# Patient Record
Sex: FEMALE | Race: WHITE | NOT HISPANIC OR LATINO | Employment: OTHER | URBAN - METROPOLITAN AREA
[De-identification: names, ages, dates, MRNs, and addresses within clinical notes are randomized per-mention and may not be internally consistent; named-entity substitution may affect disease eponyms.]

---

## 2017-01-03 ENCOUNTER — COMMUNICATION - HEALTHEAST (OUTPATIENT)
Dept: FAMILY MEDICINE | Facility: CLINIC | Age: 42
End: 2017-01-03

## 2017-01-03 DIAGNOSIS — J45.909 ASTHMA: ICD-10-CM

## 2017-01-05 ENCOUNTER — COMMUNICATION - HEALTHEAST (OUTPATIENT)
Dept: FAMILY MEDICINE | Facility: CLINIC | Age: 42
End: 2017-01-05

## 2017-01-05 DIAGNOSIS — F16.283 FLASHBACKS (H): ICD-10-CM

## 2017-01-19 ENCOUNTER — COMMUNICATION - HEALTHEAST (OUTPATIENT)
Dept: FAMILY MEDICINE | Facility: CLINIC | Age: 42
End: 2017-01-19

## 2017-02-03 ENCOUNTER — COMMUNICATION - HEALTHEAST (OUTPATIENT)
Dept: FAMILY MEDICINE | Facility: CLINIC | Age: 42
End: 2017-02-03

## 2017-02-03 DIAGNOSIS — J45.909 ASTHMA: ICD-10-CM

## 2017-02-06 ENCOUNTER — COMMUNICATION - HEALTHEAST (OUTPATIENT)
Dept: FAMILY MEDICINE | Facility: CLINIC | Age: 42
End: 2017-02-06

## 2017-03-07 ENCOUNTER — COMMUNICATION - HEALTHEAST (OUTPATIENT)
Dept: FAMILY MEDICINE | Facility: CLINIC | Age: 42
End: 2017-03-07

## 2017-03-07 DIAGNOSIS — K21.9 GERD (GASTROESOPHAGEAL REFLUX DISEASE): ICD-10-CM

## 2017-03-15 ENCOUNTER — COMMUNICATION - HEALTHEAST (OUTPATIENT)
Dept: SCHEDULING | Facility: CLINIC | Age: 42
End: 2017-03-15

## 2017-04-04 ENCOUNTER — COMMUNICATION - HEALTHEAST (OUTPATIENT)
Dept: FAMILY MEDICINE | Facility: CLINIC | Age: 42
End: 2017-04-04

## 2017-04-13 ENCOUNTER — COMMUNICATION - HEALTHEAST (OUTPATIENT)
Dept: FAMILY MEDICINE | Facility: CLINIC | Age: 42
End: 2017-04-13

## 2017-04-13 DIAGNOSIS — K59.00 CONSTIPATION: ICD-10-CM

## 2017-04-17 ENCOUNTER — COMMUNICATION - HEALTHEAST (OUTPATIENT)
Dept: SCHEDULING | Facility: CLINIC | Age: 42
End: 2017-04-17

## 2017-04-17 DIAGNOSIS — T78.2XXA: ICD-10-CM

## 2017-04-24 ENCOUNTER — COMMUNICATION - HEALTHEAST (OUTPATIENT)
Dept: INTERNAL MEDICINE | Facility: CLINIC | Age: 42
End: 2017-04-24

## 2017-04-24 DIAGNOSIS — T78.2XXA: ICD-10-CM

## 2017-04-25 ENCOUNTER — COMMUNICATION - HEALTHEAST (OUTPATIENT)
Dept: FAMILY MEDICINE | Facility: CLINIC | Age: 42
End: 2017-04-25

## 2017-05-04 ENCOUNTER — COMMUNICATION - HEALTHEAST (OUTPATIENT)
Dept: SCHEDULING | Facility: CLINIC | Age: 42
End: 2017-05-04

## 2017-05-04 DIAGNOSIS — T78.2XXA: ICD-10-CM

## 2017-05-25 ENCOUNTER — COMMUNICATION - HEALTHEAST (OUTPATIENT)
Dept: SCHEDULING | Facility: CLINIC | Age: 42
End: 2017-05-25

## 2018-07-27 ENCOUNTER — TRANSFERRED RECORDS (OUTPATIENT)
Dept: HEALTH INFORMATION MANAGEMENT | Facility: CLINIC | Age: 43
End: 2018-07-27

## 2019-01-18 ENCOUNTER — RECORDS - HEALTHEAST (OUTPATIENT)
Dept: ADMINISTRATIVE | Facility: OTHER | Age: 44
End: 2019-01-18

## 2019-01-30 ENCOUNTER — RECORDS - HEALTHEAST (OUTPATIENT)
Dept: ADMINISTRATIVE | Facility: OTHER | Age: 44
End: 2019-01-30

## 2019-01-31 ENCOUNTER — RECORDS - HEALTHEAST (OUTPATIENT)
Dept: ADMINISTRATIVE | Facility: OTHER | Age: 44
End: 2019-01-31

## 2019-02-05 ENCOUNTER — RECORDS - HEALTHEAST (OUTPATIENT)
Dept: ADMINISTRATIVE | Facility: OTHER | Age: 44
End: 2019-02-05

## 2019-02-13 ENCOUNTER — COMMUNICATION - HEALTHEAST (OUTPATIENT)
Dept: SCHEDULING | Facility: CLINIC | Age: 44
End: 2019-02-13

## 2019-03-08 ENCOUNTER — OFFICE VISIT - HEALTHEAST (OUTPATIENT)
Dept: FAMILY MEDICINE | Facility: CLINIC | Age: 44
End: 2019-03-08

## 2019-03-08 DIAGNOSIS — I10 ESSENTIAL HYPERTENSION: ICD-10-CM

## 2019-03-08 DIAGNOSIS — J45.40 MODERATE PERSISTENT ASTHMA WITHOUT COMPLICATION: ICD-10-CM

## 2019-03-08 DIAGNOSIS — Z86.711 PERSONAL HISTORY OF PE (PULMONARY EMBOLISM): ICD-10-CM

## 2019-03-08 DIAGNOSIS — Z01.818 PRE-OPERATIVE EXAMINATION: ICD-10-CM

## 2019-03-08 DIAGNOSIS — E66.01 MORBID OBESITY (H): ICD-10-CM

## 2019-03-08 LAB
ANION GAP SERPL CALCULATED.3IONS-SCNC: 8 MMOL/L (ref 5–18)
BUN SERPL-MCNC: 11 MG/DL (ref 8–22)
CALCIUM SERPL-MCNC: 9 MG/DL (ref 8.5–10.5)
CHLORIDE BLD-SCNC: 108 MMOL/L (ref 98–107)
CO2 SERPL-SCNC: 24 MMOL/L (ref 22–31)
CREAT SERPL-MCNC: 0.79 MG/DL (ref 0.6–1.1)
ERYTHROCYTE [DISTWIDTH] IN BLOOD BY AUTOMATED COUNT: 12.8 % (ref 11–14.5)
GFR SERPL CREATININE-BSD FRML MDRD: >60 ML/MIN/1.73M2
GLUCOSE BLD-MCNC: 92 MG/DL (ref 70–125)
HCT VFR BLD AUTO: 40.2 % (ref 35–47)
HGB BLD-MCNC: 13.6 G/DL (ref 12–16)
MCH RBC QN AUTO: 28.7 PG (ref 27–34)
MCHC RBC AUTO-ENTMCNC: 34 G/DL (ref 32–36)
MCV RBC AUTO: 84 FL (ref 80–100)
PLATELET # BLD AUTO: 279 THOU/UL (ref 140–440)
PMV BLD AUTO: 8.6 FL (ref 7–10)
POTASSIUM BLD-SCNC: 3.7 MMOL/L (ref 3.5–5)
RBC # BLD AUTO: 4.76 MILL/UL (ref 3.8–5.4)
SODIUM SERPL-SCNC: 140 MMOL/L (ref 136–145)
WBC: 7.6 THOU/UL (ref 4–11)

## 2019-03-08 ASSESSMENT — MIFFLIN-ST. JEOR: SCORE: 1699.67

## 2019-03-11 ENCOUNTER — AMBULATORY - HEALTHEAST (OUTPATIENT)
Dept: PHARMACY | Facility: CLINIC | Age: 44
End: 2019-03-11

## 2019-03-11 DIAGNOSIS — J45.40 MODERATE PERSISTENT ASTHMA WITHOUT COMPLICATION: ICD-10-CM

## 2019-03-12 ENCOUNTER — COMMUNICATION - HEALTHEAST (OUTPATIENT)
Dept: FAMILY MEDICINE | Facility: CLINIC | Age: 44
End: 2019-03-12

## 2019-03-12 ENCOUNTER — OFFICE VISIT - HEALTHEAST (OUTPATIENT)
Dept: FAMILY MEDICINE | Facility: CLINIC | Age: 44
End: 2019-03-12

## 2019-03-12 DIAGNOSIS — R10.9 LEFT FLANK PAIN: ICD-10-CM

## 2019-03-12 LAB
ALBUMIN UR-MCNC: NEGATIVE MG/DL
APPEARANCE UR: CLEAR
BILIRUB UR QL STRIP: NEGATIVE
COLOR UR AUTO: YELLOW
GLUCOSE UR STRIP-MCNC: NEGATIVE MG/DL
HGB UR QL STRIP: ABNORMAL
KETONES UR STRIP-MCNC: NEGATIVE MG/DL
LEUKOCYTE ESTERASE UR QL STRIP: NEGATIVE
NITRATE UR QL: NEGATIVE
PH UR STRIP: 6 [PH] (ref 5–8)
SP GR UR STRIP: >=1.03 (ref 1–1.03)
UROBILINOGEN UR STRIP-ACNC: ABNORMAL

## 2019-03-15 ENCOUNTER — RECORDS - HEALTHEAST (OUTPATIENT)
Dept: GENERAL RADIOLOGY | Facility: CLINIC | Age: 44
End: 2019-03-15

## 2019-03-15 ENCOUNTER — OFFICE VISIT - HEALTHEAST (OUTPATIENT)
Dept: FAMILY MEDICINE | Facility: CLINIC | Age: 44
End: 2019-03-15
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
## 2019-03-15 DIAGNOSIS — S63.501A WRIST SPRAIN, RIGHT, INITIAL ENCOUNTER: ICD-10-CM
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
## 2019-03-15 DIAGNOSIS — M25.551 HIP PAIN, RIGHT: ICD-10-CM

## 2019-03-18 ENCOUNTER — ANESTHESIA - HEALTHEAST (OUTPATIENT)
Dept: SURGERY | Facility: CLINIC | Age: 44
End: 2019-03-18

## 2019-03-19 ENCOUNTER — SURGERY - HEALTHEAST (OUTPATIENT)
Dept: SURGERY | Facility: CLINIC | Age: 44
End: 2019-03-19

## 2019-04-15 ENCOUNTER — OFFICE VISIT - HEALTHEAST (OUTPATIENT)
Dept: FAMILY MEDICINE | Facility: CLINIC | Age: 44
End: 2019-04-15

## 2019-04-15 DIAGNOSIS — N83.202 LEFT OVARIAN CYST: ICD-10-CM

## 2019-04-15 DIAGNOSIS — F41.1 GAD (GENERALIZED ANXIETY DISORDER): ICD-10-CM

## 2019-04-15 DIAGNOSIS — G43.009 MIGRAINE WITHOUT AURA AND WITHOUT STATUS MIGRAINOSUS, NOT INTRACTABLE: ICD-10-CM

## 2019-04-15 DIAGNOSIS — D25.9 UTERINE LEIOMYOMA, UNSPECIFIED LOCATION: ICD-10-CM

## 2019-04-15 DIAGNOSIS — J45.40 MODERATE PERSISTENT ASTHMA WITHOUT COMPLICATION: ICD-10-CM

## 2019-04-15 DIAGNOSIS — I10 ESSENTIAL HYPERTENSION: ICD-10-CM

## 2019-04-15 DIAGNOSIS — Q51.28 DIDELPHIC UTERUS: ICD-10-CM

## 2019-05-01 ENCOUNTER — RECORDS - HEALTHEAST (OUTPATIENT)
Dept: ADMINISTRATIVE | Facility: OTHER | Age: 44
End: 2019-05-01

## 2019-05-01 LAB
HPV_EXT - HISTORICAL: NORMAL
PAP SMEAR - HIM PATIENT REPORTED: NORMAL

## 2019-05-08 ENCOUNTER — RECORDS - HEALTHEAST (OUTPATIENT)
Dept: FAMILY MEDICINE | Facility: CLINIC | Age: 44
End: 2019-05-08

## 2019-05-08 ENCOUNTER — COMMUNICATION - HEALTHEAST (OUTPATIENT)
Dept: FAMILY MEDICINE | Facility: CLINIC | Age: 44
End: 2019-05-08

## 2019-05-08 DIAGNOSIS — J45.40 MODERATE PERSISTENT ASTHMA WITHOUT COMPLICATION: ICD-10-CM

## 2019-05-11 ENCOUNTER — COMMUNICATION - HEALTHEAST (OUTPATIENT)
Dept: SCHEDULING | Facility: CLINIC | Age: 44
End: 2019-05-11

## 2019-05-14 ENCOUNTER — OFFICE VISIT - HEALTHEAST (OUTPATIENT)
Dept: FAMILY MEDICINE | Facility: CLINIC | Age: 44
End: 2019-05-14

## 2019-05-14 DIAGNOSIS — Z91.09 ENVIRONMENTAL ALLERGIES: ICD-10-CM

## 2019-05-16 ENCOUNTER — RECORDS - HEALTHEAST (OUTPATIENT)
Dept: HEALTH INFORMATION MANAGEMENT | Facility: CLINIC | Age: 44
End: 2019-05-16

## 2019-05-28 ENCOUNTER — RECORDS - HEALTHEAST (OUTPATIENT)
Dept: ADMINISTRATIVE | Facility: OTHER | Age: 44
End: 2019-05-28

## 2019-06-07 ENCOUNTER — COMMUNICATION - HEALTHEAST (OUTPATIENT)
Dept: FAMILY MEDICINE | Facility: CLINIC | Age: 44
End: 2019-06-07

## 2019-06-07 DIAGNOSIS — Z91.09 ENVIRONMENTAL ALLERGIES: ICD-10-CM

## 2019-06-10 ENCOUNTER — OFFICE VISIT - HEALTHEAST (OUTPATIENT)
Dept: FAMILY MEDICINE | Facility: CLINIC | Age: 44
End: 2019-06-10

## 2019-06-10 DIAGNOSIS — R13.19 OTHER DYSPHAGIA: ICD-10-CM

## 2019-06-10 DIAGNOSIS — J45.40 MODERATE PERSISTENT ASTHMA WITHOUT COMPLICATION: ICD-10-CM

## 2019-06-10 ASSESSMENT — MIFFLIN-ST. JEOR: SCORE: 1706.82

## 2019-07-06 ENCOUNTER — COMMUNICATION - HEALTHEAST (OUTPATIENT)
Dept: SCHEDULING | Facility: CLINIC | Age: 44
End: 2019-07-06

## 2019-07-07 ENCOUNTER — OFFICE VISIT - HEALTHEAST (OUTPATIENT)
Dept: FAMILY MEDICINE | Facility: CLINIC | Age: 44
End: 2019-07-07

## 2019-07-07 DIAGNOSIS — L03.811 ABSCESS OR CELLULITIS OF SCALP: ICD-10-CM

## 2019-07-07 DIAGNOSIS — R21 RASH: ICD-10-CM

## 2019-07-08 LAB
VARICELLA ZOSTER DNA COMMENT: NORMAL
VZV DNA SPEC QL NAA+PROBE: NORMAL
VZV PCR SPECIMEN: NORMAL

## 2019-07-15 ENCOUNTER — RECORDS - HEALTHEAST (OUTPATIENT)
Dept: ADMINISTRATIVE | Facility: OTHER | Age: 44
End: 2019-07-15

## 2019-08-01 ENCOUNTER — OFFICE VISIT - HEALTHEAST (OUTPATIENT)
Dept: FAMILY MEDICINE | Facility: CLINIC | Age: 44
End: 2019-08-01

## 2019-08-01 DIAGNOSIS — J45.40 MODERATE PERSISTENT ASTHMA WITHOUT COMPLICATION: ICD-10-CM

## 2019-08-01 DIAGNOSIS — Z91.09 ENVIRONMENTAL ALLERGIES: ICD-10-CM

## 2019-08-01 DIAGNOSIS — M25.561 CHRONIC PAIN OF RIGHT KNEE: ICD-10-CM

## 2019-08-01 DIAGNOSIS — Z13.228 SCREENING FOR METABOLIC DISORDER: ICD-10-CM

## 2019-08-01 DIAGNOSIS — I10 ESSENTIAL HYPERTENSION: ICD-10-CM

## 2019-08-01 DIAGNOSIS — Z91.030 H/O BEE STING ALLERGY: ICD-10-CM

## 2019-08-01 DIAGNOSIS — H91.93 BILATERAL DEAFNESS: ICD-10-CM

## 2019-08-01 DIAGNOSIS — G89.29 CHRONIC PAIN OF RIGHT KNEE: ICD-10-CM

## 2019-08-01 LAB
CHOLEST SERPL-MCNC: 148 MG/DL
FASTING STATUS PATIENT QL REPORTED: ABNORMAL
HBA1C MFR BLD: 5.7 % (ref 3.5–6)
HDLC SERPL-MCNC: 38 MG/DL
LDLC SERPL CALC-MCNC: 95 MG/DL
TRIGL SERPL-MCNC: 74 MG/DL

## 2019-08-01 ASSESSMENT — MIFFLIN-ST. JEOR: SCORE: 1664.41

## 2019-08-02 ENCOUNTER — COMMUNICATION - HEALTHEAST (OUTPATIENT)
Dept: SCHEDULING | Facility: CLINIC | Age: 44
End: 2019-08-02

## 2019-08-21 ENCOUNTER — COMMUNICATION - HEALTHEAST (OUTPATIENT)
Dept: ADMINISTRATIVE | Facility: CLINIC | Age: 44
End: 2019-08-21

## 2019-08-22 ENCOUNTER — RECORDS - HEALTHEAST (OUTPATIENT)
Dept: ADMINISTRATIVE | Facility: OTHER | Age: 44
End: 2019-08-22

## 2019-08-28 ENCOUNTER — COMMUNICATION - HEALTHEAST (OUTPATIENT)
Dept: FAMILY MEDICINE | Facility: CLINIC | Age: 44
End: 2019-08-28

## 2019-08-28 DIAGNOSIS — J45.40 MODERATE PERSISTENT ASTHMA WITHOUT COMPLICATION: ICD-10-CM

## 2019-08-28 DIAGNOSIS — F41.1 GAD (GENERALIZED ANXIETY DISORDER): ICD-10-CM

## 2019-10-08 ENCOUNTER — COMMUNICATION - HEALTHEAST (OUTPATIENT)
Dept: FAMILY MEDICINE | Facility: CLINIC | Age: 44
End: 2019-10-08

## 2019-10-16 ENCOUNTER — COMMUNICATION - HEALTHEAST (OUTPATIENT)
Dept: FAMILY MEDICINE | Facility: CLINIC | Age: 44
End: 2019-10-16

## 2019-10-16 ENCOUNTER — OFFICE VISIT - HEALTHEAST (OUTPATIENT)
Dept: FAMILY MEDICINE | Facility: CLINIC | Age: 44
End: 2019-10-16

## 2019-10-16 DIAGNOSIS — D25.9 UTERINE LEIOMYOMA, UNSPECIFIED LOCATION: ICD-10-CM

## 2019-10-16 DIAGNOSIS — Q51.28 DIDELPHIC UTERUS: ICD-10-CM

## 2019-10-16 DIAGNOSIS — H91.93 BILATERAL DEAFNESS: ICD-10-CM

## 2019-10-16 DIAGNOSIS — N92.6 IRREGULAR MENSES: ICD-10-CM

## 2019-10-20 ENCOUNTER — COMMUNICATION - HEALTHEAST (OUTPATIENT)
Dept: SCHEDULING | Facility: CLINIC | Age: 44
End: 2019-10-20

## 2019-10-21 ENCOUNTER — OFFICE VISIT - HEALTHEAST (OUTPATIENT)
Dept: FAMILY MEDICINE | Facility: CLINIC | Age: 44
End: 2019-10-21

## 2019-10-21 ENCOUNTER — COMMUNICATION - HEALTHEAST (OUTPATIENT)
Dept: FAMILY MEDICINE | Facility: CLINIC | Age: 44
End: 2019-10-21

## 2019-10-21 ENCOUNTER — HOSPITAL ENCOUNTER (OUTPATIENT)
Dept: MAMMOGRAPHY | Facility: CLINIC | Age: 44
Discharge: HOME | End: 2019-10-21
Attending: FAMILY MEDICINE

## 2019-10-21 DIAGNOSIS — Z12.31 VISIT FOR SCREENING MAMMOGRAM: ICD-10-CM

## 2019-10-21 DIAGNOSIS — T39.1X1S: ICD-10-CM

## 2019-10-21 DIAGNOSIS — R41.0 DELIRIUM: ICD-10-CM

## 2019-10-21 LAB
ERYTHROCYTE [DISTWIDTH] IN BLOOD BY AUTOMATED COUNT: 12.2 % (ref 11–14.5)
HCT VFR BLD AUTO: 37.8 % (ref 35–47)
HGB BLD-MCNC: 12.6 G/DL (ref 12–16)
MCH RBC QN AUTO: 28.4 PG (ref 27–34)
MCHC RBC AUTO-ENTMCNC: 33.5 G/DL (ref 32–36)
MCV RBC AUTO: 85 FL (ref 80–100)
PLATELET # BLD AUTO: 291 THOU/UL (ref 140–440)
PMV BLD AUTO: 8.3 FL (ref 7–10)
RBC # BLD AUTO: 4.45 MILL/UL (ref 3.8–5.4)
WBC: 8.3 THOU/UL (ref 4–11)

## 2019-10-21 ASSESSMENT — ANXIETY QUESTIONNAIRES
2. NOT BEING ABLE TO STOP OR CONTROL WORRYING: NOT AT ALL
7. FEELING AFRAID AS IF SOMETHING AWFUL MIGHT HAPPEN: SEVERAL DAYS
GAD7 TOTAL SCORE: 8
5. BEING SO RESTLESS THAT IT IS HARD TO SIT STILL: SEVERAL DAYS
IF YOU CHECKED OFF ANY PROBLEMS ON THIS QUESTIONNAIRE, HOW DIFFICULT HAVE THESE PROBLEMS MADE IT FOR YOU TO DO YOUR WORK, TAKE CARE OF THINGS AT HOME, OR GET ALONG WITH OTHER PEOPLE: NOT DIFFICULT AT ALL
4. TROUBLE RELAXING: MORE THAN HALF THE DAYS
1. FEELING NERVOUS, ANXIOUS, OR ON EDGE: MORE THAN HALF THE DAYS
6. BECOMING EASILY ANNOYED OR IRRITABLE: NOT AT ALL
3. WORRYING TOO MUCH ABOUT DIFFERENT THINGS: MORE THAN HALF THE DAYS

## 2019-10-21 ASSESSMENT — PATIENT HEALTH QUESTIONNAIRE - PHQ9: SUM OF ALL RESPONSES TO PHQ QUESTIONS 1-9: 6

## 2019-10-22 ENCOUNTER — RECORDS - HEALTHEAST (OUTPATIENT)
Dept: RADIOLOGY | Facility: CLINIC | Age: 44
End: 2019-10-22

## 2019-10-22 ENCOUNTER — COMMUNICATION - HEALTHEAST (OUTPATIENT)
Dept: FAMILY MEDICINE | Facility: CLINIC | Age: 44
End: 2019-10-22

## 2019-10-22 LAB
ALBUMIN SERPL-MCNC: 3.6 G/DL (ref 3.5–5)
ALP SERPL-CCNC: 93 U/L (ref 45–120)
ALT SERPL W P-5'-P-CCNC: 19 U/L (ref 0–45)
ANION GAP SERPL CALCULATED.3IONS-SCNC: 9 MMOL/L (ref 5–18)
APAP SERPL-MCNC: <3 UG/ML (ref 10–20)
AST SERPL W P-5'-P-CCNC: 15 U/L (ref 0–40)
BILIRUB SERPL-MCNC: 0.4 MG/DL (ref 0–1)
BUN SERPL-MCNC: 11 MG/DL (ref 8–22)
CALCIUM SERPL-MCNC: 9.1 MG/DL (ref 8.5–10.5)
CHLORIDE BLD-SCNC: 109 MMOL/L (ref 98–107)
CO2 SERPL-SCNC: 23 MMOL/L (ref 22–31)
CREAT SERPL-MCNC: 0.82 MG/DL (ref 0.6–1.1)
GFR SERPL CREATININE-BSD FRML MDRD: >60 ML/MIN/1.73M2
GLUCOSE BLD-MCNC: 129 MG/DL (ref 70–125)
POTASSIUM BLD-SCNC: 3.2 MMOL/L (ref 3.5–5)
PROT SERPL-MCNC: 6.7 G/DL (ref 6–8)
SODIUM SERPL-SCNC: 141 MMOL/L (ref 136–145)

## 2019-10-24 ENCOUNTER — COMMUNICATION - HEALTHEAST (OUTPATIENT)
Dept: SCHEDULING | Facility: CLINIC | Age: 44
End: 2019-10-24

## 2019-10-25 ENCOUNTER — RECORDS - HEALTHEAST (OUTPATIENT)
Dept: ADMINISTRATIVE | Facility: OTHER | Age: 44
End: 2019-10-25

## 2019-10-25 ENCOUNTER — NURSE TRIAGE (OUTPATIENT)
Dept: NURSING | Facility: CLINIC | Age: 44
End: 2019-10-25

## 2019-10-25 NOTE — TELEPHONE ENCOUNTER
"Caller is speaking thru an interpretor with sign language thru a video/ number [phone].  She noticed a lump in her right breast/ on 10/21/ says she cannot get into her clinic until November/ wants to know if she can go into the er [facility] in [place] and if they can help her there.  She will do that now.  Tod Morejon RN [phone]  Additional Information    Negative: Chest pain    Negative: Breastfeeding questions    Negative: Postpartum breast pain and swelling, is breastfeeding    Negative: Postpartum breast pain and swelling, not breastfeeding    Negative: Small spot, skin growth or mole    Negative: SEVERE breast pain and fever > 103 F (39.4 C)    Negative: Patient sounds very sick or weak to the triager    Negative: Breast looks infected (spreading redness, feels hot or painful to touch) and fever    Negative: Breast looks infected (spreading redness, feels hot or painful to touch) and no fever    Negative: Painful rash and multiple small blisters grouped together (i.e., dermatomal distribution or \"band\" or \"stripe\")    Negative: Cuts, burns, or bruises of breasts and suspicious history for the injury    Breast lump    Protocols used: BREAST SYMPTOMS-A-OH      "

## 2019-11-05 ENCOUNTER — COMMUNICATION - HEALTHEAST (OUTPATIENT)
Dept: FAMILY MEDICINE | Facility: CLINIC | Age: 44
End: 2019-11-05

## 2019-11-05 ENCOUNTER — HOSPITAL ENCOUNTER (OUTPATIENT)
Dept: MAMMOGRAPHY | Facility: CLINIC | Age: 44
Discharge: HOME | End: 2019-11-05
Attending: FAMILY MEDICINE

## 2019-11-05 DIAGNOSIS — N64.89 BREAST ASYMMETRY: ICD-10-CM

## 2019-11-08 ENCOUNTER — OFFICE VISIT - HEALTHEAST (OUTPATIENT)
Dept: INTERNAL MEDICINE | Facility: CLINIC | Age: 44
End: 2019-11-08

## 2019-11-08 DIAGNOSIS — F41.9 ANXIETY: ICD-10-CM

## 2019-11-08 DIAGNOSIS — I10 ESSENTIAL HYPERTENSION: ICD-10-CM

## 2019-11-08 DIAGNOSIS — J20.9 ACUTE BRONCHITIS WITH SYMPTOMS > 10 DAYS: ICD-10-CM

## 2019-11-08 DIAGNOSIS — F32.A DEPRESSION, UNSPECIFIED DEPRESSION TYPE: ICD-10-CM

## 2019-11-08 DIAGNOSIS — G47.00 INSOMNIA, UNSPECIFIED TYPE: ICD-10-CM

## 2019-11-08 DIAGNOSIS — F60.3 BORDERLINE PERSONALITY DISORDER (H): ICD-10-CM

## 2019-11-08 ASSESSMENT — ANXIETY QUESTIONNAIRES
1. FEELING NERVOUS, ANXIOUS, OR ON EDGE: MORE THAN HALF THE DAYS
3. WORRYING TOO MUCH ABOUT DIFFERENT THINGS: MORE THAN HALF THE DAYS
IF YOU CHECKED OFF ANY PROBLEMS ON THIS QUESTIONNAIRE, HOW DIFFICULT HAVE THESE PROBLEMS MADE IT FOR YOU TO DO YOUR WORK, TAKE CARE OF THINGS AT HOME, OR GET ALONG WITH OTHER PEOPLE: SOMEWHAT DIFFICULT
7. FEELING AFRAID AS IF SOMETHING AWFUL MIGHT HAPPEN: NOT AT ALL
6. BECOMING EASILY ANNOYED OR IRRITABLE: SEVERAL DAYS
GAD7 TOTAL SCORE: 10
4. TROUBLE RELAXING: MORE THAN HALF THE DAYS
5. BEING SO RESTLESS THAT IT IS HARD TO SIT STILL: SEVERAL DAYS
2. NOT BEING ABLE TO STOP OR CONTROL WORRYING: MORE THAN HALF THE DAYS

## 2019-11-08 ASSESSMENT — MIFFLIN-ST. JEOR: SCORE: 1679.15

## 2019-11-08 ASSESSMENT — PATIENT HEALTH QUESTIONNAIRE - PHQ9: SUM OF ALL RESPONSES TO PHQ QUESTIONS 1-9: 14

## 2019-11-11 ENCOUNTER — COMMUNICATION - HEALTHEAST (OUTPATIENT)
Dept: FAMILY MEDICINE | Facility: CLINIC | Age: 44
End: 2019-11-11

## 2019-11-15 ENCOUNTER — OFFICE VISIT - HEALTHEAST (OUTPATIENT)
Dept: FAMILY MEDICINE | Facility: CLINIC | Age: 44
End: 2019-11-15

## 2019-11-15 ENCOUNTER — COMMUNICATION - HEALTHEAST (OUTPATIENT)
Dept: FAMILY MEDICINE | Facility: CLINIC | Age: 44
End: 2019-11-15

## 2019-11-15 DIAGNOSIS — E87.6 LOW POTASSIUM SYNDROME: ICD-10-CM

## 2019-11-15 DIAGNOSIS — F60.3 BORDERLINE PERSONALITY DISORDER (H): ICD-10-CM

## 2019-11-15 DIAGNOSIS — R73.09 ELEVATED GLUCOSE: ICD-10-CM

## 2019-11-15 DIAGNOSIS — J45.40 MODERATE PERSISTENT ASTHMA WITHOUT COMPLICATION: ICD-10-CM

## 2019-11-15 DIAGNOSIS — S33.5XXA LUMBAR BACK SPRAIN, INITIAL ENCOUNTER: ICD-10-CM

## 2019-11-15 LAB
ANION GAP SERPL CALCULATED.3IONS-SCNC: 10 MMOL/L (ref 5–18)
BUN SERPL-MCNC: 14 MG/DL (ref 8–22)
CALCIUM SERPL-MCNC: 8.8 MG/DL (ref 8.5–10.5)
CHLORIDE BLD-SCNC: 108 MMOL/L (ref 98–107)
CO2 SERPL-SCNC: 25 MMOL/L (ref 22–31)
CREAT SERPL-MCNC: 0.81 MG/DL (ref 0.6–1.1)
GFR SERPL CREATININE-BSD FRML MDRD: >60 ML/MIN/1.73M2
GLUCOSE BLD-MCNC: 120 MG/DL (ref 70–125)
HBA1C MFR BLD: 6 % (ref 3.5–6)
POTASSIUM BLD-SCNC: 3.4 MMOL/L (ref 3.5–5)
SODIUM SERPL-SCNC: 143 MMOL/L (ref 136–145)

## 2019-11-15 ASSESSMENT — MIFFLIN-ST. JEOR: SCORE: 1679.15

## 2019-11-15 ASSESSMENT — PATIENT HEALTH QUESTIONNAIRE - PHQ9: SUM OF ALL RESPONSES TO PHQ QUESTIONS 1-9: 8

## 2019-11-18 ENCOUNTER — COMMUNICATION - HEALTHEAST (OUTPATIENT)
Dept: FAMILY MEDICINE | Facility: CLINIC | Age: 44
End: 2019-11-18

## 2019-11-21 ENCOUNTER — HOSPITAL ENCOUNTER (OUTPATIENT)
Dept: MAMMOGRAPHY | Facility: CLINIC | Age: 44
Discharge: HOME | End: 2019-11-21

## 2019-11-21 ENCOUNTER — HOSPITAL ENCOUNTER (OUTPATIENT)
Dept: MAMMOGRAPHY | Facility: CLINIC | Age: 44
Discharge: HOME | End: 2019-11-21
Admitting: RADIOLOGY

## 2019-11-21 DIAGNOSIS — N64.89 BREAST ASYMMETRY: ICD-10-CM

## 2019-11-22 ENCOUNTER — COMMUNICATION - HEALTHEAST (OUTPATIENT)
Dept: MAMMOGRAPHY | Facility: CLINIC | Age: 44
End: 2019-11-22

## 2019-11-22 LAB
LAB AP CHARGES (HE HISTORICAL CONVERSION): NORMAL
PATH REPORT.COMMENTS IMP SPEC: NORMAL
PATH REPORT.COMMENTS IMP SPEC: NORMAL
PATH REPORT.FINAL DX SPEC: NORMAL
PATH REPORT.GROSS SPEC: NORMAL
PATH REPORT.MICROSCOPIC SPEC OTHER STN: NORMAL
PATH REPORT.RELEVANT HX SPEC: NORMAL
RESULT FLAG (HE HISTORICAL CONVERSION): NORMAL

## 2019-11-25 ENCOUNTER — COMMUNICATION - HEALTHEAST (OUTPATIENT)
Dept: FAMILY MEDICINE | Facility: CLINIC | Age: 44
End: 2019-11-25

## 2019-11-25 ENCOUNTER — COMMUNICATION - HEALTHEAST (OUTPATIENT)
Dept: SCHEDULING | Facility: CLINIC | Age: 44
End: 2019-11-25

## 2020-01-10 ENCOUNTER — OFFICE VISIT - HEALTHEAST (OUTPATIENT)
Dept: INTERNAL MEDICINE | Facility: CLINIC | Age: 45
End: 2020-01-10

## 2020-01-10 DIAGNOSIS — J30.2 SEASONAL ALLERGIC RHINITIS, UNSPECIFIED TRIGGER: ICD-10-CM

## 2020-01-10 DIAGNOSIS — I10 ESSENTIAL HYPERTENSION: ICD-10-CM

## 2020-01-10 DIAGNOSIS — R73.03 PREDIABETES: ICD-10-CM

## 2020-01-10 ASSESSMENT — MIFFLIN-ST. JEOR: SCORE: 1715.44

## 2020-01-29 ENCOUNTER — OFFICE VISIT - HEALTHEAST (OUTPATIENT)
Dept: FAMILY MEDICINE | Facility: CLINIC | Age: 45
End: 2020-01-29

## 2020-01-29 DIAGNOSIS — L60.2 THICKENED NAILS: ICD-10-CM

## 2020-02-04 ENCOUNTER — COMMUNICATION - HEALTHEAST (OUTPATIENT)
Dept: FAMILY MEDICINE | Facility: CLINIC | Age: 45
End: 2020-02-04

## 2020-02-06 ENCOUNTER — RECORDS - HEALTHEAST (OUTPATIENT)
Dept: ADMINISTRATIVE | Facility: OTHER | Age: 45
End: 2020-02-06

## 2020-02-13 ENCOUNTER — COMMUNICATION - HEALTHEAST (OUTPATIENT)
Dept: FAMILY MEDICINE | Facility: CLINIC | Age: 45
End: 2020-02-13

## 2020-02-13 DIAGNOSIS — I10 ESSENTIAL HYPERTENSION: ICD-10-CM

## 2020-02-23 ENCOUNTER — OFFICE VISIT - HEALTHEAST (OUTPATIENT)
Dept: FAMILY MEDICINE | Facility: CLINIC | Age: 45
End: 2020-02-23

## 2020-02-23 DIAGNOSIS — B35.1 ONYCHOMYCOSIS: ICD-10-CM

## 2020-02-26 ENCOUNTER — RECORDS - HEALTHEAST (OUTPATIENT)
Dept: ADMINISTRATIVE | Facility: OTHER | Age: 45
End: 2020-02-26

## 2020-02-28 ENCOUNTER — OFFICE VISIT - HEALTHEAST (OUTPATIENT)
Dept: FAMILY MEDICINE | Facility: CLINIC | Age: 45
End: 2020-02-28

## 2020-02-28 ENCOUNTER — COMMUNICATION - HEALTHEAST (OUTPATIENT)
Dept: FAMILY MEDICINE | Facility: CLINIC | Age: 45
End: 2020-02-28

## 2020-02-28 DIAGNOSIS — H60.391 INFECTIVE OTITIS EXTERNA, RIGHT: ICD-10-CM

## 2020-02-28 DIAGNOSIS — E87.6 LOW SERUM POTASSIUM: ICD-10-CM

## 2020-02-28 DIAGNOSIS — Z13.220 SCREENING FOR LIPID DISORDERS: ICD-10-CM

## 2020-02-28 LAB
ANION GAP SERPL CALCULATED.3IONS-SCNC: 8 MMOL/L (ref 5–18)
BUN SERPL-MCNC: 10 MG/DL (ref 8–22)
CALCIUM SERPL-MCNC: 8.9 MG/DL (ref 8.5–10.5)
CHLORIDE BLD-SCNC: 107 MMOL/L (ref 98–107)
CHOLEST SERPL-MCNC: 165 MG/DL
CO2 SERPL-SCNC: 28 MMOL/L (ref 22–31)
CREAT SERPL-MCNC: 0.81 MG/DL (ref 0.6–1.1)
FASTING STATUS PATIENT QL REPORTED: ABNORMAL
GFR SERPL CREATININE-BSD FRML MDRD: >60 ML/MIN/1.73M2
GLUCOSE BLD-MCNC: 102 MG/DL (ref 70–125)
HDLC SERPL-MCNC: 42 MG/DL
LDLC SERPL CALC-MCNC: 103 MG/DL
POTASSIUM BLD-SCNC: 3.4 MMOL/L (ref 3.5–5)
SODIUM SERPL-SCNC: 143 MMOL/L (ref 136–145)
TRIGL SERPL-MCNC: 100 MG/DL

## 2020-02-28 ASSESSMENT — MIFFLIN-ST. JEOR: SCORE: 1688.23

## 2020-03-02 ENCOUNTER — COMMUNICATION - HEALTHEAST (OUTPATIENT)
Dept: FAMILY MEDICINE | Facility: CLINIC | Age: 45
End: 2020-03-02

## 2020-03-05 ENCOUNTER — COMMUNICATION - HEALTHEAST (OUTPATIENT)
Dept: SCHEDULING | Facility: CLINIC | Age: 45
End: 2020-03-05

## 2020-03-25 ENCOUNTER — COMMUNICATION - HEALTHEAST (OUTPATIENT)
Dept: FAMILY MEDICINE | Facility: CLINIC | Age: 45
End: 2020-03-25

## 2020-03-25 DIAGNOSIS — J45.40 MODERATE PERSISTENT ASTHMA WITHOUT COMPLICATION: ICD-10-CM

## 2020-03-26 ENCOUNTER — COMMUNICATION - HEALTHEAST (OUTPATIENT)
Dept: FAMILY MEDICINE | Facility: CLINIC | Age: 45
End: 2020-03-26

## 2020-03-27 ENCOUNTER — OFFICE VISIT - HEALTHEAST (OUTPATIENT)
Dept: FAMILY MEDICINE | Facility: CLINIC | Age: 45
End: 2020-03-27

## 2020-03-27 ENCOUNTER — COMMUNICATION - HEALTHEAST (OUTPATIENT)
Dept: FAMILY MEDICINE | Facility: CLINIC | Age: 45
End: 2020-03-27

## 2020-03-27 DIAGNOSIS — J45.40 MODERATE PERSISTENT ASTHMA WITHOUT COMPLICATION: ICD-10-CM

## 2020-03-27 DIAGNOSIS — I10 ESSENTIAL HYPERTENSION: ICD-10-CM

## 2020-03-30 ENCOUNTER — COMMUNICATION - HEALTHEAST (OUTPATIENT)
Dept: FAMILY MEDICINE | Facility: CLINIC | Age: 45
End: 2020-03-30

## 2020-04-01 ENCOUNTER — COMMUNICATION - HEALTHEAST (OUTPATIENT)
Dept: SCHEDULING | Facility: CLINIC | Age: 45
End: 2020-04-01

## 2020-04-01 DIAGNOSIS — J45.40 MODERATE PERSISTENT ASTHMA WITHOUT COMPLICATION: ICD-10-CM

## 2020-04-03 ENCOUNTER — COMMUNICATION - HEALTHEAST (OUTPATIENT)
Dept: SCHEDULING | Facility: CLINIC | Age: 45
End: 2020-04-03

## 2020-04-03 DIAGNOSIS — Z91.030 H/O BEE STING ALLERGY: ICD-10-CM

## 2020-04-22 ENCOUNTER — COMMUNICATION - HEALTHEAST (OUTPATIENT)
Dept: FAMILY MEDICINE | Facility: CLINIC | Age: 45
End: 2020-04-22

## 2020-05-03 ENCOUNTER — COMMUNICATION - HEALTHEAST (OUTPATIENT)
Dept: SCHEDULING | Facility: CLINIC | Age: 45
End: 2020-05-03

## 2020-05-07 ENCOUNTER — COMMUNICATION - HEALTHEAST (OUTPATIENT)
Dept: FAMILY MEDICINE | Facility: CLINIC | Age: 45
End: 2020-05-07

## 2020-05-07 DIAGNOSIS — I10 ESSENTIAL HYPERTENSION: ICD-10-CM

## 2020-05-08 ENCOUNTER — COMMUNICATION - HEALTHEAST (OUTPATIENT)
Dept: ADMINISTRATIVE | Facility: CLINIC | Age: 45
End: 2020-05-08

## 2020-05-08 ENCOUNTER — COMMUNICATION - HEALTHEAST (OUTPATIENT)
Dept: SCHEDULING | Facility: CLINIC | Age: 45
End: 2020-05-08

## 2020-05-09 ENCOUNTER — COMMUNICATION - HEALTHEAST (OUTPATIENT)
Dept: EMERGENCY MEDICINE | Facility: CLINIC | Age: 45
End: 2020-05-09

## 2020-05-09 ENCOUNTER — COMMUNICATION - HEALTHEAST (OUTPATIENT)
Dept: SCHEDULING | Facility: CLINIC | Age: 45
End: 2020-05-09

## 2020-05-11 ENCOUNTER — OFFICE VISIT - HEALTHEAST (OUTPATIENT)
Dept: FAMILY MEDICINE | Facility: CLINIC | Age: 45
End: 2020-05-11

## 2020-05-11 ENCOUNTER — COMMUNICATION - HEALTHEAST (OUTPATIENT)
Dept: FAMILY MEDICINE | Facility: CLINIC | Age: 45
End: 2020-05-11

## 2020-05-11 ENCOUNTER — COMMUNICATION - HEALTHEAST (OUTPATIENT)
Dept: SCHEDULING | Facility: CLINIC | Age: 45
End: 2020-05-11

## 2020-05-11 DIAGNOSIS — J01.90 ACUTE NON-RECURRENT SINUSITIS, UNSPECIFIED LOCATION: ICD-10-CM

## 2020-05-11 DIAGNOSIS — H65.03 NON-RECURRENT ACUTE SEROUS OTITIS MEDIA OF BOTH EARS: ICD-10-CM

## 2020-05-11 DIAGNOSIS — J45.41 MODERATE PERSISTENT ASTHMA WITH ACUTE EXACERBATION: ICD-10-CM

## 2020-05-11 DIAGNOSIS — T39.1X1A TYLENOL INGESTION, ACCIDENTAL OR UNINTENTIONAL, INITIAL ENCOUNTER: ICD-10-CM

## 2020-05-11 DIAGNOSIS — I10 ESSENTIAL HYPERTENSION: ICD-10-CM

## 2020-05-11 ASSESSMENT — PATIENT HEALTH QUESTIONNAIRE - PHQ9: SUM OF ALL RESPONSES TO PHQ QUESTIONS 1-9: 4

## 2020-05-11 ASSESSMENT — MIFFLIN-ST. JEOR: SCORE: 1701.83

## 2020-05-15 ENCOUNTER — COMMUNICATION - HEALTHEAST (OUTPATIENT)
Dept: INTERNAL MEDICINE | Facility: CLINIC | Age: 45
End: 2020-05-15

## 2020-05-15 DIAGNOSIS — J30.2 SEASONAL ALLERGIC RHINITIS, UNSPECIFIED TRIGGER: ICD-10-CM

## 2020-05-18 ENCOUNTER — RECORDS - HEALTHEAST (OUTPATIENT)
Dept: ADMINISTRATIVE | Facility: OTHER | Age: 45
End: 2020-05-18

## 2020-05-19 ENCOUNTER — RECORDS - HEALTHEAST (OUTPATIENT)
Dept: ADMINISTRATIVE | Facility: OTHER | Age: 45
End: 2020-05-19

## 2020-05-21 ENCOUNTER — RECORDS - HEALTHEAST (OUTPATIENT)
Dept: ADMINISTRATIVE | Facility: OTHER | Age: 45
End: 2020-05-21

## 2020-06-22 ENCOUNTER — COMMUNICATION - HEALTHEAST (OUTPATIENT)
Dept: FAMILY MEDICINE | Facility: CLINIC | Age: 45
End: 2020-06-22

## 2020-06-22 DIAGNOSIS — J45.40 MODERATE PERSISTENT ASTHMA WITHOUT COMPLICATION: ICD-10-CM

## 2020-07-07 ENCOUNTER — COMMUNICATION - HEALTHEAST (OUTPATIENT)
Dept: FAMILY MEDICINE | Facility: CLINIC | Age: 45
End: 2020-07-07

## 2020-07-15 ENCOUNTER — NURSE TRIAGE (OUTPATIENT)
Dept: NURSING | Facility: CLINIC | Age: 45
End: 2020-07-15

## 2020-07-15 NOTE — TELEPHONE ENCOUNTER
#[phone] calls and says that Marleny is going to go to the ER and does not know what to do with her . Caller says that Marleny's  has ALS and that Marleny cannot leave him alone. RN then told Marleny that Marleny needs to call the ER and see if they will allow her to bring her  to the ER with her. Caller says that Marleny understands this and will do this now. COVID 19 Nurse Triage Plan/Patient Instructions    Please be aware that novel coronavirus (COVID-19) may be circulating in the community. If you develop symptoms such as fever, cough, or SOB or if you have concerns about the presence of another infection including coronavirus (COVID-19), please contact your health care provider or visit [email or web address].     Disposition/Instructions    Home care recommended. Follow home care protocol based instructions.    Thank you for taking steps to prevent the spread of this virus.  o Limit your contact with others.  o Wear a simple mask to cover your cough.  o Wash your hands well and often.    Resources    [facility]: About COVID-19: [email or web address]    CDC: What to Do If You're Sick: www.cdc.gov/coronavirus/2019-ncov/about/steps-when-sick.html    CDC: Ending Home Isolation: www.cdc.gov/coronavirus/2019-ncov/hcp/disposition-in-home-patients.html     CDC: Caring for Someone: www.cdc.gov/coronavirus/2019-ncov/if-you-are-sick/care-for-someone.html     [facility]: Interim Guidance for Hospital Discharge to Home: www.health.[facility].mn.us/diseases/coronavirus/hcp/hospdischarge.pdf    [facility] clinical trials (COVID-19 research studies): clinicalaffairs.[facility]/umn-clinical-trials     Below are the COVID-19 hotlines at the [place] Department of Health ([facility]). Interpreters are available.   o For health questions: Call 249-117-9891 or 1-754.724.9148 (7 a.m. to 7 p.m.)  o For questions about schools and childcare: Call 845-477-5325 or 1-842.838.7687 (7 a.m. to 7 p.m.)                      Additional Information    Negative: [1] Caller is not with the adult (patient) AND [2] reporting urgent symptoms    Negative: Lab result questions    Negative: Medication questions    Negative: Caller can't be reached by phone    Negative: Caller has already spoken to PCP or another triager    Negative: RN needs further essential information from caller in order to complete triage    Negative: Requesting regular office appointment    Negative: [1] Caller requesting NON-URGENT health information AND [2] PCP's office is the best resource    Negative: Health Information question, no triage required and triager able to answer question    General information question, no triage required and triager able to answer question    Protocols used: INFORMATION ONLY CALL-A-

## 2020-07-28 ENCOUNTER — COMMUNICATION - HEALTHEAST (OUTPATIENT)
Dept: SCHEDULING | Facility: CLINIC | Age: 45
End: 2020-07-28

## 2020-07-28 DIAGNOSIS — Z91.030 H/O BEE STING ALLERGY: ICD-10-CM

## 2020-08-06 ENCOUNTER — COMMUNICATION - HEALTHEAST (OUTPATIENT)
Dept: FAMILY MEDICINE | Facility: CLINIC | Age: 45
End: 2020-08-06

## 2020-08-06 DIAGNOSIS — Z91.030 H/O BEE STING ALLERGY: ICD-10-CM

## 2020-09-11 ENCOUNTER — COMMUNICATION - HEALTHEAST (OUTPATIENT)
Dept: FAMILY MEDICINE | Facility: CLINIC | Age: 45
End: 2020-09-11

## 2020-09-11 DIAGNOSIS — G43.009 MIGRAINE WITHOUT AURA AND WITHOUT STATUS MIGRAINOSUS, NOT INTRACTABLE: ICD-10-CM

## 2020-09-11 DIAGNOSIS — F41.1 GAD (GENERALIZED ANXIETY DISORDER): ICD-10-CM

## 2020-09-30 ENCOUNTER — COMMUNICATION - HEALTHEAST (OUTPATIENT)
Dept: FAMILY MEDICINE | Facility: CLINIC | Age: 45
End: 2020-09-30

## 2020-09-30 DIAGNOSIS — F41.1 GAD (GENERALIZED ANXIETY DISORDER): ICD-10-CM

## 2020-09-30 DIAGNOSIS — G43.009 MIGRAINE WITHOUT AURA AND WITHOUT STATUS MIGRAINOSUS, NOT INTRACTABLE: ICD-10-CM

## 2020-09-30 DIAGNOSIS — I10 ESSENTIAL HYPERTENSION: ICD-10-CM

## 2020-10-02 ENCOUNTER — OFFICE VISIT - HEALTHEAST (OUTPATIENT)
Dept: INTERNAL MEDICINE | Facility: CLINIC | Age: 45
End: 2020-10-02

## 2020-10-02 ENCOUNTER — COMMUNICATION - HEALTHEAST (OUTPATIENT)
Dept: INTERNAL MEDICINE | Facility: CLINIC | Age: 45
End: 2020-10-02

## 2020-10-02 DIAGNOSIS — T63.441A BEE STING REACTION, ACCIDENTAL OR UNINTENTIONAL, INITIAL ENCOUNTER: ICD-10-CM

## 2020-10-02 DIAGNOSIS — Z23 NEED FOR 23-POLYVALENT PNEUMOCOCCAL POLYSACCHARIDE VACCINE: ICD-10-CM

## 2020-10-02 DIAGNOSIS — I10 BENIGN ESSENTIAL HYPERTENSION: ICD-10-CM

## 2020-10-02 DIAGNOSIS — Z00.00 ROUTINE GENERAL MEDICAL EXAMINATION AT A HEALTH CARE FACILITY: ICD-10-CM

## 2020-10-02 DIAGNOSIS — Z23 NEED FOR INFLUENZA VACCINATION: ICD-10-CM

## 2020-10-02 DIAGNOSIS — R73.09 ELEVATED GLUCOSE: ICD-10-CM

## 2020-10-02 DIAGNOSIS — T39.1X1D: ICD-10-CM

## 2020-10-02 DIAGNOSIS — I10 ESSENTIAL HYPERTENSION: ICD-10-CM

## 2020-10-02 DIAGNOSIS — G43.009 MIGRAINE WITHOUT AURA AND WITHOUT STATUS MIGRAINOSUS, NOT INTRACTABLE: ICD-10-CM

## 2020-10-02 DIAGNOSIS — J45.40 MODERATE PERSISTENT ASTHMA WITHOUT COMPLICATION: ICD-10-CM

## 2020-10-02 DIAGNOSIS — G43.709 CHRONIC MIGRAINE WITHOUT AURA WITHOUT STATUS MIGRAINOSUS, NOT INTRACTABLE: ICD-10-CM

## 2020-10-02 DIAGNOSIS — Z13.220 LIPID SCREENING: ICD-10-CM

## 2020-10-02 DIAGNOSIS — F41.1 GAD (GENERALIZED ANXIETY DISORDER): ICD-10-CM

## 2020-10-02 LAB
ALBUMIN SERPL-MCNC: 3.8 G/DL (ref 3.5–5)
ALP SERPL-CCNC: 96 U/L (ref 45–120)
ALT SERPL W P-5'-P-CCNC: 12 U/L (ref 0–45)
ANION GAP SERPL CALCULATED.3IONS-SCNC: 8 MMOL/L (ref 5–18)
AST SERPL W P-5'-P-CCNC: 13 U/L (ref 0–40)
BILIRUB DIRECT SERPL-MCNC: 0.1 MG/DL
BILIRUB SERPL-MCNC: 0.4 MG/DL (ref 0–1)
BUN SERPL-MCNC: 13 MG/DL (ref 8–22)
CALCIUM SERPL-MCNC: 8.8 MG/DL (ref 8.5–10.5)
CHLORIDE BLD-SCNC: 108 MMOL/L (ref 98–107)
CHOLEST SERPL-MCNC: 173 MG/DL
CO2 SERPL-SCNC: 26 MMOL/L (ref 22–31)
CREAT SERPL-MCNC: 0.79 MG/DL (ref 0.6–1.1)
FASTING STATUS PATIENT QL REPORTED: ABNORMAL
GFR SERPL CREATININE-BSD FRML MDRD: >60 ML/MIN/1.73M2
GLUCOSE BLD-MCNC: 106 MG/DL (ref 70–125)
HBA1C MFR BLD: 6.2 %
HDLC SERPL-MCNC: 37 MG/DL
LDLC SERPL CALC-MCNC: 117 MG/DL
POTASSIUM BLD-SCNC: 4 MMOL/L (ref 3.5–5)
PROT SERPL-MCNC: 6.8 G/DL (ref 6–8)
SODIUM SERPL-SCNC: 142 MMOL/L (ref 136–145)
TRIGL SERPL-MCNC: 95 MG/DL

## 2020-10-11 ENCOUNTER — OFFICE VISIT - HEALTHEAST (OUTPATIENT)
Dept: FAMILY MEDICINE | Facility: CLINIC | Age: 45
End: 2020-10-11

## 2020-10-11 DIAGNOSIS — H60.392 INFECTIVE OTITIS EXTERNA, LEFT: ICD-10-CM

## 2020-10-15 ENCOUNTER — OFFICE VISIT - HEALTHEAST (OUTPATIENT)
Dept: FAMILY MEDICINE | Facility: CLINIC | Age: 45
End: 2020-10-15

## 2020-10-15 DIAGNOSIS — H60.393 INFECTIVE OTITIS EXTERNA, BILATERAL: ICD-10-CM

## 2020-10-23 ENCOUNTER — COMMUNICATION - HEALTHEAST (OUTPATIENT)
Dept: INTERNAL MEDICINE | Facility: CLINIC | Age: 45
End: 2020-10-23

## 2020-10-23 DIAGNOSIS — J45.40 MODERATE PERSISTENT ASTHMA WITHOUT COMPLICATION: ICD-10-CM

## 2020-10-29 ENCOUNTER — COMMUNICATION - HEALTHEAST (OUTPATIENT)
Dept: SCHEDULING | Facility: CLINIC | Age: 45
End: 2020-10-29

## 2020-11-17 ENCOUNTER — AMBULATORY - HEALTHEAST (OUTPATIENT)
Dept: OTHER | Facility: CLINIC | Age: 45
End: 2020-11-17

## 2020-11-17 ENCOUNTER — DOCUMENTATION ONLY (OUTPATIENT)
Dept: OTHER | Facility: CLINIC | Age: 45
End: 2020-11-17

## 2020-12-07 ENCOUNTER — COMMUNICATION - HEALTHEAST (OUTPATIENT)
Dept: INTERNAL MEDICINE | Facility: CLINIC | Age: 45
End: 2020-12-07

## 2020-12-07 DIAGNOSIS — J45.40 MODERATE PERSISTENT ASTHMA WITHOUT COMPLICATION: ICD-10-CM

## 2020-12-14 ENCOUNTER — COMMUNICATION - HEALTHEAST (OUTPATIENT)
Dept: INTERNAL MEDICINE | Facility: CLINIC | Age: 45
End: 2020-12-14

## 2020-12-17 ENCOUNTER — AMBULATORY - HEALTHEAST (OUTPATIENT)
Dept: ULTRASOUND IMAGING | Facility: CLINIC | Age: 45
End: 2020-12-17

## 2020-12-17 ENCOUNTER — HOSPITAL ENCOUNTER (OUTPATIENT)
Dept: MAMMOGRAPHY | Facility: CLINIC | Age: 45
Discharge: HOME | End: 2020-12-17
Attending: PLASTIC SURGERY

## 2020-12-17 ENCOUNTER — HOSPITAL ENCOUNTER (OUTPATIENT)
Dept: ULTRASOUND IMAGING | Facility: CLINIC | Age: 45
Discharge: HOME | End: 2020-12-17
Attending: PLASTIC SURGERY

## 2020-12-17 DIAGNOSIS — N63.0 BREAST LUMP: ICD-10-CM

## 2020-12-17 DIAGNOSIS — N60.01 CYST OF RIGHT BREAST: ICD-10-CM

## 2020-12-17 DIAGNOSIS — Z11.59 ENCOUNTER FOR SCREENING FOR OTHER VIRAL DISEASES: ICD-10-CM

## 2020-12-22 ENCOUNTER — RECORDS - HEALTHEAST (OUTPATIENT)
Dept: ADMINISTRATIVE | Facility: OTHER | Age: 45
End: 2020-12-22

## 2020-12-26 ENCOUNTER — COMMUNICATION - HEALTHEAST (OUTPATIENT)
Dept: SCHEDULING | Facility: CLINIC | Age: 45
End: 2020-12-26

## 2020-12-31 ENCOUNTER — COMMUNICATION - HEALTHEAST (OUTPATIENT)
Dept: SCHEDULING | Facility: CLINIC | Age: 45
End: 2020-12-31

## 2021-01-02 ENCOUNTER — COMMUNICATION - HEALTHEAST (OUTPATIENT)
Dept: INTERNAL MEDICINE | Facility: CLINIC | Age: 46
End: 2021-01-02

## 2021-01-02 ENCOUNTER — COMMUNICATION - HEALTHEAST (OUTPATIENT)
Dept: FAMILY MEDICINE | Facility: CLINIC | Age: 46
End: 2021-01-02

## 2021-01-02 ENCOUNTER — AMBULATORY - HEALTHEAST (OUTPATIENT)
Dept: FAMILY MEDICINE | Facility: CLINIC | Age: 46
End: 2021-01-02

## 2021-01-02 DIAGNOSIS — J45.40 MODERATE PERSISTENT ASTHMA WITHOUT COMPLICATION: ICD-10-CM

## 2021-01-02 DIAGNOSIS — I10 ESSENTIAL HYPERTENSION: ICD-10-CM

## 2021-01-02 DIAGNOSIS — Z91.030 H/O BEE STING ALLERGY: ICD-10-CM

## 2021-01-02 DIAGNOSIS — Z11.59 ENCOUNTER FOR SCREENING FOR OTHER VIRAL DISEASES: ICD-10-CM

## 2021-01-03 ENCOUNTER — COMMUNICATION - HEALTHEAST (OUTPATIENT)
Dept: INTERNAL MEDICINE | Facility: CLINIC | Age: 46
End: 2021-01-03

## 2021-01-04 ENCOUNTER — COMMUNICATION - HEALTHEAST (OUTPATIENT)
Dept: SCHEDULING | Facility: CLINIC | Age: 46
End: 2021-01-04

## 2021-01-07 ENCOUNTER — AMBULATORY - HEALTHEAST (OUTPATIENT)
Dept: ULTRASOUND IMAGING | Facility: CLINIC | Age: 46
End: 2021-01-07

## 2021-01-07 DIAGNOSIS — Z11.59 ENCOUNTER FOR SCREENING FOR OTHER VIRAL DISEASES: ICD-10-CM

## 2021-01-16 ENCOUNTER — AMBULATORY - HEALTHEAST (OUTPATIENT)
Dept: FAMILY MEDICINE | Facility: CLINIC | Age: 46
End: 2021-01-16

## 2021-01-16 DIAGNOSIS — Z11.59 ENCOUNTER FOR SCREENING FOR OTHER VIRAL DISEASES: ICD-10-CM

## 2021-01-17 ENCOUNTER — COMMUNICATION - HEALTHEAST (OUTPATIENT)
Dept: INTERNAL MEDICINE | Facility: CLINIC | Age: 46
End: 2021-01-17

## 2021-01-17 LAB
SARS-COV-2 PCR COMMENT: NORMAL
SARS-COV-2 RNA SPEC QL NAA+PROBE: NEGATIVE
SARS-COV-2 VIRUS SPECIMEN SOURCE: NORMAL

## 2021-01-18 ENCOUNTER — COMMUNICATION - HEALTHEAST (OUTPATIENT)
Dept: SCHEDULING | Facility: CLINIC | Age: 46
End: 2021-01-18

## 2021-01-19 ENCOUNTER — RECORDS - HEALTHEAST (OUTPATIENT)
Dept: ADMINISTRATIVE | Facility: OTHER | Age: 46
End: 2021-01-19

## 2021-01-19 ENCOUNTER — HOSPITAL ENCOUNTER (OUTPATIENT)
Dept: ULTRASOUND IMAGING | Facility: CLINIC | Age: 46
Discharge: HOME | End: 2021-01-19
Attending: PLASTIC SURGERY

## 2021-01-19 ENCOUNTER — RECORDS - HEALTHEAST (OUTPATIENT)
Dept: SCHEDULING | Facility: CLINIC | Age: 46
End: 2021-01-19

## 2021-01-19 DIAGNOSIS — N63.12 UNSPECIFIED LUMP IN THE RIGHT BREAST, UPPER INNER QUADRANT: ICD-10-CM

## 2021-01-19 DIAGNOSIS — N63.10 BREAST MASS, RIGHT: ICD-10-CM

## 2021-01-19 DIAGNOSIS — N63.0 BREAST LUMP: ICD-10-CM

## 2021-01-19 DIAGNOSIS — N60.01 CYST OF RIGHT BREAST: ICD-10-CM

## 2021-01-25 ENCOUNTER — OFFICE VISIT - HEALTHEAST (OUTPATIENT)
Dept: INTERNAL MEDICINE | Facility: CLINIC | Age: 46
End: 2021-01-25

## 2021-01-25 ENCOUNTER — COMMUNICATION - HEALTHEAST (OUTPATIENT)
Dept: SCHEDULING | Facility: CLINIC | Age: 46
End: 2021-01-25

## 2021-01-25 DIAGNOSIS — M54.2 NECK PAIN: ICD-10-CM

## 2021-01-25 DIAGNOSIS — G44.89 OTHER HEADACHE SYNDROME: ICD-10-CM

## 2021-01-25 DIAGNOSIS — H92.01 RIGHT EAR PAIN: ICD-10-CM

## 2021-01-26 ENCOUNTER — COMMUNICATION - HEALTHEAST (OUTPATIENT)
Dept: SCHEDULING | Facility: CLINIC | Age: 46
End: 2021-01-26

## 2021-01-28 ENCOUNTER — COMMUNICATION - HEALTHEAST (OUTPATIENT)
Dept: SCHEDULING | Facility: CLINIC | Age: 46
End: 2021-01-28

## 2021-01-28 ENCOUNTER — OFFICE VISIT - HEALTHEAST (OUTPATIENT)
Dept: INTERNAL MEDICINE | Facility: CLINIC | Age: 46
End: 2021-01-28

## 2021-01-28 ENCOUNTER — COMMUNICATION - HEALTHEAST (OUTPATIENT)
Dept: INTERNAL MEDICINE | Facility: CLINIC | Age: 46
End: 2021-01-28

## 2021-01-28 DIAGNOSIS — R73.03 PREDIABETES: ICD-10-CM

## 2021-01-28 DIAGNOSIS — H60.501 ACUTE OTITIS EXTERNA OF RIGHT EAR, UNSPECIFIED TYPE: ICD-10-CM

## 2021-01-28 DIAGNOSIS — I10 ESSENTIAL HYPERTENSION: ICD-10-CM

## 2021-01-28 DIAGNOSIS — G47.01 INSOMNIA DUE TO MEDICAL CONDITION: ICD-10-CM

## 2021-03-03 ENCOUNTER — OFFICE VISIT - HEALTHEAST (OUTPATIENT)
Dept: INTERNAL MEDICINE | Facility: CLINIC | Age: 46
End: 2021-03-03

## 2021-03-03 DIAGNOSIS — H91.93 BILATERAL DEAFNESS: ICD-10-CM

## 2021-03-03 DIAGNOSIS — J45.40 MODERATE PERSISTENT ASTHMA WITHOUT COMPLICATION: ICD-10-CM

## 2021-03-03 DIAGNOSIS — G47.33 OBSTRUCTIVE SLEEP APNEA SYNDROME: ICD-10-CM

## 2021-03-03 DIAGNOSIS — I10 ESSENTIAL HYPERTENSION: ICD-10-CM

## 2021-03-03 DIAGNOSIS — E66.01 MORBID OBESITY (H): ICD-10-CM

## 2021-03-03 DIAGNOSIS — R73.03 PREDIABETES: ICD-10-CM

## 2021-03-03 ASSESSMENT — MIFFLIN-ST. JEOR: SCORE: 1708.64

## 2021-03-03 NOTE — ASSESSMENT & PLAN NOTE
A. HBA1C 6.2% 10/2/2021. No time today to discuss. Patient needed to leave.    P. Will discuss with her at future visits.

## 2021-03-09 ENCOUNTER — COMMUNICATION - HEALTHEAST (OUTPATIENT)
Dept: INTERNAL MEDICINE | Facility: CLINIC | Age: 46
End: 2021-03-09

## 2021-03-11 ENCOUNTER — COMMUNICATION - HEALTHEAST (OUTPATIENT)
Dept: INTERNAL MEDICINE | Facility: CLINIC | Age: 46
End: 2021-03-11

## 2021-03-12 ENCOUNTER — COMMUNICATION - HEALTHEAST (OUTPATIENT)
Dept: NURSING | Facility: CLINIC | Age: 46
End: 2021-03-12

## 2021-03-12 ENCOUNTER — COMMUNICATION - HEALTHEAST (OUTPATIENT)
Dept: INTERNAL MEDICINE | Facility: CLINIC | Age: 46
End: 2021-03-12

## 2021-03-22 ENCOUNTER — COMMUNICATION - HEALTHEAST (OUTPATIENT)
Dept: ADMINISTRATIVE | Facility: CLINIC | Age: 46
End: 2021-03-22

## 2021-04-13 ENCOUNTER — OFFICE VISIT - HEALTHEAST (OUTPATIENT)
Dept: INTERNAL MEDICINE | Facility: CLINIC | Age: 46
End: 2021-04-13

## 2021-04-13 ENCOUNTER — COMMUNICATION - HEALTHEAST (OUTPATIENT)
Dept: ADMINISTRATIVE | Facility: CLINIC | Age: 46
End: 2021-04-13

## 2021-04-13 DIAGNOSIS — J45.40 MODERATE PERSISTENT ASTHMA WITHOUT COMPLICATION: ICD-10-CM

## 2021-04-13 DIAGNOSIS — H91.93 BILATERAL DEAFNESS: ICD-10-CM

## 2021-04-13 ASSESSMENT — MIFFLIN-ST. JEOR: SCORE: 1679.15

## 2021-04-20 ENCOUNTER — COMMUNICATION - HEALTHEAST (OUTPATIENT)
Dept: SCHEDULING | Facility: CLINIC | Age: 46
End: 2021-04-20

## 2021-04-20 ENCOUNTER — AMBULATORY - HEALTHEAST (OUTPATIENT)
Dept: NURSING | Facility: CLINIC | Age: 46
End: 2021-04-20

## 2021-04-20 DIAGNOSIS — T78.40XS ALLERGY, SEQUELA: ICD-10-CM

## 2021-04-20 DIAGNOSIS — J45.40 MODERATE PERSISTENT ASTHMA WITHOUT COMPLICATION: ICD-10-CM

## 2021-04-21 ENCOUNTER — RECORDS - HEALTHEAST (OUTPATIENT)
Dept: SCHEDULING | Facility: CLINIC | Age: 46
End: 2021-04-21

## 2021-04-23 ENCOUNTER — COMMUNICATION - HEALTHEAST (OUTPATIENT)
Dept: ALLERGY | Facility: CLINIC | Age: 46
End: 2021-04-23

## 2021-05-17 ENCOUNTER — COMMUNICATION - HEALTHEAST (OUTPATIENT)
Dept: ADMINISTRATIVE | Facility: CLINIC | Age: 46
End: 2021-05-17

## 2021-05-17 DIAGNOSIS — Z91.030 H/O BEE STING ALLERGY: ICD-10-CM

## 2021-05-24 ENCOUNTER — COMMUNICATION - HEALTHEAST (OUTPATIENT)
Dept: FAMILY MEDICINE | Facility: CLINIC | Age: 46
End: 2021-05-24

## 2021-05-25 ENCOUNTER — RECORDS - HEALTHEAST (OUTPATIENT)
Dept: ADMINISTRATIVE | Facility: CLINIC | Age: 46
End: 2021-05-25

## 2021-05-26 ENCOUNTER — RECORDS - HEALTHEAST (OUTPATIENT)
Dept: ADMINISTRATIVE | Facility: CLINIC | Age: 46
End: 2021-05-26

## 2021-05-26 ASSESSMENT — PATIENT HEALTH QUESTIONNAIRE - PHQ9
SUM OF ALL RESPONSES TO PHQ QUESTIONS 1-9: 6
SUM OF ALL RESPONSES TO PHQ QUESTIONS 1-9: 14
SUM OF ALL RESPONSES TO PHQ QUESTIONS 1-9: 8

## 2021-05-27 VITALS
OXYGEN SATURATION: 98 % | DIASTOLIC BLOOD PRESSURE: 101 MMHG | HEART RATE: 66 BPM | RESPIRATION RATE: 20 BRPM | SYSTOLIC BLOOD PRESSURE: 186 MMHG

## 2021-05-27 ASSESSMENT — PATIENT HEALTH QUESTIONNAIRE - PHQ9: SUM OF ALL RESPONSES TO PHQ QUESTIONS 1-9: 4

## 2021-05-27 NOTE — PROGRESS NOTES
"Assessment/plan   Marleny Viera is a 43 y.o. female who is a patient of Tesha Pelaez MD.    Marleny was seen today for medication refill, ear problem, headache and kidney issues.    Diagnoses and all orders for this visit:    Left ovarian cyst  Didelphic uterus  Uterine leiomyoma, unspecified location  -     Ambulatory referral to Obstetrics / Gynecology  Pt reporting left flank pain and \"urinary issues\" with trouble urinating. UA showed blood on 3/12, but pt reports having been on her period. She has work up to rule out UTI and kidney stone, including a recent CT abd/pelvis and vaginal ultrasound which instead confirm left sided 3cm complex ovarian cyst. Given her symptoms and fibroid uterus, I suspect she may be referring to her \"kidney issues\" to mass effect from ovarian cyst and fibroids. Would benefit from evaluation with Ob/Gyn to further review options for management and follow up of the cyst.     Essential hypertension. BPs elevated x 2 here (2nd check even higher in 160 range). Asymptomatic. Recommended home BP monitoring and bring me data to review in 2-4 weeks. Refill of bblocker sent. She was anxious today.   -     metoprolol tartrate (LOPRESSOR) 50 MG tablet; Take 1 tablet (50 mg total) by mouth 2 (two) times a day.    YESICA (generalized anxiety disorder). Stable anxiety. Denies need for counseling or daily SSRI.   PHQ-9 Total Score: 1 (4/15/2019  3:00 PM)  YESICA-7 Total: 4 (4/15/2019  3:00 PM)  Refills sent of her meds:   -     gabapentin (NEURONTIN) 100 MG capsule; Take 100 mg by mouth 2 (two) times a day. For migraines and anxiety  -     hydrOXYzine pamoate (VISTARIL) 25 MG capsule; Take 2 capsules (50 mg total) by mouth at bedtime. For anxiety/sleep    Asthma, moderate persistent, uncontrolled.  Not currently in exacerbation but certainly poor control with ACT score of 12. She reports feeling \"at baseline\". Last exacerbation Jan 11th- tx with steroids, doxycycline. 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  - She is not currently liking the maintenance inhaler which I believe she is using AirDuo. Given time constraints today, I recommended we f/u together and with MTM to further review options. Should have Breo Ellipta available but pt was a little confused by her asthma meds. Has tolerated and likes Symbicort in the past.   -     Refilled montelukast (SINGULAIR) 10 mg tablet; Take 1 tablet (10 mg total) by mouth daily.  -     Ambulatory referral to Medication Management    Migraine without aura and without status migrainosus, not intractable  -    Refilled  gabapentin (NEURONTIN) 100 MG capsule; Take 100 mg by mouth 2 (two) times a day. For migraines and anxiety      I spent 40 minutes with the patient, >50% of which was in counseling regarding the patient's medical issues as noted above.      Follow up: 1 month, or sooner to f/u other concerns. MTM visit for asthma.     Tesha Pelaez MD    Subjective:      HPI: Marleny Viera is a 43 y.o. female who is here for:    Chief Complaint   Patient presents with     Medication Refill     anxiety and sleep meds needed and singulair - [facility] - gabapentin and nasal spray      Ear Problem     pain since Friday - possible fluid      Headache     maybe sinus      kidney issues     not able to urinate enough      Urinary issues: Has not been able to \"urinate enough \" since Friday. Urinating small volumes. Hard to empty bladder. Feels urge to urinate. No dysuria or hematuria. Drinking \"plenty of water\".   On 3/12 she was actually evaluated for left flank pain and UA showed moderate blood without micro being able to be run.  Patient's last menstrual period was 03/17/2019. or shortly before that.   No evidence for infection.  CT abd pelvis on 3/28 showed no kidney stones, and pelvic ultrasound shows complex left cyst present. No fevers.     H/o depression/anxiety.    She " "uses 50 mg of hydroxyzine at night to help her sleep due to some anxiety.    She also has gabapentin twice a day for her anxiety as well as migraine prevention.    Was on many meds but affected kidney function and no longer on meds. No longer seeing a psychiatrist.  H/o meth use from [id number].  H/o alcohol dependence, not in years.    HTN: on metoprolol 50mg twice daily. Previously on HCTZ which may have caused a rash so was stopped.     Asthma, \"uncontrolled but stable\": ACT of 12--> 12 again today. Triggers: cold air, sx are worse this week due to weather  She picked up AirDuo, but doesn't like the taste so hasn't been using this.   Takes Singulair daily which is helpful.   Using albuterol nebulizer every night. Hasn't needed the albuterol inhaler.   Symbicort was previously helpful- needed a PA or something due to cost. '  ?Reports allergy to Advair. Was at an Asthma Clinic in the past.       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   No chest pain.    Needs refill of Singulair today.    Review of Systems:  No fevers or chills.   + headache (mild) and left ear fullness x 3-4 days, wondering if I can look at that. No change in hearing, no significant pain. This was unremarkable on exam, and I asked her to follow up if sx continue. Counseled on option for flonase for eustachian tube dysfunction and monitor for sx of worsening facial pain or fever.  + recent skin procedure went well, has f/u with plastic surgery   12 point comprehensive review of systems was negative except as noted and HPI     Social History:  Social History     Social History Narrative    . No children.     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    Nonsmoker.    No alcohol use.    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     Didelphic uterus     Status post total right knee replacement     Screening for malignant neoplasm of cervix     Personal history of PE (pulmonary embolism)     Migraine     Fatty liver     CMC DJD(carpometacarpal degenerative joint disease), localized primary     Post op anticoagulation care plan (if needed)     Obesity (BMI 35.0-39.9) with comorbidity (H)     Past Medical History:   Diagnosis Date     ADHD (attention deficit hyperactivity disorder)      Alcohol dependence (H)      Anxiety      Arthritis      Asthma      Bipolar depression (H)      Deafness      Drug abuse, nondependent (H) 10/29/2004    Overview:  polydrug abuse per psych notes ; Other, mixed, or unspecified nondependent drug abuse, unspecified     History of blood clots      History of transfusion      Hypertension      Kidney stones 2015     Major depressive disorder, recurrent episode, severe (H) 5/17/2016    Overview:  s/p suicide attempt Epic      Migraine      Obstructive sleep apnea 6/1/2016     Pulmonary emboli (H)      Past Surgical History:   Procedure Laterality Date     BREAST SURGERY       TOTAL HIP ARTHROPLASTY Bilateral      TOTAL KNEE ARTHROPLASTY Right      TUBAL LIGATION       Amoxicillin; Bee pollen; Hymenoptera allergenic extract; Iodine; Wasp venom protein starter kit; Adhesive tape-silicones; Chocolate; Food allergy formula; Geodon [ziprasidone hcl]; Ibuprofen; Prochlorperazine; Risperdal [risperidone]; Risperidone analogues; Theobroma oil; Zoloft [sertraline]; and Hydrochlorothiazide  Family History   Problem Relation Age of Onset     Cancer Mother      Stroke Father        Medications:  Current Outpatient Medications   Medication Sig Dispense Refill     albuterol (PROAIR HFA;PROVENTIL HFA;VENTOLIN HFA) 90 mcg/actuation inhaler Inhale 2 puffs every 6 (six) hours as needed for  wheezing.       cholecalciferol, vitamin D3, (VITAMIN D3) 1,000 unit capsule Take 1 capsule (1,000 Units total) by mouth daily. 60 capsule 0     DIAPER,BRIEF,INFANT-MARY,DISP (DIAPERS MISC) Adult1 month supply  approx 100       EPINEPHrine (EPIPEN 2-DIONISIO) 0.3 mg/0.3 mL injection Inject 0.3 mg as directed as needed.              fluticasone-salmeterol 113-14 mcg/actuation AePB Inhale 1 puff 2 (two) times a day. 1 each 5     fluticasone-vilanterol (BREO ELLIPTA) 100-25 mcg/dose DsDv inhaler Inhale 1 puff daily. 1 each 5     gabapentin (NEURONTIN) 100 MG capsule Take 100 mg by mouth 2 (two) times a day. For migraines and anxiety 180 capsule 3     HYDROcodone-acetaminophen (NORCO) 7.5-325 mg per tablet Take 1 tablet by mouth every 6 (six) hours as needed for pain. 15 tablet 0     hydrOXYzine pamoate (VISTARIL) 25 MG capsule Take 2 capsules (50 mg total) by mouth at bedtime. For anxiety/sleep 180 capsule 3     metoprolol tartrate (LOPRESSOR) 50 MG tablet Take 1 tablet (50 mg total) by mouth 2 (two) times a day. 180 tablet 3     montelukast (SINGULAIR) 10 mg tablet Take 1 tablet (10 mg total) by mouth daily. 90 tablet 3     polyethylene glycol (GLYCOLAX) 17 gram/dose powder Take 17 g by mouth daily as needed. 255 g 0     rizatriptan (MAXALT) 10 MG tablet Take 10 mg by mouth as needed.              No current facility-administered medications for this visit.        Imaging & Labs reviewed this visit:       US PELVIS WITH TRANSVAGINAL NON OB  3/28/2019 3:16 AM     INDICATION: 3 cm left ovarian cyst on CT, LLQ abdominal pain rule out torsion  TECHNIQUE: Transabdominal scans were performed. Endovaginal ultrasound was performed to better visualize the adnexa.  COMPARISON: 03/28/2019 CT abdomen pelvis.      FINDINGS:  Uterus measures 8.7 x 5.8 x 4.7 cm. Uterine didelphys is not well characterized on this exam. Uterine fibroids. Several pedunculated fibroids on the right. The larger 5.8 x 5 x 4.8 cm.  Where visualized endometrium  10 mm.     Right ovary measures 2.5 x 2.3 x 1.8 cm.     Left ovary is occupied by a 2.8 x 2.6 x 2.3 cm septated cyst is is only seen transabdominally. Flow documented to both ovaries. No significant free fluid in the cul-de-sac.     IMPRESSION:   CONCLUSION:  1.  2.8 cm septated left ovarian cyst.  2.  Fibroid uterus. Known uterine didelphys not well characterized on this exam.     REFERENCE:  Management of Asymptomatic Ovarian and Other Adnexal Cysts Imaged at US: Society of Radiologists in Ultrasound Consensus Conference Statement. Radiology September 2010; [id number].     COMPLEX INDETERMINATE CYSTS:  Premenopausal:  Less than or equal to 3 cm: Consider physiologic.  Greater than 3 cm: 8-12 week follow up.     Postmenopausal:  Any complex cyst: Surgical consultation.  INDICATION: left flank pain, LLQ pain   COMPARISON: 10/31/2018  TECHNIQUE: Helical thin-section CT scan of the abdomen and pelvis was performed without oral or IV contrast. Multiplanar reformats were obtained. Dose reduction techniques were used.  CONTRAST: None.     FINDINGS:   LUNG BASES: Clear.     ABDOMEN: Hepatic steatosis. Spleen, pancreas, adrenals and kidneys grossly within normal limits on noncontrast imaging. Prominent some mucosal fat again noted in the duodenum and distal small bowel which can be seen in the setting of chronic   inflammation. Bowel is normal caliber.     PELVIS: 3 cm complex cyst left ovary. Uterine didelphys again noted. There are multiple exophytic masses of the uterus, presumably fibroids, similar to prior. Largest inferiorly on the right 5.1 cm. No significant free fluid. Streak artifact from hip   prosthesis reduces pelvic detail. Normal appendix.     MUSCULOSKELETAL: Bilateral hip prosthesis. Degenerative change osseous structures.     IMPRESSION:   CONCLUSION:   1.  No renal or ureteral calculi. No hydronephrosis.  2.  No inflammatory change, bowel obstruction or abscess. Normal appendix.  3.  3 cm complex left  ovarian cyst.  4.  Uterine didelphys and fibroids again noted.         Objective:      Vitals:    04/15/19 1504 04/15/19 1538   BP: 142/88 160/72   Pulse: 72    Resp: 16    Weight: (!) 229 lb 1 oz (103.9 kg)        Physical Exam:     General: Alert, no acute distress.   HEENT: normocephalic conjunctivae are clear, Normal pearly TMs bilaterally without erythema, pus or fluid. Position and landmarks are normal.  Nose is clear.  Oropharynx is moist and clear, without tonsillar hypertrophy, asymmetry, exudate or lesions.   Neck: supple without adenopathy or thyromegaly.  Lungs: Good aeration bilaterally. Prolonged expiratory phase, Clear to auscultation without wheezes, rales or rhonci.   Heart: regular rate and rhythm, normal S1 and S2, no murmurs  Abdomen: soft and nontender even with deep palpation at the LLQ/flank  Back: No CVA tenderness.   Skin: clear without rash or lesions  Neuro: alert, interactive moving all extremities equally, normal muscle tone in all 4 extremities, deep tendon reflexes 2+ symmetrically at the patella  Psych: casually dressed, mood good, affect congruent with mood, appropriate eye contact, insight and judgment intact, normal speech pattern. No active or passive SI/HI.     PHQ9 score PHQ-9 Total Score: 1 (4/15/2019  3:00 PM)    Little interest or pleasure in doing things: Not at all  Feeling down, depressed, or hopeless: Not at all  Trouble falling or staying asleep, or sleeping too much: Not at all  Feeling tired or having little energy: Several days  Poor appetite or overeating: Not at all  Feeling bad about yourself - or that you are a failure or have let yourself or your family down: Not at all  Trouble concentrating on things, such as reading the newspaper or watching television: Not at all  Moving or speaking so slowly that other people could have noticed. Or the opposite - being so fidgety or restless that you have been moving around a lot more than usual: Not at all  Thoughts that  you would be better off dead, or of hurting yourself in some way: Not at all  PHQ-9 Total Score: 1  If you checked off any problems, how difficult have these problems made it for you to do your work, take care of things at home, or get along with other people?: Not difficult at all    GAD7 score YESICA-7 Total: 4 (4/15/2019  3:00 PM)    No data recorded      In the past 4 weeks, how much of the time did your asthma keep you from getting as much done at work, school, or at home?: Some of the time  During the past 4 weeks, how often have you had shortness of breath?: 3 to 6 times a week  During the past 4 weeks, how often did your asthma symptoms (wheezing, coughing, shortness of breath, chest tightness or pain) wake you up at night or earlier in the morning?: 2 or 3 nights a week  During the past 4 weeks, how often have you used your rescue inhaler or nebulizer medication (such as albuterol)?: 3 or more times per day  How would you rate your asthma control during the past 4 weeks?: Somewhat controlled  ACT Total Score: (!) 12  In the past 12 months, have you visited the emergency room due to your asthma?: No  In the past 12 months, have you been hospitalized due to your asthma?: No      Tesha Pelaez MD

## 2021-05-28 ASSESSMENT — ASTHMA QUESTIONNAIRES
ACT_TOTALSCORE: 22
ACT_TOTALSCORE: 14
ACT_TOTALSCORE: 25

## 2021-05-28 ASSESSMENT — ANXIETY QUESTIONNAIRES
GAD7 TOTAL SCORE: 10
GAD7 TOTAL SCORE: 8

## 2021-05-28 NOTE — PATIENT INSTRUCTIONS - HE
1.  Take zyrtec (cetirizine) 10mg, one tablet daily    2.  flonase nasal spray:  Instill one spray in each nostril twice daily    3.  Follow up in 2 weeks if you are still not better

## 2021-05-28 NOTE — TELEPHONE ENCOUNTER
Sent Rx for albuterol neb treatments. Recommend office visit with MTM especially, as well as myself, to readdress her asthma as recommended last month. Thanks, Tesha Pelaez MD

## 2021-05-28 NOTE — PROGRESS NOTES
ASSESSMENT/PLAN:  Environmental allergies  This is a 43-year-old female, history of asthma and allergies, who is hearing impaired accompanied by a  presented today for allergies.  I will give her Flonase nasal spray.  I asked her to continue with Zyrtec.  She will continue with Singulair and to keep up with her asthma maintenance medications.  She will follow-up in 1 to 2 weeks if her symptoms do not improve.  -     fluticasone propionate (FLONASE) 50 mcg/actuation nasal spray; One spray in each nostril twice daily      Patient Instructions   1.  Take zyrtec (cetirizine) 10mg, one tablet daily    2.  flonase nasal spray:  Instill one spray in each nostril twice daily    3.  Follow up in 2 weeks if you are still not better      SUBJECTIVE:    Marleny Viera is a 43 y.o. female who came in today because of allergies.  Patient has known persistent asthma and seasonal allergies.  Her allergies are related to the spring and fall seasons.  Her allergy symptoms are that of watery eyes, facial pain pressure, rhinorrhea, and a productive cough.  She has had on and off headaches recently.  Last week she was ill with a fever myalgia chills malaise but those symptoms have gotten better.  She did get her flu shot this year.  However, over the last few days she has had allergy-like symptoms.  She has started taking Zyrtec which is helped somewhat.  She also took an over-the-counter oral antihistamine (she is unable to call the name of the medication) which she thinks also has helped.  She is asking for medications to help her with the spring and fall seasons especially right now at this time.      This patient has asthma.  She is on Advair and as needed albuterol.  She also takes Singulair.  Denies shortness of breath or wheezing.      Review of Systems (except those mentioned above)  Constitutional: Negative.   HENT: Negative.   Eyes: Negative.   Respiratory: Negative.   Cardiovascular: Negative.    Gastrointestinal: Negative.   Endocrine: Negative.   Genitourinary: Negative.   Musculoskeletal: Negative.   Skin: Negative.   Allergic/Immunologic: Negative.   Neurological: Negative.   Hematological: Negative.   Psychiatric/Behavioral: Negative.     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     Asthma, moderate persistent, uncomplicated      Insomnia 02/10/2016     Essential hypertension 02/08/2016     Status post total right knee replacement 01/18/2016     Screening for malignant neoplasm of cervix 12/23/2015     Migraine 04/24/2014     Personal history of PE (pulmonary embolism) 09/19/2012     Post op anticoagulation care plan (if needed) 09/13/2012     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 Please pre-medicate patient in the future.      Wasp Venom Protein Starter Kit Itching, Shortness Of Breath, Swelling and Wheezing     Adhesive Tape-Silicones      Chocolate Cough     Food Allergy Formula      Red meat, chocolate     Geodon [Ziprasidone Hcl]      Ibuprofen Other (See Comments)     Kidney function  Kidney function       Prochlorperazine Other (See Comments)     Risperdal [Risperidone]      Risperidone Analogues Other (See Comments)     Theobroma Oil Unknown     Zoloft [Sertraline]      Hydrochlorothiazide Rash     Current Outpatient Medications   Medication Sig Dispense Refill      albuterol (PROAIR HFA;PROVENTIL HFA;VENTOLIN HFA) 90 mcg/actuation inhaler Inhale 2 puffs every 6 (six) hours as needed for wheezing.       albuterol (PROVENTIL) 2.5 mg /3 mL (0.083 %) nebulizer solution Take 3 mL (2.5 mg total) by nebulization every 4 (four) hours as needed for wheezing. 25 vial 2     cholecalciferol, vitamin D3, (VITAMIN D3) 1,000 unit capsule Take 1 capsule (1,000 Units total) by mouth daily. 60 capsule 0     DIAPER,BRIEF,INFANT-MARY,DISP (DIAPERS MISC) Adult1 month supply  approx 100       EPINEPHrine (EPIPEN 2-DIONISIO) 0.3 mg/0.3 mL injection Inject 0.3 mg as directed as needed.              fluticasone propionate (FLONASE) 50 mcg/actuation nasal spray One spray in each nostril twice daily 16 g 0     fluticasone-salmeterol 113-14 mcg/actuation AePB Inhale 1 puff 2 (two) times a day. 1 each 5     fluticasone-vilanterol (BREO ELLIPTA) 100-25 mcg/dose DsDv inhaler Inhale 1 puff daily. 1 each 5     gabapentin (NEURONTIN) 100 MG capsule Take 100 mg by mouth 2 (two) times a day. For migraines and anxiety 180 capsule 3     HYDROcodone-acetaminophen (NORCO) 7.5-325 mg per tablet Take 1 tablet by mouth every 6 (six) hours as needed for pain. 15 tablet 0     hydrOXYzine pamoate (VISTARIL) 25 MG capsule Take 2 capsules (50 mg total) by mouth at bedtime. For anxiety/sleep 180 capsule 3     metoprolol tartrate (LOPRESSOR) 50 MG tablet Take 1 tablet (50 mg total) by mouth 2 (two) times a day. 180 tablet 3     montelukast (SINGULAIR) 10 mg tablet Take 1 tablet (10 mg total) by mouth daily. 90 tablet 3     polyethylene glycol (GLYCOLAX) 17 gram/dose powder Take 17 g by mouth daily as needed. 255 g 0     rizatriptan (MAXALT) 10 MG tablet Take 10 mg by mouth as needed.              No current facility-administered medications for this visit.      Past Medical History:   Diagnosis Date     ADHD (attention deficit hyperactivity disorder)      Alcohol dependence (H)      Anxiety      Arthritis      Asthma      Bipolar  "depression (H)      Deafness      Drug abuse, nondependent (H) 10/29/2004    Overview:  polydrug abuse per psych notes ; Other, mixed, or unspecified nondependent drug abuse, unspecified     History of blood clots      History of transfusion      Hypertension      Kidney stones 2015     Major depressive disorder, recurrent episode, severe (H) 5/17/2016    Overview:  s/p suicide attempt Epic      Migraine      Obstructive sleep apnea 6/1/2016     Pulmonary emboli (H)      Past Surgical History:   Procedure Laterality Date     BREAST SURGERY       TOTAL HIP ARTHROPLASTY Bilateral      TOTAL KNEE ARTHROPLASTY Right      TUBAL LIGATION       Social History     Socioeconomic History     Marital status:      Spouse name: None     Number of children: None     Years of education: None     Highest education level: None   Occupational History     None   Social Needs     Financial resource strain: None     Food insecurity:     Worry: None     Inability: None     Transportation needs:     Medical: None     Non-medical: None   Tobacco Use     Smoking status: Never Smoker     Smokeless tobacco: Never Used   Substance and Sexual Activity     Alcohol use: No     Drug use: No     Sexual activity: Never   Lifestyle     Physical activity:     Days per week: None     Minutes per session: None     Stress: None   Relationships     Social connections:     Talks on phone: None     Gets together: None     Attends Cheondoism service: None     Active member of club or organization: None     Attends meetings of clubs or organizations: None     Relationship status: None     Intimate partner violence:     Fear of current or ex partner: None     Emotionally abused: None     Physically abused: None     Forced sexual activity: None   Other Topics Concern     None   Social History Narrative    . No children.     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    Nonsmoker.    No alcohol use.    Tesha" MD Latanya     Family History   Problem Relation Age of Onset     Cancer Mother      Stroke Father          OBJECTIVE:    Vitals:    05/14/19 1550   BP: 148/72   Pulse: 70   Temp: 98  F (36.7  C)   TempSrc: Oral   SpO2: 99%   Weight: (!) 228 lb 4 oz (103.5 kg)     Body mass index is 37.98 kg/m .    Physical Exam:  Constitutional: Patient was oriented to person, place, and time. Patient appeared well-developed and well-nourished. No distress.   Head: Normocephalic and atraumatic.   Right Ear: External ear normal. Normal TM  Left Ear: External ear normal. Normal TM  Nose: Nose normal.   Mouth/Throat: Oropharynx was clear and moist. No oropharyngeal exudate.   Eyes: Conjunctivae and EOM were normal. Pupils were equal, round, and reactive to light. Right eye exhibited no discharge. Left eye exhibited no discharge. No scleral icterus.   Neck: Neck supple. No JVD present. No tracheal deviation present. No thyromegaly present.   Lymphadenopathy:  Patient has no cervical adenopathy.   Cardiovascular: Normal rate, regular rhythm, normal heart sounds and intact distal pulses. No murmur heard.   Pulmonary/Chest: Effort normal and breath sounds normal. No stridor. No respiratory distress. Patient had no wheezes, no rales, exhibits no tenderness.   Skin: Skin was warm and dry. No rash noted. Patient was not diaphoretic. No erythema. No pallor.       Results for orders placed or performed during the hospital encounter of 03/27/19   HM2(CBC w/o Differential)   Result Value Ref Range    WBC 8.3 4.0 - 11.0 thou/uL    RBC 4.42 3.80 - 5.40 mill/uL    Hemoglobin 12.6 12.0 - 16.0 g/dL    Hematocrit 38.2 35.0 - 47.0 %    MCV 86 80 - 100 fL    MCH 28.5 27.0 - 34.0 pg    MCHC 33.0 32.0 - 36.0 g/dL    RDW 13.3 11.0 - 14.5 %    Platelets 302 140 - 440 thou/uL    MPV 10.4 8.5 - 12.5 fL   Comprehensive Metabolic Panel   Result Value Ref Range    Sodium 138 136 - 145 mmol/L    Potassium 3.6 3.5 - 5.0 mmol/L    Chloride 107 98 - 107 mmol/L     CO2 24 22 - 31 mmol/L    Anion Gap, Calculation 7 5 - 18 mmol/L    Glucose 145 (H) 70 - 125 mg/dL    BUN 12 8 - 22 mg/dL    Creatinine 0.82 0.60 - 1.10 mg/dL    GFR MDRD Af Amer >60 >60 mL/min/1.73m2    GFR MDRD Non Af Amer >60 >60 mL/min/1.73m2    Bilirubin, Total 0.3 0.0 - 1.0 mg/dL    Calcium 8.7 8.5 - 10.5 mg/dL    Protein, Total 6.3 6.0 - 8.0 g/dL    Albumin 3.2 (L) 3.5 - 5.0 g/dL    Alkaline Phosphatase 84 45 - 120 U/L    AST 13 0 - 40 U/L    ALT 16 0 - 45 U/L   Lipase   Result Value Ref Range    Lipase 23 0 - 52 U/L   Urinalysis-UC if Indicated   Result Value Ref Range    Color, UA Melyssa (!) Colorless, Yellow, Straw, Light Yellow    Clarity, UA Clear Clear    Glucose, UA Negative Negative    Bilirubin, UA Negative Negative    Ketones, UA 25 mg/dL (!) Negative    Specific Gravity, UA 1.020 1.001 - 1.030    Blood, UA Negative Negative    pH, UA 6.0 4.5 - 8.0    Protein, UA 30 mg/dL (!) Negative mg/dL    Urobilinogen, UA 2.0 E.U./dL <2.0 E.U./dL, 2.0 E.U./dL    Nitrite, UA Negative Negative    Leukocytes, UA Negative Negative    Bacteria, UA Few (!) None Seen hpf    RBC, UA 0-2 None Seen, 0-2 hpf    WBC, UA 0-5 None Seen, 0-5 hpf    Squam Epithel, UA  (!) None Seen, 0-5 lpf    Mucus, UA Few (!) None Seen lpf   UPT (lab test)   Result Value Ref Range    Pregnancy Test, Urine Negative Negative

## 2021-05-28 NOTE — TELEPHONE ENCOUNTER
Medication Request  Medication name:  Albuterol for Nebulizer  Pharmacy Name and Location:  [facility] [address]  Reason for request:  Allergies are really bad  When did you use medication last?:   n/a  Patient offered appointment:  n/a  Okay to leave a detailed message: yes

## 2021-05-28 NOTE — TELEPHONE ENCOUNTER
Using , detailed message left for patient.  Shaylee Mcrae CMA [facility] CMT

## 2021-05-28 NOTE — TELEPHONE ENCOUNTER
Left message to call back for: Marleny-call disconnected twice   Information to relay to patient:      Sent Rx for albuterol neb treatments. Recommend office visit with MTM especially, as well as myself, to readdress her asthma as recommended last month. Thanks, Tesha Pelaez MD

## 2021-05-28 NOTE — TELEPHONE ENCOUNTER
"She began to have a temperature of 100.8 on 5/9.  Now it is 101.8.  She also has body aches, wheezing diarrhea, back pain and chills.  She has been using her nebulizer every four hours since 5/9. She will go to the ED when her  gets home from work about 0800.  She knows to call 9-1-1 if she needs to.  Reema Kaminski RN, BAN, Nurse Advisor, [facility] 11p-7a      Reason for Disposition    [1] Constant abdominal pain AND [2] present > 2 hours    [1] MODERATE asthma attack (e.g., SOB at rest, speaks in phrases, audible wheezes) AND [2] not resolved after 2 nebulizer or inhaler treatments given 20 minutes apart    Answer Assessment - Initial Assessment Questions  1. TEMPERATURE: \"What is the most recent temperature?\"  \"How was it measured?\"       101.8 oral  2. ONSET: \"When did the fever start?\"       It started yesterday 5/10/19   3. SYMPTOMS: \"Do you have any other symptoms besides the fever?\"  (e.g., colds, headache, sore throat, earache, cough, rash, diarrhea, vomiting, abdominal pain)      Chills off/on; diarrhea; body aches; wheezing; back pain  4. CAUSE: If there are no symptoms, ask: \"What do you think is causing the fever?\"       Unsure  5. CONTACTS: \"Does anyone else in the family have an infection?\"      No  6. TREATMENT: \"What have you done so far to treat this fever?\" (e.g., medications)      Fluids, acetaminophen  7. IMMUNOCOMPROMISE: \"Do you have of the following: diabetes, HIV positive, splenectomy, cancer chemotherapy, chronic steroid treatment, transplant patient, etc.\"      No  8. PREGNANCY: \"Is there any chance you are pregnant?\" \"When was your last menstrual period?\"      Denies  9. TRAVEL: \"Have you traveled out of the country in the last month?\" (e.g., travel history, exposures)      No    Answer Assessment - Initial Assessment Questions  1. DIARRHEA SEVERITY: \"How bad is the diarrhea?\" \"How many extra stools have you had in the past 24 hours than normal?\"     - MILD: Few loose or mushy BMs; " "increase of 1-3 stools over normal daily number of stools; mild increase in ostomy output.    - MODERATE: Increase of 4-6 stools daily over normal; moderate increase in ostomy output.    - SEVERE (or Worst Possible): Increase of 7 or more stools daily over normal; moderate increase in ostomy output; incontinence.      She had seven diarrhea stools on 5/9/19 and five on 5/10/19 - moderate  2. ONSET: \"When did the diarrhea begin?\"       On 5/9/19  3. BM CONSISTENCY: \"How loose or watery is the diarrhea?\"       Loose  4. VOMITING: \"Are you also vomiting?\" If so, ask: \"How many times in the past 24 hours?\"       NNo  5. ABDOMINAL PAIN: \"Are you having any abdominal pain?\" If yes: \"What does it feel like?\" (e.g., crampy, dull, intermittent, constant)       No but she is having back pain  6. ABDOMINAL PAIN SEVERITY: If present, ask: \"How bad is the pain?\"  (e.g., Scale 1-10; mild, moderate, or severe)     - MILD (1-3): doesn't interfere with normal activities, abdomen soft and not tender to touch      - MODERATE (4-7): interferes with normal activities or awakens from sleep, tender to touch      - SEVERE (8-10): excruciating pain, doubled over, unable to do any normal activities       N/A  7. ORAL INTAKE: If vomiting, \"Have you been able to drink liquids?\" \"How much fluids have you had in the past 24 hours?\"      She feels her fluid intake is adequate  8. HYDRATION: \"Any signs of dehydration?\" (e.g., dry mouth [not just dry lips], too weak to stand, dizziness, new weight loss) \"When did you last urinate?\"      Denies  9. EXPOSURE: \"Have you traveled to a foreign country recently?\" \"Have you been exposed to anyone with diarrhea?\" \"Could you have eaten any food that was spoiled?\"      No  10. ANTIBIOTIC USE: \"Are you taking antibiotics now or have you taken antibiotics in the past 2 months?\"        No  11. OTHER SYMPTOMS: \"Do you have any other symptoms?\" (e.g., fever, blood in stool)        Chills, fever, body aches, " "wheezing, back pain  12. PREGNANCY: \"Is there any chance you are pregnant?\" \"When was your last menstrual period?\"        Denies    Answer Assessment - Initial Assessment Questions  1. RESPIRATORY STATUS: \"Describe your breathing?\" (e.g., wheezing, shortness of breath, unable to speak, severe coughing)       \"Wheezes\" \"Tight\"  2. ONSET: \"When did this asthma attack begin?\"       5/9/2019  3. TRIGGER: \"What do you think triggered this attack?\" (e.g., URI, exposure to pollen or other allergen, tobacco smoke)       Unsure  4. PEAK EXPIRATORY FLOW RATE (PEFR): \"Do you use a peak flow meter?\" If so, ask: \"What's the current peak flow? What's your personal best peak flow?\"       N/A  5. SEVERITY: \"How bad is this attack?\"     - MILD: No SOB at rest, mild SOB with walking, speaks normally in sentences, can lay down, no retractions, pulse < 100. (GREEN Zone: PEFR %)    - MODERATE: SOB at rest, SOB with minimal exertion and prefers to sit, cannot lie down flat, speaks in phrases, mild retractions, audible wheezing, pulse 100-120. (YELLOW Zone: PEFR 50-80%)     - SEVERE: Very SOB at rest, speaks in single words, struggling to breathe, sitting hunched forward, retractions, usually loud wheezing, sometimes minimal wheezing because of decreased air movement, pulse > 120. (RED Zone: PEFR < 50%).       Using her nebulizer every four hours  6. MEDICATIONS (Inhaler or nebs): \"What are your asthma medications?\" and \"What treatments have you given so far?\"     - Quick-relief: albuterol, metaproterenol, salbutamol, or other inhaled or nebulized beta-agonist medicines    - Long-term-control: steroids, cromolyn, or other anti-inflammatory medicines.      Albuterol inhaler and nebulizer; Breo; Singulair; fluticasone/salmutaol   7. OTHER SYMPTOMS: \"Do you have any other symptoms? (e.g., runny nose, chest pain, fever)      Fever - 101.8  8. PREGNANCY: \"Is there any chance you are pregnant?\" \"When was your last menstrual period?\"      " Denies    Protocols used: DIARRHEA-A-AH, ASTHMA ATTACK-A-AH, FEVER-A-AH

## 2021-05-29 NOTE — PROGRESS NOTES
1. Asthma, moderate persistent, uncomplicated  methylPREDNISolone (MEDROL DOSEPACK) 4 mg tablet   2. Other dysphagia  Ambulatory Referral for Upper GI Endoscopy        Plan: I feel a lot of her symptoms that she is exhibiting today coming from her asthma is a bit of exacerbation.  She is wheezing audibly and I think she will benefit from a Medrol Dosepak and to continue the inhalers that she has been using but only on a sporadic basis.    With her known history of borderline personality disorder as well, it is noted that she was pretty histrionic today with her symptoms since the sign language interpreters frustration at the patient changing her story a lot on her symptoms that he was hearing from her.  The certainly made the diagnosis and evaluation of this patient more challenging.    She also seems to be relating some dysphagia, so we will get an upper endoscopy to ensure that there is nothing going on there that is preventing her from being able to swallow normally.  We can follow-up on those results when the become available.    Subjective: 43-year-old female who is hearing impaired and is here with a .  She is concerned because she is having some trouble with swallowing.  She went to [facility] emergency [facility] on June 8.  She had some right-sided bone and muscle pain.  She also describes some pain in the right shoulder and right upper chest.  This seems to be worse when she is swallowing or eating.  She seems to note it better when she is not in the process of eating.  She still has a lot of pain and rates it very high at an 8 out of 10.  She seems to work herself up a lot and get very anxious and then she will get frustrated and try to sign too fast and it becomes very difficult to assess what is going on she does not look toxic or in trouble at all but she is very again histrionic with her concerns and complaints today.  She denies any chest pain on the left side or down the arm or up  into the neck but has this odd right sided anterior chest discomfort with swallowing and eating.  She is concerned that the things are getting stuck in her esophagus and that she is not able to swallow them completely.    Also she is concerned because she is having some wheezing, and she has a known history of asthma and is needed sometimes to be on some prednisone to make her breathe better.  She has not really been using her inhaler for unclear reasons to me.  She has no significant cough, fevers or chills, no production, no pain with taking a deep breath.    Objective very anxious appearing female in no acute distress.  Vital signs as noted.  Ears are clear bilaterally.  Eyes and nose are normal.  Oropharynx is clear.  Chest wheezes are heard throughout with decreased breath sounds noted no rales or rhonchi heard.  Heart regular rate and rhythm.

## 2021-05-29 NOTE — TELEPHONE ENCOUNTER
Refill Approved    Rx renewed per Medication Renewal Policy. Medication was last renewed on 5.14.19.    María Corea, [facility] Connection Triage/Med Refill 6/10/2019     Requested Prescriptions   Pending Prescriptions Disp Refills     fluticasone propionate (FLONASE) 50 mcg/actuation nasal spray [Pharmacy Med Name: FLUTICASONE PROP 50 MCG SPRAY]  0     Sig: INSTILL ONE SPRAY IN EACH NOSTRIL TWICE DAILY       Nasal Steroid Refill Protocol Passed - 6/7/2019  7:36 AM        Passed - Patient has had office visit/physical in last 2 years     Last office visit with prescriber/PCP: 5/14/2019 OR same dept: 5/14/2019 Olu Ruelas MD OR same specialty: 5/14/2019 Olu Ruelas MD Last physical: Visit date not found Last MTM visit: Visit date not found    Next appt within 3 mo: Visit date not found  Next physical within 3 mo: Visit date not found  Prescriber OR PCP: Olu Godoy MD  Last diagnosis associated with med order: 1. Environmental allergies  - fluticasone propionate (FLONASE) 50 mcg/actuation nasal spray [Pharmacy Med Name: FLUTICASONE PROP 50 MCG SPRAY]; INSTILL ONE SPRAY IN EACH NOSTRIL TWICE DAILY; Refill: 0     If protocol passes may refill for 12 months if within 3 months of last provider visit (or a total of 15 months).

## 2021-05-30 ENCOUNTER — RECORDS - HEALTHEAST (OUTPATIENT)
Dept: ADMINISTRATIVE | Facility: CLINIC | Age: 46
End: 2021-05-30

## 2021-05-30 NOTE — PATIENT INSTRUCTIONS - HE
Cephalexin three times a day for 10 days  Valacyclovir three times a day for 7 days or until test result returns  Recheck with primary care as planned in 2 days

## 2021-05-30 NOTE — PROGRESS NOTES
Assessment/Plan:   Abscess or cellulitis of scalp  Rash  Red, tender, boggy area/rash on posterior scalp around the left side. No injury, no bug bites, no ticks. Consider cellulitis vs shingles. Also may be early inflamed sebaceous cyst but the area is large and diffuse. A swab for zoster was attempted but without a vesicular lesion it was not a great sample. We will treat empirically for zoster since were are getting close to 72 hours but also treat for cellulitis. She has appointment with primary care in 2 days and can be reassessed at that time, and the valacyclovir stopped if indicated.   - cephalexin (KEFLEX) 500 MG capsule; Take 1 capsule (500 mg total) by mouth 3 (three) times a day for 10 days.  Dispense: 30 capsule; Refill: 0  - valACYclovir (VALTREX) 1000 MG tablet; Take 1 tablet (1,000 mg total) by mouth 3 (three) times a day for 7 days.  Dispense: 21 tablet; Refill: 0  - Varicella-Zoster Virus DNA by PCR, CSF or Skin Swab(VZDNA)    Cephalexin three times a day for 10 days  Valacyclovir three times a day for 7 days or until test result returns  Yogurt or probiotics  Zoster PCR pending.   Recheck with primary care as planned in 2 days    Addendum: the varicella PCR was negative. Attempted to call patient x2 and the call was dropped twice while connecting to the hearing impaired transfer service. Sent result to patient by [facility]. Has follow up scheduled and she can review with primary care at that time whether to continue the valacyclovir.     Subjective:      Marleny Viera is a 43 y.o. female who presents with a sign language video  ([id number]) for evaluation of pain on her scalp. It started about 2-3 days ago on the back of her head and has spread to the left side of scalp slightly. It is burning and very tender, a bit itchy. Sometimes stronger pains. She especially noted pain when brushing her hair. It has seemed weepy and crusty at times. No lesions around the ear or face. Glands feel tender  and swollen on the left anterior neck. No fever or chills, no URI or cough or ear pain. Eyes feel blurry when the pain is worse - like when brushing hair. No known injury, no apparent bug bites or bee stings. Does not think she had chicken pox as a child, no siblings, did have the varicella vaccine a couple years ago. Has not been outside much the last week due to the heat and humidity not being good for her asthma. Breathing is stable.   She is also concerned about a recent CT scan that mentioned a stable lung nodule at 5mm. She recalls a scan in 2013 or so that first found it and she thought it was 2mm which makes her very concerned about whether it is cancer. I reviewed her many CT scans and they have reported a 5mm nodule stable since 2015. I think the lesion is very likely stable and not necenssarily requiring immediate biopsy or evaluation. She has appointment this week with primary care and can discuss further at that time. Never smoker.    Allergies   Allergen Reactions     Amoxicillin Itching and Shortness Of Breath     Bee Pollen Anaphylaxis     Hymenoptera Allergenic Extract Anaphylaxis     Iodine Hives     Pt stated that she was injected with CT CONTRAST in the past and got hives, SOB, and nausea. Please pre-medicate patient in the future.      Wasp Venom Protein Starter Kit Itching, Shortness Of Breath, Swelling and Wheezing     Adhesive Tape-Silicones      Chocolate Cough     Food Allergy Formula      Red meat, chocolate     Geodon [Ziprasidone Hcl]      Ibuprofen Other (See Comments)     Kidney function  Kidney function       Latex      Added based on information entered during case entry, please review and add reactions, type, and severity as needed     Prochlorperazine Other (See Comments)     Risperdal [Risperidone]      Risperidone Analogues Other (See Comments)     Theobroma Oil Unknown     Zoloft [Sertraline]      Hydrochlorothiazide Rash     Current Outpatient Medications on File Prior to Visit    Medication Sig Dispense Refill     albuterol (PROAIR HFA;PROVENTIL HFA;VENTOLIN HFA) 90 mcg/actuation inhaler Inhale 2 puffs every 6 (six) hours as needed for wheezing.       albuterol (PROVENTIL) 2.5 mg /3 mL (0.083 %) nebulizer solution Take 3 mL (2.5 mg total) by nebulization every 4 (four) hours as needed for wheezing. 25 vial 2     cholecalciferol, vitamin D3, (VITAMIN D3) 1,000 unit capsule Take 1 capsule (1,000 Units total) by mouth daily. 60 capsule 0     DIAPER,BRIEF,INFANT-MARY,DISP (DIAPERS MISC) Adult1 month supply  approx 100       fluticasone propionate (FLONASE) 50 mcg/actuation nasal spray INSTILL ONE SPRAY IN EACH NOSTRIL TWICE DAILY 16 g 5     fluticasone-salmeterol 113-14 mcg/actuation AePB Inhale 1 puff 2 (two) times a day. 1 each 5     fluticasone-vilanterol (BREO ELLIPTA) 100-25 mcg/dose DsDv inhaler Inhale 1 puff daily. 1 each 5     gabapentin (NEURONTIN) 100 MG capsule Take 100 mg by mouth 2 (two) times a day. For migraines and anxiety 180 capsule 3     HYDROcodone-acetaminophen (NORCO) 5-325 mg per tablet Take 1-2 tablets by mouth.       HYDROcodone-acetaminophen (NORCO) 7.5-325 mg per tablet Take 1 tablet by mouth every 6 (six) hours as needed for pain. 15 tablet 0     hydrOXYzine HCl (ATARAX) 25 MG tablet TAKE 2 CAPSULES (50 MG TOTAL) BY MOUTH AT BEDTIME. FOR ANXIETY/SLEEP  3     hydrOXYzine pamoate (VISTARIL) 25 MG capsule Take 2 capsules (50 mg total) by mouth at bedtime. For anxiety/sleep 180 capsule 3     metoprolol tartrate (LOPRESSOR) 50 MG tablet Take 1 tablet (50 mg total) by mouth 2 (two) times a day. 180 tablet 3     montelukast (SINGULAIR) 10 mg tablet Take 1 tablet (10 mg total) by mouth daily. 90 tablet 3     rizatriptan (MAXALT) 10 MG tablet Take 10 mg by mouth as needed.              EPINEPHrine (EPIPEN 2-DIONISIO) 0.3 mg/0.3 mL injection Inject 0.3 mg as directed as needed.              polyethylene glycol (GLYCOLAX) 17 gram/dose powder Take 17 g by mouth daily as needed. 255  g 0     No current facility-administered medications on file prior to visit.      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     Didelphic uterus     Personal history of PE (pulmonary embolism)     Migraine     Fatty liver     CMC DJD(carpometacarpal degenerative joint disease), localized primary     Obesity (BMI 35.0-39.9) with comorbidity (H)     Other dysphagia       Objective:     /89   Pulse 90   Temp 98.9  F (37.2  C) (Oral)   Resp 18   Wt (!) 229 lb 3.2 oz (104 kg)   LMP 06/23/2019 (Exact Date)   SpO2 98%   BMI 38.14 kg/m      Physical  General Appearance: Alert, cooperative, no distress, AVSS  Head: Normocephalic, atraumatic - the back of the head has a tender boggy swelling and redness - no definite cystic structure of abscess, seems more of a cellulitis. There is a slight nodularity to the skin which along with the distribution raised the question of shingles, no significant oozing or crusting or definite vesicles. There is tenderness and what sounds like a dysesthesia/numbness/tingling sensation of the scalp in the left side as well - difficult via translation to know for sure. A papular structure was unroofed and swabbed for zoster. No purulent drainage or 'head' of a cyst or abscess identified.   Eyes: Conjunctivae are normal.   Ears: Normal TMs and external ear canals, both ears  Nose: No significant congestion.  Throat: Throat is normal.  No exudate.  No significant lesions  Neck: tender left side adenopathy, neck is supple  Skin: no other rashes or lesions  Psychiatric: Patient has a normal mood and affect.

## 2021-05-30 NOTE — TELEPHONE ENCOUNTER
Via hearing impaired relay system.   July 1 seen in ER for asthma. Referred to get testing. Today results came back. Under lung bases: stable 5mm right lower lobe nodule. She wants to know if this means she has cancer?. She has pain in this area. She states it has grown, it used to be 2 mm and now it is 5 mm.     She has not been seen in clinic for ER follow up yet. Advised to make appointment to discuss test results with a provider.   Call transferred to .     Debbi Harper RN [facility] Triage Nurse

## 2021-05-31 ENCOUNTER — RECORDS - HEALTHEAST (OUTPATIENT)
Dept: ADMINISTRATIVE | Facility: CLINIC | Age: 46
End: 2021-05-31

## 2021-05-31 NOTE — TELEPHONE ENCOUNTER
Patient came in with  and  requesting refills. Patient is going to schedule an appointment for a med check soon as she starts new job 9/9.  Leigh Doshi LPN

## 2021-05-31 NOTE — PROGRESS NOTES
Assessment/plan   Marleny Viera is a 43 y.o. female who is New  patient to my practice here with   Chief Complaint   Patient presents with     Form     Hearing loss form for work     Labs Only     cholesterol, diabetes     Asthma        Marleny was seen today for form, labs only and asthma.    Diagnoses and all orders for this visit:    Screening for metabolic disorder  -     Lipid Cascade FASTING  -     Glycosylated Hemoglobin A1c    Asthma, moderate persistent, uncomplicated  -     montelukast (SINGULAIR) 10 mg tablet; Take 1 tablet (10 mg total) by mouth daily.    Essential hypertension    Bilateral deafness    Chronic pain of right knee  -     HYDROcodone-acetaminophen (NORCO) 5-325 mg per tablet; Take 1-2 tablets by mouth every 6 (six) hours as needed for pain.  -     Ambulatory referral to Orthopedics    H/O bee sting allergy  -     EPINEPHrine (EPIPEN 2-DIONISIO) 0.3 mg/0.3 mL injection; Inject 0.3 mL (0.3 mg total) as directed as needed.    Environmental allergies  -     fluticasone propionate (FLONASE) 50 mcg/actuation nasal spray; INSTILL ONE SPRAY IN EACH NOSTRIL TWICE DAILY      Genetic letter provided to the patient, documented in the chart.  Referral to orthopedics for follow-up on her chronic knee and hip pain  Refills of all the medication as requested by the patient  Blood pressure is well controlled today  Also doing fasting labs will Follow up   Subjective:      HPI: Marleny Viera is a 43 y.o. female is here with , mainly to get a letter for her diagnosis of deafness bilateral.  Currently she is enrolling into a program and her  requested she get required documentation before she is start looking for a job.  Per patient she never worked in her life.  But as her  recently diagnosed with ALS she need to have a secondary income.    Chronic knee pain: Patient status post knee replacement surgery 2012 on the right side continue to have a lot of pain which  require narcotic pain management.  Patient also has status post both the left and right hip surgery no follow-up with her orthopedic surgeon at [facility] willing to have follow-up with them to see what else can be done to help improve her pain will do a limited refill of her Norco.  Patient understand that she need to sign a narcotic agreement form with her primary for future refills    Hypertension: Pressure is well controlled today.    Asthma: Needs refill on her Singulair   ACT Total Score: (!) 12 (4/15/2019  3:37 PM)    I have personally reviewed the patient's allergies, medications, past medical history, family history, social history, rooming notes and problem list in detail and updated the patient record as necessary.      Past Medical History:   Diagnosis Date     ADHD (attention deficit hyperactivity disorder)      Alcohol dependence (H)      Anxiety      Arthritis      Asthma      Bipolar depression (H)      Deafness      Drug abuse, nondependent (H) 10/29/2004    Overview:  polydrug abuse per psych notes ; Other, mixed, or unspecified nondependent drug abuse, unspecified     History of blood clots      History of transfusion      Hypertension      Kidney stones 2015     Major depressive disorder, recurrent episode, severe (H) 5/17/2016    Overview:  s/p suicide attempt Epic      Migraine      Obstructive sleep apnea 6/1/2016     Pulmonary emboli (H)      Status post total right knee replacement 1/18/2016     Past Surgical History:   Procedure Laterality Date     BREAST SURGERY       TOTAL HIP ARTHROPLASTY Bilateral      TOTAL KNEE ARTHROPLASTY Right      TUBAL LIGATION       Amoxicillin; Bee pollen; Hymenoptera allergenic extract; Iodine; Wasp venom protein starter kit; Adhesive tape-silicones; Chocolate; Food allergy formula; Geodon [ziprasidone hcl]; Ibuprofen; Latex; Prochlorperazine; Risperdal [risperidone]; Risperidone analogues; Theobroma oil; Zoloft [sertraline]; and  Hydrochlorothiazide  Current Outpatient Medications   Medication Sig Dispense Refill     albuterol (PROAIR HFA;PROVENTIL HFA;VENTOLIN HFA) 90 mcg/actuation inhaler Inhale 2 puffs every 6 (six) hours as needed for wheezing.       albuterol (PROVENTIL) 2.5 mg /3 mL (0.083 %) nebulizer solution Take 3 mL (2.5 mg total) by nebulization every 4 (four) hours as needed for wheezing. 25 vial 2     DIAPER,BRIEF,INFANT-MARY,DISP (DIAPERS MISC) Adult1 month supply  approx 100       EPINEPHrine (EPIPEN 2-DIONISIO) 0.3 mg/0.3 mL injection Inject 0.3 mL (0.3 mg total) as directed as needed. 2 Pre-filled Pen Syringe 0     fluticasone-vilanterol (BREO ELLIPTA) 100-25 mcg/dose DsDv inhaler Inhale 1 puff daily. 1 each 5     gabapentin (NEURONTIN) 100 MG capsule Take 100 mg by mouth 2 (two) times a day. For migraines and anxiety 180 capsule 3     HYDROcodone-acetaminophen (NORCO) 5-325 mg per tablet Take 1-2 tablets by mouth every 6 (six) hours as needed for pain. 20 tablet 0     hydrOXYzine HCl (ATARAX) 25 MG tablet TAKE 2 CAPSULES (50 MG TOTAL) BY MOUTH AT BEDTIME. FOR ANXIETY/SLEEP  3     hydrOXYzine pamoate (VISTARIL) 25 MG capsule Take 2 capsules (50 mg total) by mouth at bedtime. For anxiety/sleep 180 capsule 3     metoprolol tartrate (LOPRESSOR) 50 MG tablet Take 1 tablet (50 mg total) by mouth 2 (two) times a day. 180 tablet 3     montelukast (SINGULAIR) 10 mg tablet Take 1 tablet (10 mg total) by mouth daily. 90 tablet 3     cholecalciferol, vitamin D3, (VITAMIN D3) 1,000 unit capsule Take 1 capsule (1,000 Units total) by mouth daily. 60 capsule 0     fluticasone propionate (FLONASE) 50 mcg/actuation nasal spray INSTILL ONE SPRAY IN EACH NOSTRIL TWICE DAILY 16 g 5     fluticasone-salmeterol 113-14 mcg/actuation AePB Inhale 1 puff 2 (two) times a day. 1 each 5     rizatriptan (MAXALT) 10 MG tablet Take 10 mg by mouth as needed.              No current facility-administered medications for this visit.      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 No children.     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    Nonsmoker.    No alcohol use.    Tesha Pelaez MD       Objective:     Vitals:    08/01/19 1430   BP: 134/70   Pulse: 66   Weight: (!) 227 lb 4.8 oz (103.1 kg)   Height: 5' 4.84\" (1.647 m)       Physical Exam:   Physical Exam:  General Appearance:  Appears comfortable, Alert, cooperative, no distress,                  Lungs: Clear to auscultation bilaterally, respirations unlabored  Heart: Regular rate and rhythm, S1 and S2 normal, no murmur, rubs or gallop  Abdomen: Soft, non-tender, bowel sounds active all four quadrants,   no masses, no organomegaly  Extremities: Extremities normal, atraumatic, no cyanosis or edema, surgical scar heeling well   Pulses: DP pulses are 1-2+ bilat.    Skin: no rashes or lesions          This note has been dictated using voice recognition software. Any grammatical or context distortions are unintentional and inherent to the software  Jen Chopra MD      "

## 2021-06-02 ENCOUNTER — RECORDS - HEALTHEAST (OUTPATIENT)
Dept: ADMINISTRATIVE | Facility: CLINIC | Age: 46
End: 2021-06-02

## 2021-06-02 VITALS — HEIGHT: 66 IN | BODY MASS INDEX: 37.27 KG/M2 | WEIGHT: 231.9 LBS

## 2021-06-02 VITALS — WEIGHT: 230 LBS | BODY MASS INDEX: 38.27 KG/M2 | WEIGHT: 230 LBS | BODY MASS INDEX: 38.27 KG/M2

## 2021-06-02 VITALS — WEIGHT: 228.56 LBS | BODY MASS INDEX: 37.17 KG/M2

## 2021-06-02 VITALS — BODY MASS INDEX: 38.17 KG/M2 | WEIGHT: 234.7 LBS

## 2021-06-02 VITALS — HEIGHT: 65 IN | HEIGHT: 65 IN | BODY MASS INDEX: 38.27 KG/M2 | BODY MASS INDEX: 38.27 KG/M2

## 2021-06-02 VITALS — WEIGHT: 232.5 LBS | BODY MASS INDEX: 37.81 KG/M2

## 2021-06-02 VITALS — WEIGHT: 229.06 LBS | BODY MASS INDEX: 38.12 KG/M2

## 2021-06-02 NOTE — TELEPHONE ENCOUNTER
Who is calling:  Patient   Reason for Call:  Needs to be seen by a female Dr today, so scheduled with Dr Marquez in the 10:30 time. However patient does require a . Please call back to patient to let her know if an  is available. If not for th at appointment, patient would be willing to take a later time with Dr Alma olmedo.  Date of last appointment with primary care: 8/01/19  Okay to leave a detailed message: Yes

## 2021-06-02 NOTE — TELEPHONE ENCOUNTER
Pt had also sent a mychart: here was my reply:  Thank you letting me know. I'm sorry you feel that it was not a positive experience. I have notified my manager so she will know to look for this information from your . Please let us know if we can do anything further.  Thanks,  Tesha Pelaez MD

## 2021-06-02 NOTE — TELEPHONE ENCOUNTER
Please see the below message. Patient has an appointment scheduled on 10/21/2019. Thank you, Addie Chen

## 2021-06-02 NOTE — TELEPHONE ENCOUNTER
FYI - Status Update  Who is Calling: Patient - Via  Purple phone - sign language services  Update:  Pt  unhappy with her visit yesterday, Patient wants provider to know she is no longer a [facility] patient , transferring her care to different facility.   Okay to leave a detailed message?:  No return call needed

## 2021-06-02 NOTE — TELEPHONE ENCOUNTER
"She is most concerned about the lump on the right side of her that is tender.  She has no fever.  She has full ROM in the neck with some minor discomfort where the lump is.  For the last four days she hasn't felt well - body aches.  Denies URI symptoms. No changes in vision.  She is going to the ED in case she needs an MRI vs WIC.  I asked her to bring a long her medication on their bottles.  Reema Kaminski RN, BAN, Nurse Advisor, [place] 11p-7a    Reason for Disposition    [1] MODERATE neck pain (e.g., interferes with normal activities AND [2] present > 3 days    Answer Assessment - Initial Assessment Questions  1. ONSET: \"When did the pain begin?\"       About four days ago  2. LOCATION: \"Where does it hurt?\"       From the base of her head and then down the neck.  It is happening jeremiah often  3. PATTERN \"Does the pain come and go, or has it been constant since it started?\"       Come/go  4. SEVERITY: \"How bad is the pain?\"  (Scale 1-10; or mild, moderate, severe)    - MILD (1-3): doesn't interfere with normal activities     - MODERATE (4-7): interferes with normal activities or awakens from sleep     - SEVERE (8-10):  excruciating pain, unable to do any normal activities       Moderate  5. RADIATION: \"Does the pain go anywhere else, shoot into your arms?\"      Not into arms or back  6. CORD SYMPTOMS: \"Any weakness or numbness of the arms or legs?\"      Denies  7. CAUSE: \"What do you think is causing the neck pain?\"      Unsure   She does have migraines but this seems to be more muscle  8. NECK OVERUSE: \"Any recent activities that involved turning or twisting the neck?\"      Denies  9. OTHER SYMPTOMS: \"Do you have any other symptoms?\" (e.g., headache, fever, chest pain, difficulty breathing, neck swelling)      Yes, there is a \"lump\" that she noted yesterday on the right side of her neck.  It is tender to the touch.  She has body aches and generally does not feel well.   No vision changes, Full ROM in neck with some " "discomfort - especially where the lump is.  No nausea  10. PREGNANCY: \"Is there any chance you are pregnant?\" \"When was your last menstrual period?\"        Denies    Protocols used: NECK PAIN OR [id number]      "

## 2021-06-02 NOTE — PROGRESS NOTES
"Assessment/plan   Marleny Viera is a 43 y.o. female with history of bipolar depression, deafness, ADHD, asthma, borderline personality disorder, HTN who is established to my practice on two other occasions, last seen in April 2019.  She has subsequently had numerous ER visits for various symptoms.    Marleny was seen today for follow up.    Diagnoses and all orders for this visit:    Tylenol overdose, accidental or unintentional, sequela  -     Acetaminophen(Tylenol )  -     Comprehensive Metabolic Panel  -     HM2(CBC w/o Differential)    Delirium vs delusional d/o vs substance induced psychosis      Today in clinic the patient denied the entire ER visit yesterday (for Tylenol ingestion- deemed accidental at that time) stating she was in [place] and flying back out of [place] with the [place] layover and her cousin picked her up around 7:40 PM and they went to the [facility] to  her cats.  She keeps repeating the story as a reason for why she was not in the ER yesterday and \"whoever the patient was in the ER it was probably a different patient\".  I tried to inform her about the data as I reviewed it including the fact that she was being evaluated with her significant other and a long conversation was held about her toxic level of Tylenol and the fact that she was being interviewed with a , thus making it unlikely that the note encounter from yesterday's ER visit would be for any other patient.  She denies any suicidal ideations or intents.  Her tiffanie 7 score is 8 and her PHQ 9 score is 6, and has no thoughts about hurting herself or feeling like she is better off dead. She has no intent to harm herself or others.    I told her in good branden I could not recommend starting any medications for mood at this point until she has further evaluation in the ER for her memory lapse.  I am concerned about a delusional disorder which may be secondary to her Tylenol ingestion versus bipolar history.  " "She additionally showed me a pre-recorded video of a man she called her  who also speaks by sign language and the  we had in clinic today interpreted the same story indicating they were in [place] for their 5-year wedding anniversary and flew home around 7:40 PM last night.    I strongly encouraged the ER evaluation but she denied.  We unfortunately do not have a way to contact her  because they do not have a video phone by her report and he is \"sleeping with ALS \".  When I did try to call the home phone number it went straight to voicemail and I left a voice message through the purple video phone as directed.    She left against my advice around 5:52pm. I then called the [place] police to request a welfare check in. Details provided as requested.     Overall I am concerned that she may be having delusions or forgetfulness and agitation as a result of her Tylenol ingestion as documented by the ER notes yesterday.  On arrival to the clinic because we were already starting after 5:00pm for this visit she actually had some labs drawn which included a CBC, CMP and Tylenol level.      Follow up: recommended to be in the ER, declines intervention against medical advice. My RN assistant was present for this entire conversation. Recommended pt to see me this week to re-evaluate sx. I offered her several opportunities to schedule an office visit with me in follow-up however she gave various reasons for being busy including having a  meeting on Wednesday for example or volunteering, or working in a workshop for [employer].    I spent 60 minutes with the patient, >50% of which was in counseling regarding the patient's medical issues as noted above.      Tesha Pelaez MD    Subjective:      HPI: Marleny Viera is a 43 y.o. female who is here for:    Chief Complaint   Patient presents with     Follow Up     depression meds and bloodwork       Depression: " "Patient was originally scheduled to discuss starting medication for depression which she has been on in the past.  She is here with a  today, otherwise no significant other who present.    I started the conversation by reviewing what happened yesterday in the ER which was notable for a Tylenol ingestion with a Tylenol level of 69 and she declined N-acetylcysteine despite its recommendation for life-saving treatment.  She left AMA from the ER at this time.      See above conversation, she completely denied this encounter and describes that she flew in from vacation just yesterday evening at 7:40 PM.  She and her  were having their 5-year anniversary in [place] and her cousin picked them up from the" around 7:40 PM and they went to get their 2 cats from the kennel.  She is able to repeat this story clearly several times throughout the encounter.  She states they had a layover in [place] for about 2 hours.  She states they have a trip planned to 10/26 to 11/1 to [place] for her 44 birthday.  She states she had her mammogram done at 3 PM today which is an accurate statement.  I see this result is in process.    She declines going to the ER, concern for not being able to sleep aid, I stated this is not the case.    Review of Systems:  She is agitated by my questioning today.  She is starting to flush later on.  She denies any symptoms that were reported in the ER note from yesterday, including facial pain, mass, lump, headaches, no fatigue, fever, shortness of breath, nausea, diarrhea.    She denies even taking Tylenol or other medications other than prescribed.    12 point comprehensive review of systems was negative except as noted and HPI     Social History:  Social History     Patient does not qualify to have social determinant information on file (likely too young).   Social History Narrative    . No children.     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    Nonsmoker.    No alcohol use.    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     Didelphic uterus     Personal history of PE (pulmonary embolism)     Migraine     Fatty liver     CMC DJD(carpometacarpal degenerative joint disease), localized primary     Obesity (BMI 35.0-39.9) with comorbidity (H)     Other dysphagia     Deafness     Past Medical History:   Diagnosis Date     ADHD (attention deficit hyperactivity disorder)      Alcohol dependence (H)      Anxiety      Arthritis      Asthma      Bipolar depression (H)      Deafness      Drug abuse, nondependent (H) 10/29/2004    Overview:  polydrug abuse per psych notes ; Other, mixed, or unspecified nondependent drug abuse, unspecified     History of blood clots      History of transfusion      Hypertension      Kidney stones 2015     Major depressive disorder, recurrent episode, severe (H) 5/17/2016    Overview:  s/p suicide attempt Epic      Migraine      Obstructive sleep apnea 6/1/2016     Pulmonary emboli (H)      Status post total right knee replacement 1/18/2016     Past Surgical History:   Procedure Laterality Date     BREAST SURGERY       REDUCTION MAMMAPLASTY Bilateral 2017     TOTAL HIP ARTHROPLASTY Bilateral      TOTAL KNEE ARTHROPLASTY Right      TUBAL LIGATION       Amoxicillin; Bee pollen; Hymenoptera allergenic extract; Iodine; Wasp venom protein starter kit; Adhesive tape-silicones; Chocolate; Food allergy formula; Geodon [ziprasidone hcl]; Ibuprofen; Latex; Prochlorperazine; Risperdal [risperidone]; Risperidone analogues; Theobroma oil; Zoloft [sertraline]; and Hydrochlorothiazide  Family History   Problem Relation Age of Onset     " Cancer Mother      Breast cancer Mother      Stroke Father        Medications:  Current Outpatient Medications   Medication Sig Dispense Refill     albuterol (PROAIR HFA;PROVENTIL HFA;VENTOLIN HFA) 90 mcg/actuation inhaler Inhale 2 puffs every 6 (six) hours as needed for wheezing. 1 each 0     albuterol (PROVENTIL) 2.5 mg /3 mL (0.083 %) nebulizer solution Take 3 mL (2.5 mg total) by nebulization every 4 (four) hours as needed for wheezing. 25 vial 2     DIAPER,BRIEF,INFANT-MARY,DISP (DIAPERS MISC) Adult1 month supply  approx 100       EPINEPHrine (EPIPEN 2-DIONISIO) 0.3 mg/0.3 mL injection Inject 0.3 mL (0.3 mg total) as directed as needed. 2 Pre-filled Pen Syringe 0     fluticasone furoate-vilanterol (BREO ELLIPTA) 100-25 mcg/dose DsDv inhaler Inhale 1 Dose daily. 1 each 0     fluticasone propion-salmeterol 113-14 mcg/actuation AePB Inhale 1 puff 2 (two) times a day. 1 each 5     gabapentin (NEURONTIN) 100 MG capsule Take 100 mg by mouth 2 (two) times a day. For migraines and anxiety 180 capsule 3     HYDROcodone-acetaminophen (NORCO) 5-325 mg per tablet Take 1-2 tablets by mouth every 6 (six) hours as needed for pain. 20 tablet 0     hydrOXYzine HCl (ATARAX) 25 MG tablet TAKE 2 CAPSULES (50 MG TOTAL) BY MOUTH AT BEDTIME. FOR ANXIETY/SLEEP  3     metoprolol tartrate (LOPRESSOR) 50 MG tablet Take 1 tablet (50 mg total) by mouth 2 (two) times a day. 180 tablet 3     montelukast (SINGULAIR) 10 mg tablet Take 1 tablet (10 mg total) by mouth daily. 90 tablet 3     rizatriptan (MAXALT) 10 MG tablet Take 10 mg by mouth as needed.              No current facility-administered medications for this visit.        Imaging & Labs reviewed this visit:   Extensive review from ER visit 10/20/2019 including reassuring CMP with chloride 109 otherwise normal, acetaminophen level 69.4, blood alcohol less than 10, salicylate less than 8, WBC 7.9.      Objective:      Vitals:    10/21/19 1713 10/21/19 1733   BP: 142/82 162/88   Pulse: 64     Weight: (!) 230 lb (104.3 kg)      Of note, blood pressure was 182/91 in the ER yesterday.    Physical Exam:     General: Alert, oriented to the situation today being at [facility], however completely denies the ER visit yesterday for Tylenol ingestion.   HEENT: normocephalic conjunctivae are clear, pupils are equal, round and reactive.  Face is mildly flushed by the end of our visit.  Neck: supple without adenopathy or thyromegaly.  Lungs: Good aeration bilaterally.   Heart: regular rate and rhythm, normal S1 and S2, no murmurs  Abdomen: soft and nontender  Skin: clear without rash or lesions  Neuro: alert, interactive moving all extremities equally, she makes good eye contact. Signing through  in room today.  Psych:  appropriate affect.  Mood: concerned and suspicious that we are not talking about the right patient given the fact that she is denying the ER visit yesterday   affect: increased in intensity  Behavior: normal  Speech: appropriate speed, appropriate organization of sentence structure  Thought content: delusions. The patient expresses no suicidal ideation and no homicidal ideation  Thought process: logical connections, However completely denies yesterday's experience in the ER   Insight & Judgement: limited    PHQ9 score PHQ-9 Total Score: 6 (10/21/2019  6:00 PM)    Little interest or pleasure in doing things: Several days  Feeling down, depressed, or hopeless: Several days  Trouble falling or staying asleep, or sleeping too much: Several days  Feeling tired or having little energy: More than half the days  Poor appetite or overeating: Not at all  Feeling bad about yourself - or that you are a failure or have let yourself or your family down: Several days  Trouble concentrating on things, such as reading the newspaper or watching television: Not at all  Moving or speaking so slowly that other people could have noticed. Or the opposite - being so fidgety or restless that you have  been moving around a lot more than usual: Not at all  Thoughts that you would be better off dead, or of hurting yourself in some way: Not at all  PHQ-9 Total Score: 6  If you checked off any problems, how difficult have these problems made it for you to do your work, take care of things at home, or get along with other people?: Not difficult at all    GAD7 score YESICA-7 Total: 8 (10/21/2019  6:00 PM)    Feeling nervous, anxious or on edge: 2 (10/21/2019  6:00 PM)  Not being able to stop or control worry: 0 (10/21/2019  6:00 PM)  Worrying too much about different things: 2 (10/21/2019  6:00 PM)  Trouble relaxin (10/21/2019  6:00 PM)  Being so restless that is is hard to sit still: 1 (10/21/2019  6:00 PM)  Becoming easily annnoyed or irritable: 0 (10/21/2019  6:00 PM)  Feeling afraid as if something awful might happen: 1 (10/21/2019  6:00 PM)  YESICA-7 Total: 8 (10/21/2019  6:00 PM)  How difficult did these problems make it for you to do your work, take care of things at home or get along with other people? : Not difficult at all (10/21/2019  6:00 PM)  How difficult did these problems make it for you to do your work, take care of things at home or get along with other people? : Not difficult at all (10/21/2019  6:00 PM)              Tesha Pelaez MD

## 2021-06-02 NOTE — TELEPHONE ENCOUNTER
FYI - Status Update  Who is Calling: Patient  Update: Patient is calling, to ask Dr. Pelaez, if she is able to prescribe patient antidepressants. Patient has been feeling more depressed with the change of seasons. Patient has never had depression before. Explained this is best addressed in a consult appointment.  Dr. Pelaez may want to run lab tests to determine if patient's metabolics could be a factor. Patient understood. No further questions at this time. Transferred to scheduling to set-up appointment  Okay to leave a detailed message?:  No patient has purple phone, ASL  [phone]

## 2021-06-02 NOTE — PROGRESS NOTES
Chief complaint: Intermenstrual spotting    HPI: This patient was seen in conjunction with a .  History was a little difficult to obtain.  She tells me she has 2 uteri and so will have 2 separate menstrual periods every month.  She had her normal one in September and another one a couple weeks later in October and then now 2 weeks later is having some bright red spotting.  She is also having some abdominal pain that seems to be more on the left side.  She is not particularly sexually active so she tells me there is absolutely no chance she could be pregnant.  She has had a tubal ligation and she has never been pregnant.    She also reports that she is feeling really drowsy the last couple of days and I do not know how to link that to the abdominal pain and the spotting.    When she called to talk to a nurse because of her right red vaginal spotting, they told her to get in right away she was not able to be seen by her primary provider.  She has an appointment next week for that.    Objective:/85 (Patient Site: Right Arm, Patient Position: Sitting, Cuff Size: Adult Large)   Pulse 66   Wt (!) 228 lb 8 oz (103.6 kg)   SpO2 98%   BMI 38.21 kg/m    I did not do a pelvic exam since we know she has an anatomic difference on exam and the main thing I needed to do was do a pelvic ultrasound to look for the endometrial lining and to see if there is an ovarian cyst on the left side.    Assessment: Abdominal pain more left side than right side with early menstrual bleeding.  Didelphys uterus  Deafness    Plan: I will set up a pelvic ultrasound today around the time that she has a mammogram which had already been scheduled.  We tried to make arrangements to have an  there for her.  I believe there is already going to be a  present for the mammogram so we were asking them to get one for the pelvic ultrasound as well.  I did warn her that there may be a  transvaginal probe for that and she relates that she has had that type of an ultrasound in the past.    Total time spent was over 20 minutes today because I was looking at her records and using the  to try to come up with our most immediate game plan.  There was no formal exam completed today she will be notified of the results of her ultrasound via my chart and whatever follow-up as indicated and has an appointment for a week from today with her primary care provider.

## 2021-06-02 NOTE — TELEPHONE ENCOUNTER
" [id number] purple video relay service.    The patient is calling stating that \"the clinic is picking on me\"  \"I am sick of them harassing me and I am mad\".   \"someone told the police and I am pissed off\"  \"The clinic called the police on me\"  Patient does not want to be contacted by [facility] EVER!  She spoke with Naow this morning 830.  Patient requested her chart be \"shut down\". She was made aware that Triage RN is unsure how to shut the chart down.   \"If the clinic continues to harass me I will call customer advocacy\".   \"There need to be boundaries!\"  \"The doctors need to stay out of my...   DO NOT CALL EMAIL MY CHART do not call the police about what is going on in the future about a complaint\". \"Going home and suspended\" \"Physically pushed by the police.\"  \" was suspended from his job and now no pay.\"  \"Not happy with the doctor or the nurse.\"  Triage nurse suggested she communicate via the  for any future follow up she needs from the clinic or HE. Patient agreed and hung up.   Darlin José RN, BSN [facility] Triage Nurse    "

## 2021-06-03 VITALS
HEART RATE: 64 BPM | DIASTOLIC BLOOD PRESSURE: 88 MMHG | WEIGHT: 230 LBS | SYSTOLIC BLOOD PRESSURE: 162 MMHG | BODY MASS INDEX: 38.27 KG/M2

## 2021-06-03 VITALS — HEIGHT: 65 IN | WEIGHT: 227.3 LBS | BODY MASS INDEX: 37.87 KG/M2

## 2021-06-03 VITALS
OXYGEN SATURATION: 98 % | HEIGHT: 65 IN | SYSTOLIC BLOOD PRESSURE: 130 MMHG | DIASTOLIC BLOOD PRESSURE: 70 MMHG | WEIGHT: 230 LBS | BODY MASS INDEX: 38.32 KG/M2 | HEART RATE: 76 BPM

## 2021-06-03 VITALS
WEIGHT: 230 LBS | HEIGHT: 65 IN | OXYGEN SATURATION: 97 % | TEMPERATURE: 98.5 F | DIASTOLIC BLOOD PRESSURE: 82 MMHG | SYSTOLIC BLOOD PRESSURE: 136 MMHG | BODY MASS INDEX: 38.32 KG/M2 | HEART RATE: 67 BPM

## 2021-06-03 VITALS
SYSTOLIC BLOOD PRESSURE: 148 MMHG | WEIGHT: 228.5 LBS | HEART RATE: 66 BPM | OXYGEN SATURATION: 98 % | DIASTOLIC BLOOD PRESSURE: 85 MMHG | BODY MASS INDEX: 38.21 KG/M2

## 2021-06-03 VITALS — BODY MASS INDEX: 37.98 KG/M2 | WEIGHT: 228.25 LBS

## 2021-06-03 VITALS — HEIGHT: 66 IN | BODY MASS INDEX: 37.38 KG/M2 | WEIGHT: 232.6 LBS

## 2021-06-03 VITALS — WEIGHT: 229.2 LBS | BODY MASS INDEX: 38.14 KG/M2

## 2021-06-03 NOTE — TELEPHONE ENCOUNTER
"Could you please contact breast center upstairs and request they contact patient to schedule breast biopsy.  Patient initially stated she wanted to \"watchfully wait\" and follow up imaging in a couple of months. She has since changed her mind and would like the biopsy.  "

## 2021-06-03 NOTE — TELEPHONE ENCOUNTER
----- Message from Anna Calzada PA-C sent at 11/15/2019  4:58 PM CST -----  Still normal A1C- close to diabetic range.  Diet rich in vegetables, lean meat, low carbohydrate recommended.  Please call results to the patient or send a letter if not reachable by phone.

## 2021-06-03 NOTE — PATIENT INSTRUCTIONS - HE
Start with clear liquids only (7-up, Gatorade, Pedialyte, water, jello, broth) until vomiting and diarrhea stop. Small sips every 15 minutes if needed. Move up to BRAT diet (bananas, rice, applesauce, toast or crackers), when tolerated, next go to soft diet, (white bread, sandwich meat, soups with no tomatoes or spice ie chicken noodle) Advance diet as tolerated to normal diet but avoid acid and fatty foods for the next few days until diarrhea has resolved. May use one or two doses of Imodium or Pepto bismol as needed for diarrhea if no fever. Follow up in 1-2 days if unable to keep liquids down.     Singular once a day, Albuterol more often if     [facility] in home or walk in [facility]: [phone]    [facility]  Psychiatry and counseling services  Various locations  [phone]    [facility]  ADHD testing for adults and children  [place]  [address]  [phone]    [address]   [address]  [email or web address]    Amy Diallo MA  [facility]  [phone]    [facility]  [address]  [place], [place] [address] [phone]  (also in [place]: [phone])    [facility]  [address] [place] [address]   [phone]      Dr. Piedad Zraate  [address]   [place] [address]     Kirsten Schoenleber  [facility]  [address], [place] [address]  [phone]    [facility]  Psychiatry and counseling services  [facility]  [phone]    [facility]  [address], [address]  [place] [address]      [facility]  [address]  [place], [place] [id number] [phone]    Dr. Daniele Soni  Counseling services  [address] [place], [place] [id number] [phone]    ASHLEY Fung, [facility]  Counseling services  [facility] [place] [address]  [address]  [place], [place] [address]  [phone]    David Lentz MA  Counseling services  [facility]  [phone]
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
## 2021-06-03 NOTE — TELEPHONE ENCOUNTER
Who is calling:  Patient And      Reason for Call: Lmtcb, Relayed msg  and patient agrees, States she is at ED because of right Flank / Kidney pain. Advised Pt to follow up w/ PCP .     Date of last appointment with primary care:  11/15/19    Okay to leave a detailed message: No

## 2021-06-03 NOTE — PROGRESS NOTES
HPI:  Marleny Viera is a 44 y.o. female who is seen for   Chief Complaint   Patient presents with     Follow Up     Asthma, Patient get really tired, and has been having diarrhea for 2 days   Marleny Viera is a deaf 44-year-old female who is here with .  She has a history of asthma, was recently seen by Hermelinda Auguste NP in our office.  She was given 5 days of prednisone and advised to use her albuterol for an exacerbation of her asthma.  She is not a smoker, but did grow up in a smoking home.  She also has a steroid inhaler that she uses twice daily.  She has a rescue steroid inhaler as well.  She still feels a little short of breath and is still having a dry cough.  She does not have fever or congestion.  She is concerned that she might be diabetic because she is feeling very fatigued lately.  Last A1c was 5.7 she has had some blood sugars in the 1 30-1 50 range on random checks.  She has a history of acute renal failure which resolved other than low potassium levels, her last potassium was 3.1.  She has oral potassium at home but does not know the milligram dose of this.  She is recently discontinued her relationship with her previous family medicine provider.  She states this is due to insurance.  She also mentions that she discontinued her relationship with her psychiatrist over the last year.  She is currently on Atarax for anxiety.  She mentions a overdose of Tylenol, does not want to give any detail on this, this happened last month.  She has a long history of borderline personality disorder and depression and anxiety.  She is not on any other psychotropic drugs.  She denies smoking, alcohol use, illicit drug use.  She also mentions that she recently slipped and fell on the ice, her  also fell and she had to help him get up because he is disabled.  Later that day she noted that she had lumbar back pain.  She did not fall directly on her tailbone and does not feel that she had any other  injury.  She has been using ice and heat alternating for this pain.  She denies foot drop, loss of bowel or bladder control, numbness, tingling, weakness of lower extremities.  In the past she has had Norco but she was told by her ER doctor that she could not take anything with Tylenol in it so she has not been taking this.    ROS: Patient denies fever, chills, sweats, fainting, fatigue, weight change, dizziness, sleep problems, chest pain, palpitations, shortness of breath, wheezing, cough,  sore throat, changes in hearing, ear pain,tinnitus,  disphagia, sore throat, globus, changes in vision, eye pain eye redness, acid reflux, nausea, vomiting, diarrhea, constipation, black or bloody stools,  Dysuria, frequency, urinary incontinence, nocturia, hematuria, back pain,joint pain, bone pain, muscle cramps,edema, weakness, numbness, tingling of extremities, rash, itching, skin changes, swollen lymph nodes, thirst, increased urination, breast lumps, breast pain, nipple discharge, memory difficulties, anxiety, mood swings, (female)vaginal discharge, dyspareunia, menorrhagia, pelvic pain, sexual dysfunction,   (male) testicular lumps, erectile dysfunction.    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     Didelphic uterus     Personal history of PE (pulmonary embolism)     Migraine     Fatty liver     CMC DJD(carpometacarpal degenerative joint disease), localized primary     Obesity (BMI 35.0-39.9) with comorbidity (H)     Other dysphagia     Deafness     Lumbar back sprain, initial encounter  "    Family History   Problem Relation Age of Onset     Cancer Mother      Breast cancer Mother      Stroke Father      Social History     Socioeconomic History     Marital status:      Spouse name: None     Number of children: None     Years of education: None     Highest education level: None   Occupational History     None   Social Needs     Financial resource strain: None     Food insecurity:     Worry: None     Inability: None     Transportation needs:     Medical: None     Non-medical: None   Tobacco Use     Smoking status: Never Smoker     Smokeless tobacco: Never Used   Substance and Sexual Activity     Alcohol use: No     Drug use: No     Sexual activity: Never   Lifestyle     Physical activity:     Days per week: None     Minutes per session: None     Stress: None   Relationships     Social connections:     Talks on phone: None     Gets together: None     Attends Faith service: None     Active member of club or organization: None     Attends meetings of clubs or organizations: None     Relationship status: None     Intimate partner violence:     Fear of current or ex partner: None     Emotionally abused: None     Physically abused: None     Forced sexual activity: None   Other Topics Concern     None   Social History Narrative    . No children.     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    Nonsmoker.    No alcohol use.    Tesha Pelaez MD     Past Surgical History:   Procedure Laterality Date     BREAST SURGERY       REDUCTION MAMMAPLASTY Bilateral 2017     TOTAL HIP ARTHROPLASTY Bilateral      TOTAL KNEE ARTHROPLASTY Right      TUBAL LIGATION       Current Outpatient Medications on File Prior to Visit   Medication Sig Dispense Refill     albuterol (PROAIR HFA;PROVENTIL HFA;VENTOLIN HFA) 90 mcg/actuation inhaler Inhale 2 puffs every 6 (six) hours as needed for wheezing. 1 each 0     albuterol (PROVENTIL) 2.5 mg /3 mL (0.083 %) nebulizer solution " Take 3 mL (2.5 mg total) by nebulization every 4 (four) hours as needed for wheezing. 25 vial 2     DIAPER,BRIEF,INFANT-MARY,DISP (DIAPERS MISC) Adult1 month supply  approx 100       EPINEPHrine (EPIPEN 2-DIONISIO) 0.3 mg/0.3 mL injection Inject 0.3 mL (0.3 mg total) as directed as needed. 2 Pre-filled Pen Syringe 0     fluticasone furoate-vilanterol (BREO ELLIPTA) 100-25 mcg/dose DsDv inhaler Inhale 1 Dose daily. 1 each 0     fluticasone propion-salmeterol 113-14 mcg/actuation AePB Inhale 1 puff 2 (two) times a day. 1 each 5     fluticasone propionate (FLONASE) 50 mcg/actuation nasal spray        gabapentin (NEURONTIN) 100 MG capsule Take 100 mg by mouth 2 (two) times a day. For migraines and anxiety 180 capsule 3     hydrOXYzine HCl (ATARAX) 25 MG tablet TAKE 2 CAPSULES (50 MG TOTAL) BY MOUTH AT BEDTIME. FOR ANXIETY/SLEEP  3     metoprolol tartrate 75 mg Tab Take 75 mg by mouth 2 (two) times a day. 60 tablet 0     montelukast (SINGULAIR) 10 mg tablet Take 1 tablet (10 mg total) by mouth daily. 90 tablet 3     rizatriptan (MAXALT) 10 MG tablet Take 10 mg by mouth as needed.              [DISCONTINUED] HYDROcodone-acetaminophen (NORCO) 5-325 mg per tablet Take 1-2 tablets by mouth every 6 (six) hours as needed for pain. 20 tablet 0     [DISCONTINUED] predniSONE (DELTASONE) 20 MG tablet Take 20 mg by mouth daily. 5 tablet 0     No current facility-administered medications on file prior to visit.      Allergies   Allergen Reactions     Amoxicillin Itching and Shortness Of Breath     Bee Pollen Anaphylaxis     Hymenoptera Allergenic Extract Anaphylaxis     Iodine Hives     Pt stated that she was injected with CT CONTRAST in the past and got hives, SOB, and nausea. Please pre-medicate patient in the future.      Wasp Venom Protein Starter Kit Itching, Shortness Of Breath, Swelling and Wheezing     Adhesive Tape-Silicones      Chocolate Cough     Food Allergy Formula      Red meat, chocolate     Geodon [Ziprasidone Hcl]       Ibuprofen Other (See Comments)     Kidney function  Kidney function       Latex      Added based on information entered during case entry, please review and add reactions, type, and severity as needed     Prochlorperazine Other (See Comments)     Risperdal [Risperidone]      Risperidone Analogues Other (See Comments)     Theobroma Oil Unknown     Zoloft [Sertraline]      Hydrochlorothiazide Rash     OB History        0    Para        Term   0            AB        Living           SAB        TAB        Ectopic        Multiple        Live Births                   I have reviewed the patient's medical history in detail and updated the computerized patient record.  OBJECTIVE:  Wt Readings from Last 3 Encounters:   11/15/19 (!) 230 lb (104.3 kg)   19 (!) 230 lb (104.3 kg)   10/25/19 (!) 226 lb 5 oz (102.7 kg)     Temp Readings from Last 3 Encounters:   19 98.5  F (36.9  C)   10/25/19 99.6  F (37.6  C) (Oral)   10/20/19 98.1  F (36.7  C) (Oral)     BP Readings from Last 3 Encounters:   11/15/19 130/70   19 136/82   10/25/19 (!) 183/76     Pulse Readings from Last 3 Encounters:   11/15/19 76   19 67   10/25/19 84     Body mass index is 38.27 kg/m .     Alert, cooperative, well-hydrated. Appears well.  Eyes: Pupils equal, round, reactive to light.  HEENT: Sclera white, nares patent, MMM, TM's pearly bilaterally  Neck: supple, without lymphadenopathy, Thyroid freely movable and without hypotrophy or nodularity.   Lungs: Clear to auscultation. No retractions, no increased work of respiration, equal chest rise.   Heart: Regular rate and rhythm, no murmurs, clicks,   Gallops.  Abdomen: Soft, bowel sounds in 4 quadrants with no tenderness to palpation, no organomegaly or masses, no aortic or renal bruits.  Extremities: no tenderness to palpation of gastrocnemius, bilaterally.  Skin: no increased warmth, edema, or erythema of lower legs bilaterally.  Back: No cervical, thoracic or lumbar  tenderness to spinous processes or musculature.  Neuro::pupils equal and reactive to light bilaterally, CN II - XII grossly intact. No focal motor/sensory deficits. DTR 2/4 all 4 extremities. Muscle Strength 5/5 all extremities, Rhomberg negative  Labs:  Admission on 10/25/2019, Discharged on 10/25/2019   Component Date Value     Sodium 10/25/2019 141      Potassium 10/25/2019 3.1*     Chloride 10/25/2019 109*     CO2 10/25/2019 24      Anion Gap, Calculation 10/25/2019 8      Glucose 10/25/2019 103      Calcium 10/25/2019 8.8      BUN 10/25/2019 10      Creatinine 10/25/2019 0.77      GFR MDRD Af Amer 10/25/2019 >60      GFR MDRD Non Af Amer 10/25/2019 >60      Bilirubin, Total 10/25/2019 0.6      Bilirubin, Direct 10/25/2019 0.2      Protein, Total 10/25/2019 6.6      Albumin 10/25/2019 3.5      Alkaline Phosphatase 10/25/2019 89      AST 10/25/2019 15      ALT 10/25/2019 20      WBC 10/25/2019 7.2      RBC 10/25/2019 4.54      Hemoglobin 10/25/2019 12.7      Hematocrit 10/25/2019 37.8      MCV 10/25/2019 83      MCH 10/25/2019 28.0      MCHC 10/25/2019 33.6      RDW 10/25/2019 13.7      Platelets 10/25/2019 277      MPV 10/25/2019 11.0      Rapid Strep A Antigen 10/25/2019 No Group A Strep detected, presumptive negative      Group A Strep by PCR 10/25/2019 No Group A Strep rRNA detected      VENTRICULAR RATE 10/25/2019 59      ATRIAL RATE 10/25/2019 59      P-R INTERVAL 10/25/2019 160      QRS DURATION 10/25/2019 86      Q-T INTERVAL 10/25/2019 452      QTC CALCULATION (BEZET) 10/25/2019 447      P [place] 10/25/2019 -3      R AXIS 10/25/2019 -2      T AXIS 10/25/2019 -14      MUSE DIAGNOSIS 10/25/2019                      Value:Sinus bradycardia  T wave abnormality, consider anterior ischemia  Abnormal ECG  When compared with ECG of 20-OCT-2019 08:22,  No significant change was found  Confirmed by POPPY HAIDER, GABRIELLE LOC: ([id number]) on 10/27/2019 5:53:25 PM     Office Visit on 10/21/2019   Component Date Value      Acetaminophen 10/21/2019 <3.0*     Sodium 10/21/2019 141      Potassium 10/21/2019 3.2*     Chloride 10/21/2019 109*     CO2 10/21/2019 23      Anion Gap, Calculation 10/21/2019 9      Glucose 10/21/2019 129*     BUN 10/21/2019 11      Creatinine 10/21/2019 0.82      GFR MDRD Af Amer 10/21/2019 >60      GFR MDRD Non Af Amer 10/21/2019 >60      Bilirubin, Total 10/21/2019 0.4      Calcium 10/21/2019 9.1      Protein, Total 10/21/2019 6.7      Albumin 10/21/2019 3.6      Alkaline Phosphatase 10/21/2019 93      AST 10/21/2019 15      ALT 10/21/2019 19      WBC 10/21/2019 8.3      RBC 10/21/2019 4.45      Hemoglobin 10/21/2019 12.6      Hematocrit 10/21/2019 37.8      MCV 10/21/2019 85      MCH 10/21/2019 28.4      MCHC 10/21/2019 33.5      RDW 10/21/2019 12.2      Platelets 10/21/2019 291      MPV 10/21/2019 8.3    Admission on 10/20/2019, Discharged on 10/20/2019   Component Date Value     Sodium 10/20/2019 143      Potassium 10/20/2019 3.5      Chloride 10/20/2019 109*     CO2 10/20/2019 23      Anion Gap, Calculation 10/20/2019 11      Glucose 10/20/2019 148*     Calcium 10/20/2019 9.2      BUN 10/20/2019 12      Creatinine 10/20/2019 0.81      GFR MDRD Af Amer 10/20/2019 >60      GFR MDRD Non Af Amer 10/20/2019 >60      Alcohol, Blood 10/20/2019 <10      Bilirubin, Total 10/20/2019 0.4      Bilirubin, Direct 10/20/2019 0.1      Protein, Total 10/20/2019 7.1      Albumin 10/20/2019 3.7      Alkaline Phosphatase 10/20/2019 102      AST 10/20/2019 16      ALT 10/20/2019 17      Lipase 10/20/2019 37      SYSTOLIC BLOOD PRESSURE 10/20/2019 182      DIASTOLIC BLOOD PRESSURE 10/20/2019 91      VENTRICULAR RATE 10/20/2019 76      ATRIAL RATE 10/20/2019 76      P-R INTERVAL 10/20/2019 156      QRS DURATION 10/20/2019 84      Q-T INTERVAL 10/20/2019 402      QTC CALCULATION (BEZET) 10/20/2019 452      P Axis 10/20/2019 21      R AXIS 10/20/2019 1      T AXIS 10/20/2019 -9      MUSE DIAGNOSIS 10/20/2019                       Value:Normal sinus rhythm  Nonspecific T wave abnormality  Abnormal ECG  When compared with ECG of 08-JUN-2019 03:55,  Nonspecific T wave abnormality has replaced inverted T waves in Lateral leads  Confirmed by GAIL IRIZARRY MD LOC: ([id number]) on 10/20/2019 4:31:30 PM       Influenza  A, Rapid Anti* 10/20/2019 No Influenza A antigen detected      Influenza B, Rapid Antig* 10/20/2019 No Influenza B antigen detected      Color, UA 10/20/2019 Melyssa*     Clarity, UA 10/20/2019 Clear      Glucose, UA 10/20/2019 Negative      Bilirubin, UA 10/20/2019 Negative      Ketones, UA 10/20/2019 Negative      Specific Gravity, UA 10/20/2019 1.030      Blood, UA 10/20/2019 Moderate*     pH, UA 10/20/2019 6.0      Protein, UA 10/20/2019 30 mg/dL*     Urobilinogen, UA 10/20/2019 <2.0 E.U./dL      Nitrite, UA 10/20/2019 Negative      Leukocytes, UA 10/20/2019 Negative      Bacteria, UA 10/20/2019 None Seen      RBC, UA 10/20/2019 3-5*     WBC, UA 10/20/2019 0-5      Squam Epithel, UA 10/20/2019 *     Mucus, UA 10/20/2019 Many*     Ca Oxalate Summer, UA 10/20/2019 Present*     Acetaminophen 10/20/2019 69.4*     Salicylate 10/20/2019 <8.0      Beta hCG Qualitative 10/20/2019 Negative      WBC 10/20/2019 7.9      RBC 10/20/2019 4.55      Hemoglobin 10/20/2019 12.9      Hematocrit 10/20/2019 38.4      MCV 10/20/2019 84      MCH 10/20/2019 28.4      MCHC 10/20/2019 33.6      RDW 10/20/2019 13.6      Platelets 10/20/2019 291      MPV 10/20/2019 11.0      Neutrophils % 10/20/2019 60      Lymphocytes % 10/20/2019 34      Monocytes % 10/20/2019 5      Eosinophils % 10/20/2019 1      Basophils % 10/20/2019 0      Neutrophils Absolute 10/20/2019 4.8      Lymphocytes Absolute 10/20/2019 2.7      Monocytes Absolute 10/20/2019 0.4      Eosinophils Absolute 10/20/2019 0.1      Basophils Absolute 10/20/2019 0.0      ASSESMENT/PLAN:  1. Elevated glucose  Glycosylated Hemoglobin A1c    Glycosylated Hemoglobin A1c   2. Low potassium syndrome  Basic  Metabolic Panel    Basic Metabolic Panel   3. Borderline personality disorder (H)     4. Asthma, moderate persistent, uncomplicated     5. Lumbar back sprain, initial encounter     1.  Review of recent labs showed that her most recent blood sugar was in the normal range but she has had some elevated blood sugars and she would like to have this rechecked.  We will get A1c today.  2.  She has had a history of acute renal injury, has several low potassium levels on record, will recheck potassium.  She states she has liquid potassium to take at home she is not sure what the milligram dose is of this medication.  She will send a "[employer] message and give the milligram dose when she gets home.  3.  Patient states in passing on 2 occasions she has filed complaints against providers, health systems.  She has left her psychiatrist in the last year.  She is currently only on her Atarax for anxiety.  She was given a referral to psychiatry at [facility] last week by Hermelinda Auguste, nurse practitioner.  She was unhappy that the waiting time is over 8 months.  She was given a list of local counselors and advised that she should be established with someone for her psychiatric care.  She was also given the emergency clinic phone number for [facility].  4.  Reviewed all her asthma medications, she has a daily steroid inhaler, uses her albuterol once or twice a day.  Advised her that if she has any waking with shortness of breath or cough she should use it up to 4 times a day.  She has completed prednisone, has no wheezing today.  Asthma control appears good.  5.  She mentions that she cannot take Tylenol because she overdosed on Tylenol and October.  Does not want to give details of this event.  She is also had a history of acute renal failure so ibuprofen would not be a safe choice for her.  She has had Norco on her medication list she states that she is not taking this and I explained that it does have Tylenol in it.  She will  continue to use ice and heat for her current injury, no signs of swelling or bruising in the lumbar area, this should be sufficient.  I explained to her that I would not be able to use narcotic pain medication in the future, but would refer her to pain clinic if she gets any chronic pain problems.  She voices understanding.  Follow-up in 6 months for physical.    Anna Calzada, MS, PA-C 11/15/19

## 2021-06-03 NOTE — TELEPHONE ENCOUNTER
Who is calling:  Patient - via    Reason for Call:   Needle biopsy of Right Breast,  waiting for an appointment to be scheduled   Date of last appointment with primary care:  11/8   Has the patient been recently seen:  Yes  Okay to leave a detailed message: Yes      Orders being requested:  Needle biopsy of Right Breast   Reason service is needed/diagnosis:    See Mammo 11/6/19, 11/2/19  When are orders needed by:  TODAY   Where to send Orders:  [facility]  Okay to leave detailed message?  Yes          IM not sure who to send this message to ? Who her primary.  Is she a [facility] patient ?

## 2021-06-03 NOTE — TELEPHONE ENCOUNTER
I talked to the [facility] and they will need an order for the needle biopsy in order to schedule.  Thanks!

## 2021-06-03 NOTE — TELEPHONE ENCOUNTER
LM to patient about labs, asked to call clinic to get a full in detail, and will send a letter through the mail regarding labs and through [employer].

## 2021-06-03 NOTE — TELEPHONE ENCOUNTER
ASSESMENT/PLAN:  1. Elevated glucose  Glycosylated Hemoglobin A1c     Glycosylated Hemoglobin A1c   2. Low potassium syndrome  Basic Metabolic Panel     Basic Metabolic Panel   3. Borderline personality disorder (H)      4. Asthma, moderate persistent, uncomplicated      5. Lumbar back sprain, initial encounter      1.  Review of recent labs showed that her most recent blood sugar was in the normal range but she has had some elevated blood sugars and she would like to have this rechecked.  We will get A1c today.  2.  She has had a history of acute renal injury, has several low potassium levels on record, will recheck potassium.  She states she has liquid potassium to take at home she is not sure what the milligram dose is of this medication.  She will send a [employer] message and give the milligram dose when she gets home.  3.  Patient states in passing on 2 occasions she has filed complaints against providers, health systems.  She has left her psychiatrist in the last year.  She is currently only on her Atarax for anxiety.  She was given a referral to psychiatry at [facility] last week by Hermelinda Auguste, nurse practitioner.  She was unhappy that the waiting time is over 8 months.  She was given a list of local counselors and advised that she should be established with someone for her psychiatric care.  She was also given the emergency clinic phone number for [facility].  4.  Reviewed all her asthma medications, she has a daily steroid inhaler, uses her albuterol once or twice a day.  Advised her that if she has any waking with shortness of breath or cough she should use it up to 4 times a day.  She has completed prednisone, has no wheezing today.  Asthma control appears good.  5.  She mentions that she cannot take Tylenol because she overdosed on Tylenol and October.  Does not want to give details of this event.  She is also had a history of acute renal failure so ibuprofen would not be a safe choice for her.   She has had Norco on her medication list she states that she is not taking this and I explained that it does have Tylenol in it.  She will continue to use ice and heat for her current injury, no signs of swelling or bruising in the lumbar area, this should be sufficient.  I explained to her that I would not be able to use narcotic pain medication in the future, but would refer her to pain clinic if she gets any chronic pain problems.  She voices understanding.  Follow-up in 6 months for physical.  Anna Calzada, MS, PA-C 11/15/19

## 2021-06-03 NOTE — TELEPHONE ENCOUNTER
Anna put in a Biopsy order.  I called and left the patient a msg to call Specialty Scheduling for assistance with scheduling or call the [facility] directly at [phone].

## 2021-06-03 NOTE — PROGRESS NOTES
Still normal A1C- close to diabetic range.  Diet rich in vegetables, lean meat, low carbohydrate recommended.  Please call results to the patient or send a letter if not reachable by phone.

## 2021-06-04 VITALS
TEMPERATURE: 98 F | WEIGHT: 238 LBS | SYSTOLIC BLOOD PRESSURE: 138 MMHG | RESPIRATION RATE: 16 BRPM | HEIGHT: 65 IN | BODY MASS INDEX: 39.65 KG/M2 | OXYGEN SATURATION: 98 % | DIASTOLIC BLOOD PRESSURE: 84 MMHG | HEART RATE: 79 BPM

## 2021-06-04 VITALS — BODY MASS INDEX: 39.15 KG/M2 | HEIGHT: 65 IN | WEIGHT: 235 LBS

## 2021-06-04 VITALS
BODY MASS INDEX: 39.66 KG/M2 | DIASTOLIC BLOOD PRESSURE: 70 MMHG | HEART RATE: 80 BPM | SYSTOLIC BLOOD PRESSURE: 120 MMHG | WEIGHT: 238.31 LBS

## 2021-06-04 VITALS
OXYGEN SATURATION: 99 % | DIASTOLIC BLOOD PRESSURE: 89 MMHG | RESPIRATION RATE: 16 BRPM | HEART RATE: 95 BPM | SYSTOLIC BLOOD PRESSURE: 162 MMHG | BODY MASS INDEX: 38.72 KG/M2 | TEMPERATURE: 98.3 F | WEIGHT: 232.7 LBS

## 2021-06-04 VITALS
BODY MASS INDEX: 38.65 KG/M2 | SYSTOLIC BLOOD PRESSURE: 110 MMHG | WEIGHT: 232 LBS | DIASTOLIC BLOOD PRESSURE: 70 MMHG | HEIGHT: 65 IN

## 2021-06-05 VITALS
WEIGHT: 230 LBS | SYSTOLIC BLOOD PRESSURE: 148 MMHG | TEMPERATURE: 97.4 F | HEIGHT: 65 IN | BODY MASS INDEX: 38.32 KG/M2 | HEART RATE: 65 BPM | DIASTOLIC BLOOD PRESSURE: 90 MMHG | OXYGEN SATURATION: 100 %

## 2021-06-05 VITALS
SYSTOLIC BLOOD PRESSURE: 157 MMHG | WEIGHT: 230 LBS | HEART RATE: 93 BPM | RESPIRATION RATE: 17 BRPM | OXYGEN SATURATION: 100 % | BODY MASS INDEX: 38.27 KG/M2 | DIASTOLIC BLOOD PRESSURE: 87 MMHG | TEMPERATURE: 98.8 F

## 2021-06-05 VITALS
SYSTOLIC BLOOD PRESSURE: 140 MMHG | DIASTOLIC BLOOD PRESSURE: 70 MMHG | WEIGHT: 227.4 LBS | OXYGEN SATURATION: 99 % | BODY MASS INDEX: 37.84 KG/M2 | HEART RATE: 74 BPM

## 2021-06-05 VITALS
SYSTOLIC BLOOD PRESSURE: 150 MMHG | DIASTOLIC BLOOD PRESSURE: 82 MMHG | WEIGHT: 232.1 LBS | HEART RATE: 65 BPM | BODY MASS INDEX: 38.62 KG/M2 | OXYGEN SATURATION: 99 % | TEMPERATURE: 98.5 F

## 2021-06-05 VITALS
DIASTOLIC BLOOD PRESSURE: 80 MMHG | HEART RATE: 83 BPM | SYSTOLIC BLOOD PRESSURE: 138 MMHG | WEIGHT: 236.5 LBS | BODY MASS INDEX: 39.4 KG/M2 | HEIGHT: 65 IN | OXYGEN SATURATION: 99 %

## 2021-06-05 VITALS
SYSTOLIC BLOOD PRESSURE: 159 MMHG | OXYGEN SATURATION: 99 % | WEIGHT: 231 LBS | HEART RATE: 88 BPM | TEMPERATURE: 98.6 F | DIASTOLIC BLOOD PRESSURE: 90 MMHG | BODY MASS INDEX: 38.44 KG/M2 | RESPIRATION RATE: 22 BRPM

## 2021-06-05 NOTE — PROGRESS NOTES
Internal Medicine Office Visit  [facility]  Patient Name: Marleny Viera  Patient Age: 44 y.o.  YOB: 1975  MRN: [id number]    Date of Visit: 1/10/2020  Reason for Office Visit:   Chief Complaint   Patient presents with     Medication Refill     Follow-up     Patient would like to discuss allergies and medicine for that.            Assessment / Plan / Medical Decision Makin. Essential hypertension  Currently no well controlled   - metoprolol tartrate 75 mg Tab; Take 75 mg by mouth 2 (two) times a day.  Dispense: 60 tablet; Refill: 0    2. Seasonal allergic rhinitis, unspecified trigger  Recommend continuing singular   - mometasone (NASONEX) 50 mcg/actuation nasal spray; 2 sprays into each nostril daily.  Dispense: 17 g; Refill: 2  - sodium chloride (OCEAN) 0.65 % nasal spray; 2 sprays into each nostril 2 (two) times a day.  Dispense: 15 mL; Refill: 1  - Ambulatory referral to Allergy at the request of the patient     3. Prediabetes  Did review diabetic recommendations for dietary changes in addition to encouraging 30 minutes of purposeful activity most days of the week.  - Ambulatory referral to Diabetes Education Program (CDE) at patient's request      Orders Placed This Encounter   Procedures     Ambulatory referral to Diabetes Education Program (CDE)     Ambulatory referral to Allergy   Followup: No follow-ups on file. earlier if needed.    Health Maintenance Review  Health Maintenance   Topic Date Due     DEPRESSION ACTION PLAN  1975     MEDICARE ANNUAL WELLNESS VISIT  02/10/2017     ASTHMA CONTROL TEST  10/21/2020     MAMMOGRAM  2020     ASTHMA ACTION PLAN  11/15/2020     ADVANCE CARE PLANNING  2021     PAP SMEAR  2024     HPV TEST  2024     LIPID  2024     TD 18+ HE  02/10/2026     HIV SCREENING  Completed     PNEUMOCOCCAL IMMUNIZATION 19-64 MEDIUM RISK  Completed     INFLUENZA VACCINE RULE BASED  Completed      TDAP ADULT ONE TIME DOSE  Completed         I am having Marleny Viera start on mometasone and sodium chloride. I am also having her maintain her (DIAPER,BRIEF,INFANT-MARY,DISP (DIAPERS MISC)), rizatriptan, gabapentin, hydrOXYzine HCl, EPINEPHrine, albuterol, albuterol, fluticasone furoate-vilanterol, montelukast, fluticasone propion-salmeterol, fluticasone propionate, potassium chloride, and metoprolol tartrate.      HPI:  Marleny Viera is a 44 y.o. year old who presents to the office today accompanied by  patient is noted uncontrolled hypertension at her previous clinic visit her metoprolol was increased to twice daily though patient states that she did not increase medication blood pressure today is 138/84.  Patient reports over the last 10 days some increase in sinus congestion clear nasal drainage sneezing itchy watery eyes she is on Singulair and utilizing Flonase though is not found relief of symptoms.  She is requesting referral to allergy for allergy testing          Review of Systems- pertinent positive in bold:  Constitutional: Fever, chills, night sweats, fainting, weight change, fatigue, dizziness, sleeping difficulties, loud snoring/pauses in breathing  Eyes: change in vision, blurred or double vision, redness/eye pain  Ears, nose, mouth, throat: change in hearing, ear pain, hoarseness, difficulty swallowing, sores in the mouth or throat  Respiratory: shortness of breath, cough, bloody sputum, wheezing  Cardiovascular: chest pain, palpitations   Gastrointestinal: abdominal pain, heartburn/indigestion, nausea/vomiting, change in appetite, change in bowel habits, constipation or diarrhea, rectal bleeding/dark stools, difficulty swallowing  Urinary: painful urination, frequent urination, urinary urgency/incontinence, blood in urine/dark urine, nocturia (new or increasing), muscle cramps, swelling of hands, feet, ankles, leg pain/redness  Skin: change in moles/freckles, rash,  nodules  Hematologic/lymphatic: swollen lymph glands, abnormal bruising/bleeding  Endocrine: excessive thirst/urination, cold or heat intolerance  Neurologic/emotional: worrisome memory change, numbness/tingling, anxiety, mood swings      Current Scheduled Meds:  Outpatient Encounter Medications as of 1/10/2020   Medication Sig Dispense Refill     albuterol (PROAIR HFA;PROVENTIL HFA;VENTOLIN HFA) 90 mcg/actuation inhaler Inhale 2 puffs every 6 (six) hours as needed for wheezing. 1 each 0     albuterol (PROVENTIL) 2.5 mg /3 mL (0.083 %) nebulizer solution Take 3 mL (2.5 mg total) by nebulization every 4 (four) hours as needed for wheezing. 25 vial 2     DIAPER,BRIEF,INFANT-MARY,DISP (DIAPERS MISC) Adult1 month supply  approx 100       EPINEPHrine (EPIPEN 2-DIONISIO) 0.3 mg/0.3 mL injection Inject 0.3 mL (0.3 mg total) as directed as needed. 2 Pre-filled Pen Syringe 0     fluticasone furoate-vilanterol (BREO ELLIPTA) 100-25 mcg/dose DsDv inhaler Inhale 1 Dose daily. 1 each 0     fluticasone propion-salmeterol 113-14 mcg/actuation AePB Inhale 1 puff 2 (two) times a day. 1 each 5     fluticasone propionate (FLONASE) 50 mcg/actuation nasal spray        gabapentin (NEURONTIN) 100 MG capsule Take 100 mg by mouth 2 (two) times a day. For migraines and anxiety 180 capsule 3     hydrOXYzine HCl (ATARAX) 25 MG tablet TAKE 2 CAPSULES (50 MG TOTAL) BY MOUTH AT BEDTIME. FOR ANXIETY/SLEEP  3     metoprolol tartrate 75 mg Tab Take 75 mg by mouth 2 (two) times a day. 60 tablet 0     montelukast (SINGULAIR) 10 mg tablet Take 1 tablet (10 mg total) by mouth daily. 90 tablet 3     potassium chloride (K-DUR,KLOR-CON) 20 MEQ tablet Take 1 tablet (20 mEq total) by mouth daily. 30 tablet 11     rizatriptan (MAXALT) 10 MG tablet Take 10 mg by mouth as needed.              [DISCONTINUED] metoprolol tartrate 75 mg Tab Take 75 mg by mouth 2 (two) times a day. (Patient taking differently: Take 75 mg by mouth daily. ) 60 tablet 0     mometasone  "(NASONEX) 50 mcg/actuation nasal spray 2 sprays into each nostril daily. 17 g 2     sodium chloride (OCEAN) 0.65 % nasal spray 2 sprays into each nostril 2 (two) times a day. 15 mL 1     No facility-administered encounter medications on file as of 1/10/2020.      Past Medical History:   Diagnosis Date     ADHD (attention deficit hyperactivity disorder)      Alcohol dependence (H)      Anxiety      Arthritis      Asthma      Bipolar depression (H)      Deafness      Drug abuse, nondependent (H) 10/29/2004    Overview:  polydrug abuse per psych notes ; Other, mixed, or unspecified nondependent drug abuse, unspecified     History of blood clots      History of transfusion      Hypertension      Kidney stones 2015     Major depressive disorder, recurrent episode, severe (H) 5/17/2016    Overview:  s/p suicide attempt Epic      Migraine      Obstructive sleep apnea 6/1/2016     Pulmonary emboli (H)      Status post total right knee replacement 1/18/2016     Past Surgical History:   Procedure Laterality Date     BREAST SURGERY       REDUCTION MAMMAPLASTY Bilateral 2017     TOTAL HIP ARTHROPLASTY Bilateral      TOTAL KNEE ARTHROPLASTY Right      TUBAL LIGATION       US BREAST CORE BIOPSY RIGHT Right 11/21/2019     Social History     Tobacco Use     Smoking status: Never Smoker     Smokeless tobacco: Never Used   Substance Use Topics     Alcohol use: No     Drug use: No     Family History   Problem Relation Age of Onset     Cancer Mother      Breast cancer Mother      Stroke Father      Social History     Social History Narrative    . No children.     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    Nonsmoker.    No alcohol use.    Tesha Pelaez MD       Objective / Physical Examination:  Vitals:    01/10/20 0944   BP: 138/84   Pulse: 79   Resp: 16   Temp: 98  F (36.7  C)   SpO2: 98%   Weight: (!) 238 lb (108 kg)   Height: 5' 5\" (1.651 m)     Wt Readings from Last 3 Encounters: "   01/10/20 (!) 238 lb (108 kg)   11/15/19 (!) 230 lb (104.3 kg)   11/08/19 (!) 230 lb (104.3 kg)     Body mass index is 39.61 kg/m .     General Appearance: Alert and oriented, cooperative, affect appropriate, speech clear, in no apparent distress  Head: Normocephalic, atraumatic  Ears: Tympanic membrane clear with landmarks well visualized bilaterally  Eyes:  Conjunctivae clear and sclerae non-icteric  Nose: Septum midline, mild congestion with clear nasal drainage noted  Throat: Lips and mucosa moist. Teeth in good repair, pharynx without erythema or exudate  Neck: Supple, trachea midline. No cervical adenopathy  Lungs: Clear to auscultation bilaterally. No adventitious lung sounds noted. Normal inspiratory and expiratory effort  Cardiovascular: Regular rate, normal S1, S2. No murmurs, rubs, or gallops  Integumentary: Warm and dry.   Neuro: Alert and oriented, follows commands appropriately.     No results found.  No results found for this or any previous visit (from the past 240 hour(s)).        Hermelinda Auguste, CNP

## 2021-06-06 NOTE — TELEPHONE ENCOUNTER
Who is calling:  Patient   Reason for Call:  Patient stated that she was in the ER at [facility] and was referred to [facility]. Patient stated that she was able to schedule for an appointment with [facility] for April 20, 2020 but since she is in a lot pain right now she is questioning if there is anyway she can get in sooner. Patient stated that diagnosed with colon secondary virus and now it is traveling throughout her body. Patient stated that she is now experiencing ear pain, throat and back. Patient stated that she feels like it's her kidneys. Patient stated that she did have a CT scan yesterday but no kidney stones were found. Patient declined nurse triage. Patient stated that she wanted to schedule an hospital follow up appointment but was transferred to [facility] (writer). Writer warm transferred call to scheduling.   Date of last appointment with primary care: 2/28/2020  Okay to leave a detailed message: Yes  [phone]    FYI:  used. ID #[id number].

## 2021-06-06 NOTE — TELEPHONE ENCOUNTER
Called for the Pt to return for the Pt call.  [facility]      Jared Cr RN, [facility] Triage/Med Refill 3/5/2020 7:06 PM

## 2021-06-06 NOTE — TELEPHONE ENCOUNTER
"\"pt called in didn't know why she was referred to Internal Podiatry due to no nails. Gave [facility] address and phone number. AS 2/28/20.   refer externally for nail care podiatry no longer see for nail care JKS\"  "

## 2021-06-06 NOTE — PROGRESS NOTES
Labs are normal, with exception of mildly low potassium, unchanged since list test and beger that in October of last year, recommend  Daily potassium rich foods, orange juice, avocado, bananas, tomatoes and good hydration. please call results to the patient or send a letter if not reachable by phone.

## 2021-06-06 NOTE — TELEPHONE ENCOUNTER
----- Message from Anna Calzada PA-C sent at 2/28/2020  3:07 PM CST -----  Labs are normal, with exception of mildly low potassium, unchanged since list test and beger that in October of last year, recommend  Daily potassium rich foods, orange juice, avocado, bananas, tomatoes and good hydration. please call results to the patient or send a letter if not reachable by phone.

## 2021-06-06 NOTE — PROGRESS NOTES
SUBJECTIVE:  Marleny Viera  is a 44 y.o. female  comes in on 03/03/20 , for right ear pain. she notes that pain is behind the ear in the mastoid area, pain was not improved by cleaning wax out with a Q-tip.  She tried ice pack, Tylenol.  This did not help.  She also has a headache in the occipital area, right jaw pain,.  She has a history of otitis media and of allergies to mold.  She currently has no cough or congestion and denies fever, dizziness, nausea, vomiting, change in hearing.  She is profoundly deaf, uses an  for visits today, uses sign language for communication.  She would also like to get her blood level rechecked, at her last visit her potassium was a little low.   She has no history of kidney disease.  She originally made this appointment to discuss fungal nails, does have a podiatry consult and will follow up with podiatry.    PRIMARY PHYSICIAN:  No primary care provider on file.      Allergies  Allergies   Allergen Reactions     Amoxicillin Itching and Shortness Of Breath     Bee Pollen Anaphylaxis     Contrast [Iodixanol] Shortness Of Breath and Rash     Pt stated she reacted to the contrast given for her CT in past. She experienced shortness of breath, throat tightening, and rash on chest, and they gave her an injection to counter the symptoms.      Hymenoptera Allergenic Extract Anaphylaxis     Iodine Hives     Pt stated that she was injected with CT CONTRAST in the past and got hives, SOB, and nausea. Please pre-medicate patient in the future.      Wasp Venom Protein Starter Kit Itching, Shortness Of Breath, Swelling and Wheezing     Adhesive Tape-Silicones      Chocolate Cough     Food Allergy Formula      Red meat, chocolate     Geodon [Ziprasidone Hcl]      Ibuprofen Other (See Comments)     Kidney function  Kidney function       Latex      Added based on information entered during case entry, please review and add reactions, type, and severity as needed     Prochlorperazine  Other (See Comments)     Risperdal [Risperidone]      Risperidone Analogues Other (See Comments)     Theobroma Oil Unknown     Zoloft [Sertraline]      Hydrochlorothiazide Rash           MEDS:  Current Outpatient Medications on File Prior to Visit   Medication Sig Dispense Refill     albuterol (PROAIR HFA;PROVENTIL HFA;VENTOLIN HFA) 90 mcg/actuation inhaler Inhale 2 puffs every 6 (six) hours as needed for wheezing. 1 each 0     albuterol (PROVENTIL) 2.5 mg /3 mL (0.083 %) nebulizer solution Take 3 mL (2.5 mg total) by nebulization every 4 (four) hours as needed for wheezing. 25 vial 2     DIAPER,BRIEF,INFANT-MARY,DISP (DIAPERS MISC) Adult1 month supply  approx 100       EPINEPHrine (EPIPEN 2-DIONISIO) 0.3 mg/0.3 mL injection Inject 0.3 mL (0.3 mg total) as directed as needed. 2 Pre-filled Pen Syringe 0     fluticasone furoate-vilanterol (BREO ELLIPTA) 100-25 mcg/dose DsDv inhaler Inhale 1 Dose daily. 1 each 0     fluticasone propion-salmeterol 113-14 mcg/actuation AePB Inhale 1 puff 2 (two) times a day. 1 each 5     fluticasone propionate (FLONASE) 50 mcg/actuation nasal spray        gabapentin (NEURONTIN) 100 MG capsule Take 100 mg by mouth 2 (two) times a day. For migraines and anxiety 180 capsule 3     hydrOXYzine HCl (ATARAX) 25 MG tablet TAKE 2 CAPSULES (50 MG TOTAL) BY MOUTH AT BEDTIME. FOR ANXIETY/SLEEP  3     metoprolol tartrate 75 mg Tab Take 75 mg by mouth 2 (two) times a day. 180 tablet 2     mometasone (NASONEX) 50 mcg/actuation nasal spray 2 sprays into each nostril daily. 17 g 2     montelukast (SINGULAIR) 10 mg tablet Take 1 tablet (10 mg total) by mouth daily. 90 tablet 3     potassium chloride (K-DUR,KLOR-CON) 20 MEQ tablet Take 1 tablet (20 mEq total) by mouth daily. 30 tablet 11     rizatriptan (MAXALT) 10 MG tablet Take 10 mg by mouth as needed.              sodium chloride (OCEAN) 0.65 % nasal spray 2 sprays into each nostril 2 (two) times a day. 15 mL 1     No current facility-administered  "medications on file prior to visit.              PHYSICAL EXAMINATION:  /70 (Patient Site: Right Arm, Patient Position: Sitting, Cuff Size: Adult Regular)   Ht 5' 5\" (1.651 m)   Wt (!) 232 lb (105.2 kg)   LMP 01/29/2020 (Exact Date)   BMI 38.61 kg/m      General: alert, oriented and no acute distress  HEENT:  OBJECTIVE:  Ears: TM's pearly bilaterally with no retractions, EAC on left is without lesion and nontender, on right is tender, has erythema and edema in one area with no impacted cerumen and tenderness to exam, also tender in mastoid area without swelling or increased warmth.   Neck: No adenopathy or tenderness of anterior or posterio lymph nodes.   Oropharynx was negative. No ulcers, exudates or lesions were seen. Soft and hard palates were negative. Posterior pharynx, negative.  HEART: Heart has a regular rate and rhythm. Respirations are not labored. Lungs are clear to auscultation, without rales, rhonchi or wheezes.   Assessment and Plan:  1. Infective otitis externa, right  neomycin-polymyxin-hydrocortisone (CORTISPORIN) otic solution    traMADoL (ULTRAM) 50 mg tablet   2. Low serum potassium  Basic Metabolic Panel    Basic Metabolic Panel   3. Screening for lipid disorders  Lipid Cascade    Lipid Cascade     1.  Otitis externa, will treat with topical antibiotic, this was explained through , patient originally thought she needed an oral antibiotic, I again explained a second time that she is getting the antibiotic as an eardrop with steroid for the pain, she appears to voiced understanding.  2.  We will recheck potassium level, patient was called with this level which was still 1/10 low from the normal range which may be her normal range, advised diet rich in potassium sources such as bananas, avocado, tomato.  3.  Patient is fasting today we will get her cholesterol levels done.    She should follow-up with a regular provider in our group, has not completely established care yet, " has seen several providers, she will plan for physical at next visit with provider of her choice.  Anna Calzada MS, PA-C

## 2021-06-06 NOTE — TELEPHONE ENCOUNTER
Refill Approved    Rx renewed per Medication Renewal Policy. Medication was last renewed on 1/10/20.    Indiana Feldman, [facility] Triage/Med Refill 2/19/2020     Requested Prescriptions   Pending Prescriptions Disp Refills     metoprolol tartrate 75 mg Tab 60 tablet 0     Sig: Take 75 mg by mouth 2 (two) times a day.       Beta-Blockers Refill Protocol Passed - 2/13/2020  4:09 PM        Passed - PCP or prescribing provider visit in past 12 months or next 3 months     Last office visit with prescriber/PCP: 11/15/2019 Anna Calzada PA-C OR same dept: 11/15/2019 Anna Calzada PA-C OR same specialty: 11/15/2019 Anna Calzada PA-C  Last physical: Visit date not found Last MTM visit: Visit date not found   Next visit within 3 mo: Visit date not found  Next physical within 3 mo: Visit date not found  Prescriber OR PCP: Anna Calzada PA-C  Last diagnosis associated with med order: 1. Essential hypertension  - metoprolol tartrate 75 mg Tab; Take 75 mg by mouth 2 (two) times a day.  Dispense: 60 tablet; Refill: 0    If protocol passes may refill for 12 months if within 3 months of last provider visit (or a total of 15 months).             Passed - Blood pressure filed in past 12 months     BP Readings from Last 1 Encounters:   01/29/20 120/70

## 2021-06-06 NOTE — TELEPHONE ENCOUNTER
Question following Office Visit  When did you see your provider: 2/23/20  What is your question: Patient and a  with [employer], stated she saw the walk in care provider on 2/23/20 and was referred to podiatry but was told they do not do foot care, only surgery. Patient stated she would like to be referred somewhere in [place], that does foot care.  Okay to leave a detailed message: Yes [phone]

## 2021-06-06 NOTE — TELEPHONE ENCOUNTER
Was unable to contact patient with results will send a letter through [employer] and by Mail. Please relay message if patient calls back    Labs are normal, with exception of mildly low potassium, unchanged since list test and beger that in October of last year, recommend  Daily potassium rich foods, orange juice, avocado, bananas, tomatoes and good hydration.

## 2021-06-07 NOTE — TELEPHONE ENCOUNTER
Orders being requested:   B/P cuff/reader  Neb tubing and adult mask  Reason service is needed/diagnosis:   Patient has been told by [facility] that they will accept the orders for the supplies patient is in need of.  Needs the order faxed to their office for them to proceed with the order  When are orders needed by: ASA  Where to send Orders: Fax: [phone]  Okay to leave detailed message?  Yes  Patient requests a call back as to the completion of this request.  Needs sign .

## 2021-06-07 NOTE — TELEPHONE ENCOUNTER
Question following Office Visit  When did you see your provider: today virtual visit  What is your question: Patient stated I was told I needed to monitor my blood pressure but the [facility] pharmacy will not sell me a blood pressure cuff.  Patient was also frustrated to learn Anna was not her PCP.  Patient stressed I have been seeing her for a year and even today why is she not my PCP?   Please consider this patient as your patient or call with a follow up plan.   Okay to leave a detailed message: Yes

## 2021-06-07 NOTE — TELEPHONE ENCOUNTER
Who is calling:  Patient using  #[phone]  Reason for Call:  Pt calling stating her insurance will not cover a blood pressure machine, and she can not afford to pay for it.  Please advise her on the next step.  Date of last appointment with primary care: N/A  Okay to leave a detailed message: No

## 2021-06-07 NOTE — TELEPHONE ENCOUNTER
caller stated that the pharmacy had told her that this was discontinue back in December by the doctor. Caller stated that she was never informed of this. Caller would like to get some clarification on this medication.

## 2021-06-07 NOTE — TELEPHONE ENCOUNTER
"45 y/o female calls with  about right sided chest pain, pain just above breast and into shoulder, pain comes and goes, appears positional.  She states it hurts to take a deep breath.  She tells nurse hx of blood clot in 2012.  Pain is also in location of a previous cyst.  No known injury, no fever, no congestion, just runny nose.  RN reviewed protocols.    Per protocols, RN advised her that she should be seen immediately in the ER for evaluation. Patient not receptive to this plan, advised her MD would likely send her to the ER if she came to the clinic with these symptoms.    Heaven Levi RN Triage Nurse/[facility]  05/02/2020   12:47AM      Reason for Disposition    History of prior \"blood clot\" in leg or lungs (i.e., deep vein thrombosis, pulmonary embolism)    Protocols used: CHEST PAIN-A-AH      "

## 2021-06-07 NOTE — TELEPHONE ENCOUNTER
"Pt calling  Was exposed to multiple employees at [facility] wearing latex gloves. Pt states she has Latex allergy. When pt arrived at home shortly store visit \"a a red rash began around her neck to jaw line\". During call pt stated that her throat was becoming \"scratchy\".  Pt had no benadryl or her Epi-pen available to her.  She did agree to take a dose of Singular & go to ER now  Denied difficulty breathing  Report called to Dr Salomon at [facility]    Indiana Lin RN  [facility] Nurse Advisor    Reason for Disposition    Face becomes swollen     During call pt began to c/o scratch throat & rash area around neck & jaw were becoming swollen. Triaged to UC or ED. Pt agreed to plan. Will have a friend transport to [facility].    Protocols used: RASH OR REDNESS - WIDESPREAD-A-OH    "

## 2021-06-07 NOTE — TELEPHONE ENCOUNTER
Called to pharmacy- pharmacy has the medication listed as inactive- stated that patient stated in December may have mentioned she no longer needed the medication or that she was no longer taking it.    Called to patient and patient stated that she only has 3 pills left and is needing this medication    Patient needs new script sent in- teed up medication     montelukast (SINGULAIR) 10 mg tablet  90 tablet  3  8/28/2019   No    Sig - Route: Take 1 tablet (10 mg total) by mouth daily. - Oral    Sent to pharmacy as: montelukast (SINGULAIR) 10 mg tablet    E-Prescribing Status: Receipt confirmed by pharmacy (8/28/2019  4:22 PM CDT)

## 2021-06-07 NOTE — TELEPHONE ENCOUNTER
2/28/2020 Anna Calzada PA-C  Marleny Viera  is a 44 y.o. female  comes in on 03/03/20 , for right ear pain. she notes that pain is behind the ear in the mastoid area, pain was not improved by cleaning wax out with a Q-tip.  She tried ice pack, Tylenol.  This did not help.  She also has a headache in the occipital area, right jaw pain,.  She has a history of otitis media and of allergies to mold.  She currently has no cough or congestion and denies fever, dizziness, nausea, vomiting, change in hearing.  She is profoundly deaf, uses an  for visits today, uses sign language for communication.  She would also like to get her blood level rechecked, at her last visit her potassium was a little low.   She has no history of kidney disease.    albuterol (PROAIR HFA;PROVENTIL HFA;VENTOLIN HFA) 90 mcg/actuation inhaler  1 each  0  8/28/2019   No    Sig - Route: Inhale 2 puffs every 6 (six) hours as needed for wheezing. - Inhalation    Sent to pharmacy as: albuterol (PROAIR HFA;PROVENTIL HFA;VENTOLIN HFA) 90 mcg/actuation inhaler    Notes to Pharmacy: May substitute the equivalent medication per insurance preference.    E-Prescribing Status: Receipt confirmed by pharmacy (8/28/2019  4:22 PM CDT)      albuterol (PROVENTIL) 2.5 mg /3 mL (0.083 %) nebulizer solution  25 vial  2  8/28/2019   No    Sig - Route: Take 3 mL (2.5 mg total) by nebulization every 4 (four) hours as needed for wheezing. - Nebulization    Sent to pharmacy as: albuterol (PROVENTIL) 2.5 mg /3 mL (0.083 %) nebulizer solution    E-Prescribing Status: Receipt confirmed by pharmacy (8/28/2019  4:22 PM CDT)      Medication teed up for provider signature- please adjust as needed

## 2021-06-07 NOTE — TELEPHONE ENCOUNTER
RN cannot approve Refill Request    RN can NOT refill this medication med is not covered by policy/route to provider.     Indiana Feldman, [facility] Triage/Med Refill 4/6/2020    Requested Prescriptions   Pending Prescriptions Disp Refills     EPINEPHrine (EPIPEN 2-DIONISIO) 0.3 mg/0.3 mL injection 2 Pre-filled Pen Syringe 0     Sig: Inject 0.3 mL (0.3 mg total) as directed as needed.       There is no refill protocol information for this order

## 2021-06-07 NOTE — TELEPHONE ENCOUNTER
Check BP when you are at the grocery store or pharmacy in the next week and give this information by My Chart or phone.

## 2021-06-07 NOTE — TELEPHONE ENCOUNTER
Needs a telephone visit for her nebulizer parts. She is deaf and will need to have a video chat set up with her service. They answered when I called and she could not get her video open for the phone visit. She also needs to be screened for COVID, thanks.

## 2021-06-07 NOTE — TELEPHONE ENCOUNTER
Orders being requested: New mask for nebulizer.  Reason service is needed/diagnosis: asthma  When are orders needed by: asap  Where to send Orders: [facility]   Okay to leave detailed message?    Skin normal color for race, warm, dry and intact

## 2021-06-07 NOTE — TELEPHONE ENCOUNTER
Last Office Visit  2/28/2020 Anna Calzada PA-C  Notes:  Marleny Viera  is a 44 y.o. female  comes in on 03/03/20 , for right ear pain. she notes that pain is behind the ear in the mastoid area, pain was not improved by cleaning wax out with a Q-tip.  She tried ice pack, Tylenol.  This did not help.  She also has a headache in the occipital area, right jaw pain,.  She has a history of otitis media and of allergies to mold.  She currently has no cough or congestion and denies fever, dizziness, nausea, vomiting, change in hearing.  She is profoundly deaf, uses an  for visits today, uses sign language for communication.  She would also like to get her blood level rechecked, at her last visit her potassium was a little low.   She has no history of kidney disease.    Last Filled:  montelukast (SINGULAIR) 10 mg tablet  90 tablet  3  8/28/2019   No    Sig - Route: Take 1 tablet (10 mg total) by mouth daily. - Oral    Sent to pharmacy as: montelukast (SINGULAIR) 10 mg tablet    E-Prescribing Status: Receipt confirmed by pharmacy (8/28/2019  4:22 PM CDT)        Next OV:  Nothing scheduled       Pharmacy told patient this was discontinued- still on active med list     Medication teed up for provider signature

## 2021-06-07 NOTE — TELEPHONE ENCOUNTER
Who is calling:  Vane from [facility] [employer]  Reason for Call:  Please re-fax order to [phone], the location to where the other order was sent is currently closed.  Date of last appointment with primary care: N/A  Okay to leave a detailed message: No

## 2021-06-07 NOTE — TELEPHONE ENCOUNTER
[facility] Specific Disposition - REQUIRED: [facility] Specific Patient Instructions  COVID 19 Nurse Triage Plan/Patient Instructions    Please be aware that novel coronavirus (COVID-19) may be circulating in the community. If you develop symptoms such as fever, cough, or SOB or if you have concerns about the presence of another infection including coronavirus (COVID-19), please contact your health care provider or visit [email or web address].       Additional COVID19 information to add for patients.     Additional General Information About COVID-19    COVID-19 - General Information:  Regardless of if you have been tested or not:  Patient who have symptoms (cough, fever, or shortness of breath), need to isolate for 7 days from when symptoms started AND 72 hours after fever resolves (without fever reducing medications) AND improvement of respiratory symptoms (whichever is longer).    Isolate yourself at home (in own room/own bathroom if possible)  Do Not allow any visitors  Do Not go to work or school  Do Not go to [employer],  centers, shopping, or other public places.  Do Not shake hands.  Avoid close and intimate contact with others (hugging, kissing).  Follow CDC recommendations for household cleaning of frequently touched services.     After the initial 7 days, continue to isolate yourself from household members as much as possible. To continue decrease the risk of community spread and exposure, you and any members of your household should limit activities in public for 14 days after starting home isolation.     You can reference the following CDC link for helpful home isolation/care tips:  https://www.cdc.gov/coronavirus/2019-ncov/downloads/10Things.pdf    COVID-19 - Symptoms:   The COVID-19 can cause a respiratory illness, such as bronchitis or pneumonia.  The most common symptoms are: cough, fever, and shortness of breath.   Other symptoms are: body aches, chills, diarrhea, fatigue, headache,  runny nose, and sore throat     COVID-19 - Exposure Risk Factors:  Exposure to a person who has been diagnosed with COVID-19 .  Travel from an area with recent local transmission of COVID-19 .  The CDC (www.cdc.gov) has the most up-to-date list of where the COVID-19 outbreak is occurring.    COVID-19 - Spreading:   The virus likely spreads through respiratory droplets produced when a person coughs or sneezes. These respiratory droplets can travel approximately 6 feet and can remain on surfaces.  Common disinfectants will kill the virus.  The CDC currently does not recommend healthy people wearing masks.    COVID-19 - Protect Yourself:   Avoid close contact with people known to have this new COVID-19 infection.  Wash hands often with soap and water or alcohol-based hand .  Avoid touching the eyes, nose or mouth.     Thank you for limiting contact with others, wearing a simple mask to cover your cough, practice good hand hygiene habits and accessing our [facility] services where possible to limit the spread of this virus.    For more information about COVID19 and options for caring for yourself at home, please visit the CDC website at https://www.cdc.gov/coronavirus/2019-ncov/about/steps-when-sick.html  For more options for care at [facility], please visit our website at [email or web address]    For more information, please use the [place] Department of Health ([facility]) COVID-19 Hotlines (Interpreters available):   Health questions: Phone Number: [phone] or [phone] and Hours: 7 a.m. to 7 p.m.  Schools and  questions: Phone Number: [phone] or [phone] and Hours 7 a.m. to 7 p.m.    Protocols used: CORONAVIRUS (COVID-19) EXPOSURE-A-AH 3.30.20

## 2021-06-07 NOTE — TELEPHONE ENCOUNTER
Called and let patient know that orders were faxed to [facility] at the fax number provided Fax: [phone]    Patient understood and thanked for the call

## 2021-06-07 NOTE — TELEPHONE ENCOUNTER
: [id number]    Called to patient to relay message. LVM for patient with this information and left number to call back for any questions or concerns.   Anesthesia Post Evaluation    Patient: Aminah Woods    Procedure(s) Performed: Procedure(s) (LRB):  COLONOSCOPY, please schedule with GI provider, needs upper and lower (N/A)  EGD (ESOPHAGOGASTRODUODENOSCOPY), please schedule with GI provider, needs upper and lower (N/A)    Final Anesthesia Type: general    Patient location during evaluation: PACU  Patient participation: Yes- Able to Participate  Level of consciousness: awake and alert  Post-procedure vital signs: reviewed and stable  Pain management: adequate  Airway patency: patent    PONV status at discharge: No PONV  Anesthetic complications: no      Cardiovascular status: blood pressure returned to baseline  Respiratory status: unassisted  Hydration status: euvolemic  Follow-up not needed.          Vitals Value Taken Time   /77 2/27/2020  8:35 AM   Temp 36.6 °C (97.9 °F) 2/27/2020  8:00 AM   Pulse 88 2/27/2020  8:35 AM   Resp 20 2/27/2020  8:35 AM   SpO2 99 % 2/27/2020  8:35 AM         Event Time     Out of Recovery 08:38:20          Pain/Teodora Score: Teodora Score: 10 (2/27/2020  8:30 AM)

## 2021-06-07 NOTE — PROGRESS NOTES
"Marleny Viera is a 44 y.o. female who is being evaluated via a billable telephone visit.    Marleny Viera needs refills of her asthma medication and tubing and mask for her nebulizer.  She uses her nebulizer about 4 times a week when she has exacerbations of her asthma, she uses her handheld inhaler once or twice daily otherwise.  She is currently having worsening allergy symptoms because she is out of her antihistamine.  She still takes hydroxyzine 50 mg at night for sleep.  She is also taking her Singulair once daily and using her Nasonex once daily.  She also needs a refill of her metoprolol 75 mg.  Review of systems: Negative for fever, cough, shortness of breath, chest pain, palpitations, dizziness.    The patient has been notified of following:     \"This telephone visit will be conducted via a call between you and your physician/provider. We have found that certain health care needs can be provided without the need for a physical exam.  This service lets us provide the care you need with a short phone conversation.  If a prescription is necessary we can send it directly to your pharmacy.  If lab work is needed we can place an order for that and you can then stop by our lab to have the test done at a later time.    If during the course of the call the physician/provider feels a telephone visit is not appropriate, you will not be charged for this service.\"         Marleny Viera complains of    Chief Complaint   Patient presents with     Orders Request     CPAP needed/brace, also neds refills and a new inhaler mask        I have reviewed and updated the patient's Past Medical History, Social History, Family History and Medication List.    ALLERGIES  Amoxicillin; Bee pollen; Contrast [iodixanol]; Hymenoptera allergenic extract; Iodine; Wasp venom protein starter kit; Adhesive tape-silicones; Chocolate; Food allergy formula; Geodon [ziprasidone hcl]; Ibuprofen; Latex; Prochlorperazine; Risperdal " [risperidone]; Risperidone analogues; Theobroma oil; Zoloft [sertraline]; and Hydrochlorothiazide      Assessment/Plan:  1. Essential hypertension  metoprolol tartrate 75 mg Tab    blood pressure monitor Kit   2. Asthma, moderate persistent, uncomplicated  albuterol (PROVENTIL) 2.5 mg /3 mL (0.083 %) nebulizer solution    Nebulizer Supplies (cup, tubing, mask, & filters)     1.  Refills given of metoprolol also patient does not have a blood pressure cuff at home, this order was placed.  Check blood pressure once daily in the morning.  Blood pressure should be 130/80 or lower, pulse should not be lower than 50.  If any concerns with blood pressure reschedule for phone visit.  2.  Reviewed patient's allergy and asthma medications, refilled her nebs, tubing and mask.  Advised her that she may take her hydroxyzine once during the day as an antihistamine and continue to take 2 tabs at night for sleep.  Continue to take Singulair once daily which also helps with allergies.  Continue to use Nasonex daily.  Follow-up if allergy symptoms are worsening.  Otherwise, follow-up in July for recheck of high blood pressure.  Patient is profoundly deaf and uses video  for call.  All questions were answered, she voices understanding.  20 minutes of 28-minute appointment used to explain each medication that she uses for asthma, allergies, blood pressure and answer all her questions.    Phone call duration:  28 minutes    Tori Shaikh

## 2021-06-07 NOTE — TELEPHONE ENCOUNTER
Who is calling:  Patient   Reason for Call:  Caller has questions on regards to her nebulizer supplies and a blood pressure reader. Caller is needing to know if orders has been sent over to her medical supply store.   Date of last appointment with primary care:   Okay to leave a detailed message: Yes

## 2021-06-07 NOTE — TELEPHONE ENCOUNTER
Medication Question or Clarification  Who is calling: patient   What medication are you calling about (include dose and sig)?:    Disp  Refills  Start  End     montelukast (SINGULAIR) 10 mg tablet  90 tablet  3  8/28/2019      Sig - Route: Take 1 tablet (10 mg total) by mouth daily. - Oral     Sent to pharmacy as: montelukast (SINGULAIR) 10 mg tablet     E-Prescribing Status: Receipt confirmed by pharmacy (8/28/2019  4:22 PM CDT)         Who prescribed the medication?: Anna Calzada   What is your question/concern?: caller stated that the pharmacy had told her that this was discontinue back in December by the doctor. Caller stated that she was never informed of this. Caller would like to get some clarification on this medication.   Requested Pharmacy: n/a  Okay to leave a detailed message?: Yes

## 2021-06-07 NOTE — TELEPHONE ENCOUNTER
Refill Request  Did you contact pharmacy:no (told her to call them tomorrow also)  Medication name: inhaler, and drops for the nebulizer   Requested Prescriptions      No prescriptions requested or ordered in this encounter     Who prescribed the medication:  Requested Pharmacy:[facility] [place]  Is patient out of medication:yes  Patient notified refills processed in 3 business days: yes  Okay to leave a detailed message:

## 2021-06-08 NOTE — PROGRESS NOTES
"Marleny Viera is a 44 y.o. female who is being evaluated via a billable telephone visit.      The patient has been notified of following:     \"This telephone visit will be conducted via a call between you and your physician/provider. We have found that certain health care needs can be provided without the need for a physical exam.  This service lets us provide the care you need with a short phone conversation.  If a prescription is necessary we can send it directly to your pharmacy.  If lab work is needed we can place an order for that and you can then stop by our lab to have the test done at a later time.    Telephone visits are billed at different rates depending on your insurance coverage. During this emergency period, for some insurers they may be billed the same as an in-person visit.  Please reach out to your insurance provider with any questions.    If during the course of the call the physician/provider feels a telephone visit is not appropriate, you will not be charged for this service.\"    Patient has given verbal consent to a Telephone visit? Yes    What phone number would you like to be contacted at? [phone]     Patient would like to receive their AVS by AVS Preference: Zeinab.    Additional provider notes:       Assessment/ Plan:         1. Moderate persistent asthma with acute exacerbation     2. Non-recurrent acute serous otitis media of both ears     3. Acute non-recurrent sinusitis, unspecified location     4. Essential hypertension     5. Tylenol ingestion, accidental or unintentional, initial encounter       Clinically, this was a very difficult visit completed over the phone.  Due to the nature of however visit was conducted as well as patient's overall frustration and scattered presentation of symptoms.      Overall I encouraged patient to continue her antibiotics and steroid that she was prescribed as well as the inhalers and nebulizer management for her asthma exacerbation as well as " her acute sinus infection and otitis media.  It would be beneficial for patient to have a face-to-face visit this week for further evaluation.  Since patient will have a face-to-face visit later this week when evaluating labs can be completed at that time.  I did not order any labs to be completed today however I had discussed with the patient to recheck her blood sugar as this was quite elevated and to consider doing liver function studies given her questionable Tylenol overdose that was unintentional.  She currently is not experiencing any symptoms of an acute Tylenol overdose.  I discussed with patient to avoid taking more pain relieving medications at this time and to continue with symptomatic management with other means.  In terms of ongoing management of her asthma symptoms I discussed briefly daily steroid inhaled medication such as Advair which she adamantly refused and said that they did not work well for her.     I did not address patient's blood pressure during this visit.  Review of her blood pressures in the emergency department indicate elevation.  It is not clear if this is secondary to her emotional state when in the emergency department for evaluation.      Recommend following up in the next 1 to 3 days for face-to-face visit with Anna Calzada PA-C at [facility].  She has seen this provider numerous times in the past.    Subjective:      Marleny Viera is a 44 y.o. female who is being seen as a telephone visit encounter.   Chief Complaint   Patient presents with     Pain     Lung pain x 4 week. and Right sided (front) kidney pain x 4 weeks     Patient is new to me. She has a medical history of hypertension, asthma, borderline personality disorder, PE, obesity, and fatty liver. She is deaf. An interpretor was utilized during this visit. The patient and interpretor were video conferencing. Appointment and patients complaints/ concerns are very scattered during this phone visit.  In general  "patient has been having symptoms of shortness of breath that has been intermittent for the last 4 weeks.  She also mentions that she has had 4 weeks of general right sided pain.    Patient is feeling very frustrated in general.  She reports that she was in the emergency room every day for the last 3 days.  On 5/8 she presented to the emergency department for concerns of shortness of breath.  She was tested for COVID-19 and came back negative.  She was also diagnosed with an asthma exacerbation as well as an acute sinusitis and acute otitis media.  She was given a 5-day course of prednisone and a 7-day course of doxycycline.  She started the medication but does not feel that it is \"helping\".  She has taken 2 full days of the medication.    On 5/9 she went to the emergency department for ongoing symptoms of concern and was diagnosed with atypical chest pain and pain of the right lower leg.  Labs were completed as well as an ultrasound of the right leg and a chest CT.  The imaging results came back within normal limits.  There was no significant abnormalities found of concern on her labs.  EKG did not show any ischemia or changes from the previous day.  Troponins were negative.  She did have elevation of her blood pressures.    On 5/10 she presented to the emergency department again and was diagnosed with an asthma exacerbation.  They reviewed the work-up that had been done the previous 2 days and did not feel that further work-up of a pulmonary embolism was needed because of her presentation more suggestive of an upper respiratory tract infection.  She also has a allergy to contrast CT dye therefore limiting the ability to appropriately image her lungs for CT scan rule out PE.    She initially started the visit discussing how she called the police on her neighbor and is being panicky about this because her neighbor had mentioned that she might have to go to court because some of the attendance at the party gotten a " "\"fight\" with the police.  Supposedly patient believes that they are still in [facility].  It is unclear why patient felt that this was contributing to some of her symptoms however she does admit that it pops from anxiety but does not feel that her symptoms are related to anxiety.  Of note she was given some Ativan in the emergency department on 5/10 and it did not resolve any of her symptoms.  She mentions increased concern regarding some minor abnormalities in the labs that were completed on 5/9 which was a CO2 level of 20 and her blood sugar being 287.  She does not have a history of diabetes.  Previous labs that have been done were within normal limits and non-concerning.    After approximately 30 minutes of being on the phone together she asked regarding the concerns with Tylenol.  During this visit I was not aware of the Tylenol concern I was able to find the nurse triage note the patient described taking Tylenol numerous times during the day for the pain that she is been experiencing.  She denies any change in her skin color or yellowing of her skin or eyes.  She also denies any specific right upper quadrant abdominal pain.    Today she continues to have ear pain that seems to be worsening.  Right lung pain and wheezing, right leg pain, and front of \"kidney\" pain.  She denies any arm pain.  She did have some fevers on Saturday.    Labs and imaging completed in ER:    Results for orders placed or performed during the hospital encounter of 05/09/20   Troponin I   Result Value Ref Range    Troponin I <0.01 0.00 - 0.29 ng/mL   BNP(B-type Natriuretic Peptide)   Result Value Ref Range    BNP 31 0 - 66 pg/mL   Basic Metabolic Panel   Result Value Ref Range    Sodium 139 136 - 145 mmol/L    Potassium 3.7 3.5 - 5.0 mmol/L    Chloride 109 (H) 98 - 107 mmol/L    CO2 20 (L) 22 - 31 mmol/L    Anion Gap, Calculation 10 5 - 18 mmol/L    Glucose 287 (H) 70 - 125 mg/dL    Calcium 9.0 8.5 - 10.5 mg/dL    BUN 10 8 - 22 mg/dL    " Creatinine 0.88 0.60 - 1.10 mg/dL    GFR MDRD Af Amer >60 >60 mL/min/1.73m2    GFR MDRD Non Af Amer >60 >60 mL/min/1.73m2   Troponin I   Result Value Ref Range    Troponin I 0.01 0.00 - 0.29 ng/mL   ECG 12 lead nursing unit performed   Result Value Ref Range    SYSTOLIC BLOOD PRESSURE      DIASTOLIC BLOOD PRESSURE      VENTRICULAR RATE 83 BPM    ATRIAL RATE 83 BPM    P-R INTERVAL 146 ms    QRS DURATION 82 ms    Q-T INTERVAL 376 ms    QTC CALCULATION (BEZET) 441 ms    P Axis 31 degrees    R AXIS -1 degrees    T AXIS -26 degrees    MUSE DIAGNOSIS       Normal sinus rhythm  Minimal voltage criteria for LVH, may be normal variant  Possible Anterior infarct (cited on or before 08-MAY-2020)  T wave abnormality, consider lateral ischemia  Abnormal ECG  When compared with ECG of 08-MAY-2020 19:13,  No significant change was found  Confirmed by SEE ED PROVIDER NOTE FOR, ECG INTERPRETATION ([id number]),  MuseAdmin, MuseAdmin ([id number]) on 5/11/2020 11:48:13 AM         Xr Chest 1 View Portable    Result Date: 5/8/2020  EXAM: XR CHEST 1 VIEW PORTABLE LOCATION: [facility] DATE/TIME: 5/8/2020 8:20 PM INDICATION: dyspnea COMPARISON: None.     Heart size within normal limits for portable technique. Pulmonary vascularity normal. The lungs are clear. Prominent overlying soft tissue.    Ct Chest Without Contrast    Result Date: 5/9/2020  EXAM: CT CHEST WO CONTRAST LOCATION: [facility] DATE/TIME: 5/9/2020 6:16 PM INDICATION: Right sided chest pain and shortness of breath, CXR yesterday, concern for possible PNA not seen on CXR, COVID test yesterday negative COMPARISON: None. TECHNIQUE: CT chest without IV contrast. Multiplanar reformats were obtained. Dose reduction techniques were used. CONTRAST: None. FINDINGS: 5 mm pulmonary nodule right lung base adjacent to the diaphragm on image 60 of series 4. The lungs otherwise are clear. No infiltrates or pleural effusions. MEDIASTINUM/AXILLAE: No lymphadenopathy. No thoracic  aortic aneurysm. UPPER ABDOMEN: No significant finding. MUSCULOSKELETAL: Unremarkable.     1.  5 mm pulmonary nodule right lung base. Follow-up using Fleischner Society guidelines recommended. 2.  Lungs otherwise clear. REFERENCE: Guidelines for Management of Incidental Pulmonary Nodules Detected on CT Images: From the Fleischner Society 2017. Guidelines apply to incidental nodules in patients who are 35 years or older. Guidelines do not apply to lung cancer screening, patients with immunosuppression, or patients with known primary cancer. SINGLE NODULE Nodule size <6 mm Low-risk patients: No follow-up needed. High-risk patients: Optional follow-up at 12 months. Nodule size 6-8 mm Low-risk patients: Follow-up CT at 6-12 months, then consider CT at 18-24 months. High-risk patients: Follow-up CT at 6-12 months, then at 18-24 months if no change. Nodule size >8 mm Either low or high-risk patients: Consider CT, PET/CT, or tissue sampling at 3 months. Consider referral to lung nodule clinic.     Us Venous Leg Right    Result Date: 5/9/2020  EXAM: US VENOUS LEG RIGHT LOCATION: [facility] DATE/TIME: 5/9/2020 6:11 PM INDICATION: Right calf pain COMPARISON: Ultrasound 06/07/2019 TECHNIQUE: Venous Duplex ultrasound of the right lower extremity with and without compression, augmentation and duplex. Color flow and spectral Doppler with waveform analysis performed. FINDINGS: Exam includes the common femoral, femoral, popliteal, and contralateral common femoral veins as well as segmentally visualized deep calf veins and greater saphenous vein. RIGHT: No deep vein thrombosis. No superficial thrombophlebitis. No popliteal cyst. Limited visualization of the calf veins due to body habitus.     1.  No deep venous thrombosis in the right lower extremity.      The following portions of the patient's history were reviewed and updated as appropriate: allergies, current medications, past family history, past medical history, past  social history, past surgical history and problem list.    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      albuterol (PROVENTIL) 2.5 mg /3 mL (0.083 %) nebulizer solution Take 3 mL (2.5 mg total) by nebulization 4 (four) times a week.      blood pressure monitor Kit Check blood pressure once in am; should be 130/80 or less. Pulse should be greater than 50.      DIAPER,BRIEF,INFANT-MARY,DISP (DIAPERS MISC) Adult1 month supply  approx 100 7/19/2016: Received from: [employer]     doxycycline (VIBRAMYCIN) 100 MG capsule Take 1 capsule (100 mg total) by mouth 2 (two) times a day for 7 days.      EPINEPHrine (EPIPEN 2-DIONISIO) 0.3 mg/0.3 mL injection Inject 0.3 mL (0.3 mg total) as directed as needed.      gabapentin (NEURONTIN) 100 MG capsule Take 100 mg by mouth 2 (two) times a day. For migraines and anxiety      hydrOXYzine HCL (ATARAX) 25 MG tablet TAKE 2 CAPSULES (50 MG TOTAL) BY MOUTH AT BEDTIME. FOR ANXIETY/SLEEP      mometasone (NASONEX) 50 mcg/actuation nasal spray 2 sprays into each nostril daily.      montelukast (SINGULAIR) 10 mg tablet Take 1 tablet (10 mg total) by mouth daily.      potassium chloride (K-DUR,KLOR-CON) 20 MEQ tablet Take 1 tablet (20 mEq total) by mouth daily.   "    predniSONE (DELTASONE) 20 MG tablet Take 60 mg by mouth daily for 5 days.      rizatriptan (MAXALT) 10 MG tablet Take 10 mg by mouth as needed.        7/7/2019: prn     sodium chloride (OCEAN) 0.65 % nasal spray 2 sprays into each nostril 2 (two) times a day.      traMADoL (ULTRAM) 50 mg tablet Take 1 tablet (50 mg total) by mouth once as needed for pain or severe pain (7-10) (one tab at night next 5 night).      metoprolol tartrate 75 mg Tab Take 75 mg by mouth 2 (two) times a day.        Review of Systems   A 12 point comprehensive review of systems was negative except as noted.     See HPI.      Objective:      Ht 5' 5\" (1.651 m)   Wt (!) 235 lb (106.6 kg)   BMI 39.11 kg/m      No physical exam was completed today as this encounter was completed over a telephone visit.     No evaluation was able to be completed on patient at this visit was completed on the phone through an .  Patient is very frustrated sounding on the phone.    I personally spent 37 minutes in direct conversation with the patient    Eve Sharp CNP  12:14 PM       "

## 2021-06-08 NOTE — TELEPHONE ENCOUNTER
ID [id number]    From Virtual Visit with Eve Sharp CNP:    Recommend following up in the next 1 to 3 days for face-to-face visit with Anna Calzada PA-C at [facility].  She has seen this provider numerous times in the past.      Patient states she has been transferred and disconnected multiple times.    Pt requesting just to schedule above appointment as recommended.     Pt warm transferred to scheduling to schedule.    Monika Lawton, RN   [facility] RN Triage    Reason for Disposition    [1] Follow-up call to recent contact AND [2] information only call, no triage required    Protocols used: INFORMATION ONLY CALL-A-

## 2021-06-08 NOTE — TELEPHONE ENCOUNTER
Left message on patients voicemail stating we are not seeing patients with sinus issues due to the restrictions with COVID and she can give me a call back to see what symptoms she is having.    Cherelle Mendez RN  [facility]  [phone]

## 2021-06-08 NOTE — TELEPHONE ENCOUNTER
"Pt is calling   call. Hearing impaired.    For the last 4 months, she is complaining of nasal congestion, runny nose, sinus pressure and a sinus headache.. It is hard to smell and the inside of the nose hurts at times.   Sinuses feel slightly swollen.  Whites of the eyes are slightly pink in color, watery eyes.  Using visine for the eyes.  Three times a day. Also taking Singulair.  She was on the Flonase, but this wasn't working so she stopped it. She is on Nasonex, Hydroxyzine at bedtime, Singulair, and an inhaler twice a day. None of this is working.  No fever  No other symptoms  H/O asthma and allergies related to the weather at times.  She states that she has come wheezing on and off.  Uses her inhaler twice a day, but this is not working as well.  The medication she was put on in January isn't helping at all either.  She had a nebulizer but threw it out. She stated that she doesn't like how the Albuterol makes her feel. She stated that it doesn't really work for her.  She states that she is \"really frustrated since this has been going on for 4 months now with no relief\".  She would like new medication or something else to help with all of these symptoms, and would like to speak to the doctor for help.        Reason for Disposition    [1] Nasal allergies AND [2] only certain times of year AND [3] hay fever diagnosis has never been confirmed by a HCP    Protocols used: NASAL ALLERGIES (HAY FEVER)-A-    Protocol and home care measures reviewed in detail with the patient.  I advised her that I would send a message to her provider for a call back after the weekend.  I advised her to call back with any new or worsening, signs, symptoms, concerns, or questions, should they come up.  She verbalized understanding.    Joyce Ross RN Triage [facility]    "

## 2021-06-08 NOTE — TELEPHONE ENCOUNTER
Pt called and said that she has had nasal blockage with drainage for the past 3-4 years and it seems to be getting worse. She said it's hard to breathe. She is currently using prescription nasal spray but it doesn't seem to help. She is requesting an appointment and said that if she is not able to be seen in clinic she is wondering if a telephone visit can be done. She uses [employer]. Please call pt at [phone] or let schedulers know if clinic or phone visit are possible.

## 2021-06-08 NOTE — TELEPHONE ENCOUNTER
Patient has not been to [facility] for a long time. She has been seeing Hermelinda Auguste.

## 2021-06-08 NOTE — TELEPHONE ENCOUNTER
Right lung area doesn't feel well  Kidney uncomfortable.  She thinks the Tylelnol she is taking is too much.      Marleny has been in an ER everyday of the past three days.  Her problems persist. She doesn't know what to do. She knows she does not want to go to an ER again. She was willing to have a telephone visit with a provider today.  I want this visit as soon as possible.    My biggest concern is a possible Tylenol overdose.  She said she's been taking 1000 mg Tylenol every two hours while awake for the past three days.  Today she has only had 1000 mg once. That was about 5:00 a.m. I instructed her to not take any more at this time. She's taking the Tylenol for chest and kidney pain. She stated because of the  Stress she can't remember how much Tylenol she took yesterday, 5/10 and Saturday 5/9.  She took 3000 mg Tylenol on Friday in the period between 2 pm and 7 pm.    Marleny is very upset, very stressed and said her experience at the ER yesterday, 5/10 was not good.      There was a problem with the interpretor service that Marleny said she told the staff about but it wasn't resolved so this added to her stress. Of course this limited communication between her and the staff.    She kept trying to make the provider yesterday, understand the amount of Tylenol she was taking and thought she should have lab work done.    She said the provider refused further lab work.    Besides her own issues she has a son with ALS that she tries to help which adds to her stress on Saturday.  There was a major stressor yesterday with her neighbors which involved the police.    I wanted Marleny to go to an ER again because of the possibility of Tylenol overdose. She won't go but agreed to a telephone visit today.    A visit is scheduled for 9:45 a.m. today        COVID 19 Nurse Triage Plan/Patient Instructions    Please be aware that novel coronavirus (COVID-19) may be circulating in the community. If you develop symptoms such as fever,  cough, or SOB or if you have concerns about the presence of another infection including coronavirus (COVID-19), please contact your health care provider or visit [email or web address].     Disposition/Instructions    Patient to have scheduled Telephone Visit with a provider. Follow System Ambulatory Workflow for COVID 19.     The clinic staff will assist you to schedule an appointment to complete the Telephone Visit with a provider during normal clinic hours.       Call Back If: Your symptoms worsen before you are able to complete your Telephone Visit with a provider.        Thank you for limiting contact with others, wearing a simple mask to cover your cough, practice good hand hygiene habits and accessing our virtual services where possible to limit the spread of this virus.    For more information about COVID19 and options for caring for yourself at home, please visit the CDC website at https://www.cdc.gov/coronavirus/2019-ncov/about/steps-when-sick.html  For more options for care at [facility], please visit our website at [email or web address]    For more information, please use the [place] Department of Health COVID-19 Website: [email or web address]/coronavirus/index.html  [place] Department of Health ([facility]) COVID-19 Hotlines (Interpreters available):      Health questions: Phone Number: [phone] or [phone] and Hours: 7 a.m. to 7 p.m.    Schools and  questions: Phone Number: [phone] or [phone] and Hours 7 a.m. to 7 p.m.      Reason for Disposition    DOUBLE DOSE (an extra dose or lesser amount) of over-the-counter (OTC) drug and any symptoms (e.g., dizziness, nausea, pain, sleepiness)    Protocols used: POISONING-A-[place]    Nguyen WHEELER RN FNA

## 2021-06-08 NOTE — TELEPHONE ENCOUNTER
# [id number]   with purple video  relay  Service called with deaf  Pt .    Received from [facility] ED 5/8/20 - prednisone and antibiotic suspected sinus and ear infection  And  Shortness of breath and slight cough,  and  intermittent R dull  side chest pain =negative for covid   .  Return to ED if worsening pain or struggling to breath or fever and any other new of concerning symptoms.     Hx of  R sided chest pain for 4 weeks ,  and eval  in ED  and same pain as when seen  as last night  In ED ..    Currently : T 100 TM , having shortness of breath  And intermittent  R chest pain  At dull level  . Concerned about blood clot  In lung and legs , persisting R chest pain and states having shortness of breath and having new symptoms of ;   New symptoms :  neck and back of  Head pain- constant is 8/10 on pain scale   , new  fever  T 100,Tm now  , Constant  R Knee cap pain is 7/10 , &  intermittently R side kidney pain ( hx of kidney stones on left only in past ) is 6/10. Called to [facility] ED to give ED  a heads up  And advised Pt to bring in note pad to write her answers until  can arrive .     Guideline : used discharge instruction - no guideline A .   COVID 19 Nurse Triage Plan/Patient Instructions Pt will return to [facility] ED per discharge instructions.        Please be aware that novel coronavirus (COVID-19) may be circulating in the community. If you develop symptoms such as fever, cough, or SOB or if you have concerns about the presence of another infection including coronavirus (COVID-19), please contact your health care provider or visit [email or web address].     Disposition/Instructions    Patient to go to ED and follow protocol based instructions. Follow System Ambulatory Workflow for COVID 19.     Bring Your Own Device:  Please also bring your smart device(s) (smart phones, tablets, laptops) and their charging cables for your personal use and to communicate with your care team during  your visit.      Thank you for limiting contact with others, wearing a simple mask to cover your cough, practice good hand hygiene habits and accessing our virtual services where possible to limit the spread of this virus.    For more information about COVID19 and options for caring for yourself at home, please visit the CDC website at https://www.cdc.gov/coronavirus/2019-ncov/about/steps-when-sick.html  For more options for care at [facility], please visit our website at [email or web address]    For more information, please use the [place] Department of Health COVID-19 Website: https://www.health.[facility].[place]/diseases/coronavirus/index.html  [place] Department of Health ([facility]) COVID-19 Hotlines (Interpreters available):      Health questions: Phone Number: [phone] or [phone] and Hours: 7 a.m. to 7 p.m.    Schools and  questions: Phone Number: [phone] or [phone] and Hours 7 a.m. to 7 p.m.                       Reason for Disposition    Nursing judgment    Protocols used: NO GUIDELINE OR REFERENCE [id number]

## 2021-06-09 NOTE — TELEPHONE ENCOUNTER
Who is requesting the letter? Patient.  Why is the letter needed? States needs letter that states she is unable to wear a mask due to her asthma and hard to breath in the hot weather.  How would you like this letter returned? Pick it up at the clinic.  Okay to leave a detailed message? Yes

## 2021-06-09 NOTE — TELEPHONE ENCOUNTER
Patient no longer a patient of Dr. Pelaez's. Looks like she has been seeing NAYELI Diaz at [facility].   Leigh Doshi LPN

## 2021-06-10 NOTE — TELEPHONE ENCOUNTER
RN cannot approve Refill Request    RN can NOT refill this medication med is not covered by policy/route to provider. Last office visit: 2/28/2020 Anna Calzada PA-C Last Physical: Visit date not found Last MTM visit: Visit date not found Last visit same specialty: Visit date not found.  Next visit within 3 mo: Visit date not found  Next physical within 3 mo: Visit date not found      Lupe Jonesin, [facility] Triage/Med Refill 7/30/2020    Requested Prescriptions   Pending Prescriptions Disp Refills     EPINEPHrine (EPIPEN/ADRENACLICK/AUVI-Q) 0.3 mg/0.3 mL injection [Pharmacy Med Name: EPINEPHRINE 0.3 MG AUTO-INJECT] 2 Pre-filled Pen Syringe 0     Sig: INJECT 0.3 ML (0.3 MG TOTAL) AS DIRECTED AS NEEDED.       Epinephrine (Bee Sting) Kit Refill Protocol Passed - 7/28/2020  8:30 AM        Passed - Patient to get 0.3mg dose (adult kit)          Passed - Patient has had office visit/physical in last 1 year     Last office visit with prescriber/PCP: 2/28/2020 Anna Calzada PA-C OR same dept: Visit date not found OR same specialty: Visit date not found  Last physical: Visit date not found Last MTM visit: Visit date not found   Next visit within 3 mo: Visit date not found  Next physical within 3 mo: Visit date not found  Prescriber OR PCP: Anna Calzada PA-C  Last diagnosis associated with med order: 1. H/O bee sting allergy  - EPINEPHrine (EPIPEN/ADRENACLICK/AUVI-Q) 0.3 mg/0.3 mL injection [Pharmacy Med Name: EPINEPHRINE 0.3 MG AUTO-INJECT]; Inject 0.3 mL (0.3 mg total) as directed as needed.  Dispense: 2 Pre-filled Pen Syringe; Refill: 0    If protocol passes may refill for 12 months if within 3 months of last provider visit (or a total of 15 months).

## 2021-06-10 NOTE — TELEPHONE ENCOUNTER
Transmission to pharmacy failed during last prescription attempt, resent electronically to pharmacy.    Tesha Lucio RN  [facility] Medication Refill Nurse  8/6/2020  7:03 AM

## 2021-06-11 NOTE — TELEPHONE ENCOUNTER
Medication Question or Clarification  Who is calling: Patient   What medication are you calling about (include dose and sig)?: hydrOXYzine HCL (ATARAX) 25 MG tablet  180 tablet  3  5/7/2020     Sig: TAKE 2 CAPSULES (50 MG TOTAL) BY MOUTH AT BEDTIME. FOR ANXIETY/SLEEP    Sent to pharmacy as: hydrOXYzine HCL 25 mg tablet (ATARAX)    Who prescribed the medication?: Anna Spann   What is your question/concern?: Patient states her prescription hydroxyzine was sent to wrong pharmacy requesting to send to [facility] pharmacy .  Requested Pharmacy: [facility]   Okay to leave a detailed message?: No

## 2021-06-11 NOTE — TELEPHONE ENCOUNTER
Last Office Visit  VV 05/11/2020    Gabapentin is not discussed        Notes:      Last Filled:  gabapentin (NEURONTIN) 100 MG capsule  180 capsule  3  4/15/2019   No    Sig - Route: Take 100 mg by mouth 2 (two) times a day. For migraines and anxiety - Oral        Next OV:  Visit date not found  - it has been requested to [facility] and pt has not done so at this time.  Nothing scheduled             Medication teed up for provider signature      Called and LVM for pt to call back to schedule appt.  Please advise on Refill

## 2021-06-11 NOTE — TELEPHONE ENCOUNTER
Patient states she is using [facility] pharmacy from now [facility] requested to send all her prescription to new pharmacy .  Refill Request  Did you contact pharmacy: No  Medication name:   Requested Prescriptions     Pending Prescriptions Disp Refills     gabapentin (NEURONTIN) 100 MG capsule 180 capsule 3     Sig: Take 100 mg by mouth 2 (two) times a day. For migraines and anxiety     rizatriptan (MAXALT) 10 MG tablet   0     Sig: Take 1 tablet (10 mg total) by mouth as needed.   Who prescribed the medication: FIDELIA BROOKS  Requested Pharmacy: [facility]   Is patient out of medication: Yes  Patient notified refills processed in 3 business days:  yes  Okay to leave a detailed message: no

## 2021-06-11 NOTE — TELEPHONE ENCOUNTER
Refill Approved    Rx renewed per Medication Renewal Policy. Medication was last renewed on 4/15/19.    Indiana Feldman, [facility] Triage/Med Refill 9/12/2020     Requested Prescriptions   Pending Prescriptions Disp Refills     gabapentin (NEURONTIN) 100 MG capsule [Pharmacy Med Name: GABAPENTIN 100 MG CAPSULE] 180 capsule 3     Sig: TAKE ONE CAPSULE BY MOUTH 2 (TWO) TIMES A DAY. FOR MIGRAINES AND ANXIETY       Gabapentin/Levetiracetam/Tiagabine Refill Protocol  Passed - 9/11/2020  2:29 PM        Passed - PCP or prescribing provider visit in past 12 months or next 3 months     Last office visit with prescriber/PCP: 10/21/2019 Tesha Pelaez MD OR same dept: 10/21/2019 Tesha Pelaez MD OR same specialty: 2/28/2020 Anna Calzada PA-C  Last physical: 3/8/2019 Last MTM visit: Visit date not found   Next visit within 3 mo: Visit date not found  Next physical within 3 mo: Visit date not found  Prescriber OR PCP: Tesha Pelaez MD  Last diagnosis associated with med order: 1. Migraine without aura and without status migrainosus, not intractable  - gabapentin (NEURONTIN) 100 MG capsule [Pharmacy Med Name: GABAPENTIN 100 MG CAPSULE]; TAKE ONE CAPSULE BY MOUTH 2 (TWO) TIMES A DAY. FOR MIGRAINES AND ANXIETY  Dispense: 180 capsule; Refill: 3    2. YESICA (generalized anxiety disorder)  - gabapentin (NEURONTIN) 100 MG capsule [Pharmacy Med Name: GABAPENTIN 100 MG CAPSULE]; TAKE ONE CAPSULE BY MOUTH 2 (TWO) TIMES A DAY. FOR MIGRAINES AND ANXIETY  Dispense: 180 capsule; Refill: 3    If protocol passes may refill for 12 months if within 3 months of last provider visit (or a total of 15 months).

## 2021-06-12 NOTE — TELEPHONE ENCOUNTER
Order pended.  Miya Casey [facility] ............... 4:57 PM, 10/02/20

## 2021-06-12 NOTE — TELEPHONE ENCOUNTER
Medication Question or Clarification  Who is calling: [facility]   What medication are you calling about (include dose and sig)?: albuterol (PROVENTIL) 2.5 mg /3 mL (0.083 %) nebulizer solution 24 vial 2 10/3/2020    Sig - Route: Take 3 mL (2.5 mg total) by nebulization 4 (four) times a week. - Nebulization   Sent to pharmacy as: albuterol sulfate 2.5 mg/3 mL (0.083 %) solution for nebulization (PROVENTIL)   E-Prescribing Status: Receipt confirmed by pharmacy (10/2/2020  2:33 PM CDT)  Who prescribed the medication?: BLAKE CORCORAN  What is your question/concern?: [facility] states patient insurance Medicare Part B does not cover this medication at [facility] and also they can not transfer prescription to other pharmacy per medicare guide lines requesting to send albuterol Rx to [facility] pharmacy # [id number]  Requested Pharmacy: [facility] # [id number]  Okay to leave a detailed message?: No

## 2021-06-12 NOTE — PROGRESS NOTES
Chief Complaint   Patient presents with     Ear Pain     left ear painful and draining x5 days       HPI:    Marleny Viera is a 44 y.o. female with history of deafness, hypertension, and asthma who presents today complaining of painful left ear pain with drainage noted over the past 5 days.  She has not had fever, cough, or runny nose.  She denies sore throat and shortness of breath.  She has not had a loss of sense of taste or smell.  The patient recalls having an ear infection a long time ago and does not recall what medication she was treated with for that infection.      History obtained from the patient.  As and  is not available, communicated with the patient in writing.     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  2016-02: Insomnia  2016-02: Essential hypertension  2016-01: Status post total right knee replacement  2015-12: Screening for malignant neoplasm of cervix  2015-06: Headache disorder  2015-05: Dizziness  2014-08: Atypical chest pain  2014-08: Acute bronchitis  2014-04: Migraine  2012-09: Personal history of PE (pulmonary embolism)  2012-09: Post op anticoagulation care plan (if needed)  2012-07: 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  2012-02: Fatty liver  2004-10: Drug abuse, nondependent (H)  2003-02: Alcohol dependence (H)  Asthma, moderate persistent, uncomplicated  Abdominal pain, epigastric  Hypokalemia  Uterine leiomyoma, unspecified location      Past Medical History:   Diagnosis Date     ADHD (attention deficit hyperactivity disorder)       Alcohol dependence (H)      Anxiety      Arthritis      Asthma      Bipolar depression (H)      Deafness      Drug abuse, nondependent (H) 10/29/2004    Overview:  polydrug abuse per psych notes ; Other, mixed, or unspecified nondependent drug abuse, unspecified     History of blood clots      History of transfusion      Hypertension      Kidney stones 2015     Major depressive disorder, recurrent episode, severe (H) 5/17/2016    Overview:  s/p suicide attempt Epic      Migraine      Obstructive sleep apnea 6/1/2016     Pulmonary emboli (H)      Status post total right knee replacement 1/18/2016       Social History     Tobacco Use     Smoking status: Never Smoker     Smokeless tobacco: Never Used   Substance Use Topics     Alcohol use: Yes     Comment: dependence       Review of Systems   Constitutional: Negative for activity change, chills and fever.   HENT: Positive for ear discharge and ear pain. Negative for congestion, rhinorrhea and sore throat.         Left ear pain for five days with drainage noted.    Eyes: Negative for redness.   Respiratory: Negative for cough, chest tightness, shortness of breath and wheezing.    Cardiovascular: Negative for chest pain.       Vitals:    10/11/20 1143 10/11/20 1206   BP: 146/84 159/90   Patient Site: Right Arm    Patient Position: Sitting    Cuff Size: Adult Large    Pulse: 64 88   Resp: 16 22   Temp: 98.6  F (37  C)    TempSrc: Oral    SpO2: 99%    Weight: (!) 231 lb (104.8 kg)        Physical Exam  Constitutional:       General: She is not in acute distress.     Appearance: Normal appearance. She is obese. She is not ill-appearing or toxic-appearing.   HENT:      Head: Normocephalic.      Right Ear: Ear canal normal. No drainage. Tympanic membrane is not erythematous.      Left Ear: Drainage present. Tympanic membrane is injected.      Ears:      Comments: Erythema and moisture noted left ear canal.      Nose: No congestion or rhinorrhea.      Mouth/Throat:      Mouth:  Mucous membranes are moist.   Cardiovascular:      Rate and Rhythm: Normal rate and regular rhythm.      Heart sounds: Normal heart sounds, S1 normal and S2 normal.   Pulmonary:      Effort: Pulmonary effort is normal.      Breath sounds: Normal breath sounds.   Skin:     General: Skin is warm and dry.      Capillary Refill: Capillary refill takes less than 2 seconds.   Neurological:      Mental Status: She is alert.         Clinical Decision Making:    Differential diagnosis for this patient include otitis media and otitis externa.  Slight injection of the left tympanic membrane with moisture in the ear canal of the left ear.  The right ear appears clear of erythema or drainage.  I will treat the patient for otitis externa she will be treated with Ciprodex otic drops.  Patient is to follow-up in 3 days if symptoms are not resolving.  She is also to address her elevated blood pressure with her primary provider.     At the end of the encounter, I discussed results, diagnosis, medications. Discussed red flags for immediate return to clinic/ER, as well as indications for follow up if no improvement. Patient understood and agreed to plan. Patient was stable for discharge.    1. Infective otitis externa, left  ciprofloxacin-dexamethasone (CIPRODEX) otic suspension         Patient Instructions   1.  Keep ears completely dry during the time that you are on treatment. No swimming. Showering is ok, but keep ears away from the water as much as possible.   2.  Follow-up if no improvement in pain over the course the next 3 to 4 days.  Follow-up sooner if worsening symptoms such as fever develop.  3.  May take Tylenol or ibuprofen as needed for pain control.  4.  Avoid putting anything inside the ear canal such as Q-tips.

## 2021-06-12 NOTE — TELEPHONE ENCOUNTER
Orders being requested: Mammogram   Reason service is needed/diagnosis: routine check   When are orders needed by: as soon as possible   Where to send Orders: n/a  Okay to leave detailed message?  Yes

## 2021-06-12 NOTE — PROGRESS NOTES
Chief Complaint   Patient presents with     Ear Drainage / painx 1 week       ASSESSMENT & PLAN:   Diagnoses and all orders for this visit:    Infective otitis externa, bilateral        MDM:  Patient with complaints of bilateral ear pain and drainage, left greater than right.  Has improved somewhat since starting eardrops 4 days ago.    Ear exam is more or less normal with the exception of tenderness when pushing on tragus and movement of ear.  Recommended continuing eardrops.  Emphasized need to instill drops and lie with the ear up for 5 minutes then repeating on the other side.  There is no significant canal swelling that would impede the drops from getting to the back of the ear.    Tylenol 3 times daily for pain discussed.    Recheck if ear pain is still significant after total of 7 days of drops.    Supportive care discussed.  See discharge instructions below for specific recommendations given.    At the end of the encounter, I discussed results, diagnosis, medications. Discussed red flags for immediate return to clinic/ER, as well as indications for follow up if no improvement. Patient and/or caregiver understood and agreed to plan. Patient was stable for discharge.    SUBJECTIVE    HPI:  HPI  Marleny Duartein presents to the walk-in clinic with   Chief Complaint   Patient presents with     Ear Drainage / painx 1 week     Patient continues to have a lot of left ear drainage after starting Ciprodex on October 11 for otitis externa.  Please see note from October 11 for further information.    Patient does not feel like the eardrops are getting all the way to the back of her ear.    She has new right ear pain starting yesterday.  Pain in both ears is sharp.  Hurts to touch the outside of her ears.  Has pain behind both ears in her head as well.  Started using the drops in her right ear as well after it started hurting.    There has been a little drainage from the right ear as well    Patient denies sore throat,  congestion or cough.      Patient is allergic to ibuprofen.    No  is available.  Patient would like to communicate via writing.    History obtained by voice dictating questions in my phone and having patient write out answers.    Denies: Fever, cough, congestion,     See ROS for additional symptoms and/or pertinent negatives.       History obtained from the patient.    Past Medical History:   Diagnosis Date     ADHD (attention deficit hyperactivity disorder)      Alcohol dependence (H)      Anxiety      Arthritis      Asthma      Bipolar depression (H)      Deafness      Drug abuse, nondependent (H) 10/29/2004    Overview:  polydrug abuse per psych notes ; Other, mixed, or unspecified nondependent drug abuse, unspecified     History of blood clots      History of transfusion      Hypertension      Kidney stones 2015     Major depressive disorder, recurrent episode, severe (H) 5/17/2016    Overview:  s/p suicide attempt Epic      Migraine      Obstructive sleep apnea 6/1/2016     Pulmonary emboli (H)      Status post total right knee replacement 1/18/2016       Active Ambulatory (Non-Hospital) Problems    Diagnosis     Lumbar back sprain, initial encounter     Deafness     Other dysphagia     Obesity (BMI 35.0-39.9) with comorbidity (H)     Uterine leiomyoma, unspecified location     CMC DJD(carpometacarpal degenerative joint disease), localized primary     Borderline personality disorder (H)     Didelphic uterus     Asthma, moderate persistent, uncomplicated     Insomnia     Essential hypertension     Migraine     Personal history of PE (pulmonary embolism)     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   HENT: Positive for ear discharge and ear pain. Negative for congestion, postnasal drip, rhinorrhea and sore throat.    Respiratory: Negative for cough.    Allergic/Immunologic: Negative for environmental allergies.   Neurological: Positive for headaches.       OBJECTIVE    Vitals:    10/15/20 1645 10/15/20 1649   BP: 169/83 157/87   Pulse: 93    Resp: 17    Temp: 98.8  F (37.1  C)    SpO2: 100%    Weight: (!) 230 lb (104.3 kg)        Physical Exam  Constitutional:       General: She is not in acute distress.     Appearance: She is well-developed.   HENT:      Head:      Comments: Tender just posterior to her bilateral ears.  No signs of mastoiditis nor swelling.     Right Ear: External ear normal. Tenderness present. No drainage or swelling. No middle ear effusion. Tympanic membrane is not injected, erythematous or bulging.      Left Ear: External ear normal. Tenderness present. No drainage or swelling.  No middle ear effusion. Tympanic membrane is not injected, erythematous or bulging.      Nose: No congestion or rhinorrhea.   Eyes:      General:         Right eye: No discharge.         Left eye: No discharge.      Conjunctiva/sclera: Conjunctivae normal.   Pulmonary:      Effort: Pulmonary effort is normal.   Musculoskeletal: Normal range of motion.   Skin:     General: Skin is warm and dry.      Capillary Refill: Capillary refill takes less than 2 seconds.   Neurological:      Mental Status: She is alert and oriented to person, place, and time.   Psychiatric:         Mood and Affect: Mood normal.         Behavior: Behavior normal.         Thought Content: Thought content normal.         Judgment: Judgment normal.         Labs:  No results found for this or any previous visit (from the past 240 hour(s)).      Radiology:    No results found.    PATIENT INSTRUCTIONS:   There are no Patient Instructions  on file for this visit.

## 2021-06-12 NOTE — PROGRESS NOTES
Assessment/Plan:        Problem List Items Addressed This Visit        ENT/CARD/PULM/ENDO Problems    Essential hypertension, as below.    Relevant Medications    metoprolol tartrate (LOPRESSOR) 25 MG tablet    hydrOXYzine HCL (ATARAX) 25 MG tablet    Other Relevant Orders    Basic Metabolic Panel (Completed)    Asthma, moderate persistent, uncomplicated, says she gets some shortness of breath when going upstairs, states that this is worsened by allergies.  It sounds like she uses albuterol as needed.  She has a nebulizer machine at home.  She is on Zyrtec for allergies.  She uses fluticasone for allergies.  She is on Singulair.  She is not on any other medications for asthma.  Currently controlled.    Relevant Medications    fluticasone (VERAMYST) 27.5 mcg/actuation nasal spray    albuterol (PROAIR HFA;PROVENTIL HFA;VENTOLIN HFA) 90 mcg/actuation inhaler    cetirizine (ZYRTEC) 10 MG tablet    hydrOXYzine HCL (ATARAX) 25 MG tablet    montelukast (SINGULAIR) 10 mg tablet    Migraine, as described below, the patient takes four tablets a week.    Relevant Medications    rizatriptan (MAXALT) 10 MG tablet    metoprolol tartrate (LOPRESSOR) 25 MG tablet    gabapentin (NEURONTIN) 100 MG capsule      Other Visit Diagnoses     Routine general medical examination at a health care facility, not listed as medicare annual wellness, listed as an office visit and patient wanted a physical. Care gaps reviewed with the patient. Influenza and pneumonia 23 given to the patient today. Mammogram ordered 5/19/2020.  -  Primary    Bee sting reaction, accidental or unintentional, initial encounter        Relevant Medications    cetirizine (ZYRTEC) 10 MG tablet    YESICA (generalized anxiety disorder), patient wished all, of her medications refilled.  She says this helps her mood.        Relevant Medications    gabapentin (NEURONTIN) 100 MG capsule    Benign essential hypertension, blood pressure is elevated in clinic today.  Patient is on  metoprolol 75 mg twice a day, heart rate is 93.  I discussed this with the patient, and the patient wishes to continue to follow-up with her primary provider, KENNETH Diaz [facility] in [facility])    Relevant Medications    metoprolol tartrate (LOPRESSOR) 25 MG tablet    Accidental paracetamol poisoning, subsequent encounter,  I tried to discuss this with the patient but was unable to, she was seen August 30, 2020 in the emergency department, [facility], with this diagnosis. I was unable to discuss this hospitalization with the patient, and she left before receive treatment for an elevated acetaminophen level level..  She was seen once again in September 1, 2020 in the emergency department, [facility] [low back pain and black stool for the past 2 days] patient left saying that she was going to go to a different hospital. Today the visit is according to her wishes. She comes by herself to the appointment. I discussed that because of the acetaminophen level I will check her liver function today.        Relevant Orders    Hepatic Profile (Completed)    Elevated glucose, 128 when she was seen by [facility] August 30, 2020.  I discussed this with her, and that I will be checking an HbA1c result.    Relevant Orders    Glycosylated Hemoglobin A1c (Completed)    Need for 23-polyvalent pneumococcal polysaccharide vaccine        Relevant Orders    Pneumococcal polysaccharide vaccine 23-valent 3 yo or older, subq/IM (Completed)    Lipid screening        Relevant Orders    Lipid Palmer FASTING (Completed)    Need for influenza vaccination                  Subjective:    Patient ID: Marleny Viera is a 44 y.o. female.    HPI   Patient is here for an office visit, says that she needs a medication refill.  No primary care provider listed (follows with Dr. Anna Calzada).  She wants the flu shot, says she needs labs 6 monthly. Wants physical as well.  in clinic with her, and  "interpreting for her.    Asthma: May 8 COVID test neg, allergies, affect it as well. Fall and the spring. Runny nose, has been trying to use nasal spray, flonase, (not on her list). Shortness of breath on and off, on walking. Going up stairs. Not persistent cough. ON awakening has cough, then improves. August 30, went to the hospital for her asthma, CXR done, diagnosed in her 20's or childhood. Patient has a neb machine she says. Uses inhalers she says for emergencies.    Migraine: related to stress, vision sensitivity, ice, all over,  has ALS, difficult. All day, try to sleep, better on waking. 4 times a week. Takes the medication 4 times a week. November 11, 2017 ? \"Heart attack\", kidney function abnormal. No stents, no surgery.     Gabapentin is for bipolar disorder, ADHD, spilt personality disorder according to the patient. No psychiatrist currently.  she is working with and they are looking for a new psychiatrist. 100 mg two times a day. Helps with anxiety. Aterax for allergy, 50 mg at bedtime.    Metoprolol 75 mg two times a day.     I discussed with the patient, that her glucose was elevated at 128, and potassium low at 3.4 August 30, 2020.    The following portions of the patient's history were reviewed and updated as appropriate: allergies, current medications, past family history, past medical history, past social history, past surgical history and problem list.    She has a nurse who sets up her medications.  She has a CADI waiver.    Review of Systems  Cough she says in the mornings, shortness of breath with activity, which sounds like this is normal for her, and no acute exacerbation.  No chest pain.  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      /70   Pulse 74   Wt (!) 227 lb 6.4 oz (103.1 kg)   LMP 09/28/2020 (Exact Date)   SpO2 99%   BMI 37.84 kg/m  "     Objective:    Physical Exam   Not distressed.  She talks about the things that she wants from this appointment today.  She does not really wish to deal with anything else.  No thyromegaly, no cervical lymphadenopathy.  Chest is clear, no crackles, no wheezes.  Normal heart sounds, I do not hear any murmurs.  Abdomen is soft and nontender.  No peripheral edema.  Affect is good, not depressed, but also on her own mission.

## 2021-06-12 NOTE — PATIENT INSTRUCTIONS - HE
1.  Keep ears completely dry during the time that you are on treatment. No swimming. Showering is ok, but keep ears away from the water as much as possible.   2.  Follow-up if no improvement in pain over the course the next 3 to 4 days.  Follow-up sooner if worsening symptoms such as fever develop.  3.  May take Tylenol or ibuprofen as needed for pain control.  4.  Avoid putting anything inside the ear canal such as Q-tips.

## 2021-06-12 NOTE — TELEPHONE ENCOUNTER
RN cannot approve Refill Request    RN can NOT refill this medication No established PCP. Last office visit: 10/2/2020 Carolann Julien MD Last Physical: Visit date not found Last MTM visit: Visit date not found Last visit same specialty: 10/2/2020 Carolann Julien MD.  Next visit within 3 mo: Visit date not found  Next physical within 3 mo: Visit date not found      Salud Diego, [facility] Triage/Med Refill 10/25/2020    Requested Prescriptions   Pending Prescriptions Disp Refills     albuterol (PROAIR HFA;PROVENTIL HFA;VENTOLIN HFA) 90 mcg/actuation inhaler [Pharmacy Med Name: ALBUTEROL SULFATE HFA 90MCG] 18 g 0     Sig: INHALE 2 PUFFS BY MOUTH EVERY FOUR HOURS AS NEEDED FOR WHEEZING OR SHORTNESS OF BREATH       Albuterol/Levalbuterol Refill Protocol Passed - 10/23/2020  4:07 PM        Passed - PCP or prescribing provider visit in last year     Last office visit with prescriber/PCP: 10/2/2020 Carolann Julien MD OR same dept: 10/2/2020 Carolann Julien MD OR same specialty: 10/2/2020 Carolann Julien MD Last physical: Visit date not found       Next appt within 3 mo: Visit date not found  Next physical within 3 mo: Visit date not found  Prescriber OR PCP: Carolann Julien MD  Last diagnosis associated with med order: 1. Asthma, moderate persistent, uncomplicated  - albuterol (PROAIR HFA;PROVENTIL HFA;VENTOLIN HFA) 90 mcg/actuation inhaler [Pharmacy Med Name: ALBUTEROL SULFATE HFA 90MCG]; INHALE 2 PUFFS BY MOUTH EVERY FOUR HOURS AS NEEDED FOR WHEEZING OR SHORTNESS OF BREATH  Dispense: 18 g; Refill: 0    If protocol passes may refill for 6 months if within 3 months of last provider visit (or a total of 9 months). If patient requesting >1 inhaler per month refill x 6 months and have patient make appointment with provider.

## 2021-06-13 NOTE — TELEPHONE ENCOUNTER
Who is calling:  patient  Reason for Call:  Has questions regarding covid vaccines. Wondering if clinic is offering it yet she would like to schedule for herself and spouse.  Date of last appointment with primary care: na  Okay to leave a detailed message: Yes

## 2021-06-13 NOTE — TELEPHONE ENCOUNTER
Orders being requested: Scale with LED light  Reason service is needed/diagnosis: weighing  When are orders needed by: soon  Where to send Orders: dme  Okay to leave detailed message?  Yes

## 2021-06-13 NOTE — TELEPHONE ENCOUNTER
I've signed for a different formulation of Albuterol. I hope this is less expensive.    Carolann Julien MD

## 2021-06-13 NOTE — TELEPHONE ENCOUNTER
Medication Question or Clarification  Who is calling: Patient  What medication are you calling about (include dose and sig)?:   albuterol (PROVENTIL) 2.5 mg /3 mL (0.083 %) nebulizer solution 24 vial 2 10/3/2020     Sig - Route: Take 3 mL (2.5 mg total) by nebulization 4 (four) times a week. - Nebulization    Sent to pharmacy as: albuterol sulfate 2.5 mg/3 mL (0.083 %) solution for nebulization (PROVENTIL)        Who prescribed the medication?: Dr Julien  What is your question/concern?: States this medication cost's too much and was wondering if she can have a lower cost alternative sent to [facility].  Requested Pharmacy: [facility]  Okay to leave a detailed message?: Yes

## 2021-06-13 NOTE — TELEPHONE ENCOUNTER
I contacted patient via .  She is stating she wants scale due to new diagnosis of diabetes.   I explained to her that insurance will not cover a scale based on diabetes diagnosis.      Patient upset as she was able to get bp monitor due to her hypertension.    I tried to explain the reasoning, however, patient hung up.  Magali PURCELL CMA

## 2021-06-13 NOTE — TELEPHONE ENCOUNTER
Please send in rx to [facility].  It appears albuterol (proventil)  is the one she called saying was too expensive.  Magali PURCELL, CMA

## 2021-06-14 NOTE — TELEPHONE ENCOUNTER
Reason for call:  Patient reporting a symptom    Symptom or request: Heart papatations and General fatigue    Duration (how long have symptoms been present): n/a    Have you been treated for this before? No    Additional comments: pt scheduled through [facility] with luis on 07/26. Please triage symptoms    Phone Number patient can be reached at:  [phone]    Best Time:  ANY    Can we leave a detailed message on this number:  YES    Call taken on 7/19/2018 at 8:44 AM by Laurence Antonio     Triage Call:  Pt is calling with a . Pt received a letter in the mail from the [facility] department stating to make an appointment for her biopsy, pt already has an appointment made so she was confused as to why she also got a letter. Letter states if she has not already made an appointment to do so. Pt question clarified and pt understands.   Alicia Messer, RN Nurse Triage 12/26/2020 4:49 PM     Additional Information    Health Information question, no triage required and triager able to answer question    Protocols used: INFORMATION ONLY CALL-A-

## 2021-06-14 NOTE — TELEPHONE ENCOUNTER
Made patient a follow up appointment and mailed her an appointment card with her instructions.   Prescription signed for albuterol, with 5 refills.    Carolann Julien MD

## 2021-06-14 NOTE — TELEPHONE ENCOUNTER
RN cannot approve Refill Request    RN can NOT refill this medication Protocol failed and NO refill given. Last office visit: Visit date not found Last Physical: Visit date not found Last MTM visit: Visit date not found Last visit same specialty: 2/28/2020 Anna Calzada PA-C.  Next visit within 3 mo: Visit date not found  Next physical within 3 mo: Visit date not found      Salud Diego, [facility] Triage/Med Refill 1/2/2021    Requested Prescriptions   Pending Prescriptions Disp Refills     blood pressure monitor Kit 1 each 0     Sig: Check blood pressure once in am; should be 130/80 or less. Pulse should be greater than 50.       There is no refill protocol information for this order

## 2021-06-14 NOTE — TELEPHONE ENCOUNTER
"Patient calling - with  #[phone] on the line.  Says she went to clinic yesterday - for severe ear pain and was sent to the ED.  She says she was evaluated in the ED and sent home.    She continues to feel the \"pinching\" pain in her ear and about an hour ago she developed new symptoms.  Now the right side of her face, neck and arm are numb.    Triaged to disposition of Call 911 Now.    Joyce Nava RN  Triage Nurse Advisor    COVID 19 Nurse Triage Plan/Patient Instructions    Please be aware that novel coronavirus (COVID-19) may be circulating in the community. If you develop symptoms such as fever, cough, or SOB or if you have concerns about the presence of another infection including coronavirus (COVID-19), please contact your health care provider or visit [email or web address].     Disposition/Instructions    Call to EMS/911 recommended. Follow protocol based instructions.    Bring Your Own Device:  Please also bring your smart device(s) (smart phones, tablets, laptops) and their charging cables for your personal use and to communicate with your care team during your visit.      Thank you for taking steps to prevent the spread of this virus.  o Limit your contact with others.  o Wear a simple mask to cover your cough.  o Wash your hands well and often.    Resources    [facility]: About COVID-19: [email or web address]    CDC: What to Do If You're Sick: www.cdc.gov/coronavirus/2019-ncov/about/steps-when-sick.html    CDC: Ending Home Isolation: www.cdc.gov/coronavirus/2019-ncov/hcp/disposition-in-home-patients.html     CDC: Caring for Someone: www.cdc.gov/coronavirus/2019-ncov/if-you-are-sick/care-for-someone.html     [facility]: Interim Guidance for Hospital Discharge to Home: www.health.[facility].[place]/diseases/coronavirus/hcp/hospdischarge.pdf    [facility] clinical trials (COVID-19 research studies): clinicalaffairs.[facility]/umn-clinical-trials     Below are the COVID-19 " hotlines at the [place] Department of [facility] ([facility]). Interpreters are available.   o For health questions: Call 644-066-1402 or 1-661.427.8214 (7 a.m. to 7 p.m.)  o For questions about schools and childcare: Call 776-169-1125 or 1-725.991.5721 (7 a.m. to 7 p.m.)       Reason for Disposition    [1] Numbness (i.e., loss of sensation) of the face, arm / hand, or leg / foot on one side of the body AND [2] sudden onset AND [3] present now    Protocols used: NEUROLOGIC DEFICIT-A-AH

## 2021-06-14 NOTE — TELEPHONE ENCOUNTER
From chart review, it appears the patient has tried trazodone in the past.  I can give her a few days of trazodone to help with sleep.  Alternatively, melatonin can be obtained over-the-counter at a very low cost.  Thank you

## 2021-06-14 NOTE — TELEPHONE ENCOUNTER
Patient calling , and she is describing having little 'pins' 3 of them that have crawled out of her ear. She reports being seen in the ER twice for this, and having an MRI.  She reports she will not go back to the ER again.  She wants s nurse or doctor to come to her home and states, 'this is an emergency, this is an emergency , over and over.    Patient was advised to make a telephone appointment with Dr. Julien.   translater states she sees the patient on videao screen , and she is just rocking back and forth in a chair while we are communicating.    Call was sent to PSR to schedule an appointment.by telephone today.    Lupe Llamas RN  [facility] Triage/refill nurse    Reason for Disposition    Caller is unable to remove the foreign body    All other foreign bodies in ear canal    Protocols used: EAR - FOREIGN BODY-A-OH

## 2021-06-14 NOTE — PROGRESS NOTES
Marleny Viera is a 45 y.o. female who is being evaluated via a billable telephone visit.    EDF [facility] 1/25, [facility] 1/27 pain in right ear, right side of face - ear drops for right ear, thought she saw small pinworms on cotton ball when she removed it.. [video relay    [phone]]  What phone number would you like to be contacted at? [phone]  How would you like to obtain your AVS? AVS Preference: MyChart.  Assessment & Plan     Acute otitis externa of right ear, unspecified type  Insomnia due to medical condition  Reviewed hospital/ED visits with the patient and counseled her on the treatment plan.  Recommended to abstain from using Q-tips as that may defeat the purpose of the topical eardrops.  She will complete the course of cephalexin and Cortisporin.  Recommended probiotics and/or yogurt in the setting of taking the cephalexin for her loose stools.  If symptoms do not improve, including possible cellulitis around the periauricular area, she will touch base with her PCP.  I will forward note to her PCP.  Recommended to take 5-10 mg of melatonin to help with sleep.  She can use acetaminophen for pain and will keep current gabapentin dose.  She believes that she has kidney dysfunction, hence reason for the low gabapentin dose.  I recommended reassured her that she does have known CKD.  Also recommended she upload a picture of the ?Pinworm on mychart If she notices another one on the cottonball  - melatonin 5 mg Tab tablet  Dispense: 30 tablet; Refill: 0    Essential hypertension  Prediabetes  6.2% in October 2020, and is slowly rising.  Blood pressure in ambulatory setting also above goal.  Reassured her that elevated blood pressures during the ED visits are likely secondary to pain and anxiety/stress regarding the issue with her ear.  Counseled patient regarding increased risk of developing diabetes and to speak with PCP regarding when to next check A1c and how to address her elevated blood  "pressures.    Review of external notes as documented above      27 minutes spent on the date of the encounter doing chart review, history and exam, documentation and further activities as noted above     BMI:   Estimated body mass index is 38.44 kg/m  as calculated from the following:    Height as of 1/27/21: 5' 5\" (1.651 m).    Weight as of 1/27/21: 231 lb (104.8 kg).     Return if symptoms worsen or fail to improve.    Joseph Singh MD  [facility]    Subjective   Marleny Viera is 45 y.o. and presents to clinic today for the following health issues   HPI   was used for this telephone visit. Seen at [facility] yesterday for right facial numbness and ear pain. Affected area actually noted to be along the right occipital region and the right cheek/zygomatic arch area/mid face.  Had been seen at [facility] the day before where she underwent a CT scan of the neck that was negative for any abnormalities.  Over the 1/25/2021 01/27/2021 work-up, MRI and MRA head, CT soft tissue neck, CT head, BMP, troponin, CBC, PTT/INR, was unremarkable.  Discharged on cephalexin 500 mg 4 times daily x7 days and Cortisporin drops 3 times daily x7 days (for otitis externa).  Also administered dose of Decadron.  Notes that she went to sleep after after using the eardrops and then putting a cotton ball in her ear She slept okay however when she took the cottonball out this morning, she noted there was a pinworm in the air. Then put a Q-tip in the ear, and that she did not see anything. She endorses still taking the cephalexin.  Her main issue is that she cannot sleep due to the discomfort.      Review of Systems  ROS A focused review of systems was performed and was otherwise negative      Objective     Vitals:  No vitals were obtained today due to virtual visit.    Physical Exam  N/A    I spent a total of 27 minutes face-to-face with Marlenytonie Duartein during today's office " visit.     Phone call duration: 27 minutes

## 2021-06-14 NOTE — TELEPHONE ENCOUNTER
Refill Approved    Rx renewed per Medication Renewal Policy. Medication was last renewed on 8/6/20.    Indiana Feldman, [facility] Triage/Med Refill 1/4/2021     Requested Prescriptions   Pending Prescriptions Disp Refills     EPINEPHrine (EPIPEN/ADRENACLICK/AUVI-Q) 0.3 mg/0.3 mL injection 2 Pre-filled Pen Syringe 0     Sig: Inject 0.3 mL (0.3 mg total) as directed as needed.       Epinephrine (Bee Sting) Kit Refill Protocol Passed - 1/2/2021 10:31 AM        Passed - Patient to get 0.3mg dose (adult kit)          Passed - Patient has had office visit/physical in last 1 year     Last office visit with prescriber/PCP: Visit date not found OR same dept: 2/28/2020 Anna Calzada PA-C OR same specialty: 2/28/2020 Anna Calzada PA-C  Last physical: Visit date not found Last MTM visit: Visit date not found   Next visit within 3 mo: Visit date not found  Next physical within 3 mo: Visit date not found  Prescriber OR PCP: Eve Sharp, PEBBLSE  Last diagnosis associated with med order: 1. H/O bee sting allergy  - EPINEPHrine (EPIPEN/ADRENACLICK/AUVI-Q) 0.3 mg/0.3 mL injection; Inject 0.3 mL (0.3 mg total) as directed as needed.  Dispense: 2 Pre-filled Pen Syringe; Refill: 0    If protocol passes may refill for 12 months if within 3 months of last provider visit (or a total of 15 months).

## 2021-06-14 NOTE — TELEPHONE ENCOUNTER
Reason for Call:  Other      Detailed comments: Patient seen this morning and prescribed melatonin 5 mg, the pharmacy called patient and stated that the insurance does not cover this prescription.    Phone Number Patient can be reached at: Home number on file [phone] (home)  This will go directly thru a relay service    Best Time: any    Can we leave a detailed message on this number?: Yes    Call taken on 1/28/2021 at 9:57 AM by Laura L Goldberg

## 2021-06-14 NOTE — TELEPHONE ENCOUNTER
and patient calling.  Complains of diarrhea and back pain for past 2 weeks.  Also having headaches and feels worn down.  Is having episodes of diarrhea every 15-30 minutes and diarrhea is green, orange.  Back pain is on spine area and is constant.  Rates pain 9 out of 10 and OTC medication provides no relief.  Offered UC, ED, office visit.  Patient prefers same day office visit.    Piedad Franco RN  [facility] Triage Nurse Advisor    Reason for Disposition    [1] SEVERE diarrhea (e.g., 7 or more times / day more than normal) AND [2] present > 24 hours (1 day)    SEVERE back pain (e.g., excruciating, unable to do any normal activities) and not improved after pain medicine and CARE ADVICE    Additional Information    Negative: Shock suspected (e.g., cold/pale/clammy skin, too weak to stand, low BP, rapid pulse)    Negative: Difficult to awaken or acting confused (e.g., disoriented, slurred speech)    Negative: Sounds like a life-threatening emergency to the triager    Negative: Vomiting also present and worse than the diarrhea    Negative: [1] Blood in stool AND [2] without diarrhea    Negative: Diarrhea in a cancer patient who is currently (or recently) receiving chemotherapy or radiation therapy, or cancer patient who has metastatic or end-stage cancer and is receiving palliative care    Negative: [1] SEVERE abdominal pain (e.g., excruciating) AND [2] present > 1 hour    Negative: [1] SEVERE abdominal pain AND [2] age > 60    Negative: [1] Blood in the stool AND [2] moderate or large amount of blood    Negative: Black or tarry bowel movements  (Exception: chronic-unchanged  black-grey bowel movements AND is taking iron pills or Pepto-bismol)    Negative: [1] Drinking very little AND [2] dehydration suspected (e.g., no urine > 12 hours, very dry mouth, very lightheaded)    Negative: Patient sounds very sick or weak to the triager    Negative: [1] SEVERE diarrhea (e.g., 7 or more times / day  more than normal) AND [2] age > 60 years    Negative: [1] Constant abdominal pain AND [2] present > 2 hours    Negative: [1] Fever > 103 F (39.4 C) AND [2] not able to get the fever down using Fever Care Advice    Negative: Fever > 100.5 F (38.1 C) and flank pain    Negative: Pain or burning with passing urine (urination)    Negative: Pain radiates into groin, scrotum    Negative: Blood in urine (red, pink, or tea-colored)    Negative: Vomiting and pain over lower ribs of back (i.e., flank - kidney area)    Negative: Weakness of a leg or foot (e.g., unable to bear weight, dragging foot)    Negative: Patient sounds very sick or weak to the triager    Negative: SEVERE back pain of sudden onset and age > 60    Negative: SEVERE abdominal pain (e.g., excruciating)    Negative: Abdominal pain and age > 60    Negative: Unable to urinate (or only a few drops) and bladder feels very full    Negative: Loss of bladder or bowel control (urine or bowel incontinence; wetting self, leaking stool) of new onset    Negative: Numbness (loss of sensation) in groin or rectal area    Negative: Major injury to the back (e.g., MVA, fall > 10 feet or 3 meters, penetrating injury, etc.)    Negative: Pain in the upper back over the ribs (rib cage) that radiates (travels) into the chest    Negative: Pain in the upper back over the ribs (rib cage) and worsened by coughing (or clearly increases with breathing)    Negative: Passed out (i.e., fainted, collapsed and was not responding)    Negative: Shock suspected (e.g., cold/pale/clammy skin, too weak to stand, low BP, rapid pulse)    Negative: Sounds like a life-threatening emergency to the triager    Protocols used: DIARRHEA-A-AH, BACK PAIN-A-OH

## 2021-06-14 NOTE — TELEPHONE ENCOUNTER
/2021      Marleny KENNETH Maximiliano  [address]  [place] [address]          2019-nCOV (no units)   Date Value   01/16/2021     Test received-See reflex to IDDL test SARS CoV2 (COVID-19) Virus RT-PCR        SARS-CoV-2 PCR Result (no units)   Date Value   01/16/2021 NEGATIVE     SARS-COV-2 PCR COMMENT (no units)   Date Value   01/16/2021     Testing was performed using the [employer]pt[employer] SARS-CoV-2 Assay on the [place]       This letter provides a written record that you were tested for COVID-19.    Your result was negative. This means that we didn t find the virus that causes COVID-19 in your sample. A test may show negative when you do actually have the virus. This can happen when the virus is in the early stages of infection, before you feel illness symptoms.    If you have symptoms   Stay home and away from others (self-isolate) until you meet ALL of the guidelines below:    You ve had no fever--and no medicine that reduces fever--for 1 full day (24 hours). And      Your other symptoms have gotten better. For example, your cough or breathing has improved. And     At least 10 days have passed since your symptoms started. (If you've been told by a doctor that you have a weak immune system, wait 20 days.)    During this time:    Stay home. Don t go to work, school or anywhere else.     Stay in your own room, including for meals. Use your own bathroom if you can.    Stay away from others in your home. No hugging, kissing or shaking hands. No visitors.    Clean  high touch  surfaces often (doorknobs, counters, handles, etc.). Use a household cleaning spray or wipes. You can find a full list on the EPA website at www.epa.gov/pesticide-registration/list-n-disinfectants-use-against-sars-cov-2.    Cover your mouth and nose with a mask or other face covering to avoid spreading germs.    Wash your hands and face often with soap and water.    Going back to work  Check with your employer for any guidelines to follow for going back to  work.    Employers: This document serves as formal notice that your employee tested negative for COVID-19, as of the testing date shown above.

## 2021-06-14 NOTE — PROGRESS NOTES
COVID-19 PCR test completed. Patient handout For Patients Who Have Been Tested for Covid-19 (Coronavirus) was given to the patient, which includes test result notification process.  
16

## 2021-06-14 NOTE — TELEPHONE ENCOUNTER
She had a sign language interpretor on the call. She has pain in the back of her head and ear pinching discomfort. She took a picture of the back of her head and didn't find any redness. She said she did have an ear infection about one month ago. I connected them both to scheduling for an appointment and told them if they can't get an appointment urgent care or the ER is appropriate.  Dasia Doyle RN  [facility]      Reason for Disposition    Severe earache pain     Pinching or pain at 8    Additional Information    Ear pain is the main symptom    Negative: Sounds like a life-threatening emergency to the triager    Negative: Moving the earlobe or touching the ear clearly increases the pain    Negative: Pink or red swelling behind the ear    Negative: Stiff neck (can't touch chin to chest)    Negative: Patient sounds very sick or weak to the triager    Protocols used: EARACHE-A-OH, EAR - CONGESTION-A-OH

## 2021-06-14 NOTE — PROGRESS NOTES
Assessment & Plan     Marleny was seen today for pain.    Diagnoses and all orders for this visit:    Other headache syndrome      Neck pain      Right ear pain        I originally planned on sending the patient for a stat CT scan of her head neck, but it is late, and I decided that it would be better for her to go to the emergency department and be seen.  She needs a hearing .  She has had severe right-sided occipital head pain, and right angle of the jaw pain with radiation to her chin on the right-hand side.  Movement makes this pain worse.  No fever.  She also has pain in her right ear.  She was treated for acute otitis media in October 2020.  I am concerned that she has something bad, possibly ear infection, mastoiditis, with local effects in her brain.    I discussed with ED physician and patient will go to the ED at [facility] now.       Return in about 1 week (around 2/1/2021). This appointment wasn't scheduled.     Carolann Julien MD  [facility]     Marleny Viera is 45 y.o. and presents to clinic today for the following health issues     HPI   Patient is here for an office visit. The hearing  had to leave.  She has had 3 days of very severe pain involving her head, the right-hand side, over the occipital area.  She also has pain involving the angle of her right shoulder, with pain radiating to her chin.  Pain in her right ear.  She was treated with eardrops and antibiotics for otitis media, October 2020.  These treatments helped the pain, but this is returned.  She does not have a fever.  No sore throat, no cough.    I discussed with the patient, that I would send her for an urgent CT scan of her head and neck.  However it is 4 PM, and she is unlikely to get this done urgently.  I spoke with [facility] emergency department, and the patient will go across to them today.    Review of Systems  No cough, no fever, no sore  throat.      Objective    /82 (Patient Site: Right Arm, Patient Position: Sitting, Cuff Size: Adult Large)   Pulse 65   Temp 98.5  F (36.9  C) (Oral)   Wt (!) 232 lb 1.6 oz (105.3 kg)   SpO2 99%   BMI 38.62 kg/m    Body mass index is 38.62 kg/m .     Physical Exam  In pain, pale. Swelling and tenderness at the angle of her jaw on the right, may have lymphadenopathy in this area. Right ear looks okay (eardrum), except for the canal which appears narrowed. Left eardrum and ear canal normal. Oropharynx appears normal. RUBY. Patient has a normal gait. Patient is alert and able to answer questions. Hearing impaired  interpreting for her.  No respiratory distress.

## 2021-06-15 NOTE — PROGRESS NOTES
"    Assessment & Plan     Marleny was seen today for medication refill and asthma follow up.    Diagnoses and all orders for this visit:    Essential hypertension, blood pressure is controlled.  Heart rate is 83.  Continue with metoprolol 75 mg twice a day.    Asthma, moderate persistent, uncomplicated (no acute exacerbation today) patient says she is on an inhaler that she takes regularly twice a day, but there is none on her list. Just albuterol which is prn. Albuterol neb which is also prn. She says she has not had to use the albuterol nebulizer or albuterol inhaler for the whole month of February.  Review past medications, budesonide-formoterol [160-4.5] 2 puffs twice a day is on her list, however this was discontinued in March 2019, and the reason listed is expense.  Also fluticasone Solu-Medrol 113-14, 1 puff twice a day, discontinued August 2019.  Breo Ellipta 100-25 1 daily, discontinued 5/2020.  I'm going to start this again. I may ask for a pharm D visit.    Obesity (BMI 35.0-39.9) with comorbidity (H)    Bilateral deafness, needs hearing .    Prediabetes, discussed with patient at future visit.  HbA1c 6.2% October 2020.    Sleep apnea, fatigue, drowsiness during the day.  Patient does not wish to be referred to the sleep clinic, for other care for her sleep apnea.      Prediabetes  A. HBA1C 6.2% 10/2/2021. No time today to discuss. Patient needed to leave.    P. Will discuss with her at future visits.        30 minutes spent on the date of the encounter doing chart review, review of test results, patient visit and documentation        BMI:   Estimated body mass index is 39.36 kg/m  as calculated from the following:    Height as of this encounter: 5' 5\" (1.651 m).    Weight as of this encounter: 236 lb 8 oz (107.3 kg).     {Provider Documentation   No follow-ups on file.    Carolann Julien MD  [facility]    Subjective   Marleny Viera is 45 y.o. and " presents today for the following health issues     HPI   Patient needs a hearing interpretor.     Patient needed to rush off to relieve the PCA (her  has ALS).     She says she has drowsiness.  When I discussed this with her further.  She says that she is not getting enough sleep at night.  She is not using CPAP as this is not comfortable.  Mask is an issue.  I discussed with her that there are other ways of doing the CPAP, such as with a nose connection.  I offered to refer the patient back to the sleep clinic, however patient is not interested in this.  She saw something on Facebook, where she could just touch her button and this would help her sleep apnea.  I am not sure of what she is referring to.    Patient says that she had a was having a biopsy, sounds like this was a breast biopsy, and she had to stop this because she could not breathe.  She is concerned however that they could be a problem that has not been dealt with.    Patient says that she is anxious all day.  She describes PTSD.  She asks about something for sleep, even though I discussed with her that if she is not treating sleep apnea, medications not really going to help that.  She has been taking hydroxyzine 50 mg at bedtime.  She says that she is not on any melatonin.  Discussed that she can use melatonin, and see what happens with her sleep.    For her asthma.  She says that she is on a steroid inhaler [not on her list], which she takes 1 puff twice a day.  She has a nebulizer, but has not had to use this, during February.  She also has an inhaler, however she has not had to use this during February.  Today when I ask about the fluticasone, which is a nasal spray, she says she is not using anything like this.  She is on Zyrtec 10 mg daily.  Singular 10 mg daily.    She came requesting medicine refills, however I am not sure which medications she needs refilling    Blood pressure is controlled, patient is on metoprolol 75 mg twice a day.   "Heart rate is 83.    On her list is generalized anxiety disorder.  Patient says she has PTSD.  Previously has said that the gabapentin helps her mood.  She is on gabapentin 100 mg twice a day.    History of elevated glucose.  Recently glucose was elevated at 121, January 27, 2021.  HbA1c 6.2% October 2, 2020.  This is not discussed with her today, diagnosis of prediabetes.      Review of Systems  Rest of her review of systems is negative.      Objective    /80   Pulse 83   Ht 5' 5\" (1.651 m)   Wt (!) 236 lb 8 oz (107.3 kg)   SpO2 99%   BMI 39.36 kg/m    Body mass index is 39.36 kg/m .  Physical Exam   Patient is completely deaf.  Hearing  is for her.  Patient is not in respiratory distress.  She is walking without an assistive device.  Her chest is completely clear, no crackles, no wheezes.  Air entry is good bilaterally.  No peripheral edema.          "

## 2021-06-15 NOTE — TELEPHONE ENCOUNTER
Called pt to help schedule - phone rang without the option to leave VM - please try again     ALTGARACIA King  8:34 AM ........ 3/11/2021

## 2021-06-15 NOTE — TELEPHONE ENCOUNTER
Pt has an appointment on 3/17 for a AWV. Thanks.    ----- Message from Carolann Julien MD sent at 3/3/2021 10:30 PM CST -----  Please schedule an appointment with me, and ask the patient to bring in her inhalers she is using. Thank you, Carolann Julien MD

## 2021-06-15 NOTE — TELEPHONE ENCOUNTER
New Appointment Needed  What is the reason for the visit:    Pre-Op Appt Request  When is the surgery? :  04.15.21  Where is the surgery?:   [facility]   Who is the surgeon? :  Dr Simons   What type of surgery is being done?: carpal tunnel and another procedure  Provider Preference: PCP only  How soon do you need to be seen?: last week of March   Waitlist offered?: No  Okay to leave a detailed message:  Yes

## 2021-06-15 NOTE — TELEPHONE ENCOUNTER
Received MTM referral from clinic     Patient was not reachable after several attempts, will route to MTM Pharmacist/Provider as an FYI. Left MTM scheduling information on patients voicemail.    Thank you for the referral,    Christophe Whitaker, MTM coordinator

## 2021-06-16 PROBLEM — H91.93 BILATERAL DEAFNESS: Status: ACTIVE | Noted: 2019-08-01

## 2021-06-16 PROBLEM — G47.33 OBSTRUCTIVE SLEEP APNEA SYNDROME: Status: ACTIVE | Noted: 2021-03-03

## 2021-06-16 PROBLEM — E66.01 MORBID OBESITY (H): Status: ACTIVE | Noted: 2019-03-08

## 2021-06-16 PROBLEM — R73.03 PREDIABETES: Status: ACTIVE | Noted: 2021-03-03

## 2021-06-16 PROBLEM — R13.19 OTHER DYSPHAGIA: Status: ACTIVE | Noted: 2019-06-10

## 2021-06-16 PROBLEM — S33.5XXA LUMBAR BACK SPRAIN, INITIAL ENCOUNTER: Status: ACTIVE | Noted: 2019-11-15

## 2021-06-16 NOTE — TELEPHONE ENCOUNTER
The patient was given the below clinician message and stated that she would like to be referred to the allergy clinic. Referral pended.

## 2021-06-16 NOTE — TELEPHONE ENCOUNTER
Patient called back stating that the reaction she had after the intial covid vaccine  was that she slept for 16 hours and could not get out of bed.  Concerns regarding this because her  has ALS and was unable to assist her in any way.

## 2021-06-16 NOTE — TELEPHONE ENCOUNTER
I don't know if her second vaccine should be given or not.     Does she want me to send her to Allergy clinic?    Carolann Julien MD

## 2021-06-16 NOTE — TELEPHONE ENCOUNTER
"Thank you for this information. The patient was not able to tell me yesterday what was specifically needed. Do I also place a referral to speech pathology or just need to write that she needs a speech pathology evaluation in the letter.    \"UbiDuo 3 SGD communication device letter  Needs to be re- written & include:  to see a speech language pathologist for a SGD evaluation & include the H dx code (typically its H91.93)  This is specialized evaluation and if the letter doesn't specifically say this information patient wont get evaluated   FAX: [phone]  ATTN: ANTOINE/ Brianna\"    I have rewritten the letter.     Carolann Julien MD      "

## 2021-06-16 NOTE — TELEPHONE ENCOUNTER
Called pt. See note from COVID vaccine today. Pt still has redness and warmth in neck and jaw, arm soreness, temp of 99.7 and has done 2 neb treatments. Still feels a little short of breath. 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 Pt would like to know what she can do at home for treatment.     Also she is wondering if she should even get her second covid vaccine. Thanks.

## 2021-06-16 NOTE — TELEPHONE ENCOUNTER
Opened in error. Request from staff to reach out to patient.See charting in Nurse Triage note from yesterday 4/20/21. Patient returned call to clinic from staff. Will hold off on contacting at this time.    Karina Wheatley RN, BSN Nurse Triage Advisor 10:01 AM 4/21/2021

## 2021-06-16 NOTE — TELEPHONE ENCOUNTER
Marleny called back, relayed provider message, she acknowledged understanding.  She is wondering if she needs to have more Allergy testing done?  She is scheduled for her 2nd COVID vaccine on 5/11

## 2021-06-16 NOTE — TELEPHONE ENCOUNTER
Pt called back with  ID sign language [id number],    Pt wants to know why the Nurse hung up on her. Pt said that the nurse was really rude and hung up on her.     Pt would like a follow up call.

## 2021-06-16 NOTE — TELEPHONE ENCOUNTER
I have signed the allergy clinic referral.    I think we should avoid giving her the second COVID shot, as it is not clear what happened and I am concerned about giving her another one.    Thank you.    Carolann Julien MD

## 2021-06-16 NOTE — TELEPHONE ENCOUNTER
4-14-21  Reason for Call:  Letter is being requested     Detailed comments: Brianna called (pt ) stated the letter that was written 4-13-21 for  UbiDuo 3 SGD communication device letter  Needs to be re- written & include:  to see a speech language pathologist for a SGD evaluation & include the H dx code (typically its H91.93)  This is specialized evaluation and if the letter doesn't specifically say this information patient wont get evaluated   FAX: [phone]  ATTN: ANTOINE/ Brianna    Phone Number Patient can be reached at: Other phone number:  [phone]    Best Time: anytime    Can we leave a detailed message on this number?: Yes    Call taken on 4/14/2021 at 12:04 PM by Екатерина Jackson

## 2021-06-16 NOTE — TELEPHONE ENCOUNTER
"RN Triage:    ID sign language [id number] with [employer].     Patient is calling in \"stated that I already explained everything to the care team\". Writer asked if she was calling the clinic back. Patient replied \"oh dear god \" and the call was ended.    Darlin José RN, BSN Care Connection Triage Nurse      Additional Information    Caller hangs up    Protocols used: DIFFICULT CALLER-A-AH      "

## 2021-06-16 NOTE — TELEPHONE ENCOUNTER
Reason for Call:  Other      Detailed comments: [employer] received a message from patient asking them to follow up with provider if any additional information regarding the process or getting the device is needed.  The order for the SGD evaluation.  A fax was sent to you for getting the referral.  Joyce Tran [employer] [phone]        Call taken on 4/13/2021 at 4:38 PM by Laura L Goldberg

## 2021-06-16 NOTE — TELEPHONE ENCOUNTER
We will just have to wait and see what happens, over the next couple of days to few days.  In terms of her shortness of breath, she can use nebulizer treatments, every 4 hours as needed.  However if she has worsening problems with breathing, I would suggest that she go to the emergency room.    I am not sure at this time about the second Covid vaccine.  We have some time to figure that out.    Carolann Julien MD

## 2021-06-16 NOTE — TELEPHONE ENCOUNTER
The patient was informed of the clinician message and verbalized understanding. COVID 2nd shot appointment cancelled.     The patient inquired about never receiving her Symbicort prescribed at her appointment on 3/3/21. The pharmacy was closed and will be open again tomorrow from 8 AM to 4:30 PM. The patient was informed that we will work on this and let her know the outcome in the morning.     Update: The pharmacy stated that 2 medications including the budesonide (Symbicort) inhaler were delivered to the patient on 3/4/21. The patient was informed of this and she stated that she did not receive this delivery. She stated that she will follow up with the pharmacy.

## 2021-06-16 NOTE — TELEPHONE ENCOUNTER
Reason for Call:  Other appointment     Detailed comments: Pt is scheduled for preop with Bethany at 1pm today. Her  went to the hospital this morning, hospital is calling her to go there. Pt doesn't want to cancel her appt. Wants to speak with someone about  appt.     Phone Number Patient can be reached at: Home number on file [phone] (home)    Best Time: anytime    Can we leave a detailed message on this number?: No    Call taken on 3/22/2021 at 9:45 AM by Barbara Reynoso

## 2021-06-16 NOTE — TELEPHONE ENCOUNTER
4-21-21   Marleny called back with Sign language interpretor, pt stated she has 2 added questions for the Nurse she just spoke to, I asked if it was regarding Allergy or Covid patient & interpretor were getting miss communication pt stated she is having a reaction and I tried to understand and get clarification if the question was related to Allergy or Covid Vaccine, pt ended up disconnectingthe call, sign language did call the pt back and went to v-mail I LM to call [facility] @ [phone].  I called RN triage line to call pt back to assist  elizabeth

## 2021-06-16 NOTE — TELEPHONE ENCOUNTER
Pt said she spoke with Naima about the covid vaccine and was transferred to the nurse line who was rude and got hung up on.

## 2021-06-16 NOTE — PROGRESS NOTES
"  Assessment & Plan     Marleny was seen today for letter for school/work.    Diagnoses and all orders for this visit:    Bilateral deafness, patient has come today wanting a letter to say that she needs a communication device listed below..  Letter is written and given to the patient.    Asthma, moderate persistent, uncomplicated, I would like to review this at future visits.  Patient is using a nebulizer machine, 3-4 times a day, several times a week.  Like to investigate this further, optimize her treatment.    20 minutes spent on the date of the encounter doing chart review, patient visit and documentation     BMI:   Estimated body mass index is 38.27 kg/m  as calculated from the following:    Height as of this encounter: 5' 5\" (1.651 m).    Weight as of this encounter: 230 lb (104.3 kg).       Return in about 3 months (around 7/13/2021), or Follow with Dr. Julien.    Carolann Julien MD  [facility]   Marleny Viera is 45 y.o. and presents today for the following health issues   HPI   Patient has come today, because she wants a communication device, called an Chrysallis 3 SGD.  She wants me to write a letter, saying that she needs it.  She would like like to use this, when no  is present, and this will enable her to communicate.  Patient did not feel that she needed speech pathology referral.  She says that this has already been approved through Medicare.  She says everything has been arranged.  Patient has a .  From what I read, there has to be speech pathology assessment.  We will see what happens.    Patient is using her nebulizer machine several times a day [3-4 times a day, several times a week].  I have previously discussed with her, that she can use the albuterol inhaler as needed.  She does not need to use the nebulizer machine every day.  She is on montelukast 10 mg daily.  She is on Symbicort 80-4.52 puffs twice a day.      Review of " "Systems   Rest of the review systems is negative.  No cough.        Objective    /90 (Patient Site: Right Arm, Patient Position: Sitting)   Pulse 65   Temp 97.4  F (36.3  C)   Ht 5' 5\" (1.651 m)   Wt (!) 230 lb (104.3 kg)   SpO2 100%   BMI 38.27 kg/m    Body mass index is 38.27 kg/m .  Physical Exam   Chest is clear, no crackles, no wheezes.  Patient complains of pain in her nose, she has redness.  She had the Covid vaccine.            "

## 2021-06-17 NOTE — TELEPHONE ENCOUNTER
Pt called with inter services and was requesting the Moderna vaccination she had a reaction to the Pzifer. A referral was placed to see  and she had to cancel. She does not know what else to do

## 2021-06-17 NOTE — TELEPHONE ENCOUNTER
Telephone Encounter by Danielle Zacarias LPN at 5/11/2020  8:32 AM     Author: Danielle Zacarias LPN Service: -- Author Type: Certified Medical Assistant    Filed: 5/11/2020  8:34 AM Encounter Date: 5/8/2020 Status: Signed    : Danielle Zacarias LPN (Licensed Nurse)       Anna Calzada PA-C [place], [facility] [place] 9 minutes ago (8:22 AM)      Video visit or in person with a .        Attempted tp contact patient- patient stated that she was on the other line and hung up.    If patient calls back, please assist in scheduling appointment

## 2021-06-17 NOTE — TELEPHONE ENCOUNTER
Reason for Call:  Medication or medication refill:    Do you use a [facility]?  Name of the pharmacy and phone number for the current request: [facility] [phone]    Name of the medication requested: EPINEPHrine (EPIPEN/ADRENACLICK/AUVI-Q) 0.3 mg/0.3 mL injection    Pharmacy calling for refill      Call taken on 5/17/2021 at 4:05 PM by Laura L Goldberg

## 2021-06-17 NOTE — TELEPHONE ENCOUNTER
Refill Request  Medication name:   Requested Prescriptions     Pending Prescriptions Disp Refills     EPINEPHrine (EPIPEN/ADRENACLICK/AUVI-Q) 0.3 mg/0.3 mL injection 2 Pre-filled Pen Syringe 2     Sig: Inject 0.3 mL (0.3 mg total) as directed as needed.     Who prescribed the medication: Eve Sharp CNP                      Last refill on medication: 01/04/2021  Requested Pharmacy: [facility]  Last appointment with PCP: 04/13/2021  Next appointment: Not due    Vishal Reynoso MA

## 2021-06-18 NOTE — PATIENT INSTRUCTIONS - HE
Patient Instructions by Tommie Brown PA-C at 2/23/2020 11:40 AM     Author: Tommie Brown PA-C Service: -- Author Type: Physician Assistant    Filed: 2/23/2020 12:55 PM Encounter Date: 2/23/2020 Status: Addendum    : Tommie Brown PA-C (Physician Assistant)    Related Notes: Original Note by Tommie Brown PA-C (Physician Assistant) filed at 2/23/2020 12:55 PM       Referral to podiatry for evaluation of your nail status and infection with fungus.  At that time determination can be made for appropriate labs and treatment if nail fungus is diagnosed.    At that appointment the nail care can be discussed with the specialist.      Patient Education     Nail Fungal Infection  A nail fungal infection changes the way fingernails and toenails look. They may thicken, discolor, change shape, or split. This condition is hard to treat because nails grow slowly and have limited blood supply. The infection often comes back after treatment.  There are 2 types of medicines used to treat this condition:    Topical anti-fungal medicines. These are applied to the surface of the skin and nail area. These medicines are not very effective because they cant get deep into the nail.    Oral antifungal medicines. These medicines work better because they go into the nail from the inside out. But the infection may still come back. It may take 9 to 12 months for your nail to look normal again. This means you are cured. You can repeat treatment if needed. Most people take these medicines without any problems. It is rare to stop therapy because of side effects. But your healthcare provider may give you some monitoring tests. Talk about possible side effects with your provider before starting treatment.  If medicines fail, the nail can be removed surgically or chemically. These methods physically remove the fungus from the body. This helps medical treatment be more effective.  Home care    Use medicines exactly as directed for as long as  directed. Treating a fungal infection can take longer than other kinds of infections.    Smoking is a risk factor for fungal infection. This is one more reason to quit.    Wear absorbent socks, and shoes that let your feet breathe. Sweaty feet increase your risk of fungal infection. They also make an existing infection harder to treat.    Use footwear when in damp public places like swimming pools, gyms, and shower rooms. This will help you avoid the fungus that grows there.    Don't share nail clippers or scissors with others.  Follow-up care  Follow up with your healthcare provider, or as advised.  When to seek medical advice  Call your healthcare provider right away if any of these occur:    Skin by the nail becomes red, swollen, painful, or drains pus (a creamy yellow or white liquid)    Side effects from oral anti-fungal medicines  Date Last Reviewed: 8/1/2016 2000-2017 [employer]. [address] [place] [address], [place] [address]. All rights reserved. This information is not intended as a substitute for professional medical care. Always follow your healthcare professional's instructions.

## 2021-06-19 NOTE — LETTER
Letter by Jen Chopra MD at      Author: Jen Chopra MD Service: -- Author Type: --    Filed:  Encounter Date: 8/1/2019 Status: (Other)         August 1, 2019     Patient: Marleny Viera   YOB: 1975   Date of Visit: 8/1/2019       To Whom it May Concern:    Marleny Viera was seen in my clinic on 8/1/2019. I certify that patient is deaf since birth , we used  to conduct this interview.     If you have any questions or concerns, please don't hesitate to call.    Sincerely,         Electronically signed by Jen Chopra MD

## 2021-06-19 NOTE — LETTER
Letter by Marleni Weathers at      Author: Marleni Weathers Service: -- Author Type: --    Filed:  Encounter Date: 8/21/2019 Status: (Other)         Marleny Viera  [address] [facility] [address]           08/21/19       Dear Marleny:      At [facility], we care about your health and well-being.  Your primary care provider is committed to ensuring you receive high quality care and has chosen a network of specialists to assist in providing that care.  Recently Dr. Mistry referred you to [facility] for an upper endoscopy.     It is important to your overall health to follow through with the referral from your care provider.  Please call [facility] [phone] at your earliest convenience for assistance in scheduling an appointment with the recommended specialist.  If you have already scheduled an appointment, please disregard this notice.  Thank you for choosing the [facility] for your healthcare needs.        Sincerely,        Electronically signed by Marleni Weathers      Referral Coordinator   [facility]

## 2021-06-20 ENCOUNTER — HEALTH MAINTENANCE LETTER (OUTPATIENT)
Age: 46
End: 2021-06-20

## 2021-06-20 NOTE — LETTER
Letter by Serenity Vizcaino RN at      Author: Serenity Vizcaino RN Service: -- Author Type: --    Filed:  Encounter Date: 5/9/2020 Status: (Other)       5/9/2020        Marleny Viera  [address] [place] [address]    This letter provides a written record that you were tested for COVID-19 on 5/8/20.     Your result was negative.    This means that we didnt find the virus that causes COVID-19 in your sample. A test may show negative when you do actually have the virus. This can happen when the virus is in the early stages of infection, before you feel illness symptoms.    Even if you dont have symptoms, they may still appear. For safety, its very important to follow these rules.    Keep yourself away from others (self-isolation):      Stay home. Dont go to work, school or anywhere else.     Stay away from others in your home. No hugging, kissing or shaking hands.    Dont let anyone visit.    Cover your mouth and nose with a mask, tissue or wash cloth to avoid spreading germs.    Stay in self-isolation until you meet ALL of the guidelines below:    1. You have had no fever for at least 72 hours (that is 3 full days of no fever without the use of medicine that reduces fevers), AND  2. other symptoms (such as cough, shortness of breath) have gotten better, AND  3. at least 7 days have passed since your symptoms first appeared.    Going back to work  Check with your employer for any guidelines to follow for going back to work.    Employers: This document serves as formal notice that your employee tested negative for COVID-19, as of the testing date shown above.    For questions regarding this letter or your Negative COVID-19 result, call [phone] between 8A to 6:30P (M-F) and 10A to 6:30P (weekends).

## 2021-06-20 NOTE — LETTER
Letter by Anna Calzada PA-C at      Author: Anna Calzada PA-C Service: -- Author Type: --    Filed:  Encounter Date: 2/28/2020 Status: (Other)         Marleny Viera  [address]  [place] [address]             February 28, 2020         Dear Ms. Viera,    Below are the results from your recent visit:    Resulted Orders   Basic Metabolic Panel   Result Value Ref Range    Sodium 143 136 - 145 mmol/L    Potassium 3.4 (L) 3.5 - 5.0 mmol/L    Chloride 107 98 - 107 mmol/L    CO2 28 22 - 31 mmol/L    Anion Gap, Calculation 8 5 - 18 mmol/L    Glucose 102 70 - 125 mg/dL    Calcium 8.9 8.5 - 10.5 mg/dL    BUN 10 8 - 22 mg/dL    Creatinine 0.81 0.60 - 1.10 mg/dL    GFR MDRD Af Amer >60 >60 mL/min/1.73m2    GFR MDRD Non Af Amer >60 >60 mL/min/1.73m2    Narrative    Fasting Glucose reference range is 70-99 mg/dL per  American Diabetes Association (ADA) guidelines.   Lipid Cascade   Result Value Ref Range    Cholesterol 165 <=199 mg/dL    Triglycerides 100 <=149 mg/dL    HDL Cholesterol 42 (L) >=50 mg/dL    LDL Calculated 103 <=129 mg/dL    Patient Fasting > 8hrs? Unknown         Labs are normal, with exception of mildly low potassium, unchanged since list test and beger that in October of last year, recommend  Daily potassium rich foods, orange juice, avocado, bananas, tomatoes and good hydration.  Please call with questions or contact us using [employer].    Sincerely,        Electronically signed by Anna Calzada PA-C

## 2021-06-20 NOTE — LETTER
Letter by Anna Calzada PA-C at      Author: Anna Calzada PA-C Service: -- Author Type: --    Filed:  Encounter Date: 2020 Status: (Other)         Marleny Viera  [address]  [facility] [address]      2020      Dear Marleny Viera,   : 1975      This letter is in regards to the appointment that you had scheduled on 2020 at the [facility] with Anna Calzada.     The [facility] strives to see all patients in a timely manner and we need your help to achieve this.  The above-mentioned appointment was missed and we do not have record of a cancellation by you.  Whenever possible, we request appointment cancellations at least 72 hours in advance.  This time allows us to offer the appointment to another patient in need.      If you feel you have received this letter in error, or if you need to reschedule this appointment, please call our office so that we may update our records.      Sincerely,    [facility]

## 2021-06-20 NOTE — LETTER
Letter by Monisha Brar RN at      Author: Monisha Brar RN Service: -- Author Type: --    Filed:  Encounter Date: 5/9/2020 Status: (Other)       5/9/2020        Marleny Viera  [address] [place] [address]    This letter provides a written record that you were tested for COVID-19 on 5/8/20.     Your result was negative.    This means that we didnt find the virus that causes COVID-19 in your sample. A test may show negative when you do actually have the virus. This can happen when the virus is in the early stages of infection, before you feel illness symptoms.    Even if you dont have symptoms, they may still appear. For safety, its very important to follow these rules.    Keep yourself away from others (self-isolation):      Stay home. Dont go to work, school or anywhere else.     Stay away from others in your home. No hugging, kissing or shaking hands.    Dont let anyone visit.    Cover your mouth and nose with a mask, tissue or wash cloth to avoid spreading germs.    Stay in self-isolation until you meet ALL of the guidelines below:    1. You have had no fever for at least 72 hours (that is 3 full days of no fever without the use of medicine that reduces fevers), AND  2. other symptoms (such as cough, shortness of breath) have gotten better, AND  3. at least 10 days have passed since your symptoms first appeared.    Going back to work  Check with your employer for any guidelines to follow for going back to work.    Employers: This document serves as formal notice that your employee tested negative for COVID-19, as of the testing date shown above.    For questions regarding this letter or your Negative COVID-19 result, call [phone] between 8A to 6:30P (M-F) and 10A to 6:30P (weekends).

## 2021-06-20 NOTE — LETTER
Letter by Anna Calzada PA-C at      Author: Anna Calzada PA-C Service: -- Author Type: --    Filed:  Encounter Date: 2020 Status: (Other)         Marleny Viera  [address]  [facility] [address]      2020      Dear Marleny Viera,   : 1975      This letter is in regards to the appointment that you had scheduled on 2020 at the [facility] with Anna Calzada PA-C.     The [facility] strives to see all patients in a timely manner and we need your help to achieve this.  The above-mentioned appointment was missed and we do not have record of a cancellation by you.  Whenever possible, we request appointment cancellations at least 72 hours in advance.  This time allows us to offer the appointment to another patient in need.      If you feel you have received this letter in error, or if you need to reschedule this appointment, please call our office so that we may update our records.      Sincerely,    [facility]

## 2021-06-21 NOTE — LETTER
Letter by Carolann Julien MD at      Author: Carolann Julien MD Service: -- Author Type: --    Filed:  Encounter Date: 2021 Status: (Other)         Marleny Viera  [address]  [place] [address]             2021    Attention: ANTOINE/ Brianna Fax: [phone]    Re: Marleny Viera, : 1975    Marleny Viera is deaf, and relies on a hearing . An [employer] 3 SGD communication device would be very helpful for her, allowing her to communicate when there is no  present. Please have a speech language pathologist do a SGD evaluation. Her diagnosis is bilateral deafness, she was born deaf (Dx: H91.93).    Please call with questions or contact us using [employer].    Sincerely,        Electronically signed by Carolann Julien MD

## 2021-06-21 NOTE — LETTER
Letter by Anny Watts at      Author: Anny Watts Service: -- Author Type: --    Filed:  Date of Service:  Status: (Other)         Della Mendez  [address] [place] [address]            March 2, 2021      Exam Date: 1/19/21    Dear Della Mendez:  In accordance with Mammography Quality Standards Act of 1992 (MQSA), we are informing you that the below named patient has been mailed two separate notices for a Breast Biopsy.    We feel we have given Marleny Viera 1975 an adequate amount of time to respond.  As part of the MQSA, this will be our final attempt to contact the patient.    Please notify us, either by phone [phone], if the patient has gone to another facility for her follow-up study.    Thank you for any assistance you can provide.        Sincerely,    Your Care Team

## 2021-06-21 NOTE — LETTER
Letter by Carolann Julien MD at      Author: Carolann Julien MD Service: -- Author Type: --    Filed:  Encounter Date: 3/3/2021 Status: (Other)       My Asthma Action Plan     Name: Marleny Viera   YOB: 1975  Date: 3/3/2021   My doctor: Carolann Julien MD   My clinic: [facility]        My Rescue Medicine:   Albuterol (Proair/Ventolin/Proventil HFA) 2-4 puffs EVERY 4 HOURS as needed. Use a spacer if recommended by your provider.   My Asthma Severity:   Moderate Persistent  Know your asthma triggers: upper respiratory infections             GREEN ZONE   Good Control    I feel good    No cough or wheeze    Can work, sleep and play without asthma symptoms     Take your asthma control medicine every day.     1. If exercise triggers your asthma, take your rescue medication    15 minutes before exercise or sports, and    During exercise if you have asthma symptoms  2. Spacer to use with inhaler: If you have a spacer, make sure to use it with your inhaler             YELLOW ZONE Getting Worse  I have ANY of these:    I do not feel good    Cough or wheeze    Chest feels tight    Wake up at night 1. Keep taking your Green Zone medications  2. Start taking your rescue medicine:    every 20 minutes for up to 1 hour. Then every 4 hours for 24-48 hours.  3. If you stay in the Yellow Zone for more than 12-24 hours, contact your doctor.  4. If you do not return to the Green Zone in 12-24 hours or you get worse, start taking your oral steroid medicine if prescribed by your provider.           RED ZONE Medical Alert - Get Help  I have ANY of these:    I feel awful    Medicine is not helping    Breathing getting harder    Trouble walking or talking    Nose opens wide to breathe     1. Take your rescue medicine NOW  2. If your provider has prescribed an oral steroid medicine, start taking it NOW  3. Call your doctor NOW  4. If you are still in the Red Zone  after 20 minutes and you have not reached your doctor:    Take your rescue medicine again and    Call 911 or go to the emergency room right away    See your regular doctor within 2 weeks of an Emergency Room or Urgent Care visit for follow-up treatment.          Annual Reminders:  Meet with Asthma Educator,  Flu Shot in the Fall, consider Pneumonia Vaccination for patients with asthma (aged 19 and older).    Pharmacy:   [id number] - [place], [place] - [address] [place] [address]  Phone: [phone] Fax: [phone]      Electronically signed by Carolann Julien MD   Date: 03/03/21                      Asthma Triggers  How To Control Things That Make Your Asthma Worse    Triggers are things that make your asthma worse.  Look at the list below to help you find your triggers and what you can do about them.  You can help prevent asthma flare-ups by staying away from your triggers.      Trigger                                                          What you can do   Cigarette Smoke  Tobacco smoke can make asthma worse. Do not allow smoking in your home, car or around you.  Be sure no one smokes at a junaid day care or school.  If you smoke, ask your health care provider for ways to help you quit.  Ask family members to quit too.  Ask your health care provider for a referral to Quit Plan to help you quit smoking, or call 1-806-506-PLAN.     Colds, Flu, Bronchitis  These are common triggers of asthma. Wash your hands often.  Dont touch your eyes, nose or mouth.  Get a flu shot every year.     Dust Mites  These are tiny bugs that live in cloth or carpet. They are too small to see. Wash sheets and blankets in hot water every week.   Encase pillows and mattress in dust mite proof covers.  Avoid having carpet if you can. If you have carpet, vacuum weekly.   Use a dust mask and HEPA vacuum.   Pollen and Outdoor Mold  Some people are allergic to trees, grass, or weed pollen,  or molds. Try to keep your windows closed.  Limit time out doors when pollen count is high.   Ask you health care provider about taking medicine during allergy season.     Animal Dander  Some people are allergic to skin flakes, urine or saliva from pets with fur or feathers. Keep pets with fur or feathers out of your home.    If you cant keep the pet outdoors, then keep the pet out of your bedroom.  Keep the bedroom door closed.  Keep pets off cloth furniture and away from stuffed toys.     Mice, Rats, and Cockroaches  Some people are allergic to the waste from these pests.   Cover food and garbage.  Clean up spills and food crumbs.  Store grease in the refrigerator.   Keep food out of the bedroom.   Indoor Mold  This can be a trigger if your home has high moisture. Fix leaking faucets, pipes, or other sources of water.   Clean moldy surfaces.  Dehumidify basement if it is damp and smelly.   Smoke, Strong Odors, and Sprays  These can reduce air quality. Stay away from strong odors and sprays, such as perfume, powder, hair spray, paints, smoke incense, paint, cleaning products, candles and new carpet.   Exercise or Sports  Some people with asthma have this trigger. Be active!  Ask your doctor about taking medicine before sports or exercise to prevent symptoms.    Warm up for 5-10 minutes before and after sports or exercise.     Other Triggers of Asthma  Cold air:  Cover your nose and mouth with a scarf.  Sometimes laughing or crying can be a trigger.  Some medicines and food can trigger asthma.

## 2021-06-21 NOTE — LETTER
Letter by Carolann Julien MD at      Author: Carolann Julien MD Service: -- Author Type: --    Filed:  Encounter Date: 2021 Status: (Other)         To Whom it May Concern,    Re: Marleny Viera : 1975    Marleny Viera is deaf, and relies on a hearing . An [employer] 3 SGD communication device would be very helpful for her, allowing her to communicate when there is no  present.     Sincerely,        Carolann Julien [facility] (Dr. Julien)

## 2021-06-22 ENCOUNTER — RECORDS - HEALTHEAST (OUTPATIENT)
Dept: INTERNAL MEDICINE | Facility: CLINIC | Age: 46
End: 2021-06-22

## 2021-06-23 ENCOUNTER — TRANSFERRED RECORDS (OUTPATIENT)
Dept: HEALTH INFORMATION MANAGEMENT | Facility: CLINIC | Age: 46
End: 2021-06-23

## 2021-06-23 ENCOUNTER — RECORDS - HEALTHEAST (OUTPATIENT)
Dept: ADMINISTRATIVE | Facility: OTHER | Age: 46
End: 2021-06-23

## 2021-06-24 ENCOUNTER — COMMUNICATION - HEALTHEAST (OUTPATIENT)
Dept: ADMINISTRATIVE | Facility: CLINIC | Age: 46
End: 2021-06-24

## 2021-06-24 NOTE — PATIENT INSTRUCTIONS - HE
Instructions for Surgery  No aspirin or ibuprofen products 1 week before surgery.    The morning of surgery take the following medications with small sips of water: Metoprolol and Singulair. Hold all other oral medications.  The morning of surgery, use the Albuterol and Symbicort inhaler.    You should be ambulatory immediately, this is not major surgery.  You not at increased risk for blood clots.    NO Anticoagulation is needed.    If you feel you are immobilized after surgery by pain or any other reason, call the clinic to let us know.       Continue your asthma albuterol inhaler- ideally restarting your Symbicort would be great help. We will need to see you back post-op to better optimize your long term asthma control, but because symptoms are at baseline, it is ok to proceed with this type of procedure.

## 2021-06-24 NOTE — PROGRESS NOTES
Per PCP, patient unable to afford Symbicort as not covered by insurance. Cheapest LAMA/ICS alternatives are Breo Ellipta or Dulera at $35 per inhaler or generic AirDuo at $5. Per past PCP note, patient reports allergy to Advair, which contains same ingredients as AirDuo (fluticasone/salmeterol). Will discuss with PCP.

## 2021-06-24 NOTE — TELEPHONE ENCOUNTER
Question following Office Visit  When did you see your provider: 3-12-19  What is your question: pharmacy confirming only the two inhalers were sent to the pharmacy. Writer verified yes yesterday two were sent.    Okay to leave a detailed message: Yes  fluticasone-vilanterol (BREO ELLIPTA) 100-25 mcg/dose DsDv inhaler  fluticasone-salmeterol 113-14 mcg/actuation AePB

## 2021-06-24 NOTE — TELEPHONE ENCOUNTER
Left message to call back for: Marleny  Information to relay to patient:  Patient is a new patient, please schedule with any available provider accepting new patient's.

## 2021-06-24 NOTE — PROGRESS NOTES
"Preoperative Exam    Scheduled Procedure: EXCISION DOG EAR LEFT CHEST  Surgery Date:  3/19/19  Surgery Location: [facility], fax [phone]    Surgeon:  Dr. Della Mendez    Assessment/Plan:     1. Pre-operative examination  - Basic Metabolic Panel- normal  - HM2(CBC w/o Differential)- normal   - She had an EKG 1/18/19- NSR, VR 60, no acute ST or T changes  - She had a CXR on 1/11/19- normal     2. Morbid obesity (H)    3. Asthma, moderate persistent, uncontrolled. Not currently in exacerbation but certainly poor control with ACT score of 12. She reports feeling \"at baseline\". Last exacerbation Jan 11th- tx with steroids, doxycycline. 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  - Reviewed inhaler technique today which was good.  - Continue singulair morning of surgery  - Continue albuterol 2-3 times per day at this time. Has been unable to afford the Symbicort but will look to find this to see if she has an old one available.   - Will follow up with us after procedure asap to review asthma and options for alternative maintenance inhaler pending coverage with her insurance plan    4. Personal history of PE (pulmonary embolism)  Pt will be ambulatory immediately, this is not major surgery.   She is not at risk for blood clots.  NO Anticoagulation is needed.  If she feels that she is immobilized after surgery by pain or any other reason, call the clinic and we will start the recommendations with rivaroxaban (her problem list has a plan in place for post op anticoagulation with this agent as she has not tolerated coumadin previously).      5. Essential hypertension  BP mildly elevated here.   Continue metoprolol even on day of surgery.        Surgical Procedure Risk: Low (reported cardiac risk generally < 1%)  Have you had prior anesthesia?: Yes  Have you or any family members had a previous anesthesia reaction:  No  Do you " "or any family members have a history of a clotting or bleeding disorder?: Yes: Hx of a blood clot  Cardiac Risk Assessment: no increased risk for major cardiac complications    Patient approved for surgery with general or local anesthesia.    Functional Status: Independent  Patient plans to recover at home with family.     Subjective:      Marleny Viera is a 43 y.o. female who is establishing care here due to insurance change and presents for a preoperative consultation for revision dog ear procedure.     She has a cyst draining under her arm that had been removed. Continues to drain. She is having a procedure to fix this.     New to me, so we additionally reviewed her pertinent medical hx:     H/o pulmonary embolism: in 2012 which was provoked in the setting of a postop knee replacement. Sounds like she had an unidentified DVT in the leg that moved to her lung, and during this knee procedure she actually required \"12 minutes\" of cardiac resuscitation.  Completed 6 months of a blood thinner, not anymore.   Her problem list has a plan for post-op anticoagulation should she be sedentary or undergoing any high risk procedures (which the current pre-op is low risk, not requiring post op anticoagulation).    HTN: on metoprolol 50mg twice daily. Previously on HCTZ which may have caused a rash so was stopped.     Asthma, \"uncontrolled but stable\": on Singulair 10mg daily.  ACT of 12. Using albuterol nebulizer or inhaler with spacer. Used neb this AM. Used albuterol inhaler or neb 3x/day a few days after shoveling snow. Symbicort on her med list but states not using as not effective or too costly. Reports allergy to Advair. Was at an Asthma Clinic in the past.     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   No chest pain.    H/o depression. Was on many meds but affected kidney function and no longer on meds. No longer seeing a " psychiatrist.  H/o meth use from [id number].  H/o alcohol dependence, not in years.    She has a h/o BL hip replacement and right knee replacement.    All other systems reviewed and are negative, other than those listed in the HPI.    Pertinent History  Do you have difficulty breathing or chest pain after walking up a flight of stairs: Yes: Patient reports sometimes has pain with stairs  History of obstructive sleep apnea: No  Steroid use in the last 6 months: No  Frequent Aspirin/NSAID use: No  Prior Blood Transfusion: No  Prior Blood Transfusion Reaction: No  If for some reason prior to, during or after the procedure, if it is medically indicated, would you be willing to have a blood transfusion?:  There is no transfusion refusal.    Current Outpatient Medications   Medication Sig Dispense Refill     cholecalciferol, vitamin D3, (VITAMIN D3) 1,000 unit capsule Take 1 capsule (1,000 Units total) by mouth daily. 60 capsule 0     DIAPER,BRIEF,INFANT-MARY,DISP (DIAPERS MISC) Adult1 month supply  approx 100       EPINEPHrine (EPIPEN 2-DIONISIO) 0.3 mg/0.3 mL injection        gabapentin (NEURONTIN) 100 MG capsule Take 100 mg by mouth 3 (three) times a day.       metoprolol tartrate (LOPRESSOR) 25 MG tablet Take 50 mg by mouth 2 (two) times a day.              polyethylene glycol (GLYCOLAX) 17 gram/dose powder Take 17 g by mouth daily as needed. 255 g 0     rizatriptan (MAXALT) 10 MG tablet        budesonide-formoterol (SYMBICORT) 160-4.5 mcg/actuation inhaler Inhale 2 puffs 2 (two) times a day.       hydrOXYzine pamoate (VISTARIL) 25 MG capsule Take 25 mg by mouth 3 (three) times a day as needed for itching.       No current facility-administered medications for this visit.         Allergies   Allergen Reactions     Amoxicillin Itching and Shortness Of Breath     Bee Pollen Anaphylaxis     Hymenoptera Allergenic Extract Anaphylaxis     Iodine Hives     Pt stated that she was injected with CT CONTRAST in the past and got  hives, SOB, and nausea. Please pre-medicate patient in the future.      Wasp Venom Protein Starter Kit Itching, Shortness Of Breath, Swelling and Wheezing     Adhesive Tape-Silicones      Chocolate Cough     Food Allergy Formula      Red meat, chocolate     Geodon [Ziprasidone Hcl]      Ibuprofen Other (See Comments)     Kidney function  Kidney function       Prochlorperazine Other (See Comments)     Risperdal [Risperidone]      Risperidone Analogues Other (See Comments)     Theobroma Oil Unknown     Zoloft [Sertraline]      Hydrochlorothiazide Rash       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     Didelphic uterus     Status post total right knee replacement     Screening for malignant neoplasm of cervix     Personal history of PE (pulmonary embolism)     Migraine     Fatty liver     CMC DJD(carpometacarpal degenerative joint disease), localized primary     Post op anticoagulation care plan (if needed)     Obesity (BMI 35.0-39.9) with comorbidity (H)       Past Medical History:   Diagnosis Date     ADHD (attention deficit hyperactivity disorder)      Alcohol dependence (H)      Anxiety      Arthritis      Asthma      Bipolar depression (H)      Deafness      Diabetes mellitus (H)      Drug abuse, nondependent (H) 10/29/2004    Overview:  polydrug abuse per psych notes ; Other, mixed, or unspecified nondependent drug abuse, unspecified     History of blood clots      Hypertension      Kidney stones 2015     Major depressive disorder, recurrent episode, severe (H) 5/17/2016    Overview:  s/p suicide attempt Epic      Migraine      Obstructive sleep apnea 6/1/2016     Pulmonary emboli (H)        Past Surgical History:   Procedure Laterality Date     BREAST SURGERY        "TOTAL HIP ARTHROPLASTY Bilateral      TOTAL KNEE ARTHROPLASTY Right      TUBAL LIGATION         Social History     Socioeconomic History     Marital status:      Spouse name: Not on file     Number of children: Not on file     Years of education: Not on file     Highest education level: Not on file   Occupational History     Not on file   Social Needs     Financial resource strain: Not on file     Food insecurity:     Worry: Not on file     Inability: Not on file     Transportation needs:     Medical: Not on file     Non-medical: Not on file   Tobacco Use     Smoking status: Never Smoker     Smokeless tobacco: Never Used   Substance and Sexual Activity     Alcohol use: No     Drug use: No     Sexual activity: No   Lifestyle     Physical activity:     Days per week: Not on file     Minutes per session: Not on file     Stress: Not on file   Relationships     Social connections:     Talks on phone: Not on file     Gets together: Not on file     Attends Alevism service: Not on file     Active member of club or organization: Not on file     Attends meetings of clubs or organizations: Not on file     Relationship status: Not on file     Intimate partner violence:     Fear of current or ex partner: Not on file     Emotionally abused: Not on file     Physically abused: Not on file     Forced sexual activity: Not on file   Other Topics Concern     Not on file   Social History Narrative    . No children.     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    Nonsmoker.    No alcohol use.    Tesha Pelaez MD       Patient Care Team:  Tesha Pelaez MD as PCP - General (Family Medicine)  Joyce Martinez CNP as Nurse Practitioner (Family Medicine)          Objective:     Vitals:    03/08/19 1201 03/08/19 1236   BP: 150/72 144/78   Pulse: (!) 56    Resp: 18    Temp: 97.8  F (36.6  C)    TempSrc: Oral    SpO2: 99%    Weight: (!) 231 lb 14.4 oz (105.2 kg)    Height: 5' 5.75\" " (1.67 m)    LMP: 03/01/2019         Physical Exam:  Constitutional: Patient is oriented to person, place, and time. Patient appears well-developed and well-nourished. No distress.   Head: Normocephalic and atraumatic.   Ears: External ear and TMs normal bilaterally.  Nose: Nose normal.   Mouth/Throat: Oropharynx is clear and moist. No oropharyngeal exudate.   Eyes: Conjunctivae and EOM are normal. Pupils are equal, round, and reactive to light. No discharge. No scleral icterus.   Neck: Neck supple. No JVD present. No tracheal deviation present. No thyromegaly present.  Left axilla: 3-4 cm area of fullness that is nontender to palpation   Cardiovascular: Normal rate, regular rhythm, normal heart sounds and intact distal pulses. No murmur heard.   Pulmonary/Chest: Effort normal. Good airflow but prominent wheezing noted fairly diffusely, with prolonged exhalation phase.   Abdominal: Soft. Bowel sounds are normal. No masses. There is no tenderness.   Lymphadenopathy:  Patient has no cervical adenopathy.   Neurological: Patient is alert and oriented to person, place, and time. Patient has normal reflexes. No cranial nerve deficit. Coordination normal.   Skin: Skin is warm and dry. No rash noted. No pallor.  Psychiatric: Patient has good eye contact without any psychomotor retardation or stereotypic behaviors.  normal mood and affect. Judgment and thought content normal.   Speech is regular rate and rhythm.       Patient Instructions   Instructions for Surgery    No aspirin or ibuprofen products 1 week before surgery.    The morning of surgery take the following medications with small sips of water: Metoprolol and Singulair. Hold all other oral medications.  The morning of surgery, use the Albuterol and Symbicort inhaler.    You should be ambulatory immediately, this is not major surgery.  You not at increased risk for blood clots.    NO Anticoagulation is needed.    If you feel you are immobilized after surgery by pain  or any other reason, call the clinic and we will start the recommendations with rivaroxaban.              Continue your asthma albuterol inhaler. We will need to see you back post-op to better optimize your long term asthma control, but because symptoms are at baseline, it is ok to proceed with this type of procedure.       Labs:  Recent Results (from the past 48 hour(s))   Basic Metabolic Panel    Collection Time: 03/08/19 12:54 PM   Result Value Ref Range    Sodium 140 136 - 145 mmol/L    Potassium 3.7 3.5 - 5.0 mmol/L    Chloride 108 (H) 98 - 107 mmol/L    CO2 24 22 - 31 mmol/L    Anion Gap, Calculation 8 5 - 18 mmol/L    Glucose 92 70 - 125 mg/dL    Calcium 9.0 8.5 - 10.5 mg/dL    BUN 11 8 - 22 mg/dL    Creatinine 0.79 0.60 - 1.10 mg/dL    GFR MDRD Af Amer >60 >60 mL/min/1.73m2    GFR MDRD Non Af Amer >60 >60 mL/min/1.73m2   HM2(CBC w/o Differential)    Collection Time: 03/08/19 12:54 PM   Result Value Ref Range    WBC 7.6 4.0 - 11.0 thou/uL    RBC 4.76 3.80 - 5.40 mill/uL    Hemoglobin 13.6 12.0 - 16.0 g/dL    Hematocrit 40.2 35.0 - 47.0 %    MCV 84 80 - 100 fL    MCH 28.7 27.0 - 34.0 pg    MCHC 34.0 32.0 - 36.0 g/dL    RDW 12.8 11.0 - 14.5 %    Platelets 279 140 - 440 thou/uL    MPV 8.6 7.0 - 10.0 fL        Immunization History   Administered Date(s) Administered     Hep B, Adult 02/22/2007, 10/16/2007, 05/27/2008, 03/14/2013     Influenza, inj, historic,unspecified 11/03/2014, 09/30/2015     MMR 05/09/2016     Pneumo Polysac 23-V 10/03/2006     Td, Adult, Absorbed 1973, 11/10/2003, 03/14/2013     Td,adult,historic,unspecified 1973     Tdap 01/24/2011, 02/10/2016     Varicella 06/12/2017, 07/10/2017       In the past 4 weeks, how much of the time did your asthma keep you from getting as much done at work, school, or at home?: Some of the time  During the past 4 weeks, how often have you had shortness of breath?: 3 to 6 times a week  During the past 4 weeks, how often did your asthma symptoms  (wheezing, coughing, shortness of breath, chest tightness or pain) wake you up at night or earlier in the morning?: 2 or 3 nights a week  During the past 4 weeks, how often have you used your rescue inhaler or nebulizer medication (such as albuterol)?: 3 or more times per day  How would you rate your asthma control during the past 4 weeks?: Somewhat controlled  ACT Total Score: (!) 12  In the past 12 months, have you visited the emergency room due to your asthma?: Yes  Number of Emergency Room visits in the past 12 months due to asthma: 5  In the past 12 months, have you been hospitalized due to your asthma?: Yes  Number of Hospitalizations due to asthma in the last 12 months: 2        Electronically signed by Tesha Pelaez MD 03/09/19 12:03 PM

## 2021-06-24 NOTE — TELEPHONE ENCOUNTER
New Appointment Needed  What is the reason for the visit:    Pre-Op Appt Request  When is the surgery? :  03.19.19  Where is the surgery?:   Unknown   Who is the surgeon? :  Dr. Pavan Mendez  What type of surgery is being done?: Surgery on left breast   Provider Preference: Any available  How soon do you need to be seen?: Any day and prefer afternoons.  Waitlist offered?: No  Okay to leave a detailed message:  Yes

## 2021-06-24 NOTE — TELEPHONE ENCOUNTER
Patient Returning Call  Reason for call:  Patient returning clinic call    Information relayed to patient:  Patient received a message from clinic to call back and schedule appointment.  Patient scheduled for 03/01 at 4:00pm with Dr. Tesha Pelaez.    Patient has additional questions:  No     If YES, what are your questions/concerns:  N/A      Okay to leave a detailed message?: No call back needed

## 2021-06-24 NOTE — TELEPHONE ENCOUNTER
Called pt to review options per pharmD note.   She reviewed there was no specific allergy to Allegra, she just wasn't sure it was all that helpful.  She was agreeable to start AirDuo because she won't have more money until she gets paid next week. We discussed her breathing symptoms should improve starting the AirDuo, and if not, then we can switch to the breo Ellipta later.     She will go to pharmacy today to  AirDuo; advised her to ask them to show her how to use it as we are not in clinic today to review this with her.  Tesha Pelaez MD

## 2021-06-24 NOTE — PROGRESS NOTES
Assessment/Plan:     1. Left flank pain  UA shows moderate blood.  Unable to run micro.  No evidence of infection.  Discussed CT to confirm a stone or watching/waiting with pain management.  Patient would like to go proceed with scan.  Orders placed for CT scan, and patient meeting with referral coordinator to get scheduled.  I will be in touch with her regarding results.  Push fluids.  May continue Tylenol as needed for pain.  To ER with any severe/worsening pain.   - Urinalysis-UC if Indicated  - CT Abdomen Pelvis Without Oral Without IV Contrast; Future        Subjective:     Marleny Viera is a 43 y.o. female accompanied by an  who presents with complaints of left back pain.  She woke up suddenly last night from her sleep with a sharp pain to her left flank area.  The pain has persisted since onset.  It comes and goes.  Patient reports a crampy, sharp type pain.  Rates the pain a 7/10.  Pain is worse with walking or laying down.  No chest pain.  She does have some shortness of breath, but this is baseline for her.  She is recently had a change to her asthma medications.  Patient denies any fevers.  She is having some mild nausea and diarrhea.  No vomiting, hematuria, dysuria, or urinary frequency/urgency.  Patient denies any abnormal vaginal discharge or bleeding.  She finished her menstrual period about 4 days ago.  Patient denies any chronic back issues.  No recent fall or trauma.  Patient does have a history of a kidney stone.  She does not recall when this was.  Patient treated pain at home with Tylenol, which was not terribly helpful.  No other treatments tried.      The following portions of the patient's history were reviewed and updated as appropriate: allergies, current medications.    Review of Systems  A comprehensive review of systems was performed and was otherwise negative    Objective:     /70   Pulse 76   Temp 99  F (37.2  C)   Resp 20   Wt (!) 234 lb 11.2 oz (106.5 kg)    LMP 03/01/2019   BMI 38.17 kg/m      General Appearance: Alert, cooperative, no distress, appears stated age  Back: left CVA tenderness  Lungs: Lung sounds diminished with diffuse expiratory wheezing.   Heart: Regular rate and rhythm, S1 and S2 normal, no murmur, rub, or gallop   Abdomen: Soft, diffuse tenderness, bowel sounds active all four quadrants,  no masses, no organomegaly      Yamel Beckman, SHAMIR

## 2021-06-24 NOTE — TELEPHONE ENCOUNTER
"----- Message from Heaven Lundberg, PharmD sent at 3/11/2019 12:59 PM CDT -----  Regarding: RE: Inhaler coverage....  Ok, here's what I figured out. The cheapest inhaler is AirDuo at $5 (yay!), but it contains the exact same ingredients (fluticasone/salmeterol) as Advair and I saw in your last note, patient reported an \"allergy\" to Advair.     Other options are ...    Breo Ellipta (fluticasone/vilanterol) at $35 - also contains fluticasone, but nice because it's just one puff a day so can improve compliance    Dulera (mometasone/formoterol) at $35    Rx for Airduo and Breo Ellipta are both on hold at her pharmacy so depending on what you and patient decide, she could  either.     Hope this helps!  Heaven     ----- Message -----  From: Tesha Pelaez MD  Sent: 3/9/2019   5:03 PM  To: Heaven Lundberg, PharmD  Subject: Inhaler coverage....                             Hi Heaven, this is the pt I was mentioning on Friday- would love your assistance looking into options for maintenance inhaler coverage for her. If you think this could be specifically an MTM visit/referral, I can place this (not sure if you are able to self-refer or not)! Thanks. JS    "

## 2021-06-25 NOTE — ANESTHESIA PREPROCEDURE EVALUATION
Anesthesia Evaluation      Patient summary reviewed   No history of anesthetic complications     Airway   Mallampati: I  Neck ROM: full   Pulmonary - normal exam   (+) asthma  sleep apnea on other, ,                          Cardiovascular - normal exam  (+) hypertension, ,      Neuro/Psych - negative ROS     Endo/Other    (+) obesity,      GI/Hepatic/Renal - negative ROS           Dental - normal exam                        Anesthesia Plan  Planned anesthetic: general LMA    ASA 3   Induction: intravenous   Anesthetic plan and risks discussed with: patient

## 2021-06-25 NOTE — PROGRESS NOTES
Assessment/Plan:     1. Fall, initial encounter  - XR Pelvis W 2 Vw Hip Right; Future  - XR Wrist Right 3 or More VWS; Future    2. Wrist sprain, right, initial encounter    3. Hip pain, right    Xrays completed today and reviewed by myself as negative for any acute fractures.  Radiology report is negative for any fracture.  Reassured patient.  Recommend rest, ice application, elevation, and Tylenol for pain.  I did place patient's wrist in a splint for support.  She may discontinue this in the next 2-3 weeks as pain improves.  She will follow up with her PCP with any new/worsening symptoms.     Subjective:     Marleny Viera is a 43 y.o. female who presents after sustaining a fall earlier today.  Patient is deaf.  An  is not available, therefore, communication was done through writing.  Patient delivers food.  She slipped on some ice and fell on her right hip and on an outstretched right hand.  Reports pain to her right hip - worse with ambulation.  History of bilateral hip replacement.  Swelling and pain to her right wrist.  She does have some tingling in her fingers.  No treatments tried at home for symptoms.     Of note, patient was in earlier this week with flank pain concerning for a possible kidney stone.  She was referred for a CT, but apparently cancelled it.  Patient denies any pain or hematuria today.     The following portions of the patient's history were reviewed and updated as appropriate: allergies, current medications.    Review of Systems  A comprehensive review of systems was performed and was otherwise negative    Objective:     /88   Pulse 80   Wt (!) 232 lb 8 oz (105.5 kg)   LMP 03/01/2019   BMI 37.81 kg/m      General Appearance: Alert, cooperative, no distress, appears stated age  Musculoskeletal: Gait is ataxic secondary to right hip pain. No swelling, bruising, or acute deformity. Mild pain to palpation to the lateral right hip.  Right wrist appears normal without  acute deformity, swelling, redness, or bruising.  No swelling or pain to the hand or digits.  CMS intact.  Wrist flexion and extension very painful.       Yamel Beckman, NP     Discharged

## 2021-06-25 NOTE — ANESTHESIA CARE TRANSFER NOTE
Last vitals:   Vitals:    03/19/19 0759   BP: (!) 159/96   Pulse: 81   Resp: 22   Temp: 37.1  C (98.7  F)   SpO2: 100%     Patient's level of consciousness is drowsy  Spontaneous respirations: yes  Maintains airway independently: yes  Dentition unchanged: yes  Oropharynx: oropharynx clear of all foreign objects    QCDR Measures:  ASA# 20 - Surgical Safety Checklist: WHO surgical safety checklist completed prior to induction    PQRS# 430 - Adult PONV Prevention: 4558F - Pt received => 2 anti-emetic agents (different classes) preop & intraop  ASA# 8 - Peds PONV Prevention: NA - Not pediatric patient, not GA or 2 or more risk factors NOT present  PQRS# 424 - Neema-op Temp Management: 4559F - At least one body temp DOCUMENTED => 35.5C or 95.9F within required timeframe  PQRS# 426 - PACU Transfer Protocol: - Transfer of care checklist used  ASA# 14 - Acute Post-op Pain: ASA14B - Patient did NOT experience pain >= 7 out of 10

## 2021-06-25 NOTE — ANESTHESIA POSTPROCEDURE EVALUATION
Patient: Marleny Viera  EXCISION DOG EAR LEFT CHEST  Anesthesia type: general    Patient location: Phase II Recovery  Last vitals:   Vitals:    03/19/19 0900   BP: 167/78   Pulse: 69   Resp: 18   Temp:    SpO2: 98%     Post vital signs: stable  Level of consciousness: awake and responds to simple questions  Post-anesthesia pain: pain controlled  Post-anesthesia nausea and vomiting: no  Pulmonary: unassisted, return to baseline  Cardiovascular: stable and blood pressure at baseline  Hydration: adequate  Anesthetic events: no    QCDR Measures:  ASA# 11 - Neema-op Cardiac Arrest: ASA11B - Patient did NOT experience unanticipated cardiac arrest  ASA# 12 - Neema-op Mortality Rate: ASA12B - Patient did NOT die  ASA# 13 - PACU Re-Intubation Rate: ASA13B - Patient did NOT require a new airway mgmt  ASA# 10 - Composite Anes Safety: ASA10A - No serious adverse event    Additional Notes:

## 2021-06-25 NOTE — PATIENT INSTRUCTIONS - HE
Recommend rest, ice, and elevation of the wrist.      Tylenol for pain.    You can wear the splint for support - you can take it off to shower or if your wrist if feeling better.    I will call you if the radiologist says anything about your xrays - otherwise they look normal.

## 2021-06-26 NOTE — TELEPHONE ENCOUNTER
RN cannot approve Refill Request    RN can NOT refill this medication The original prescription was discontinued on 4/13/2021 by Carolann Julien MD. Renewing this prescription may not be appropriate.. Last office visit: 4/13/2021 Carolann Julien MD Last Physical: Visit date not found Last MTM visit: Visit date not found Last visit same specialty: 4/13/2021 Carolann Julien MD.  Next visit within 3 mo: Visit date not found  Next physical within 3 mo: Visit date not found      Isaura Hinds, [facility] Triage/Med Refill 6/22/2021    Requested Prescriptions   Pending Prescriptions Disp Refills     hydrOXYzine HCL (ATARAX) 25 MG tablet [Pharmacy Med Name: hydrOXYzine HCl 25MG TABS] 180 tablet 1     Sig: TAKE 2 TABLETS BY MOUTH AT BEDTIME       Antihistamine Refill Protocol Passed - 6/21/2021 10:36 AM        Passed - Patient has had office visit/physical in last year     Last office visit with prescriber/PCP: 4/13/2021 Carolann Julien MD OR same dept: 4/13/2021 Carolann Julien MD OR same specialty: 4/13/2021 Carolann Julien MD  Last physical: Visit date not found Last MTM visit: Visit date not found   Next visit within 3 mo: Visit date not found  Next physical within 3 mo: Visit date not found  Prescriber OR PCP: Carolann Julien MD  Last diagnosis associated with med order: 1. Essential hypertension  - hydrOXYzine HCL (ATARAX) 25 MG tablet [Pharmacy Med Name: hydrOXYzine HCl 25MG TABS]; TAKE 2 TABLETS BY MOUTH AT BEDTIME  Dispense: 180 tablet; Refill: 1    If protocol passes may refill for 12 months if within 3 months of last provider visit (or a total of 15 months).

## 2021-06-26 NOTE — TELEPHONE ENCOUNTER
Brianna from [facility] has sent over evaluation report with the MD rx form for the provider to sign. She has sent over the Medicare approved RX form. Please send back by Friday, June 25th or Monday, June 28th at the latest. Please call Brianna if directly with any questions.

## 2021-06-28 ENCOUNTER — COMMUNICATION - HEALTHEAST (OUTPATIENT)
Dept: INTERNAL MEDICINE | Facility: CLINIC | Age: 46
End: 2021-06-28

## 2021-06-28 NOTE — PROGRESS NOTES
Progress Notes by Tommie Brown PA-C at 2/23/2020 11:40 AM     Author: Tommie Brown PA-C Service: -- Author Type: Physician Assistant    Filed: 3/3/2020  4:30 PM Encounter Date: 2/23/2020 Status: Signed    : Tommie Brown PA-C (Physician Assistant)       Subjective:      Patient ID: Marleny Viera is a 44 y.o. female.    Chief Complaint:    HPI     Marleny Viera is a 44 y.o. female who presents today complaining of inability to care for her feet since she has had hip surgeries and onto able to bend over and cut the nails.  She had formerly been having someone help her cut the nails.  She states over the last 2 weeks the toenails on the toes have become painful.      Past Medical History:   Diagnosis Date   ? ADHD (attention deficit hyperactivity disorder)    ? Alcohol dependence (H)    ? Anxiety    ? Arthritis    ? Asthma    ? Bipolar depression (H)    ? Deafness    ? Drug abuse, nondependent (H) 10/29/2004    Overview:  polydrug abuse per psych notes ; Other, mixed, or unspecified nondependent drug abuse, unspecified   ? History of blood clots    ? History of transfusion    ? Hypertension    ? Kidney stones 2015   ? Major depressive disorder, recurrent episode, severe (H) 5/17/2016    Overview:  s/p suicide attempt Epic    ? Migraine    ? Obstructive sleep apnea 6/1/2016   ? Pulmonary emboli (H)    ? Status post total right knee replacement 1/18/2016       Past Surgical History:   Procedure Laterality Date   ? BREAST SURGERY     ? REDUCTION MAMMAPLASTY Bilateral 2017   ? TOTAL HIP ARTHROPLASTY Bilateral    ? TOTAL KNEE ARTHROPLASTY Right    ? TUBAL LIGATION     ? US BREAST CORE BIOPSY RIGHT Right 11/21/2019       Family History   Problem Relation Age of Onset   ? Cancer Mother    ? Breast cancer Mother    ? Stroke Father        Social History     Tobacco Use   ? Smoking status: Never Smoker   ? Smokeless tobacco: Never Used   Substance Use Topics   ? Alcohol use: Yes     Comment: dependence   ? Drug use:  Not Currently       Review of Systems  As above in HPI, otherwise balance of Review of Systems are negative.    Objective:     /89   Pulse 95   Temp 98.3  F (36.8  C) (Oral)   Resp 16   Wt (!) 232 lb 11.2 oz (105.6 kg)   LMP 01/29/2020 (Exact Date)   SpO2 99%   BMI 38.72 kg/m      Physical Exam  General: Patient is resting comfortably no acute distress is afebrile  HEENT: Head is normocephalic atraumatic   Skin: Without rash non-diaphoretic  Musculoskeletal: feet are inspected.  There is onychomycosis on the thickened toenails and dystrophic changes that appear to be consistent with onychomycosis.      Assessment:     Procedures    The encounter diagnosis was Onychomycosis.    Plan:     1. Onychomycosis  Ambulatory referral to Podiatry         Patient Instructions   Referral to podiatry for evaluation of your nail status and infection with fungus.  At that time determination can be made for appropriate labs and treatment if nail fungus is diagnosed.    At that appointment the nail care can be discussed with the specialist.      Patient Education     Nail Fungal Infection  A nail fungal infection changes the way fingernails and toenails look. They may thicken, discolor, change shape, or split. This condition is hard to treat because nails grow slowly and have limited blood supply. The infection often comes back after treatment.  There are 2 types of medicines used to treat this condition:    Topical anti-fungal medicines. These are applied to the surface of the skin and nail area. These medicines are not very effective because they cant get deep into the nail.    Oral antifungal medicines. These medicines work better because they go into the nail from the inside out. But the infection may still come back. It may take 9 to 12 months for your nail to look normal again. This means you are cured. You can repeat treatment if needed. Most people take these medicines without any problems. It is rare to stop  therapy because of side effects. But your healthcare provider may give you some monitoring tests. Talk about possible side effects with your provider before starting treatment.  If medicines fail, the nail can be removed surgically or chemically. These methods physically remove the fungus from the body. This helps medical treatment be more effective.  Home care    Use medicines exactly as directed for as long as directed. Treating a fungal infection can take longer than other kinds of infections.    Smoking is a risk factor for fungal infection. This is one more reason to quit.    Wear absorbent socks, and shoes that let your feet breathe. Sweaty feet increase your risk of fungal infection. They also make an existing infection harder to treat.    Use footwear when in damp public places like swimming pools, gyms, and shower rooms. This will help you avoid the fungus that grows there.    Don't share nail clippers or scissors with others.  Follow-up care  Follow up with your healthcare provider, or as advised.  When to seek medical advice  Call your healthcare provider right away if any of these occur:    Skin by the nail becomes red, swollen, painful, or drains pus (a creamy yellow or white liquid)    Side effects from oral anti-fungal medicines  Date Last Reviewed: 8/1/2016 2000-2017 [employer]. [address] [place] [address], [place] [address]. All rights reserved. This information is not intended as a substitute for professional medical care. Always follow your healthcare professional's instructions.

## 2021-07-03 NOTE — ADDENDUM NOTE
Addendum Note by Tesha Pelaez MD at 4/15/2019  3:00 PM     Author: Tesha Pelaez MD Service: -- Author Type: Physician    Filed: 4/17/2019  8:12 PM Encounter Date: 4/15/2019 Status: Signed    : Tesha Pelaez MD (Physician)    Addended by: TESHA PELAEZ on: 4/17/2019 08:12 PM        Modules accepted: Level of Service

## 2021-07-04 NOTE — ADDENDUM NOTE
Addendum Note by Carolann Corcoran MD at 4/21/2021  4:21 PM     Author: Carolann Corcoran MD Service: -- Author Type: Physician    Filed: 4/21/2021  4:21 PM Encounter Date: 4/20/2021 Status: Signed    : Carolann Corcoran MD (Physician)    Addended by: CAROLANN CORCORAN on: 4/21/2021 04:21 PM        Modules accepted: Orders

## 2021-07-04 NOTE — ADDENDUM NOTE
Addendum Note by Alicia Castillo CMA at 4/21/2021  4:17 PM     Author: Alicia Castillo CMA Service: -- Author Type: Certified Medical Assistant    Filed: 4/21/2021  4:17 PM Encounter Date: 4/20/2021 Status: Signed    : Alicia Castillo CMA (Certified Medical Assistant)    Addended by: ALICIA CASTILLO on: 4/21/2021 04:17 PM        Modules accepted: Orders

## 2021-07-06 ENCOUNTER — COMMUNICATION - HEALTHEAST (OUTPATIENT)
Dept: SCHEDULING | Facility: CLINIC | Age: 46
End: 2021-07-06

## 2021-07-07 ENCOUNTER — COMMUNICATION - HEALTHEAST (OUTPATIENT)
Dept: LAB | Facility: CLINIC | Age: 46
End: 2021-07-07

## 2021-07-07 ENCOUNTER — TRANSCRIBE ORDERS (OUTPATIENT)
Dept: INTERNAL MEDICINE | Facility: CLINIC | Age: 46
End: 2021-07-07

## 2021-07-07 ENCOUNTER — HOSPITAL ENCOUNTER (EMERGENCY)
Dept: EMERGENCY MEDICINE | Facility: CLINIC | Age: 46
Discharge: HOME | End: 2021-07-07
Attending: EMERGENCY MEDICINE
Payer: COMMERCIAL

## 2021-07-07 ENCOUNTER — COMMUNICATION - HEALTHEAST (OUTPATIENT)
Dept: ADMINISTRATIVE | Facility: CLINIC | Age: 46
End: 2021-07-07

## 2021-07-07 DIAGNOSIS — K92.1 MELENA: Primary | ICD-10-CM

## 2021-07-07 DIAGNOSIS — K92.1 MELENA: ICD-10-CM

## 2021-07-07 DIAGNOSIS — R63.4 LOSS OF WEIGHT: ICD-10-CM

## 2021-07-07 LAB
ABO/RH(D): NORMAL
ALBUMIN SERPL-MCNC: 3.4 G/DL (ref 3.5–5)
ALP SERPL-CCNC: 84 U/L (ref 45–120)
ALT SERPL W P-5'-P-CCNC: 12 U/L (ref 0–45)
ANION GAP SERPL CALCULATED.3IONS-SCNC: 12 MMOL/L (ref 5–18)
ANTIBODY SCREEN: NEGATIVE
AST SERPL W P-5'-P-CCNC: 15 U/L (ref 0–40)
BILIRUB DIRECT SERPL-MCNC: 0.2 MG/DL
BILIRUB SERPL-MCNC: 0.8 MG/DL (ref 0–1)
BUN SERPL-MCNC: 8 MG/DL (ref 8–22)
CALCIUM SERPL-MCNC: 8.8 MG/DL (ref 8.5–10.5)
CHLORIDE BLD-SCNC: 105 MMOL/L (ref 98–107)
CO2 SERPL-SCNC: 22 MMOL/L (ref 22–31)
CREAT SERPL-MCNC: 0.72 MG/DL (ref 0.6–1.1)
DILUENT/DEVELOPER EXIPRATION DATE HE - HISTORICAL: ABNORMAL
DILUENT/DEVELOPER LOT NUMBER HE - HISTORICAL: ABNORMAL
ERYTHROCYTE [DISTWIDTH] IN BLOOD BY AUTOMATED COUNT: 14.5 % (ref 11–14.5)
EXPIRATION DATE HE - HISTORICAL: ABNORMAL
GFR SERPL CREATININE-BSD FRML MDRD: >60 ML/MIN/1.73M2
GLUCOSE BLD-MCNC: 96 MG/DL (ref 70–125)
HCT VFR BLD AUTO: 37.1 % (ref 35–47)
HGB BLD-MCNC: 12.2 G/DL (ref 12–16)
INR PPP: 1.02 (ref 0.9–1.1)
LOT NUMBER (HISTORICAL CONVERSION): ABNORMAL
MCH RBC QN AUTO: 26.5 PG (ref 27–34)
MCHC RBC AUTO-ENTMCNC: 32.9 G/DL (ref 32–36)
MCV RBC AUTO: 81 FL (ref 80–100)
PLATELET # BLD AUTO: 293 THOU/UL (ref 140–440)
PMV BLD AUTO: 11.1 FL (ref 8.5–12.5)
POC FECAL OCCULT BLD HE - HISTORICAL: POSITIVE
POCT NEGATIVE CONTROL HE - HISTORICAL: ABNORMAL
POCT POSITIVE CONTROL HE - HISTORICAL: ABNORMAL
POTASSIUM BLD-SCNC: 3.2 MMOL/L (ref 3.5–5)
PROT SERPL-MCNC: 6.8 G/DL (ref 6–8)
RBC # BLD AUTO: 4.6 MILL/UL (ref 3.8–5.4)
SODIUM SERPL-SCNC: 139 MMOL/L (ref 136–145)
WBC: 7.2 THOU/UL (ref 4–11)

## 2021-07-07 ASSESSMENT — MIFFLIN-ST. JEOR: SCORE: 1615.65

## 2021-07-07 NOTE — TELEPHONE ENCOUNTER
Telephone Encounter by Nathalie Hernández RT (R) at 7/7/2021  2:00 PM     Author: Nathalie Hernández RT (R) Service: -- Author Type: Radiologic Technologist    Filed: 7/7/2021  2:03 PM Encounter Date: 7/7/2021 Status: Signed    : Nathalie Hernández RT (R) (Radiologic Technologist)       Reason for Call:  BUN result  question/Additional comments     Name of test or procedure: Basic Metabolic Panel     Date of test of procedure: 7/6/21    Location of the test or procedure: [facility]    OK to leave the result message on voice mail or with a family member? Yes    Phone number Patient can be reached at: Home number on file [phone] (home)    Additional comments:     1.patient viewed her results via [employer] and would like to know what the BUN level means. I also relayed the message from Dr. Julien as she asked if other labs have been placed.     2. Patient hasn't been contacted by [facility] and order is only in as routine, if we're needing a more urgent appointment I'd resubmit order and deb emergent if needing to be seen stat.     Call taken on 7/7/2021 at 2:00 PM by Nathalie Hernández

## 2021-07-07 NOTE — ED TRIAGE NOTES
ED Triage Notes by Lilibeth Hawkins RN at 7/7/2021  5:33 PM     Author: Lilibeth Hawkins RN Service: -- Author Type: Registered Nurse    Filed: 7/7/2021  5:33 PM Date of Service: 7/7/2021  5:33 PM Status: Signed    : Lilibeth Hawkins RN (Registered Nurse)       Arrives to ED from [facility] with c/o x1 week black stools and x3 days of L sided abd pain. Denies N/V.

## 2021-07-07 NOTE — TELEPHONE ENCOUNTER
Telephone Encounter by Isabella Meier RN at 7/6/2021 10:25 PM     Author: Isabella Meier RN Service: -- Author Type: Registered Nurse    Filed: 7/6/2021 10:32 PM Encounter Date: 7/6/2021 Status: Signed    : Isabella Meier RN (Registered Nurse)       Pt had labs today. Got results on MyChart. Calling w/ questions about results. Questions answered. Advised contact clinic tomorrow if further questions.     Reason for Disposition  ? Caller requesting lab results    Additional Information  ? Negative: Lab calling with strep throat test results and triager can call in prescription  ? Negative: Lab calling with urinalysis test results and triager can call in prescription  ? Negative: Medication questions  ? Negative: ED call to PCP  ? Negative: Physician call to PCP  ? Negative: Call about patient who is currently hospitalized  ? Negative: Lab or radiology calling with CRITICAL test results  ? Negative: [1] Prescription not at pharmacy AND [2] was prescribed today by PCP  ? Negative: [1] Follow-up call from patient regarding patient's clinical status AND [2] information urgent  ? Negative: [1] Caller requests to speak ONLY to PCP AND [2] URGENT question  ? Negative: [1] Caller requests to speak to PCP now AND [2] won't tell us reason for call  (Exception: if 10 pm to 6 am, caller must first discuss reason for the call)  ? Negative: Notification of hospital admission  ? Negative: Notification of death    Protocols used[facility]

## 2021-07-07 NOTE — ED NOTES
ED Notes by Suri Wilhelm RN at 7/7/2021  6:46 PM     Author: Suri Wilhelm RN Service: Emergency Medicine Author Type: Registered Nurse    Filed: 7/7/2021  6:47 PM Date of Service: 7/7/2021  6:46 PM Status: Signed    : Suri Wilhelm RN (Registered Nurse)        at bedside with MD. Pt reports black stools for one week.

## 2021-07-07 NOTE — TELEPHONE ENCOUNTER
Telephone Encounter by García Willams RT (R) at 7/7/2021  2:54 PM     Author: García Willams RT (R) Service: -- Author Type: Radiologic Technologist    Filed: 7/7/2021  2:56 PM Encounter Date: 7/7/2021 Status: Signed    : García Willams RT (R) (Radiologic Technologist)       Called pt to come back for the HM1 draw. For whatever reason, the HM1 was not drawn.

## 2021-07-07 NOTE — TELEPHONE ENCOUNTER
Telephone Encounter by Aleena Sellers CMA at 7/7/2021  4:21 PM     Author: Aleena Sellers CMA Service: -- Author Type: Certified Medical Assistant    Filed: 7/7/2021  4:23 PM Encounter Date: 7/7/2021 Status: Signed    : Aleena Sellers CMA (Certified Medical Assistant)       Called pt on lab results and she states that she has had left ovary pain since this morning along with another black stool. States pain this am was a 8 out of 10. She has been taking Extra Strength Tylenol every 6 hours. Please advise.

## 2021-07-07 NOTE — TELEPHONE ENCOUNTER
Reason for Call:  Other      Detailed comments: Person from [facility] calling wanting update on paperwork sent over last week (see encounter from 6/24). I advised her to fax the paperwork again, so please be on the look out as this is time sensitive paperwork.     Phone Number Patient can be reached at: Other phone number:  *[phone]    Best Time: Any    Can we leave a detailed message on this number?: No    Call taken on 6/28/2021 at 12:14 PM by Jennifer Perez

## 2021-07-07 NOTE — TELEPHONE ENCOUNTER
[employer] calling to check status once again on form.  Form was faxed to [phone].  Please call to update status  [phone]

## 2021-07-08 ENCOUNTER — COMMUNICATION - HEALTHEAST (OUTPATIENT)
Dept: ADMINISTRATIVE | Facility: CLINIC | Age: 46
End: 2021-07-08

## 2021-07-08 NOTE — ED NOTES
ED Notes by Eve Madden RN at 7/7/2021  7:36 PM     Author: Yanko, Eve, RN Service: -- Author Type: Registered Nurse    Filed: 7/7/2021  7:36 PM Date of Service: 7/7/2021  7:36 PM Status: Signed    : Eve Madden RN (Registered Nurse)       Pt wants to leave as she needs to care for her  who has ALS. Dr Zabala notified.

## 2021-07-08 NOTE — ED PROVIDER NOTES
ED Provider Notes by Dillon Zabala MD at 7/7/2021  6:14 PM     Author: Dillon Zabala MD Service: -- Author Type: Physician    Filed: 7/7/2021  7:53 PM Date of Service: 7/7/2021  6:14 PM Status: Signed    : Dillon Zabala MD (Physician)       eMERGENCY dEPARTMENT eNCOUnter      CHIEF COMPLAINT    Chief Complaint   Patient presents with   ? Melena       HPI      The patient required an .     Marleny Viera is a 45 y.o. female with a pertinent history of hypertension, asthma, PE, fatty liver, prediabetic who presents to this ED by personal vehicle for evaluation of melena.    Per chart review, the patient presented to the  [facility] yesterday for weight loss.  The patient also reported black stools. It was found the patient had low potassium and low BUN.  An urgent gastroenterology referral was requested.  As of today at~1400, the patient had not been contacted by gastroenterology.    Per triage note, the patient reports ~1 week (~6/30/21) of black stools accompanied by ~3 days (~7/4/21) of left sided abdominal pain.    The patient presents to the ED reporting left pelvic pain and black stool lasting ~ 1 week (~6/30/21).  She took extra strength tylenol before presenting to the ED.  The patient denies the use of blood thinners and notes she is allergic to ibuprofen.  She explained last time she used ibuprofen it caused her to have kidney problems.  The patient denies a history of GI bleeds or internal bleeding.  The patient has never had a colonoscopy or endoscopy.    Of note, the patient went to the doctor today and had blood drawn.  She noted her BUN was low.    The patient denies chest pain, shortness of breath, abdominal pain, myalgias, and any other symptoms or complaints at this time.        PAST MEDICAL HISTORY    Past Medical History:   Diagnosis Date   ? ADHD (attention deficit hyperactivity disorder)    ? Alcohol dependence (H)    ? Anxiety    ?  Arthritis    ? Asthma    ? Bipolar depression (H)    ? Deafness    ? Drug abuse, nondependent (H) 10/29/2004    Overview:  polydrug abuse per psych notes ; Other, mixed, or unspecified nondependent drug abuse, unspecified   ? History of blood clots    ? History of transfusion    ? Hypertension    ? Kidney stones 2015   ? Major depressive disorder, recurrent episode, severe (H) 5/17/2016    Overview:  s/p suicide attempt Epic    ? Migraine    ? Obstructive sleep apnea 6/1/2016   ? Pulmonary emboli (H)    ? Status post total right knee replacement 1/18/2016     Past Surgical History:   Procedure Laterality Date   ? BREAST BIOPSY Right     2019... Also had fluid drained from left breast under surgery also in 2019   ? BREAST SURGERY     ? REDUCTION MAMMAPLASTY Bilateral 2017   ? TOTAL HIP ARTHROPLASTY Bilateral    ? TOTAL KNEE ARTHROPLASTY Right    ? TUBAL LIGATION     ? US BREAST CORE BIOPSY RIGHT Right 11/21/2019       CURRENT MEDICATIONS    No current facility-administered medications for this encounter.     Current Outpatient Medications:   ?  albuterol (ACCUNEB) 0.63 mg/3 mL nebulizer solution, Take 3 mL (0.63 mg total) by nebulization every 6 (six) hours as needed for wheezing., Disp: 90 vial, Rfl: 3  ?  albuterol (PROAIR HFA;PROVENTIL HFA;VENTOLIN HFA) 90 mcg/actuation inhaler, Inhale 2 puffs every 4 (four) hours as needed for wheezing., Disp: 18 g, Rfl: 5  ?  blood pressure monitor Kit, Check blood pressure once in am; should be 130/80 or less. Pulse should be greater than 50., Disp: 1 each, Rfl: 0  ?  budesonide-formoteroL (SYMBICORT) 80-4.5 mcg/actuation inhaler, Inhale 2 puffs 2 (two) times a day., Disp: 1 Inhaler, Rfl: 12  ?  EPINEPHrine (EPIPEN/ADRENACLICK/AUVI-Q) 0.3 mg/0.3 mL injection, Inject 0.3 mL (0.3 mg total) as directed as needed., Disp: 2 Pre-filled Pen Syringe, Rfl: 2  ?  fluticasone (VERAMYST) 27.5 mcg/actuation nasal spray, Use 1 spray each nose daily, Disp: 10 g, Rfl: 12  ?  gabapentin  (NEURONTIN) 100 MG capsule, Take 100 mg by mouth 2 (two) times a day. For migraines and anxiety, Disp: 180 capsule, Rfl: 3  ?  melatonin 5 mg Tab tablet, Take 1-2 tablets (5-10 mg total) by mouth at bedtime as needed (for difficulty sleeping)., Disp: 30 tablet, Rfl: 0  ?  metoprolol tartrate (LOPRESSOR) 25 MG tablet, Take 3 tablets (75 mg total) by mouth 2 (two) times a day., Disp: 180 tablet, Rfl: 3  ?  montelukast (SINGULAIR) 10 mg tablet, Take 1 tablet (10 mg total) by mouth daily., Disp: 90 tablet, Rfl: 3  ?  potassium chloride (KLOR-CON) 10 MEQ CR tablet, Take 2 tablets (20 mEq total) by mouth daily for 7 days., Disp: 14 tablet, Rfl: 0  ?  rizatriptan (MAXALT) 10 MG tablet, Take 1 tablet (10 mg total) by mouth as needed., Disp: 90 tablet, Rfl: 3    ALLERGIES    Allergies   Allergen Reactions   ? Amoxicillin Itching and Shortness Of Breath   ? Bee Pollen Anaphylaxis   ? Contrast [Iodixanol] Shortness Of Breath and Rash     Pt stated she reacted to the contrast given for her CT in past. She experienced shortness of breath, throat tightening, and rash on chest, and they gave her an injection to counter the symptoms.    ? Hymenoptera Allergenic Extract Anaphylaxis   ? Iodine Hives     Pt stated that she was injected with CT CONTRAST in the past and got hives, SOB, and nausea. Please pre-medicate patient in the future.    ? Wasp Venom Protein Starter Kit Itching, Shortness Of Breath, Swelling and Wheezing   ? Adhesive Tape-Silicones    ? Food Allergy Formula      Red meat, chocolate   ? Geodon [Ziprasidone Hcl]    ? Ibuprofen Other (See Comments)     Kidney function  Kidney function     ? Latex      Added based on information entered during case entry, please review and add reactions, type, and severity as needed   ? Prochlorperazine Other (See Comments)   ? Risperdal [Risperidone]    ? Risperidone Analogues Other (See Comments)   ? Theobroma Oil Unknown   ? Zoloft [Sertraline]    ? Hydrochlorothiazide Rash  "      FAMILY HISTORY    Family History   Problem Relation Age of Onset   ? Cancer Mother    ? Breast cancer Mother    ? Stroke Father        SOCIAL HISTORY    Social History     Socioeconomic History   ? Marital status:      Spouse name: Not on file   ? Number of children: Not on file   ? Years of education: Not on file   ? Highest education level: Not on file   Occupational History   ? Not on file   Social Needs   ? Financial resource strain: Not on file   ? Food insecurity     Worry: Not on file     Inability: Not on file   ? Transportation needs     Medical: Not on file     Non-medical: Not on file   Tobacco Use   ? Smoking status: Never Smoker   ? Smokeless tobacco: Never Used   Substance and Sexual Activity   ? Alcohol use: Yes     Comment: dependence   ? Drug use: Not Currently   ? Sexual activity: Never   Lifestyle   ? Physical activity     Days per week: Not on file     Minutes per session: Not on file   ? Stress: Not on file   Relationships   ? Social connections     Talks on phone: Not on file     Gets together: Not on file     Attends [employer] service: Not on file     Active member of club or organization: Not on file     Attends meetings of clubs or organizations: Not on file     Relationship status: Not on file   ? Intimate partner violence     Fear of current or ex partner: Not on file     Emotionally abused: Not on file     Physically abused: Not on file     Forced sexual activity: Not on file   Other Topics Concern   ? Not on file   Social History Narrative    . No children.     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    Nonsmoker.    No alcohol use.    Tesha Pelaez MD       REVIEW OF SYSTEMS      Review of Systems   Respiratory: Negative for shortness of breath.    Cardiovascular: Negative for chest pain.   Gastrointestinal: Positive for blood in stool (black stool). Negative for abdominal pain.   Genitourinary: Positive for pelvic pain " "(left).   Musculoskeletal: Negative for myalgias.   All other systems reviewed and are negative.      PHYSICAL EXAM    VITAL SIGNS: BP (!) 185/79 (Patient Position: Sitting)   Pulse 69   Temp 98.2  F (36.8  C) (Oral)   Resp 16   Ht 5' 5\" (1.651 m)   Wt 216 lb (98 kg)   LMP 06/28/2021   SpO2 99%   BMI 35.94 kg/m      Physical Exam   Constitutional: She appears well-developed and well-nourished. No distress.   HENT:   Head: Normocephalic and atraumatic.   Eyes: Conjunctivae are normal.   Neck: Neck supple.   Cardiovascular: Normal rate and regular rhythm.   Pulmonary/Chest: Effort normal and breath sounds normal.   Abdominal: Soft. There is no abdominal tenderness.   Genitourinary:    Genitourinary Comments: No fissures or hemorrhoids noted.  There was not any obvious evident stool on rectal exam, however the Hemoccult did test positive     Musculoskeletal:         General: No edema.   Neurological: She is alert.   Skin: Skin is warm and dry.   Psychiatric: She has a normal mood and affect.   Nursing note and vitals reviewed.      LABS  Pertinent lab results reviewed in chart.  No results found for this visit on 07/07/21.      RADIOLOGY  No results found.        ED COURSE & MEDICAL DECISION MAKING    6:45 PM I met with the patient for the initial interview and physical examination. Discussed plan for treatment and workup in the ED. PPE: Provider wore gloves and paper mask.         I did see and examined the patient.  History and physical examination is consistent with an upper GI bleed given the black stools and Hemoccult positive test.  Diagnostics were ordered.    Before her diagnostics could return she did tell staff that she had to leave and unfortunately she left before is able to get back into talk to her as far as her reason why.  Attempt to contact her.  She did tell staff that she was going to try and come back later.  If she does all  were left off and plan would be for admission with Protonix " and GI consultation    FINAL IMPRESSION        Upper GI bleed        Dillon Zabala MD  07/07/21 1953

## 2021-07-08 NOTE — TELEPHONE ENCOUNTER
Telephone Encounter by Goldberg, Laura L, RT (R) at 7/8/2021  2:27 PM     Author: Goldberg, Laura L, RT (NICOL) Service: -- Author Type: Radiologic Technologist    Filed: 7/8/2021  2:32 PM Encounter Date: 7/8/2021 Status: Signed    : Goldberg, Laura L, RT (R) (Radiologic Technologist)       Reason for Call: Request for an order or referral:    Order or referral being requested: order    Date needed: asap    Has the patient been seen by the PCP for this problem? YES    Additional comments:   Order was sent to [facility] they are unable to see this patient in their office.  Order was redirected to [facility], patient indicates she does not want to go to [facility] for services.   Wondering if an order for endoscopy or Colonoscopy can be ordered so she can get in sooner with [facility] or another facility.   Patient concerned because she has blood in her stool and orders are placed as URGENT and she is unable to get in soon.    Phone number Patient can be reached at:  Home number on file [phone] (home)    Best Time:  any    Can we leave a detailed message on this number?  Yes    Call taken on 7/8/2021 at 2:28 PM by Laura L Goldberg

## 2021-07-08 NOTE — ED NOTES
ED Notes by Eve Madden RN at 7/7/2021  8:23 PM     Author: Yanko, Eve, RN Service: -- Author Type: Registered Nurse    Filed: 7/7/2021  8:23 PM Date of Service: 7/7/2021  8:23 PM Status: Signed    : Eve Madden RN (Registered Nurse)       Pt left on own power.

## 2021-07-08 NOTE — PROGRESS NOTES
Progress Notes by Fercho Rose RPh at 7/7/2021  7:22 PM     Author: Fercho Rose RPh Service: Pharmacy Author Type: Pharmacist    Filed: 7/7/2021  7:23 PM Date of Service: 7/7/2021  7:22 PM Status: Signed    : Fercho Rose RPh (Pharmacist)       Pharmacy Note - Admission Medication History    Pertinent Provider Information: INformation obtained from patient thru assistance of sign language interpretor.     ______________________________________________________________________    Prior To Admission (PTA) med list completed and updated in EMR.       PTA Med List   Medication Sig Last Dose   ? albuterol (ACCUNEB) 0.63 mg/3 mL nebulizer solution Take 3 mL (0.63 mg total) by nebulization every 6 (six) hours as needed for wheezing. Past Week at Unknown time   ? albuterol (PROAIR HFA;PROVENTIL HFA;VENTOLIN HFA) 90 mcg/actuation inhaler Inhale 2 puffs every 4 (four) hours as needed for wheezing. Past Week at Unknown time   ? budesonide-formoteroL (SYMBICORT) 80-4.5 mcg/actuation inhaler Inhale 2 puffs 2 (two) times a day. 7/7/2021 at am NOT with   ? fluticasone (VERAMYST) 27.5 mcg/actuation nasal spray Use 1 spray each nose daily 7/7/2021 at Unknown time   ? gabapentin (NEURONTIN) 100 MG capsule Take 100 mg by mouth 2 (two) times a day. For migraines and anxiety 7/7/2021 at am   ? metoprolol tartrate (LOPRESSOR) 25 MG tablet Take 3 tablets (75 mg total) by mouth 2 (two) times a day. 7/7/2021 at had both doses today   ? montelukast (SINGULAIR) 10 mg tablet Take 10 mg by mouth at bedtime. 7/6/2021 at Unknown time   ? rizatriptan (MAXALT) 10 MG tablet Take 1 tablet (10 mg total) by mouth as needed. Past Week at Unknown time       Information source(s): Patient and CareEverywhere/SureScripts  Method of interview communication: in-person    Summary of Changes to PTA Med List    Changed: removed kcl, patient did not start    Patient was asked about OTC/herbal products specifically.  PTA med  list reflects this.    In the past week, patient estimated taking medication this percent of the time:  greater than 90%.    Allergies were reviewed, assessed, and updated with the patient.      Patient did not bring any medications to the hospital and can't retrieve from home. No multi-dose medications are available for use during hospital stay.     The information provided in this note is only as accurate as the sources available at the time of the update(s).    Thank you for the opportunity to participate in the care of this patient.    Fercho Rose [facility]  7/7/2021 7:22 PM

## 2021-07-08 NOTE — ED NOTES
ED Notes by Suri Wilhelm RN at 7/7/2021  7:11 PM     Author: Suri Wilhelm RN Service: Emergency Medicine Author Type: Registered Nurse    Filed: 7/7/2021  7:11 PM Date of Service: 7/7/2021  7:11 PM Status: Signed    : Suri Wilhelm RN (Registered Nurse)       Report given to RENATA Prado.

## 2021-07-09 ENCOUNTER — COMMUNICATION - HEALTHEAST (OUTPATIENT)
Dept: ADMINISTRATIVE | Facility: CLINIC | Age: 46
End: 2021-07-09

## 2021-07-09 ENCOUNTER — RECORDS - HEALTHEAST (OUTPATIENT)
Dept: ADMINISTRATIVE | Facility: OTHER | Age: 46
End: 2021-07-09

## 2021-07-09 NOTE — TELEPHONE ENCOUNTER
Telephone Encounter by Nathalie Hernández RT (R) at 7/9/2021  3:35 PM     Author: Nathalie Hernández RT (R) Service: -- Author Type: Radiologic Technologist    Filed: 7/9/2021  3:39 PM Encounter Date: 7/9/2021 Status: Signed    : Nathalie Hernández RT (R) (Radiologic Technologist)       ..Reason for Call:  Other appointment     Detailed comments: Patient is scheduled on Tuesday with [facility].    Phone Number Patient can be reached at: Home number on file [phone] (home)    Best Time: ANY    Can we leave a detailed message on this number?: Yes    Call taken on 7/9/2021 at 3:36 PM by Nathalie Hernández

## 2021-07-09 NOTE — TELEPHONE ENCOUNTER
Telephone Encounter by Aleena Sellers CMA at 7/9/2021  1:55 PM     Author: Aleean Sellers CMA Service: -- Author Type: Certified Medical Assistant    Filed: 7/9/2021  1:57 PM Encounter Date: 7/8/2021 Status: Signed    : Aleena Sellers CMA (Certified Medical Assistant)       Spoke to pt. She is wondering if she can be referred to [facility] and Dr Julien can't refer outside the system. 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 Offered pt to refer pt to health care home for assistance and she declined.    I called [facility] and there are no appointments until September. They are checking to see if pt can be seen sooner. Waiting for a call back.

## 2021-07-09 NOTE — TELEPHONE ENCOUNTER
Telephone Encounter by Aleena Sellers CMA at 7/9/2021 11:20 AM     Author: Aleena Sellers CMA Service: -- Author Type: Certified Medical Assistant    Filed: 7/9/2021 11:20 AM Encounter Date: 7/8/2021 Status: Signed    : Aleena Sellers CMA (Certified Medical Assistant)       LMTCB. Pt needs to report back to the ER for treatment. See note from ER the other day.    Before her diagnostics could return she did tell staff that she had to leave and unfortunately she left before is able to get back into talk to her as far as her reason why.  Attempt to contact her.  She did tell staff that she was going to try and come back later.  If she does all  were left off and plan would be for admission with Protonix and GI consultation

## 2021-07-09 NOTE — TELEPHONE ENCOUNTER
Telephone Encounter by Aleena Sellers CMA at 7/9/2021  3:53 PM     Author: Aleena Sellers CMA Service: -- Author Type: Certified Medical Assistant    Filed: 7/9/2021  3:53 PM Encounter Date: 7/9/2021 Status: Signed    : Aleena Sellers CMA (Certified Medical Assistant)       Noted. Thanks.

## 2021-07-09 NOTE — TELEPHONE ENCOUNTER
Telephone Encounter by Carolann Julien MD at 7/9/2021 10:04 AM     Author: Carolann Julien MD Service: -- Author Type: Physician    Filed: 7/9/2021 10:07 AM Encounter Date: 7/8/2021 Status: Signed    : Carolann Julien MD (Physician)       I'm wondering if she should go into the ED as I don't think that [facility] will be able to see her urgently for a colonoscopy and this may be the only way to get this done quickly.     She also has a hearing disability, and requires a hearing , which makes it more difficult as well.    Hemoglobin was noted to be 12.2 in the emergency department, when she was seen for a different problem, she was concerned about adnexal pain, July 7, 2021.  This hemoglobin was done, 5 days after she had noted that she had melena.So I am less worried.    Carolann Julien MD

## 2021-07-09 NOTE — TELEPHONE ENCOUNTER
Telephone Encounter by Nathalie Hernández RT (R) at 7/9/2021  1:50 PM     Author: Nathalie Hernández RT (R) Service: -- Author Type: Radiologic Technologist    Filed: 7/9/2021  1:56 PM Encounter Date: 7/9/2021 Status: Signed    : Nathalie Hernández RT (R) (Radiologic Technologist)       ERROR

## 2021-07-09 NOTE — TELEPHONE ENCOUNTER
Telephone Encounter by Nathalie Hernández RT (R) at 7/9/2021  2:05 PM     Author: Nathalie Hernández RT (R) Service: -- Author Type: Radiologic Technologist    Filed: 7/9/2021  2:09 PM Encounter Date: 7/8/2021 Status: Signed    : Nathalie Hernández RT (R) (Radiologic Technologist)       Ramiro from [facility] called to let us kn ow that patient is scheduled with [facility] Friday, July 16th at 10:40am with Dr. Nava in [place].

## 2021-07-10 VITALS — WEIGHT: 216 LBS | HEIGHT: 65 IN | BODY MASS INDEX: 35.99 KG/M2

## 2021-07-11 PROBLEM — M18.11 ARTHRITIS OF CARPOMETACARPAL (CMC) JOINT OF RIGHT THUMB: Status: ACTIVE | Noted: 2021-03-10

## 2021-07-21 NOTE — ED NOTES
Went in to change the expiration date on the occult blood stool but it would not allow me to change it.Correct expiration date should be 11/23/21.

## 2021-08-02 DIAGNOSIS — F60.3 BORDERLINE PERSONALITY DISORDER (H): Primary | ICD-10-CM

## 2021-08-05 RX ORDER — HYDROXYZINE HYDROCHLORIDE 25 MG/1
TABLET, FILM COATED ORAL
Qty: 180 TABLET | Refills: 1 | Status: SHIPPED | OUTPATIENT
Start: 2021-08-05 | End: 2021-11-17

## 2021-08-05 NOTE — TELEPHONE ENCOUNTER
Patient calling to check status of refill request, she is out of her medication and needing asap as she is unable to sleep.

## 2021-09-08 ENCOUNTER — HOSPITAL ENCOUNTER (EMERGENCY)
Facility: HOSPITAL | Age: 46
Discharge: HOME | End: 2021-09-08
Payer: MEDICARE

## 2021-09-08 ENCOUNTER — NURSE TRIAGE (OUTPATIENT)
Dept: NURSING | Facility: CLINIC | Age: 46
End: 2021-09-08

## 2021-09-08 VITALS
RESPIRATION RATE: 16 BRPM | WEIGHT: 218 LBS | SYSTOLIC BLOOD PRESSURE: 184 MMHG | TEMPERATURE: 98.4 F | BODY MASS INDEX: 36.32 KG/M2 | HEIGHT: 65 IN | OXYGEN SATURATION: 96 % | HEART RATE: 73 BPM | DIASTOLIC BLOOD PRESSURE: 84 MMHG

## 2021-09-08 ASSESSMENT — MIFFLIN-ST. JEOR: SCORE: 1624.72

## 2021-09-08 NOTE — ED TRIAGE NOTES
Pt reports right-sided rib/below the breast pain that doesn't worsen with inhalation or exhalation. Pt reports feeling a lump and has been painful for 3 days. Pt took extra strength tylenol 1 hr PTA and has not helped. Pt also endorses diarrhea. Denies new SOB.    Medical hx of asthma and allergies.  Hx of blood clots in 2012/2013 and anticoagulant was discontinued after that time.

## 2021-09-09 ENCOUNTER — HOSPITAL ENCOUNTER (EMERGENCY)
Facility: CLINIC | Age: 46
Discharge: HOME | End: 2021-09-10
Admitting: EMERGENCY MEDICINE
Payer: MEDICARE

## 2021-09-09 ENCOUNTER — APPOINTMENT (OUTPATIENT)
Dept: ULTRASOUND IMAGING | Facility: CLINIC | Age: 46
End: 2021-09-09
Payer: MEDICARE

## 2021-09-09 ENCOUNTER — APPOINTMENT (OUTPATIENT)
Dept: RADIOLOGY | Facility: CLINIC | Age: 46
End: 2021-09-09
Payer: MEDICARE

## 2021-09-09 VITALS
OXYGEN SATURATION: 100 % | RESPIRATION RATE: 16 BRPM | HEART RATE: 75 BPM | BODY MASS INDEX: 36.32 KG/M2 | WEIGHT: 218 LBS | SYSTOLIC BLOOD PRESSURE: 187 MMHG | DIASTOLIC BLOOD PRESSURE: 95 MMHG | HEIGHT: 65 IN | TEMPERATURE: 97.3 F

## 2021-09-09 DIAGNOSIS — R07.89 CHEST WALL PAIN: ICD-10-CM

## 2021-09-09 PROCEDURE — 76705 ECHO EXAM OF ABDOMEN: CPT

## 2021-09-09 PROCEDURE — 99285 EMERGENCY DEPT VISIT HI MDM: CPT | Mod: 25

## 2021-09-09 PROCEDURE — 71101 X-RAY EXAM UNILAT RIBS/CHEST: CPT | Mod: RT

## 2021-09-09 ASSESSMENT — MIFFLIN-ST. JEOR: SCORE: 1624.72

## 2021-09-09 NOTE — TELEPHONE ENCOUNTER
Patient is calling with  #[id number].      For the last 3 days she has been noticing that on the right side of her chest, closer to rib cage in center there is a lump.  It is painful to touch, rates pain 7/10.   Lump is size of a quarter and normal skin color.  It doesn't show above the skin, but is there when she presses down.        Denies fever, warmth.     Patient was frustrated with the wait in the ED overnight and today at the clinic it was reported to be a 3 hour wait for an , so she left.  She isn't sure she would be able to be see in the clinic or  tomorrow without advanced warning to get an .  She had left the ED in order to get to a 0700 AA meeting she couldn't miss.      Plan: Route note high priority to PCP team to evaluate need for appointment or other plan.  Patient was agreeable to this, also verbalized understanding that she may still need to come in and they can get  arranged if needed.    COVID 19 Nurse Triage Plan/Patient Instructions    Please be aware that novel coronavirus (COVID-19) may be circulating in the community. If you develop symptoms such as fever, cough, or SOB or if you have concerns about the presence of another infection including coronavirus (COVID-19), please contact your health care provider or visit [email or web address].     Disposition/Instructions    In-Person Visit with provider recommended. Reference Visit Selection Guide.    Thank you for taking steps to prevent the spread of this virus.  o Limit your contact with others.  o Wear a simple mask to cover your cough.  o Wash your hands well and often.    Resources    [facility]: About COVID-19: [email or web address]    CDC: What to Do If You're Sick: www.cdc.gov/coronavirus/2019-ncov/about/steps-when-sick.html    CDC: Ending Home Isolation: www.cdc.gov/coronavirus/2019-ncov/hcp/disposition-in-home-patients.html     CDC: Caring for Someone:  www.cdc.gov/coronavirus/2019-ncov/if-you-are-sick/care-for-someone.html     [facility]: Interim Guidance for Hospital Discharge to Home: www.health.[facility].[place]/diseases/coronavirus/hcp/hospdischarge.pdf    [facility] clinical trials (COVID-19 research studies): [email or web address].[email or web address]/umn-clinical-trials     Below are the COVID-19 hotlines at the [place] Department of Health ([facility]). Interpreters are available.   o For health questions: Call 584-485-1177 or 1-575.729.4442 (7 a.m. to 7 p.m.)  o For questions about schools and childcare: Call 994-685-2305 or 1-925.955.1236 (7 a.m. to 7 p.m.)                     Tesha Sierra RN on 9/8/2021 at 9:58 PM         Reason for Disposition    [1] Swelling is painful to touch AND [2] no fever    Additional Information    Negative: Small growth, spot, bump, or pigmented area of skin (e.g., moles, skin tags, wart, melanoma, skin cancer)    Negative: Inguinal hernia previously diagnosed by a physician    Negative: Followed a skin injury    Negative: Follows an insect bite    Negative: Swelling of lymph node suspected    Negative: Swelling of vaccination site    Negative: Swelling of tongue    Negative: Swelling of lip    Negative: Swelling of eye    Negative: Swelling of entire face    Negative: Swelling of scrotum    Negative: Swelling of labia    Negative: Swelling of surgical incision    Negative: Swelling of ankle joint    Negative: Swelling of elbow joint    Negative: Swelling of knee joint    Negative: Swelling with a skin rash    Negative: Patient sounds very sick or weak to the triager    Negative: SEVERE pain (e.g., excruciating)    Negative: [1] Swelling is painful to touch AND [2] fever    Negative: [1] Swelling is red AND [2] fever    Negative: [1] Swelling is red AND [2] size > 2 inches (5.0 cm) (Exception: itchy area of skin)    Negative: [1] Swelling of groin (inguinal area) AND [2] painful    Protocols used: SKIN LUMP OR LOCALIZED SWELLING-A-AH

## 2021-09-09 NOTE — TELEPHONE ENCOUNTER
Patient was left a message asking to call back to make an appointment with Dr. Julien. When the patient returns this call, please inform her that there are virtual visits that are currently available this afternoon with Dr. Julien which can be facilitated using this ASL phone service, otherwise patient may make an appointment for an office visit next week. Please clarify with the patient that Dr. Julien's note has a typo: The office visit being offered to the patient is on Friday, September 17 at 2:00--not September 16.      #[phone] helped with this call.

## 2021-09-09 NOTE — TELEPHONE ENCOUNTER
Patient has some sort of device that allows her to communicate, which be able to do a virtual visit with us?    Otherwise, I can see her when I am in the clinic, there is a same-day opening, on my schedule, September 16, at  2 p.m.It is a 20 minute slot. Let us know if there is any change in the lump, or if she develops redness of her skin or a fever.     Carolann Julien MD

## 2021-09-10 LAB
ALBUMIN SERPL-MCNC: 3.5 G/DL (ref 3.5–5)
ALP SERPL-CCNC: 84 U/L (ref 45–120)
ALT SERPL W P-5'-P-CCNC: 16 U/L (ref 0–45)
ANION GAP SERPL CALCULATED.3IONS-SCNC: 9 MMOL/L (ref 5–18)
AST SERPL W P-5'-P-CCNC: 16 U/L (ref 0–40)
BILIRUB SERPL-MCNC: 0.8 MG/DL (ref 0–1)
BUN SERPL-MCNC: 10 MG/DL (ref 8–22)
CALCIUM SERPL-MCNC: 8.8 MG/DL (ref 8.5–10.5)
CHLORIDE BLD-SCNC: 107 MMOL/L (ref 98–107)
CO2 SERPL-SCNC: 26 MMOL/L (ref 22–31)
CREAT SERPL-MCNC: 0.81 MG/DL (ref 0.6–1.1)
ERYTHROCYTE [DISTWIDTH] IN BLOOD BY AUTOMATED COUNT: 14.5 % (ref 10–15)
GFR SERPL CREATININE-BSD FRML MDRD: 87 ML/MIN/1.73M2
GLUCOSE BLD-MCNC: 101 MG/DL (ref 70–125)
HCT VFR BLD AUTO: 36.3 % (ref 35–47)
HGB BLD-MCNC: 11.8 G/DL (ref 11.7–15.7)
LIPASE SERPL-CCNC: 17 U/L (ref 0–52)
MCH RBC QN AUTO: 26.7 PG (ref 26.5–33)
MCHC RBC AUTO-ENTMCNC: 32.5 G/DL (ref 31.5–36.5)
MCV RBC AUTO: 82 FL (ref 78–100)
PLATELET # BLD AUTO: 286 10E3/UL (ref 150–450)
POTASSIUM BLD-SCNC: 3.4 MMOL/L (ref 3.5–5)
PROT SERPL-MCNC: 6.8 G/DL (ref 6–8)
RBC # BLD AUTO: 4.42 10E6/UL (ref 3.8–5.2)
SODIUM SERPL-SCNC: 142 MMOL/L (ref 136–145)
WBC # BLD AUTO: 7.4 10E3/UL (ref 4–11)

## 2021-09-10 PROCEDURE — 83690 ASSAY OF LIPASE: CPT | Performed by: EMERGENCY MEDICINE

## 2021-09-10 PROCEDURE — 85027 COMPLETE CBC AUTOMATED: CPT | Performed by: EMERGENCY MEDICINE

## 2021-09-10 PROCEDURE — 36415 COLL VENOUS BLD VENIPUNCTURE: CPT | Performed by: EMERGENCY MEDICINE

## 2021-09-10 PROCEDURE — 80053 COMPREHEN METABOLIC PANEL: CPT | Performed by: EMERGENCY MEDICINE

## 2021-09-10 NOTE — ED PROVIDER NOTES
EMERGENCY DEPARTMENT ENCOUNTER      NAME: Marleny Viera  AGE: 45 year old female  YOB: 1975  MRN: [id number]  EVALUATION DATE & TIME: 9/9/2021  9:01 PM    PCP: Carolann Julien    ED PROVIDER: Gladys Goldman PA-C      Chief Complaint   Patient presents with     Abdominal Pain         FINAL IMPRESSION:  1. Chest wall pain          ED COURSE & MEDICAL DECISION MAKING:    Pertinent Labs & Imaging studies reviewed. (See chart for details)    45 year old female presents to the Emergency Department for evaluation of chest wall pain/lump.    Physical exam is remarkable for a well-appearing female who is in no acute distress.  She has tenderness to palpation on the right distal anterior chest wall and the uppermost portion of the right upper quadrant of the abdomen.  I do not appreciate any lump or hernias on my exam.  No overlying skin changes.  Heart and lung sounds clear diffusely throughout.  Remainder of abdomen is soft and nontender.  Vital signs are stable and she is afebrile.    CBC is unremarkable with no leukocytosis or anemia.  CMP is unremarkable with no significant electrolyte derangements, normal liver and kidney function. Lipase within normal limits.  Right upper quadrant ultrasound shows fatty liver but otherwise clear.  Chest x-ray is negative.    The patient was given Tylenol for pain.  I do not think any further emergent labs or imaging are indicated at this time.  She has very reassuring lab work-up.  Chest pain is highly reproducible so low concern for ACS, she denies any discrete chest pain or shortness of breath. She is not tachycardic or hypoxic, low concern for PE.  I suspect her symptoms are secondary to muscle strain, the patient notes that she does move her  frequently as he has ALS so certainly possible mechanism there.  Advised ongoing treatment at home with Tylenol, ice or heat, and lidocaine patches.  Recommend follow-up with her primary care provider for  recheck.  Advised return to the ER if she develops any new or worsening symptoms like severe pain, fever, persistent vomiting, chest pain or shortness of breath, or any other concerning symptoms.  Patient is amenable with this treatment plan and verbalized her understanding.    ED Course   9:12 PM Performed my initial history and physical exam. Discussed workup in the emergency department, management of symptoms, and likely disposition. PPE: Provider wore surgical mask and gloves.   12:43 AM Updated with test results. I discussed the plan for discharge with the patient, and patient is agreeable. We discussed supportive cares at home and reasons for return to the ER including new or worsening symptoms - all questions and concerns addressed. Patient to be discharged by RN.    At the conclusion of the encounter I discussed the results of all of the tests and the disposition. The questions were answered. The patient or family acknowledged understanding and was agreeable with the care plan.     Voice recognition software was used in the creation of this note. Any grammatical or nonsensical errors are due to inherent errors with the software and are not the intention of the writer.     MEDICATIONS GIVEN IN THE EMERGENCY:  Medications - No data to display    NEW PRESCRIPTIONS STARTED AT TODAY'S ER VISIT  New Prescriptions    No medications on file            =================================================================    HPI    Patient information was obtained from: Patient     Use of Intrepreter: Yes (In Person) - Language American Sign Language        Marleny Viera is a 45 year old female with a pertinent medical history of hypertension, pulmonary emboli, asthma, kidney stones, anxiety, and alcohol dependence who presents to this ED by walk in for evaluation of painful lump over right ribcage.    Patient has had a painful lump over her right lower ribs/liver area for 4 days. She describes the pain as feeling  "\"warm\" and notes that she has been having a hard time sleeping. The pain is worsened when she lays down and when she is moving around. Her pain is not worsened with taking deep breaths. She took Tylenol this morning, afternoon, and evening (last dose around 6:30 PM) with no relief. She also reports that she had 1 episode of diarrhea recently. No history of similar symptoms. No reported recent falls or injury, but patient notes that she has to help her , who has ALS, get around. She notes a history of asthma and reports no change in her breathing from baseline. She denies fever, chills, vomiting, black/bloody stools, headache, dizziness, or additional symptoms at this time.         REVIEW OF SYSTEMS   Constitutional:  No reported fever, chills  Respiratory: No reported SOB  GI:  Reports diarrhea. No reported nausea, vomiting, hematemesis, dark or bloody stools, hematochezia  Musculoskeletal:  Reports painful lump over right lower rib region. No reported new muscle/joint swelling or loss of function.   Neurologic:  No reported headache, vertigo    All other systems reviewed and are negative unless noted in HPI.      PAST MEDICAL HISTORY:  Past Medical History:   Diagnosis Date     ADHD (attention deficit hyperactivity disorder)      Alcohol dependence (H)      Anxiety      Arthritis      Asthma      Bipolar depression (H)      Deafness      Drug abuse, nondependent (H) 10/29/2004    Overview:  polydrug abuse per psych notes ; Other, mixed, or unspecified nondependent drug abuse, unspecified     History of blood clots      History of transfusion      Hypertension      Kidney stones 2015     Major depressive disorder, recurrent episode, severe (H) 5/17/2016    Overview:  s/p suicide attempt Epic      Migraine      Obstructive sleep apnea 6/1/2016     Pulmonary emboli (H)      Status post total right knee replacement 1/18/2016       PAST SURGICAL HISTORY:  Past Surgical History:   Procedure Laterality Date     " BIOPSY BREAST Right     2019... Also had fluid drained from left breast under surgery also in 2019     BREAST SURGERY       MAMMOPLASTY REDUCTION Bilateral 2017     TOTAL HIP ARTHROPLASTY Bilateral      TOTAL KNEE ARTHROPLASTY Right      TUBAL LIGATION       US BREAST CORE BIOPSY RIGHT Right 11/21/2019       CURRENT MEDICATIONS:    albuterol (ACCUNEB) 0.63 MG/3ML neb solution  albuterol (PROAIR HFA/PROVENTIL HFA/VENTOLIN HFA) 108 (90 Base) MCG/ACT inhaler  albuterol (PROAIR HFA/PROVENTIL HFA/VENTOLIN HFA) 108 (90 Base) MCG/ACT inhaler  albuterol (PROVENTIL) (2.5 MG/3ML) 0.083% neb solution  Blood Pressure Monitoring (BLOOD PRESSURE KIT) KIT  budesonide-formoterol (SYMBICORT) 80-4.5 MCG/ACT Inhaler  EPINEPHrine (ANY BX GENERIC EQUIV) 0.3 MG/0.3ML injection 2-pack  EPINEPHrine (ANY BX GENERIC EQUIV) 0.3 MG/0.3ML injection 2-pack  fluticasone (VERAMYST) 27.5 MCG/SPRAY nasal spray  gabapentin (NEURONTIN) 100 MG capsule  hydrOXYzine (ATARAX) 25 MG tablet  hydrOXYzine (ATARAX) 50 MG tablet  melatonin 5 MG tablet  metoprolol tartrate (LOPRESSOR) 25 MG tablet  mometasone (NASONEX) 50 MCG/ACT nasal spray  montelukast (SINGULAIR) 10 MG tablet  omeprazole (PRILOSEC) 20 MG DR capsule  oxybutynin ER (DITROPAN-XL) 10 MG 24 hr tablet  potassium chloride ER (K-TAB/KLOR-CON) 10 MEQ CR tablet  rizatriptan (MAXALT) 10 MG tablet  rizatriptan (MAXALT) 10 MG tablet  SYMBICORT 80-4.5 MCG/ACT Inhaler        ALLERGIES:  Allergies   Allergen Reactions     Amoxicillin Itching and Shortness Of Breath     Bee Pollen Anaphylaxis     Contrast [Iodixanol] Shortness Of Breath and Rash     Pt stated she reacted to the contrast given for her CT in past. She experienced shortness of breath, throat tightening, and rash on chest, and they gave her an injection to counter the symptoms.      Hymenoptera Allergenic Extract [Wasp Venom Protein] Anaphylaxis     Iodine Hives     Pt stated that she was injected with CT CONTRAST in the past and got hives, SOB,  "and nausea. Please pre-medicate patient in the future.      Wasp Venom Protein Starter Kit [Wasp Venom Protein] Itching, Shortness Of Breath, Swelling and Difficulty breathing     Adhesive Tape-Silicones [Adhesive Tape] Unknown     Food Allergy Formula Unknown     Red meat, chocolate     Geodon [Ziprasidone] Unknown     Ibuprofen Other (See Comments)     Kidney function, Kidney function     Latex Unknown     Added based on information entered during case entry, please review and add reactions, type, and severity as needed     Prochlorperazine Other (See Comments)     Risperdal [Risperidone] Unknown     Risperidone Analogues [Risperidone] Other (See Comments)     Theobroma Oil [Theobroma Grandiflorum Seed Butter] Unknown     Zoloft [Sertraline] Unknown     Hydrochlorothiazide Rash       FAMILY HISTORY:  Family History   Problem Relation Age of Onset     Cancer Mother      Breast Cancer Mother      Cerebrovascular Disease Father        SOCIAL HISTORY:   Social History     Socioeconomic History     Marital status:      Spouse name: Not on file     Number of children: Not on file     Years of education: Not on file     Highest education level: Not on file   Occupational History     Not on file   Tobacco Use     Smoking status: Never Smoker     Smokeless tobacco: Never Used   Substance and Sexual Activity     Alcohol use: Yes     Comment: Alcoholic Drinks/day: dependence     Drug use: Not Currently     Sexual activity: Never   Other Topics Concern     Not on file   Social History Narrative    . No children.   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  Nonsmoker.  No alcohol use.  Tesha Pelaez MD       Social Determinants of Health     Financial Resource Strain:      Difficulty of Paying Living Expenses:    Food Insecurity:      Worried About Running Out of Food in the Last Year:      Ran Out of Food in the Last Year:    Transportation Needs:      Lack of " "Transportation (Medical):      Lack of Transportation (Non-Medical):    Physical Activity:      Days of Exercise per Week:      Minutes of Exercise per Session:    Stress:      Feeling of Stress :    Social Connections:      Frequency of Communication with Friends and Family:      Frequency of Social Gatherings with Friends and Family:      Attends Yazidi Services:      Active Member of Clubs or Organizations:      Attends Club or Organization Meetings:      Marital Status:    Intimate Partner Violence:      Fear of Current or Ex-Partner:      Emotionally Abused:      Physically Abused:      Sexually Abused:        VITALS:  Patient Vitals for the past 24 hrs:   BP Temp Temp src Pulse Resp SpO2 Height Weight   09/09/21 2008 (!) 187/95 97.3  F (36.3  C) Oral 75 16 100 % 1.651 m (5' 5\") 98.9 kg (218 lb)       PHYSICAL EXAM    VITAL SIGNS: BP (!) 187/95   Pulse 75   Temp 97.3  F (36.3  C) (Oral)   Resp 16   Ht 1.651 m (5' 5\")   Wt 98.9 kg (218 lb)   LMP 08/25/2021   SpO2 100%   BMI 36.28 kg/m    General Appearance: Alert, cooperative, normal speech and facial symmetry, appears stated age, the patient does not appear in distress  Head:  Normocephalic, without obvious abnormality, atraumatic  Eyes: Conjunctiva/corneas clear, EOM's intact, no nystagmus, PERRL  ENT:  Lips, mucosa, and tongue normal; teeth and gums normal, no pharyngeal inflammation, no dysphonia or difficulty swallowing, membranes are moist without pallor  Chest:  Tenderness to palpation on the right distal anterior chest wall and the uppermost portion of the right upper quadrant of the abdomen.  I do not appreciate any lump or hernias on my exam.  No overlying skin changes.  Cardio:  Regular rate and rhythm, S1 and S2 normal, no murmur, rub    or gallop, 2+ pulses symmetric in all extremities  Pulm:  Clear to auscultation bilaterally, respirations unlabored with no accessory muscle use  Abdomen:  Abdomen is soft, non-distended with no tenderness " to palpation, rebound tenderness, or guarding.   Extremities:  Extremities normal, there is no tenderness to palpation , atraumatic, no cyanosis or edema, full function and range of motion, pulses equal in all extremities, normal cap refill, no joint swelling; no calf tenderness  Neuro: Patient is awake, alert, and responsive to voice. No gross motor weaknesses or sensory loss; moves all extremities.     LAB:  All pertinent labs reviewed and interpreted.  Labs Ordered and Resulted from Time of ED Arrival Up to the Time of Departure from the ED   COMPREHENSIVE METABOLIC PANEL - Abnormal; Notable for the following components:       Result Value    Potassium 3.4 (*)     All other components within normal limits   CBC WITH PLATELETS - Normal   LIPASE - Normal       RADIOLOGY:  Reviewed all pertinent imaging. Please see official radiology report.  Abdomen US, limited (RUQ only)   Final Result   IMPRESSION:   1.  Fatty liver.   2.  Exam otherwise negative.            Ribs XR, unilat 3 views + PA chest, right   Final Result   IMPRESSION: No acute abnormality. No rib fractures.              IRaiza, am serving as a scribe to document services personally performed by Gladys Goldman PA-C based on my observation and the provider's statements to me. IGladys PA-C attest that Raiza Pires is acting in a scribe capacity, has observed my performance of the services and has documented them in accordance with my direction.     Gladys Goldman PA-C  Emergency Medicine  [facility] EMERGENCY ROOM  [address] [facility] [id number] [phone]  Dept: [phone]     Gladys Goldman PA-C  09/10/21 0101

## 2021-09-10 NOTE — DISCHARGE INSTRUCTIONS
You were seen in the emergency department today for evaluation of chest wall/upper abdominal pain.  Your lab work today is reassuring with no evidence of infection in your blood, liver or kidney dysfunction, or electrolyte problems.  Your right upper quadrant ultrasound showed fatty liver but I do not believe this is the cause of your symptoms, this is typically benign and can be monitored by your primary doctor.  Your chest x-ray is negative for fractures or dislocations.  You may have a chest wall strain which should improve over the next few days.    You may take Tylenol for pain, do not exceed 4000 mg/day.  Apply ice or heat, whatever feels better.  You may also use over-the-counter lidocaine patches.    Follow-up with your primary care provider for recheck if your symptoms have not improved.  Return to the ER if you develop any new or worsening symptoms like severe pain, fever, vomiting, chest pain or difficulty breathing, or any other symptoms that concern you.

## 2021-09-11 ENCOUNTER — NURSE TRIAGE (OUTPATIENT)
Dept: NURSING | Facility: CLINIC | Age: 46
End: 2021-09-11

## 2021-09-11 NOTE — TELEPHONE ENCOUNTER
Patient is complaining of spreading purple color on the right side of breast area. Patient was in in the ED one day ago. Patient is complaining of moderate pain to area and tylenol taken with no relief.  Triage guidelines recommend to see pcp within 2 weeks.  Caller verbalized and understands directives.  COVID 19 Nurse Triage Plan/Patient Instructions    Please be aware that novel coronavirus (COVID-19) may be circulating in the community. If you develop symptoms such as fever, cough, or SOB or if you have concerns about the presence of another infection including coronavirus (COVID-19), please contact your health care provider or visit [email or web address].     Disposition/Instructions    In-Person Visit with provider recommended. Reference Visit Selection Guide.    Thank you for taking steps to prevent the spread of this virus.  o Limit your contact with others.  o Wear a simple mask to cover your cough.  o Wash your hands well and often.    Resources    [facility]: About COVID-19: [email or web address]    CDC: What to Do If You're Sick: www.cdc.gov/coronavirus/2019-ncov/about/steps-when-sick.html    CDC: Ending Home Isolation: www.cdc.gov/coronavirus/2019-ncov/hcp/disposition-in-home-patients.html     CDC: Caring for Someone: www.cdc.gov/coronavirus/2019-ncov/if-you-are-sick/care-for-someone.html     [facility]: Interim Guidance for Hospital Discharge to Home: www.health.[facility].mn.us/diseases/coronavirus/hcp/hospdischarge.pdf    [facility] clinical trials (COVID-19 research studies): [email or web address]     Below are the COVID-19 hotlines at the [facility] of Health ([facility]). Interpreters are available.   o For health questions: Call 238-382-9793 or 1-478.163.9553 (7 a.m. to 7 p.m.)  o For questions about schools and childcare: Call 148-890-5160 or 1-179.458.6789 (7 a.m. to 7 p.m.)                     Reason for Disposition    [1] Breast pain AND [2]  "cause is not known    Additional Information    Negative: Chest pain    Negative: Breastfeeding questions about baby    Negative: Breastfeeding questions about mother (breast symptoms or feeling sick)    Negative: Breastfeeding questions about mother's medicines and diet    Negative: Postpartum breast pain and swelling, not breastfeeding    Negative: Small spot, skin growth or mole    Negative: [1] SEVERE breast pain AND [2] fever > 103 F (39.4 C)    Negative: Patient sounds very sick or weak to the triager    Negative: [1Breast looks infected (spreading redness, feels hot or painful to touch) AND [2] fever    Negative: [1Breast looks infected (spreading redness, feels hot or painful to touch) AND [2] no fever    Negative: [1] Painful rash AND [2] multiple small blisters grouped together (i.e., dermatomal distribution or \"band\" or \"stripe\")    Negative: [1] Cuts, burns, or bruises of breasts AND [2] suspicious history for the injury    Negative: Breast lump    Negative: [1] Nipple discharge AND [2] bloody    Negative: Nipple is inverted (i.e., points inward)  (Exception: long-term physical characteristic, present for many years)    Negative: Dry flaking-peeling skin of nipple    Negative: Change in shape or appearance of breast    Negative: Dimpling of skin of breast  (i.e., skin looks like the outside of an orange peel)    Negative: [1] Breast tenderness and fullness AND [2] pregnant    Negative: [1] Nipple discharge AND [2] not bloody (e.g., clear, white, yellow, brown, green)    Protocols used: BREAST SYMPTOMS-A-AH      "

## 2021-09-26 ENCOUNTER — HOSPITAL ENCOUNTER (EMERGENCY)
Facility: CLINIC | Age: 46
Discharge: LEFT BEFORE DISCHARGE | End: 2021-09-26
Payer: COMMERCIAL

## 2021-09-26 VITALS
OXYGEN SATURATION: 100 % | TEMPERATURE: 98.6 F | HEART RATE: 92 BPM | WEIGHT: 218 LBS | DIASTOLIC BLOOD PRESSURE: 92 MMHG | SYSTOLIC BLOOD PRESSURE: 165 MMHG | BODY MASS INDEX: 36.28 KG/M2 | RESPIRATION RATE: 18 BRPM

## 2021-09-27 ENCOUNTER — OFFICE VISIT (OUTPATIENT)
Dept: INTERNAL MEDICINE | Facility: CLINIC | Age: 46
End: 2021-09-27
Payer: MEDICARE

## 2021-09-27 VITALS
DIASTOLIC BLOOD PRESSURE: 68 MMHG | HEART RATE: 76 BPM | SYSTOLIC BLOOD PRESSURE: 136 MMHG | WEIGHT: 215 LBS | BODY MASS INDEX: 35.78 KG/M2

## 2021-09-27 DIAGNOSIS — Z23 NEED FOR IMMUNIZATION AGAINST INFLUENZA: ICD-10-CM

## 2021-09-27 DIAGNOSIS — M54.2 NECK PAIN ON RIGHT SIDE: Primary | ICD-10-CM

## 2021-09-27 PROCEDURE — 90686 IIV4 VACC NO PRSV 0.5 ML IM: CPT | Performed by: INTERNAL MEDICINE

## 2021-09-27 PROCEDURE — G0008 ADMIN INFLUENZA VIRUS VAC: HCPCS | Performed by: INTERNAL MEDICINE

## 2021-09-27 PROCEDURE — 99214 OFFICE O/P EST MOD 30 MIN: CPT | Mod: 25 | Performed by: INTERNAL MEDICINE

## 2021-09-27 NOTE — PATIENT INSTRUCTIONS
Schedule a head CT scan.  Look on your [facility] computer portal for results of that test.    Follow-up in clinic in 1 to 2 weeks with available provider to follow-up on the head/neck pain.    You can request a referral to physical therapy anytime to help with neck stretching exercises to help reduce pain.    You can use Tylenol up to 3 times a day.  Okay to use a heating pad and gentle stretching and massage of your back.    If you do develop a fever or increasing ear pain, get evaluated at that time but you did not have evidence of an ear infection at this time.

## 2021-09-27 NOTE — PROGRESS NOTES
[facility] clinic Follow Up Note    Marleny Viera   45 year old female    Date of Visit: 9/27/2021    Chief Complaint   Patient presents with     Follow Up     Emergency department follow-up and discuss several health concerns     Subjective  Marleny is a 45-year-old who had established care with Dr. Julien, who comes in today for follow-up on ER and complaint of right occipital pain for 5 days.    Patient has a history of recurrent headaches.  I did review the MRI MRA of her brain January 2021 that did not show any arterial stenosis or aneurysm or mass or abnormality.    On review of the chart I do note diagnoses of fatty liver, prediabetes, history of alcohol and polysubstance abuse and questionable sleep apnea.    Patient was in the ER on September 9, she had had a chest wall strain.  Chest x-ray was negative and no rib fracture.  Patient states that the rib pain and chest wall pain has now resolved.    She did have an ultrasound that time which showed a fatty liver but otherwise negative.  I did review labs from September 9 including a normal lipase, normal liver test a creatinine of 0.81 and normal hemogram.    Patient is on metoprolol for hypertension.    History of asthma but never smoked, denies flare or increasing shortness of breath.  On inhalers.    She denies fever or sinusitis symptoms or nasal discharge or cough.  No pain on the left side of her head or neck.  No chest pain or abdominal pain.    She did have some mild nausea over the past couple of days, she had some mild dizziness and unsteadiness.  She states that happened in the past stated it felt like a migraine headaches.    PMHx:    Past Medical History:   Diagnosis Date     ADHD (attention deficit hyperactivity disorder)      Alcohol dependence (H)      Anxiety      Arthritis      Asthma      Bipolar depression (H)      Deafness      Drug abuse, nondependent (H) 10/29/2004    Overview:  polydrug abuse per psych notes ; Other, mixed,  or unspecified nondependent drug abuse, unspecified     History of blood clots      History of transfusion      Hypertension      Kidney stones 2015     Major depressive disorder, recurrent episode, severe (H) 5/17/2016    Overview:  s/p suicide attempt Epic      Migraine      Obstructive sleep apnea 6/1/2016     Pulmonary emboli (H)      Status post total right knee replacement 1/18/2016     PSHx:    Past Surgical History:   Procedure Laterality Date     BIOPSY BREAST Right     2019... Also had fluid drained from left breast under surgery also in 2019     BREAST SURGERY       MAMMOPLASTY REDUCTION Bilateral 2017     TOTAL HIP ARTHROPLASTY Bilateral      TOTAL KNEE ARTHROPLASTY Right      TUBAL LIGATION       US BREAST CORE BIOPSY RIGHT Right 11/21/2019     Immunizations:   Immunization History   Administered Date(s) Administered     COVID-19,PF,Pfizer 04/20/2021     FLU 6-35 months 09/18/2016     Flu, Unspecified 11/03/2014, 09/30/2015, 09/15/2016, 10/22/2018, 09/05/2019     HepB-Adult 02/22/2007, 10/16/2007, 05/27/2008, 03/14/2013     Influenza (IIV3) PF 09/30/2015     Influenza Vaccine IM > 6 months Valent IIV4 (Alfuria,Fluzone) 09/21/2017, 10/03/2018, 09/05/2019, 10/02/2020, 09/27/2021     Influenza,INJ,MDCK,PF, IIV3 18+yrs 11/03/2014     MMR 05/09/2016     Pneumococcal 23 valent 10/03/2006, 10/02/2020     Td (Adult), Adsorbed 1973, 11/10/2003, 03/14/2013     Td,adult,historic,unspecified 1973     Tdap (Adacel,Boostrix) 01/24/2011, 02/10/2016     Tdap (Adult) Unspecified Formulation 1973     Varicella 06/12/2017, 07/10/2017       ROS A comprehensive review of systems was performed and was otherwise negative    Medications, allergies, and problem list were reviewed and updated    Exam  /68   Pulse 76   Wt 97.5 kg (215 lb)   BMI 35.78 kg/m    Patient is bright and alert.  Patient is deaf,  used.    Pupils and irises equal and reactive.  Extraocular muscles  intact.  No conjunctivitis or scleral icterus.  External ear was normal to inspection palpation nontender to palpation.  External ear canals normal and tympanic membrane is normal without evidence of inflammation or effusion.  No significant cerumen.  No vesicular eruption around the ear.  Skin is normal behind the ear and of the scalp.    She does have recreation of her pain with palpation of the occipital prominence and mastoid area on the right.  There is no midline cervical spine tenderness to palpation.  There is minimal tenderness along the trapezius muscle.  There are some mild tenderness along the paracervical muscle as it attaches to the occipital area.  There is no fluctuance or lymphadenopathy.  No neck mass or lymphadenopathy.  There is no carotid bruit.  No JVD.  Her lungs are clear to auscultation with no wheezing or crackles.  No supraclavicular density or adenopathy.  Heart is regular without murmur.  Abdomen is moderately overweight, nontender.  Able to climb up on exam table and otherwise nonfocal neurologic exam.    Assessment/Plan  1. Neck pain on right side  I suspect this is full skeletal pain of her neck.  Consistent with the occipital insertion site of her paracervical muscles, possible sternocleidomastoid muscle strain.  Unclear precipitating etiology.  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    She denies prolonged time on the computer or falling asleep in a chair.    There has been some history of alcohol use and possible sleep apnea in the chart, may have been neck positioning at night causing strain.    Patient is requesting a head CT scan, I will have her do that to rule out gross abnormality.    She did not want to do another brain MRI that was done in January of this year and otherwise negative.  - CT Head w/o Contrast; Future    Could consider physical therapy but she declines at this time.    Tylenol as needed, heat and massage and  stretching.    As her primary care physician is out on medical leave, I will have patient follow-up in clinic in 1 to 2 weeks with nurse practitioner or available provider to follow-up on this issue.    2. Need for immunization against influenza  Given today  - INFLUENZA VACCINE IM >6 MO VALENT IIV4 (ALFURIA/FLUZONE)    History of asthma but lungs are clear, no apparent flare at this time.  Continue current inhalers and Singulair.      Return in about 10 days (around 10/7/2021) for Follow up.   Patient Instructions   Schedule a head CT scan.  Look on your [employer] computer portal for results of that test.    Follow-up in clinic in 1 to 2 weeks with available provider to follow-up on the head/neck pain.    You can request a referral to physical therapy anytime to help with neck stretching exercises to help reduce pain.    You can use Tylenol up to 3 times a day.  Okay to use a heating pad and gentle stretching and massage of your back.    If you do develop a fever or increasing ear pain, get evaluated at that time but you did not have evidence of an ear infection at this time.    Edward Benson MD, MD        Current Outpatient Medications   Medication Sig Dispense Refill     albuterol (ACCUNEB) 0.63 MG/3ML neb solution Inhale 0.63 mg into the lungs       albuterol (PROAIR HFA/PROVENTIL HFA/VENTOLIN HFA) 108 (90 Base) MCG/ACT inhaler Inhale 2 puffs into the lungs       albuterol (PROAIR HFA/PROVENTIL HFA/VENTOLIN HFA) 108 (90 Base) MCG/ACT inhaler        albuterol (PROVENTIL) (2.5 MG/3ML) 0.083% neb solution TAKE 3 ML (2.5 MG TOTAL) BY NEBULIZATION 4 (FOUR) TIMES A WEEK.       Blood Pressure Monitoring (BLOOD PRESSURE KIT) KIT Check blood pressure once in am; should be 130/80 or less. Pulse should be greater than 50.       EPINEPHrine (ANY BX GENERIC EQUIV) 0.3 MG/0.3ML injection 2-pack 0.3 mg       EPINEPHrine (ANY BX GENERIC EQUIV) 0.3 MG/0.3ML injection 2-pack        fluticasone (VERAMYST) 27.5 MCG/SPRAY nasal spray  Use 1 spray each nose daily       gabapentin (NEURONTIN) 100 MG capsule Take 100 mg by mouth       hydrOXYzine (ATARAX) 25 MG tablet TAKE 2 TABLETS BY MOUTH AT BEDTIME 180 tablet 1     melatonin 5 MG tablet Take 5-10 mg by mouth nightly as needed for sleep        metoprolol tartrate (LOPRESSOR) 25 MG tablet Take 25 mg by mouth daily        mometasone (NASONEX) 50 MCG/ACT nasal spray Spray 2 sprays into both nostrils daily        montelukast (SINGULAIR) 10 MG tablet Take 10 mg by mouth At Bedtime        omeprazole (PRILOSEC) 20 MG DR capsule Take 20 mg by mouth every morning        potassium chloride ER (K-TAB/KLOR-CON) 10 MEQ CR tablet Take 10 mEq by mouth daily        rizatriptan (MAXALT) 10 MG tablet Take 10 mg by mouth at onset of headache for migraine        SYMBICORT 80-4.5 MCG/ACT Inhaler        Allergies   Allergen Reactions     Amoxicillin Itching and Shortness Of Breath     Bee Pollen Anaphylaxis     Contrast [Iodixanol] Shortness Of Breath and Rash     Pt stated she reacted to the contrast given for her CT in past. She experienced shortness of breath, throat tightening, and rash on chest, and they gave her an injection to counter the symptoms.      Covid-19 (Mrna) Vaccine Shortness Of Breath     [facility] COVID-19 Vaccine     Hymenoptera Allergenic Extract [Wasp Venom Protein] Anaphylaxis     Iodine Hives     Pt stated that she was injected with CT CONTRAST in the past and got hives, SOB, and nausea. Please pre-medicate patient in the future.      Wasp Venom Protein Starter Kit [Wasp Venom Protein] Itching, Shortness Of Breath, Swelling and Difficulty breathing     Adhesive Tape-Silicones [Adhesive Tape] Unknown     Food Allergy Formula Unknown     Red meat, chocolate     Geodon [Ziprasidone] Unknown     Ibuprofen Other (See Comments)     Kidney function, Kidney function     Latex Unknown     Added based on information entered during case entry, please review and add reactions, type, and severity as needed      Prochlorperazine Other (See Comments)     Risperdal [Risperidone] Unknown     Risperidone Analogues [Risperidone] Other (See Comments)     Theobroma Oil [Theobroma Grandiflorum Seed Butter] Unknown     Zoloft [Sertraline] Unknown     Hydrochlorothiazide Rash     Social History     Tobacco Use     Smoking status: Never Smoker     Smokeless tobacco: Never Used   Substance Use Topics     Alcohol use: Yes     Comment: Alcoholic Drinks/day: dependence     Drug use: Not Currently

## 2021-09-27 NOTE — ED TRIAGE NOTES
Pt presents to the ED with c/o of right side neck and head pain that started after a fall on Thursday.  Pt reports falling and hitting her head backwards, no loc. Pt has dizziness without nausea. This information is collected via [employer].    53.25

## 2021-10-04 DIAGNOSIS — R09.82 POSTNASAL DRIP: ICD-10-CM

## 2021-10-04 DIAGNOSIS — G43.909 MIGRAINE WITHOUT STATUS MIGRAINOSUS, NOT INTRACTABLE, UNSPECIFIED MIGRAINE TYPE: Primary | ICD-10-CM

## 2021-10-05 RX ORDER — RIZATRIPTAN BENZOATE 10 MG/1
TABLET ORAL
Qty: 18 TABLET | Refills: 3 | Status: SHIPPED | OUTPATIENT
Start: 2021-10-05 | End: 2022-06-17

## 2021-10-05 RX ORDER — FLUTICASONE FUROATE 27.5 UG/1
SPRAY, METERED NASAL
Qty: 5.9 ML | Refills: 3 | Status: SHIPPED | OUTPATIENT
Start: 2021-10-05 | End: 2021-11-17

## 2021-10-27 RX ORDER — MONTELUKAST SODIUM 10 MG/1
TABLET ORAL
Qty: 90 TABLET | OUTPATIENT
Start: 2021-10-27

## 2021-10-27 RX ORDER — GABAPENTIN 100 MG/1
CAPSULE ORAL
Qty: 180 CAPSULE | OUTPATIENT
Start: 2021-10-27

## 2021-11-01 DIAGNOSIS — Z87.898 HISTORY OF HEADACHE: Primary | ICD-10-CM

## 2021-11-01 DIAGNOSIS — J32.9 CHRONIC SINUSITIS, UNSPECIFIED LOCATION: ICD-10-CM

## 2021-11-01 RX ORDER — GABAPENTIN 100 MG/1
CAPSULE ORAL
Qty: 180 CAPSULE | Refills: 0 | Status: SHIPPED | OUTPATIENT
Start: 2021-11-01 | End: 2022-03-04

## 2021-11-01 RX ORDER — MONTELUKAST SODIUM 10 MG/1
TABLET ORAL
Qty: 90 TABLET | Refills: 0 | Status: SHIPPED | OUTPATIENT
Start: 2021-11-01 | End: 2022-03-04

## 2021-11-16 ASSESSMENT — ACTIVITIES OF DAILY LIVING (ADL)
CURRENT_FUNCTION: LAUNDRY REQUIRES ASSISTANCE
CURRENT_FUNCTION: TELEPHONE REQUIRES ASSISTANCE
CURRENT_FUNCTION: MONEY MANAGEMENT REQUIRES ASSISTANCE
CURRENT_FUNCTION: MEDICATION ADMINISTRATION REQUIRES ASSISTANCE
CURRENT_FUNCTION: PREPARING MEALS REQUIRES ASSISTANCE
CURRENT_FUNCTION: BATHING REQUIRES ASSISTANCE
CURRENT_FUNCTION: SHOPPING REQUIRES ASSISTANCE

## 2021-11-16 ASSESSMENT — PATIENT HEALTH QUESTIONNAIRE - PHQ9
10. IF YOU CHECKED OFF ANY PROBLEMS, HOW DIFFICULT HAVE THESE PROBLEMS MADE IT FOR YOU TO DO YOUR WORK, TAKE CARE OF THINGS AT HOME, OR GET ALONG WITH OTHER PEOPLE: SOMEWHAT DIFFICULT
SUM OF ALL RESPONSES TO PHQ QUESTIONS 1-9: 12
SUM OF ALL RESPONSES TO PHQ QUESTIONS 1-9: 12

## 2021-11-17 ENCOUNTER — OFFICE VISIT (OUTPATIENT)
Dept: INTERNAL MEDICINE | Facility: CLINIC | Age: 46
End: 2021-11-17
Payer: MEDICARE

## 2021-11-17 VITALS
HEART RATE: 65 BPM | BODY MASS INDEX: 35.99 KG/M2 | DIASTOLIC BLOOD PRESSURE: 84 MMHG | HEIGHT: 65 IN | WEIGHT: 216 LBS | SYSTOLIC BLOOD PRESSURE: 134 MMHG | OXYGEN SATURATION: 99 %

## 2021-11-17 DIAGNOSIS — F41.1 GAD (GENERALIZED ANXIETY DISORDER): ICD-10-CM

## 2021-11-17 DIAGNOSIS — R73.03 PREDIABETES: ICD-10-CM

## 2021-11-17 DIAGNOSIS — Z13.21 SCREENING FOR ENDOCRINE, NUTRITIONAL, METABOLIC AND IMMUNITY DISORDER: ICD-10-CM

## 2021-11-17 DIAGNOSIS — Z12.11 ENCOUNTER FOR SCREENING COLONOSCOPY: ICD-10-CM

## 2021-11-17 DIAGNOSIS — R63.4 WEIGHT LOSS: ICD-10-CM

## 2021-11-17 DIAGNOSIS — Z13.0 SCREENING FOR ENDOCRINE, NUTRITIONAL, METABOLIC AND IMMUNITY DISORDER: ICD-10-CM

## 2021-11-17 DIAGNOSIS — Z11.59 ENCOUNTER FOR HCV SCREENING TEST FOR LOW RISK PATIENT: ICD-10-CM

## 2021-11-17 DIAGNOSIS — H91.93 BILATERAL DEAFNESS: ICD-10-CM

## 2021-11-17 DIAGNOSIS — K76.0 FATTY LIVER: ICD-10-CM

## 2021-11-17 DIAGNOSIS — E66.01 MORBID OBESITY (H): ICD-10-CM

## 2021-11-17 DIAGNOSIS — G47.00 PERSISTENT INSOMNIA: ICD-10-CM

## 2021-11-17 DIAGNOSIS — K92.1 MELENA: ICD-10-CM

## 2021-11-17 DIAGNOSIS — Z00.00 ENCOUNTER FOR ANNUAL PHYSICAL EXAM: ICD-10-CM

## 2021-11-17 DIAGNOSIS — Z13.29 SCREENING FOR ENDOCRINE, NUTRITIONAL, METABOLIC AND IMMUNITY DISORDER: ICD-10-CM

## 2021-11-17 DIAGNOSIS — G47.33 OBSTRUCTIVE SLEEP APNEA SYNDROME: ICD-10-CM

## 2021-11-17 DIAGNOSIS — I10 ESSENTIAL HYPERTENSION: ICD-10-CM

## 2021-11-17 DIAGNOSIS — Z13.228 SCREENING FOR ENDOCRINE, NUTRITIONAL, METABOLIC AND IMMUNITY DISORDER: ICD-10-CM

## 2021-11-17 LAB
ALBUMIN SERPL-MCNC: 3.1 G/DL (ref 3.5–5)
ALP SERPL-CCNC: 84 U/L (ref 45–120)
ALT SERPL W P-5'-P-CCNC: 11 U/L (ref 0–45)
ANION GAP SERPL CALCULATED.3IONS-SCNC: 9 MMOL/L (ref 5–18)
AST SERPL W P-5'-P-CCNC: 11 U/L (ref 0–40)
BASOPHILS # BLD AUTO: 0 10E3/UL (ref 0–0.2)
BASOPHILS NFR BLD AUTO: 0 %
BILIRUB SERPL-MCNC: 0.5 MG/DL (ref 0–1)
BUN SERPL-MCNC: 10 MG/DL (ref 8–22)
CALCIUM SERPL-MCNC: 8.8 MG/DL (ref 8.5–10.5)
CHLORIDE BLD-SCNC: 107 MMOL/L (ref 98–107)
CHOLEST SERPL-MCNC: 154 MG/DL
CO2 SERPL-SCNC: 25 MMOL/L (ref 22–31)
CREAT SERPL-MCNC: 0.79 MG/DL (ref 0.6–1.1)
EOSINOPHIL # BLD AUTO: 0.1 10E3/UL (ref 0–0.7)
EOSINOPHIL NFR BLD AUTO: 2 %
ERYTHROCYTE [DISTWIDTH] IN BLOOD BY AUTOMATED COUNT: 14.3 % (ref 10–15)
FASTING STATUS PATIENT QL REPORTED: NO
GFR SERPL CREATININE-BSD FRML MDRD: 89 ML/MIN/1.73M2
GLUCOSE BLD-MCNC: 124 MG/DL (ref 70–125)
HBA1C MFR BLD: 5.9 % (ref 0–5.6)
HCT VFR BLD AUTO: 35.3 % (ref 35–47)
HDLC SERPL-MCNC: 37 MG/DL
HGB BLD-MCNC: 11.6 G/DL (ref 11.7–15.7)
IMM GRANULOCYTES # BLD: 0 10E3/UL
IMM GRANULOCYTES NFR BLD: 0 %
LDLC SERPL CALC-MCNC: 97 MG/DL
LYMPHOCYTES # BLD AUTO: 2.3 10E3/UL (ref 0.8–5.3)
LYMPHOCYTES NFR BLD AUTO: 31 %
MCH RBC QN AUTO: 27.2 PG (ref 26.5–33)
MCHC RBC AUTO-ENTMCNC: 32.9 G/DL (ref 31.5–36.5)
MCV RBC AUTO: 83 FL (ref 78–100)
MONOCYTES # BLD AUTO: 0.3 10E3/UL (ref 0–1.3)
MONOCYTES NFR BLD AUTO: 4 %
NEUTROPHILS # BLD AUTO: 4.7 10E3/UL (ref 1.6–8.3)
NEUTROPHILS NFR BLD AUTO: 63 %
PLATELET # BLD AUTO: 268 10E3/UL (ref 150–450)
POTASSIUM BLD-SCNC: 3.5 MMOL/L (ref 3.5–5)
PROT SERPL-MCNC: 5.9 G/DL (ref 6–8)
RBC # BLD AUTO: 4.27 10E6/UL (ref 3.8–5.2)
SODIUM SERPL-SCNC: 141 MMOL/L (ref 136–145)
TRIGL SERPL-MCNC: 100 MG/DL
TSH SERPL DL<=0.005 MIU/L-ACNC: 3.21 UIU/ML (ref 0.3–5)
WBC # BLD AUTO: 7.4 10E3/UL (ref 4–11)

## 2021-11-17 PROCEDURE — 85025 COMPLETE CBC W/AUTO DIFF WBC: CPT | Performed by: NURSE PRACTITIONER

## 2021-11-17 PROCEDURE — 36415 COLL VENOUS BLD VENIPUNCTURE: CPT | Performed by: NURSE PRACTITIONER

## 2021-11-17 PROCEDURE — 80053 COMPREHEN METABOLIC PANEL: CPT | Performed by: NURSE PRACTITIONER

## 2021-11-17 PROCEDURE — 84443 ASSAY THYROID STIM HORMONE: CPT | Performed by: NURSE PRACTITIONER

## 2021-11-17 PROCEDURE — 80061 LIPID PANEL: CPT | Performed by: NURSE PRACTITIONER

## 2021-11-17 PROCEDURE — 99214 OFFICE O/P EST MOD 30 MIN: CPT | Mod: 25 | Performed by: NURSE PRACTITIONER

## 2021-11-17 PROCEDURE — 83036 HEMOGLOBIN GLYCOSYLATED A1C: CPT | Performed by: NURSE PRACTITIONER

## 2021-11-17 PROCEDURE — 99207 PR ANNUAL WELLNESS VISIT, PPS, INITIAL STAT: CPT | Performed by: NURSE PRACTITIONER

## 2021-11-17 PROCEDURE — 86803 HEPATITIS C AB TEST: CPT | Performed by: NURSE PRACTITIONER

## 2021-11-17 RX ORDER — HYDROXYZINE HYDROCHLORIDE 25 MG/1
75 TABLET, FILM COATED ORAL 3 TIMES DAILY PRN
Qty: 270 TABLET | Refills: 0 | Status: SHIPPED | OUTPATIENT
Start: 2021-11-17 | End: 2022-03-04

## 2021-11-17 ASSESSMENT — ACTIVITIES OF DAILY LIVING (ADL)
CURRENT_FUNCTION: SHOPPING REQUIRES ASSISTANCE
CURRENT_FUNCTION: TELEPHONE REQUIRES ASSISTANCE
CURRENT_FUNCTION: BATHING REQUIRES ASSISTANCE
CURRENT_FUNCTION: MONEY MANAGEMENT REQUIRES ASSISTANCE
CURRENT_FUNCTION: LAUNDRY REQUIRES ASSISTANCE
CURRENT_FUNCTION: MEDICATION ADMINISTRATION REQUIRES ASSISTANCE
CURRENT_FUNCTION: PREPARING MEALS REQUIRES ASSISTANCE

## 2021-11-17 ASSESSMENT — ASTHMA QUESTIONNAIRES
QUESTION_2 LAST FOUR WEEKS HOW OFTEN HAVE YOU HAD SHORTNESS OF BREATH: NOT AT ALL
QUESTION_1 LAST FOUR WEEKS HOW MUCH OF THE TIME DID YOUR ASTHMA KEEP YOU FROM GETTING AS MUCH DONE AT WORK, SCHOOL OR AT HOME: NONE OF THE TIME
QUESTION_4 LAST FOUR WEEKS HOW OFTEN HAVE YOU USED YOUR RESCUE INHALER OR NEBULIZER MEDICATION (SUCH AS ALBUTEROL): NOT AT ALL
QUESTION_5 LAST FOUR WEEKS HOW WOULD YOU RATE YOUR ASTHMA CONTROL: WELL CONTROLLED
ACT_TOTALSCORE: 24
QUESTION_3 LAST FOUR WEEKS HOW OFTEN DID YOUR ASTHMA SYMPTOMS (WHEEZING, COUGHING, SHORTNESS OF BREATH, CHEST TIGHTNESS OR PAIN) WAKE YOU UP AT NIGHT OR EARLIER THAN USUAL IN THE MORNING: NOT AT ALL

## 2021-11-17 ASSESSMENT — PATIENT HEALTH QUESTIONNAIRE - PHQ9: SUM OF ALL RESPONSES TO PHQ QUESTIONS 1-9: 12

## 2021-11-17 ASSESSMENT — MIFFLIN-ST. JEOR: SCORE: 1610.65

## 2021-11-17 NOTE — PATIENT INSTRUCTIONS
Your labs are processing this time, all results and measurements are back.    They will call you to get your colonoscopy scheduled.    Increase your hydroxyzine at bedtime to 3 tablets, total of 75 mg for sleep.  Try this for the next couple weeks and see if this is helpful.    Follow-up with Dr. Chaudhry in 3 months.    COVID testing at [facility] or [facility].
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
## 2021-11-18 ENCOUNTER — TELEPHONE (OUTPATIENT)
Dept: NURSING | Facility: CLINIC | Age: 46
End: 2021-11-18
Payer: COMMERCIAL

## 2021-11-18 LAB — HCV AB SERPL QL IA: NEGATIVE

## 2021-11-18 ASSESSMENT — ASTHMA QUESTIONNAIRES: ACT_TOTALSCORE: 24

## 2021-11-18 NOTE — TELEPHONE ENCOUNTER
Telephone Call:     Pt went into clinic yesterday for her annual physical and a lab draw was completed.     Pt checked her mychart and saw that her labs are in and she would like her PCP to call her to discuss the lab results with her.     Pt prefers a telephone call.  needed as well for the call.     Routing a message to her provider to address the labs with patient.     Flavia Mckeon RN  [facility] Nurse Advisor 9:22 AM 11/18/2021

## 2021-11-19 ENCOUNTER — MYC MEDICAL ADVICE (OUTPATIENT)
Dept: INTERNAL MEDICINE | Facility: CLINIC | Age: 46
End: 2021-11-19
Payer: COMMERCIAL

## 2021-11-19 NOTE — TELEPHONE ENCOUNTER
She will need a follow up visit for this, not sure how else to explain her labs over the phone to her if not in person with an .

## 2021-11-19 NOTE — TELEPHONE ENCOUNTER
Sent patient a UEIS message to either call us back or to schedule a follow up appt with Maria Fernanda through her UEIS for a lab follow up appointment.

## 2021-12-15 DIAGNOSIS — I10 ESSENTIAL HYPERTENSION: Primary | ICD-10-CM

## 2021-12-16 RX ORDER — METOPROLOL TARTRATE 25 MG/1
TABLET, FILM COATED ORAL
Qty: 540 TABLET | Refills: 3 | Status: SHIPPED | OUTPATIENT
Start: 2021-12-16 | End: 2022-12-21

## 2021-12-26 ENCOUNTER — HOSPITAL ENCOUNTER (EMERGENCY)
Facility: CLINIC | Age: 46
Discharge: HOME | End: 2021-12-26
Attending: EMERGENCY MEDICINE | Admitting: EMERGENCY MEDICINE
Payer: MEDICARE

## 2021-12-26 VITALS
RESPIRATION RATE: 16 BRPM | SYSTOLIC BLOOD PRESSURE: 179 MMHG | WEIGHT: 215 LBS | OXYGEN SATURATION: 97 % | DIASTOLIC BLOOD PRESSURE: 81 MMHG | HEART RATE: 63 BPM | BODY MASS INDEX: 35.78 KG/M2 | TEMPERATURE: 98.4 F

## 2021-12-26 DIAGNOSIS — H60.502 ACUTE OTITIS EXTERNA OF LEFT EAR, UNSPECIFIED TYPE: ICD-10-CM

## 2021-12-26 PROCEDURE — 99283 EMERGENCY DEPT VISIT LOW MDM: CPT

## 2021-12-26 RX ORDER — NEOMYCIN SULFATE, POLYMYXIN B SULFATE, HYDROCORTISONE 3.5; 10000; 1 MG/ML; [USP'U]/ML; MG/ML
4 SOLUTION/ DROPS AURICULAR (OTIC) 3 TIMES DAILY
Qty: 5 ML | Refills: 0 | Status: SHIPPED | OUTPATIENT
Start: 2021-12-26 | End: 2022-01-02

## 2021-12-26 RX ORDER — CEPHALEXIN 500 MG/1
500 CAPSULE ORAL 4 TIMES DAILY
Qty: 28 CAPSULE | Refills: 0 | Status: SHIPPED | OUTPATIENT
Start: 2021-12-26 | End: 2022-01-02

## 2021-12-26 ASSESSMENT — ENCOUNTER SYMPTOMS
CHILLS: 0
SORE THROAT: 0
COUGH: 0
FEVER: 0
DIZZINESS: 1
APPETITE CHANGE: 0
HEADACHES: 1
DIAPHORESIS: 0
NECK PAIN: 1

## 2021-12-26 NOTE — ED NOTES
Patient triaged using written material while waiting for the sign language interpretor to arrive   Given the call light

## 2021-12-26 NOTE — ED PROVIDER NOTES
EMERGENCY DEPARTMENT ENCOUNTER      NAME: Marleny Viera  AGE: 46 year old female  YOB: 1975  MRN: [id number]  EVALUATION DATE & TIME: 12/26/2021  5:40 AM    PCP: Carolann Julien    ED PROVIDER: Latha Liu M.D.      Chief Complaint   Patient presents with     Otalgia     left side      Dizziness         FINAL IMPRESSION:  1. Acute otitis externa of left ear, unspecified type          ED COURSE & MEDICAL DECISION MAKING:    Pertinent Labs & Imaging studies reviewed. (See chart for details)  46 year old female presents to the Emergency Department for evaluation of left ear pain.  The patient states that her pain started 2 days ago.  She states that she gets ear infections about once a year, and this pain is similar to her previous ear infections.  She states when she has ear infections, she has associated dizziness and drainage from the ear.  She denies any fever, chills, sweats, no URI symptoms.  On evaluation, the patient has otitis externa.  Patient has previously been treated with oral antibiotics antibiotic drops.  This was prescribed to her and I discussed with her return if symptoms acutely worsen or progress.    At the conclusion of the encounter I discussed the results of all of the tests and the disposition. The questions were answered. The patient or family acknowledged understanding and was agreeable with the care plan.     6:22 AM I met with patient for initial interview and exam. I discussed plan for discharge with oral antibiotics. Patient was agreeable with the plan. PPE includes N95, protective glasses, gloves.      MEDICATIONS GIVEN IN THE EMERGENCY:  Medications - No data to display    NEW PRESCRIPTIONS STARTED AT TODAY'S ER VISIT  New Prescriptions    CEPHALEXIN (KEFLEX) 500 MG CAPSULE    Take 1 capsule (500 mg) by mouth 4 times daily for 7 days    NEOMYCIN-POLYMYXIN-HYDROCORTISONE (CORTISPORIN) 3.5-43768-5 OTIC SOLUTION    Place 4 drops Into the left ear 3 times  "daily for 7 days            =================================================================    HPI    Patient information was obtained from: patient     Use of :  Yes- ASL        Marleny Viera is a 46 year old female with a pertinent history of hypertension, asthma, PE, history of transfusion, ROBERT, who presents to this ED via car for evaluation of otalgia and dizziness.    Patient reports since Spring Chhaya (3 days ago), she has developed sudden onset of left ear pain that feels like a \"fullness sharp intermittent\" ache that radiates to the left side of her neck as well. At present, the severity of the pain is 6/10. She is also noticing drainage from the left ear. Her right ear seems to be asymptomatic. She says this feel similar to her prior ear infections that usually occurs every December. They are usually resolved with oral antibiotics and ear drops. Along with her ear infections, she also develops associated dizziness and headaches.     She is not a smoker. The patient currently denies fever, chills, sweats, cough, congestion, appetite change, sore throat, or any other associated symptoms. No other medical complaints at this time.        REVIEW OF SYSTEMS   Review of Systems   Constitutional: Negative for appetite change, chills, diaphoresis and fever.   HENT: Positive for ear discharge and ear pain (left sided). Negative for congestion and sore throat.    Respiratory: Negative for cough.    Musculoskeletal: Positive for neck pain (left sided).   Neurological: Positive for dizziness and headaches.   All other systems reviewed and are negative.       PAST MEDICAL HISTORY:  Past Medical History:   Diagnosis Date     ADHD (attention deficit hyperactivity disorder)      Alcohol dependence (H)      Anxiety      Arthritis      Asthma      Bipolar depression (H)      Deafness      Drug abuse, nondependent (H) 10/29/2004    Overview:  polydrug abuse per psych notes ; Other, mixed, or unspecified " nondependent drug abuse, unspecified     History of blood clots      History of transfusion      Hypertension      Kidney stones 2015     Major depressive disorder, recurrent episode, severe (H) 5/17/2016    Overview:  s/p suicide attempt Epic      Migraine      Obstructive sleep apnea 6/1/2016     Pulmonary emboli (H)      Status post total right knee replacement 1/18/2016       PAST SURGICAL HISTORY:  Past Surgical History:   Procedure Laterality Date     BIOPSY BREAST Right     2019... Also had fluid drained from left breast under surgery also in 2019     BREAST SURGERY       MAMMOPLASTY REDUCTION Bilateral 2017     TOTAL HIP ARTHROPLASTY Bilateral      TOTAL KNEE ARTHROPLASTY Right      TUBAL LIGATION       US BREAST CORE BIOPSY RIGHT Right 11/21/2019           CURRENT MEDICATIONS:    No current facility-administered medications for this encounter.     Current Outpatient Medications   Medication     cephALEXin (KEFLEX) 500 MG capsule     neomycin-polymyxin-hydrocortisone (CORTISPORIN) 3.5-38062-8 otic solution     albuterol (ACCUNEB) 0.63 MG/3ML neb solution     albuterol (PROAIR HFA/PROVENTIL HFA/VENTOLIN HFA) 108 (90 Base) MCG/ACT inhaler     albuterol (PROVENTIL) (2.5 MG/3ML) 0.083% neb solution     Blood Pressure Monitoring (BLOOD PRESSURE KIT) KIT     EPINEPHrine (ANY BX GENERIC EQUIV) 0.3 MG/0.3ML injection 2-pack     gabapentin (NEURONTIN) 100 MG capsule     hydrOXYzine (ATARAX) 25 MG tablet     melatonin 5 MG tablet     metoprolol tartrate (LOPRESSOR) 25 MG tablet     mometasone (NASONEX) 50 MCG/ACT nasal spray     montelukast (SINGULAIR) 10 MG tablet     omeprazole (PRILOSEC) 20 MG DR capsule     rizatriptan (MAXALT) 10 MG tablet     SYMBICORT 80-4.5 MCG/ACT Inhaler         ALLERGIES:  Allergies   Allergen Reactions     Amoxicillin Itching and Shortness Of Breath     Bee Pollen Anaphylaxis     Contrast [Iodixanol] Shortness Of Breath and Rash     Pt stated she reacted to the contrast given for her CT in  past. She experienced shortness of breath, throat tightening, and rash on chest, and they gave her an injection to counter the symptoms.      Covid-19 (Mrna) Vaccine Shortness Of Breath     Pfizer COVID-19 Vaccine     Hymenoptera Allergenic Extract [Wasp Venom Protein] Anaphylaxis     Iodine Hives     Pt stated that she was injected with CT CONTRAST in the past and got hives, SOB, and nausea. Please pre-medicate patient in the future.      Wasp Venom Protein Starter Kit [Wasp Venom Protein] Itching, Shortness Of Breath, Swelling and Difficulty breathing     Adhesive Tape-Silicones [Adhesive Tape] Unknown     Food Allergy Formula Unknown     Red meat, chocolate     Geodon [Ziprasidone] Unknown     Ibuprofen Other (See Comments)     Kidney function, Kidney function     Latex Unknown     Added based on information entered during case entry, please review and add reactions, type, and severity as needed     Prochlorperazine Other (See Comments)     Risperdal [Risperidone] Unknown     Risperidone Analogues [Risperidone] Other (See Comments)     Theobroma Oil [Theobroma Grandiflorum Seed Butter] Unknown     Trazodone Other (See Comments)     Elevated creatinine     Zoloft [Sertraline] Unknown     Hydrochlorothiazide Rash       FAMILY HISTORY:  Family History   Problem Relation Age of Onset     Cancer Mother      Breast Cancer Mother      Cerebrovascular Disease Father        SOCIAL HISTORY:   Social History     Socioeconomic History     Marital status:      Spouse name: Not on file     Number of children: Not on file     Years of education: Not on file     Highest education level: Not on file   Occupational History     Not on file   Tobacco Use     Smoking status: Never Smoker     Smokeless tobacco: Never Used   Substance and Sexual Activity     Alcohol use: Yes     Comment: Alcoholic Drinks/day: dependence     Drug use: Not Currently     Sexual activity: Never   Other Topics Concern     Not on file   Social History  "Narrative    . No children.   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  Nonsmoker.  No alcohol use.  Tseha Pelaez MD       Social Determinants of Health     Financial Resource Strain: Not on file   Food Insecurity: Not on file   Transportation Needs: Not on file   Physical Activity: Not on file   Stress: Not on file   Social Connections: Not on file   Intimate Partner Violence: Not on file   Housing Stability: Not on file       VITALS:  BP (!) 179/81   Pulse 63   Temp 98.4  F (36.9  C) (Oral)   Resp 16   Wt 97.5 kg (215 lb)   SpO2 97%   BMI 35.78 kg/m      PHYSICAL EXAM    Constitutional: Well developed, Well nourished, NAD  HENT: Right TM is clear, left TM edemadous left ear canal with exudate, erythema and serous drainage Normocephalic, Atraumatic, Oropharynx normal, mucous membranes moist, Nose normal. Neck- Normal range of motion, No tenderness, Supple, No stridor.   Eyes: PERRL, EOMI, Conjunctiva normal, No discharge.   Respiratory: Normal breath sounds, No respiratory distress, No wheezing, Speaks full sentences easily. No cough.    Cardiovascular: Normal heart rate, Regular rhythm, Chest wall nontender.    GI: Bowel sounds normal, Soft, No tenderness  Musculoskeletal: 2+ DP pulses. No edema.  No cyanosis, No clubbing. Good range of motion in all major joints. No tenderness to palpation or major deformities noted.   Integument: Warm, Dry, No erythema, No rash. No petechiae.   Neurologic: Alert & oriented x 3, Normal motor function, Normal sensory function, No focal deficits noted.    Psychiatric: Affect normal, Judgment normal, Mood normal. Cooperative.   LAB/RADIOLOGY:  All pertinent labs and imaging reviewed and interpreted. Please see official radiology report.         PROCEDURES:   None      I, Marianne Maldonado, am serving as a scribe to document services personally performed by Dr. Liu based on my observation and the provider's statements to me. I, " Latha Liu MD attest that Marianne Joel, is acting in a scribe capacity, has observed my performance of the services and has documented them in accordance with my direction.    Latha Liu M.D.  Emergency Medicine  [facility] EMERGENCY ROOM  [address] [address]  [phone]  Dept: [phone]     Latha Liu MD  12/26/21 1019

## 2021-12-26 NOTE — ED NOTES
Discharge education performed using the sign language interpretor   Patient denies any questions/concerns

## 2021-12-26 NOTE — ED NOTES
Patient roomed, waiting on the sign language interpretor to arrive in order to triage and assess patient properly   ETA 30 minutes  Patient aware

## 2021-12-30 ENCOUNTER — TELEPHONE (OUTPATIENT)
Dept: INTERNAL MEDICINE | Facility: CLINIC | Age: 46
End: 2021-12-30
Payer: COMMERCIAL

## 2021-12-30 DIAGNOSIS — Z12.31 VISIT FOR SCREENING MAMMOGRAM: Primary | ICD-10-CM

## 2021-12-30 NOTE — TELEPHONE ENCOUNTER
Reason for Call:  Patient calling via .  Patient states that she was seen in the ER for an ear infection on 12/26/2021.  Patient states that she is still experiencing ear drainage and the antibiotic is causing her to have diarrhea. Patient states that she is also using ear drops.  Patient is wondering if there is a different antibiotic she should be taking.     Patient also has questions regarding a mammogram order.  Patient states she needs to have one ordered as it has been over a year since she had her last one.      Patient also wondering if she is due for any vaccines.      Phone Number Patient can be reached at: Cell number on file:    Telephone Information:   Mobile [phone]       Best Time: anytime    Can we leave a detailed message on this number? YES    Call taken on 12/30/2021 at 10:12 AM by Vidya Rodriguez

## 2021-12-30 NOTE — TELEPHONE ENCOUNTER
Please ask the patient to schedule an appointment with me, to discuss.  I do have some openings next week.    She has a number of concerns, and I wish to do proper job with her concerns.    Carolann Julien MD

## 2021-12-30 NOTE — TELEPHONE ENCOUNTER
Attempted to call but was not able to leave voicemail. Will try back at a later time. Prelert message also sent.  Miya Casey CMA ............... 5:18 PM, 12/30/21

## 2022-01-12 NOTE — TELEPHONE ENCOUNTER
Called patient and no answer and no voicemail available.  Sent patient another [employer] message to call us back and schedule appt.

## 2022-02-14 ENCOUNTER — NURSE TRIAGE (OUTPATIENT)
Dept: NURSING | Facility: CLINIC | Age: 47
End: 2022-02-14
Payer: COMMERCIAL

## 2022-02-15 NOTE — TELEPHONE ENCOUNTER
#[phone] calls and says that Marleny is calling and says that she needs an appointment for her blood work.  says that pt. Says that she comes in every 3 months for her blood work.  says that pt. Gets blood work done for her blood glucose. RN then conferenced  and pt., with [facility] , for assistance in scheduling an appointmnet. COVID 19 Nurse Triage Plan/Patient Instructions    Please be aware that novel coronavirus (COVID-19) may be circulating in the community. If you develop symptoms such as fever, cough, or SOB or if you have concerns about the presence of another infection including coronavirus (COVID-19), please contact your health care provider or visit [email or web address].     Disposition/Instructions    Home care recommended. Follow home care protocol based instructions.    Thank you for taking steps to prevent the spread of this virus.  o Limit your contact with others.  o Wear a simple mask to cover your cough.  o Wash your hands well and often.    Resources    [facility]: About COVID-19: [email or web address]    CDC: What to Do If You're Sick: www.cdc.gov/coronavirus/2019-ncov/about/steps-when-sick.html    CDC: Ending Home Isolation: www.cdc.gov/coronavirus/2019-ncov/hcp/disposition-in-home-patients.html     CDC: Caring for Someone: www.cdc.gov/coronavirus/2019-ncov/if-you-are-sick/care-for-someone.html     [facility]: Interim Guidance for Hospital Discharge to Home: www.health.[facility].[place]/diseases/coronavirus/hcp/hospdischarge.pdf    [facility] clinical trials (COVID-19 research studies): [email or web address]/[email or web address]-trials     Below are the COVID-19 hotlines at the [place] Department of Health ([facility]). Interpreters are available.   o For health questions: Call 189-256-2319 or 1-688.187.9145 (7 a.m. to 7 p.m.)  o For questions about schools and childcare: Call 348-543-0203 or 1-866.869.3240 (7 a.m. to 7  p.m.)                   Reason for Disposition    Health Information question, no triage required and triager able to answer question    Additional Information    Negative: [1] Caller is not with the adult (patient) AND [2] reporting urgent symptoms    Negative: Lab result questions    Negative: Medication questions    Negative: Caller can't be reached by phone    Negative: Caller has already spoken to PCP or another triager    Negative: RN needs further essential information from caller in order to complete triage    Negative: Requesting regular office appointment    Negative: [1] Caller requesting NON-URGENT health information AND [2] PCP's office is the best resource    Protocols used: INFORMATION ONLY CALL - NO TRIAGE-A-

## 2022-03-02 DIAGNOSIS — J45.40 MODERATE PERSISTENT ASTHMA WITHOUT COMPLICATION: Primary | ICD-10-CM

## 2022-03-04 DIAGNOSIS — Z87.898 HISTORY OF HEADACHE: ICD-10-CM

## 2022-03-04 DIAGNOSIS — J32.9 CHRONIC SINUSITIS, UNSPECIFIED LOCATION: ICD-10-CM

## 2022-03-04 DIAGNOSIS — G47.00 PERSISTENT INSOMNIA: ICD-10-CM

## 2022-03-04 RX ORDER — MONTELUKAST SODIUM 10 MG/1
1 TABLET ORAL DAILY
Qty: 90 TABLET | Refills: 3 | Status: SHIPPED | OUTPATIENT
Start: 2022-03-04 | End: 2022-03-08

## 2022-03-04 RX ORDER — GABAPENTIN 100 MG/1
CAPSULE ORAL
Qty: 180 CAPSULE | Refills: 3 | Status: SHIPPED | OUTPATIENT
Start: 2022-03-04 | End: 2022-03-08

## 2022-03-04 RX ORDER — HYDROXYZINE HYDROCHLORIDE 25 MG/1
75 TABLET, FILM COATED ORAL 3 TIMES DAILY PRN
Qty: 270 TABLET | Refills: 0 | Status: SHIPPED | OUTPATIENT
Start: 2022-03-04 | End: 2022-06-11

## 2022-03-04 RX ORDER — ALBUTEROL SULFATE 90 UG/1
AEROSOL, METERED RESPIRATORY (INHALATION)
Qty: 8.5 G | Refills: 1 | Status: SHIPPED | OUTPATIENT
Start: 2022-03-04 | End: 2022-03-08

## 2022-03-04 NOTE — TELEPHONE ENCOUNTER
Reason for Call:  Medication or medication refill: 2 refill requests    Do you use a [facility]? NO  Name of the pharmacy and phone number for the current request:  [address] in [place], [phone]    Name of the medication requested:     gabapentin (NEURONTIN) 100 MG capsule 180 capsule 0 11/1/2021  No   Sig: TAKE 1 CAPSULE BY MOUTH TWICE A DAY FOR MIGRAINES AND ANXIETY     montelukast (SINGULAIR) 10 MG tablet 90 tablet 0 11/1/2021  No   Sig: TAKE 1 TABLET BY MOUTH DAILY     Can we leave a detailed message on this number? YES    Best Time: ANY    Call taken on 3/4/2022 at 2:07 PM by Cami Millan

## 2022-03-04 NOTE — TELEPHONE ENCOUNTER
Refill Request  Medication name: Pending Prescriptions:                       Disp   Refills    hydrOXYzine (ATARAX) 25 MG tablet         270 ta*0            Sig: Take 3 tablets (75 mg) by mouth 3 times daily as           needed for anxiety (Insomnia)    Who prescribed the medication: Milady  Last refill on medication: 10/26/21  Requested Pharmacy: [facility]  Last appointment with PCP: 11/17/21  Next appointment: Not due

## 2022-03-08 DIAGNOSIS — J45.40 MODERATE PERSISTENT ASTHMA WITHOUT COMPLICATION: ICD-10-CM

## 2022-03-08 DIAGNOSIS — Z87.898 HISTORY OF HEADACHE: ICD-10-CM

## 2022-03-08 DIAGNOSIS — J32.9 CHRONIC SINUSITIS, UNSPECIFIED LOCATION: ICD-10-CM

## 2022-03-08 RX ORDER — MONTELUKAST SODIUM 10 MG/1
1 TABLET ORAL DAILY
Qty: 90 TABLET | Refills: 3 | Status: SHIPPED | OUTPATIENT
Start: 2022-03-08 | End: 2023-03-13

## 2022-03-08 RX ORDER — ALBUTEROL SULFATE 90 UG/1
AEROSOL, METERED RESPIRATORY (INHALATION)
Qty: 8.5 G | Refills: 1 | Status: SHIPPED | OUTPATIENT
Start: 2022-03-08 | End: 2023-03-13

## 2022-03-08 RX ORDER — GABAPENTIN 100 MG/1
CAPSULE ORAL
Qty: 180 CAPSULE | Refills: 3 | Status: SHIPPED | OUTPATIENT
Start: 2022-03-08 | End: 2023-03-13

## 2022-03-26 ENCOUNTER — HEALTH MAINTENANCE LETTER (OUTPATIENT)
Age: 47
End: 2022-03-26

## 2022-05-14 ENCOUNTER — APPOINTMENT (OUTPATIENT)
Dept: CT IMAGING | Facility: CLINIC | Age: 47
End: 2022-05-14
Attending: EMERGENCY MEDICINE
Payer: MEDICARE

## 2022-05-14 ENCOUNTER — HOSPITAL ENCOUNTER (EMERGENCY)
Facility: CLINIC | Age: 47
Discharge: HOME | End: 2022-05-14
Attending: EMERGENCY MEDICINE | Admitting: EMERGENCY MEDICINE
Payer: MEDICARE

## 2022-05-14 VITALS
RESPIRATION RATE: 18 BRPM | TEMPERATURE: 96.9 F | WEIGHT: 225 LBS | BODY MASS INDEX: 37.44 KG/M2 | SYSTOLIC BLOOD PRESSURE: 175 MMHG | DIASTOLIC BLOOD PRESSURE: 79 MMHG | OXYGEN SATURATION: 99 % | HEART RATE: 58 BPM

## 2022-05-14 DIAGNOSIS — S00.03XA CONTUSION OF SCALP, INITIAL ENCOUNTER: ICD-10-CM

## 2022-05-14 DIAGNOSIS — S09.90XA CLOSED HEAD INJURY, INITIAL ENCOUNTER: ICD-10-CM

## 2022-05-14 PROCEDURE — 72125 CT NECK SPINE W/O DYE: CPT

## 2022-05-14 PROCEDURE — 70450 CT HEAD/BRAIN W/O DYE: CPT

## 2022-05-14 PROCEDURE — 250N000013 HC RX MED GY IP 250 OP 250 PS 637: Performed by: EMERGENCY MEDICINE

## 2022-05-14 PROCEDURE — 99284 EMERGENCY DEPT VISIT MOD MDM: CPT | Mod: 25

## 2022-05-14 RX ORDER — ACETAMINOPHEN 325 MG/1
650 TABLET ORAL ONCE
Status: COMPLETED | OUTPATIENT
Start: 2022-05-14 | End: 2022-05-14

## 2022-05-14 RX ORDER — CYCLOBENZAPRINE HCL 10 MG
10 TABLET ORAL 2 TIMES DAILY PRN
Qty: 10 TABLET | Refills: 0 | Status: SHIPPED | OUTPATIENT
Start: 2022-05-14 | End: 2022-06-17

## 2022-05-14 RX ORDER — CYCLOBENZAPRINE HCL 10 MG
10 TABLET ORAL 2 TIMES DAILY PRN
Qty: 10 TABLET | Refills: 0 | Status: SHIPPED | OUTPATIENT
Start: 2022-05-14 | End: 2022-05-14

## 2022-05-14 RX ADMIN — ACETAMINOPHEN 650 MG: 325 TABLET ORAL at 12:28

## 2022-05-14 ASSESSMENT — ENCOUNTER SYMPTOMS
NUMBNESS: 0
CONFUSION: 0
TROUBLE SWALLOWING: 0
FEVER: 0
BRUISES/BLEEDS EASILY: 0
NECK PAIN: 1
WEAKNESS: 0
WOUND: 1
VOMITING: 0
NECK STIFFNESS: 1

## 2022-05-14 NOTE — ED TRIAGE NOTES
Patient reports that she was mowing the lawn @1015 this morning and tripped backwards over a ramp and hit the back of her head, she denies LOC, denies taking thinners, reports that she does have C spine tenderness, was wearing a C collar that the writer placed, patient reported that she could not breathe while wearing the collar, the possibility of paralysis was emphasized to the patient and they still insisted on removing the collar, this encounter utilized a  over the martii.  Romi Haider RN.......5/14/2022 11:46 AM     Triage Assessment     Row Name 05/14/22 1141       Triage Assessment (Adult)    Airway WDL WDL       Respiratory WDL    Respiratory WDL WDL       Skin Circulation/Temperature WDL    Skin Circulation/Temperature WDL WDL       Cardiac WDL    Cardiac WDL WDL       Peripheral/Neurovascular WDL    Peripheral Neurovascular WDL WDL       Cognitive/Neuro/Behavioral WDL    Cognitive/Neuro/Behavioral WDL WDL

## 2022-05-14 NOTE — ED PROVIDER NOTES
EMERGENCY DEPARTMENT ENCOUNTER      NAME: Marleny Viera  AGE: 46 year old female  YOB: 1975  MRN: [id number]  EVALUATION DATE & TIME: 5/14/2022 12:40 PM    PCP: Maria Fernanda Schumacher    ED PROVIDER: Phil Gallegos MD        Chief Complaint   Patient presents with     Headache     Head Injury         FINAL IMPRESSION:  1. Closed head injury, initial encounter    2. Contusion of scalp, initial encounter          ED COURSE & MEDICAL DECISION MAKING:    Pertinent Labs & Imaging studies reviewed. (See chart for details)  46 year old female presents to the Emergency Department for evaluation of head injury after tripping over a ramp    No loss of consciousness.  Not on blood thinners.  Denies any confusion or wooziness.    No numbness or weakness in arms or legs.     Reported midline C-spine tenderness in triage therefore CT head and neck was ordered.  Abrasion right parietal scalp.  CT imaging shows contusion right parietal scalp otherwise no fractures or intracranial hemorrhage.    Patient has no chest pain or shortness of breath and is adamant this was a mechanical fall.     Plan for discharge home with Flexeril for neck stiffness, use Tylenol for pain, icing, given information what to watch out for regarding head injuries.    12:53 PM I met with the patient, obtained history, performed an initial exam, and discussed options and plan for diagnostics and treatment here in the ED. PPE worn: N95 mask, gloves, face shield   1:14 PM We discussed the plan for discharge and the patient is agreeable. Reviewed supportive cares, symptomatic treatment, outpatient follow up, and reasons to return to the Emergency Department. Patient to be discharged by ED RN.     At the conclusion of the encounter I discussed the results of all of the tests and the disposition. The questions were answered. The patient or family acknowledged understanding and was agreeable with the care plan.            MEDICATIONS GIVEN IN THE  "EMERGENCY:  Medications   acetaminophen (TYLENOL) tablet 650 mg (650 mg Oral Given 5/14/22 1228)       NEW PRESCRIPTIONS STARTED AT TODAY'S ER VISIT  Current Discharge Medication List      START taking these medications    Details   cyclobenzaprine (FLEXERIL) 10 MG tablet Take 1 tablet (10 mg) by mouth 2 times daily as needed for muscle spasms  Qty: 10 tablet, Refills: 0                =================================================================    HPI    Triage note  \"  Patient reports that she was mowing the lawn @1015 this morning and tripped backwards over a ramp and hit the back of her head, she denies LOC, denies taking thinners, reports that she does have C spine tenderness, was wearing a C collar that the writer placed, patient reported that she could not breathe while wearing the collar, the possibility of paralysis was emphasized to the patient and they still insisted on removing the collar, this encounter utilized a  over the martii.  Romi Haider RN.......5/14/2022 11:46 AM     Triage Assessment     Row Name 05/14/22 1141       Triage Assessment (Adult)    Airway WDL WDL       Respiratory WDL    Respiratory WDL WDL       Skin Circulation/Temperature WDL    Skin Circulation/Temperature WDL WDL       Cardiac WDL    Cardiac WDL WDL       Peripheral/Neurovascular WDL    Peripheral Neurovascular WDL WDL       Cognitive/Neuro/Behavioral WDL    Cognitive/Neuro/Behavioral WDL WDL              \"      Patient information was obtained from: Patient     Use of : Yes (In Person) Language - American Sign Language         Marleny Viera is a 46 year old female with a pertinent history of deafness, PE, hypertension, asthma, drug abuse, alcohol dependence, ADHD, bipolar depression, anxiety, s/p knee arthroplasty, and s/p tubal ligation who presents to this ED by ambulance for evaluation of headache and head injury.     At approximately 10:15 AM, the patient reports mowing the lawn when " "she tripped backwards over a ramp, hitting the back of her head. No loss of consciousness. No active bleeding. She adds that she \"always has problems with balance.\" The patient also endorses neck stiffness and soreness. The patient states that she used to be on blood thinners for her s/p knee arthroplasty, but is no longer taking any blood thinners. She is allergic to Ibuprofen. No other injuries or complaints at this time.       REVIEW OF SYSTEMS   Review of Systems   Constitutional: Negative for fever.   HENT: Negative for trouble swallowing.         Positive for head injury.   Eyes: Negative for visual disturbance.   Cardiovascular: Negative for chest pain.   Gastrointestinal: Negative for vomiting.   Musculoskeletal: Positive for neck pain (sore) and neck stiffness.   Skin: Positive for wound.        Negative for active bleeding.   Allergic/Immunologic: Negative for immunocompromised state.   Neurological: Negative for weakness and numbness.        Negative for loss of consciousness.   Hematological: Does not bruise/bleed easily.   Psychiatric/Behavioral: Negative for confusion.       PAST MEDICAL HISTORY:  Past Medical History:   Diagnosis Date     ADHD (attention deficit hyperactivity disorder)      Alcohol dependence (H)      Anxiety      Arthritis      Asthma      Bipolar depression (H)      Deafness      Drug abuse, nondependent (H) 10/29/2004    Overview:  polydrug abuse per psych notes ; Other, mixed, or unspecified nondependent drug abuse, unspecified     History of blood clots      History of transfusion      Hypertension      Kidney stones 2015     Major depressive disorder, recurrent episode, severe (H) 5/17/2016    Overview:  s/p suicide attempt Epic      Migraine      Obstructive sleep apnea 6/1/2016     Pulmonary emboli (H)      Status post total right knee replacement 1/18/2016       PAST SURGICAL HISTORY:  Past Surgical History:   Procedure Laterality Date     BIOPSY BREAST Right     2019... Also " had fluid drained from left breast under surgery also in 2019     BREAST SURGERY       MAMMOPLASTY REDUCTION Bilateral 2017     TOTAL HIP ARTHROPLASTY Bilateral      TOTAL KNEE ARTHROPLASTY Right      TUBAL LIGATION       US BREAST CORE BIOPSY RIGHT Right 11/21/2019           CURRENT MEDICATIONS:    cyclobenzaprine (FLEXERIL) 10 MG tablet  albuterol (ACCUNEB) 0.63 MG/3ML neb solution  albuterol (PROAIR HFA/PROVENTIL HFA/VENTOLIN HFA) 108 (90 Base) MCG/ACT inhaler  albuterol (PROVENTIL) (2.5 MG/3ML) 0.083% neb solution  Blood Pressure Monitoring (BLOOD PRESSURE KIT) KIT  EPINEPHrine (ANY BX GENERIC EQUIV) 0.3 MG/0.3ML injection 2-pack  gabapentin (NEURONTIN) 100 MG capsule  hydrOXYzine (ATARAX) 25 MG tablet  melatonin 5 MG tablet  metoprolol tartrate (LOPRESSOR) 25 MG tablet  mometasone (NASONEX) 50 MCG/ACT nasal spray  montelukast (SINGULAIR) 10 MG tablet  omeprazole (PRILOSEC) 20 MG DR capsule  rizatriptan (MAXALT) 10 MG tablet  SYMBICORT 80-4.5 MCG/ACT Inhaler        ALLERGIES:  Allergies   Allergen Reactions     Amoxicillin Itching and Shortness Of Breath     Bee Pollen Anaphylaxis     Contrast [Iodixanol] Shortness Of Breath and Rash     Pt stated she reacted to the contrast given for her CT in past. She experienced shortness of breath, throat tightening, and rash on chest, and they gave her an injection to counter the symptoms.      Covid-19 (Mrna) Vaccine Shortness Of Breath     Pfizer COVID-19 Vaccine     Hymenoptera Allergenic Extract [Wasp Venom Protein] Anaphylaxis     Iodine Hives     Pt stated that she was injected with CT CONTRAST in the past and got hives, SOB, and nausea. Please pre-medicate patient in the future.      Wasp Venom Protein Starter Kit [Wasp Venom Protein] Itching, Shortness Of Breath, Swelling and Difficulty breathing     Adhesive Tape-Silicones [Adhesive Tape] Unknown     Food Allergy Formula Unknown     Red meat, chocolate     Geodon [Ziprasidone] Unknown     Ibuprofen Other (See  "Comments)     Kidney function, Kidney function     Latex Unknown     Added based on information entered during case entry, please review and add reactions, type, and severity as needed     Prochlorperazine Other (See Comments)     Risperdal [Risperidone] Unknown     Risperidone Analogues [Risperidone] Other (See Comments)     Theobroma Oil [Theobroma Grandiflorum Seed Butter] Unknown     Trazodone Other (See Comments)     Elevated creatinine     Zoloft [Sertraline] Unknown     Hydrochlorothiazide Rash       FAMILY HISTORY:  Family History   Problem Relation Age of Onset     Cancer Mother      Breast Cancer Mother      Cerebrovascular Disease Father        SOCIAL HISTORY:   Social History     Socioeconomic History     Marital status:    Tobacco Use     Smoking status: Never Smoker     Smokeless tobacco: Never Used   Substance and Sexual Activity     Alcohol use: Yes     Comment: Alcoholic Drinks/day: dependence     Drug use: Not Currently     Sexual activity: Never   Social History Narrative    . No children.   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  Nonsmoker.  No alcohol use.  Tesha Pelaez MD         VITALS:  BP (!) 175/79   Pulse 58   Temp 96.9  F (36.1  C) (Temporal)   Resp 18   Wt 102.1 kg (225 lb)   LMP 05/03/2022   SpO2 99%   BMI 37.44 kg/m      PHYSICAL EXAM      Vitals: BP (!) 175/79   Pulse 58   Temp 96.9  F (36.1  C) (Temporal)   Resp 18   Wt 102.1 kg (225 lb)   LMP 05/03/2022   SpO2 99%   BMI 37.44 kg/m    General: Appears in no acute distress, awake, alert, interactive.  Eyes: Conjunctivae non-injected. Sclera anicteric. Pupils round and reactive.   HENT: Radiation and tenderness to right parietal scalp  Neck: Supple.  No midline tenderness.  Full range of motion  Respiratory/Chest: Respiration unlabored.  Abdomen: non distended  Musculoskeletal: Normal extremities. No edema or erythema. Moves arms with no difficulty. No weakness in " the arms.   Skin: No diaphoresis. Abrasion to right parietal scalp.   Neurologic: Face symmetric, moves all extremities spontaneously. Speech clear.  Psychiatric: Oriented to person, place, and time. Affect appropriate.       LAB:  All pertinent labs reviewed and interpreted.  Results for orders placed or performed during the hospital encounter of 05/14/22   CT Head w/o Contrast    Impression    IMPRESSION:  1.  No acute intracranial hemorrhage.  2.  Right parietal scalp hematoma without underlying calvarial fracture.   CT Cervical Spine w/o Contrast    Impression    IMPRESSION:  1.  No acute cervical spine fracture.       RADIOLOGY:  Reviewed all pertinent imaging. Please see official radiology report.  CT Cervical Spine w/o Contrast   Final Result   IMPRESSION:   1.  No acute cervical spine fracture.      CT Head w/o Contrast   Final Result   IMPRESSION:   1.  No acute intracranial hemorrhage.   2.  Right parietal scalp hematoma without underlying calvarial fracture.              I, Tesfaye Maldonado, am serving as a scribe to document services personally performed by Eddy Gallegos MD based on my observation and the provider's statements to me. I, Dr. Eddy Gallegos, attest that Tesfaye Maldonado is acting in a scribe capacity, has observed my performance of the services and has documented them in accordance with my direction.    Eddy Gallegos MD  Emergency Medicine  [facility] EMERGENCY ROOM  [address] [facility] [id number] [phone]     Eddy Gallegos MD  05/14/22 2312

## 2022-05-14 NOTE — DISCHARGE INSTRUCTIONS
Recommend icing your contusion, use Tylenol as needed for pain.  If he develop neck stiffness she can use Flexeril twice a day.  Return to the ER for worsening symptoms

## 2022-06-06 ENCOUNTER — NURSE TRIAGE (OUTPATIENT)
Dept: NURSING | Facility: CLINIC | Age: 47
End: 2022-06-06
Payer: MEDICARE

## 2022-06-06 NOTE — TELEPHONE ENCOUNTER
Caller  is reporting she has had daily headaches , watery eye with floaters and nausea and some  Concentration problems since  having a head injury that she was seen in ED for on 05/14; caller has not had a followup appointmnt in advised time and  now has appointment scheduled for   10 days   Caller also report others have noticed she is tilting her head back but she is unsure why.   Caller reprots headaches can be severe and often lasts   several hours. ;     Triage protocol revieiwed   Patient is advised to be seen withiin 3 days   Able to schdule next day  follow up ED appointment at her clinic   Caller further advised to call back for any new or worsening symptoms  Maria C Shelley RN  FNA            Reason for Disposition    [1] Traumatic brain injury (mTBI; concussion) AND [2] more than 14 days since head injury    Traumatic Brain Injury (mTBI, concussion) symptoms are worsening    Additional Information    Negative: [1] ACUTE NEURO SYMPTOM AND [2] present now  (DEFINITION: difficult to awaken OR confused thinking and talking OR slurred speech OR weakness of arms OR unsteady walking)    Negative: Knocked out (unconscious) > 1 minute    Negative: Seizure (convulsion) occurred  (Exception: prior history of seizures and now alert and without Acute Neuro Symptoms)    Negative: Penetrating head injury (e.g., knife, gun shot wound, metal object)    Negative: [1] Major bleeding (e.g., actively dripping or spurting) AND [2] can't be stopped    Negative: [1] Dangerous mechanism of injury (e.g., MVA, diving, trampoline, contact sports, fall > 10 feet or 3 meters) AND [2] NECK pain AND [3] began < 1 hour after injury    Negative: Sounds like a life-threatening emergency to the triager    Negative: Weakness (i.e., paralysis, loss of muscle strength) of the face, arm or leg on one side of the body    Negative: Loss of speech or garbled speech    Negative: Difficult to awaken or acting confused (e.g., disoriented, slurred  "speech)    Negative: Sounds like a life-threatening emergency to the triager    Negative: [1] Traumatic brain injury (concussion) AND [2] less than 14 days since head injury    Negative: [1] SEVERE headache (e.g., excruciating) AND [2] \"worst headache\" of life    Negative: [1] SEVERE headache AND [2] sudden-onset (i.e., reaching maximum intensity within seconds)    Negative: [1] TBI  symptoms are worsening AND [2]  taking Coumadin (warfarin) or other strong blood thinner, or known bleeding disorder (e.g., thrombocytopenia)    Negative: [1] TBI symptoms are worsening AND [2] age > 60 years    Negative: Patient sounds very sick or weak to the triager    Negative: [1] SEVERE headache (e.g., excruciating, pain scale 8-10) AND [2] not improved after pain medications    Negative: [1] Caller has URGENT question AND [2] triager unable to answer question    Negative: [1] Caller has NON-URGENT question AND [2] triager unable to answer question    Negative: Known moderate or severe traumatic brain injury    Protocols used: HEAD INJURY-A-AH, TRAUMATIC BRAIN INJURY MORE THAN 14 DAYS AGO FOLLOW-UP CALL-A-      "

## 2022-06-10 DIAGNOSIS — G47.00 PERSISTENT INSOMNIA: ICD-10-CM

## 2022-06-11 RX ORDER — HYDROXYZINE HYDROCHLORIDE 25 MG/1
TABLET, FILM COATED ORAL
Qty: 270 TABLET | Refills: 1 | Status: SHIPPED | OUTPATIENT
Start: 2022-06-11 | End: 2022-12-21

## 2022-06-11 NOTE — TELEPHONE ENCOUNTER
"Last Written Prescription Date:  3/4/22  Last Fill Quantity: 270,  # refills: 0   Last office visit provider:  11/17/21     Requested Prescriptions   Pending Prescriptions Disp Refills     hydrOXYzine (ATARAX) 25 MG tablet [Pharmacy Med Name: hydrOXYzine HCl 25MG TABS*] 270 tablet 0     Sig: TAKE 3 TABLETS (75MG) BY MOUTH THREE TIMES A DAY AS NEEDED FOR ANXIETY OR INSOMNIA       Antihistamines Protocol Passed - 6/10/2022 12:37 PM        Passed - Recent (12 mo) or future (30 days) visit within the authorizing provider's specialty     Patient has had an office visit with the authorizing provider or a provider within the authorizing providers department within the previous 12 mos or has a future within next 30 days. See \"Patient Info\" tab in inbasket, or \"Choose Columns\" in Meds & Orders section of the refill encounter.              Passed - Patient is age 3 or older     Apply age and/or weight-based dosing for peds patients age 3 and older.    Forward request to provider for patients under the age of 3.          Passed - Medication is active on med list             Salud Diego 06/11/22 12:05 PM  "

## 2022-06-16 ENCOUNTER — HOSPITAL ENCOUNTER (OUTPATIENT)
Dept: MAMMOGRAPHY | Facility: CLINIC | Age: 47
Discharge: HOME | End: 2022-06-16
Attending: NURSE PRACTITIONER
Payer: MEDICARE

## 2022-06-16 DIAGNOSIS — N64.89 BREAST ASYMMETRY: ICD-10-CM

## 2022-06-16 PROCEDURE — 77066 DX MAMMO INCL CAD BI: CPT

## 2022-06-16 PROCEDURE — 76642 ULTRASOUND BREAST LIMITED: CPT | Mod: 50

## 2022-06-17 ENCOUNTER — OFFICE VISIT (OUTPATIENT)
Dept: FAMILY MEDICINE | Facility: CLINIC | Age: 47
End: 2022-06-17
Payer: MEDICARE

## 2022-06-17 VITALS
DIASTOLIC BLOOD PRESSURE: 99 MMHG | TEMPERATURE: 98.6 F | RESPIRATION RATE: 20 BRPM | OXYGEN SATURATION: 97 % | BODY MASS INDEX: 35.94 KG/M2 | HEART RATE: 67 BPM | SYSTOLIC BLOOD PRESSURE: 173 MMHG | WEIGHT: 216 LBS

## 2022-06-17 DIAGNOSIS — H10.33 ACUTE CONJUNCTIVITIS OF BOTH EYES, UNSPECIFIED ACUTE CONJUNCTIVITIS TYPE: ICD-10-CM

## 2022-06-17 DIAGNOSIS — H05.20 PROPTOSIS: Primary | ICD-10-CM

## 2022-06-17 LAB — TSH SERPL DL<=0.005 MIU/L-ACNC: 3.74 UIU/ML (ref 0.3–5)

## 2022-06-17 PROCEDURE — 99213 OFFICE O/P EST LOW 20 MIN: CPT | Performed by: EMERGENCY MEDICINE

## 2022-06-17 PROCEDURE — 84443 ASSAY THYROID STIM HORMONE: CPT | Performed by: EMERGENCY MEDICINE

## 2022-06-17 PROCEDURE — 36415 COLL VENOUS BLD VENIPUNCTURE: CPT | Performed by: EMERGENCY MEDICINE

## 2022-06-17 RX ORDER — SULFACETAMIDE SODIUM 100 MG/ML
1-2 SOLUTION/ DROPS OPHTHALMIC
Qty: 5 ML | Refills: 0 | Status: SHIPPED | OUTPATIENT
Start: 2022-06-17 | End: 2022-11-09

## 2022-06-18 ENCOUNTER — NURSE TRIAGE (OUTPATIENT)
Dept: NURSING | Facility: CLINIC | Age: 47
End: 2022-06-18
Payer: MEDICARE

## 2022-06-18 NOTE — TELEPHONE ENCOUNTER
Triage Call:    Patient calling through sign language interpretor service to inquire on TSH lab results from 6/17/22.  Results have not been reviewed or released.      Advised patient to check back tomorrow.    María Rebollar RN  06/18/22 12:18 AM  [facility] Nurse Advisor    Reason for Disposition    Caller requesting lab results    Additional Information    Negative: Lab calling with strep throat test results and triager can call in prescription    Negative: Lab calling with urinalysis test results and triager can call in prescription    Negative: Medication questions    Negative: ED call to PCP    Negative: Physician call to PCP    Negative: Call about patient who is currently hospitalized    Negative: Lab or radiology calling with CRITICAL test results    Negative: [1] Prescription not at pharmacy AND [2] was prescribed today by PCP    Negative: [1] Follow-up call from patient regarding patient's clinical status AND [2] information urgent    Negative: [1] Caller requests to speak ONLY to PCP AND [2] urgent question    Negative: [1] Caller requests to speak to PCP now AND [2] won't tell us reason for call  (Exception: if 10 pm to 6 am, caller must first discuss reason for the call)    Negative: Notification of hospital admission    Negative: Notification of death    Protocols used: [facility]

## 2022-06-18 NOTE — PROGRESS NOTES
Impression:  Eye symptoms.  Differential diagnosis includes conjunctivitis, although the patient may have some proptosis and need to rule out thyroid eye disease or other conditions    Plan:  Check a TSH, sulfacetamide eyedrops, refer to [place] [facility] for further evaluation.  She should follow-up with her primary care physician to follow-up the results of the thyroid tests as well.      Chief Complaint:  Patient presents with:  Eye Problem: Itchiness and pain was seen for it headache since 15 th of May. Patient states it is both eyes. Patient states it is getting worse. Patient states she has been seen for this. Patient has been sleeping more than usual.         HPI:   Marleny Viera is a 46 year old female who presents to this clinic for the evaluation of eye symptoms.  Patient woke up this morning with feeling of dry eyes and grittiness in her eyes and redness in her eyes.  Both eyes equally affected.  No decrease in vision.  She had a head injury where she fell while mowing the lawn and hit her head on a wood ramp back on May 15.  She had CT of the head and the neck at that time which were negative.  Since that time she has had occasional headaches and has been sleeping more than usual.  No drainage from the nose or the ears.  No nausea or vomiting.  No other complaints      PMH:   Past Medical History:   Diagnosis Date     ADHD (attention deficit hyperactivity disorder)      Alcohol dependence (H)      Anxiety      Arthritis      Asthma      Bipolar depression (H)      Deafness      Drug abuse, nondependent (H) 10/29/2004    Overview:  polydrug abuse per psych notes ; Other, mixed, or unspecified nondependent drug abuse, unspecified     History of blood clots      History of transfusion      Hypertension      Kidney stones 2015     Major depressive disorder, recurrent episode, severe (H) 5/17/2016    Overview:  s/p suicide attempt Epic      Migraine      Obstructive sleep apnea 6/1/2016     Pulmonary  emboli (H)      Status post total right knee replacement 1/18/2016     Past Surgical History:   Procedure Laterality Date     BIOPSY BREAST Right     2019... Also had fluid drained from left breast under surgery also in 2019     BREAST SURGERY       MAMMOPLASTY REDUCTION Bilateral 2017     TOTAL HIP ARTHROPLASTY Bilateral      TOTAL KNEE ARTHROPLASTY Right      TUBAL LIGATION       US BREAST CORE BIOPSY RIGHT Right 11/21/2019         ROS:  All other systems negative    Meds:    Current Outpatient Medications:      albuterol (ACCUNEB) 0.63 MG/3ML neb solution, Inhale 0.63 mg into the lungs, Disp: , Rfl:      albuterol (PROAIR HFA/PROVENTIL HFA/VENTOLIN HFA) 108 (90 Base) MCG/ACT inhaler, INHALE 2 PUFFS BY MOUTH EVERY FOUR HOURS AS NEEDED FOR WHEEZING OR SHORTNESS OF BREATH, Disp: 8.5 g, Rfl: 1     gabapentin (NEURONTIN) 100 MG capsule, TAKE 1 CAPSULE BY MOUTH TWICE A DAY FOR MIGRAINES AND ANXIETY, Disp: 180 capsule, Rfl: 3     hydrOXYzine (ATARAX) 25 MG tablet, TAKE 3 TABLETS (75MG) BY MOUTH THREE TIMES A DAY AS NEEDED FOR ANXIETY OR INSOMNIA, Disp: 270 tablet, Rfl: 1     melatonin 5 MG tablet, Take 5-10 mg by mouth nightly as needed for sleep , Disp: , Rfl:      metoprolol tartrate (LOPRESSOR) 25 MG tablet, TAKE 3 TABLETS BY MOUTH TWICE A DAY, Disp: 540 tablet, Rfl: 3     montelukast (SINGULAIR) 10 MG tablet, Take 1 tablet (10 mg) by mouth daily, Disp: 90 tablet, Rfl: 3     omeprazole (PRILOSEC) 20 MG DR capsule, Take 20 mg by mouth every morning , Disp: , Rfl:      sulfacetamide (BLEPH-10) 10 % ophthalmic solution, Apply 1-2 drops to eye every 2 hours (while awake), Disp: 5 mL, Rfl: 0     albuterol (PROVENTIL) (2.5 MG/3ML) 0.083% neb solution, TAKE 3 ML (2.5 MG TOTAL) BY NEBULIZATION 4 (FOUR) TIMES A WEEK., Disp: , Rfl:      Blood Pressure Monitoring (BLOOD PRESSURE KIT) KIT, Check blood pressure once in am; should be 130/80 or less. Pulse should be greater than 50., Disp: , Rfl:      cyclobenzaprine (FLEXERIL) 10  "MG tablet, Take 1 tablet (10 mg) by mouth 2 times daily as needed for muscle spasms, Disp: 10 tablet, Rfl: 0     EPINEPHrine (ANY BX GENERIC EQUIV) 0.3 MG/0.3ML injection 2-pack, , Disp: , Rfl:      mometasone (NASONEX) 50 MCG/ACT nasal spray, Spray 2 sprays into both nostrils daily  (Patient not taking: Reported on 6/17/2022), Disp: , Rfl:      rizatriptan (MAXALT) 10 MG tablet, TABLET BY MOUTH DAILY AS NEEDED (Patient not taking: Reported on 6/17/2022), Disp: 18 tablet, Rfl: 3     SYMBICORT 80-4.5 MCG/ACT Inhaler, , Disp: , Rfl:         Social:  Social History     Socioeconomic History     Marital status:      Spouse name: Not on file     Number of children: Not on file     Years of education: Not on file     Highest education level: Not on file   Occupational History     Not on file   Tobacco Use     Smoking status: Never Smoker     Smokeless tobacco: Never Used   Substance and Sexual Activity     Alcohol use: Yes     Comment: Alcoholic Drinks/day: dependence     Drug use: Not Currently     Sexual activity: Never   Other Topics Concern     Not on file   Social History Narrative    . No children.   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  Nonsmoker.  No alcohol use.  Tesha Pelaez MD       Social Determinants of Health     Financial Resource Strain: Not on file   Food Insecurity: Not on file   Transportation Needs: Not on file   Physical Activity: Not on file   Stress: Not on file   Social Connections: Not on file   Intimate Partner Violence: Not on file   Housing Stability: Not on file         Physical Exam:  Vitals:    06/17/22 1911   BP: (!) 173/99   Pulse: 67   Resp: 20   Temp: 98.6  F (37  C)   TempSrc: Oral   SpO2: 97%   Weight: 98 kg (216 lb)      Patient is awake, alert, no distress  Eyes: PERRL, EOMI, slight conjunctival injection bilaterally, the patient may have some proptosis bilaterally but no lid lag, cornea and anterior chamber appear " clear  Head: No finding any scalp hematoma or skull depression or deformity  Skin: No lesions or rash  Neuro: Normal motor and sensory function in all extremities  Psych: Awake, alert, normally responsive      Results:    No results found for this or any previous visit (from the past 24 hour(s)).           Tommie Barillas MD

## 2022-07-04 ENCOUNTER — HOSPITAL ENCOUNTER (EMERGENCY)
Facility: CLINIC | Age: 47
Discharge: HOME | End: 2022-07-04
Attending: EMERGENCY MEDICINE | Admitting: EMERGENCY MEDICINE
Payer: MEDICARE

## 2022-07-04 ENCOUNTER — APPOINTMENT (OUTPATIENT)
Dept: ULTRASOUND IMAGING | Facility: CLINIC | Age: 47
End: 2022-07-04
Attending: EMERGENCY MEDICINE
Payer: MEDICARE

## 2022-07-04 VITALS
RESPIRATION RATE: 18 BRPM | BODY MASS INDEX: 35.82 KG/M2 | SYSTOLIC BLOOD PRESSURE: 194 MMHG | HEART RATE: 78 BPM | WEIGHT: 215 LBS | OXYGEN SATURATION: 98 % | HEIGHT: 65 IN | DIASTOLIC BLOOD PRESSURE: 90 MMHG | TEMPERATURE: 98.5 F

## 2022-07-04 DIAGNOSIS — T74.21XA SEXUAL ASSAULT OF ADULT, INITIAL ENCOUNTER: ICD-10-CM

## 2022-07-04 PROCEDURE — 250N000009 HC RX 250: Performed by: EMERGENCY MEDICINE

## 2022-07-04 PROCEDURE — 99285 EMERGENCY DEPT VISIT HI MDM: CPT | Mod: 25

## 2022-07-04 PROCEDURE — 250N000011 HC RX IP 250 OP 636: Performed by: EMERGENCY MEDICINE

## 2022-07-04 PROCEDURE — 76830 TRANSVAGINAL US NON-OB: CPT

## 2022-07-04 PROCEDURE — 250N000013 HC RX MED GY IP 250 OP 250 PS 637: Performed by: EMERGENCY MEDICINE

## 2022-07-04 PROCEDURE — 96372 THER/PROPH/DIAG INJ SC/IM: CPT | Performed by: EMERGENCY MEDICINE

## 2022-07-04 RX ORDER — DIPHENHYDRAMINE HCL 25 MG
25 CAPSULE ORAL ONCE
Status: COMPLETED | OUTPATIENT
Start: 2022-07-04 | End: 2022-07-04

## 2022-07-04 RX ORDER — METRONIDAZOLE 500 MG/1
500 TABLET ORAL 2 TIMES DAILY
Qty: 14 TABLET | Refills: 1 | Status: SHIPPED | OUTPATIENT
Start: 2022-07-04 | End: 2022-07-04

## 2022-07-04 RX ORDER — METRONIDAZOLE 500 MG/1
500 TABLET ORAL 2 TIMES DAILY
Qty: 14 TABLET | Refills: 1 | Status: SHIPPED | OUTPATIENT
Start: 2022-07-04 | End: 2022-11-09

## 2022-07-04 RX ORDER — DOXYCYCLINE 100 MG/1
100 CAPSULE ORAL 2 TIMES DAILY
Qty: 14 CAPSULE | Refills: 0 | Status: SHIPPED | OUTPATIENT
Start: 2022-07-04 | End: 2022-07-04

## 2022-07-04 RX ORDER — DOXYCYCLINE 100 MG/1
100 CAPSULE ORAL 2 TIMES DAILY
Qty: 14 CAPSULE | Refills: 0 | Status: SHIPPED | OUTPATIENT
Start: 2022-07-04 | End: 2022-11-09

## 2022-07-04 RX ADMIN — DIPHENHYDRAMINE HYDROCHLORIDE 25 MG: 25 CAPSULE ORAL at 16:33

## 2022-07-04 RX ADMIN — LIDOCAINE HYDROCHLORIDE 500 MG: 10 INJECTION, SOLUTION EPIDURAL; INFILTRATION; INTRACAUDAL; PERINEURAL at 19:28
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
## 2022-07-05 ENCOUNTER — NURSE TRIAGE (OUTPATIENT)
Dept: NURSING | Facility: CLINIC | Age: 47
End: 2022-07-05

## 2022-07-05 ENCOUNTER — HOSPITAL ENCOUNTER (EMERGENCY)
Facility: CLINIC | Age: 47
Discharge: LEFT BEFORE DISCHARGE | End: 2022-07-05
Payer: MEDICARE

## 2022-07-05 ENCOUNTER — ANCILLARY PROCEDURE (OUTPATIENT)
Dept: GENERAL RADIOLOGY | Facility: CLINIC | Age: 47
End: 2022-07-05
Attending: PHYSICIAN ASSISTANT
Payer: MEDICARE

## 2022-07-05 ENCOUNTER — OFFICE VISIT (OUTPATIENT)
Dept: FAMILY MEDICINE | Facility: CLINIC | Age: 47
End: 2022-07-05
Payer: MEDICARE

## 2022-07-05 VITALS
SYSTOLIC BLOOD PRESSURE: 191 MMHG | TEMPERATURE: 98.9 F | OXYGEN SATURATION: 98 % | DIASTOLIC BLOOD PRESSURE: 105 MMHG | RESPIRATION RATE: 16 BRPM | HEART RATE: 92 BPM

## 2022-07-05 VITALS
WEIGHT: 215 LBS | TEMPERATURE: 98.7 F | HEART RATE: 65 BPM | DIASTOLIC BLOOD PRESSURE: 86 MMHG | OXYGEN SATURATION: 99 % | RESPIRATION RATE: 18 BRPM | SYSTOLIC BLOOD PRESSURE: 180 MMHG | BODY MASS INDEX: 35.78 KG/M2

## 2022-07-05 DIAGNOSIS — S46.011A ROTATOR CUFF STRAIN, RIGHT, INITIAL ENCOUNTER: Primary | ICD-10-CM

## 2022-07-05 PROCEDURE — 73030 X-RAY EXAM OF SHOULDER: CPT | Mod: TC | Performed by: RADIOLOGY

## 2022-07-05 PROCEDURE — 99214 OFFICE O/P EST MOD 30 MIN: CPT | Performed by: PHYSICIAN ASSISTANT

## 2022-07-05 NOTE — PATIENT INSTRUCTIONS
Ice topically to the area 20 minutes on each hour while awake.  Take precautions to avoid damage to the skin.  After 2 days, transition to ice topically 20 minutes 3 times per day.  After 2 days may add heat and alternate ice and heat.    Return to see primary care provider or return to clinic for reexamination and heidy-ray in 2 weeks if anything less than 100% resolution or return to pain-free weightbearing and full activities.
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
## 2022-07-06 ASSESSMENT — PATIENT HEALTH QUESTIONNAIRE - PHQ9
10. IF YOU CHECKED OFF ANY PROBLEMS, HOW DIFFICULT HAVE THESE PROBLEMS MADE IT FOR YOU TO DO YOUR WORK, TAKE CARE OF THINGS AT HOME, OR GET ALONG WITH OTHER PEOPLE: SOMEWHAT DIFFICULT
SUM OF ALL RESPONSES TO PHQ QUESTIONS 1-9: 19
SUM OF ALL RESPONSES TO PHQ QUESTIONS 1-9: 19
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
## 2022-07-07 ENCOUNTER — NURSE TRIAGE (OUTPATIENT)
Dept: NURSING | Facility: CLINIC | Age: 47
End: 2022-07-07

## 2022-07-07 ENCOUNTER — OFFICE VISIT (OUTPATIENT)
Dept: INTERNAL MEDICINE | Facility: CLINIC | Age: 47
End: 2022-07-07
Payer: MEDICARE

## 2022-07-07 VITALS
SYSTOLIC BLOOD PRESSURE: 160 MMHG | OXYGEN SATURATION: 99 % | BODY MASS INDEX: 36.15 KG/M2 | DIASTOLIC BLOOD PRESSURE: 88 MMHG | WEIGHT: 217 LBS | HEIGHT: 65 IN | HEART RATE: 75 BPM

## 2022-07-07 DIAGNOSIS — I10 ESSENTIAL HYPERTENSION: ICD-10-CM

## 2022-07-07 DIAGNOSIS — F32.2 SEVERE DEPRESSION (H): ICD-10-CM

## 2022-07-07 DIAGNOSIS — T74.21XA SEXUAL ASSAULT OF ADULT, INITIAL ENCOUNTER: ICD-10-CM

## 2022-07-07 DIAGNOSIS — T50.901A ACCIDENTAL OVERDOSE, INITIAL ENCOUNTER: Primary | ICD-10-CM

## 2022-07-07 LAB
ALBUMIN SERPL BCG-MCNC: 4.4 G/DL (ref 3.5–5.2)
ALP SERPL-CCNC: 100 U/L (ref 35–104)
ALT SERPL W P-5'-P-CCNC: 13 U/L (ref 10–35)
ANION GAP SERPL CALCULATED.3IONS-SCNC: 6 MMOL/L (ref 7–15)
AST SERPL W P-5'-P-CCNC: 25 U/L (ref 10–35)
BILIRUB SERPL-MCNC: 0.7 MG/DL
BUN SERPL-MCNC: 13.5 MG/DL (ref 6–20)
CALCIUM SERPL-MCNC: 9.3 MG/DL (ref 8.6–10)
CHLORIDE SERPL-SCNC: 103 MMOL/L (ref 98–107)
CREAT SERPL-MCNC: 0.94 MG/DL (ref 0.51–0.95)
DEPRECATED HCO3 PLAS-SCNC: 28 MMOL/L (ref 22–29)
ERYTHROCYTE [DISTWIDTH] IN BLOOD BY AUTOMATED COUNT: 13.2 % (ref 10–15)
GFR SERPL CREATININE-BSD FRML MDRD: 74 ML/MIN/1.73M2
GLUCOSE SERPL-MCNC: 121 MG/DL (ref 70–99)
HCT VFR BLD AUTO: 42.8 % (ref 35–47)
HGB BLD-MCNC: 14.3 G/DL (ref 11.7–15.7)
HIV 1+2 AB+HIV1 P24 AG SERPL QL IA: NONREACTIVE
MCH RBC QN AUTO: 27.8 PG (ref 26.5–33)
MCHC RBC AUTO-ENTMCNC: 33.4 G/DL (ref 31.5–36.5)
MCV RBC AUTO: 83 FL (ref 78–100)
PLATELET # BLD AUTO: 297 10E3/UL (ref 150–450)
POTASSIUM SERPL-SCNC: 3.4 MMOL/L (ref 3.4–5.3)
PROT SERPL-MCNC: 7.8 G/DL (ref 6.4–8.3)
RBC # BLD AUTO: 5.15 10E6/UL (ref 3.8–5.2)
SODIUM SERPL-SCNC: 137 MMOL/L (ref 136–145)
WBC # BLD AUTO: 6.7 10E3/UL (ref 4–11)

## 2022-07-07 PROCEDURE — 80053 COMPREHEN METABOLIC PANEL: CPT | Performed by: INTERNAL MEDICINE

## 2022-07-07 PROCEDURE — 87591 N.GONORRHOEAE DNA AMP PROB: CPT | Performed by: INTERNAL MEDICINE

## 2022-07-07 PROCEDURE — 83036 HEMOGLOBIN GLYCOSYLATED A1C: CPT | Performed by: INTERNAL MEDICINE

## 2022-07-07 PROCEDURE — 85027 COMPLETE CBC AUTOMATED: CPT | Performed by: INTERNAL MEDICINE

## 2022-07-07 PROCEDURE — 36415 COLL VENOUS BLD VENIPUNCTURE: CPT | Performed by: INTERNAL MEDICINE

## 2022-07-07 PROCEDURE — 87491 CHLMYD TRACH DNA AMP PROBE: CPT | Performed by: INTERNAL MEDICINE

## 2022-07-07 PROCEDURE — 87389 HIV-1 AG W/HIV-1&-2 AB AG IA: CPT | Performed by: INTERNAL MEDICINE

## 2022-07-07 PROCEDURE — 99214 OFFICE O/P EST MOD 30 MIN: CPT | Performed by: INTERNAL MEDICINE

## 2022-07-07 PROCEDURE — 96127 BRIEF EMOTIONAL/BEHAV ASSMT: CPT | Performed by: INTERNAL MEDICINE

## 2022-07-07 ASSESSMENT — ASTHMA QUESTIONNAIRES
ACT_TOTALSCORE: 25
ACT_TOTALSCORE: 25
QUESTION_1 LAST FOUR WEEKS HOW MUCH OF THE TIME DID YOUR ASTHMA KEEP YOU FROM GETTING AS MUCH DONE AT WORK, SCHOOL OR AT HOME: NONE OF THE TIME
QUESTION_4 LAST FOUR WEEKS HOW OFTEN HAVE YOU USED YOUR RESCUE INHALER OR NEBULIZER MEDICATION (SUCH AS ALBUTEROL): NOT AT ALL
QUESTION_5 LAST FOUR WEEKS HOW WOULD YOU RATE YOUR ASTHMA CONTROL: COMPLETELY CONTROLLED
QUESTION_3 LAST FOUR WEEKS HOW OFTEN DID YOUR ASTHMA SYMPTOMS (WHEEZING, COUGHING, SHORTNESS OF BREATH, CHEST TIGHTNESS OR PAIN) WAKE YOU UP AT NIGHT OR EARLIER THAN USUAL IN THE MORNING: NOT AT ALL
QUESTION_2 LAST FOUR WEEKS HOW OFTEN HAVE YOU HAD SHORTNESS OF BREATH: NOT AT ALL

## 2022-07-07 ASSESSMENT — PATIENT HEALTH QUESTIONNAIRE - PHQ9
SUM OF ALL RESPONSES TO PHQ QUESTIONS 1-9: 19
10. IF YOU CHECKED OFF ANY PROBLEMS, HOW DIFFICULT HAVE THESE PROBLEMS MADE IT FOR YOU TO DO YOUR WORK, TAKE CARE OF THINGS AT HOME, OR GET ALONG WITH OTHER PEOPLE: SOMEWHAT DIFFICULT

## 2022-07-07 NOTE — TELEPHONE ENCOUNTER
"  TELEPHONE CALL -    Reason for call-  TEST results - with .     [facility] some flags on the results   and RN explained that [facility] Glucose is accepted for prediabetic pt.  Pt needs to wait for Dr to reviewed the results to call her back  She asked for the other test - explained that they are \"still in process\" we will all back if they are positive  Patient verbalized understanding and agrees with plan    Yennifer Contreras RN Nurse Triage Advisor 6:53 PM 7/7/2022  "
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
## 2022-07-08 ENCOUNTER — TELEPHONE (OUTPATIENT)
Dept: INTERNAL MEDICINE | Facility: CLINIC | Age: 47
End: 2022-07-08

## 2022-07-08 ENCOUNTER — MYC MEDICAL ADVICE (OUTPATIENT)
Dept: INTERNAL MEDICINE | Facility: CLINIC | Age: 47
End: 2022-07-08

## 2022-07-08 LAB
C TRACH DNA SPEC QL NAA+PROBE: NEGATIVE
N GONORRHOEA DNA SPEC QL NAA+PROBE: NEGATIVE

## 2022-07-08 NOTE — TELEPHONE ENCOUNTER
Patient informed via [employer] about auto-release results and that provider will get back to her or with result notes once he has a chance to review results.    Arthur Watts, ANA MARIAN, RN  [facility]

## 2022-07-08 NOTE — TELEPHONE ENCOUNTER
Pt called and stated she met with Luis A Rodriguez on 07/07/2022 and she has some questions/concerns about her lab work that was done yesterday. Please call Pt to discuss her questions/concerns.    Cami Millan

## 2022-07-11 DIAGNOSIS — R73.01 IMPAIRED FASTING GLUCOSE: Primary | ICD-10-CM

## 2022-07-11 LAB — HBA1C MFR BLD: 5.9 % (ref 0–5.6)

## 2022-07-11 NOTE — TELEPHONE ENCOUNTER
Duplicate: please see Telephone encounter from 7/8/22.    ANA MARIA HarerllN, RN  [facility]
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
## 2022-07-11 NOTE — TELEPHONE ENCOUNTER
Left message to return call-please relay Elma Rodriguez's message to the patient when she returns call and assist patient to set up appt with PCP.    Arthur Watts, ANA MARIAN, RN  [facility]

## 2022-07-11 NOTE — TELEPHONE ENCOUNTER
Patient called back regarding message that was left. PCP does not have in person appointment that works for patient for several weeks. Time frame was not in message and if it needed to be virtual or in person. Please call patient back and help with scheduling.
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
## 2022-07-11 NOTE — TELEPHONE ENCOUNTER
I will try adding on a hemoglobin A1c to the labs drawn last week to further evaluate for possible diabetes.  She should be scheduling a follow-up appointment with her primary care provider, Maria Fernanda Schumacher to follow-up all of her complicated issues

## 2022-07-13 NOTE — TELEPHONE ENCOUNTER
Pt returned call and TC advised PCP would like her to schedule a follow up office visit. Pt states she is feeling fine and doesn't need to come in for a follow up appointment.     Nothing else needed at this time.      Cami Millan

## 2022-07-13 NOTE — TELEPHONE ENCOUNTER
Called pt L/M. Calling to set up an appt with [facility]. Can use a hold spot if available per Maria Fernanda.

## 2022-07-13 NOTE — TELEPHONE ENCOUNTER
I see that her A1C was 5.9%; she should still make a follow up appointment with me. If we can use a hold spot that would be okay.

## 2022-07-20 ENCOUNTER — NURSE TRIAGE (OUTPATIENT)
Dept: NURSING | Facility: CLINIC | Age: 47
End: 2022-07-20
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
## 2022-08-08 ENCOUNTER — VIRTUAL VISIT (OUTPATIENT)
Dept: INTERNAL MEDICINE | Facility: CLINIC | Age: 47
End: 2022-08-08
Payer: MEDICARE

## 2022-08-08 DIAGNOSIS — E87.6 HYPOKALEMIA: Primary | ICD-10-CM

## 2022-08-08 PROCEDURE — 99207 PR NON-BILLABLE SERV PER CHARTING: CPT | Performed by: NURSE PRACTITIONER

## 2022-08-08 NOTE — PROGRESS NOTES
Marleny is a 46 year old who is being evaluated via a billable telephone visit.    Patient declines doing video call today. The patient also declined doing the phone visit today, her  was currently at her house. Rescheduled for Wednesday 08/10/22. No charge visit today. Will close encounter.     What phone number would you like to be contacted at? [phone]  How would you like to obtain your AVS? Zeinab  Phone call duration: 5 minutes  
[phone]

## 2022-08-08 NOTE — NURSING NOTE
Unable to update visit type due to template scheduling error. Supervisor made aware and is looking into this. Patient reports no changes to allergies/medications since reviewed during e-check in.     Gladys WHEELER Virtual Visit Facilitator 12:27 PM August 8, 2022

## 2022-08-10 ENCOUNTER — OFFICE VISIT (OUTPATIENT)
Dept: INTERNAL MEDICINE | Facility: CLINIC | Age: 47
End: 2022-08-10
Payer: MEDICARE

## 2022-08-10 VITALS
OXYGEN SATURATION: 98 % | DIASTOLIC BLOOD PRESSURE: 83 MMHG | BODY MASS INDEX: 36.78 KG/M2 | HEART RATE: 73 BPM | WEIGHT: 221 LBS | SYSTOLIC BLOOD PRESSURE: 150 MMHG

## 2022-08-10 DIAGNOSIS — R59.9 ENLARGED LYMPH NODES: Primary | ICD-10-CM

## 2022-08-10 DIAGNOSIS — R21 RASH AND NONSPECIFIC SKIN ERUPTION: ICD-10-CM

## 2022-08-10 LAB
BASOPHILS # BLD AUTO: 0 10E3/UL (ref 0–0.2)
BASOPHILS NFR BLD AUTO: 0 %
EOSINOPHIL # BLD AUTO: 0.1 10E3/UL (ref 0–0.7)
EOSINOPHIL NFR BLD AUTO: 1 %
ERYTHROCYTE [DISTWIDTH] IN BLOOD BY AUTOMATED COUNT: 13.1 % (ref 10–15)
HCT VFR BLD AUTO: 39.6 % (ref 35–47)
HGB BLD-MCNC: 13.3 G/DL (ref 11.7–15.7)
IMM GRANULOCYTES # BLD: 0 10E3/UL
IMM GRANULOCYTES NFR BLD: 0 %
LYMPHOCYTES # BLD AUTO: 2.8 10E3/UL (ref 0.8–5.3)
LYMPHOCYTES NFR BLD AUTO: 37 %
MCH RBC QN AUTO: 28.1 PG (ref 26.5–33)
MCHC RBC AUTO-ENTMCNC: 33.6 G/DL (ref 31.5–36.5)
MCV RBC AUTO: 84 FL (ref 78–100)
MONOCYTES # BLD AUTO: 0.3 10E3/UL (ref 0–1.3)
MONOCYTES NFR BLD AUTO: 5 %
NEUTROPHILS # BLD AUTO: 4.2 10E3/UL (ref 1.6–8.3)
NEUTROPHILS NFR BLD AUTO: 57 %
PLATELET # BLD AUTO: 296 10E3/UL (ref 150–450)
RBC # BLD AUTO: 4.73 10E6/UL (ref 3.8–5.2)
WBC # BLD AUTO: 7.4 10E3/UL (ref 4–11)

## 2022-08-10 PROCEDURE — 85025 COMPLETE CBC W/AUTO DIFF WBC: CPT | Performed by: NURSE PRACTITIONER

## 2022-08-10 PROCEDURE — 99000 SPECIMEN HANDLING OFFICE-LAB: CPT | Performed by: NURSE PRACTITIONER

## 2022-08-10 PROCEDURE — 36415 COLL VENOUS BLD VENIPUNCTURE: CPT | Performed by: NURSE PRACTITIONER

## 2022-08-10 PROCEDURE — 99213 OFFICE O/P EST LOW 20 MIN: CPT | Performed by: NURSE PRACTITIONER

## 2022-08-10 RX ORDER — TRIAMCINOLONE ACETONIDE 1 MG/G
CREAM TOPICAL 2 TIMES DAILY
Qty: 45 G | Refills: 0 | Status: SHIPPED | OUTPATIENT
Start: 2022-08-10 | End: 2022-08-24

## 2022-08-10 ASSESSMENT — PATIENT HEALTH QUESTIONNAIRE - PHQ9
SUM OF ALL RESPONSES TO PHQ QUESTIONS 1-9: 7
SUM OF ALL RESPONSES TO PHQ QUESTIONS 1-9: 7
10. IF YOU CHECKED OFF ANY PROBLEMS, HOW DIFFICULT HAVE THESE PROBLEMS MADE IT FOR YOU TO DO YOUR WORK, TAKE CARE OF THINGS AT HOME, OR GET ALONG WITH OTHER PEOPLE: SOMEWHAT DIFFICULT

## 2022-08-10 NOTE — PATIENT INSTRUCTIONS
Your labs are processing at this time, I will release results on my chart when they are back.    Topical TMC cream twice daily to the rashes, good handwashing after use.    Try a warm moist wash cloth to the right side of your neck area/swollen lymph node 15 minutes four times per day.    Tylenol as needed for pain control.    Follow up if symptoms persist or worsen.

## 2022-08-10 NOTE — PROGRESS NOTES
"  Assessment & Plan     Enlarged lymph nodes: Right side of neck, one solitary node, movable, squishy, felt on palpation. CBC was normal. No other symptoms.   - CBC with platelets and differential  - Other Laboratory; [place] [facility]; Monkeypox (Laboratory Miscellaneous Order)  - Rest, push fluids.  - Warm, moist wash cloth to swollen node 15 minutes four times per day.  - Tylenol as needed for pain control.     Rash and nonspecific skin eruption: Rash appears to be contact dermatitis. Will treat with topical TMC cream. She did want Monkey Pox testing, this was ordered today. Follow up if symptoms persist.   - triamcinolone (KENALOG) 0.1 % external cream  Dispense: 45 g; Refill: 0  - Other Laboratory; [place] [facility]; Monkeypox (Laboratory Miscellaneous Order)       BMI:   Estimated body mass index is 36.78 kg/m  as calculated from the following:    Height as of 7/7/22: 1.651 m (5' 5\").    Weight as of this encounter: 100.2 kg (221 lb).     Return in about 3 months (around 11/10/2022) for Follow up.    Maria Fernanda Schumacher CNP  [facility]    Rosemarie Farmer is a 46 year old, presenting for the following health issues:  Derm Problem (Lump on right side of neck - started yesterday & today - wants more information about monkey pox vaccine)    An  was used for the duration of her visit today via I PAD.    HPI     The patient presents today with concerns of an itchy rash that she is concerned might be monkey pox. 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 Upon return she started to have a red and itchy main on her neck area, that has spread to her arms and other areas. She has not tried anything topical to help with the rash at home.     She reports that there was 5 people that came home from their conference that tested positive for monkey pox. Discussed that there is not a lot of information about the " vaccine yet, Infectious Disease is the only department that currently has it.     She also reports noticing a lump on the right side of her neck that developed over the past couple of days. It hurts to touch. She denies a fever, chills, sore throat, body aches, or other symptoms. Her right ear hurts at times, but is tolerable.    Review of Systems   Constitutional, HEENT, cardiovascular, pulmonary, GI, , musculoskeletal, neuro, skin, endocrine and psych systems are negative, except as otherwise noted.      Objective    BP (!) 150/83 (BP Location: Right arm, Patient Position: Sitting, Cuff Size: Adult Large)   Pulse 73   Wt 100.2 kg (221 lb)   SpO2 98%   BMI 36.78 kg/m    Body mass index is 36.78 kg/m .  Physical Exam   GENERAL: healthy, alert and no distress  EYES: Eyes grossly normal to inspection, PERRLA and conjunctivae and sclerae normal  HENT: ear canals and TM's normal, nose and mouth without ulcers or lesions  NECK: Inflamed lymph node, right side of neck no masses, or scars and thyroid normal to palpation  RESP: lungs clear to auscultation - no rales, rhonchi or wheezes  CV: regular rate and rhythm, normal S1 S2, no S3 or S4, no murmur, click or rub, no peripheral edema and peripheral pulses strong  MS: no gross musculoskeletal defects noted, no edema  SKIN: Red raised pinpoint rash on neck, bilateral upper arms.  NEURO: Normal strength and tone, mentation intact and speech normal  PSYCH: mentation appears normal, affect normal/bright              .  ..

## 2022-08-16 DIAGNOSIS — J45.40 MODERATE PERSISTENT ASTHMA WITHOUT COMPLICATION: Primary | ICD-10-CM

## 2022-08-16 DIAGNOSIS — T78.40XD ALLERGIC REACTION, SUBSEQUENT ENCOUNTER: Primary | ICD-10-CM

## 2022-08-16 NOTE — TELEPHONE ENCOUNTER
"Routing refill request to provider for review/approval because:  Medication is reported/historical   Disp Refills Start End SERENITY   EPINEPHrine (EPIPEN/ADRENACLICK/AUVI-Q) 0.3 mg/0.3 mL injection 2 Pre-filled Pen Syringe 2 2021  No   Sig - Route: Inject 0.3 mL (0.3 mg total) as directed as needed. - Injection   Sent to pharmacy as: EPINEPHrine 0.3 mg/0.3 mL injection, auto-injector (EPIPEN/ADRENACLICK/AUVI-Q)   Notes to Pharmacy: DX Code Needed  REQUESTING REFILLS FOR EPINEPHRINE 0.3 MG AUTO-INJECT PEN, FOR MEHRAN ABDI, : 1975. PT USED THEIR LAST ONE.   E-Prescribing Status: Receipt confirmed by pharmacy (2021  6:59 PM CDT)       EPINEPHrine (EPIPEN/ADRENACLICK/AUVI-Q) 0.3 mg/0.3 mL injection [[id number]]    Electronically signed by: Carolann Julien MD on 21 Status: Active   Ordering user: Carolann Julien MD 21 Authorized by: Carolann Julien MD   Frequency: PRN 21 - Until Discontinued Released by: Carolann Julien MD 21   Diagnoses  H/O bee sting allergy [Z91.030]   Medication comments: DX Code Needed  REQUESTING REFILLS FOR EPINEPHRINE 0.3 MG AUTO-INJECT PEN, FOR MEHRAN ABDI, : 1975. PT USED THEIR LAST ONE.       Last Written Prescription Date:    Last Fill Quantity: ,  # refills:    Last office visit provider:  8/10/22     Requested Prescriptions   Pending Prescriptions Disp Refills     EPINEPHrine (ANY BX GENERIC EQUIV) 0.3 MG/0.3ML injection 2-pack 2 each        Anaphylaxis Kits Protocol Passed - 2022  8:11 AM        Passed - Recent (12 mo) or future (30 days) visit witin the authorizing provider's specialty     Patient has had an office visit with the authorizing provider or a provider within the authorizing providers department within the previous 12 mos or has a future within next 30 days. See \"Patient Info\" tab in inbasket, or \"Choose Columns\" in Meds & Orders section of the refill encounter.       "        Passed - Patient is age 5 or older        Passed - Medication is active on med list             Indiana Feldman, RN 08/16/22 6:07 PM

## 2022-08-16 NOTE — TELEPHONE ENCOUNTER
"Routing refill request to provider for review/approval because:  Medication is reported/historical  budesonide-formoteroL (SYMBICORT) 80-4.5 mcg/actuation inhaler [[id number]]    Electronically signed by: Carolann Julien MD on 03/03/21 2232 Status: Active   Ordering user: Carolann Julien MD 03/03/21 2232 Authorized by: Carolann Julien MD   Frequency: BID 03/03/21 - Until Discontinued   Diagnoses  Moderate persistent asthma without complication [J45.40]       Last Written Prescription Date:    Last Fill Quantity: ,  # refills:    Last office visit provider:  8/10/22     Requested Prescriptions   Pending Prescriptions Disp Refills     SYMBICORT 80-4.5 MCG/ACT Inhaler         Inhaled Steroids Protocol Passed - 8/16/2022  8:09 AM        Passed - Patient is age 12 or older        Passed - Asthma control assessment score within normal limits in last 6 months     Please review ACT score.           Passed - Medication is active on med list        Passed - Recent (6 mo) or future (30 days) visit within the authorizing provider's specialty     Patient had office visit in the last 6 months or has a visit in the next 30 days with authorizing provider or within the authorizing provider's specialty.  See \"Patient Info\" tab in inbasket, or \"Choose Columns\" in Meds & Orders section of the refill encounter.           Long-Acting Beta Agonist Inhalers Protocol  Passed - 8/16/2022  8:09 AM        Passed - Patient is age 12 or older        Passed - Asthma control assessment score within normal limits in last 6 months     Please review ACT score.           Passed - Medication is active on med list        Passed - Recent (6 mo) or future (30 days) visit within the authorizing provider's specialty     Patient had office visit in the last 6 months or has a visit in the next 30 days with authorizing provider or within the authorizing provider's specialty.  See \"Patient Info\" tab in inbasket, or \"Choose Columns\" " in Meds & Orders section of the refill encounter.                 Indiana Feldman RN 08/16/22 6:03 PM

## 2022-08-17 RX ORDER — EPINEPHRINE 0.3 MG/.3ML
0.3 INJECTION SUBCUTANEOUS PRN
Qty: 2 EACH | Refills: 1 | Status: SHIPPED | OUTPATIENT
Start: 2022-08-17 | End: 2023-04-19

## 2022-08-17 RX ORDER — DILTIAZEM HYDROCHLORIDE 60 MG/1
2 TABLET, FILM COATED ORAL 2 TIMES DAILY
Qty: 10.2 G | Refills: 11 | Status: SHIPPED | OUTPATIENT
Start: 2022-08-17 | End: 2023-04-19

## 2022-08-19 ENCOUNTER — TELEPHONE (OUTPATIENT)
Dept: INTERNAL MEDICINE | Facility: CLINIC | Age: 47
End: 2022-08-19

## 2022-08-19 NOTE — TELEPHONE ENCOUNTER
"Prior Authorization Request   Who s requesting:  Pharmacy  Pharmacy Name and Location:  [facility]  Medication Name: Symbicort  Insurance Plan: Medicare  Key: None,\" [id number]  " Telephone Encounter by Peabody, Diane, RN at 04/17/18 04:10 PM     Author:  Peabody, Diane, RN Service:  (none) Author Type:  Registered Nurse     Filed:  04/17/18 04:10 PM Encounter Date:  4/17/2018 Status:  Signed     :  Peabody, Diane, RN (Registered Nurse)       From: Ines Keyes  To: Claude Sabillon PA-C  Sent: 4/17/2018  3:14 PM CDT  Subject: Other    The patch is working fine; wore it for 2 weeks, no hot flashes.       Revision History        Date/Time User Provider Type Action    > 04/17/18 04:10 PM Peabody, Diane, RN Registered Nurse Sign    Attribution information within the note text is not available.

## 2022-08-19 NOTE — TELEPHONE ENCOUNTER
Central Prior Authorization Team   Phone: [phone]      Prior Authorization Approval    Authorization Effective Date: 8/19/2022  Authorization Expiration Date: 12/31/2022  Medication: SYMBICORT 80-4.5 MCG/ACT Inhaler--APPROVED  Approved Dose/Quantity:    Reference #:     Insurance Company: [employer] Part D - Phone [phone] Fax [phone]  Expected CoPay:       CoPay Card Available:      Foundation Assistance Needed:    Which Pharmacy is filling the prescription (Not needed for infusion/clinic administered): [facility] [address] - [place], [place] - [address] [place] [address]  Pharmacy Notified: Yes  Patient Notified: Yes PHARMACY WILL CONTACT WHEN FILLED

## 2022-08-19 NOTE — TELEPHONE ENCOUNTER
Central Prior Authorization Team   Phone: [phone]      PA Initiation    Medication: SYMBICORT 80-4.5 MCG/ACT Inhaler--INITIATED  Insurance Company: [employer] Part D - Phone [phone] Fax [phone]  Pharmacy Filling the Rx: [facility] [address] - [place], [place] - [facility] [address]  Filling Pharmacy Phone: [phone]  Filling Pharmacy Fax:    Start Date: 8/19/2022

## 2022-08-20 ENCOUNTER — ANCILLARY PROCEDURE (OUTPATIENT)
Dept: GENERAL RADIOLOGY | Facility: CLINIC | Age: 47
End: 2022-08-20
Attending: PHYSICIAN ASSISTANT
Payer: MEDICARE

## 2022-08-20 ENCOUNTER — OFFICE VISIT (OUTPATIENT)
Dept: FAMILY MEDICINE | Facility: CLINIC | Age: 47
End: 2022-08-20
Payer: MEDICARE

## 2022-08-20 VITALS
HEART RATE: 77 BPM | SYSTOLIC BLOOD PRESSURE: 146 MMHG | OXYGEN SATURATION: 99 % | DIASTOLIC BLOOD PRESSURE: 86 MMHG | TEMPERATURE: 98.6 F | RESPIRATION RATE: 20 BRPM

## 2022-08-20 DIAGNOSIS — S60.222A CONTUSION OF LEFT HAND, INITIAL ENCOUNTER: Primary | ICD-10-CM

## 2022-08-20 DIAGNOSIS — S05.12XA CONTUSION OF LEFT ORBITAL TISSUES, INITIAL ENCOUNTER: ICD-10-CM

## 2022-08-20 DIAGNOSIS — S80.212A ABRASION, LEFT KNEE, INITIAL ENCOUNTER: ICD-10-CM

## 2022-08-20 DIAGNOSIS — S40.812A ABRASION OF LEFT UPPER EXTREMITY, INITIAL ENCOUNTER: ICD-10-CM

## 2022-08-20 DIAGNOSIS — M79.642 PAIN OF LEFT HAND: ICD-10-CM

## 2022-08-20 PROCEDURE — 99214 OFFICE O/P EST MOD 30 MIN: CPT | Performed by: PHYSICIAN ASSISTANT

## 2022-08-20 PROCEDURE — 73130 X-RAY EXAM OF HAND: CPT | Mod: TC | Performed by: RADIOLOGY

## 2022-08-20 NOTE — PATIENT INSTRUCTIONS
Ice topically to the area 20 minutes on each hour while awake.  Take precautions to avoid damage to the skin.  After 2 days, transition to ice topically 20 minutes 3 times per day.  After 2 days may add heat and alternate ice and heat.  Ibuprofen 600 mg (3 tablets) 3 times a day with food for analgesia and anti-inflammatory effect.  Do not use the ibuprofen if you have contraindications to using NSAIDs.  Injuries to the extremities may be elevated above level of the heart continuously for the first 2 days as much as possible.  Use of brace for comfort for three days.  You may transition out of the brace after 3 days for range of motion and return to full activities as tolerated.    Return to see primary care provider or return to clinic for reexamination and heidy-ray in 2 weeks if anything less than 100% resolution or return to pain-free weightbearing and full activities.
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
## 2022-09-18 ENCOUNTER — HEALTH MAINTENANCE LETTER (OUTPATIENT)
Age: 47
End: 2022-09-18

## 2022-09-22 LAB — SCANNED LAB RESULT: NORMAL

## 2022-09-30 ENCOUNTER — TELEPHONE (OUTPATIENT)
Dept: INTERNAL MEDICINE | Facility: CLINIC | Age: 47
End: 2022-09-30

## 2022-09-30 DIAGNOSIS — J45.40 MODERATE PERSISTENT ASTHMA WITHOUT COMPLICATION: Primary | ICD-10-CM

## 2022-09-30 RX ORDER — ALBUTEROL SULFATE 0.83 MG/ML
SOLUTION RESPIRATORY (INHALATION)
Qty: 90 ML | Refills: 1 | Status: SHIPPED | OUTPATIENT
Start: 2022-09-30 | End: 2022-12-01

## 2022-10-03 NOTE — TELEPHONE ENCOUNTER
General Call      Reason for Call:  Insurance issue    What are your questions or concerns:  Pt was informed that the albuterol (Proventil) neb solution is not covered by insurance; is there a generic that will be covered by Medicare?  Please address.    Date of last appointment with provider: 8/10/2022    Could we send this information to you in GRID or would you prefer to receive a phone call?:   Patient would prefer a phone call   Okay to leave a detailed message?: Yes at Cell number on file:    Telephone Information:   Mobile [phone]

## 2022-10-06 ENCOUNTER — NURSE TRIAGE (OUTPATIENT)
Dept: NURSING | Facility: CLINIC | Age: 47
End: 2022-10-06

## 2022-10-06 ENCOUNTER — DOCUMENTATION ONLY (OUTPATIENT)
Dept: LAB | Facility: CLINIC | Age: 47
End: 2022-10-06

## 2022-10-06 ENCOUNTER — HOSPITAL ENCOUNTER (EMERGENCY)
Facility: CLINIC | Age: 47
Discharge: HOME | End: 2022-10-06
Attending: STUDENT IN AN ORGANIZED HEALTH CARE EDUCATION/TRAINING PROGRAM | Admitting: STUDENT IN AN ORGANIZED HEALTH CARE EDUCATION/TRAINING PROGRAM
Payer: MEDICARE

## 2022-10-06 VITALS
TEMPERATURE: 99 F | SYSTOLIC BLOOD PRESSURE: 159 MMHG | RESPIRATION RATE: 16 BRPM | DIASTOLIC BLOOD PRESSURE: 77 MMHG | HEART RATE: 66 BPM | OXYGEN SATURATION: 96 %

## 2022-10-06 DIAGNOSIS — L03.011 PARONYCHIA OF FINGER OF RIGHT HAND: ICD-10-CM

## 2022-10-06 LAB
ALBUMIN SERPL-MCNC: 3.8 G/DL (ref 3.5–5)
ALP SERPL-CCNC: 82 U/L (ref 45–120)
ALT SERPL W P-5'-P-CCNC: 19 U/L (ref 0–45)
ANION GAP SERPL CALCULATED.3IONS-SCNC: 6 MMOL/L (ref 5–18)
APTT PPP: 29 SECONDS (ref 22–38)
AST SERPL W P-5'-P-CCNC: 21 U/L (ref 0–40)
BASOPHILS # BLD AUTO: 0 10E3/UL (ref 0–0.2)
BASOPHILS NFR BLD AUTO: 0 %
BILIRUB SERPL-MCNC: 0.8 MG/DL (ref 0–1)
BUN SERPL-MCNC: 13 MG/DL (ref 8–22)
CALCIUM SERPL-MCNC: 9.1 MG/DL (ref 8.5–10.5)
CHLORIDE BLD-SCNC: 106 MMOL/L (ref 98–107)
CO2 SERPL-SCNC: 28 MMOL/L (ref 22–31)
CREAT SERPL-MCNC: 0.83 MG/DL (ref 0.6–1.1)
EOSINOPHIL # BLD AUTO: 0.1 10E3/UL (ref 0–0.7)
EOSINOPHIL NFR BLD AUTO: 1 %
ERYTHROCYTE [DISTWIDTH] IN BLOOD BY AUTOMATED COUNT: 13.7 % (ref 10–15)
GFR SERPL CREATININE-BSD FRML MDRD: 86 ML/MIN/1.73M2
GLUCOSE BLD-MCNC: 122 MG/DL (ref 70–125)
HCT VFR BLD AUTO: 41.2 % (ref 35–47)
HGB BLD-MCNC: 13.9 G/DL (ref 11.7–15.7)
IMM GRANULOCYTES # BLD: 0 10E3/UL
IMM GRANULOCYTES NFR BLD: 0 %
INR PPP: 1.07 (ref 0.85–1.15)
LYMPHOCYTES # BLD AUTO: 2.4 10E3/UL (ref 0.8–5.3)
LYMPHOCYTES NFR BLD AUTO: 36 %
MCH RBC QN AUTO: 27.9 PG (ref 26.5–33)
MCHC RBC AUTO-ENTMCNC: 33.7 G/DL (ref 31.5–36.5)
MCV RBC AUTO: 83 FL (ref 78–100)
MONOCYTES # BLD AUTO: 0.2 10E3/UL (ref 0–1.3)
MONOCYTES NFR BLD AUTO: 3 %
NEUTROPHILS # BLD AUTO: 4.1 10E3/UL (ref 1.6–8.3)
NEUTROPHILS NFR BLD AUTO: 60 %
NRBC # BLD AUTO: 0 10E3/UL
NRBC BLD AUTO-RTO: 0 /100
PLATELET # BLD AUTO: 281 10E3/UL (ref 150–450)
POTASSIUM BLD-SCNC: 3.4 MMOL/L (ref 3.5–5)
PROT SERPL-MCNC: 7.5 G/DL (ref 6–8)
RBC # BLD AUTO: 4.99 10E6/UL (ref 3.8–5.2)
SODIUM SERPL-SCNC: 140 MMOL/L (ref 136–145)
WBC # BLD AUTO: 6.9 10E3/UL (ref 4–11)

## 2022-10-06 PROCEDURE — 85610 PROTHROMBIN TIME: CPT | Performed by: STUDENT IN AN ORGANIZED HEALTH CARE EDUCATION/TRAINING PROGRAM

## 2022-10-06 PROCEDURE — C9113 INJ PANTOPRAZOLE SODIUM, VIA: HCPCS | Performed by: STUDENT IN AN ORGANIZED HEALTH CARE EDUCATION/TRAINING PROGRAM

## 2022-10-06 PROCEDURE — 250N000013 HC RX MED GY IP 250 OP 250 PS 637: Performed by: STUDENT IN AN ORGANIZED HEALTH CARE EDUCATION/TRAINING PROGRAM

## 2022-10-06 PROCEDURE — 80053 COMPREHEN METABOLIC PANEL: CPT | Performed by: STUDENT IN AN ORGANIZED HEALTH CARE EDUCATION/TRAINING PROGRAM

## 2022-10-06 PROCEDURE — 96365 THER/PROPH/DIAG IV INF INIT: CPT

## 2022-10-06 PROCEDURE — 82040 ASSAY OF SERUM ALBUMIN: CPT | Performed by: STUDENT IN AN ORGANIZED HEALTH CARE EDUCATION/TRAINING PROGRAM

## 2022-10-06 PROCEDURE — 96375 TX/PRO/DX INJ NEW DRUG ADDON: CPT

## 2022-10-06 PROCEDURE — 36415 COLL VENOUS BLD VENIPUNCTURE: CPT | Performed by: STUDENT IN AN ORGANIZED HEALTH CARE EDUCATION/TRAINING PROGRAM

## 2022-10-06 PROCEDURE — 258N000003 HC RX IP 258 OP 636: Performed by: STUDENT IN AN ORGANIZED HEALTH CARE EDUCATION/TRAINING PROGRAM

## 2022-10-06 PROCEDURE — 85730 THROMBOPLASTIN TIME PARTIAL: CPT | Performed by: STUDENT IN AN ORGANIZED HEALTH CARE EDUCATION/TRAINING PROGRAM

## 2022-10-06 PROCEDURE — 99284 EMERGENCY DEPT VISIT MOD MDM: CPT | Mod: 25

## 2022-10-06 PROCEDURE — 250N000011 HC RX IP 250 OP 636: Performed by: STUDENT IN AN ORGANIZED HEALTH CARE EDUCATION/TRAINING PROGRAM

## 2022-10-06 PROCEDURE — 85025 COMPLETE CBC W/AUTO DIFF WBC: CPT | Performed by: STUDENT IN AN ORGANIZED HEALTH CARE EDUCATION/TRAINING PROGRAM

## 2022-10-06 RX ORDER — CLINDAMYCIN HCL 150 MG
450 CAPSULE ORAL 3 TIMES DAILY
Qty: 63 CAPSULE | Refills: 0 | Status: SHIPPED | OUTPATIENT
Start: 2022-10-06 | End: 2022-10-13

## 2022-10-06 RX ORDER — CLINDAMYCIN HCL 150 MG
450 CAPSULE ORAL ONCE
Status: COMPLETED | OUTPATIENT
Start: 2022-10-06 | End: 2022-10-06

## 2022-10-06 RX ORDER — SUCRALFATE 1 G/1
1 TABLET ORAL 4 TIMES DAILY
Qty: 20 TABLET | Refills: 0 | Status: SHIPPED | OUTPATIENT
Start: 2022-10-06 | End: 2022-10-11

## 2022-10-06 RX ORDER — ACETAMINOPHEN 325 MG/1
975 TABLET ORAL ONCE
Status: DISCONTINUED | OUTPATIENT
Start: 2022-10-06 | End: 2022-10-07 | Stop reason: HOSPADM

## 2022-10-06 RX ORDER — PANTOPRAZOLE SODIUM 40 MG/1
40 TABLET, DELAYED RELEASE ORAL DAILY
Qty: 30 TABLET | Refills: 0 | Status: SHIPPED | OUTPATIENT
Start: 2022-10-06 | End: 2022-11-05

## 2022-10-06 RX ADMIN — VANCOMYCIN HYDROCHLORIDE 2000 MG: 5 INJECTION, POWDER, LYOPHILIZED, FOR SOLUTION INTRAVENOUS at 22:16

## 2022-10-06 RX ADMIN — PANTOPRAZOLE SODIUM 40 MG: 40 INJECTION, POWDER, FOR SOLUTION INTRAVENOUS at 23:02

## 2022-10-06 RX ADMIN — CLINDAMYCIN HYDROCHLORIDE 450 MG: 150 CAPSULE ORAL at 21:08

## 2022-10-06 ASSESSMENT — ACTIVITIES OF DAILY LIVING (ADL)
ADLS_ACUITY_SCORE: 35
ADLS_ACUITY_SCORE: 35

## 2022-10-06 NOTE — TELEPHONE ENCOUNTER
Nurse Triage SBAR    Situation:   -Skin redness    Background:   -Katn calling, with , It is okay to leave a detailed message at this number    Assessment: .   -For the past 3 days her right pinky finger is red  -Swollen  -Sensitive tot the toug=cht  -No trauma  -It is red up to the second joint   -Pain with movement - throbing pain (8/10 at rest)  -No Ooozing or discharge   -No fever  -Hot to the touch    Protocol Recommended Disposition:   See HCP Within 4 Hours (Or PCP Triage), See in Office Today    Recommendation:   ISHA Cr MD @1911  She called back at 1912 and gave the following recommendations:  -She needs to be seen tonight if she at any point develops systemic symptoms (ex. Fever, Chills) or if it rapidly gets worse  -Otherwise she can go to  early tomorrow AM - but she needs to seen in the next 18 hours      Provider Recommendation Follow Up:   Reached patient/caregiver. Informed of provider's recommendations. Patient verbalized understanding and agrees with the plan.         -Patient is already at the ED - she has not other questions at this time    Reason for Disposition    Localized rash is very painful (no fever)    [1] Red area or streak [2] large (> 2 in. or 5 cm)    Additional Information    Negative: Localized lump (or swelling) without redness or rash    Negative: Jock Itch suspected (i.e., itchy rash on inner thighs near genital area)    Negative: Insect bite(s) suspected    Negative: Athlete's Foot suspected (i.e., itchy rash between the toes)    Negative: Sounds like a life-threatening emergency to the triager    Negative: Redness of immunization site    Negative: Shingles suspected (i.e., painful rash, multiple small blisters grouped together in one area of body; dermatomal distribution)    Negative: Small spot, skin growth, or mole    Negative: Wound infection suspected (i.e., pain, spreading redness, or pus; in a cut, puncture, scrape or sutured wound)     Negative: Poison ivy, oak, or sumac contact suspected    Negative: Rash of female genital area (vulva)    Negative: Rash of male genital area (penis or scrotum)    Negative: Fever and localized purple or blood-colored spots or dots that are not from injury or friction    Negative: Fever and localized rash is very painful    Negative: Patient sounds very sick or weak to the triager    Negative: Looks like a boil, infected sore, deep ulcer, or other infected rash (spreading redness, pus)    Negative: Painful rash with multiple small blisters grouped together (i.e., dermatomal distribution or 'band' or 'stripe')    Negative: Followed a hand injury    Negative: Swelling followed an insect bite to the area    Negative: Swelling followed a bee, wasp, or other sting to the area    Negative: Swelling of arm is main symptom    Negative: Swelling of wrist is main symptom    Negative: Cast problems or questions    Negative: Pain, redness, swelling, or pus at IV Site    Negative: SEVERE difficulty breathing (e.g., struggling for each breath, speaks in single words)    Negative: Sounds like a life-threatening emergency to the triager    Negative: Difficulty breathing at rest    Negative: Looks like a broken bone or dislocated joint (e.g., crooked or deformed)    Negative: Entire hand is cool or blue in comparison to other side    Negative: [1] Can't use hand or can barely use hand AND [2] new-onset    Negative: [1] Difficulty breathing with exertion (e.g., walking) AND [2] new-onset or worsening    Negative: [1] Red area or streak AND [2] fever    Negative: [1] Cast arm AND [2] now increased pain    Negative: [1] Swelling is painful to touch AND [2] fever    Negative: [1] Postpartum (from 0 to 6 weeks after delivery) AND [2] new blurred vision or vision changes    Negative: [1] Postpartum (from 0 to 6 weeks after delivery) AND [2] face swelling    Negative: Patient sounds very sick or weak to the triager    Negative: [1]  Pregnant 20 or more weeks AND [2] new face swelling    Negative: [1] Pregnant 20 or more weeks AND [2] new blurred vision or vision changes    Negative: SEVERE hand swelling (e.g., swelling of entire hand and up into forearm)    Negative: MODERATE hand swelling (e.g., visible swelling of hand and fingers; pitting edema)    Protocols used: RASH OR REDNESS - [id number]-DH, HAND SWELLING-A-AH

## 2022-10-07 NOTE — PROGRESS NOTES
There is not anything that I would check, her last A1C was 5.9% and would not warrant another check until the 6 month deb.

## 2022-10-07 NOTE — PHARMACY-VANCOMYCIN DOSING SERVICE
Pharmacy Vancomycin Initial Note  Date of Service 2022  Patient's  1975  46 year old, female    Indication: Skin and Soft Tissue Infection    Current estimated CrCl = CrCl cannot be calculated (Patient's most recent lab result is older than the maximum 30 days allowed.).    Creatinine for last 3 days  No results found for requested labs within last 72 hours.    Recent Vancomycin Level(s) for last 3 days  No results found for requested labs within last 72 hours.      Vancomycin IV Administrations (past 72 hours)      No vancomycin orders with administrations in past 72 hours.                Nephrotoxins and other renal medications (From now, onward)    Start     Dose/Rate Route Frequency Ordered Stop    10/06/22 2200  vancomycin (VANCOCIN) 2,000 mg in 0.9% NaCl 500 mL intermittent infusion         2,000 mg  over 2 Hours Intravenous ONCE 10/06/22 2154            Contrast Orders - past 72 hours (72h ago, onward)    None          Plan:  1. Start vancomycin  2000 mg IV x 1 dose in ER        [facility]

## 2022-10-07 NOTE — ED TRIAGE NOTES
Pt presents to the ED with c/o right 5th finger pain and swelling that has been going on for 3 days. No known trauma or cut. It is hot to the touch, red. Took tylenol earlier without relief.      Triage Assessment     Row Name 10/06/22 2005       Triage Assessment (Adult)    Airway WDL WDL       Respiratory WDL    Respiratory WDL WDL       Skin Circulation/Temperature WDL    Skin Circulation/Temperature WDL WDL       Cardiac WDL    Cardiac WDL WDL       Peripheral/Neurovascular WDL    Peripheral Neurovascular WDL WDL       Cognitive/Neuro/Behavioral WDL    Cognitive/Neuro/Behavioral WDL WDL

## 2022-10-07 NOTE — ED PROVIDER NOTES
Emergency Department Encounter         FINAL IMPRESSION:  Paronychia, upper GI bleed        ED COURSE AND MEDICAL DECISION MAKING     8:52 PM I met with the patient and performed my initial interview and exam   9:23 PM I performed a digital block procedure on the patient's right little finger   10:47 PM Spoke with Dr. Ramirez, [facility], regarding the patient reporting episodes of black stool  10:52 PM nurse reported the patient is itching and now has redness on their neck while receiving a vancomycin transfusion   10:55 PM I rechecked the patient to follow up about the possible vancomycin reaction     ED Course as of 10/06/22 2133   Thu Oct 06, 2022   2102 Patient is a 38-year-old female here with Right finger pain for the past 3 days.  No fevers, chills, nausea or vomiting.  No trauma.  No wrist pain.  Patient states the pain is streaking up her medial aspect of the right pinky finger.  Arrival she looks well.  Has a swelling and concern for paronychia of the right pinky finger however no significant fluctuance or signs of purulence.  There is some swelling and redness noted to the pad of the finger concerning for felon.  There is some streaking up the medial half of the finger.  Plan to have patient be seen tomorrow by hand surgery for drainage.  We will start clindamycin on her.   2128 Digital block successful.  Patient now reports that she was having  black stools for the past couple days.  No blood.  No abdominal pain.  Plan for labs IV reevaluate     - Labs reassuring.  Patient is not on blood thinners.  Hemoglobin stable.  She seems reliable.  Discussed case with Dr. Ramirez who agrees that if patient is reliable, and has no other significant risk factors for severe decompensating upper GI bleed, reasonable for her to follow-up emergently as an outpatient.  Symptom with Protonix and Carafate.  - As far as her paronychia/possible developing felon, she will be given hand surgery follow-up to call tomorrow morning.   "Sent home with clindamycin.  Digital block performed for pain control           At the conclusion of the encounter I discussed the results of all the tests and the disposition. The questions were answered. The patient or family acknowledged understanding and was agreeable with the care plan.                  MEDICATIONS GIVEN IN THE EMERGENCY DEPARTMENT:  Medications - No data to display    NEW PRESCRIPTIONS STARTED AT TODAY'S ED VISIT:  New Prescriptions    No medications on file       HPI     Patient information obtained from: Patient     Use of Interpretor: Yes (In Person) - Language: ASL    Marleny Viera is a 46 year old female with a pertinent history of HTN and prediabetes who presents to this ED via walk-in for evaluation of finger pain and swelling    The patient reports that for 3 days straight their right little finger has been red, swollen, and painful to the touch. The patient describes the pain as \"throbbing\". Redness has been spreading down the finger and pain radiates into the hand and wrist. Most of the pain is located on the inside of the tip of the middle finger. The skin is also warm to the touch and it hurts to bend the finger. The patient also notes a new white dot that formed on the pad of the finger. The patient notes a mild fever and that they have had black stool for a couple of days. Patient denies vomiting.     REVIEW OF SYSTEMS:  Review of Systems   Constitutional: Negative for malaise. Positive for fever (mild)  HEENT: Negative runny nose, sore throat, ear pain, neck pain  Respiratory: Negative for shortness of breath, cough, congestion  Cardiovascular: Negative for chest pain, leg edema  Gastrointestinal: Negative for abdominal distention, abdominal pain, constipation, vomiting, nausea, diarrhea. Positive for black stool  Genitourinary: Negative for dysuria and hematuria.   Integument: Negative for rash, skin breakdown. Positive for finger swelling and redness (right little " finger)  Neurological: Negative for paresthesias, weakness, headache.  Musculoskeletal: Negative for joint pain, joint swelling      All other systems reviewed and are negative.      MEDICAL HISTORY     Past Medical History:   Diagnosis Date     Accidental overdose, initial encounter 7/7/2022     ADHD (attention deficit hyperactivity disorder)      Alcohol dependence (H)      Anxiety      Arthritis      Asthma      Bipolar depression (H)      Deafness      Drug abuse, nondependent (H) 10/29/2004     History of blood clots      History of transfusion      Hypertension      Kidney stones 2015     Major depressive disorder, recurrent episode, severe (H) 5/17/2016     Migraine      Obstructive sleep apnea 6/1/2016     Pulmonary emboli (H)      Status post total right knee replacement 1/18/2016       Past Surgical History:   Procedure Laterality Date     BIOPSY BREAST Right     2019... Also had fluid drained from left breast under surgery also in 2019     BREAST SURGERY       MAMMOPLASTY REDUCTION Bilateral 2017     TOTAL HIP ARTHROPLASTY Bilateral      TOTAL KNEE ARTHROPLASTY Right      TUBAL LIGATION       US BREAST CORE BIOPSY RIGHT Right 11/21/2019       Social History     Tobacco Use     Smoking status: Never Smoker     Smokeless tobacco: Never Used   Substance Use Topics     Alcohol use: Yes     Comment: Alcoholic Drinks/day: dependence     Drug use: Not Currently       albuterol (ACCUNEB) 0.63 MG/3ML neb solution  albuterol (PROAIR HFA/PROVENTIL HFA/VENTOLIN HFA) 108 (90 Base) MCG/ACT inhaler  albuterol (PROVENTIL) (2.5 MG/3ML) 0.083% neb solution  Blood Pressure Monitoring (BLOOD PRESSURE KIT) KIT  doxycycline hyclate (VIBRAMYCIN) 100 MG capsule  EPINEPHrine (ANY BX GENERIC EQUIV) 0.3 MG/0.3ML injection 2-pack  gabapentin (NEURONTIN) 100 MG capsule  hydrOXYzine (ATARAX) 25 MG tablet  melatonin 5 MG tablet  metoprolol tartrate (LOPRESSOR) 25 MG tablet  metroNIDAZOLE (FLAGYL) 500 MG tablet  mometasone (NASONEX) 50  MCG/ACT nasal spray  montelukast (SINGULAIR) 10 MG tablet  omeprazole (PRILOSEC) 20 MG DR capsule  sulfacetamide (BLEPH-10) 10 % ophthalmic solution  SYMBICORT 80-4.5 MCG/ACT Inhaler          PHYSICAL EXAM     BP (!) 177/90   Pulse 83   Temp 99  F (37.2  C) (Temporal)   Resp 16   SpO2 95%       PHYSICAL EXAM:     General: Patient appears well, nontoxic, comfortable  HEENT: Moist mucous membranes, no tongue swelling.  No head trauma.  No midline neck pain.  Cardiovascular: Normal rate, normal rhythm, no extremity edema.  No appreciable murmur.  Respiratory: No signs of respiratory distress, lungs are clear to auscultation bilaterally with no wheezes rhonchi or rales.  Abdominal: Soft, nontender, nondistended, no palpable masses, no guarding, no rebound  Musculoskeletal: Full range of motion of joints, no deformities appreciated.  Neurological: Alert and oriented, grossly neurologically intact.  Psychological: Normal affect and mood.  Integument: No rashes appreciated          RESULTS       Labs Ordered and Resulted from Time of ED Arrival to Time of ED Departure - No data to display    No orders to display         PROCEDURES:  Procedures:  [facility]    Digital Block    Date/Time: 10/6/2022 9:24 PM  Performed by: Shane Lance DO  Authorized by: Shane Lance DO     Risks, benefits and alternatives discussed.      INDICATION     Indications:  Pain relief    LOCATION     Block location:  Finger    Finger blocked:  R little finger    PRE-PROCEDURE DETAILS     Skin preparation:  Alcohol    ANESTHESIA (see MAR for exact dosages)     Syringe type:  Controlled syringe    Needle gauge:  30 G    Anesthetic injected:  Bupivacaine 0.25% w/o epi    Technique:  Metacarpal block    Injection procedure:  Anatomic landmarks identified, incremental injection, negative aspiration for blood, anatomic landmarks palpated and introduced needle    POST-PROCEDURE DETAILS     Outcome:  Anesthesia  achieved      PROCEDURE    Patient Tolerance:  Patient tolerated the procedure well with no immediate complications         I, Edie Long am serving as a scribe to document services personally performed by Shane Lance DO, based on my observations and the provider's statements to me.  I, Shane Lance DO, attest that Edie Long is acting in a scribe capacity, has observed my performance of the services and has documented them in accordance with my direction.    Shane Lance DO  Emergency Medicine  [facility] EMERGENCY ROOM     Shane Lance DO  10/07/22 0210

## 2022-10-11 ENCOUNTER — HOSPITAL ENCOUNTER (OUTPATIENT)
Dept: MAMMOGRAPHY | Facility: CLINIC | Age: 47
Discharge: HOME | End: 2022-10-11
Attending: NURSE PRACTITIONER | Admitting: NURSE PRACTITIONER
Payer: MEDICARE

## 2022-10-11 DIAGNOSIS — N64.89 BREAST ASYMMETRY: ICD-10-CM

## 2022-10-11 DIAGNOSIS — R92.8 OTHER ABNORMAL AND INCONCLUSIVE FINDINGS ON DIAGNOSTIC IMAGING OF BREAST: ICD-10-CM

## 2022-10-11 PROCEDURE — 76642 ULTRASOUND BREAST LIMITED: CPT | Mod: RT

## 2022-10-11 NOTE — TELEPHONE ENCOUNTER
Central Prior Authorization Team   Phone: [phone]    PA Initiation    Medication:   Albuterol Sulfate (2.5 MG/3ML)0.083% nebulizer solution  Insurance Company: [employer] Part D - Phone [phone] Fax [phone]  Pharmacy Filling the Rx: [id number] [place] [address] AT [place] [address]  Filling Pharmacy Phone: [phone]  Filling Pharmacy Fax:    Start Date: 10/11/2022

## 2022-10-11 NOTE — TELEPHONE ENCOUNTER
PRIOR AUTHORIZATION DENIED    Medication:   Albuterol Sulfate (2.5 MG/3ML)0.083% nebulizer solution    Denial Date: 10/11/2022    Denial Rational: Pharmacy instructed to bill Medicare Part B and contact clinic if CMN form is needed

## 2022-10-11 NOTE — PROGRESS NOTES
I met with Marleny to discuss her procedure today. She was able to indentify herself using (the computer as her SL interpretor had not arrived yet for me) using full name and .I used a grease board to write down my questions for her as she is not a lip reader. She indicated the procedure would be on her right breast. I handed her the consent to read. Consent was obtained.The interpretor, Ale arrived and we reviewed the procedure again. Ale explained the process of changing then gowning up then on to the bx. I gave Marleny the take home information for post biopsy. She agreed and accepted the information. I escorted her back and asked if she needed to use the restroom and she declined. I informed Marleny and Ale that the room was being cleaned and prepped. I then escorted her to the U/S room, laid her on the gurney. I informed them that the U/S tech is Ralph and the radiologist is Dr. Oliveros. I advised that results should be back in 1-3 business days and if not, someone will call and explain the reason for the delay. Marleny responded that she has a video phone and to leave a message as she will not be home on Friday. RENATA Rosen, OCN, CBCN    Ultimately, Marleny reported she was having some loose stools/diarrhea and would like to reschedule. I responded that the decision is hers so if that is what she wishes, we will accommodate her. I escorted her back to the changing area and left as the interpretor will escort her out. RENATA Rosen, OCN, CBCN

## 2022-10-26 ENCOUNTER — MYC MEDICAL ADVICE (OUTPATIENT)
Dept: INTERNAL MEDICINE | Facility: CLINIC | Age: 47
End: 2022-10-26

## 2022-10-26 NOTE — TELEPHONE ENCOUNTER
[facility] called the [facility] and talked with Sofiya YANEZ. She said patient has been has left the procedure once and cancelled another at the last minute. Patient is able to be elevated with pillow that have if she needs to be positioned to support breath. Patient could bring her own if needed. RN recommended she reschedule and they will assist her at the appointment.     I responded to patient's MyChart and relayed [facility] RN recommendations.

## 2022-11-02 ENCOUNTER — NURSE TRIAGE (OUTPATIENT)
Dept: NURSING | Facility: CLINIC | Age: 47
End: 2022-11-02

## 2022-11-02 PROCEDURE — 93005 ELECTROCARDIOGRAM TRACING: CPT | Performed by: EMERGENCY MEDICINE

## 2022-11-02 PROCEDURE — C9803 HOPD COVID-19 SPEC COLLECT: HCPCS

## 2022-11-02 PROCEDURE — 90791 PSYCH DIAGNOSTIC EVALUATION: CPT

## 2022-11-02 PROCEDURE — 93005 ELECTROCARDIOGRAM TRACING: CPT | Performed by: STUDENT IN AN ORGANIZED HEALTH CARE EDUCATION/TRAINING PROGRAM

## 2022-11-02 PROCEDURE — 96374 THER/PROPH/DIAG INJ IV PUSH: CPT

## 2022-11-02 PROCEDURE — 99285 EMERGENCY DEPT VISIT HI MDM: CPT | Mod: 25

## 2022-11-03 ENCOUNTER — APPOINTMENT (OUTPATIENT)
Dept: CT IMAGING | Facility: HOSPITAL | Age: 47
End: 2022-11-03
Attending: EMERGENCY MEDICINE
Payer: MEDICARE

## 2022-11-03 ENCOUNTER — HOSPITAL ENCOUNTER (EMERGENCY)
Facility: HOSPITAL | Age: 47
Discharge: HOME | End: 2022-11-03
Attending: EMERGENCY MEDICINE | Admitting: EMERGENCY MEDICINE
Payer: MEDICARE

## 2022-11-03 ENCOUNTER — APPOINTMENT (OUTPATIENT)
Dept: RADIOLOGY | Facility: HOSPITAL | Age: 47
End: 2022-11-03
Attending: EMERGENCY MEDICINE
Payer: MEDICARE

## 2022-11-03 ENCOUNTER — APPOINTMENT (OUTPATIENT)
Dept: NUCLEAR MEDICINE | Facility: HOSPITAL | Age: 47
End: 2022-11-03
Attending: EMERGENCY MEDICINE
Payer: MEDICARE

## 2022-11-03 VITALS
RESPIRATION RATE: 18 BRPM | HEART RATE: 59 BPM | DIASTOLIC BLOOD PRESSURE: 73 MMHG | SYSTOLIC BLOOD PRESSURE: 170 MMHG | TEMPERATURE: 98.4 F | OXYGEN SATURATION: 98 %

## 2022-11-03 DIAGNOSIS — R10.9 FLANK PAIN: ICD-10-CM

## 2022-11-03 DIAGNOSIS — R07.9 CHEST PAIN, UNSPECIFIED TYPE: ICD-10-CM

## 2022-11-03 DIAGNOSIS — F32.A DEPRESSION, UNSPECIFIED DEPRESSION TYPE: ICD-10-CM

## 2022-11-03 LAB
ALBUMIN UR-MCNC: 10 MG/DL
ANION GAP SERPL CALCULATED.3IONS-SCNC: 9 MMOL/L (ref 7–15)
APPEARANCE UR: CLEAR
BACTERIA #/AREA URNS HPF: ABNORMAL /HPF
BASOPHILS # BLD AUTO: 0 10E3/UL (ref 0–0.2)
BASOPHILS NFR BLD AUTO: 0 %
BILIRUB UR QL STRIP: NEGATIVE
BUN SERPL-MCNC: 11.7 MG/DL (ref 6–20)
CALCIUM SERPL-MCNC: 8.7 MG/DL (ref 8.6–10)
CHLORIDE SERPL-SCNC: 103 MMOL/L (ref 98–107)
COLOR UR AUTO: ABNORMAL
CREAT SERPL-MCNC: 0.81 MG/DL (ref 0.51–0.95)
D DIMER PPP FEU-MCNC: 1.21 UG/ML FEU (ref 0–0.5)
DEPRECATED HCO3 PLAS-SCNC: 26 MMOL/L (ref 22–29)
EOSINOPHIL # BLD AUTO: 0.1 10E3/UL (ref 0–0.7)
EOSINOPHIL NFR BLD AUTO: 1 %
ERYTHROCYTE [DISTWIDTH] IN BLOOD BY AUTOMATED COUNT: 13.7 % (ref 10–15)
GFR SERPL CREATININE-BSD FRML MDRD: 88 ML/MIN/1.73M2
GLUCOSE SERPL-MCNC: 120 MG/DL (ref 70–99)
GLUCOSE UR STRIP-MCNC: 50 MG/DL
HCT VFR BLD AUTO: 41.5 % (ref 35–47)
HGB BLD-MCNC: 13.9 G/DL (ref 11.7–15.7)
HGB UR QL STRIP: ABNORMAL
IMM GRANULOCYTES # BLD: 0 10E3/UL
IMM GRANULOCYTES NFR BLD: 0 %
KETONES UR STRIP-MCNC: ABNORMAL MG/DL
LEUKOCYTE ESTERASE UR QL STRIP: NEGATIVE
LYMPHOCYTES # BLD AUTO: 2.5 10E3/UL (ref 0.8–5.3)
LYMPHOCYTES NFR BLD AUTO: 32 %
MCH RBC QN AUTO: 28.4 PG (ref 26.5–33)
MCHC RBC AUTO-ENTMCNC: 33.5 G/DL (ref 31.5–36.5)
MCV RBC AUTO: 85 FL (ref 78–100)
MONOCYTES # BLD AUTO: 0.3 10E3/UL (ref 0–1.3)
MONOCYTES NFR BLD AUTO: 4 %
MUCOUS THREADS #/AREA URNS LPF: PRESENT /LPF
NEUTROPHILS # BLD AUTO: 4.8 10E3/UL (ref 1.6–8.3)
NEUTROPHILS NFR BLD AUTO: 63 %
NITRATE UR QL: NEGATIVE
NRBC # BLD AUTO: 0 10E3/UL
NRBC BLD AUTO-RTO: 0 /100
NT-PROBNP SERPL-MCNC: 321 PG/ML (ref 0–450)
PH UR STRIP: 6 [PH] (ref 5–7)
PLATELET # BLD AUTO: 270 10E3/UL (ref 150–450)
POTASSIUM SERPL-SCNC: 3.7 MMOL/L (ref 3.4–5.3)
RBC # BLD AUTO: 4.9 10E6/UL (ref 3.8–5.2)
RBC URINE: 118 /HPF
SARS-COV-2 RNA RESP QL NAA+PROBE: NEGATIVE
SODIUM SERPL-SCNC: 138 MMOL/L (ref 136–145)
SP GR UR STRIP: 1.02 (ref 1–1.03)
SQUAMOUS EPITHELIAL: <1 /HPF
TROPONIN T SERPL HS-MCNC: <6 NG/L
UROBILINOGEN UR STRIP-MCNC: <2 MG/DL
WBC # BLD AUTO: 7.6 10E3/UL (ref 4–11)
WBC URINE: 1 /HPF

## 2022-11-03 PROCEDURE — 36415 COLL VENOUS BLD VENIPUNCTURE: CPT | Performed by: EMERGENCY MEDICINE

## 2022-11-03 PROCEDURE — 250N000013 HC RX MED GY IP 250 OP 250 PS 637: Performed by: EMERGENCY MEDICINE

## 2022-11-03 PROCEDURE — 71046 X-RAY EXAM CHEST 2 VIEWS: CPT

## 2022-11-03 PROCEDURE — 85379 FIBRIN DEGRADATION QUANT: CPT | Performed by: EMERGENCY MEDICINE

## 2022-11-03 PROCEDURE — U0003 INFECTIOUS AGENT DETECTION BY NUCLEIC ACID (DNA OR RNA); SEVERE ACUTE RESPIRATORY SYNDROME CORONAVIRUS 2 (SARS-COV-2) (CORONAVIRUS DISEASE [COVID-19]), AMPLIFIED PROBE TECHNIQUE, MAKING USE OF HIGH THROUGHPUT TECHNOLOGIES AS DESCRIBED BY CMS-2020-01-R: HCPCS | Performed by: EMERGENCY MEDICINE

## 2022-11-03 PROCEDURE — 81001 URINALYSIS AUTO W/SCOPE: CPT | Performed by: EMERGENCY MEDICINE

## 2022-11-03 PROCEDURE — 343N000001 HC RX 343: Performed by: EMERGENCY MEDICINE

## 2022-11-03 PROCEDURE — 80048 BASIC METABOLIC PNL TOTAL CA: CPT | Performed by: EMERGENCY MEDICINE

## 2022-11-03 PROCEDURE — A9567 TECHNETIUM TC-99M AEROSOL: HCPCS | Performed by: EMERGENCY MEDICINE

## 2022-11-03 PROCEDURE — 250N000011 HC RX IP 250 OP 636: Performed by: EMERGENCY MEDICINE

## 2022-11-03 PROCEDURE — 272N000035 NM LUNG SCAN VENTILATION

## 2022-11-03 PROCEDURE — 85025 COMPLETE CBC W/AUTO DIFF WBC: CPT | Performed by: EMERGENCY MEDICINE

## 2022-11-03 PROCEDURE — 84484 ASSAY OF TROPONIN QUANT: CPT | Performed by: EMERGENCY MEDICINE

## 2022-11-03 PROCEDURE — G1010 CDSM STANSON: HCPCS

## 2022-11-03 PROCEDURE — 83880 ASSAY OF NATRIURETIC PEPTIDE: CPT | Performed by: EMERGENCY MEDICINE

## 2022-11-03 RX ORDER — ONDANSETRON 2 MG/ML
4 INJECTION INTRAMUSCULAR; INTRAVENOUS ONCE
Status: COMPLETED | OUTPATIENT
Start: 2022-11-03 | End: 2022-11-03

## 2022-11-03 RX ORDER — DIPHENHYDRAMINE HYDROCHLORIDE 50 MG/ML
25 INJECTION INTRAMUSCULAR; INTRAVENOUS ONCE
Status: DISCONTINUED | OUTPATIENT
Start: 2022-11-03 | End: 2022-11-03 | Stop reason: HOSPADM

## 2022-11-03 RX ORDER — HYDROXYZINE HYDROCHLORIDE 25 MG/1
50 TABLET, FILM COATED ORAL ONCE
Status: COMPLETED | OUTPATIENT
Start: 2022-11-03 | End: 2022-11-03

## 2022-11-03 RX ORDER — KETOROLAC TROMETHAMINE 15 MG/ML
15 INJECTION, SOLUTION INTRAMUSCULAR; INTRAVENOUS ONCE
Status: DISCONTINUED | OUTPATIENT
Start: 2022-11-03 | End: 2022-11-03

## 2022-11-03 RX ORDER — ACETAMINOPHEN 325 MG/1
650 TABLET ORAL ONCE
Status: COMPLETED | OUTPATIENT
Start: 2022-11-03 | End: 2022-11-03

## 2022-11-03 RX ORDER — DIAZEPAM 5 MG
5 TABLET ORAL ONCE
Status: DISCONTINUED | OUTPATIENT
Start: 2022-11-03 | End: 2022-11-03 | Stop reason: HOSPADM

## 2022-11-03 RX ADMIN — ACETAMINOPHEN 650 MG: 325 TABLET, FILM COATED ORAL at 04:34

## 2022-11-03 RX ADMIN — HYDROXYZINE HYDROCHLORIDE 50 MG: 25 TABLET, FILM COATED ORAL at 02:03

## 2022-11-03 RX ADMIN — ONDANSETRON 4 MG: 2 INJECTION INTRAMUSCULAR; INTRAVENOUS at 02:03

## 2022-11-03 RX ADMIN — KIT FOR THE PREPARATION OF TECHNETIUM TC 99M PENTETATE 55 MILLICURIE: 20 INJECTION, POWDER, LYOPHILIZED, FOR SOLUTION INTRAVENOUS; RESPIRATORY (INHALATION) at 07:46

## 2022-11-03 ASSESSMENT — ACTIVITIES OF DAILY LIVING (ADL)
ADLS_ACUITY_SCORE: 35

## 2022-11-03 NOTE — ED TRIAGE NOTES
Pt arrives to triage ambulatory and is deaf, endorsing that she is having left sided chest pain x 2 days and numbness in upper left arm. Left flank pain x 1 week. Started period Monday. Has some depression and pain in chest worse with stress or sadness.pt breathing a bit labored in triage and yes to anxiety and a little of hard time breathing

## 2022-11-03 NOTE — ED NOTES
Pt wants to go home.  She is refusing CT. Knows that IV benadryl will make her sleepy.  She has no way of coming back to get her car, as her family is in [place].

## 2022-11-03 NOTE — DISCHARGE INSTRUCTIONS
Aftercare Plan  If I am feeling unsafe or I am in a crisis, I will:   Contact my established care providers   Call the National Suicide Prevention Lifeline: 988  Go to the nearest emergency room   Call 911     Warning signs that I or other people might notice when a crisis is developing for me:   Grief   Feeling anxious  Medical concerns    Things I am able to do on my own to cope or help me feel better:   Riding horses  Pet cats  Watch movies  Read a book  Go to library and read, check out books to read  Walk around the mall to decrease isolation     Things that I am able to do with others to cope or help me better:   Clean at the farm, 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    Por flex, texto  GENNA a 728535 o texto a 442-AYUDAME en WhatsApp    The Lexx Project (LGBTQ Youth Crisis Line)  5.034.659.4938  text START to 254-544    Community Resources  Fast Tracker  Linking people to mental health and substance use disorder resources  [employer] Fuelsckermn.org     [place] Mental Health Warm Line  Peer to peer support  Monday thru Saturday, 12 pm to 10 pm  300.579.6626 or 1.169.926.2018  Text  "\"Support\" to 15635    National [place] on Mental Illness (ULISES)  391.509.8012 or 1.888.ULISES.HELPS    Mental Health Apps  My3  https://Lukkin.org/    VirtualHopeBox  https://"[employer]"/apps/virtual-hope-box/    Additional Information  Today you were seen by a licensed mental health professional through Triage and Transition services, [facility] ([facility])  for a crisis assessment in the Emergency Department at [facility].  It is recommended that you follow up with your established providers (psychiatrist, mental health therapist, and/or primary care doctor - as relevant) as soon as possible. Coordinators from [facility] will be calling you in the next 24-48 hours to ensure that you have the resources you need.  You can also contact [facility] coordinators directly at [phone]. You may have been scheduled for or offered an appointment with a mental health provider. [facility] maintains an extensive network of licensed behavioral health providers to connect patients with the services they need.  We do not charge providers a fee to participate in our referral network.  We match patients with providers based on a patient's specific needs, insurance coverage, and location.  Our first effort will be to refer you to a provider within your care system, and will utilize providers outside your care system as needed.    "

## 2022-11-03 NOTE — CONSULTS
"Diagnostic Evaluation Consultation  Crisis Assessment    Patient was assessed: Ignacia  Patient location: [facility] ED  Was a release of information signed: No. Reason: Pt needing to sign      Referral Data and Chief Complaint  Pt is a 47 year old, who uses she/her pronouns, and presents to the ED alone. Patient is referred to the ED by self.     Per Triage Note: \"Pt arrives to triage ambulatory and is deaf, endorsing that she is having left sided chest pain x 2 days and numbness in upper left arm. Left flank pain x 1 week. Started period Monday. Has some depression and pain in chest worse with stress or sadness.pt breathing a bit labored in triage and yes to anxiety and a little of hard time breathing. Arelis Julio RN 11/2/2022 11:28 PM\"    Per Provider Note: \"47 year old female presents to the Emergency Department for evaluation of concerns including increasing depression in the setting of the anniversary of the loss of her partner.  She reports that for the last couple of days she has had increased left-sided chest pain and some pain and numbness extending to her jaw and her arm on the left side.  Her cardiopulmonary and neurovascular exams are unremarkable.  She also reports some left flank pain which seems below the rib margin.  Her urine analysis does reveal some hematuria however patient thinks that this is probably contaminated from her menses.  CT stone protocol negative for kidney stone.  Chest x-ray clear.  EKG and troponin are both stable and reassuring.  I think she is ruled out for ACS given 2 days of continuous symptoms.  She does have an elevated D-dimer.  She is pended for a VQ scan because she has a contrast allergy.  This will not be available until early morning but is ordered.  Expect that if this is negative she will be able to be cleared from a chest pain standpoint.  She does report that this is quite temporally related to the anniversary of her partner's death and she has been more " depressed.  She adamantly denies any suicidal ideation.  She is interested in getting some resources from crisis counselors here and a DEC evaluation was ordered, also pending at shift change.  I do not think she is holdable based on my conversations with her if she decides to leave prior to completing this evaluation.  She was signed out to Dr. Andrade at shift change pending follow-up on VQ scan and DEC evaluation    Informed Consent and Assessment Methods     Patient is her own guardian. Writer met with patient and explained the crisis assessment process, including applicable information disclosures and limits to confidentiality, assessed understanding of the process, and obtained consent to proceed with the assessment. Patient was observed to be able to participate in the assessment as evidenced by appearing alert and oriented. Assessment methods included conducting a formal interview with patient, review of medical records, collaboration with medical staff, and obtaining relevant collateral information from family and community providers when available..     Over the course of this crisis assessment provided reassurance, offered validation, engaged patient in problem solving and disposition planning, worked with patient on safety and aftercare planning and provided psychoeducation. Patient's response to interventions was accepting.     Summary of Patient Situation  Pt presents to ED as mentioned above, for medical complaints and depression that stems around the year anniversary of her husbands death. Pt lives alone and tends to isolate. 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 Pt denies any SI, HI or SIB. Pt has a Confucianism branden base and attends a local deaf Y[employer]. Pt was offered help in scheduling individual therapy for which pt respectfully declined. Pt would like to work on securing services through her  Alexus who works for [employer]. Pt  "reports feeling safe where she lives. Pt is concerned about her recnt medical diagnosis and that has caused her depression and anxiety to be triggered. Pt developed a safety / aftercare plan during the assessment and is in agreement to using this in the community.    Brief Psychosocial History  Pt reports she lives alone in a home in [place]. Pt states she is struggling with the one year anniversary of the passing of her  who had ALS. Pt states she is disabled, not working. Pt has reduced social security money coming in that cause her to stress over her finances. Pt has to wait until she is 48 to get \"widows program funding\".  Pt states she is on the list for a CADI Waiver. Pt is also on the list for S assistance. Pt is not currently connected to additional services at this time.    Hobbies: Pt rides her horse and hearing friends. Likes horse therapy, is on a break from it during the winter season.    Supports: Most are friends who can hear and can sign, related tot he horse farm.    Significant Clinical History  Pt reports a historical dx of Anxiety, ADHD, Bipolar, PTSD and Depression. Flashbacks for whole life, since childhood. Previous IP MH placements, most recently in 2013 at [facility]. Pt could not remember how long she attended programming there. Pt states she doesn't take medications for mental health because of the impact it has on her kiddney's. Pt's physical health prevent her from being able to take certain medications for mental health. Pt states there are 2 medications she can take for anxiety which are gabapentin and hydroxyzine. No recent medication changes noted.    [employer]  Alexus KUMARI, is helping pt find a therapist who takes her insurance. They meet 2 times per month. Pt declined therapy services during assessment. Requests \"deaf therapist\".    Sober 1 1/2 years from alcohol. Pt states she entered into treatment for this at that time. Pt attended programming " and completed successfully.     Collateral Information  Pt did not have someone for [facility] to contact for collateral.     Risk Assessment  ESS-6  1.a. Over the past 2 weeks, have you had thoughts of killing yourself? No  1.b. Have you ever attempted to kill yourself and, if yes, when did this last happen? No   2. Recent or current suicide plan? No   3. Recent or current intent to act on ideation? No  4. Lifetime psychiatric hospitalization? Yes  5. Pattern of excessive substance use? Yes  6. Current irritability, agitation, or aggression? No  Scoring note: BOTH 1a and 1b must be yes for it to score 1 point, if both are not yes it is zero. All others are 1 point per number. If all questions 1a/1b - 6 are no, risk is negligible. If one of 1a/1b is yes, then risk is mild. If either question 2 or 3, but not both, is yes, then risk is automatically moderate regardless of total score. If both 2 and 3 are yes, risk is automatically high regardless of total score.      Score: 2, mild risk      Does the patient have access to lethal means? No  Does the patient engage in non-suicidal self-injurious behavior (NSSI/SIB)? no  Does the patient have thoughts of harming others? No  Is the patient engaging in sexually inappropriate behavior?  no     Current Substance Abuse  Is there recent substance abuse? no  Was a urine drug screen or blood alcohol level obtained: No    Mental Status Exam   Affect: Flat   Appearance: Appropriate    Attention Span/Concentration: Attentive  Eye Contact: Engaged   Fund of Knowledge: Appropriate    Language /Speech Content: Other: Pt is deaf, used    Language /Speech Volume: Other: Pt is deaf, used    Language /Speech Rate/Productions:Other: Pt is deaf, used    Recent Memory: Intact   Remote Memory: Variable   Mood: Sad    Orientation to Person: Yes    Orientation to Place: Yes   Orientation to Time of Day: Yes    Orientation to Date: Yes    Situation (Do they understand  why they are here?): Yes    Psychomotor Behavior: Normal    Thought Content: Clear   Thought Form: Intact      History of commitment: No    Medication  Psychotropic medications:   No current facility-administered medications for this encounter.     Current Outpatient Medications   Medication     albuterol (ACCUNEB) 0.63 MG/3ML neb solution     albuterol (PROAIR HFA/PROVENTIL HFA/VENTOLIN HFA) 108 (90 Base) MCG/ACT inhaler     albuterol (PROVENTIL) (2.5 MG/3ML) 0.083% neb solution     Blood Pressure Monitoring (BLOOD PRESSURE KIT) KIT     doxycycline hyclate (VIBRAMYCIN) 100 MG capsule     EPINEPHrine (ANY BX GENERIC EQUIV) 0.3 MG/0.3ML injection 2-pack     gabapentin (NEURONTIN) 100 MG capsule     hydrOXYzine (ATARAX) 25 MG tablet     melatonin 5 MG tablet     metoprolol tartrate (LOPRESSOR) 25 MG tablet     metroNIDAZOLE (FLAGYL) 500 MG tablet     mometasone (NASONEX) 50 MCG/ACT nasal spray     montelukast (SINGULAIR) 10 MG tablet     omeprazole (PRILOSEC) 20 MG DR capsule     pantoprazole (PROTONIX) 40 MG EC tablet     sulfacetamide (BLEPH-10) 10 % ophthalmic solution     SYMBICORT 80-4.5 MCG/ACT Inhaler     Medication changes made in the last two weeks: No    Current Care Team  Primary Care Provider: Unknown  Psychiatrist: No  Therapist: No  : Yes. Name: Alexus. Location: [facility]. Date of last visit: Unknown. Frequency: See above. Perceived helpfulness: yes.     CTSS or ARMHS:No  ACT Team: No  Other: Is working to get a CADI worker and IHS worker through  Alexus.    Diagnosis  311 (F32.9) Unspecified Depressive Disorder   300.00 (F41.9) Unspecified Anxiety Disorder   Other Unspecified and Specified Bipolar and Related Disorder 296.80 (F31.9) Unspecified Bipolar and Related Disorder - by history   309.81 (F43.10) Posttraumatic Stress Disorder (includes Posttraumatic Stress Disorder for Children 6 Years and Younger)  Without dissociative symptoms - by history     Clinical Summary and  Substantiation of Recommendations    After therapeutic assessment, intervention and aftercare planning by ED care team and [facility] and in consultation with attending provider, the patient's circumstances and mental state were appropriate for outpatient management. It is the recommendation of this clinician that pt discharge with OP MH support. A this time the pt is not presenting as an acute risk to self or others due to the following factors: denying any SI, HI or SIB. Pt willing to utilize aftercare / safety plan. Pt is connected with services through [employer] and would like to continue working with them specifically, declining any individual therapy offered during this assessment. Pt states she feels she is safe at home.   Disposition  Recommended disposition: Individual Therapy and Medication Management      Reviewed case and recommendations with attending provider. Attending Name: Dr Birmingham  Attending concurs with disposition: Yes    Patient concurs with disposition: Yes       Final disposition: Individual therapy  and Medication management.   Outpatient Details (if applicable):   Aftercare plan and appointments placed in the AVS and provided to patient: Yes. Given to patient by ED Staff    Was lethal means counseling provided as a part of aftercare planning? No;     Assessment Details  Patient interview started at: 6:12AM and completed at: 7:06AM.  Total duration spent on the patient case in minutes: 1.75 hrs   CPT code(s) utilized: 53653 - Psychotherapy for Crisis - 60 (30-74*) min     Piedad Trevizo, [facility], [facility], [facility]  DEC - Triage & Transition Services  Callback: [phone]    Aftercare Plan  If I am feeling unsafe or I am in a crisis, I will:   Contact my established care providers   Call the National Suicide Prevention Lifeline: 988  Go to the nearest emergency room   Call 590     Warning signs that I or other people might notice when a crisis is developing for me:   Grief   Feeling anxious  Medical  "concerns    Things I am able to do on my own to cope or help me feel better:   Riding horses  Pet cats  Watch movies  Read a book  Go to library and read, check out books to read  Walk around the mall to decrease isolation     Things that I am able to do with others to cope or help me better:   Clean at the farm, 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  Look into making or using fidgets for a distraction    Changes I can make to support my mental health and wellness:   Explore ways to build relationships with friends who clean the farm   Begin individual therapy  Seek additional support through [employer]    People in my life that I can ask for help:   Alexus  Continue to call family    Your [facility] has a mental health crisis team you can call 24/7: [facility] Mobile Crisis  [phone] (adults)  [phone] (children)    Crisis Lines  Crisis Text Line  Text 339657  You will be connected with a trained live crisis counselor to provide support.    Por espanol, texto  GENNA a 114766 o texto a 442-AYUDAME en WhatsApp    The Lexx Project (LGBTQ Youth Crisis Line)  3.597.813.1381  text START to 021-312    Community Resources  Fast Tracker  Linking people to mental health and substance use disorder resources  fasttrackermn.org     [place] Mental Health Warm Line  Peer to peer support  Monday thru Saturday, 12 pm to 10 pm  355.294.9730 or 8.578.446.1630  Text \"Support\" to 86593    National [place] on Mental Illness (ULIESS)  831.110.5435 or 1.888.ULISES.HELPS    Mental Health Apps  My3  https://my3app.org/    VirtualHopeBox  https://LogRhythm.org/apps/virtual-hope-box/    Additional Information  Today you were seen by a licensed mental health professional through Triage and " Transition services, [facility] ([facility])  for a crisis assessment in the Emergency Department at [facility].  It is recommended that you follow up with your established providers (psychiatrist, mental health therapist, and/or primary care doctor - as relevant) as soon as possible. Coordinators from [facility] will be calling you in the next 24-48 hours to ensure that you have the resources you need.  You can also contact [facility] coordinators directly at [phone]. You may have been scheduled for or offered an appointment with a mental health provider. [facility] maintains an extensive network of licensed behavioral health providers to connect patients with the services they need.  We do not charge providers a fee to participate in our referral network.  We match patients with providers based on a patient's specific needs, insurance coverage, and location.  Our first effort will be to refer you to a provider within your care system, and will utilize providers outside your care system as needed.

## 2022-11-03 NOTE — ED PROVIDER NOTES
EMERGENCY DEPARTMENT ENCOUNTER      NAME: Marleny Viera  AGE: 47 year old female  YOB: 1975  MRN: [id number]  EVALUATION DATE & TIME: 11/3/2022 12:53 AM    PCP: Maria Fernanda Schumacher    ED PROVIDER: Syed Thompson M.D.      Chief Complaint   Patient presents with     Chest Pain     Flank Pain       FINAL IMPRESSION:  1. Chest pain, unspecified type    2. Flank pain    3. Depression, unspecified depression type          ED COURSE & MEDICAL DECISION MAKIN year old female presents to the Emergency Department for evaluation of concerns including increasing depression in the setting of the anniversary of the loss of her partner.  She reports that for the last couple of days she has had increased left-sided chest pain and some pain and numbness extending to her jaw and her arm on the left side.  Her cardiopulmonary and neurovascular exams are unremarkable.  She also reports some left flank pain which seems below the rib margin.  Her urine analysis does reveal some hematuria however patient thinks that this is probably contaminated from her menses.  CT stone protocol negative for kidney stone.  Chest x-ray clear.  EKG and troponin are both stable and reassuring.  I think she is ruled out for ACS given 2 days of continuous symptoms.  She does have an elevated D-dimer.  She is pended for a VQ scan because she has a contrast allergy.  This will not be available until early morning but is ordered.  Expect that if this is negative she will be able to be cleared from a chest pain standpoint.  She does report that this is quite temporally related to the anniversary of her partner's death and she has been more depressed.  She adamantly denies any suicidal ideation.  She is interested in getting some resources from crisis counselors here and a DEC evaluation was ordered, also pending at shift change.  I do not think she is holdable based on my conversations with her if she decides to leave prior to  "completing this evaluation.  She was signed out to Dr. Andrade at shift change pending follow-up on VQ scan and DEC evaluation        MEDICATIONS GIVEN IN THE EMERGENCY:  Medications   hydrOXYzine (ATARAX) tablet 50 mg (50 mg Oral Given 11/3/22 0203)   ondansetron (ZOFRAN) injection 4 mg (4 mg Intravenous Given 11/3/22 0203)   acetaminophen (TYLENOL) tablet 650 mg (650 mg Oral Given 11/3/22 0434)       NEW PRESCRIPTIONS STARTED AT TODAY'S ER VISIT  New Prescriptions    No medications on file          =================================================================    HPI    Patient information was obtained from: patient    Use of : Yes (In Person) - Language - American Sign Language        Marleny Viera is a 47 year old female with a pertinent history of drug abuse, bipolar depression, borderline personality disorder, alcohol dependence, pulmonary embolism, hypertension, obesity, breast surgery, and fatty liver who presents to this ED via walk in by self for evaluation of chest pain and depression.    Per chart review, patient spoke with [facility] on 11/02/2022 for evaluation of chest pain. Patient reported the following: \"breathing just fine but left chest starting to hurt.\" Chest pain has been constant for 2 days. The pain goes to the \"upper part\" of left chest and \"goes to shoulder.\" She was advised to call 911, but patient declined and said her roommate would give her a ride to the ER.    Patient presents with depression that has been present since 11/01/2022 (2 days) as well as constant chest pain that has been present the past couple of days. Per depression, patient reports it is the anniversary of her  passing. She denies suicidal ideation. She also reports of decreased sleeping because her mind has been racing. She reports not feeling safe at home. She lives alone. Per chest pain, patient reports the pain radiates into her neck and ear as well as her left arm and side of her " face. She reports her left arm feels numb. She reports her ear feels like an ear infection. Stirring in the kitchen makes her left arm feel numb. Exertion provokes her chest pain. She took Tylenol before departing to the ED with no significant chest pain relief. She has never experienced this pain before. She also reports of a cough. Her pain and numbness is currently present. Patient is allergic to ibuprofen. She denies leg swelling, leg pain, and fever.    Patient reports she was feeling left kidney pain, but this has resolved. She reports her pain felt like her prior kidney stone. Patient has had her period recently. Patient does not report of any other medical concerns or complaints at this time.      REVIEW OF SYSTEMS   All systems reviewed and negative except as noted in HPI.    PAST MEDICAL HISTORY:  Past Medical History:   Diagnosis Date     Accidental overdose, initial encounter 7/7/2022     ADHD (attention deficit hyperactivity disorder)      Alcohol dependence (H)      Anxiety      Arthritis      Asthma      Bipolar depression (H)      Deafness      Drug abuse, nondependent (H) 10/29/2004    Overview:  polydrug abuse per psych notes ; Other, mixed, or unspecified nondependent drug abuse, unspecified     History of blood clots      History of transfusion      Hypertension      Kidney stones 2015     Major depressive disorder, recurrent episode, severe (H) 5/17/2016    Overview:  s/p suicide attempt Epic      Migraine      Obstructive sleep apnea 6/1/2016     Pulmonary emboli (H)      Status post total right knee replacement 1/18/2016       PAST SURGICAL HISTORY:  Past Surgical History:   Procedure Laterality Date     BIOPSY BREAST Right     2019... Also had fluid drained from left breast under surgery also in 2019     BREAST SURGERY       MAMMOPLASTY REDUCTION Bilateral 2017     TOTAL HIP ARTHROPLASTY Bilateral      TOTAL KNEE ARTHROPLASTY Right      TUBAL LIGATION       US BREAST CORE BIOPSY RIGHT Right  11/21/2019           CURRENT MEDICATIONS:    No current facility-administered medications for this encounter.     Current Outpatient Medications   Medication     albuterol (ACCUNEB) 0.63 MG/3ML neb solution     albuterol (PROAIR HFA/PROVENTIL HFA/VENTOLIN HFA) 108 (90 Base) MCG/ACT inhaler     albuterol (PROVENTIL) (2.5 MG/3ML) 0.083% neb solution     Blood Pressure Monitoring (BLOOD PRESSURE KIT) KIT     doxycycline hyclate (VIBRAMYCIN) 100 MG capsule     EPINEPHrine (ANY BX GENERIC EQUIV) 0.3 MG/0.3ML injection 2-pack     gabapentin (NEURONTIN) 100 MG capsule     hydrOXYzine (ATARAX) 25 MG tablet     melatonin 5 MG tablet     metoprolol tartrate (LOPRESSOR) 25 MG tablet     metroNIDAZOLE (FLAGYL) 500 MG tablet     mometasone (NASONEX) 50 MCG/ACT nasal spray     montelukast (SINGULAIR) 10 MG tablet     omeprazole (PRILOSEC) 20 MG DR capsule     pantoprazole (PROTONIX) 40 MG EC tablet     sulfacetamide (BLEPH-10) 10 % ophthalmic solution     SYMBICORT 80-4.5 MCG/ACT Inhaler         ALLERGIES:  Allergies   Allergen Reactions     Amoxicillin Itching and Shortness Of Breath     Bee Pollen Anaphylaxis     Contrast [Iodixanol] Shortness Of Breath and Rash     Pt stated she reacted to the contrast given for her CT in past. She experienced shortness of breath, throat tightening, and rash on chest, and they gave her an injection to counter the symptoms.      Covid-19 (Mrna) Vaccine Shortness Of Breath     Pfizer COVID-19 Vaccine     Hymenoptera Allergenic Extract [Wasp Venom Protein] Anaphylaxis     Iodine Hives     Pt stated that she was injected with CT CONTRAST in the past and got hives, SOB, and nausea. Please pre-medicate patient in the future.      Wasp Venom Protein Starter Kit [Wasp Venom Protein] Itching, Shortness Of Breath, Swelling and Difficulty breathing     Adhesive Tape-Silicones [Adhesive Tape] Unknown     Food Allergy Formula Unknown     Red meat, chocolate     Geodon [Ziprasidone] Unknown     Ibuprofen  "Other (See Comments)     Kidney function, Kidney function     Latex Unknown     Added based on information entered during case entry, please review and add reactions, type, and severity as needed     Prochlorperazine Other (See Comments)     Risperdal [Risperidone] Unknown     Risperidone Analogues [Risperidone] Other (See Comments)     Theobroma Oil [Theobroma Grandiflorum Seed Butter] Unknown     Trazodone Other (See Comments)     Elevated creatinine     Zoloft [Sertraline] Unknown     Hydrochlorothiazide Rash       FAMILY HISTORY:  Family History   Problem Relation Age of Onset     Cancer Mother      Breast Cancer Mother      Cerebrovascular Disease Father        SOCIAL HISTORY:   Social History     Socioeconomic History     Marital status:    Tobacco Use     Smoking status: Never     Smokeless tobacco: Never   Substance and Sexual Activity     Alcohol use: Yes     Comment: Alcoholic Drinks/day: dependence     Drug use: Not Currently     Sexual activity: Never   Social History Narrative    . No children.   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  Nonsmoker.  No alcohol use.  Tesha Pelaez MD         VITALS:  BP (!) 168/79   Pulse 69   Temp 98.4  F (36.9  C) (Oral)   Resp 26   SpO2 99%     PHYSICAL EXAM    Constitutional: Well developed, Well nourished, NAD.  HENT: Normocephalic, Atraumatic. Neck Supple.  Eyes: EOMI, Conjunctiva normal.  Respiratory: Breathing comfortably on room air. Speaks full sentences easily. Lungs clear to ascultation.  Cardiovascular: Normal heart rate, Regular rhythm. No peripheral edema.  Abdomen: Soft, nontender. No CVA tenderness  Musculoskeletal: Good range of motion in all major joints. No major deformities noted.  Integument: Warm, Dry.  Neurologic: Fully awake, alert, oriented.  Face is symmetric.  Speech is normal.  Cranial nerves II through XII intact.  Strength is 5 out of 5 throughout bilateral upper and lower " extremities.  Sensation to light touch intact x4.  Patient is ambulatory.  Psychiatric: Cooperative.     LAB:  All pertinent labs reviewed and interpreted.  Labs Ordered and Resulted from Time of ED Arrival to Time of ED Departure   BASIC METABOLIC PANEL - Abnormal       Result Value    Sodium 138      Potassium 3.7      Chloride 103      Carbon Dioxide (CO2) 26      Anion Gap 9      Urea Nitrogen 11.7      Creatinine 0.81      Calcium 8.7      Glucose 120 (*)     GFR Estimate 88     D DIMER QUANTITATIVE - Abnormal    D-Dimer Quantitative 1.21 (*)    ROUTINE UA WITH MICROSCOPIC REFLEX TO CULTURE - Abnormal    Color Urine Light Yellow      Appearance Urine Clear      Glucose Urine 50 (*)     Bilirubin Urine Negative      Ketones Urine Trace (*)     Specific Gravity Urine 1.025      Blood Urine >1.0 mg/dL (*)     pH Urine 6.0      Protein Albumin Urine 10 (*)     Urobilinogen Urine <2.0      Nitrite Urine Negative      Leukocyte Esterase Urine Negative      Bacteria Urine Few (*)     Mucus Urine Present (*)     RBC Urine 118 (*)     WBC Urine 1      Squamous Epithelials Urine <1     TROPONIN T, HIGH SENSITIVITY - Normal    Troponin T, High Sensitivity <6     NT PROBNP INPATIENT - Normal    N terminal Pro BNP Inpatient 321     CBC WITH PLATELETS AND DIFFERENTIAL    WBC Count 7.6      RBC Count 4.90      Hemoglobin 13.9      Hematocrit 41.5      MCV 85      MCH 28.4      MCHC 33.5      RDW 13.7      Platelet Count 270      % Neutrophils 63      % Lymphocytes 32      % Monocytes 4      % Eosinophils 1      % Basophils 0      % Immature Granulocytes 0      NRBCs per 100 WBC 0      Absolute Neutrophils 4.8      Absolute Lymphocytes 2.5      Absolute Monocytes 0.3      Absolute Eosinophils 0.1      Absolute Basophils 0.0      Absolute Immature Granulocytes 0.0      Absolute NRBCs 0.0         RADIOLOGY:  Reviewed all pertinent imaging. Please see official radiology report.  Abd/pelvis CT no contrast - Stone Protocol   Final  Result   IMPRESSION:    1.  No acute abnormality. No urinary stone or hydronephrosis.   2.  Uterine fibroids.         Chest XR,  PA & LAT   Final Result   IMPRESSION: Negative chest.      NM Lung Scan Ventilation and Perfusion    (Results Pending)       EKG:    Performed at: Nov 2, 2022 23:45    Impression: Sinus rhythm. Minimal voltage criteria for LVH, may be normal variant. Nonspecific ST and T wave abnormality. No change from previous    Rate: 77 BPM  Rhythm: Sinus rhythm.  Axis: 0  ID Interval: 160 ms  QRS Interval: 84 ms  QTc Interval: 452 ms  ST Changes: Nonspecific ST abnormality  Comparison: When compared with ECG of Jan 27, 2021 00:43, no significant change was found.     I have independently reviewed and interpreted the EKG(s) documented above.        I, Dillan Horn, am serving as a scribe to document services personally performed by Dr. Syed Thompson, based on my observation and the provider's statements to me. I, Syed Thompson MD attest that Dillan Horn is acting in a scribe capacity, has observed my performance of the services and has documented them in accordance with my direction.    Syed Thompson M.D.  Emergency Medicine  [facility] EMERGENCY DEPARTMENT  [facility] [facility] [id number] [phone]  Dept: [phone]     Syed Thompson MD  11/03/22 8177

## 2022-11-03 NOTE — ED NOTES
EMERGENCY DEPARTMENT SIGN OUT NOTE        ED COURSE AND MEDICAL DECISION MAKING  Patient was signed out to me by Dr Yaneli Andrade at end of shift    Patient evaluated for mental health.  She was assessed by the on call crisis team and felt to be safe for discharge.  She was also evaluated for shortness of breath.  The D-dimer was positive.  She has a contrast allergy and therefore VQ scan was ordered.  Patient was too anxious to complete the study.  I offered Valium but she initially would rather have the CTA of the chest with some pretest Benadryl.  I ordered the Benadryl.  She declined the further imaging.  She would rather follow-up as an outpatient.  I explained the CT of the chest was necessary to rule out a pulmonary embolism which could be life-threatening.  Patient acknowledges understanding.  She is having no symptoms at this time does not think this is necessary.  I attempted to convince her to have the study performed however she declined.  She acknowledges understanding has full capacity to make this decision      FINAL IMPRESSION    1. Chest pain, unspecified type    2. Flank pain    3. Depression, unspecified depression type        ED MEDS  Medications   technetium pertechnetate with albumin (Tc99m MAA) radioisotope injection 3-10 millicurie (has no administration in time range)   diazepam (VALIUM) tablet 5 mg (has no administration in time range)   diphenhydrAMINE (BENADRYL) injection 25 mg (has no administration in time range)   hydrOXYzine (ATARAX) tablet 50 mg (50 mg Oral Given 11/3/22 0203)   ondansetron (ZOFRAN) injection 4 mg (4 mg Intravenous Given 11/3/22 0203)   acetaminophen (TYLENOL) tablet 650 mg (650 mg Oral Given 11/3/22 0434)   technetium pentetate Tc99m (DTPA) inhaled radioisotope 30-65 millicurie (55 millicuries Inhalation Given 11/3/22 7977)       LAB  Labs Ordered and Resulted from Time of ED Arrival to Time of ED Departure   BASIC METABOLIC PANEL - Abnormal       Result Value     Sodium 138      Potassium 3.7      Chloride 103      Carbon Dioxide (CO2) 26      Anion Gap 9      Urea Nitrogen 11.7      Creatinine 0.81      Calcium 8.7      Glucose 120 (*)     GFR Estimate 88     D DIMER QUANTITATIVE - Abnormal    D-Dimer Quantitative 1.21 (*)    ROUTINE UA WITH MICROSCOPIC REFLEX TO CULTURE - Abnormal    Color Urine Light Yellow      Appearance Urine Clear      Glucose Urine 50 (*)     Bilirubin Urine Negative      Ketones Urine Trace (*)     Specific Gravity Urine 1.025      Blood Urine >1.0 mg/dL (*)     pH Urine 6.0      Protein Albumin Urine 10 (*)     Urobilinogen Urine <2.0      Nitrite Urine Negative      Leukocyte Esterase Urine Negative      Bacteria Urine Few (*)     Mucus Urine Present (*)     RBC Urine 118 (*)     WBC Urine 1      Squamous Epithelials Urine <1     TROPONIN T, HIGH SENSITIVITY - Normal    Troponin T, High Sensitivity <6     NT PROBNP INPATIENT - Normal    N terminal Pro BNP Inpatient 321     COVID-19 VIRUS (CORONAVIRUS) BY PCR - Normal    SARS CoV2 PCR Negative     CBC WITH PLATELETS AND DIFFERENTIAL    WBC Count 7.6      RBC Count 4.90      Hemoglobin 13.9      Hematocrit 41.5      MCV 85      MCH 28.4      MCHC 33.5      RDW 13.7      Platelet Count 270      % Neutrophils 63      % Lymphocytes 32      % Monocytes 4      % Eosinophils 1      % Basophils 0      % Immature Granulocytes 0      NRBCs per 100 WBC 0      Absolute Neutrophils 4.8      Absolute Lymphocytes 2.5      Absolute Monocytes 0.3      Absolute Eosinophils 0.1      Absolute Basophils 0.0      Absolute Immature Granulocytes 0.0      Absolute NRBCs 0.0         RADIOLOGY    Abd/pelvis CT no contrast - Stone Protocol   Final Result   IMPRESSION:    1.  No acute abnormality. No urinary stone or hydronephrosis.   2.  Uterine fibroids.         Chest XR,  PA & LAT   Final Result   IMPRESSION: Negative chest.      NM Lung Scan Ventilation and Perfusion    (Results Pending)       DISCHARGE MEDS  New  Prescriptions    No medications on file       Isiah Birmingham MD  [facility] EMERGENCY DEPARTMENT  [facility] [facility] [id number] [phone]     Isiah Birmingham MD  11/03/22 1016

## 2022-11-03 NOTE — TELEPHONE ENCOUNTER
"Pt reports \"breathing just fine but L chest starting to hurt\". Pt reports chest pain for two days, constant. Pt reports pain is \"upper part\" L chest and \"goes to shoulder\".     Advised pt to call 911 now.     Pt refuses to call 911, states she will have her roommate give her a ride to the ER and ends call.     Reason for Disposition    [1] Chest pain lasts > 5 minutes AND [2] age > 44    Additional Information    Negative: Passed out (i.e., lost consciousness, collapsed and was not responding)    Negative: Shock suspected (e.g., cold/pale/clammy skin, too weak to stand, low BP, rapid pulse)    Negative: Difficult to awaken or acting confused (e.g., disoriented, slurred speech)    Negative: SEVERE difficulty breathing (e.g., struggling for each breath, speaks in single words)    Protocols used: CHEST PAIN-A-AH      "

## 2022-11-03 NOTE — ED NOTES
EMERGENCY DEPARTMENT SIGN OUT NOTE        ED COURSE AND MEDICAL DECISION MAKING  Patient was signed out to me by Dr Yaneli Andrade at 7:05 AM     In brief, Marleny Viera is a 47 year old female who initially presented for evaluation of chest pain and depression. Patient present since 11/01/2022 (2 days) with depression and constant chest pain for the past couple of days. Pain radiates into her neck and ear as well as her left arm and side of her face. She has left arm feels numb. Patient took Tylenol before ED arrival. At present, she still reports persisting pain and numbness. She also reports of decreased sleeping because her mind has been racing. She reports not feeling safe at home. Patient reports it is the anniversary of her  passing. She denies suicidal ideation.        FINAL IMPRESSION    1. Chest pain, unspecified type    2. Flank pain    3. Depression, unspecified depression type        ED MEDS  Medications   hydrOXYzine (ATARAX) tablet 50 mg (50 mg Oral Given 11/3/22 0203)   ondansetron (ZOFRAN) injection 4 mg (4 mg Intravenous Given 11/3/22 0203)   acetaminophen (TYLENOL) tablet 650 mg (650 mg Oral Given 11/3/22 0434)       LAB  Labs Ordered and Resulted from Time of ED Arrival to Time of ED Departure   BASIC METABOLIC PANEL - Abnormal       Result Value    Sodium 138      Potassium 3.7      Chloride 103      Carbon Dioxide (CO2) 26      Anion Gap 9      Urea Nitrogen 11.7      Creatinine 0.81      Calcium 8.7      Glucose 120 (*)     GFR Estimate 88     D DIMER QUANTITATIVE - Abnormal    D-Dimer Quantitative 1.21 (*)    ROUTINE UA WITH MICROSCOPIC REFLEX TO CULTURE - Abnormal    Color Urine Light Yellow      Appearance Urine Clear      Glucose Urine 50 (*)     Bilirubin Urine Negative      Ketones Urine Trace (*)     Specific Gravity Urine 1.025      Blood Urine >1.0 mg/dL (*)     pH Urine 6.0      Protein Albumin Urine 10 (*)     Urobilinogen Urine <2.0      Nitrite Urine Negative      Leukocyte  Esterase Urine Negative      Bacteria Urine Few (*)     Mucus Urine Present (*)     RBC Urine 118 (*)     WBC Urine 1      Squamous Epithelials Urine <1     TROPONIN T, HIGH SENSITIVITY - Normal    Troponin T, High Sensitivity <6     NT PROBNP INPATIENT - Normal    N terminal Pro BNP Inpatient 321     CBC WITH PLATELETS AND DIFFERENTIAL    WBC Count 7.6      RBC Count 4.90      Hemoglobin 13.9      Hematocrit 41.5      MCV 85      MCH 28.4      MCHC 33.5      RDW 13.7      Platelet Count 270      % Neutrophils 63      % Lymphocytes 32      % Monocytes 4      % Eosinophils 1      % Basophils 0      % Immature Granulocytes 0      NRBCs per 100 WBC 0      Absolute Neutrophils 4.8      Absolute Lymphocytes 2.5      Absolute Monocytes 0.3      Absolute Eosinophils 0.1      Absolute Basophils 0.0      Absolute Immature Granulocytes 0.0      Absolute NRBCs 0.0     COVID-19 VIRUS (CORONAVIRUS) BY PCR         RADIOLOGY    Abd/pelvis CT no contrast - Stone Protocol   Final Result   IMPRESSION:    1.  No acute abnormality. No urinary stone or hydronephrosis.   2.  Uterine fibroids.         Chest XR,  PA & LAT   Final Result   IMPRESSION: Negative chest.      NM Lung Scan Ventilation and Perfusion    (Results Pending)       DISCHARGE MEDS  New Prescriptions    No medications on file       Isiah Birmingham MD  [facility] EMERGENCY DEPARTMENT  [address] [facility] [id number] [phone]     Isiah Birmingham MD  08/20/23 1233

## 2022-11-03 NOTE — ED NOTES
ER DISCHARGE NOTE:   Marleny Viera  MRN: [id number]    Marleny Viera is ok to discharge from the emergency room.    (R07.9) Chest pain, unspecified type    (R10.9) Flank pain    (F32.A) Depression, unspecified depression type      Marleny Viera discharged via on foot, alone.    Verbalized understanding of discharge instructions.   Prescriptions: none prescribed.    AVS in hand.

## 2022-11-03 NOTE — ED NOTES
EMERGENCY DEPARTMENT SIGN OUT NOTE        ED COURSE AND MEDICAL DECISION MAKING  Patient was signed out to me by Dr Jerome Thompson at 4:56 AM.    In brief, Marleny Viera is a 47 year old female who initially presented for evaluation of chest pain and depression. Patient presents with depression that has been present since 11/01/2022 (2 days) as well as constant chest pain that has been present the past couple of days. Pain radiates into her neck and ear as well as her left arm and side of her face. She reports her left arm feels numb. Patient took Tylenol before ED arrival. At present, she still reports persisting pain and numbness. She also reports of decreased sleeping because her mind has been racing. She reports not feeling safe at home. Patient reports it is the anniversary of her  passing. She denies suicidal ideation.     At time of sign out, disposition was pending VQ scan and DEC evaluation.    FINAL IMPRESSION    1. Chest pain, unspecified type    2. Flank pain    3. Depression, unspecified depression type        ED MEDS  Medications   hydrOXYzine (ATARAX) tablet 50 mg (50 mg Oral Given 11/3/22 0203)   ondansetron (ZOFRAN) injection 4 mg (4 mg Intravenous Given 11/3/22 0203)   acetaminophen (TYLENOL) tablet 650 mg (650 mg Oral Given 11/3/22 0434)       LAB  Labs Ordered and Resulted from Time of ED Arrival to Time of ED Departure   BASIC METABOLIC PANEL - Abnormal       Result Value    Sodium 138      Potassium 3.7      Chloride 103      Carbon Dioxide (CO2) 26      Anion Gap 9      Urea Nitrogen 11.7      Creatinine 0.81      Calcium 8.7      Glucose 120 (*)     GFR Estimate 88     D DIMER QUANTITATIVE - Abnormal    D-Dimer Quantitative 1.21 (*)    ROUTINE UA WITH MICROSCOPIC REFLEX TO CULTURE - Abnormal    Color Urine Light Yellow      Appearance Urine Clear      Glucose Urine 50 (*)     Bilirubin Urine Negative      Ketones Urine Trace (*)     Specific Gravity Urine 1.025      Blood Urine >1.0  mg/dL (*)     pH Urine 6.0      Protein Albumin Urine 10 (*)     Urobilinogen Urine <2.0      Nitrite Urine Negative      Leukocyte Esterase Urine Negative      Bacteria Urine Few (*)     Mucus Urine Present (*)     RBC Urine 118 (*)     WBC Urine 1      Squamous Epithelials Urine <1     TROPONIN T, HIGH SENSITIVITY - Normal    Troponin T, High Sensitivity <6     NT PROBNP INPATIENT - Normal    N terminal Pro BNP Inpatient 321     CBC WITH PLATELETS AND DIFFERENTIAL    WBC Count 7.6      RBC Count 4.90      Hemoglobin 13.9      Hematocrit 41.5      MCV 85      MCH 28.4      MCHC 33.5      RDW 13.7      Platelet Count 270      % Neutrophils 63      % Lymphocytes 32      % Monocytes 4      % Eosinophils 1      % Basophils 0      % Immature Granulocytes 0      NRBCs per 100 WBC 0      Absolute Neutrophils 4.8      Absolute Lymphocytes 2.5      Absolute Monocytes 0.3      Absolute Eosinophils 0.1      Absolute Basophils 0.0      Absolute Immature Granulocytes 0.0      Absolute NRBCs 0.0           RADIOLOGY    Abd/pelvis CT no contrast - Stone Protocol   Final Result   IMPRESSION:    1.  No acute abnormality. No urinary stone or hydronephrosis.   2.  Uterine fibroids.         Chest XR,  PA & LAT   Final Result   IMPRESSION: Negative chest.      NM Lung Scan Ventilation and Perfusion    (Results Pending)       DISCHARGE MEDS  New Prescriptions    No medications on file       I, Alicia Boston, am serving as a scribe to document services personally performed by Dr. Andrade, based on my observations and the provider's statements to me. I, Dr. Andrade, attest that Alicia Boston is acting in a scribe capacity, has observed my performance of the services and has documented them in accordance with my direction.      Haley Andrade MD  [facility] EMERGENCY DEPARTMENT  [facility] [facility] [address] [phone]

## 2022-11-03 NOTE — ED NOTES
ER NURSING STATUS NOTE  Marleny Viera  MRN: [id number]       Initial reason for ER visit: 2 day old chest pain    Current length of ER stay: ~9 hours    Back story: ER visit on 11/2/22 at 2330 for c/o 2 day old chest pain that radiates into left arm.  Sxs has pt stressed and sad.      Medical Hx:   Past Medical History:   Diagnosis Date     Accidental overdose, initial encounter 7/7/2022     ADHD (attention deficit hyperactivity disorder)      Alcohol dependence (H)      Anxiety      Arthritis      Asthma      Bipolar depression (H)      Deafness      Drug abuse, nondependent (H) 10/29/2004    Overview:  polydrug abuse per psych notes ; Other, mixed, or unspecified nondependent drug abuse, unspecified     History of blood clots      History of transfusion      Hypertension      Kidney stones 2015     Major depressive disorder, recurrent episode, severe (H) 5/17/2016    Overview:  s/p suicide attempt Epic      Migraine      Obstructive sleep apnea 6/1/2016     Pulmonary emboli (H)      Status post total right knee replacement 1/18/2016        Current status:    Family at bedside: none at bedside  Neuro: alert and oriented.  Deaf.  Cardiac: on cardiac monitor.  Denies CP.  Respiratory: on RA, no distress.  Digestive: Mild nausea, stated it's reflux.  Urinary: up to bathroom  Mobility: steady on feet  Skin: no obvious skin impairments  Abnormal labs/scans: had partial VQ scan.  Pt became anxious, techs could not finish scan.  MD updated.  IV Access site: 18g LAC  Psych: DEC assessed.  OK to discharge with MH support as OP.  Current living status: lives alone  VS obtained: BP (!) 172/79   Pulse 63   Temp 98.4  F (36.9  C) (Oral)   Resp 18   SpO2 97% .  Pain status: denies pain  Preferred Language: ASL  Plan: awaiting full eval

## 2022-11-04 ENCOUNTER — MYC MEDICAL ADVICE (OUTPATIENT)
Dept: INTERNAL MEDICINE | Facility: CLINIC | Age: 47
End: 2022-11-04

## 2022-11-09 ENCOUNTER — OFFICE VISIT (OUTPATIENT)
Dept: INTERNAL MEDICINE | Facility: CLINIC | Age: 47
End: 2022-11-09
Payer: MEDICARE

## 2022-11-09 VITALS
OXYGEN SATURATION: 100 % | BODY MASS INDEX: 37.94 KG/M2 | SYSTOLIC BLOOD PRESSURE: 138 MMHG | DIASTOLIC BLOOD PRESSURE: 78 MMHG | WEIGHT: 227.7 LBS | HEART RATE: 59 BPM | HEIGHT: 65 IN

## 2022-11-09 DIAGNOSIS — K21.00 GASTROESOPHAGEAL REFLUX DISEASE WITH ESOPHAGITIS, UNSPECIFIED WHETHER HEMORRHAGE: Primary | ICD-10-CM

## 2022-11-09 DIAGNOSIS — I10 ESSENTIAL HYPERTENSION: ICD-10-CM

## 2022-11-09 DIAGNOSIS — F32.2 SEVERE DEPRESSION (H): ICD-10-CM

## 2022-11-09 DIAGNOSIS — R07.89 CHEST DISCOMFORT: ICD-10-CM

## 2022-11-09 PROCEDURE — 99214 OFFICE O/P EST MOD 30 MIN: CPT | Mod: 25 | Performed by: NURSE PRACTITIONER

## 2022-11-09 PROCEDURE — G0008 ADMIN INFLUENZA VIRUS VAC: HCPCS | Performed by: NURSE PRACTITIONER

## 2022-11-09 PROCEDURE — 90686 IIV4 VACC NO PRSV 0.5 ML IM: CPT | Performed by: NURSE PRACTITIONER

## 2022-11-09 ASSESSMENT — PATIENT HEALTH QUESTIONNAIRE - PHQ9
SUM OF ALL RESPONSES TO PHQ QUESTIONS 1-9: 2
SUM OF ALL RESPONSES TO PHQ QUESTIONS 1-9: 2
10. IF YOU CHECKED OFF ANY PROBLEMS, HOW DIFFICULT HAVE THESE PROBLEMS MADE IT FOR YOU TO DO YOUR WORK, TAKE CARE OF THINGS AT HOME, OR GET ALONG WITH OTHER PEOPLE: NOT DIFFICULT AT ALL

## 2022-11-09 NOTE — PROGRESS NOTES
Assessment & Plan     Gastroesophageal reflux disease with esophagitis, unspecified whether hemorrhage  Intermittent heartburn with subjective feelings of reflux.  She had been on omeprazole in the past but this was discontinued for some reason.  Restart the medication and follow-up if symptoms are not improving  - omeprazole (PRILOSEC) 20 MG DR capsule; Take 1 capsule (20 mg) by mouth every morning    Chest discomfort  She was seen in the ED for chest discomfort.  D-dimer mildly elevated but VQ scan indeterminate.  Her chest discomfort has completely resolved and so I told her that we do not need to investigate this further as long as she is feeling much better    Essential hypertension  Controlled on metoprolol    Severe depression (H)  She has a history of depression and reported worsening symptoms during her ED visit.  It sounds like it was the anniversary of the loss of a partner.  However, she says her mood is a bit better today and that she is feeling stable    Patient Instructions   EKG started Optisol 30 minutes before breakfast every day.    No need to recheck D-dimer.  Flu shot today.    If your gastrointestinal symptoms do not improve over the next couple of weeks, come back for reevaluation        Review of external notes as documented elsewhere in note  Prescription drug management  20 minutes spent on the date of the encounter doing chart review, history and exam, documentation and further activities per the note    Return in about 2 weeks (around 11/23/2022), or if symptoms worsen or fail to improve.    Harpreet Jacobs, [facility]    Rosemarie Farmer is a 47 year old, presenting for the following health issues:  ED FOLLOW UP ([place] 11/3 - needs pulmonologist referral- flu shot recheck labs/)      HPI     Here for ED follow-up.  She had been having some chest discomfort and was feeling a bit depressed.  Her work-up revealed mildly elevated D-dimer she  "could not have CTA of the chest due to contrast allergy and so a VQ scan was ordered but indeterminate.    Today she is feeling much better although she wants to talk about some gastrointestinal symptoms she has been experiencing.  Mild heartburn symptoms along with subjective feelings of gastric contents in her esophagus at times.    Review of her chart shows that she had been prescribed omeprazole in the past but this was discontinued.  She is willing to restart the medication      Review of Systems   Constitutional, HEENT, cardiovascular, pulmonary, gi and gu systems are negative, except as otherwise noted.      Objective    /78 (BP Location: Right arm, Patient Position: Sitting, Cuff Size: Adult Large)   Pulse 59   Ht 1.651 m (5' 5\")   Wt 103.3 kg (227 lb 11.2 oz)   SpO2 100%   BMI 37.89 kg/m    Body mass index is 37.89 kg/m .  Physical Exam      used for this encounter.  The patient is alert oriented and in no acute distress      Answers for HPI/ROS submitted by the patient on 11/9/2022  If you checked off any problems, how difficult have these problems made it for you to do your work, take care of things at home, or get along with other people?: Not difficult at all  PHQ9 TOTAL SCORE: 2      "

## 2022-11-09 NOTE — PATIENT INSTRUCTIONS
EKG started Optisol 30 minutes before breakfast every day.    No need to recheck D-dimer.  Flu shot today.    If your gastrointestinal symptoms do not improve over the next couple of weeks, come back for reevaluation

## 2022-11-18 NOTE — TELEPHONE ENCOUNTER
..Reason for Call:  Medication or medication refill:    Do you use a [facility]?  Name of the pharmacy and phone number for the current request: [facility] [place] [address] - [place], [place] - [address]  [phone]    Name of the medication requested:   1. gabapentin (NEURONTIN) 100 MG capsule  2. montelukast (SINGULAIR) 10 MG tablet    Other request: NONE    Call taken on 10/28/2021 at 9:37 AM by SHAYNA Brenner       80

## 2022-11-30 DIAGNOSIS — J45.40 MODERATE PERSISTENT ASTHMA WITHOUT COMPLICATION: ICD-10-CM

## 2022-12-01 RX ORDER — ALBUTEROL SULFATE 0.83 MG/ML
SOLUTION RESPIRATORY (INHALATION)
Qty: 90 ML | Refills: 1 | Status: SHIPPED | OUTPATIENT
Start: 2022-12-01 | End: 2022-12-19

## 2022-12-01 NOTE — TELEPHONE ENCOUNTER
"Routing refill request to provider for review/approval because:  Early refill requested.    Last Written Prescription Date:  9/30/22  Last Fill Quantity: 90 ml,  # refills: 1   Last office visit provider:  11/9/22     Requested Prescriptions   Pending Prescriptions Disp Refills     albuterol (PROVENTIL) (2.5 MG/3ML) 0.083% neb solution 90 mL 1     Sig: TAKE 3 ML (2.5 MG TOTAL) BY NEBULIZATION 4 (FOUR) TIMES A WEEK.       Asthma Maintenance Inhalers - Anticholinergics Passed - 12/1/2022 11:08 AM        Passed - Patient is age 12 years or older        Passed - Asthma control assessment score within normal limits in last 6 months     Please review ACT score.           Passed - Medication is active on med list        Passed - Recent (6 mo) or future (30 days) visit within the authorizing provider's specialty     Patient had office visit in the last 6 months or has a visit in the next 30 days with authorizing provider or within the authorizing provider's specialty.  See \"Patient Info\" tab in inbasket, or \"Choose Columns\" in Meds & Orders section of the refill encounter.           Short-Acting Beta Agonist Inhalers Protocol  Passed - 12/1/2022 11:08 AM        Passed - Patient is age 12 or older        Passed - Asthma control assessment score within normal limits in last 6 months     Please review ACT score.           Passed - Medication is active on med list        Passed - Recent (6 mo) or future (30 days) visit within the authorizing provider's specialty     Patient had office visit in the last 6 months or has a visit in the next 30 days with authorizing provider or within the authorizing provider's specialty.  See \"Patient Info\" tab in inbasket, or \"Choose Columns\" in Meds & Orders section of the refill encounter.                 Sp Stiles RN 12/01/22 11:09 AM  "

## 2022-12-19 DIAGNOSIS — J45.40 MODERATE PERSISTENT ASTHMA WITHOUT COMPLICATION: ICD-10-CM

## 2022-12-19 RX ORDER — ALBUTEROL SULFATE 0.83 MG/ML
SOLUTION RESPIRATORY (INHALATION)
Qty: 90 ML | Refills: 1 | Status: SHIPPED | OUTPATIENT
Start: 2022-12-19 | End: 2023-04-17

## 2022-12-19 NOTE — TELEPHONE ENCOUNTER
"Last Written Prescription Date:  12/1/22  Last Fill Quantity: 90,  # refills: 1   Last office visit provider:  11/9/22    Request is for a different pharmacy.    Requested Prescriptions   Pending Prescriptions Disp Refills     albuterol (PROVENTIL) (2.5 MG/3ML) 0.083% neb solution 90 mL 1     Sig: TAKE 3 ML (2.5 MG TOTAL) BY NEBULIZATION 4 (FOUR) TIMES A WEEK.       Asthma Maintenance Inhalers - Anticholinergics Passed - 12/19/2022  8:54 AM        Passed - Patient is age 12 years or older        Passed - Asthma control assessment score within normal limits in last 6 months     Please review ACT score.           Passed - Medication is active on med list        Passed - Recent (6 mo) or future (30 days) visit within the authorizing provider's specialty     Patient had office visit in the last 6 months or has a visit in the next 30 days with authorizing provider or within the authorizing provider's specialty.  See \"Patient Info\" tab in inbasket, or \"Choose Columns\" in Meds & Orders section of the refill encounter.           Short-Acting Beta Agonist Inhalers Protocol  Passed - 12/19/2022  8:54 AM        Passed - Patient is age 12 or older        Passed - Asthma control assessment score within normal limits in last 6 months     Please review ACT score.           Passed - Medication is active on med list        Passed - Recent (6 mo) or future (30 days) visit within the authorizing provider's specialty     Patient had office visit in the last 6 months or has a visit in the next 30 days with authorizing provider or within the authorizing provider's specialty.  See \"Patient Info\" tab in inbasket, or \"Choose Columns\" in Meds & Orders section of the refill encounter.                 Sarika Jefferson RN 12/19/22 5:36 PM  "

## 2022-12-20 DIAGNOSIS — G47.00 PERSISTENT INSOMNIA: ICD-10-CM

## 2022-12-21 DIAGNOSIS — I10 ESSENTIAL HYPERTENSION: ICD-10-CM

## 2022-12-21 RX ORDER — HYDROXYZINE HYDROCHLORIDE 25 MG/1
TABLET, FILM COATED ORAL
Qty: 270 TABLET | Refills: 3 | Status: SHIPPED | OUTPATIENT
Start: 2022-12-21 | End: 2023-03-22

## 2022-12-21 NOTE — TELEPHONE ENCOUNTER
"Last Written Prescription Date:  6/11/22  Last Fill Quantity: 270,  # refills: 1   Last office visit provider:  11/9/22     Requested Prescriptions   Pending Prescriptions Disp Refills     hydrOXYzine (ATARAX) 25 MG tablet [Pharmacy Med Name: hydrOXYzine HCl 25MG TABS*] 270 tablet 1     Sig: TAKE 3 TABLETS (75MG) BY MOUTH THREE TIMES A DAY AS NEEDED FOR ANXIETY OR INSOMNIA       Antihistamines Protocol Passed - 12/21/2022 10:26 AM        Passed - Recent (12 mo) or future (30 days) visit within the authorizing provider's specialty     Patient has had an office visit with the authorizing provider or a provider within the authorizing providers department within the previous 12 mos or has a future within next 30 days. See \"Patient Info\" tab in inbasket, or \"Choose Columns\" in Meds & Orders section of the refill encounter.              Passed - Patient is age 3 or older     Apply age and/or weight-based dosing for peds patients age 3 and older.    Forward request to provider for patients under the age of 3.          Passed - Medication is active on med list             Sp Stiles RN 12/21/22 10:26 AM  "

## 2022-12-22 RX ORDER — METOPROLOL TARTRATE 25 MG/1
75 TABLET, FILM COATED ORAL 2 TIMES DAILY
Qty: 540 TABLET | Refills: 3 | Status: SHIPPED | OUTPATIENT
Start: 2022-12-22 | End: 2023-04-19

## 2022-12-22 NOTE — TELEPHONE ENCOUNTER
"Last Written Prescription Date:  12/16/21  Last Fill Quantity: 540,  # refills: 3   Last office visit provider:  11/9/22     Requested Prescriptions   Pending Prescriptions Disp Refills     metoprolol tartrate (LOPRESSOR) 25 MG tablet 540 tablet 3     Sig: Take 3 tablets (75 mg) by mouth 2 times daily       Beta-Blockers Protocol Passed - 12/22/2022  8:21 AM        Passed - Blood pressure under 140/90 in past 12 months     BP Readings from Last 3 Encounters:   11/09/22 138/78   11/03/22 (!) 170/73   10/06/22 (!) 159/77                 Passed - Patient is age 6 or older        Passed - Recent (12 mo) or future (30 days) visit within the authorizing provider's specialty     Patient has had an office visit with the authorizing provider or a provider within the authorizing providers department within the previous 12 mos or has a future within next 30 days. See \"Patient Info\" tab in inbasket, or \"Choose Columns\" in Meds & Orders section of the refill encounter.              Passed - Medication is active on med list             Sp Stiles RN 12/22/22 8:21 AM  "

## 2022-12-27 ENCOUNTER — NURSE TRIAGE (OUTPATIENT)
Dept: NURSING | Facility: CLINIC | Age: 47
End: 2022-12-27

## 2022-12-27 NOTE — TELEPHONE ENCOUNTER
Pt Call: Black stool     Background: Patient noted to have three days of black stool (since Sunday) Sunday 2x yesterday 1, today x1 - all very black in color and dark. Patient denies starting any new medications    Symptoms: Had black stool x 1 today. States she feels fine with no abdominal discomfort or any vomiting. Patient with no limitations with walking or standing and denies dizziness. Patient denies taking any Pepto or Iron pills.    Disposition: Go to ED Now  Educated patient on potential bleeding issues in GI and that she should be checked out by a provider as soon as possible to test for bleeding. Patient at first not wanting to go to recommended disposition, but after talking with patient, states she will follow plan to go to ED. Patient denies any help finding local ER to go to. States she will go to ER after a meeting she has.    CHUY RICH RN on 12/27/2022 at 3:06 PM      Reason for Disposition    Bloody, black, or tarry bowel movements  (Exception: Chronic-unchanged black-grey bowel movements and is taking iron pills or Pepto-Bismol.)    Additional Information    Negative: Passed out (i.e., fainted, collapsed and was not responding)    Negative: Shock suspected (e.g., cold/pale/clammy skin, too weak to stand, low BP, rapid pulse)    Negative: Vomiting red blood or black (coffee ground) material    Negative: Sounds like a life-threatening emergency to the triager    Negative: Diarrhea is main symptom    Negative: Rectal symptoms    Negative: SEVERE rectal bleeding (large blood clots; constant or on and off bleeding)    Negative: SEVERE dizziness (e.g., unable to stand, requires support to walk, feels like passing out now)    Negative: MODERATE rectal bleeding (small blood clots, passing blood without stool, or toilet water turns red) more than once a day    Protocols used: RECTAL BLEEDING-A-[place]

## 2022-12-28 ENCOUNTER — OFFICE VISIT (OUTPATIENT)
Dept: PODIATRY | Facility: CLINIC | Age: 47
End: 2022-12-28
Payer: MEDICARE

## 2022-12-28 VITALS — WEIGHT: 220 LBS | BODY MASS INDEX: 36.65 KG/M2 | HEIGHT: 65 IN | HEART RATE: 84 BPM | OXYGEN SATURATION: 99 %

## 2022-12-28 DIAGNOSIS — R73.03 PREDIABETES: Primary | ICD-10-CM

## 2022-12-28 DIAGNOSIS — L60.2 ONYCHAUXIS: ICD-10-CM

## 2022-12-28 DIAGNOSIS — B35.1 NAIL FUNGUS: Primary | ICD-10-CM

## 2022-12-28 PROCEDURE — T1013 SIGN LANG/ORAL INTERPRETER: HCPCS | Mod: U3

## 2022-12-28 PROCEDURE — 99203 OFFICE O/P NEW LOW 30 MIN: CPT | Mod: 25 | Performed by: PODIATRIST

## 2022-12-28 PROCEDURE — 11721 DEBRIDE NAIL 6 OR MORE: CPT | Performed by: PODIATRIST

## 2022-12-28 ASSESSMENT — PAIN SCALES - GENERAL: PAINLEVEL: SEVERE PAIN (6)

## 2022-12-28 NOTE — PATIENT INSTRUCTIONS
Podiatry Clinics that offer foot and toenail care  [facility]  [facility]  [phone]    Foot and Ankle Clinics, [facility]  [phone]    [facility]  [place] - [address] on Tuesdays  [phone]    [facility]  [place]  [phone]    [facility]  [facility]  [phone]    [facility]  [phone]    [facility]  [phone]      [facility]  *Nurse comes to your home for nail care.  Piedad Stallworth RN Foot Specialist  [phone]

## 2022-12-28 NOTE — PROGRESS NOTES
FOOT AND ANKLE SURGERY/PODIATRY CONSULT NOTE         ASSESSMENT:   Onychomycosis  Onychauxis      TREATMENT:  Debrided nails 1 through 5 both feet today.  I recommended the patient return to the clinic as needed.        HPI:Marleny Viera presented to the clinic today along with her  complaining of long thick painful nails both feet.  The patient stated that she can no longer reach her feet.  She has low back problems.  The nails are also very thick and she finds it difficult to treat her nails.  She has not had any redness, swelling, drainage or bleeding surrounding any of the nails of both feet.  She does have some discomfort with normal shoe gear and ambulation.  She denies any other previous treatment.       Past Medical History:   Diagnosis Date     Accidental overdose, initial encounter 7/7/2022     ADHD (attention deficit hyperactivity disorder)      Alcohol dependence (H)      Anxiety      Arthritis      Asthma      Bipolar depression (H)      Deafness      Drug abuse, nondependent (H) 10/29/2004    Overview:  polydrug abuse per psych notes ; Other, mixed, or unspecified nondependent drug abuse, unspecified     History of blood clots      History of transfusion      Hypertension      Kidney stones 2015     Major depressive disorder, recurrent episode, severe (H) 5/17/2016    Overview:  s/p suicide attempt Epic      Migraine      Obstructive sleep apnea 6/1/2016     Pulmonary emboli (H)      Status post total right knee replacement 1/18/2016       Social History     Socioeconomic History     Marital status:      Spouse name: Not on file     Number of children: Not on file     Years of education: Not on file     Highest education level: Not on file   Occupational History     Not on file   Tobacco Use     Smoking status: Never     Smokeless tobacco: Never   Substance and Sexual Activity     Alcohol use: Yes     Comment: Alcoholic Drinks/day: dependence     Drug use: Not Currently     Sexual  "activity: Never   Other Topics Concern     Not on file   Social History Narrative    . No children.   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  Nonsmoker.  No alcohol use.  Tesha Pelaez MD       Social Determinants of Health     Financial Resource Strain: Not on file   Food Insecurity: Not on file   Transportation Needs: Not on file   Physical Activity: Not on file   Stress: Not on file   Social Connections: Not on file   Intimate Partner Violence: Not on file   Housing Stability: Not on file          Allergies   Allergen Reactions     Amoxicillin Itching and Shortness Of Breath     Bee Pollen Anaphylaxis     Contrast [Iodixanol] Shortness Of Breath and Rash     Pt stated she reacted to the contrast given for her CT in past. She experienced shortness of breath, throat tightening, and rash on chest, and they gave her an injection to counter the symptoms.      Covid-19 (Mrna) Vaccine Shortness Of Breath     Pfizer COVID-19 Vaccine     Hymenoptera Allergenic Extract [Wasp Venom Protein] Anaphylaxis     Iodine Hives     Pt stated that she was injected with CT CONTRAST in the past and got hives, SOB, and nausea. Please pre-medicate patient in the future.      Wasp Venom Protein Starter Kit [Wasp Venom Protein] Itching, Shortness Of Breath, Swelling and Difficulty breathing     Adhesive Tape-Silicones [Adhesive Tape] Unknown     Food Allergy Formula Unknown     Red meat, chocolate     Geodon [Ziprasidone] Unknown     Ibuprofen Other (See Comments)     Kidney function, Kidney function     Latex Unknown     Added based on information entered during case entry, please review and add reactions, type, and severity as needed     Prochlorperazine Other (See Comments)     Risperdal [Risperidone] Unknown     Risperidone Analogues [Risperidone] Other (See Comments)     Theobroma Oil [Theobroma Grandiflorum Seed Butter] Unknown     Trazodone Other (See Comments)     Elevated creatinine "     Zoloft [Sertraline] Unknown     Hydrochlorothiazide Rash          Current Outpatient Medications:      albuterol (PROAIR HFA/PROVENTIL HFA/VENTOLIN HFA) 108 (90 Base) MCG/ACT inhaler, INHALE 2 PUFFS BY MOUTH EVERY FOUR HOURS AS NEEDED FOR WHEEZING OR SHORTNESS OF BREATH, Disp: 8.5 g, Rfl: 1     albuterol (PROVENTIL) (2.5 MG/3ML) 0.083% neb solution, TAKE 3 ML (2.5 MG TOTAL) BY NEBULIZATION 4 (FOUR) TIMES A WEEK., Disp: 90 mL, Rfl: 1     Blood Pressure Monitoring (BLOOD PRESSURE KIT) KIT, Check blood pressure once in am; should be 130/80 or less. Pulse should be greater than 50., Disp: , Rfl:      EPINEPHrine (ANY BX GENERIC EQUIV) 0.3 MG/0.3ML injection 2-pack, Inject 0.3 mLs (0.3 mg) into the muscle as needed for anaphylaxis, Disp: 2 each, Rfl: 1     gabapentin (NEURONTIN) 100 MG capsule, TAKE 1 CAPSULE BY MOUTH TWICE A DAY FOR MIGRAINES AND ANXIETY, Disp: 180 capsule, Rfl: 3     hydrOXYzine (ATARAX) 25 MG tablet, TAKE 3 TABLETS (75MG) BY MOUTH THREE TIMES A DAY AS NEEDED FOR ANXIETY OR INSOMNIA, Disp: 270 tablet, Rfl: 3     metoprolol tartrate (LOPRESSOR) 25 MG tablet, Take 3 tablets (75 mg) by mouth 2 times daily, Disp: 540 tablet, Rfl: 3     montelukast (SINGULAIR) 10 MG tablet, Take 1 tablet (10 mg) by mouth daily, Disp: 90 tablet, Rfl: 3     omeprazole (PRILOSEC) 20 MG DR capsule, Take 1 capsule (20 mg) by mouth every morning, Disp: 90 capsule, Rfl: 1     SYMBICORT 80-4.5 MCG/ACT Inhaler, Inhale 2 puffs into the lungs 2 times daily, Disp: 10.2 g, Rfl: 11     Family History   Problem Relation Age of Onset     Cancer Mother      Breast Cancer Mother      Cerebrovascular Disease Father         Social History     Socioeconomic History     Marital status:      Spouse name: Not on file     Number of children: Not on file     Years of education: Not on file     Highest education level: Not on file   Occupational History     Not on file   Tobacco Use     Smoking status: Never     Smokeless tobacco: Never  "  Substance and Sexual Activity     Alcohol use: Yes     Comment: Alcoholic Drinks/day: dependence     Drug use: Not Currently     Sexual activity: Never   Other Topics Concern     Not on file   Social History Narrative    . No children.   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  Nonsmoker.  No alcohol use.  Tesha Pelaez MD       Social Determinants of Health     Financial Resource Strain: Not on file   Food Insecurity: Not on file   Transportation Needs: Not on file   Physical Activity: Not on file   Stress: Not on file   Social Connections: Not on file   Intimate Partner Violence: Not on file   Housing Stability: Not on file        Review of Systems - Patient denies fever, chills, rash, wound, stiffness, limping, numbness, weakness, heart burn, blood in stool, chest pain with activity, calf pain when walking, shortness of breath with activity, chronic cough, easy bleeding/bruising, swelling of ankles, excessive thirst, fatigue, depression, anxiety.  Patient admits to long thick painful nails both feet.      OBJECTIVE:  Appearance: alert, well appearing, and in no distress.    Pulse 84   Ht 1.651 m (5' 5\")   Wt 99.8 kg (220 lb)   SpO2 99%   BMI 36.61 kg/m       Body mass index is 36.61 kg/m .     General appearance: Patient is alert and fully cooperative with history & exam.  No sign of distress is noted during the visit.  Psychiatric: Affect is pleasant & appropriate.  Patient appears motivated to improve health.  Respiratory: Breathing is regular & unlabored while sitting.  HEENT: Hearing is intact to spoken word.  Speech is clear.  No gross evidence of visual impairment that would impact ambulation.    Vascular: Dorsalis pedis and posterior tibial pulses are palpable. There is no pedal hair growth bilaterally.  CFT < 3 sec from anterior tibial surface to distal digits bilaterally. There is no appreciable edema noted.  Dermatologic: Long, thick, discolored, " dystrophic nails 1 through 5 both feet with subungual debris present.  Turgor and texture are within normal limits. No coloration or temperature changes. No primary or secondary lesions noted.  Neurologic: All epicritic and proprioceptive sensations are grossly intact bilaterally.  Musculoskeletal: All active and passive ankle, subtalar, midtarsal, and 1st MPJ range of motion are grossly intact without pain or crepitus, with the exception of none. Manual muscle strength is within normal limits bilaterally. All dorsiflexors, plantarflexors, invertors, evertors are intact bilaterally.  No tenderness present to bilateral feet or ankles on palpation.  No tenderness to bilateral feet or ankles with range of motion. Calf is soft/non-tender without warmth/induration    Imaging:       No images are attached to the encounter or orders placed in the encounter.     No results found.   No results found.       Harpreet Bonds DPM  [facility] Foot & Ankle Surgery/Podiatry

## 2022-12-28 NOTE — LETTER
12/28/2022         RE: Marleny Viera  [address]  [place] [place] [address]        Dear Colleague,    Thank you for referring your patient, Marleny Viera, to the [facility]. Please see a copy of my visit note below.    FOOT AND ANKLE SURGERY/PODIATRY CONSULT NOTE         ASSESSMENT:   Onychomycosis  Onychauxis      TREATMENT:  Debrided nails 1 through 5 both feet today.  I recommended the patient return to the clinic as needed.        HPI:Marleny Viera presented to the clinic today along with her  complaining of long thick painful nails both feet.  The patient stated that she can no longer reach her feet.  She has low back problems.  The nails are also very thick and she finds it difficult to treat her nails.  She has not had any redness, swelling, drainage or bleeding surrounding any of the nails of both feet.  She does have some discomfort with normal shoe gear and ambulation.  She denies any other previous treatment.       Past Medical History:   Diagnosis Date     Accidental overdose, initial encounter 7/7/2022     ADHD (attention deficit hyperactivity disorder)      Alcohol dependence (H)      Anxiety      Arthritis      Asthma      Bipolar depression (H)      Deafness      Drug abuse, nondependent (H) 10/29/2004    Overview:  polydrug abuse per psych notes ; Other, mixed, or unspecified nondependent drug abuse, unspecified     History of blood clots      History of transfusion      Hypertension      Kidney stones 2015     Major depressive disorder, recurrent episode, severe (H) 5/17/2016    Overview:  s/p suicide attempt Epic      Migraine      Obstructive sleep apnea 6/1/2016     Pulmonary emboli (H)      Status post total right knee replacement 1/18/2016       Social History     Socioeconomic History     Marital status:      Spouse name: Not on file     Number of children: Not on file     Years of education: Not on file     Highest education level:  "Not on file   Occupational History     Not on file   Tobacco Use     Smoking status: Never     Smokeless tobacco: Never   Substance and Sexual Activity     Alcohol use: Yes     Comment: Alcoholic Drinks/day: dependence     Drug use: Not Currently     Sexual activity: Never   Other Topics Concern     Not on file   Social History Narrative    . No children.   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  Nonsmoker.  No alcohol use.  Tesha Pelaez MD       Social Determinants of Health     Financial Resource Strain: Not on file   Food Insecurity: Not on file   Transportation Needs: Not on file   Physical Activity: Not on file   Stress: Not on file   Social Connections: Not on file   Intimate Partner Violence: Not on file   Housing Stability: Not on file          Allergies   Allergen Reactions     Amoxicillin Itching and Shortness Of Breath     Bee Pollen Anaphylaxis     Contrast [Iodixanol] Shortness Of Breath and Rash     Pt stated she reacted to the contrast given for her CT in past. She experienced shortness of breath, throat tightening, and rash on chest, and they gave her an injection to counter the symptoms.      Covid-19 (Mrna) Vaccine Shortness Of Breath     Pfizer COVID-19 Vaccine     Hymenoptera Allergenic Extract [Wasp Venom Protein] Anaphylaxis     Iodine Hives     Pt stated that she was injected with CT CONTRAST in the past and got hives, SOB, and nausea. Please pre-medicate patient in the future.      Wasp Venom Protein Starter Kit [Wasp Venom Protein] Itching, Shortness Of Breath, Swelling and Difficulty breathing     Adhesive Tape-Silicones [Adhesive Tape] Unknown     Food Allergy Formula Unknown     Red meat, chocolate     Geodon [Ziprasidone] Unknown     Ibuprofen Other (See Comments)     Kidney function, Kidney function     Latex Unknown     Added based on information entered during case entry, please review and add reactions, type, and severity as needed "     Prochlorperazine Other (See Comments)     Risperdal [Risperidone] Unknown     Risperidone Analogues [Risperidone] Other (See Comments)     Theobroma Oil [Theobroma Grandiflorum Seed Butter] Unknown     Trazodone Other (See Comments)     Elevated creatinine     Zoloft [Sertraline] Unknown     Hydrochlorothiazide Rash          Current Outpatient Medications:      albuterol (PROAIR HFA/PROVENTIL HFA/VENTOLIN HFA) 108 (90 Base) MCG/ACT inhaler, INHALE 2 PUFFS BY MOUTH EVERY FOUR HOURS AS NEEDED FOR WHEEZING OR SHORTNESS OF BREATH, Disp: 8.5 g, Rfl: 1     albuterol (PROVENTIL) (2.5 MG/3ML) 0.083% neb solution, TAKE 3 ML (2.5 MG TOTAL) BY NEBULIZATION 4 (FOUR) TIMES A WEEK., Disp: 90 mL, Rfl: 1     Blood Pressure Monitoring (BLOOD PRESSURE KIT) KIT, Check blood pressure once in am; should be 130/80 or less. Pulse should be greater than 50., Disp: , Rfl:      EPINEPHrine (ANY BX GENERIC EQUIV) 0.3 MG/0.3ML injection 2-pack, Inject 0.3 mLs (0.3 mg) into the muscle as needed for anaphylaxis, Disp: 2 each, Rfl: 1     gabapentin (NEURONTIN) 100 MG capsule, TAKE 1 CAPSULE BY MOUTH TWICE A DAY FOR MIGRAINES AND ANXIETY, Disp: 180 capsule, Rfl: 3     hydrOXYzine (ATARAX) 25 MG tablet, TAKE 3 TABLETS (75MG) BY MOUTH THREE TIMES A DAY AS NEEDED FOR ANXIETY OR INSOMNIA, Disp: 270 tablet, Rfl: 3     metoprolol tartrate (LOPRESSOR) 25 MG tablet, Take 3 tablets (75 mg) by mouth 2 times daily, Disp: 540 tablet, Rfl: 3     montelukast (SINGULAIR) 10 MG tablet, Take 1 tablet (10 mg) by mouth daily, Disp: 90 tablet, Rfl: 3     omeprazole (PRILOSEC) 20 MG DR capsule, Take 1 capsule (20 mg) by mouth every morning, Disp: 90 capsule, Rfl: 1     SYMBICORT 80-4.5 MCG/ACT Inhaler, Inhale 2 puffs into the lungs 2 times daily, Disp: 10.2 g, Rfl: 11     Family History   Problem Relation Age of Onset     Cancer Mother      Breast Cancer Mother      Cerebrovascular Disease Father         Social History     Socioeconomic History     Marital status:  "     Spouse name: Not on file     Number of children: Not on file     Years of education: Not on file     Highest education level: Not on file   Occupational History     Not on file   Tobacco Use     Smoking status: Never     Smokeless tobacco: Never   Substance and Sexual Activity     Alcohol use: Yes     Comment: Alcoholic Drinks/day: dependence     Drug use: Not Currently     Sexual activity: Never   Other Topics Concern     Not on file   Social History Narrative    . No children.   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  Nonsmoker.  No alcohol use.  Tesha Pelaez MD       Social Determinants of Health     Financial Resource Strain: Not on file   Food Insecurity: Not on file   Transportation Needs: Not on file   Physical Activity: Not on file   Stress: Not on file   Social Connections: Not on file   Intimate Partner Violence: Not on file   Housing Stability: Not on file        Review of Systems - Patient denies fever, chills, rash, wound, stiffness, limping, numbness, weakness, heart burn, blood in stool, chest pain with activity, calf pain when walking, shortness of breath with activity, chronic cough, easy bleeding/bruising, swelling of ankles, excessive thirst, fatigue, depression, anxiety.  Patient admits to long thick painful nails both feet.      OBJECTIVE:  Appearance: alert, well appearing, and in no distress.    Pulse 84   Ht 1.651 m (5' 5\")   Wt 99.8 kg (220 lb)   SpO2 99%   BMI 36.61 kg/m       Body mass index is 36.61 kg/m .     General appearance: Patient is alert and fully cooperative with history & exam.  No sign of distress is noted during the visit.  Psychiatric: Affect is pleasant & appropriate.  Patient appears motivated to improve health.  Respiratory: Breathing is regular & unlabored while sitting.  HEENT: Hearing is intact to spoken word.  Speech is clear.  No gross evidence of visual impairment that would impact " ambulation.    Vascular: Dorsalis pedis and posterior tibial pulses are palpable. There is no pedal hair growth bilaterally.  CFT < 3 sec from anterior tibial surface to distal digits bilaterally. There is no appreciable edema noted.  Dermatologic: Long, thick, discolored, dystrophic nails 1 through 5 both feet with subungual debris present.  Turgor and texture are within normal limits. No coloration or temperature changes. No primary or secondary lesions noted.  Neurologic: All epicritic and proprioceptive sensations are grossly intact bilaterally.  Musculoskeletal: All active and passive ankle, subtalar, midtarsal, and 1st MPJ range of motion are grossly intact without pain or crepitus, with the exception of none. Manual muscle strength is within normal limits bilaterally. All dorsiflexors, plantarflexors, invertors, evertors are intact bilaterally.  No tenderness present to bilateral feet or ankles on palpation.  No tenderness to bilateral feet or ankles with range of motion. Calf is soft/non-tender without warmth/induration    Imaging:       No images are attached to the encounter or orders placed in the encounter.     No results found.   No results found.       Harpreet Bonds DPM  [facility] Foot & Ankle Surgery/Podiatry         Again, thank you for allowing me to participate in the care of your patient.        Sincerely,        Harpreet Lim DPM

## 2023-01-12 LAB
ATRIAL RATE - MUSE: 77 BPM
DIASTOLIC BLOOD PRESSURE - MUSE: NORMAL MMHG
INTERPRETATION ECG - MUSE: NORMAL
P AXIS - MUSE: 28 DEGREES
PR INTERVAL - MUSE: 160 MS
QRS DURATION - MUSE: 84 MS
QT - MUSE: 400 MS
QTC - MUSE: 452 MS
R AXIS - MUSE: 0 DEGREES
SYSTOLIC BLOOD PRESSURE - MUSE: NORMAL MMHG
T AXIS - MUSE: -8 DEGREES
VENTRICULAR RATE- MUSE: 77 BPM

## 2023-01-17 ENCOUNTER — TRANSFERRED RECORDS (OUTPATIENT)
Dept: HEALTH INFORMATION MANAGEMENT | Facility: CLINIC | Age: 48
End: 2023-01-17

## 2023-01-23 ENCOUNTER — TRANSFERRED RECORDS (OUTPATIENT)
Dept: HEALTH INFORMATION MANAGEMENT | Facility: CLINIC | Age: 48
End: 2023-01-23

## 2023-01-23 ENCOUNTER — TELEPHONE (OUTPATIENT)
Dept: INTERNAL MEDICINE | Facility: CLINIC | Age: 48
End: 2023-01-23

## 2023-01-23 ENCOUNTER — OFFICE VISIT (OUTPATIENT)
Dept: FAMILY MEDICINE | Facility: CLINIC | Age: 48
End: 2023-01-23
Payer: COMMERCIAL

## 2023-01-23 VITALS
HEART RATE: 62 BPM | SYSTOLIC BLOOD PRESSURE: 186 MMHG | RESPIRATION RATE: 16 BRPM | DIASTOLIC BLOOD PRESSURE: 79 MMHG | WEIGHT: 232 LBS | BODY MASS INDEX: 38.61 KG/M2 | TEMPERATURE: 98.4 F | OXYGEN SATURATION: 98 %

## 2023-01-23 DIAGNOSIS — N76.0 BV (BACTERIAL VAGINOSIS): Primary | ICD-10-CM

## 2023-01-23 DIAGNOSIS — R39.15 URINARY URGENCY: ICD-10-CM

## 2023-01-23 DIAGNOSIS — R30.0 DYSURIA: ICD-10-CM

## 2023-01-23 DIAGNOSIS — B96.89 BV (BACTERIAL VAGINOSIS): Primary | ICD-10-CM

## 2023-01-23 LAB
ALBUMIN UR-MCNC: NEGATIVE MG/DL
APPEARANCE UR: CLEAR
BACTERIA #/AREA URNS HPF: ABNORMAL /HPF
BILIRUB UR QL STRIP: NEGATIVE
CLUE CELLS: PRESENT
COLOR UR AUTO: YELLOW
GLUCOSE UR STRIP-MCNC: NEGATIVE MG/DL
HGB UR QL STRIP: ABNORMAL
KETONES UR STRIP-MCNC: NEGATIVE MG/DL
LEUKOCYTE ESTERASE UR QL STRIP: NEGATIVE
NITRATE UR QL: NEGATIVE
PH UR STRIP: 7 [PH] (ref 5–8)
RBC #/AREA URNS AUTO: ABNORMAL /HPF
SP GR UR STRIP: 1.02 (ref 1–1.03)
SQUAMOUS #/AREA URNS AUTO: ABNORMAL /LPF
TRICHOMONAS, WET PREP: ABNORMAL
UROBILINOGEN UR STRIP-ACNC: 0.2 E.U./DL
WBC #/AREA URNS AUTO: ABNORMAL /HPF
WBC'S/HIGH POWER FIELD, WET PREP: ABNORMAL
YEAST, WET PREP: ABNORMAL

## 2023-01-23 PROCEDURE — 81001 URINALYSIS AUTO W/SCOPE: CPT | Performed by: PHYSICIAN ASSISTANT

## 2023-01-23 PROCEDURE — 99213 OFFICE O/P EST LOW 20 MIN: CPT | Performed by: PHYSICIAN ASSISTANT

## 2023-01-23 PROCEDURE — 87210 SMEAR WET MOUNT SALINE/INK: CPT | Performed by: PHYSICIAN ASSISTANT

## 2023-01-23 RX ORDER — METRONIDAZOLE 500 MG/1
500 TABLET ORAL 2 TIMES DAILY
Qty: 14 TABLET | Refills: 0 | Status: SHIPPED | OUTPATIENT
Start: 2023-01-23 | End: 2023-01-30

## 2023-01-23 ASSESSMENT — ENCOUNTER SYMPTOMS
NAUSEA: 0
CHILLS: 0
ABDOMINAL PAIN: 0
HEMATURIA: 0
FLANK PAIN: 0
FEVER: 0
FREQUENCY: 1
VOMITING: 0
DYSURIA: 0
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
## 2023-01-23 NOTE — TELEPHONE ENCOUNTER
Situation: Patient calling about results noted in MyChart.    Background: Patient had office visit today for Urgency and Dysuria.    Labs drawn.  Results back.  Patient noticed two flagged items and wondering what they mean?    Assessment:  Patient wanted writer to relay to provider that she continues to have urgency, in fact on way home wet herself and had to take shower and change when she got home from visit.  She is miserable.    Recommendation: Writer relayed that provider has not reviewed results yet.  Once reviewed, provider will make recommendations.  If medications are needed, patient would like them filled at: [facility].    Please call back patient once labs reviewed at: [phone]   will be needed.       RENATA Joseph

## 2023-01-23 NOTE — PATIENT INSTRUCTIONS
Your urine test was negative for signs of UTI.   I will call with your wet prep test results.   Please read food list below if you're having large amount of any of these foods or if you newly started eating or drinking them please decrease or eliminate them. Continue drinking plenty of water.   Follow up if no improvement in 4 days.     What is a bladder irritant?   A bladder irritant is any food, drink, or medication that causes the bladder to be irritated. Irritation can cause frequency (needing to urinate more often than normal), urgency (the sense of needing to urinate), bladder spasms, and even bladder pain. Bladder spasms can lead to urine leakage if there is a sudden urge, but not enough time to reach a toilet.     What are some examples of bladder irritants?   The following is a list of bladder irritants. The seven MOST IRRITATING are listed first:   *All alcoholic beverages   *Cigarettes/Tobacco   *Cola drinks   *Tea   *Artificial Sweeteners   *Chocolate   *Coffee     Other possible irritants include:   Fruits (and their juices):   cranberries, grapes, oranges, hair,   peaches, pineapple, plums, apples, and cantaloupe   Vegetables: onions, tomatoes, chilies, peppers   Milk/Dairy: aged cheese, sour cream, yogurt   Grains: rye & sourdough breads   Seasonings: spices & spicy food, especially peppers, acidic foods and beverages, walnuts &   peanuts, vinegar

## 2023-01-24 ENCOUNTER — MYC MEDICAL ADVICE (OUTPATIENT)
Dept: INTERNAL MEDICINE | Facility: CLINIC | Age: 48
End: 2023-01-24
Payer: COMMERCIAL

## 2023-01-25 ENCOUNTER — PATIENT OUTREACH (OUTPATIENT)
Dept: CARE COORDINATION | Facility: CLINIC | Age: 48
End: 2023-01-25
Payer: COMMERCIAL

## 2023-01-25 NOTE — PROGRESS NOTES
Clinic Care Coordination Contact  Community Health Worker Initial Outreach    CHW Initial Information Gathering:  Referral Source: PCP  Preferred Hospital: [facility]  [phone]  Current living arrangement:: I live alone  Community Resources: Lif[employer], Financial/Insurance, Transportation Services (Medical Assistance)  Supplies Currently Used at Home: Incontinence Supplies  Equipment Currently Used at Home: shower chair, grab bar, toilet, grab bar, tub/shower  Informal Support system:: Family  No PCP office visit in Past Year: No (1/23/23 with Dr. Rosas and 8/10/22 with Bethany Schumacher)  Transportation means:: Regular car, [employer]  CHW Additional Questions  MyChart active?: Yes  Patient sent Social Determinants of Health questionnaire?: Yes    Patient accepts CC: Yes. Patient scheduled for assessment with ERNESTO Guillen on 1/27/23 at 2:00. Patient noted desire to discuss:     - Support with applying for CADI Waiver    - Support with getting lower cost/free incontinence supplies.    ** CHW sent Care Coordination Questionnaires through [employer].    Delma Wolff  Community Health Worker  [facility]  Clinic Care Coordination   [place] [facility]  Office: [phone]

## 2023-01-27 ENCOUNTER — HOSPITAL ENCOUNTER (EMERGENCY)
Facility: CLINIC | Age: 48
Discharge: HOME | End: 2023-01-27
Attending: EMERGENCY MEDICINE | Admitting: EMERGENCY MEDICINE
Payer: COMMERCIAL

## 2023-01-27 ENCOUNTER — TELEPHONE (OUTPATIENT)
Dept: CARE COORDINATION | Facility: CLINIC | Age: 48
End: 2023-01-27

## 2023-01-27 ENCOUNTER — APPOINTMENT (OUTPATIENT)
Dept: CT IMAGING | Facility: CLINIC | Age: 48
End: 2023-01-27
Attending: EMERGENCY MEDICINE
Payer: COMMERCIAL

## 2023-01-27 ENCOUNTER — PATIENT OUTREACH (OUTPATIENT)
Dept: NURSING | Facility: CLINIC | Age: 48
End: 2023-01-27
Payer: COMMERCIAL

## 2023-01-27 VITALS
TEMPERATURE: 98.6 F | RESPIRATION RATE: 18 BRPM | OXYGEN SATURATION: 98 % | HEART RATE: 91 BPM | WEIGHT: 225 LBS | SYSTOLIC BLOOD PRESSURE: 179 MMHG | HEIGHT: 65 IN | DIASTOLIC BLOOD PRESSURE: 76 MMHG | BODY MASS INDEX: 37.49 KG/M2

## 2023-01-27 DIAGNOSIS — R10.9 FLANK PAIN: Primary | ICD-10-CM

## 2023-01-27 DIAGNOSIS — R31.9 HEMATURIA, UNSPECIFIED TYPE: ICD-10-CM

## 2023-01-27 LAB
ALBUMIN SERPL-MCNC: 4.1 G/DL (ref 3.5–5)
ALBUMIN UR-MCNC: NEGATIVE MG/DL
ALP SERPL-CCNC: 83 U/L (ref 45–120)
ALT SERPL W P-5'-P-CCNC: 17 U/L (ref 0–45)
ANION GAP SERPL CALCULATED.3IONS-SCNC: 9 MMOL/L (ref 5–18)
APPEARANCE UR: CLEAR
AST SERPL W P-5'-P-CCNC: 18 U/L (ref 0–40)
BILIRUB SERPL-MCNC: 0.5 MG/DL (ref 0–1)
BILIRUB UR QL STRIP: NEGATIVE
BUN SERPL-MCNC: 13 MG/DL (ref 8–22)
CALCIUM SERPL-MCNC: 9.2 MG/DL (ref 8.5–10.5)
CHLORIDE BLD-SCNC: 107 MMOL/L (ref 98–107)
CO2 SERPL-SCNC: 26 MMOL/L (ref 22–31)
COLOR UR AUTO: YELLOW
CREAT SERPL-MCNC: 0.94 MG/DL (ref 0.6–1.1)
ERYTHROCYTE [DISTWIDTH] IN BLOOD BY AUTOMATED COUNT: 13.4 % (ref 10–15)
GFR SERPL CREATININE-BSD FRML MDRD: 74 ML/MIN/1.73M2
GLUCOSE BLD-MCNC: 118 MG/DL (ref 70–125)
GLUCOSE UR STRIP-MCNC: NEGATIVE MG/DL
HCT VFR BLD AUTO: 40.9 % (ref 35–47)
HGB BLD-MCNC: 13.7 G/DL (ref 11.7–15.7)
HGB UR QL STRIP: ABNORMAL
HOLD SPECIMEN: NORMAL
KETONES UR STRIP-MCNC: ABNORMAL MG/DL
LEUKOCYTE ESTERASE UR QL STRIP: NEGATIVE
MCH RBC QN AUTO: 28 PG (ref 26.5–33)
MCHC RBC AUTO-ENTMCNC: 33.5 G/DL (ref 31.5–36.5)
MCV RBC AUTO: 84 FL (ref 78–100)
MUCOUS THREADS #/AREA URNS LPF: PRESENT /LPF
NITRATE UR QL: NEGATIVE
PH UR STRIP: 5.5 [PH] (ref 5–7)
PLATELET # BLD AUTO: 248 10E3/UL (ref 150–450)
POTASSIUM BLD-SCNC: 3.6 MMOL/L (ref 3.5–5)
PROT SERPL-MCNC: 7.6 G/DL (ref 6–8)
RBC # BLD AUTO: 4.89 10E6/UL (ref 3.8–5.2)
RBC URINE: 4 /HPF
SODIUM SERPL-SCNC: 142 MMOL/L (ref 136–145)
SP GR UR STRIP: 1.02 (ref 1–1.03)
SQUAMOUS EPITHELIAL: 4 /HPF
UROBILINOGEN UR STRIP-MCNC: <2 MG/DL
WBC # BLD AUTO: 8 10E3/UL (ref 4–11)
WBC URINE: 2 /HPF

## 2023-01-27 PROCEDURE — 36415 COLL VENOUS BLD VENIPUNCTURE: CPT | Performed by: EMERGENCY MEDICINE

## 2023-01-27 PROCEDURE — 99284 EMERGENCY DEPT VISIT MOD MDM: CPT | Mod: 25

## 2023-01-27 PROCEDURE — 74176 CT ABD & PELVIS W/O CONTRAST: CPT

## 2023-01-27 PROCEDURE — 85041 AUTOMATED RBC COUNT: CPT | Performed by: EMERGENCY MEDICINE

## 2023-01-27 PROCEDURE — 81003 URINALYSIS AUTO W/O SCOPE: CPT | Performed by: EMERGENCY MEDICINE

## 2023-01-27 PROCEDURE — 80053 COMPREHEN METABOLIC PANEL: CPT | Performed by: EMERGENCY MEDICINE

## 2023-01-27 PROCEDURE — 250N000013 HC RX MED GY IP 250 OP 250 PS 637: Performed by: EMERGENCY MEDICINE

## 2023-01-27 PROCEDURE — 81001 URINALYSIS AUTO W/SCOPE: CPT | Performed by: EMERGENCY MEDICINE

## 2023-01-27 RX ORDER — DIMENHYDRINATE 50 MG
50 TABLET ORAL 3 TIMES DAILY PRN
Qty: 20 TABLET | Refills: 0 | Status: SHIPPED | OUTPATIENT
Start: 2023-01-27 | End: 2023-02-16

## 2023-01-27 RX ADMIN — Medication 50 MG: at 19:26

## 2023-01-27 ASSESSMENT — ENCOUNTER SYMPTOMS
FREQUENCY: 1
SHORTNESS OF BREATH: 0
DIZZINESS: 1

## 2023-01-27 ASSESSMENT — ACTIVITIES OF DAILY LIVING (ADL): ADLS_ACUITY_SCORE: 33

## 2023-01-27 NOTE — ED TRIAGE NOTES
PT states that she is having pain on her Rt side near her kidney and low back. Has a known UTI since Monday. Was given antibiotics but feels that they are not working.

## 2023-01-27 NOTE — Clinical Note
Marleny Viera was seen and treated in our emergency department on 1/27/2023.         Sincerely,     [facility]

## 2023-01-27 NOTE — PROGRESS NOTES
Clinic Care Coordination Contact    Situation: TORY OROZCO spoke with patientusing  via phone to assess resource needs    Background: Pt lives in own home.      Assessment: Determined pt has [facility]  and active with CADI waiver program.  Pt says her  is on vacation this week.  TORY OROZCO redirected pt to [facility]  to discuss CADI waiver covering incontinence supplies, grab bars and homemaker services.  Pt says  is looking for homemaker services but staffing is an issue    Plan/Recommendations: No further Care Coordination outreaches to prevent duplication of services.
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
## 2023-01-27 NOTE — TELEPHONE ENCOUNTER
Maria Fernanda and Team,    I spoke with patient today to discuss resources and she is requesting a call with a medical question about a medication she was recently prescribed for urinary trouble. Can someone please follow up with her?    Thank you,    Mindy Philip, [facility]  Social Work Care Coordinator  [facility]ack[facility]  [phone]

## 2023-01-28 NOTE — DISCHARGE INSTRUCTIONS
Continue to use acetaminophen following the instructions on the bottle as needed for pain.    If you would like to use only more for the muscle relaxation continue to use the Dramamine, your next dose would be okay anytime after midnight tonight.    You can also use an over-the-counter muscle pain patch just ask your pharmacist for recommendation on brand if you are unsure.

## 2023-01-29 ENCOUNTER — HEALTH MAINTENANCE LETTER (OUTPATIENT)
Age: 48
End: 2023-01-29

## 2023-01-30 ENCOUNTER — OFFICE VISIT (OUTPATIENT)
Dept: FAMILY MEDICINE | Facility: CLINIC | Age: 48
End: 2023-01-30
Payer: COMMERCIAL

## 2023-01-30 VITALS
TEMPERATURE: 98.4 F | OXYGEN SATURATION: 98 % | RESPIRATION RATE: 18 BRPM | HEART RATE: 97 BPM | SYSTOLIC BLOOD PRESSURE: 170 MMHG | DIASTOLIC BLOOD PRESSURE: 87 MMHG

## 2023-01-30 DIAGNOSIS — R39.89 URINARY PROBLEM: ICD-10-CM

## 2023-01-30 DIAGNOSIS — M54.50 ACUTE RIGHT-SIDED LOW BACK PAIN WITHOUT SCIATICA: Primary | ICD-10-CM

## 2023-01-30 LAB
ALBUMIN UR-MCNC: NEGATIVE MG/DL
APPEARANCE UR: CLEAR
BACTERIA #/AREA URNS HPF: ABNORMAL /HPF
BILIRUB UR QL STRIP: NEGATIVE
COLOR UR AUTO: YELLOW
GLUCOSE UR STRIP-MCNC: NEGATIVE MG/DL
HGB UR QL STRIP: ABNORMAL
KETONES UR STRIP-MCNC: NEGATIVE MG/DL
LEUKOCYTE ESTERASE UR QL STRIP: NEGATIVE
NITRATE UR QL: NEGATIVE
PH UR STRIP: 5 [PH] (ref 5–8)
RBC #/AREA URNS AUTO: ABNORMAL /HPF
SP GR UR STRIP: >=1.03 (ref 1–1.03)
SQUAMOUS #/AREA URNS AUTO: ABNORMAL /LPF
UROBILINOGEN UR STRIP-ACNC: 0.2 E.U./DL
WBC #/AREA URNS AUTO: ABNORMAL /HPF

## 2023-01-30 PROCEDURE — 81001 URINALYSIS AUTO W/SCOPE: CPT | Performed by: PHYSICIAN ASSISTANT

## 2023-01-30 PROCEDURE — 99213 OFFICE O/P EST LOW 20 MIN: CPT | Performed by: PHYSICIAN ASSISTANT

## 2023-01-30 RX ORDER — POLYETHYLENE GLYCOL 3350 17 G/17G
1 POWDER, FOR SOLUTION ORAL DAILY
Qty: 527 G | Refills: 0 | Status: SHIPPED | OUTPATIENT
Start: 2023-01-30 | End: 2024-08-06

## 2023-01-30 NOTE — TELEPHONE ENCOUNTER
Outgoing Call:  No answer    Mindi Shelby RN contacted Marleny on 01/30/23 and left a message. If patient calls back please route to any available RN    Mindi GARCIA RN  [facility]

## 2023-01-30 NOTE — PATIENT INSTRUCTIONS
Heat for the next 2-3 days.  Modify activity.  Mild pain is okay as long as it resolves after a minute or 2 of discontinuing the activity.  Decreased level activity if pain is moderate to severe or last longer than a few minutes after discontinuing.    Physical therapy follow up     Tylenol 500-1000 mg every 8 hours as needed    Miralax 1 capful a day until bowels are loose
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
## 2023-02-01 NOTE — TELEPHONE ENCOUNTER
Please refer to My Chart messages: 1/27/23 and 1/30/23.  WIC visit on 1/30/23 and Pt has follow-up with PCP on 2/6/23.    PCP notified. Closing encounter.    Flavia Rdz RN, BSN  [facility]

## 2023-02-14 ENCOUNTER — NURSE TRIAGE (OUTPATIENT)
Dept: NURSING | Facility: CLINIC | Age: 48
End: 2023-02-14
Payer: COMMERCIAL

## 2023-02-15 ENCOUNTER — TELEPHONE (OUTPATIENT)
Dept: INTERNAL MEDICINE | Facility: CLINIC | Age: 48
End: 2023-02-15
Payer: COMMERCIAL

## 2023-02-15 ENCOUNTER — NURSE TRIAGE (OUTPATIENT)
Dept: NURSING | Facility: CLINIC | Age: 48
End: 2023-02-15
Payer: COMMERCIAL

## 2023-02-15 DIAGNOSIS — N63.20 MASS OF LEFT BREAST, UNSPECIFIED QUADRANT: Primary | ICD-10-CM

## 2023-02-15 DIAGNOSIS — N64.89 OTHER SPECIFIED DISORDERS OF BREAST: ICD-10-CM

## 2023-02-15 DIAGNOSIS — N64.9 DISORDER OF BREAST, UNSPECIFIED: ICD-10-CM

## 2023-02-15 NOTE — TELEPHONE ENCOUNTER
Reason for Call: Request for an order or referral:    Order or referral being requested: Diagnostic Mammo and Ultrasound    Date needed: as soon as possible    Has the patient been seen by the PCP for this problem? NO    Additional comments: Patient wants to have mammogram done at [facility], but another person on the call with patient mentioned they would need the orders first and requested that a message be sent to PCP.    Phone number Patient can be reached at:  Cell number on file:    Telephone Information:   Mobile [phone]       Best Time:  Any time.    Can we leave a detailed message on this number?  YES    Call taken on 2/15/2023 at 9:26 AM by Morgan Rosas

## 2023-02-15 NOTE — TELEPHONE ENCOUNTER
"  Nurse Triage SBAR    Is this a 2nd Level Triage? NO    Situation: Noticed lump in Left breast shower this evening    Background: Bilateral deafness    Assessment: Was in shower and noticed a lump on Left lower breast, not red, not painful, no fevers, no cuts or rashes.     Protocol Recommended Disposition:   See PCP Within 3 Days         Joyce Munroe RN on 2/14/2023 at 8:48 PM       Reason for Disposition    Breast lump    Additional Information    Negative: Chest pain    Negative: Breastfeeding questions about baby    Negative: Breastfeeding questions about mother (breast symptoms or feeling sick)    Negative: Breastfeeding questions about mother's medicines and diet    Negative: Postpartum breast pain and swelling, not breastfeeding    Negative: Small spot, skin growth or mole    Negative: [1] SEVERE breast pain AND [2] fever > 103 F (39.4 C)    Negative: Patient sounds very sick or weak to the triager    Negative: [1] Breast looks infected (spreading redness, feels hot or painful to touch) AND [2] fever    Negative: [1] Breast looks infected (spreading redness, feels hot or painful to touch) AND [2] no fever    Negative: [1] Painful rash AND [2] multiple small blisters grouped together (i.e., dermatomal distribution or \"band\" or \"stripe\")    Negative: [1] Cuts, burns, or bruises of breasts AND [2] suspicious history for the injury    Protocols used: BREAST SYMPTOMS-A-AH      "

## 2023-02-16 ENCOUNTER — HOSPITAL ENCOUNTER (EMERGENCY)
Facility: CLINIC | Age: 48
Discharge: HOME | End: 2023-02-17
Attending: EMERGENCY MEDICINE | Admitting: EMERGENCY MEDICINE
Payer: COMMERCIAL

## 2023-02-16 ENCOUNTER — APPOINTMENT (OUTPATIENT)
Dept: RADIOLOGY | Facility: CLINIC | Age: 48
End: 2023-02-16
Attending: EMERGENCY MEDICINE
Payer: COMMERCIAL

## 2023-02-16 ENCOUNTER — APPOINTMENT (OUTPATIENT)
Dept: ULTRASOUND IMAGING | Facility: CLINIC | Age: 48
End: 2023-02-16
Attending: EMERGENCY MEDICINE
Payer: COMMERCIAL

## 2023-02-16 DIAGNOSIS — N63.10 MASS OF RIGHT BREAST, UNSPECIFIED QUADRANT: ICD-10-CM

## 2023-02-16 DIAGNOSIS — R07.89 ATYPICAL CHEST PAIN: ICD-10-CM

## 2023-02-16 LAB
ALBUMIN SERPL-MCNC: 3.5 G/DL (ref 3.5–5)
ALP SERPL-CCNC: 93 U/L (ref 45–120)
ALT SERPL W P-5'-P-CCNC: 18 U/L (ref 0–45)
ANION GAP SERPL CALCULATED.3IONS-SCNC: 9 MMOL/L (ref 5–18)
AST SERPL W P-5'-P-CCNC: 17 U/L (ref 0–40)
BASOPHILS # BLD AUTO: 0 10E3/UL (ref 0–0.2)
BASOPHILS NFR BLD AUTO: 0 %
BILIRUB SERPL-MCNC: 0.4 MG/DL (ref 0–1)
BNP SERPL-MCNC: 23 PG/ML (ref 0–71)
BUN SERPL-MCNC: 10 MG/DL (ref 8–22)
CALCIUM SERPL-MCNC: 9 MG/DL (ref 8.5–10.5)
CHLORIDE BLD-SCNC: 108 MMOL/L (ref 98–107)
CO2 SERPL-SCNC: 24 MMOL/L (ref 22–31)
CREAT SERPL-MCNC: 0.79 MG/DL (ref 0.6–1.1)
D DIMER PPP FEU-MCNC: 0.58 UG/ML FEU (ref 0–0.5)
EOSINOPHIL # BLD AUTO: 0.1 10E3/UL (ref 0–0.7)
EOSINOPHIL NFR BLD AUTO: 2 %
ERYTHROCYTE [DISTWIDTH] IN BLOOD BY AUTOMATED COUNT: 13.9 % (ref 10–15)
FLUAV RNA SPEC QL NAA+PROBE: NEGATIVE
FLUBV RNA RESP QL NAA+PROBE: NEGATIVE
GFR SERPL CREATININE-BSD FRML MDRD: >90 ML/MIN/1.73M2
GLUCOSE BLD-MCNC: 126 MG/DL (ref 70–125)
HCT VFR BLD AUTO: 40.9 % (ref 35–47)
HGB BLD-MCNC: 13.7 G/DL (ref 11.7–15.7)
IMM GRANULOCYTES # BLD: 0 10E3/UL
IMM GRANULOCYTES NFR BLD: 0 %
LYMPHOCYTES # BLD AUTO: 2.3 10E3/UL (ref 0.8–5.3)
LYMPHOCYTES NFR BLD AUTO: 32 %
MCH RBC QN AUTO: 28.2 PG (ref 26.5–33)
MCHC RBC AUTO-ENTMCNC: 33.5 G/DL (ref 31.5–36.5)
MCV RBC AUTO: 84 FL (ref 78–100)
MONOCYTES # BLD AUTO: 0.3 10E3/UL (ref 0–1.3)
MONOCYTES NFR BLD AUTO: 5 %
NEUTROPHILS # BLD AUTO: 4.4 10E3/UL (ref 1.6–8.3)
NEUTROPHILS NFR BLD AUTO: 61 %
NRBC # BLD AUTO: 0 10E3/UL
NRBC BLD AUTO-RTO: 0 /100
PLATELET # BLD AUTO: 271 10E3/UL (ref 150–450)
POTASSIUM BLD-SCNC: 3.4 MMOL/L (ref 3.5–5)
PROT SERPL-MCNC: 7.1 G/DL (ref 6–8)
RBC # BLD AUTO: 4.85 10E6/UL (ref 3.8–5.2)
RSV RNA SPEC NAA+PROBE: NEGATIVE
SARS-COV-2 RNA RESP QL NAA+PROBE: NEGATIVE
SODIUM SERPL-SCNC: 141 MMOL/L (ref 136–145)
TROPONIN I SERPL-MCNC: <0.01 NG/ML (ref 0–0.29)
WBC # BLD AUTO: 7.1 10E3/UL (ref 4–11)

## 2023-02-16 PROCEDURE — 93970 EXTREMITY STUDY: CPT

## 2023-02-16 PROCEDURE — 71046 X-RAY EXAM CHEST 2 VIEWS: CPT

## 2023-02-16 PROCEDURE — 85379 FIBRIN DEGRADATION QUANT: CPT | Performed by: EMERGENCY MEDICINE

## 2023-02-16 PROCEDURE — 80053 COMPREHEN METABOLIC PANEL: CPT | Performed by: EMERGENCY MEDICINE

## 2023-02-16 PROCEDURE — C9803 HOPD COVID-19 SPEC COLLECT: HCPCS

## 2023-02-16 PROCEDURE — 93005 ELECTROCARDIOGRAM TRACING: CPT | Performed by: EMERGENCY MEDICINE

## 2023-02-16 PROCEDURE — 85004 AUTOMATED DIFF WBC COUNT: CPT | Performed by: EMERGENCY MEDICINE

## 2023-02-16 PROCEDURE — 87637 SARSCOV2&INF A&B&RSV AMP PRB: CPT | Performed by: EMERGENCY MEDICINE

## 2023-02-16 PROCEDURE — 99285 EMERGENCY DEPT VISIT HI MDM: CPT | Mod: CS,25

## 2023-02-16 PROCEDURE — 84484 ASSAY OF TROPONIN QUANT: CPT | Performed by: EMERGENCY MEDICINE

## 2023-02-16 PROCEDURE — 83880 ASSAY OF NATRIURETIC PEPTIDE: CPT | Performed by: EMERGENCY MEDICINE

## 2023-02-16 PROCEDURE — 36415 COLL VENOUS BLD VENIPUNCTURE: CPT | Performed by: EMERGENCY MEDICINE

## 2023-02-16 ASSESSMENT — ACTIVITIES OF DAILY LIVING (ADL)
ADLS_ACUITY_SCORE: 33
ADLS_ACUITY_SCORE: 35

## 2023-02-17 VITALS
HEART RATE: 101 BPM | TEMPERATURE: 97.5 F | DIASTOLIC BLOOD PRESSURE: 73 MMHG | RESPIRATION RATE: 16 BRPM | OXYGEN SATURATION: 98 % | SYSTOLIC BLOOD PRESSURE: 186 MMHG

## 2023-02-17 LAB
ATRIAL RATE - MUSE: 77 BPM
DIASTOLIC BLOOD PRESSURE - MUSE: NORMAL MMHG
INTERPRETATION ECG - MUSE: NORMAL
P AXIS - MUSE: 23 DEGREES
PR INTERVAL - MUSE: 156 MS
QRS DURATION - MUSE: 76 MS
QT - MUSE: 404 MS
QTC - MUSE: 457 MS
R AXIS - MUSE: -1 DEGREES
SYSTOLIC BLOOD PRESSURE - MUSE: NORMAL MMHG
T AXIS - MUSE: -14 DEGREES
VENTRICULAR RATE- MUSE: 77 BPM

## 2023-02-17 NOTE — ED TRIAGE NOTES
"Pt presents w/ R \"lung \"pain, 6/10, that started two days ago. Pt denies fevers or recent illness. Cough noted in triage. Shortness of breath reported. ABCs intact.      Triage Assessment     Row Name 02/16/23 1921       Triage Assessment (Adult)    Airway WDL WDL       Respiratory WDL    Respiratory WDL WDL       Skin Circulation/Temperature WDL    Skin Circulation/Temperature WDL WDL       Cardiac WDL    Cardiac WDL WDL       Peripheral/Neurovascular WDL    Peripheral Neurovascular WDL WDL       Cognitive/Neuro/Behavioral WDL    Cognitive/Neuro/Behavioral WDL WDL       Alicia Coma Scale    Best Eye Response 4-->(E4) spontaneous    Best Motor Response 6-->(M6) obeys commands    Best Verbal Response 5-->(V5) oriented    Alicia Coma Scale Score 15              "

## 2023-02-17 NOTE — DISCHARGE INSTRUCTIONS
Tylenol 650 mg every 4 hours as needed for pain  Discuss a mammogram with your primary care provider  Return to the emergency department for worsening problems or concerns

## 2023-02-17 NOTE — ED PROVIDER NOTES
EMERGENCY DEPARTMENT ENCOUnter      NAME: Marleny Viera  AGE: 47 year old female  YOB: 1975  MRN: [id number]  EVALUATION DATE & TIME: 2/16/2023  8:57 PM    PCP: Maria Fernanda Schumacher    ED PROVIDER: Jackie Medellin MD      Chief Complaint   Patient presents with     Shortness of Breath                FINAL IMPRESSION:  1. Atypical chest pain    2. Mass of right breast, unspecified quadrant          ED COURSE & MEDICAL DECISION MAKING:      In summary, the patient is a 47-year-old female that presents to the emergency department for evaluation of chest pain and concerned about a lump in her left breast.  I think it is just likely fibrocystic breast, but recommended mammogram through primary care.  Unclear etiology of the patient's chest pain.  She has no evidence of ACS, pneumonia, COVID, or pneumothorax.  We will discharge to home with close outpatient follow-up.    9:03 PM I met with the patient for the initial interview and physical examination. Discussed plan for treatment and workup in the ED. PPE: Provider wore gloves, and N95 mask.  Offered pain medication which the patient declined.  0010-updated and discharged patient    Medical Decision Making    History:    Supplemental history from: Documented in chart, if applicable    External Record(s) reviewed: Documented in chart, if applicable.    Work Up:    Chart documentation includes differential considered and any EKGs or imaging independently interpreted by provider, where specified.    In additional to work up documented, I considered the following work up: Documented in chart, if applicable. and Imaging CT, but deferred due to Contrast allergy.    External consultation:    Discussion of management with another provider: Documented in chart, if applicable    Complicating factors:    Care impacted by chronic illness: N/A    Care affected by social determinants of health: N/A    Disposition considerations: Discharge. No recommendations on  prescription strength medication(s). See documentation for any additional details.            At the conclusion of the encounter I discussed the results of all of the tests and the disposition. The questions were answered. The patient or family acknowledged understanding and was agreeable with the care plan.         MEDICATIONS GIVEN IN THE EMERGENCY:  Medications - No data to display    NEW PRESCRIPTIONS STARTED AT TODAY'S ER VISIT  New Prescriptions    No medications on file          =================================================================    HPI        Marleny Viera is a 47 year old female with a pertinent history of hypertension, asthma, uterine leiomyoma, anxiety, PE, and alcohol dependence in remission who presents to this ED via walk in for evaluation of shortness of breath.     The patient reports the onset of right sided stabbing chest pain this morning, which has persisted throughout the day. She currently rates her pain as a 6/10. She denies any palliating or provoking factors. She endorses a non productive cough and shortness of breath. She used her nebulizer at home, but this did not provide relief.     Additionally, the patient reports that she has a lump on her left breast which she noticed two days ago (2/14), and has been growing since she noticed it.     She denies taking a blood thinner at this time. She denies fever, chills, vomiting, diaphoresis, and any other symptoms or complaints at this time.     ScHx: The patient denies smoking or drinking alcohol.     REVIEW OF SYSTEMS     Constitutional:  Denies fever or chills or diaphoresis   HENT:  Denies sore throat   Respiratory:  Endorses cough, shortness of breath   Cardiovascular:  Denies palpitations. Endorses right sided chest pain   GI:  Denies abdominal pain, nausea, or vomiting  Musculoskeletal:  Denies any new extremity pain. Endorses lump on left breast  Skin:  Denies rash   Neurologic:  Denies headache, focal weakness or sensory  changes    All other systems reviewed and are negative      PAST MEDICAL HISTORY:  Past Medical History:   Diagnosis Date     Accidental overdose, initial encounter 7/7/2022     ADHD (attention deficit hyperactivity disorder)      Alcohol dependence (H)      Anxiety      Arthritis      Asthma      Bipolar depression (H)      Deafness      Drug abuse, nondependent (H) 10/29/2004    Overview:  polydrug abuse per psych notes ; Other, mixed, or unspecified nondependent drug abuse, unspecified     History of blood clots      History of transfusion      Hypertension      Kidney stones 2015     Major depressive disorder, recurrent episode, severe (H) 5/17/2016    Overview:  s/p suicide attempt Epic      Migraine      Obstructive sleep apnea 6/1/2016     Pulmonary emboli (H)      Status post total right knee replacement 1/18/2016       PAST SURGICAL HISTORY:  Past Surgical History:   Procedure Laterality Date     BIOPSY BREAST Right     2019... Also had fluid drained from left breast under surgery also in 2019     BREAST SURGERY       MAMMOPLASTY REDUCTION Bilateral 2017     TOTAL HIP ARTHROPLASTY Bilateral      TOTAL KNEE ARTHROPLASTY Right      TUBAL LIGATION       US BREAST CORE BIOPSY RIGHT Right 11/21/2019           CURRENT MEDICATIONS:    albuterol (PROAIR HFA/PROVENTIL HFA/VENTOLIN HFA) 108 (90 Base) MCG/ACT inhaler  albuterol (PROVENTIL) (2.5 MG/3ML) 0.083% neb solution  Blood Pressure Monitoring (BLOOD PRESSURE KIT) KIT  EPINEPHrine (ANY BX GENERIC EQUIV) 0.3 MG/0.3ML injection 2-pack  gabapentin (NEURONTIN) 100 MG capsule  hydrOXYzine (ATARAX) 25 MG tablet  metoprolol tartrate (LOPRESSOR) 25 MG tablet  montelukast (SINGULAIR) 10 MG tablet  omeprazole (PRILOSEC) 20 MG DR capsule  polyethylene glycol (MIRALAX) 17 GM/Dose powder  SYMBICORT 80-4.5 MCG/ACT Inhaler        ALLERGIES:  Allergies   Allergen Reactions     Amoxicillin Itching and Shortness Of Breath     Bee Pollen Anaphylaxis     Contrast [Iodixanol]  Shortness Of Breath and Rash     Pt stated she reacted to the contrast given for her CT in past. She experienced shortness of breath, throat tightening, and rash on chest, and they gave her an injection to counter the symptoms.      Covid-19 (Mrna) Vaccine Shortness Of Breath     Pfizer COVID-19 Vaccine     Hymenoptera Allergenic Extract [Wasp Venom Protein] Anaphylaxis     Iodine Hives and Unknown     Pt stated that she was injected with CT CONTRAST in the past and got hives, SOB, and nausea. Please pre-medicate patient in the future.      Wasp Venom Protein Starter Kit [Wasp Venom Protein] Itching, Shortness Of Breath, Swelling and Difficulty breathing     Adhesive Tape Unknown     Bee Venom Unknown     Food Allergy Formula Unknown     Red meat, chocolate     Geodon [Ziprasidone] Unknown     Ibuprofen Other (See Comments)     Kidney function, Kidney function     Latex Unknown     Added based on information entered during case entry, please review and add reactions, type, and severity as needed     Morphine Unknown     Prochlorperazine Other (See Comments)     Risperdal [Risperidone] Unknown     Risperidone Analogues [Risperidone] Other (See Comments)     Shellfish Allergy Unknown     Theobroma Oil [Theobroma Grandiflorum Seed Butter] Unknown     Trazodone Other (See Comments)     Elevated creatinine     Zoloft [Sertraline] Unknown     Hydrochlorothiazide Rash       FAMILY HISTORY:  Family History   Problem Relation Age of Onset     Cancer Mother      Breast Cancer Mother      Cerebrovascular Disease Father        SOCIAL HISTORY:   Social History     Socioeconomic History     Marital status:      Spouse name: None     Number of children: None     Years of education: None     Highest education level: None   Tobacco Use     Smoking status: Never     Smokeless tobacco: Never   Substance and Sexual Activity     Alcohol use: Yes     Comment: Alcoholic Drinks/day: dependence     Drug use: Not Currently     Sexual  "activity: Never   Social History Narrative    . No children.   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  Nonsmoker.  No alcohol use.  Tesha Pelaez MD         VITALS:  Patient Vitals for the past 24 hrs:   BP Temp Temp src Pulse Resp SpO2   02/16/23 2210 -- -- -- 101 -- --   02/16/23 1919 (!) 187/91 97.5  F (36.4  C) Temporal 99 16 98 %       PHYSICAL EXAM    Constitutional:  Well developed, obese  HENT:  Normocephalic, Atraumatic, Bilateral external ears normal, Oropharynx moist, Nose normal.   Neck:  Normal range of motion, No meningismus, No stridor.   Eyes:  EOMI, Conjunctiva normal, No discharge.   Respiratory:  Normal breath sounds, No respiratory distress, No wheezing, No chest tenderness.   Cardiovascular:  Normal heart rate, Normal rhythm, No murmurs  GI:  Soft, No tenderness, No guarding, No CVA tenderness.   Musculoskeletal:  Neurovascularly intact distally, No edema, No tenderness, No cyanosis, Good range of motion in all major joints.   Integument:  Warm, Dry, No erythema, No rash.   Lymphatic:  No lymphadenopathy noted.   Neurologic:  Alert & oriented , Normal motor function, Normal sensory function, No focal deficits noted.   Psychiatric:  Affect normal, Judgment normal, Mood normal.   Breasts-linear nodular region medial left breast which does not feel cystic,nodular, or discrete.  No abnormalities appreciated on patient's right breast     LAB:  All pertinent labs reviewed and interpreted.  Results for orders placed or performed during the hospital encounter of 02/16/23   US Lower Extremity Venous Duplex Bilateral    Impression    IMPRESSION:  1.  No deep venous thrombosis in the bilateral lower extremities.   Chest XR,  PA & LAT    Impression    IMPRESSION: Negative chest.   D dimer quantitative   Result Value Ref Range    D-Dimer Quantitative 0.58 (H) 0.00 - 0.50 ug/mL FEU   Comprehensive metabolic panel   Result Value Ref Range    Sodium 141 136 - 145 " mmol/L    Potassium 3.4 (L) 3.5 - 5.0 mmol/L    Chloride 108 (H) 98 - 107 mmol/L    Carbon Dioxide (CO2) 24 22 - 31 mmol/L    Anion Gap 9 5 - 18 mmol/L    Urea Nitrogen 10 8 - 22 mg/dL    Creatinine 0.79 0.60 - 1.10 mg/dL    Calcium 9.0 8.5 - 10.5 mg/dL    Glucose 126 (H) 70 - 125 mg/dL    Alkaline Phosphatase 93 45 - 120 U/L    AST 17 0 - 40 U/L    ALT 18 0 - 45 U/L    Protein Total 7.1 6.0 - 8.0 g/dL    Albumin 3.5 3.5 - 5.0 g/dL    Bilirubin Total 0.4 0.0 - 1.0 mg/dL    GFR Estimate >90 >60 mL/min/1.73m2   Result Value Ref Range    Troponin I <0.01 0.00 - 0.29 ng/mL   B-Type Natriuretic Peptide ([facility] Only)   Result Value Ref Range    BNP 23 0 - 71 pg/mL   Symptomatic Influenza A/B & SARS-CoV2 (COVID-19) Virus PCR Multiplex Nasopharyngeal    Specimen: Nasopharyngeal; Swab   Result Value Ref Range    Influenza A PCR Negative Negative    Influenza B PCR Negative Negative    RSV PCR Negative Negative    SARS CoV2 PCR Negative Negative   CBC with platelets and differential   Result Value Ref Range    WBC Count 7.1 4.0 - 11.0 10e3/uL    RBC Count 4.85 3.80 - 5.20 10e6/uL    Hemoglobin 13.7 11.7 - 15.7 g/dL    Hematocrit 40.9 35.0 - 47.0 %    MCV 84 78 - 100 fL    MCH 28.2 26.5 - 33.0 pg    MCHC 33.5 31.5 - 36.5 g/dL    RDW 13.9 10.0 - 15.0 %    Platelet Count 271 150 - 450 10e3/uL    % Neutrophils 61 %    % Lymphocytes 32 %    % Monocytes 5 %    % Eosinophils 2 %    % Basophils 0 %    % Immature Granulocytes 0 %    NRBCs per 100 WBC 0 <1 /100    Absolute Neutrophils 4.4 1.6 - 8.3 10e3/uL    Absolute Lymphocytes 2.3 0.8 - 5.3 10e3/uL    Absolute Monocytes 0.3 0.0 - 1.3 10e3/uL    Absolute Eosinophils 0.1 0.0 - 0.7 10e3/uL    Absolute Basophils 0.0 0.0 - 0.2 10e3/uL    Absolute Immature Granulocytes 0.0 <=0.4 10e3/uL    Absolute NRBCs 0.0 10e3/uL       RADIOLOGY:  I have independently reviewed and interpreted the above imaging, pending the final radiology read.  Chest XR,  PA & LAT   Final Result   IMPRESSION:  Negative chest.      US Lower Extremity Venous Duplex Bilateral   Final Result   IMPRESSION:   1.  No deep venous thrombosis in the bilateral lower extremities.          EK-rate is 77, sinus, there is no ST segment elevation or depression appreciated.  EKG is unchanged from 2022  I have independently reviewed and interpreted this EKG          I, Naima Kayli, am serving as a scribe to document services personally performed by Dr. Medellin based on my observation and the provider's statements to me. I, Jackie Medellin MD attest that Naima Milan is acting in a scribe capacity, has observed my performance of the services and has documented them in accordance with my direction.    Jackie Medellin MD  Emergency Medicine  [facility] EMERGENCY ROOM  [address] [address]  [phone]  Dept: [phone]       Jackie Medellin MD  23 0030

## 2023-03-09 ENCOUNTER — TELEPHONE (OUTPATIENT)
Dept: INTERNAL MEDICINE | Facility: CLINIC | Age: 48
End: 2023-03-09
Payer: COMMERCIAL

## 2023-03-09 DIAGNOSIS — F41.9 ANXIETY: Primary | ICD-10-CM

## 2023-03-09 DIAGNOSIS — F33.2 SEVERE EPISODE OF RECURRENT MAJOR DEPRESSIVE DISORDER, WITHOUT PSYCHOTIC FEATURES (H): ICD-10-CM

## 2023-03-09 NOTE — TELEPHONE ENCOUNTER
Order/Referral Request    Who is requesting: Patient     Orders being requested: For a referral for mental health    Reason service is needed/diagnosis: mental health     When are orders needed by: ASAP    Has this been discussed with Provider: No    Does patient have a preference on a Group/Provider/Facility? Within [place]     Does patient have an appointment scheduled?: No    Where to send orders: N/A    Could we send this information to you in [facility] or would you prefer to receive a phone call?:   Patient would prefer a phone call   Okay to leave a detailed message?: Yes at Cell number on file:    Telephone Information:   Mobile [phone]

## 2023-03-10 DIAGNOSIS — Z87.898 HISTORY OF HEADACHE: ICD-10-CM

## 2023-03-10 DIAGNOSIS — J32.9 CHRONIC SINUSITIS, UNSPECIFIED LOCATION: ICD-10-CM

## 2023-03-10 DIAGNOSIS — J45.40 MODERATE PERSISTENT ASTHMA WITHOUT COMPLICATION: ICD-10-CM

## 2023-03-10 NOTE — TELEPHONE ENCOUNTER
3419 .    VM left via  stating her referral did get placed and to expect a call in next 2 days for scheduling, or she may call [phone] if no call.     Pt has appt 3/14 with Maria Fernanda Armendariz RN

## 2023-03-10 NOTE — TELEPHONE ENCOUNTER
Pharmacy called to see if we received there refill request. TC advised pharmacy that we did receive the 3 medication refill requests today and the turn around time is 1-3 business days.    Cami Millan

## 2023-03-10 NOTE — TELEPHONE ENCOUNTER
Mental health referral placed for the patient, psychiatry based. She has allergies to multiple mental health medications, will let psychiatry start meds. She is also due to follow up with me in office, let's get her in.

## 2023-03-11 NOTE — TELEPHONE ENCOUNTER
"Routing refill request to provider for review/approval because:  ACT Score    Last Written Prescription Date:  3/8/2022  Last Fill Quantity: 8.5g,  # refills: 1   Last office visit provider:  11/9/2022     Requested Prescriptions   Pending Prescriptions Disp Refills     albuterol (PROAIR HFA/PROVENTIL HFA/VENTOLIN HFA) 108 (90 Base) MCG/ACT inhaler 8.5 g 1     Sig: INHALE 2 PUFFS BY MOUTH EVERY FOUR HOURS AS NEEDED FOR WHEEZING OR SHORTNESS OF BREATH       Asthma Maintenance Inhalers - Anticholinergics Failed - 3/10/2023  8:30 AM        Failed - Asthma control assessment score within normal limits in last 6 months     Please review ACT score.           Passed - Patient is age 12 years or older        Passed - Medication is active on med list        Passed - Recent (6 mo) or future (30 days) visit within the authorizing provider's specialty     Patient had office visit in the last 6 months or has a visit in the next 30 days with authorizing provider or within the authorizing provider's specialty.  See \"Patient Info\" tab in inbasket, or \"Choose Columns\" in Meds & Orders section of the refill encounter.           Short-Acting Beta Agonist Inhalers Protocol  Failed - 3/10/2023  8:30 AM        Failed - Asthma control assessment score within normal limits in last 6 months     Please review ACT score.           Passed - Patient is age 12 or older        Passed - Medication is active on med list        Passed - Recent (6 mo) or future (30 days) visit within the authorizing provider's specialty     Patient had office visit in the last 6 months or has a visit in the next 30 days with authorizing provider or within the authorizing provider's specialty.  See \"Patient Info\" tab in inbasket, or \"Choose Columns\" in Meds & Orders section of the refill encounter.            Routing refill request to provider for review/approval because:  ACT Score    Last Written Prescription Date:  3/8/2022  Last Fill Quantity: 90,  # refills: 3    " "    montelukast (SINGULAIR) 10 MG tablet 90 tablet 3     Sig: Take 1 tablet (10 mg) by mouth daily       Leukotriene Inhibitors Protocol Failed - 3/10/2023  8:30 AM        Failed - Asthma control assessment score within normal limits in last 6 months     Please review ACT score.           Passed - Patient is age 12 or older     If patient is under 16, ok to refill using age based dosing.           Passed - Medication is active on med list        Passed - Recent (6 mo) or future (30 days) visit within the authorizing provider's specialty     Patient had office visit in the last 6 months or has a visit in the next 30 days with authorizing provider or within the authorizing provider's specialty.  See \"Patient Info\" tab in inbasket, or \"Choose Columns\" in Meds & Orders section of the refill encounter.            Routing refill request to provider for review/approval because:  Drug not on the [facility] refill protocol     Last Written Prescription Date:  3/8/2022  Last Fill Quantity: 180,  # refills: 3        gabapentin (NEURONTIN) 100 MG capsule 180 capsule 3     Sig: TAKE 1 CAPSULE BY MOUTH TWICE A DAY FOR MIGRAINES AND ANXIETY       There is no refill protocol information for this order          Alicia Messer RN 03/10/23 8:56 PM  "

## 2023-03-13 RX ORDER — ALBUTEROL SULFATE 90 UG/1
AEROSOL, METERED RESPIRATORY (INHALATION)
Qty: 8.5 G | Refills: 1 | Status: SHIPPED | OUTPATIENT
Start: 2023-03-13 | End: 2023-04-19

## 2023-03-13 RX ORDER — GABAPENTIN 100 MG/1
CAPSULE ORAL
Qty: 180 CAPSULE | Refills: 3 | Status: SHIPPED | OUTPATIENT
Start: 2023-03-13 | End: 2023-04-19

## 2023-03-13 RX ORDER — MONTELUKAST SODIUM 10 MG/1
1 TABLET ORAL DAILY
Qty: 90 TABLET | Refills: 3 | Status: SHIPPED | OUTPATIENT
Start: 2023-03-13 | End: 2023-04-17

## 2023-03-21 ENCOUNTER — TELEPHONE (OUTPATIENT)
Dept: INTERNAL MEDICINE | Facility: CLINIC | Age: 48
End: 2023-03-21
Payer: COMMERCIAL

## 2023-03-21 DIAGNOSIS — F41.9 ANXIETY: Primary | ICD-10-CM

## 2023-03-21 DIAGNOSIS — G47.00 PERSISTENT INSOMNIA: ICD-10-CM

## 2023-03-21 NOTE — TELEPHONE ENCOUNTER
New Medication Request    Contacts       Type Contact Phone/Fax    03/21/2023 03:36 PM CDT Phone (Incoming) Marleny Viera (Self) [phone] (M)          What medication are you requesting?: Pharmacy called and states Pt is looking for a Rx for sleep. Pt told pharmacy that she was given 2 Rx's for sleep back in December 2022. Pharmacy states they only have the Rx for hydroxyzine. Pt told pharmacy that she couldn't find the bottle for the other sleep medication and she can't remember the name of it. Pt states the hydroxyzine is for insomnia and anxiety but the other Rx was just for sleep. Pharmacy was wondering if PCP can send in new Rx for a sleep medication per Pt's request.    Reason for medication request: Pharmacy calling on behalf of Pt requesting Rx to help Pt sleep. Pharmacy stated that the Pt can't remember the name of the Rx because she can't find her medication bottle.     Have you taken this medication before?: Yes    Controlled Substance Agreement on file:   CSA -- Patient Level:    CSA: None found at the patient level.         Patient offered an appointment? No    Preferred Pharmacy:    [facility] - [place], [place] - [address] [facility] [address]  [address] [facility] [address]  [facility] [address]  Phone: [phone] Fax: [phone]      Cami Millan

## 2023-03-22 ENCOUNTER — TELEPHONE (OUTPATIENT)
Dept: INTERNAL MEDICINE | Facility: CLINIC | Age: 48
End: 2023-03-22

## 2023-03-22 ENCOUNTER — OFFICE VISIT (OUTPATIENT)
Dept: FAMILY MEDICINE | Facility: CLINIC | Age: 48
End: 2023-03-22
Payer: COMMERCIAL

## 2023-03-22 VITALS
TEMPERATURE: 98.3 F | DIASTOLIC BLOOD PRESSURE: 68 MMHG | RESPIRATION RATE: 18 BRPM | OXYGEN SATURATION: 97 % | SYSTOLIC BLOOD PRESSURE: 152 MMHG | HEART RATE: 56 BPM

## 2023-03-22 DIAGNOSIS — Z87.39 HISTORY OF GOUT: ICD-10-CM

## 2023-03-22 DIAGNOSIS — S90.821A BLISTER OF RIGHT FOOT, INITIAL ENCOUNTER: ICD-10-CM

## 2023-03-22 DIAGNOSIS — M79.671 RIGHT FOOT PAIN: Primary | ICD-10-CM

## 2023-03-22 DIAGNOSIS — L03.116 CELLULITIS OF LEFT LOWER EXTREMITY: Primary | ICD-10-CM

## 2023-03-22 DIAGNOSIS — L03.115 CELLULITIS OF RIGHT LOWER EXTREMITY: ICD-10-CM

## 2023-03-22 DIAGNOSIS — M79.671 RIGHT FOOT PAIN: ICD-10-CM

## 2023-03-22 LAB
BASOPHILS # BLD AUTO: 0 10E3/UL (ref 0–0.2)
BASOPHILS NFR BLD AUTO: 0 %
C REACTIVE PROTEIN LHE: 0.4 MG/DL (ref 0–?)
EOSINOPHIL # BLD AUTO: 0.2 10E3/UL (ref 0–0.7)
EOSINOPHIL NFR BLD AUTO: 2 %
ERYTHROCYTE [DISTWIDTH] IN BLOOD BY AUTOMATED COUNT: 13.5 % (ref 10–15)
HCT VFR BLD AUTO: 42.8 % (ref 35–47)
HGB BLD-MCNC: 13.8 G/DL (ref 11.7–15.7)
IMM GRANULOCYTES # BLD: 0 10E3/UL
IMM GRANULOCYTES NFR BLD: 0 %
LYMPHOCYTES # BLD AUTO: 2.4 10E3/UL (ref 0.8–5.3)
LYMPHOCYTES NFR BLD AUTO: 37 %
MCH RBC QN AUTO: 27.8 PG (ref 26.5–33)
MCHC RBC AUTO-ENTMCNC: 32.2 G/DL (ref 31.5–36.5)
MCV RBC AUTO: 86 FL (ref 78–100)
MONOCYTES # BLD AUTO: 0.3 10E3/UL (ref 0–1.3)
MONOCYTES NFR BLD AUTO: 4 %
NEUTROPHILS # BLD AUTO: 3.7 10E3/UL (ref 1.6–8.3)
NEUTROPHILS NFR BLD AUTO: 56 %
PLATELET # BLD AUTO: 258 10E3/UL (ref 150–450)
RBC # BLD AUTO: 4.96 10E6/UL (ref 3.8–5.2)
URATE SERPL-MCNC: 6.2 MG/DL (ref 2–7.5)
WBC # BLD AUTO: 6.5 10E3/UL (ref 4–11)

## 2023-03-22 PROCEDURE — 85025 COMPLETE CBC W/AUTO DIFF WBC: CPT | Performed by: FAMILY MEDICINE

## 2023-03-22 PROCEDURE — 36415 COLL VENOUS BLD VENIPUNCTURE: CPT | Performed by: FAMILY MEDICINE

## 2023-03-22 PROCEDURE — 86140 C-REACTIVE PROTEIN: CPT | Performed by: FAMILY MEDICINE

## 2023-03-22 PROCEDURE — 84550 ASSAY OF BLOOD/URIC ACID: CPT | Performed by: FAMILY MEDICINE

## 2023-03-22 PROCEDURE — 99215 OFFICE O/P EST HI 40 MIN: CPT | Performed by: FAMILY MEDICINE

## 2023-03-22 RX ORDER — PREDNISONE 20 MG/1
TABLET ORAL
Qty: 15 TABLET | Refills: 0 | Status: SHIPPED | OUTPATIENT
Start: 2023-03-22 | End: 2023-03-23 | Stop reason: ALTCHOICE

## 2023-03-22 RX ORDER — MUPIROCIN 20 MG/G
OINTMENT TOPICAL
Qty: 22 G | Refills: 0 | Status: SHIPPED | OUTPATIENT
Start: 2023-03-22 | End: 2023-04-17

## 2023-03-22 RX ORDER — HYDROXYZINE HYDROCHLORIDE 25 MG/1
50 TABLET, FILM COATED ORAL EVERY 8 HOURS PRN
Qty: 60 TABLET | Refills: 0 | Status: SHIPPED | OUTPATIENT
Start: 2023-03-22 | End: 2023-07-19

## 2023-03-22 RX ORDER — MUPIROCIN 20 MG/G
OINTMENT TOPICAL
Qty: 15 G | Refills: 0 | Status: SHIPPED | OUTPATIENT
Start: 2023-03-22 | End: 2023-03-22

## 2023-03-22 RX ORDER — CLINDAMYCIN HCL 300 MG
300 CAPSULE ORAL 3 TIMES DAILY
Qty: 21 CAPSULE | Refills: 0 | Status: SHIPPED | OUTPATIENT
Start: 2023-03-22 | End: 2023-03-23

## 2023-03-22 RX ORDER — COLCHICINE 0.6 MG/1
TABLET ORAL
Qty: 12 TABLET | Refills: 0 | Status: SHIPPED | OUTPATIENT
Start: 2023-03-22 | End: 2023-07-19

## 2023-03-22 NOTE — TELEPHONE ENCOUNTER
Hydroxyzine ordered, she can take 50mg at bedtime. She cancelled her appointment with me on 03/15; we should see her for follow up.

## 2023-03-22 NOTE — TELEPHONE ENCOUNTER
3-22-23  Pt called & is requesting to switch pharmacies for:  colchicine (COLCYRS) 0.6 MG tablet  mupirocin (BACTROBAN) 2 % external ointment  clindamycin (CLEOCIN) 300 MG capsule  From [facility] to :  [facility] - [place], [place] - [address]  [facility]

## 2023-03-22 NOTE — TELEPHONE ENCOUNTER
Medication Question or Refill        What medication are you calling about (include dose and sig)?:     hydrOXYzine (ATARAX) 25 MG tablet 60 tablet 0 3/22/2023  --   Sig - Route: Take 2 tablets (50 mg) by mouth every 8 hours as needed for anxiety or other (sleep) - Oral   Sent to pharmacy as: hydrOXYzine HCl 25 MG Oral Tablet (ATARAX         Patient was taking 75 mg TID.        [facility] [address] [facility] [address]  [address] [address]  Phone: [phone] Fax: [phone]      Controlled Substance Agreement on file:   CSA -- Patient Level:    CSA: None found at the patient level.       Who prescribed the medication?: Maria Fernanda    Do you need a refill? No    When did you use the medication last?   yesterday    Do you have any questions or concerns?  Yes:      RX sent today shows decrease. Is this correct? Pharmacy wants to Confirm    [phone]- any pharmacist  Vani Rdz on 3/22/2023 at 1:28 PM

## 2023-03-22 NOTE — TELEPHONE ENCOUNTER
Spoke with Stanley, pharmacist at [facility], and informed of Maria Fernanda's message.     Stanley is wondering if there is another sleep medication prescribed, due to pt thinking she was going to have 2 different medications. Informed that according to a telephone encounter from yesterday that hydroxyzine is the only thing ordered.     No further questions or concerns

## 2023-03-22 NOTE — TELEPHONE ENCOUNTER
Attempted to contact, no answer. Left message with her  service requesting call back.     I will also send patient a Zeinab Verduzco RN

## 2023-03-22 NOTE — PATIENT INSTRUCTIONS
Elevate foot    Apply topical antibiotic ointment to foot 2-3 times a day.   Watch for increasing redness and swelling around the wound.     Start the colchicine today - 2 tabs at once together then in 1 hour take 1 more tablet.   Might have GI upset or diarrhea. If so, do not take the 3 rd tablet until feeling better.   Tomorrow, if there is still pain, then take 1 tablet once or twice daily for up  to another few days if needed for pain.   If not helping then stop. If too much side effects then stop.     If redness of foot around the blister is increasing and moving into more of the foot then start taking the oral antibiotic clindamycin but I would wait a day or two.     Labs are pending. I will call you if they require a change in plan or send a note on the labs.   Return if worse or no better.      Wear the post op shoe for comfort     
Dermabond/Surgicel Hemostat Fibrillar

## 2023-03-23 ENCOUNTER — TELEPHONE (OUTPATIENT)
Dept: URGENT CARE | Facility: URGENT CARE | Age: 48
End: 2023-03-23
Payer: COMMERCIAL

## 2023-03-23 DIAGNOSIS — L03.115 CELLULITIS OF RIGHT FOOT: Primary | ICD-10-CM

## 2023-03-23 DIAGNOSIS — S90.821A BLISTER OF RIGHT FOOT, INITIAL ENCOUNTER: ICD-10-CM

## 2023-03-23 RX ORDER — MUPIROCIN 20 MG/G
OINTMENT TOPICAL
Qty: 22 G | Refills: 0 | Status: SHIPPED | OUTPATIENT
Start: 2023-03-23 | End: 2023-07-19

## 2023-03-23 RX ORDER — CLINDAMYCIN HCL 300 MG
300 CAPSULE ORAL 3 TIMES DAILY
Qty: 21 CAPSULE | Refills: 0 | OUTPATIENT
Start: 2023-03-23 | End: 2023-03-30

## 2023-03-23 RX ORDER — CLINDAMYCIN HCL 300 MG
300 CAPSULE ORAL 3 TIMES DAILY
Qty: 21 CAPSULE | Refills: 0 | Status: SHIPPED | OUTPATIENT
Start: 2023-03-23 | End: 2023-07-19

## 2023-03-23 RX ORDER — MUPIROCIN 20 MG/G
OINTMENT TOPICAL
Qty: 22 G | Refills: 0 | OUTPATIENT
Start: 2023-03-23

## 2023-03-23 RX ORDER — COLCHICINE 0.6 MG/1
TABLET ORAL
Qty: 12 TABLET | Refills: 0 | OUTPATIENT
Start: 2023-03-23

## 2023-03-23 NOTE — TELEPHONE ENCOUNTER
I spoke with Marleny this afternoon and she reported that her medications had arrived late this morning in the mail and she has taken the colchicine and the clindamycin and used the mupirocin. The redness of her foot is gone and pain is better, mainly with push off during walking.     She will continue the medications and follow up as needed.

## 2023-03-23 NOTE — PROGRESS NOTES
Mickie Javier covering for [facility] helped me get medications signed for [facility] and the prior authorization request sent.   I discontinued the colchicine since the time frame is no longer valid for that treatment option.   I will discuss with patient whether she needs to take the prednisone or just the antibiotic but have not connected with her yet today.

## 2023-03-23 NOTE — PROGRESS NOTES
I spoke with Marleny this afternoon and she reported that her medications had arrived late this morning in the mail from [facility] and she has taken the colchicine and the clindamycin and used the mupirocin. The redness of her foot is gone and pain is better, mainly with push off during walking.     She will continue the medications and follow up as needed.

## 2023-03-23 NOTE — TELEPHONE ENCOUNTER
Central Prior Authorization Team   Phone: [phone]      Prior Authorization Not Needed per Insurance    Medication: clindamycin (CLEOCIN) 300 MG capsule - PA NOT NEEDED  Insurance Company: [employer] - Phone [phone] Fax [phone]  Expected CoPay:      Pharmacy Filling the Rx: [facility] - [place], [place] - [address] [facility] [address] [id number]  Pharmacy Notified: Yes - verified pharmacy received paid claim  Patient Notified: Yes (pharmacy will notify patient when ready)
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
## 2023-03-23 NOTE — TELEPHONE ENCOUNTER
Prior Authorization Retail Medication Request    Medication/Dose: clindamycin 300 mg  ICD code (if different than what is on RX):  Blister of right foot, initial encounter [S90.821A]          Cellulitis of right lower extremity [L03.115]         Previously Tried and Failed:  N/a  Rationale:  Allergies to amoxicillin (shortness of breath) and therefore unable to take amoxicillin. Needs clindamycin for cellulities    Insurance Name:    Insurance ID:      Pharmacy Information (if different than what is on RX)  Name:  [facility]  Phone: [phone]

## 2023-03-24 NOTE — TELEPHONE ENCOUNTER
ASL ID: [id number]    Message left to call clinic back or to check her mychart that has same message from Maria Fernanda Schumacher.     Closing encounter due to 3 attempts to get a hold of pt with no response

## 2023-04-13 ENCOUNTER — ANCILLARY PROCEDURE (OUTPATIENT)
Dept: MAMMOGRAPHY | Facility: CLINIC | Age: 48
End: 2023-04-13
Attending: INTERNAL MEDICINE
Payer: COMMERCIAL

## 2023-04-13 ENCOUNTER — OFFICE VISIT (OUTPATIENT)
Dept: FAMILY MEDICINE | Facility: CLINIC | Age: 48
End: 2023-04-13
Payer: COMMERCIAL

## 2023-04-13 VITALS
HEART RATE: 55 BPM | WEIGHT: 223.7 LBS | DIASTOLIC BLOOD PRESSURE: 77 MMHG | BODY MASS INDEX: 37.23 KG/M2 | RESPIRATION RATE: 16 BRPM | TEMPERATURE: 98.1 F | SYSTOLIC BLOOD PRESSURE: 178 MMHG | OXYGEN SATURATION: 98 %

## 2023-04-13 DIAGNOSIS — J30.2 SEASONAL ALLERGIC RHINITIS, UNSPECIFIED TRIGGER: Primary | ICD-10-CM

## 2023-04-13 DIAGNOSIS — H65.92 OME (OTITIS MEDIA WITH EFFUSION), LEFT: ICD-10-CM

## 2023-04-13 DIAGNOSIS — R07.0 THROAT PAIN: ICD-10-CM

## 2023-04-13 DIAGNOSIS — N63.20 MASS OF LEFT BREAST, UNSPECIFIED QUADRANT: ICD-10-CM

## 2023-04-13 DIAGNOSIS — N64.9 DISORDER OF BREAST, UNSPECIFIED: ICD-10-CM

## 2023-04-13 DIAGNOSIS — N64.89 OTHER SPECIFIED DISORDERS OF BREAST: ICD-10-CM

## 2023-04-13 DIAGNOSIS — R59.1 LYMPHADENOPATHY: ICD-10-CM

## 2023-04-13 LAB
DEPRECATED S PYO AG THROAT QL EIA: NEGATIVE
GROUP A STREP BY PCR: NOT DETECTED

## 2023-04-13 PROCEDURE — 76642 ULTRASOUND BREAST LIMITED: CPT | Mod: 50

## 2023-04-13 PROCEDURE — 77066 DX MAMMO INCL CAD BI: CPT

## 2023-04-13 PROCEDURE — 87651 STREP A DNA AMP PROBE: CPT | Performed by: NURSE PRACTITIONER

## 2023-04-13 PROCEDURE — 99214 OFFICE O/P EST MOD 30 MIN: CPT | Performed by: NURSE PRACTITIONER

## 2023-04-13 RX ORDER — CETIRIZINE HYDROCHLORIDE 10 MG/1
10 TABLET ORAL DAILY
Qty: 30 TABLET | Refills: 3 | Status: SHIPPED | OUTPATIENT
Start: 2023-04-13 | End: 2023-04-19

## 2023-04-13 ASSESSMENT — ENCOUNTER SYMPTOMS: FEVER: 0

## 2023-04-13 NOTE — PROGRESS NOTES
"Assessment & Plan     Throat pain    - Streptococcus A Rapid Screen w/Reflex to PCR - Clinic Collect  - Group A Streptococcus PCR Throat Swab    Lymphadenopathy    - Streptococcus A Rapid Screen w/Reflex to PCR - Clinic Collect  - Group A Streptococcus PCR Throat Swab    Seasonal allergic rhinitis, unspecified trigger    - cetirizine (ZYRTEC) 10 MG tablet  Dispense: 30 tablet; Refill: 3    OME (otitis media with effusion), left       Patient requesting oral allergy medication.  Is having some intermittent left ear pain.  Does have effusion on that side.    Due to the presence of some throat pain with tender left lymph node without AOM, did do a strep test which was negative.  Recommended home COVID test as well.      20 minutes spent by me on the date of the encounter doing chart review, patient visit, documentation and Extended visit due to .         Return for If no better.    Kyung Haider [facility] Farmer is a 47 year old female who presents to clinic today for the following health issues:  Chief Complaint   Patient presents with     Allergies     Needing new medication for allergies, itchiness and runny nose, ear bothering (mostly Lt ear), no fever     HPI    Eyes watery, clear - \"Spring allergies.\"  Ear pain left, tender lymph node on the left side.      Doesn't like nasal spray.  Here mainly to look for a new oral allergy medicine.    Due to language barrier, an  was present during the history-taking and subsequent discussion (and for part of the physical exam) with this patient.        Review of Systems   Constitutional: Negative for fever.           Objective    BP (!) 178/77   Pulse 55   Temp 98.1  F (36.7  C) (Oral)   Resp 16   Wt 101.5 kg (223 lb 11.2 oz)   SpO2 98%   BMI 37.23 kg/m    Physical Exam  Constitutional:       Appearance: Normal appearance.   HENT:      Ears:      Comments: + Retracted TM with air-fluid level seen   "   Nose: Congestion present.      Mouth/Throat:      Pharynx: Posterior oropharyngeal erythema present.      Tonsils: No tonsillar exudate. 1+ on the right. 2+ on the left.   Pulmonary:      Effort: Pulmonary effort is normal.   Lymphadenopathy:      Cervical: Cervical adenopathy (Left only) present.   Skin:     General: Skin is warm.   Neurological:      Mental Status: She is alert.   Psychiatric:         Mood and Affect: Mood normal.            Results for orders placed or performed in visit on 04/13/23 (from the past 24 hour(s))   Streptococcus A Rapid Screen w/Reflex to PCR - Clinic Collect    Specimen: Throat; Swab   Result Value Ref Range    Group A Strep antigen Negative Negative

## 2023-04-13 NOTE — PATIENT INSTRUCTIONS
Cetirizine daily for allergies    Recommend home COVID test due to the large lymph node on your neck.    Strep test is negative    Recheck in your clinic if blood pressure is over 140/90

## 2023-04-14 DIAGNOSIS — J45.40 MODERATE PERSISTENT ASTHMA WITHOUT COMPLICATION: ICD-10-CM

## 2023-04-15 NOTE — TELEPHONE ENCOUNTER
"Routing refill request to provider for review/approval because:  act    Last Written Prescription Date:  12/19/22  Last Fill Quantity: 90,  # refills: 1   Last office visit provider:  11/9/22     Requested Prescriptions   Pending Prescriptions Disp Refills     albuterol (PROVENTIL) (2.5 MG/3ML) 0.083% neb solution [Pharmacy Med Name: ALBUTEROL 0.083% INHAL SOLN] 180 mL 11     Sig: USE 1 VIAL NEBULIZER FOUR TIMES PER WEEK       Asthma Maintenance Inhalers - Anticholinergics Failed - 4/14/2023  6:03 PM        Failed - Asthma control assessment score within normal limits in last 6 months     Please review ACT score.           Passed - Patient is age 12 years or older        Passed - Medication is active on med list        Passed - Recent (6 mo) or future (30 days) visit within the authorizing provider's specialty     Patient had office visit in the last 6 months or has a visit in the next 30 days with authorizing provider or within the authorizing provider's specialty.  See \"Patient Info\" tab in inbasket, or \"Choose Columns\" in Meds & Orders section of the refill encounter.           Short-Acting Beta Agonist Inhalers Protocol  Failed - 4/14/2023  6:03 PM        Failed - Asthma control assessment score within normal limits in last 6 months     Please review ACT score.           Passed - Patient is age 12 or older        Passed - Medication is active on med list        Passed - Recent (6 mo) or future (30 days) visit within the authorizing provider's specialty     Patient had office visit in the last 6 months or has a visit in the next 30 days with authorizing provider or within the authorizing provider's specialty.  See \"Patient Info\" tab in inbasket, or \"Choose Columns\" in Meds & Orders section of the refill encounter.                 Indiana Feldman, RN 04/15/23 4:39 PM  "

## 2023-04-17 ENCOUNTER — OFFICE VISIT (OUTPATIENT)
Dept: INTERNAL MEDICINE | Facility: CLINIC | Age: 48
End: 2023-04-17
Payer: COMMERCIAL

## 2023-04-17 VITALS
OXYGEN SATURATION: 99 % | SYSTOLIC BLOOD PRESSURE: 152 MMHG | HEART RATE: 62 BPM | HEIGHT: 65 IN | BODY MASS INDEX: 37.49 KG/M2 | DIASTOLIC BLOOD PRESSURE: 80 MMHG | RESPIRATION RATE: 16 BRPM | WEIGHT: 225 LBS | TEMPERATURE: 97.5 F

## 2023-04-17 DIAGNOSIS — F33.2 SEVERE EPISODE OF RECURRENT MAJOR DEPRESSIVE DISORDER, WITHOUT PSYCHOTIC FEATURES (H): ICD-10-CM

## 2023-04-17 DIAGNOSIS — J45.40 MODERATE PERSISTENT ASTHMA WITHOUT COMPLICATION: Primary | ICD-10-CM

## 2023-04-17 DIAGNOSIS — F41.9 ANXIETY: ICD-10-CM

## 2023-04-17 PROCEDURE — T1013 SIGN LANG/ORAL INTERPRETER: HCPCS | Mod: U3

## 2023-04-17 PROCEDURE — 99214 OFFICE O/P EST MOD 30 MIN: CPT | Mod: 25 | Performed by: NURSE PRACTITIONER

## 2023-04-17 PROCEDURE — 90677 PCV20 VACCINE IM: CPT | Performed by: NURSE PRACTITIONER

## 2023-04-17 PROCEDURE — G0009 ADMIN PNEUMOCOCCAL VACCINE: HCPCS | Performed by: NURSE PRACTITIONER

## 2023-04-17 RX ORDER — MONTELUKAST SODIUM 10 MG/1
10 TABLET ORAL AT BEDTIME
Qty: 90 TABLET | Refills: 3 | Status: SHIPPED | OUTPATIENT
Start: 2023-04-17 | End: 2023-04-19

## 2023-04-17 RX ORDER — ALBUTEROL SULFATE 0.83 MG/ML
SOLUTION RESPIRATORY (INHALATION)
Qty: 180 ML | Refills: 11 | Status: SHIPPED | OUTPATIENT
Start: 2023-04-17 | End: 2023-04-19

## 2023-04-17 ASSESSMENT — ASTHMA QUESTIONNAIRES
QUESTION_3 LAST FOUR WEEKS HOW OFTEN DID YOUR ASTHMA SYMPTOMS (WHEEZING, COUGHING, SHORTNESS OF BREATH, CHEST TIGHTNESS OR PAIN) WAKE YOU UP AT NIGHT OR EARLIER THAN USUAL IN THE MORNING: TWO OR THREE NIGHTS A WEEK
HOSPITALIZATION_OVERNIGHT_LAST_YEAR_TOTAL: ONE
QUESTION_2 LAST FOUR WEEKS HOW OFTEN HAVE YOU HAD SHORTNESS OF BREATH: ONCE OR TWICE A WEEK
ACT_TOTALSCORE: 15
QUESTION_5 LAST FOUR WEEKS HOW WOULD YOU RATE YOUR ASTHMA CONTROL: SOMEWHAT CONTROLLED
ACT_TOTALSCORE: 15
QUESTION_1 LAST FOUR WEEKS HOW MUCH OF THE TIME DID YOUR ASTHMA KEEP YOU FROM GETTING AS MUCH DONE AT WORK, SCHOOL OR AT HOME: SOME OF THE TIME
QUESTION_4 LAST FOUR WEEKS HOW OFTEN HAVE YOU USED YOUR RESCUE INHALER OR NEBULIZER MEDICATION (SUCH AS ALBUTEROL): TWO OR THREE TIMES PER WEEK
EMERGENCY_ROOM_LAST_YEAR_TOTAL: ONE

## 2023-04-17 NOTE — LETTER
April 17, 2023      Marleny Viera  [address] [address]        To Whom It May Concern:    Marleny Viera was seen in our clinic and under my care for Primary Care. Please allow her to have her two cats with her as they are her emotional support animals.       Sincerely,        Maria Fernanda Schumacher, CNP

## 2023-04-17 NOTE — PROGRESS NOTES
"  Assessment & Plan     Asthma, moderate persistent, uncomplicated: Will add in Singulair, continues on Symbicort, Albuterol, and Zyrtec. Stable.   - montelukast (SINGULAIR) 10 MG tablet  Dispense: 90 tablet; Refill: 3    Anxiety: She continues on PRN hydroxyzine. Stable.     Severe episode of recurrent major depressive disorder, without psychotic features (H): She continues on Gabapentin. Has a psychiatry referral in place.      BMI:   Estimated body mass index is 37.44 kg/m  as calculated from the following:    Height as of this encounter: 1.651 m (5' 5\").    Weight as of this encounter: 102.1 kg (225 lb).   Weight management plan: Discussed healthy diet and exercise guidelines    [facility]    Rosemarie Farmer is a 47 year old, presenting for the following health issues:  Follow Up (Asthma)        4/17/2023     3:38 PM   Additional Questions   Roomed by Lakshmi Grady 4/17/2023   Any forms needing to be completed Yes     History of Present Illness     Asthma:  She presents for follow up of asthma.  She has no cough, no wheezing, and no shortness of breath. She is using a relief medication a few times a month. She does not miss any doses of her controller medication throughout the week.Patient is aware of the following triggers: cold air, dust mites, exercise or sports, humidity, mold, pollens, smoke and strong odors and fumes. The patient has not had a visit to the Emergency Room, Urgent Care or Hospital due to asthma since the last clinic visit.     Hypertension: She presents for follow up of hypertension.  She does check blood pressure  regularly outside of the clinic. Outpatient blood pressures have not been over 140/90. She follows a low salt diet.     She eats 4 or more servings of fruits and vegetables daily.She consumes 2 sweetened beverage(s) daily.She exercises with enough effort to increase her heart rate 30 to 60 minutes per day.  She " "exercises with enough effort to increase her heart rate 4 days per week.   She is taking medications regularly.     The patient presents today for follow up of her asthma. She started Zyrtec last week and this helped her some, will add in Singulair to also help her. She continues on albuterol and Symbicort.    She got a new job working part time at [employer], she has been out a bit more now that it is getting nicer out.    She reports that she needs a emotional support animal letter written for her two cats, will do this.    She is due for her physical with fasted labs. Will give her the prevnar 20 shot today.    An  was used during the duration of her visit today.     Review of Systems   Constitutional, HEENT, cardiovascular, pulmonary, GI, , musculoskeletal, neuro, skin, endocrine and psych systems are negative, except as otherwise noted.      Objective    BP (!) 152/80   Pulse 62   Temp 97.5  F (36.4  C)   Resp 16   Ht 1.651 m (5' 5\")   Wt 102.1 kg (225 lb)   SpO2 99%   BMI 37.44 kg/m    Body mass index is 37.44 kg/m .  Physical Exam   GENERAL: healthy, alert and no distress  EYES: Eyes grossly normal to inspection  RESP: lungs clear to auscultation - no rales, rhonchi or wheezes  CV: regular rate and rhythm, normal S1 S2, no S3 or S4, no murmur, click or rub, no peripheral edema and peripheral pulses strong  MS: no gross musculoskeletal defects noted, no edema  SKIN: no suspicious lesions or rashes  NEURO: Normal strength and tone, mentation intact and speech normal  PSYCH: mentation appears normal, affect normal/bright      "

## 2023-04-17 NOTE — PATIENT INSTRUCTIONS
Start the Singulair daily for your asthma and allergies.    Letter written for your Cats today.    Continue your other current medications.    You received the Prevnar 20 shot today.    See you back in three months for your physical with labs, before then if anything comes up.

## 2023-04-19 DIAGNOSIS — J45.40 MODERATE PERSISTENT ASTHMA WITHOUT COMPLICATION: ICD-10-CM

## 2023-04-19 DIAGNOSIS — J30.2 SEASONAL ALLERGIC RHINITIS, UNSPECIFIED TRIGGER: ICD-10-CM

## 2023-04-19 DIAGNOSIS — T78.40XD ALLERGIC REACTION, SUBSEQUENT ENCOUNTER: ICD-10-CM

## 2023-04-19 DIAGNOSIS — I10 ESSENTIAL HYPERTENSION: ICD-10-CM

## 2023-04-19 DIAGNOSIS — Z87.898 HISTORY OF HEADACHE: ICD-10-CM

## 2023-04-19 DIAGNOSIS — K21.00 GASTROESOPHAGEAL REFLUX DISEASE WITH ESOPHAGITIS, UNSPECIFIED WHETHER HEMORRHAGE: ICD-10-CM

## 2023-04-19 RX ORDER — METOPROLOL TARTRATE 25 MG/1
75 TABLET, FILM COATED ORAL 2 TIMES DAILY
Qty: 540 TABLET | Refills: 3 | Status: SHIPPED | OUTPATIENT
Start: 2023-04-19 | End: 2023-10-12

## 2023-04-19 RX ORDER — ALBUTEROL SULFATE 0.83 MG/ML
SOLUTION RESPIRATORY (INHALATION)
Qty: 180 ML | Refills: 11 | Status: SHIPPED | OUTPATIENT
Start: 2023-04-19 | End: 2024-08-09

## 2023-04-19 RX ORDER — DILTIAZEM HYDROCHLORIDE 60 MG/1
2 TABLET, FILM COATED ORAL 2 TIMES DAILY
Qty: 10.2 G | Refills: 11 | Status: SHIPPED | OUTPATIENT
Start: 2023-04-19 | End: 2024-08-06

## 2023-04-19 RX ORDER — MONTELUKAST SODIUM 10 MG/1
10 TABLET ORAL AT BEDTIME
Qty: 90 TABLET | Refills: 3 | Status: SHIPPED | OUTPATIENT
Start: 2023-04-19 | End: 2024-05-02

## 2023-04-19 RX ORDER — EPINEPHRINE 0.3 MG/.3ML
0.3 INJECTION SUBCUTANEOUS PRN
Qty: 2 EACH | Refills: 0 | Status: SHIPPED | OUTPATIENT
Start: 2023-04-19 | End: 2023-06-07

## 2023-04-19 RX ORDER — ALBUTEROL SULFATE 90 UG/1
AEROSOL, METERED RESPIRATORY (INHALATION)
Qty: 8.5 G | Refills: 1 | Status: SHIPPED | OUTPATIENT
Start: 2023-04-19 | End: 2024-06-07

## 2023-04-19 RX ORDER — GABAPENTIN 100 MG/1
CAPSULE ORAL
Qty: 180 CAPSULE | Refills: 3 | Status: SHIPPED | OUTPATIENT
Start: 2023-04-19 | End: 2024-05-02

## 2023-04-19 RX ORDER — CETIRIZINE HYDROCHLORIDE 10 MG/1
10 TABLET ORAL DAILY
Qty: 30 TABLET | Refills: 3 | Status: SHIPPED | OUTPATIENT
Start: 2023-04-19 | End: 2023-09-20

## 2023-04-19 NOTE — TELEPHONE ENCOUNTER
Patient calling regarding refill requests.   [facility] [id number] assisting.     Pt [facility] to [facility] [place] - needs to renew all her refills.   A few of her medications were sent to the incorrect pharmacy. TC did update pharmacy, confirmed [facility] is correct.     Per Pt, [facility] does not accept albuterol neb solution. She requested we call and discuss with them.       I called [facility] - they do accept albuterol neb solution, they just do not have an order on file with them.   Will send new orders over.       Routing to refill pool for protocol.     RENATA Alejandro

## 2023-04-19 NOTE — TELEPHONE ENCOUNTER
"Routing refill request to provider for review/approval because:  Drug not on the [facility] refill protocol   ACT not up to date.     Thank you!  Flavia YANEZ    Approved per [facility] RN refill protocol.     cetirizine (ZYRTEC) 10 MG tablet 30 tablet 3     Sig: Take 1 tablet (10 mg) by mouth daily       Antihistamines Protocol Passed - 4/19/2023 11:25 AM        Passed - Patient is 3-64 years of age     Apply weight-based dosing for peds patients age 3 - 12 years of age.    Forward request to provider for patients under the age of 3 or over the age of 64.          Passed - Recent (12 mo) or future (30 days) visit within the authorizing provider's specialty     Patient has had an office visit with the authorizing provider or a provider within the authorizing providers department within the previous 12 mos or has a future within next 30 days. See \"Patient Info\" tab in inbasket, or \"Choose Columns\" in Meds & Orders section of the refill encounter.              Passed - Medication is active on med list          EPINEPHrine (ANY BX GENERIC EQUIV) 0.3 MG/0.3ML injection 2-pack 2 each 0     Sig: Inject 0.3 mLs (0.3 mg) into the muscle as needed for anaphylaxis       Anaphylaxis Kits Protocol Passed - 4/19/2023 11:25 AM        Passed - Recent (12 mo) or future (30 days) visit witin the authorizing provider's specialty     Patient has had an office visit with the authorizing provider or a provider within the authorizing providers department within the previous 12 mos or has a future within next 30 days. See \"Patient Info\" tab in inbasket, or \"Choose Columns\" in Meds & Orders section of the refill encounter.              Passed - Patient is age 5 or older        Passed - Medication is active on med list          omeprazole (PRILOSEC) 20 MG DR capsule 90 capsule 1     Sig: Take 1 capsule (20 mg) by mouth every morning       PPI Protocol Passed - 4/19/2023 11:25 AM        Passed - Not on Clopidogrel (unless Pantoprazole ordered)        Passed - " "No diagnosis of osteoporosis on record        Passed - Recent (12 mo) or future (30 days) visit within the authorizing provider's specialty     Patient has had an office visit with the authorizing provider or a provider within the authorizing providers department within the previous 12 mos or has a future within next 30 days. See \"Patient Info\" tab in inbasket, or \"Choose Columns\" in Meds & Orders section of the refill encounter.              Passed - Medication is active on med list        Passed - Patient is age 18 or older        Passed - No active pregnacy on record        Passed - No positive pregnancy test in past 12 months               Flavia RODGERSN, RN  [facility]    "

## 2023-04-27 DIAGNOSIS — G47.00 PERSISTENT INSOMNIA: ICD-10-CM

## 2023-04-27 RX ORDER — HYDROXYZINE HYDROCHLORIDE 25 MG/1
TABLET, FILM COATED ORAL
Qty: 270 TABLET | Refills: 3 | Status: SHIPPED | OUTPATIENT
Start: 2023-04-27 | End: 2024-01-11

## 2023-04-27 NOTE — TELEPHONE ENCOUNTER
#[id number]  Pt last seen OV encounter dated 4.17.23  hydrOXYzine (ATARAX) 25 MG tablet 3.22.23 change  -Take 2 tablets (50 mg) by mouth every 8 hours as needed for anxiety or other (sleep)  hydrOXYzine (ATARAX) 25 MG tablet  TAKE 3 TABLETS (75MG) BY MOUTH THREE TIMES A DAY AS NEEDED FOR ANXIETY OR INSOMNIA    Please see Mychart encounter daed 3.22.23  Pt reporting prescription was changed pt was taking 3 pills/day and was changed to 2 pills/day  Prescription is not reflecting what pt is used to taking.      Pharmacy: [facility]    Message will be routed to PCP for review    Mindi GARCIA RN  [facility]

## 2023-05-03 ENCOUNTER — APPOINTMENT (OUTPATIENT)
Dept: CT IMAGING | Facility: CLINIC | Age: 48
End: 2023-05-03
Attending: EMERGENCY MEDICINE
Payer: COMMERCIAL

## 2023-05-03 ENCOUNTER — HOSPITAL ENCOUNTER (EMERGENCY)
Facility: CLINIC | Age: 48
Discharge: HOME | End: 2023-05-04
Attending: EMERGENCY MEDICINE | Admitting: EMERGENCY MEDICINE
Payer: COMMERCIAL

## 2023-05-03 DIAGNOSIS — N83.202 LEFT OVARIAN CYST: ICD-10-CM

## 2023-05-03 LAB
ALBUMIN SERPL-MCNC: 3.7 G/DL (ref 3.5–5)
ALBUMIN UR-MCNC: NEGATIVE MG/DL
ALP SERPL-CCNC: 84 U/L (ref 45–120)
ALT SERPL W P-5'-P-CCNC: 15 U/L (ref 0–45)
ANION GAP SERPL CALCULATED.3IONS-SCNC: 5 MMOL/L (ref 5–18)
APPEARANCE UR: CLEAR
AST SERPL W P-5'-P-CCNC: 15 U/L (ref 0–40)
BASOPHILS # BLD AUTO: 0 10E3/UL (ref 0–0.2)
BASOPHILS NFR BLD AUTO: 0 %
BILIRUB SERPL-MCNC: 0.6 MG/DL (ref 0–1)
BILIRUB UR QL STRIP: NEGATIVE
BUN SERPL-MCNC: 11 MG/DL (ref 8–22)
C REACTIVE PROTEIN LHE: 0.5 MG/DL (ref 0–?)
CALCIUM SERPL-MCNC: 9.1 MG/DL (ref 8.5–10.5)
CHLORIDE BLD-SCNC: 107 MMOL/L (ref 98–107)
CO2 SERPL-SCNC: 27 MMOL/L (ref 22–31)
COLOR UR AUTO: ABNORMAL
CREAT SERPL-MCNC: 0.79 MG/DL (ref 0.6–1.1)
EOSINOPHIL # BLD AUTO: 0.1 10E3/UL (ref 0–0.7)
EOSINOPHIL NFR BLD AUTO: 1 %
ERYTHROCYTE [DISTWIDTH] IN BLOOD BY AUTOMATED COUNT: 13.2 % (ref 10–15)
GFR SERPL CREATININE-BSD FRML MDRD: >90 ML/MIN/1.73M2
GLUCOSE BLD-MCNC: 102 MG/DL (ref 70–125)
GLUCOSE UR STRIP-MCNC: NEGATIVE MG/DL
HCG UR QL: NEGATIVE
HCT VFR BLD AUTO: 40.6 % (ref 35–47)
HEMOCCULT STL QL: NEGATIVE
HGB BLD-MCNC: 13.7 G/DL (ref 11.7–15.7)
HGB UR QL STRIP: ABNORMAL
IMM GRANULOCYTES # BLD: 0 10E3/UL
IMM GRANULOCYTES NFR BLD: 0 %
KETONES UR STRIP-MCNC: NEGATIVE MG/DL
LEUKOCYTE ESTERASE UR QL STRIP: NEGATIVE
LIPASE SERPL-CCNC: 16 U/L (ref 0–52)
LYMPHOCYTES # BLD AUTO: 2.4 10E3/UL (ref 0.8–5.3)
LYMPHOCYTES NFR BLD AUTO: 25 %
MCH RBC QN AUTO: 28.1 PG (ref 26.5–33)
MCHC RBC AUTO-ENTMCNC: 33.7 G/DL (ref 31.5–36.5)
MCV RBC AUTO: 83 FL (ref 78–100)
MONOCYTES # BLD AUTO: 0.5 10E3/UL (ref 0–1.3)
MONOCYTES NFR BLD AUTO: 5 %
NEUTROPHILS # BLD AUTO: 6.6 10E3/UL (ref 1.6–8.3)
NEUTROPHILS NFR BLD AUTO: 69 %
NITRATE UR QL: NEGATIVE
NRBC # BLD AUTO: 0 10E3/UL
NRBC BLD AUTO-RTO: 0 /100
PH UR STRIP: 7 [PH] (ref 5–7)
PLATELET # BLD AUTO: 266 10E3/UL (ref 150–450)
POTASSIUM BLD-SCNC: 3.7 MMOL/L (ref 3.5–5)
PROT SERPL-MCNC: 6.8 G/DL (ref 6–8)
RBC # BLD AUTO: 4.88 10E6/UL (ref 3.8–5.2)
RBC URINE: <1 /HPF
SODIUM SERPL-SCNC: 139 MMOL/L (ref 136–145)
SP GR UR STRIP: 1.02 (ref 1–1.03)
UROBILINOGEN UR STRIP-MCNC: <2 MG/DL
WBC # BLD AUTO: 9.6 10E3/UL (ref 4–11)
WBC URINE: <1 /HPF

## 2023-05-03 PROCEDURE — 85025 COMPLETE CBC W/AUTO DIFF WBC: CPT | Performed by: EMERGENCY MEDICINE

## 2023-05-03 PROCEDURE — 83690 ASSAY OF LIPASE: CPT | Performed by: EMERGENCY MEDICINE

## 2023-05-03 PROCEDURE — 80053 COMPREHEN METABOLIC PANEL: CPT | Performed by: EMERGENCY MEDICINE

## 2023-05-03 PROCEDURE — 86140 C-REACTIVE PROTEIN: CPT | Performed by: EMERGENCY MEDICINE

## 2023-05-03 PROCEDURE — 250N000011 HC RX IP 250 OP 636: Performed by: EMERGENCY MEDICINE

## 2023-05-03 PROCEDURE — 99284 EMERGENCY DEPT VISIT MOD MDM: CPT | Mod: 25

## 2023-05-03 PROCEDURE — 82272 OCCULT BLD FECES 1-3 TESTS: CPT | Performed by: EMERGENCY MEDICINE

## 2023-05-03 PROCEDURE — 81025 URINE PREGNANCY TEST: CPT | Performed by: EMERGENCY MEDICINE

## 2023-05-03 PROCEDURE — 74176 CT ABD & PELVIS W/O CONTRAST: CPT

## 2023-05-03 PROCEDURE — 36415 COLL VENOUS BLD VENIPUNCTURE: CPT | Performed by: EMERGENCY MEDICINE

## 2023-05-03 PROCEDURE — 81001 URINALYSIS AUTO W/SCOPE: CPT | Performed by: EMERGENCY MEDICINE

## 2023-05-03 RX ORDER — ONDANSETRON 2 MG/ML
4 INJECTION INTRAMUSCULAR; INTRAVENOUS ONCE
Status: COMPLETED | OUTPATIENT
Start: 2023-05-03 | End: 2023-05-03

## 2023-05-03 RX ORDER — ONDANSETRON 4 MG/1
4 TABLET, ORALLY DISINTEGRATING ORAL ONCE
Status: COMPLETED | OUTPATIENT
Start: 2023-05-03 | End: 2023-05-03

## 2023-05-03 RX ADMIN — ONDANSETRON 4 MG: 4 TABLET, ORALLY DISINTEGRATING ORAL at 22:06

## 2023-05-03 ASSESSMENT — ENCOUNTER SYMPTOMS
DIFFICULTY URINATING: 1
ROS GI COMMENTS: NEGATIVE FOR HEMATEMESIS.
ABDOMINAL PAIN: 1
BLOOD IN STOOL: 1

## 2023-05-03 ASSESSMENT — ACTIVITIES OF DAILY LIVING (ADL): ADLS_ACUITY_SCORE: 35

## 2023-05-04 ENCOUNTER — NURSE TRIAGE (OUTPATIENT)
Dept: NURSING | Facility: CLINIC | Age: 48
End: 2023-05-04
Payer: COMMERCIAL

## 2023-05-04 VITALS
OXYGEN SATURATION: 97 % | DIASTOLIC BLOOD PRESSURE: 81 MMHG | BODY MASS INDEX: 37.44 KG/M2 | WEIGHT: 225 LBS | TEMPERATURE: 99.3 F | RESPIRATION RATE: 16 BRPM | HEART RATE: 73 BPM | SYSTOLIC BLOOD PRESSURE: 175 MMHG

## 2023-05-04 ASSESSMENT — ACTIVITIES OF DAILY LIVING (ADL): ADLS_ACUITY_SCORE: 35

## 2023-05-04 NOTE — ED TRIAGE NOTES
Pt arrives to ED with c/o 3 days of intermittent abdominal pain and back pain which is worse on the left side. Nausea but no vomiting or diarrhea. Pt endorses to having difficulty urinating and black stool. Pt needs ALS  which was paged out.       Triage Assessment     Row Name 05/03/23 2046       Triage Assessment (Adult)    Airway WDL WDL       Respiratory WDL    Respiratory WDL WDL       Skin Circulation/Temperature WDL    Skin Circulation/Temperature WDL WDL       Cardiac WDL    Cardiac WDL WDL       Peripheral/Neurovascular WDL    Peripheral Neurovascular WDL WDL       Cognitive/Neuro/Behavioral WDL    Cognitive/Neuro/Behavioral WDL WDL

## 2023-05-04 NOTE — DISCHARGE INSTRUCTIONS
Over-the-counter Tylenol every 6 hours if needed for pain.  Ice or heat off-and-on to sore areas can also help with pain.  Dark-colored stools were negative for blood today.  Follow-up with your clinic as needed.

## 2023-05-04 NOTE — ED PROVIDER NOTES
EMERGENCY DEPARTMENT ENCOUNTER      NAME: Marleny Viera  AGE: 47 year old female  YOB: 1975  MRN: [id number]  EVALUATION DATE & TIME: No admission date for patient encounter.    PCP: Maria Fernanda Schumacher    ED PROVIDER: Kendrick Galarza M.D.      Chief Complaint   Patient presents with     Abdominal Pain     Back Pain     Melena         FINAL IMPRESSION:  1.  Acute left flank pain.  2.  Acute dark stools, guaiac negative.  3.  Left ovarian cyst.      ED COURSE & MEDICAL DECISION MAKIN:28 PM I met with the patient to gather history and to perform my initial exam. We discussed plans for the ED course, including diagnostic testing and treatment. PPE worn: cloth mask.  Patient is driving.  We thought of giving Toradol but patient lists an allergy to ibuprofen causing kidney problems so this was held.  Patient with 3 days of intermittent abdominal pain which is primarily left side and left flank.  Also melena today several times.  No previous history of this.  Chart indicates a history of past alcoholism but patient denies current alcohol.  10:58 PM.  Laboratory work is all negative.  Hemoglobin 13.7.  Abdominal CT showing an incidental left ovarian cyst, uterine fibroids, hepatic steatosis.  Urine is negative also.  We will need to move the patient to a different room and perform a rectal examination to see if her dark stools or blood or not.  12:26 AM Initial  had to go home. New  will be present shortly.   12:51 AM I rechecked and updated the patient.  Stool is guaiac negative.  She probably ate something that caused dark stools.  Results communicated to the patient.  We talked about Tylenol ice or heat to the left ovarian cyst area.  She denied the need or desire for anything stronger.  Patient discharged home.    Pertinent Labs & Imaging studies reviewed. (See chart for details)  47 year old female presents to the Emergency Department for evaluation of left flank pain and  "melena.    At the conclusion of the encounter I discussed the results of all of the tests and the disposition. The questions were answered. The patient or family acknowledged understanding and was agreeable with the care plan.     Medical Decision Making    History:    Supplemental history from: .    External Record(s) reviewed: Inpatient and outpatient computer records reviewed.    Work Up:    Chart documentation includes differential considered and any EKGs or imaging independently interpreted by provider, where specified.  Differential diagnosis includes kidney infection, kidney stone, GI bleed, etc.    In additional to work up documented, I considered the following work up: Documented in chart, if applicable.    External consultation:    Discussion of management with another provider: Documented in chart, if applicable    Complicating factors:    Care impacted by chronic illness: Hypertension, Mental Health and Other: Deafness    Care affected by social determinants of health: N/A    Disposition considerations: Patient may require admission.      MEDICATIONS GIVEN IN THE EMERGENCY:  Medications   ondansetron (ZOFRAN) injection 4 mg (has no administration in time range)   ondansetron (ZOFRAN ODT) ODT tab 4 mg (4 mg Oral $Given 5/3/23 2204)       NEW PRESCRIPTIONS STARTED AT TODAY'S ER VISIT  New Prescriptions    No medications on file          =================================================================    HPI    Patient information was obtained from: Patient     Use of : Yes (In Person) - Language ASL         Marleny Viera is a 47 year old female with a pertinent history of bilateral deafness, HTN, PE, ROBERT, De Quervain's disease, BPD, and bipolar depression, who presents to this ED by walk in for evaluation of abdominal pain.    Patient reports of left sided \"kidney\" pain for the past 3 days. Patient states that her pain feels similar to when she had kidney stones \"years ago.\" " She also reports of urinary retention. Today, the patient endorses developing 5 episodes of black stools, prompting her to be present in the ED. Patient adds that she has never had black stools before. Here in the ED, she rates her pain an 8 out of 10. Patient also mentions taking Tylenol ~2 hours ago. She denies anticoagulant use. She denies hematemesis. She otherwise denies any other symptoms or complaints at this time.     She does not identify any waxing or waning symptoms otherwise, exacerbating or alleviating features,associated symptoms except as mentioned. She denies any pain related complaints.    Social Hx: Denies alcohol use.    REVIEW OF SYSTEMS   Review of Systems   Gastrointestinal: Positive for abdominal pain and blood in stool (black).        Negative for hematemesis.   Genitourinary: Positive for difficulty urinating.   All other systems reviewed and are negative.       PAST MEDICAL HISTORY:  Past Medical History:   Diagnosis Date     Accidental overdose, initial encounter 7/7/2022     ADHD (attention deficit hyperactivity disorder)      Alcohol dependence (H)      Anxiety      Arthritis      Asthma      Bipolar depression (H)      Deafness      Drug abuse, nondependent (H) 10/29/2004    Overview:  polydrug abuse per psych notes ; Other, mixed, or unspecified nondependent drug abuse, unspecified     History of blood clots      History of transfusion      Hypertension      Kidney stones 2015     Major depressive disorder, recurrent episode, severe (H) 5/17/2016    Overview:  s/p suicide attempt Epic      Migraine      Obstructive sleep apnea 6/1/2016     Pulmonary emboli (H)      Status post total right knee replacement 1/18/2016       PAST SURGICAL HISTORY:  Past Surgical History:   Procedure Laterality Date     BIOPSY BREAST Right     2019... Also had fluid drained from left breast under surgery also in 2019     BREAST SURGERY       MAMMOPLASTY REDUCTION Bilateral 2017     TOTAL HIP ARTHROPLASTY  Bilateral      TOTAL KNEE ARTHROPLASTY Right      TUBAL LIGATION       US BREAST CORE BIOPSY RIGHT Right 11/21/2019           CURRENT MEDICATIONS:    hydrOXYzine (ATARAX) 25 MG tablet  albuterol (PROAIR HFA/PROVENTIL HFA/VENTOLIN HFA) 108 (90 Base) MCG/ACT inhaler  albuterol (PROVENTIL) (2.5 MG/3ML) 0.083% neb solution  Blood Pressure Monitoring (BLOOD PRESSURE KIT) KIT  cetirizine (ZYRTEC) 10 MG tablet  clindamycin (CLEOCIN) 300 MG capsule  colchicine (COLCYRS) 0.6 MG tablet  EPINEPHrine (ANY BX GENERIC EQUIV) 0.3 MG/0.3ML injection 2-pack  gabapentin (NEURONTIN) 100 MG capsule  hydrOXYzine (ATARAX) 25 MG tablet  metoprolol tartrate (LOPRESSOR) 25 MG tablet  montelukast (SINGULAIR) 10 MG tablet  mupirocin (BACTROBAN) 2 % external ointment  omeprazole (PRILOSEC) 20 MG DR capsule  polyethylene glycol (MIRALAX) 17 GM/Dose powder  SYMBICORT 80-4.5 MCG/ACT Inhaler        ALLERGIES:  Allergies   Allergen Reactions     Amoxicillin Itching and Shortness Of Breath     Bee Pollen Anaphylaxis     Contrast [Iodixanol] Shortness Of Breath and Rash     Pt stated she reacted to the contrast given for her CT in past. She experienced shortness of breath, throat tightening, and rash on chest, and they gave her an injection to counter the symptoms.      Covid-19 (Mrna) Vaccine Shortness Of Breath     Pfizer COVID-19 Vaccine     Hymenoptera Allergenic Extract [Wasp Venom Protein] Anaphylaxis     Iodine Hives and Unknown     Pt stated that she was injected with CT CONTRAST in the past and got hives, SOB, and nausea. Please pre-medicate patient in the future.      Wasp Venom Protein Starter Kit [Wasp Venom Protein] Itching, Shortness Of Breath, Swelling and Difficulty breathing     Adhesive Tape Unknown     Bee Venom Unknown     Food Allergy Formula Unknown     Red meat, chocolate     Geodon [Ziprasidone] Unknown     Ibuprofen Other (See Comments)     Kidney function, Kidney function     Latex Unknown     Added based on information  "entered during case entry, please review and add reactions, type, and severity as needed     Morphine Unknown     Prochlorperazine Other (See Comments)     Risperdal [Risperidone] Unknown     Risperidone Analogues [Risperidone] Other (See Comments)     Shellfish Allergy Unknown     Theobroma Oil [Theobroma Grandiflorum Seed Butter] Unknown     Trazodone Other (See Comments)     Elevated creatinine     Zoloft [Sertraline] Unknown     Hydrochlorothiazide Rash       FAMILY HISTORY:  Family History   Problem Relation Age of Onset     Cancer Mother      Breast Cancer Mother      Cerebrovascular Disease Father        SOCIAL HISTORY:   Social History     Socioeconomic History     Marital status:      Spouse name: None     Number of children: None     Years of education: None     Highest education level: None   Tobacco Use     Smoking status: Never     Smokeless tobacco: Never   Vaping Use     Vaping status: Never Used   Substance and Sexual Activity     Alcohol use: Yes     Comment: Alcoholic Drinks/day: dependence     Drug use: Not Currently     Sexual activity: Never   Social History Narrative    . No children.   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  Nonsmoker.  No alcohol use.  Tesha Pelaez MD     History of alcoholism.  Denies current tobacco, alcohol, or drugs.    VITALS:  BP (!) 177/87   Pulse 81   Temp 99.3  F (37.4  C) (Temporal)   Resp 16   Wt 102.1 kg (225 lb)   SpO2 100%   BMI 37.44 kg/m      PHYSICAL EXAM    Vital Signs:  BP (!) 177/87   Pulse 81   Temp 99.3  F (37.4  C) (Temporal)   Resp 16   Wt 102.1 kg (225 lb)   SpO2 100%   BMI 37.44 kg/m    General:  On entering the room  is in no apparent distress.    Neck:  Neck supple with full range of motion and nontender.    Back:  Back and spine are nontender.  No costovertebral angle tenderness on the right, but tender on the left.  Also left flank tenderness..    HEENT:  Oropharynx clear with " moist mucous membranes.  HEENT unremarkable.    Pulmonary:  Chest clear to auscultation without rhonchi rales or wheezing.    Cardiovascular:  Cardiac regular rate and rhythm without murmurs rubs or gallops.    Abdomen:  Abdomen soft nontender.  There is no rebound or guarding.    Muskuloskeletal:  she moves all 4 without any difficulty and has normal neurovascular exams.  Extremities without clubbing, cyanosis, or edema.  Legs and calves are nontender.    Neuro:  she is alert and oriented ×3 and moves all extremities symmetrically.    Psych:  Normal affect.    Skin:  Unremarkable and warm and dry.       LAB:  All pertinent labs reviewed and interpreted.  Labs Ordered and Resulted from Time of ED Arrival to Time of ED Departure   ROUTINE UA WITH MICROSCOPIC REFLEX TO CULTURE - Abnormal       Result Value    Color Urine Light Yellow      Appearance Urine Clear      Glucose Urine Negative      Bilirubin Urine Negative      Ketones Urine Negative      Specific Gravity Urine 1.019      Blood Urine 0.1 mg/dL (*)     pH Urine 7.0      Protein Albumin Urine Negative      Urobilinogen Urine <2.0      Nitrite Urine Negative      Leukocyte Esterase Urine Negative      RBC Urine <1      WBC Urine <1     LIPASE - Normal    Lipase 16     COMPREHENSIVE METABOLIC PANEL - Normal    Sodium 139      Potassium 3.7      Chloride 107      Carbon Dioxide (CO2) 27      Anion Gap 5      Urea Nitrogen 11      Creatinine 0.79      Calcium 9.1      Glucose 102      Alkaline Phosphatase 84      AST 15      ALT 15      Protein Total 6.8      Albumin 3.7      Bilirubin Total 0.6      GFR Estimate >90     CRP INFLAMMATION - Normal    CRP 0.5     HCG QUALITATIVE URINE - Normal    hCG Urine Qualitative Negative     OCCULT BLOOD STOOL - Normal    Occult Blood Negative     CBC WITH PLATELETS AND DIFFERENTIAL    WBC Count 9.6      RBC Count 4.88      Hemoglobin 13.7      Hematocrit 40.6      MCV 83      MCH 28.1      MCHC 33.7      RDW 13.2       Platelet Count 266      % Neutrophils 69      % Lymphocytes 25      % Monocytes 5      % Eosinophils 1      % Basophils 0      % Immature Granulocytes 0      NRBCs per 100 WBC 0      Absolute Neutrophils 6.6      Absolute Lymphocytes 2.4      Absolute Monocytes 0.5      Absolute Eosinophils 0.1      Absolute Basophils 0.0      Absolute Immature Granulocytes 0.0      Absolute NRBCs 0.0         RADIOLOGY:  Reviewed all pertinent imaging. Please see official radiology report.  Abd/pelvis CT no contrast - Stone Protocol   Final Result   IMPRESSION:    1.  Left ovarian cyst.   2.  Fibroid uterus.   3.  Hepatic steatosis.                    EKG:          PROCEDURES:         I, Tesfaye Maldonado, am serving as a scribe to document services personally performed by Dr. Galarza based on my observation and the provider's statements to me. I, Kendrick Galarza MD attest that Tesfaye Maldonado is acting in a scribe capacity, has observed my performance of the services and has documented them in accordance with my direction.    Kendrick Galarza M.D.  Emergency Medicine  [facility] EMERGENCY ROOM  [facility] [facility] [id number] [phone]  Dept: [phone]     Kendrick Galarza MD  05/04/23 0104

## 2023-05-04 NOTE — TELEPHONE ENCOUNTER
Patient called via  requesting information on when she can get her next Covid vaccine. Looking through immunization records noted 2 Pfizer Monovalent vaccines 4/15/23 and 4/20/23. Based off Bivalent schedule she should wait 8 weeks to get her next vaccine. Offered to route to scheduling to set up appointment, but patient stated she will wait till it's closer to that time and call and hang up.      We are scheduling all people aged 6 months and older for COVID-19 vaccines.  Persons 6 months - 17 years will receive the Pfizer COVID-19 vaccine and patients 18+ years will be able to choose between Pfizer and Moderna COVID-19 vaccines.     We are now following the updated bivalent vaccine schedule as shown below. Patients 5+ years who are not immunocompromised and have previously received a bivalent vaccine are not eligible for another bivalent dose at this time.     Bivalent vaccine schedule  Unvaccinated and 6m - <5y Received one dose of Pfizer monovalent and 6m - <5 y Received two doses of Pfizer monovalent and 6m - <5 y Received three doses of Pfizer monovalent and 6m-<5Y   Three doses of bivalent:     1st bivalent dose    2nd bivalent dose given three weeks after 1st dose    3rd bivalent dose given at least 8 weeks after 2nd dose Two doses of bivalent:    1st bivalent dose given three weeks after monovalent dose    2nd bivalent dose given at least 8 weeks after 1st bivalent dose Single bivalent dose at least 8 weeks after 2nd dose of monovalent Single bivalent dose at least 2 months after 3rd dose of monovalent         Unvaccinated and 5+ years Received one or more doses of monovalent Moderna/Pfizer or Novavax or Pop and 5+ years Immunocompromised and received one bivalent dose and 5+ years Received one bivalent dose and 65+ years   Single Bivalent dose Single bivalent dose at least 2 months after last monovalent/Novavax/Pop dose Single Bivalent dose at least 2 months after the previous  bivalent dose Single bivalent dose at least 4 months after the previous bivalent dose     In our health system, patients 5 years and older can receive Pfizer and 18 and over can receive Moderna Bivalent boosters, regardless of the original vaccine  received for primary COVID-19 vaccines.     To learn more about COVID-19 vaccines in general, please visit the CDC website: https://www.cdc.gov/coronavirus/2019-ncov/vaccines/index.html     To schedule a COVID-19 vaccine appointment, please log in to [employer] using this link to see when and where we have openings (to schedule a child s vaccine, you will need to log into their [employer] account). [facility] retail pharmacies also administer Moderna and Pfizer COVID Vaccines to patients 5 years and older. [facility] now offer Novavax primary COVID-19 vaccine.  To schedule at a [facility] pharmacy please go to [email or web address].    If you have technical difficulty using [employer], call [phone], option 1 for assistance.    More information about vaccine effectiveness at reducing spread of disease, hospitalizations, and death as well as vaccine safety and answers to other questions can be found on our website: [email or web address].              Reason for Disposition    COVID-19 vaccine, Frequently Asked Questions (FAQs)    Additional Information    Negative: Difficulty breathing or swallowing and starts within 2 hours after injection    Negative: Sounds like a life-threatening emergency to the triager    Negative: [1] Symptoms of COVID-19 (e.g., cough, fever, SOB, or others) AND [2] within 14 days of EXPOSURE (close contact) with diagnosed or suspected COVID-19 patient    Negative: [1] Symptoms of COVID-19 (e.g., cough, fever, SOB, or others) AND [2] within 14 days of being at a crowded indoor or outdoor event (e.g., concert, festival, rally, wedding)    Negative: Typical COVID-19 symptoms (e.g., cough,  difficulty breathing, loss of taste or smell, runny nose, sore throat) that are NOT expected from vaccine    Negative: [1] COVID-19 exposure AND [2] no symptoms, or symptoms not typical of COVID-19    Negative: Fever > 104 F (40 C)    Negative: Sounds like a severe, unusual reaction to the triager    Negative: [1] Fever > 101 F (38.3 C) AND [2] over 60 years of age AND [3] started > 48 hours after getting vaccine    Negative: [1] Fever > 100.0 F (37.8 C) AND [2] bedridden (e.g., nursing home patient, CVA, chronic illness, recovering from surgery) AND [3] started > 48 hours after getting vaccine    Negative: [1] Fever > 100.0 F (37.8 C) AND [2] diabetes mellitus or weak immune system (e.g., HIV positive, cancer chemo, splenectomy, organ transplant, chronic steroids) AND [3] started > 48 hours after getting vaccine    Negative: [1] Fever > 100.0 F (37.8 C) AND [2] present > 3 days (72 hours)    Negative: [1] Fever > 100.0 F (37.8 C) AND [2] healthcare worker    Negative: [1] Pain, tenderness, or swelling at the injection site AND [2] over 3 days (72 hours) since vaccine AND [3] getting worse    Negative: [1] Redness around the injection site AND [2] started > 48 hours after getting vaccine  (Exception: Red area < 1 inch or 2.5 cm wide.)    Negative: [1] Pain, tenderness, or swelling at the injection site AND [2] lasts > 7 days    Negative: [1] Lymph node swelling (i.e., armpit or neck on side of vaccine) AND [2] lasts > 3 weeks    Negative: Requesting COVID-19 vaccine    Negative: COVID-19 vaccine, injection site reaction (e.g., pain, redness, swelling), question about    Negative: COVID-19 vaccine, systemic reactions (e.g., fatigue, fever, muscle aches), questions about    Negative: Up-to-date on COVID-19 vaccination and exposure to COVID-19, Frequently Asked Questions (FAQs)    Negative: COVID-19 Prevention and Healthy Living, questions about    Protocols used: CORONAVIRUS (COVID-19) VACCINE QUESTIONS AND  [id number]    Sabrina Strong RN on 5/4/2023 at 12:48 PM

## 2023-05-15 ENCOUNTER — TELEPHONE (OUTPATIENT)
Dept: INTERNAL MEDICINE | Facility: CLINIC | Age: 48
End: 2023-05-15
Payer: COMMERCIAL

## 2023-05-15 NOTE — TELEPHONE ENCOUNTER
Prior Authorization Request   Who s requesting:  Pharmacy  Pharmacy Name and Location:  [facility]  Medication Name: Cetluz  Insurance Plan: [employer]  Key:  [id number]

## 2023-05-19 NOTE — TELEPHONE ENCOUNTER
PRIOR AUTHORIZATION DENIED    Medication: CETIRIZINE HCL 10 MG PO TABS  Insurance Company: [employer] - Phone [phone] Fax [phone]  Denial Date:  5/19/2023   Denial Rational: OTC not Covered      Appeal Information: N/A   Patient Notified:  N

## 2023-06-06 DIAGNOSIS — T78.40XD ALLERGIC REACTION, SUBSEQUENT ENCOUNTER: ICD-10-CM

## 2023-06-07 RX ORDER — EPINEPHRINE 0.3 MG/.3ML
0.3 INJECTION SUBCUTANEOUS PRN
Qty: 2 EACH | Refills: 1 | Status: SHIPPED | OUTPATIENT
Start: 2023-06-07 | End: 2024-05-03

## 2023-06-07 NOTE — TELEPHONE ENCOUNTER
"Last Written Prescription Date:  4/19/2023  Last Fill Quantity: 2,  # refills: 0   Last office visit provider:  4/17/2023     Requested Prescriptions   Pending Prescriptions Disp Refills     EPINEPHrine (ANY BX GENERIC EQUIV) 0.3 MG/0.3ML injection 2-pack [Pharmacy Med Name: EPINEPHrine 0.3MG/0.3ML SOAJ*]       Sig: INJECT 0.3 MLS (0.3 MG) INTO THE MUSCLE AS NEEDED FOR ANAPHYLAXIS       Anaphylaxis Kits Protocol Passed - 6/7/2023  1:55 PM        Passed - Recent (12 mo) or future (30 days) visit witin the authorizing provider's specialty     Patient has had an office visit with the authorizing provider or a provider within the authorizing providers department within the previous 12 mos or has a future within next 30 days. See \"Patient Info\" tab in inbasket, or \"Choose Columns\" in Meds & Orders section of the refill encounter.              Passed - Patient is age 5 or older        Passed - Medication is active on med list             Jennifer Redding RN 06/07/23 1:55 PM  "

## 2023-07-01 ENCOUNTER — HOSPITAL ENCOUNTER (EMERGENCY)
Facility: CLINIC | Age: 48
Discharge: HOME | End: 2023-07-02
Attending: EMERGENCY MEDICINE | Admitting: EMERGENCY MEDICINE
Payer: COMMERCIAL

## 2023-07-01 DIAGNOSIS — T74.21XA SEXUAL ASSAULT OF ADULT, INITIAL ENCOUNTER: ICD-10-CM

## 2023-07-01 DIAGNOSIS — L25.3 LATEX ALLERGY, CONTACT DERMATITIS: ICD-10-CM

## 2023-07-01 PROCEDURE — 250N000013 HC RX MED GY IP 250 OP 250 PS 637: Performed by: EMERGENCY MEDICINE

## 2023-07-01 PROCEDURE — 99285 EMERGENCY DEPT VISIT HI MDM: CPT | Mod: 25

## 2023-07-01 PROCEDURE — 94640 AIRWAY INHALATION TREATMENT: CPT

## 2023-07-01 PROCEDURE — 99284 EMERGENCY DEPT VISIT MOD MDM: CPT | Mod: 25

## 2023-07-01 RX ORDER — DIPHENHYDRAMINE HCL 50 MG
50 CAPSULE ORAL ONCE
Status: COMPLETED | OUTPATIENT
Start: 2023-07-02 | End: 2023-07-01

## 2023-07-01 RX ADMIN — DIPHENHYDRAMINE HYDROCHLORIDE 50 MG: 50 CAPSULE ORAL at 23:45

## 2023-07-01 ASSESSMENT — ACTIVITIES OF DAILY LIVING (ADL): ADLS_ACUITY_SCORE: 35

## 2023-07-02 VITALS
HEART RATE: 77 BPM | WEIGHT: 215 LBS | SYSTOLIC BLOOD PRESSURE: 170 MMHG | OXYGEN SATURATION: 94 % | BODY MASS INDEX: 35.82 KG/M2 | TEMPERATURE: 98.2 F | DIASTOLIC BLOOD PRESSURE: 76 MMHG | HEIGHT: 65 IN | RESPIRATION RATE: 34 BRPM

## 2023-07-02 PROCEDURE — 250N000009 HC RX 250: Performed by: EMERGENCY MEDICINE

## 2023-07-02 PROCEDURE — 250N000012 HC RX MED GY IP 250 OP 636 PS 637: Performed by: EMERGENCY MEDICINE

## 2023-07-02 RX ORDER — DIPHENHYDRAMINE HCL 25 MG
50 CAPSULE ORAL EVERY 6 HOURS PRN
Qty: 30 CAPSULE | Refills: 0 | Status: SHIPPED | OUTPATIENT
Start: 2023-07-02 | End: 2023-07-19

## 2023-07-02 RX ORDER — PREDNISONE 20 MG/1
TABLET ORAL
Qty: 10 TABLET | Refills: 0 | Status: SHIPPED | OUTPATIENT
Start: 2023-07-02 | End: 2023-07-02

## 2023-07-02 RX ORDER — DIPHENHYDRAMINE HCL 25 MG
50 CAPSULE ORAL EVERY 6 HOURS PRN
Qty: 30 CAPSULE | Refills: 0 | Status: SHIPPED | OUTPATIENT
Start: 2023-07-02 | End: 2023-07-02

## 2023-07-02 RX ORDER — IPRATROPIUM BROMIDE AND ALBUTEROL SULFATE 2.5; .5 MG/3ML; MG/3ML
3 SOLUTION RESPIRATORY (INHALATION) ONCE
Status: COMPLETED | OUTPATIENT
Start: 2023-07-02 | End: 2023-07-02

## 2023-07-02 RX ORDER — PREDNISONE 20 MG/1
TABLET ORAL
Qty: 10 TABLET | Refills: 0 | Status: SHIPPED | OUTPATIENT
Start: 2023-07-02 | End: 2023-07-16

## 2023-07-02 RX ORDER — PREDNISONE 20 MG/1
20 TABLET ORAL ONCE
Status: COMPLETED | OUTPATIENT
Start: 2023-07-02 | End: 2023-07-02

## 2023-07-02 RX ADMIN — IPRATROPIUM BROMIDE AND ALBUTEROL SULFATE 3 ML: .5; 3 SOLUTION RESPIRATORY (INHALATION) at 00:37

## 2023-07-02 RX ADMIN — PREDNISONE 20 MG: 20 TABLET ORAL at 00:37

## 2023-07-02 ASSESSMENT — ACTIVITIES OF DAILY LIVING (ADL): ADLS_ACUITY_SCORE: 35
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
## 2023-07-02 NOTE — ED NOTES
Patient's blood pressure elevated per  patient states she has high blood pressure and did not take her medication yet today.

## 2023-07-02 NOTE — ED NOTES
Patient denied any emergent need at this time[facility]
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
## 2023-07-02 NOTE — ED NOTES
AIDET performed, white board updated for rounding. Patient updated on plan of care. Patient's pain assessed. Call light within reach, bed in low position, side rails up.  ipad at bedside for sign language assistance.

## 2023-07-03 ENCOUNTER — HOSPITAL ENCOUNTER (EMERGENCY)
Facility: CLINIC | Age: 48
Discharge: HOME | End: 2023-07-03
Attending: STUDENT IN AN ORGANIZED HEALTH CARE EDUCATION/TRAINING PROGRAM | Admitting: STUDENT IN AN ORGANIZED HEALTH CARE EDUCATION/TRAINING PROGRAM
Payer: COMMERCIAL

## 2023-07-03 VITALS
TEMPERATURE: 97.5 F | BODY MASS INDEX: 35.78 KG/M2 | OXYGEN SATURATION: 97 % | RESPIRATION RATE: 18 BRPM | SYSTOLIC BLOOD PRESSURE: 190 MMHG | HEART RATE: 63 BPM | DIASTOLIC BLOOD PRESSURE: 90 MMHG | WEIGHT: 215 LBS

## 2023-07-03 DIAGNOSIS — N39.0 URINARY TRACT INFECTION WITHOUT HEMATURIA, SITE UNSPECIFIED: ICD-10-CM

## 2023-07-03 LAB
ALBUMIN UR-MCNC: 30 MG/DL
APPEARANCE UR: ABNORMAL
BILIRUB UR QL STRIP: NEGATIVE
COLOR UR AUTO: YELLOW
GLUCOSE UR STRIP-MCNC: NEGATIVE MG/DL
HCG UR QL: NEGATIVE
HGB UR QL STRIP: ABNORMAL
KETONES UR STRIP-MCNC: NEGATIVE MG/DL
LEUKOCYTE ESTERASE UR QL STRIP: ABNORMAL
MUCOUS THREADS #/AREA URNS LPF: PRESENT /LPF
NITRATE UR QL: NEGATIVE
PH UR STRIP: 5.5 [PH] (ref 5–7)
RBC URINE: 8 /HPF
SP GR UR STRIP: 1.03 (ref 1–1.03)
SQUAMOUS EPITHELIAL: 16 /HPF
UROBILINOGEN UR STRIP-MCNC: <2 MG/DL
WBC URINE: 9 /HPF

## 2023-07-03 PROCEDURE — 99284 EMERGENCY DEPT VISIT MOD MDM: CPT

## 2023-07-03 PROCEDURE — 87591 N.GONORRHOEAE DNA AMP PROB: CPT | Performed by: STUDENT IN AN ORGANIZED HEALTH CARE EDUCATION/TRAINING PROGRAM

## 2023-07-03 PROCEDURE — 81001 URINALYSIS AUTO W/SCOPE: CPT | Performed by: STUDENT IN AN ORGANIZED HEALTH CARE EDUCATION/TRAINING PROGRAM

## 2023-07-03 PROCEDURE — 96372 THER/PROPH/DIAG INJ SC/IM: CPT | Performed by: STUDENT IN AN ORGANIZED HEALTH CARE EDUCATION/TRAINING PROGRAM

## 2023-07-03 PROCEDURE — 250N000009 HC RX 250: Performed by: STUDENT IN AN ORGANIZED HEALTH CARE EDUCATION/TRAINING PROGRAM

## 2023-07-03 PROCEDURE — 250N000013 HC RX MED GY IP 250 OP 250 PS 637: Performed by: STUDENT IN AN ORGANIZED HEALTH CARE EDUCATION/TRAINING PROGRAM

## 2023-07-03 PROCEDURE — 250N000011 HC RX IP 250 OP 636: Mod: JZ | Performed by: STUDENT IN AN ORGANIZED HEALTH CARE EDUCATION/TRAINING PROGRAM

## 2023-07-03 PROCEDURE — 81025 URINE PREGNANCY TEST: CPT | Performed by: STUDENT IN AN ORGANIZED HEALTH CARE EDUCATION/TRAINING PROGRAM

## 2023-07-03 PROCEDURE — 87491 CHLMYD TRACH DNA AMP PROBE: CPT | Performed by: STUDENT IN AN ORGANIZED HEALTH CARE EDUCATION/TRAINING PROGRAM

## 2023-07-03 RX ORDER — AZITHROMYCIN 500 MG/1
1000 TABLET, FILM COATED ORAL ONCE
Status: COMPLETED | OUTPATIENT
Start: 2023-07-03 | End: 2023-07-03

## 2023-07-03 RX ORDER — CEPHALEXIN 500 MG/1
500 CAPSULE ORAL 3 TIMES DAILY
Qty: 21 CAPSULE | Refills: 0 | Status: SHIPPED | OUTPATIENT
Start: 2023-07-03 | End: 2023-07-03

## 2023-07-03 RX ORDER — CEPHALEXIN 500 MG/1
500 CAPSULE ORAL 3 TIMES DAILY
Qty: 21 CAPSULE | Refills: 0 | Status: SHIPPED | OUTPATIENT
Start: 2023-07-03 | End: 2023-07-19

## 2023-07-03 RX ORDER — METRONIDAZOLE 500 MG/1
500 TABLET ORAL ONCE
Status: COMPLETED | OUTPATIENT
Start: 2023-07-03 | End: 2023-07-03

## 2023-07-03 RX ADMIN — METRONIDAZOLE 500 MG: 500 TABLET ORAL at 20:31

## 2023-07-03 RX ADMIN — LIDOCAINE HYDROCHLORIDE 250 MG: 10 INJECTION, SOLUTION EPIDURAL; INFILTRATION; INTRACAUDAL; PERINEURAL at 21:16

## 2023-07-03 RX ADMIN — AZITHROMYCIN MONOHYDRATE 1000 MG: 500 TABLET ORAL at 20:31

## 2023-07-03 ASSESSMENT — ENCOUNTER SYMPTOMS
ACTIVITY CHANGE: 1
DYSURIA: 1
SHORTNESS OF BREATH: 1
WHEEZING: 1
ABDOMINAL PAIN: 1
APPETITE CHANGE: 1

## 2023-07-03 ASSESSMENT — ACTIVITIES OF DAILY LIVING (ADL)
ADLS_ACUITY_SCORE: 35
ADLS_ACUITY_SCORE: 33
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
## 2023-07-04 ENCOUNTER — HOSPITAL ENCOUNTER (EMERGENCY)
Facility: HOSPITAL | Age: 48
Discharge: HOME | End: 2023-07-05
Attending: EMERGENCY MEDICINE | Admitting: EMERGENCY MEDICINE
Payer: COMMERCIAL
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
## 2023-07-04 LAB
C TRACH DNA SPEC QL NAA+PROBE: NEGATIVE
N GONORRHOEA DNA SPEC QL NAA+PROBE: NEGATIVE

## 2023-07-04 PROCEDURE — 99285 EMERGENCY DEPT VISIT HI MDM: CPT | Mod: 25

## 2023-07-04 PROCEDURE — 90791 PSYCH DIAGNOSTIC EVALUATION: CPT

## 2023-07-04 RX ORDER — ALBUTEROL SULFATE 0.83 MG/ML
2.5 SOLUTION RESPIRATORY (INHALATION) EVERY 6 HOURS PRN
Status: DISCONTINUED | OUTPATIENT
Start: 2023-07-04 | End: 2023-07-05 | Stop reason: HOSPADM

## 2023-07-04 RX ORDER — CEPHALEXIN 500 MG/1
500 CAPSULE ORAL ONCE
Status: COMPLETED | OUTPATIENT
Start: 2023-07-04 | End: 2023-07-05

## 2023-07-04 RX ORDER — HYDROXYZINE HYDROCHLORIDE 25 MG/1
25 TABLET, FILM COATED ORAL ONCE
Status: COMPLETED | OUTPATIENT
Start: 2023-07-05 | End: 2023-07-05

## 2023-07-04 ASSESSMENT — COLUMBIA-SUICIDE SEVERITY RATING SCALE - C-SSRS
1. IN THE PAST MONTH, HAVE YOU WISHED YOU WERE DEAD OR WISHED YOU COULD GO TO SLEEP AND NOT WAKE UP?: NO
1. HAVE YOU WISHED YOU WERE DEAD OR WISHED YOU COULD GO TO SLEEP AND NOT WAKE UP?: YES
2. HAVE YOU ACTUALLY HAD ANY THOUGHTS OF KILLING YOURSELF?: NO
ATTEMPT LIFETIME: YES
BASED ON RESPONSES TO C-SSRS QS 1-6, WHAT IS THE PATIENT'S OVERALL RISK RATING FOR SUICIDE: MODERATE RISK

## 2023-07-04 ASSESSMENT — ACTIVITIES OF DAILY LIVING (ADL)
ADLS_ACUITY_SCORE: 35
ADLS_ACUITY_SCORE: 35
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
## 2023-07-04 NOTE — ED NOTES
Report received from RENATA Akins,  requested but not yet arrived, ASL ipad not working, using pen and paper to communicate until  arrives or ipad working.
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
## 2023-07-05 VITALS
OXYGEN SATURATION: 99 % | SYSTOLIC BLOOD PRESSURE: 185 MMHG | DIASTOLIC BLOOD PRESSURE: 82 MMHG | HEART RATE: 82 BPM | TEMPERATURE: 98.2 F | RESPIRATION RATE: 18 BRPM

## 2023-07-05 PROCEDURE — 250N000013 HC RX MED GY IP 250 OP 250 PS 637: Performed by: EMERGENCY MEDICINE

## 2023-07-05 RX ADMIN — HYDROXYZINE HYDROCHLORIDE 25 MG: 25 TABLET, FILM COATED ORAL at 00:28

## 2023-07-05 RX ADMIN — CEPHALEXIN 500 MG: 500 CAPSULE ORAL at 00:28

## 2023-07-05 ASSESSMENT — ACTIVITIES OF DAILY LIVING (ADL): ADLS_ACUITY_SCORE: 35
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
## 2023-07-06 ENCOUNTER — TELEPHONE (OUTPATIENT)
Dept: BEHAVIORAL HEALTH | Facility: CLINIC | Age: 48
End: 2023-07-06

## 2023-07-06 ENCOUNTER — OFFICE VISIT (OUTPATIENT)
Dept: FAMILY MEDICINE | Facility: CLINIC | Age: 48
End: 2023-07-06
Payer: COMMERCIAL

## 2023-07-06 ENCOUNTER — TELEPHONE (OUTPATIENT)
Dept: INTERNAL MEDICINE | Facility: CLINIC | Age: 48
End: 2023-07-06

## 2023-07-06 VITALS
RESPIRATION RATE: 20 BRPM | BODY MASS INDEX: 35.61 KG/M2 | HEART RATE: 48 BPM | OXYGEN SATURATION: 99 % | DIASTOLIC BLOOD PRESSURE: 78 MMHG | SYSTOLIC BLOOD PRESSURE: 163 MMHG | TEMPERATURE: 98 F | WEIGHT: 214 LBS

## 2023-07-06 DIAGNOSIS — R30.0 DYSURIA: ICD-10-CM

## 2023-07-06 DIAGNOSIS — R11.10 VOMITING AND DIARRHEA: Primary | ICD-10-CM

## 2023-07-06 DIAGNOSIS — R19.7 VOMITING AND DIARRHEA: Primary | ICD-10-CM

## 2023-07-06 DIAGNOSIS — T74.21XA SEXUAL ASSAULT OF ADULT, INITIAL ENCOUNTER: ICD-10-CM

## 2023-07-06 LAB
ALBUMIN UR-MCNC: NEGATIVE MG/DL
ANION GAP SERPL CALCULATED.3IONS-SCNC: 10 MMOL/L (ref 5–18)
APPEARANCE UR: CLEAR
BACTERIA #/AREA URNS HPF: ABNORMAL /HPF
BASOPHILS # BLD AUTO: 0 10E3/UL (ref 0–0.2)
BASOPHILS NFR BLD AUTO: 0 %
BILIRUB UR QL STRIP: NEGATIVE
BUN SERPL-MCNC: 20 MG/DL (ref 8–22)
CALCIUM SERPL-MCNC: 8.6 MG/DL (ref 8.5–10.5)
CAOX CRY #/AREA URNS HPF: ABNORMAL /HPF
CHLORIDE BLD-SCNC: 109 MMOL/L (ref 98–107)
CO2 SERPL-SCNC: 25 MMOL/L (ref 22–31)
COLOR UR AUTO: YELLOW
CREAT SERPL-MCNC: 0.91 MG/DL (ref 0.6–1.1)
EOSINOPHIL # BLD AUTO: 0 10E3/UL (ref 0–0.7)
EOSINOPHIL NFR BLD AUTO: 0 %
ERYTHROCYTE [DISTWIDTH] IN BLOOD BY AUTOMATED COUNT: 13.7 % (ref 10–15)
GFR SERPL CREATININE-BSD FRML MDRD: 77 ML/MIN/1.73M2
GLUCOSE BLD-MCNC: 107 MG/DL (ref 70–125)
GLUCOSE UR STRIP-MCNC: NEGATIVE MG/DL
HCT VFR BLD AUTO: 42.2 % (ref 35–47)
HGB BLD-MCNC: 13.6 G/DL (ref 11.7–15.7)
HGB UR QL STRIP: ABNORMAL
IMM GRANULOCYTES # BLD: 0 10E3/UL
IMM GRANULOCYTES NFR BLD: 0 %
KETONES UR STRIP-MCNC: NEGATIVE MG/DL
LEUKOCYTE ESTERASE UR QL STRIP: NEGATIVE
LYMPHOCYTES # BLD AUTO: 3.6 10E3/UL (ref 0.8–5.3)
LYMPHOCYTES NFR BLD AUTO: 32 %
MCH RBC QN AUTO: 28 PG (ref 26.5–33)
MCHC RBC AUTO-ENTMCNC: 32.2 G/DL (ref 31.5–36.5)
MCV RBC AUTO: 87 FL (ref 78–100)
MONOCYTES # BLD AUTO: 0.6 10E3/UL (ref 0–1.3)
MONOCYTES NFR BLD AUTO: 5 %
MUCOUS THREADS #/AREA URNS LPF: PRESENT /LPF
NEUTROPHILS # BLD AUTO: 7.1 10E3/UL (ref 1.6–8.3)
NEUTROPHILS NFR BLD AUTO: 62 %
NITRATE UR QL: NEGATIVE
PH UR STRIP: 5 [PH] (ref 5–8)
PLATELET # BLD AUTO: 300 10E3/UL (ref 150–450)
POTASSIUM BLD-SCNC: 3.4 MMOL/L (ref 3.5–5)
RBC # BLD AUTO: 4.86 10E6/UL (ref 3.8–5.2)
RBC #/AREA URNS AUTO: ABNORMAL /HPF
SODIUM SERPL-SCNC: 144 MMOL/L (ref 136–145)
SP GR UR STRIP: >=1.03 (ref 1–1.03)
SQUAMOUS #/AREA URNS AUTO: ABNORMAL /LPF
UROBILINOGEN UR STRIP-ACNC: 0.2 E.U./DL
WBC # BLD AUTO: 11.5 10E3/UL (ref 4–11)
WBC #/AREA URNS AUTO: ABNORMAL /HPF

## 2023-07-06 PROCEDURE — 87086 URINE CULTURE/COLONY COUNT: CPT | Performed by: PHYSICIAN ASSISTANT

## 2023-07-06 PROCEDURE — 81001 URINALYSIS AUTO W/SCOPE: CPT | Performed by: PHYSICIAN ASSISTANT

## 2023-07-06 PROCEDURE — 36415 COLL VENOUS BLD VENIPUNCTURE: CPT | Performed by: PHYSICIAN ASSISTANT

## 2023-07-06 PROCEDURE — 99215 OFFICE O/P EST HI 40 MIN: CPT | Performed by: PHYSICIAN ASSISTANT

## 2023-07-06 PROCEDURE — 80048 BASIC METABOLIC PNL TOTAL CA: CPT | Performed by: PHYSICIAN ASSISTANT

## 2023-07-06 PROCEDURE — 85025 COMPLETE CBC W/AUTO DIFF WBC: CPT | Performed by: PHYSICIAN ASSISTANT

## 2023-07-06 RX ORDER — ONDANSETRON 4 MG/1
4 TABLET, ORALLY DISINTEGRATING ORAL EVERY 8 HOURS PRN
Qty: 10 TABLET | Refills: 0 | Status: SHIPPED | OUTPATIENT
Start: 2023-07-06 | End: 2023-07-19

## 2023-07-06 NOTE — PATIENT INSTRUCTIONS
Be sure to follow-up with your  and your primary care provider within the next week    If you have any new or worsening symptoms please go to the emergency room    Zofran was prescribed and can help you with nausea as needed  You can use Imodium which is over-the-counter for diarrhea    This is most likely related to the antibiotics you have been prescribed.  This will resolve as your body adjusts to them    It does not look like you have a urinary tract infection today.
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
## 2023-07-07 ENCOUNTER — HOSPITAL ENCOUNTER (EMERGENCY)
Facility: CLINIC | Age: 48
Discharge: HOME | End: 2023-07-08
Attending: EMERGENCY MEDICINE | Admitting: EMERGENCY MEDICINE
Payer: COMMERCIAL

## 2023-07-07 ENCOUNTER — APPOINTMENT (OUTPATIENT)
Dept: CT IMAGING | Facility: CLINIC | Age: 48
End: 2023-07-07
Attending: EMERGENCY MEDICINE
Payer: COMMERCIAL

## 2023-07-07 DIAGNOSIS — E87.6 HYPOKALEMIA: ICD-10-CM

## 2023-07-07 DIAGNOSIS — R11.2 NAUSEA AND VOMITING, UNSPECIFIED VOMITING TYPE: ICD-10-CM

## 2023-07-07 DIAGNOSIS — R53.81 MALAISE: ICD-10-CM

## 2023-07-07 DIAGNOSIS — R10.84 GENERALIZED ABDOMINAL PAIN: ICD-10-CM

## 2023-07-07 LAB
ALBUMIN SERPL-MCNC: 3.3 G/DL (ref 3.5–5)
ALBUMIN UR-MCNC: 20 MG/DL
ALP SERPL-CCNC: 77 U/L (ref 45–120)
ALT SERPL W P-5'-P-CCNC: 10 U/L (ref 0–45)
ANION GAP SERPL CALCULATED.3IONS-SCNC: 10 MMOL/L (ref 5–18)
APAP SERPL-MCNC: <3 UG/ML (ref 10–20)
APPEARANCE UR: ABNORMAL
AST SERPL W P-5'-P-CCNC: 10 U/L (ref 0–40)
BASOPHILS # BLD AUTO: 0 10E3/UL (ref 0–0.2)
BASOPHILS NFR BLD AUTO: 0 %
BILIRUB SERPL-MCNC: 0.5 MG/DL (ref 0–1)
BILIRUB UR QL STRIP: NEGATIVE
BUN SERPL-MCNC: 17 MG/DL (ref 8–22)
CALCIUM SERPL-MCNC: 8.2 MG/DL (ref 8.5–10.5)
CAOX CRY #/AREA URNS HPF: ABNORMAL /HPF
CHLORIDE BLD-SCNC: 108 MMOL/L (ref 98–107)
CO2 SERPL-SCNC: 24 MMOL/L (ref 22–31)
COLOR UR AUTO: YELLOW
CREAT SERPL-MCNC: 0.8 MG/DL (ref 0.6–1.1)
EOSINOPHIL # BLD AUTO: 0 10E3/UL (ref 0–0.7)
EOSINOPHIL NFR BLD AUTO: 0 %
ERYTHROCYTE [DISTWIDTH] IN BLOOD BY AUTOMATED COUNT: 13.8 % (ref 10–15)
GFR SERPL CREATININE-BSD FRML MDRD: 90 ML/MIN/1.73M2
GLUCOSE BLD-MCNC: 140 MG/DL (ref 70–125)
GLUCOSE UR STRIP-MCNC: NEGATIVE MG/DL
HCT VFR BLD AUTO: 39.9 % (ref 35–47)
HGB BLD-MCNC: 13.4 G/DL (ref 11.7–15.7)
HGB UR QL STRIP: ABNORMAL
HOLD SPECIMEN: NORMAL
IMM GRANULOCYTES # BLD: 0.1 10E3/UL
IMM GRANULOCYTES NFR BLD: 1 %
KETONES UR STRIP-MCNC: NEGATIVE MG/DL
LEUKOCYTE ESTERASE UR QL STRIP: NEGATIVE
LYMPHOCYTES # BLD AUTO: 2.7 10E3/UL (ref 0.8–5.3)
LYMPHOCYTES NFR BLD AUTO: 20 %
MCH RBC QN AUTO: 28.6 PG (ref 26.5–33)
MCHC RBC AUTO-ENTMCNC: 33.6 G/DL (ref 31.5–36.5)
MCV RBC AUTO: 85 FL (ref 78–100)
MONOCYTES # BLD AUTO: 0.7 10E3/UL (ref 0–1.3)
MONOCYTES NFR BLD AUTO: 5 %
MUCOUS THREADS #/AREA URNS LPF: PRESENT /LPF
NEUTROPHILS # BLD AUTO: 10.2 10E3/UL (ref 1.6–8.3)
NEUTROPHILS NFR BLD AUTO: 74 %
NITRATE UR QL: NEGATIVE
NRBC # BLD AUTO: 0 10E3/UL
NRBC BLD AUTO-RTO: 0 /100
PH UR STRIP: 5.5 [PH] (ref 5–7)
PLATELET # BLD AUTO: 292 10E3/UL (ref 150–450)
POTASSIUM BLD-SCNC: 3.1 MMOL/L (ref 3.5–5)
PROT SERPL-MCNC: 5.9 G/DL (ref 6–8)
RBC # BLD AUTO: 4.69 10E6/UL (ref 3.8–5.2)
RBC URINE: 0 /HPF
SODIUM SERPL-SCNC: 142 MMOL/L (ref 136–145)
SP GR UR STRIP: 1.03 (ref 1–1.03)
SQUAMOUS EPITHELIAL: 7 /HPF
UROBILINOGEN UR STRIP-MCNC: 2 MG/DL
WBC # BLD AUTO: 13.7 10E3/UL (ref 4–11)
WBC URINE: 1 /HPF

## 2023-07-07 PROCEDURE — 74176 CT ABD & PELVIS W/O CONTRAST: CPT

## 2023-07-07 PROCEDURE — 96361 HYDRATE IV INFUSION ADD-ON: CPT

## 2023-07-07 PROCEDURE — 80143 DRUG ASSAY ACETAMINOPHEN: CPT

## 2023-07-07 PROCEDURE — 250N000011 HC RX IP 250 OP 636: Mod: JZ | Performed by: EMERGENCY MEDICINE

## 2023-07-07 PROCEDURE — 99285 EMERGENCY DEPT VISIT HI MDM: CPT | Mod: 25

## 2023-07-07 PROCEDURE — 250N000013 HC RX MED GY IP 250 OP 250 PS 637: Performed by: EMERGENCY MEDICINE

## 2023-07-07 PROCEDURE — 96376 TX/PRO/DX INJ SAME DRUG ADON: CPT

## 2023-07-07 PROCEDURE — 258N000003 HC RX IP 258 OP 636: Performed by: EMERGENCY MEDICINE

## 2023-07-07 PROCEDURE — 81001 URINALYSIS AUTO W/SCOPE: CPT | Performed by: EMERGENCY MEDICINE

## 2023-07-07 PROCEDURE — 96375 TX/PRO/DX INJ NEW DRUG ADDON: CPT

## 2023-07-07 PROCEDURE — 36415 COLL VENOUS BLD VENIPUNCTURE: CPT | Performed by: EMERGENCY MEDICINE

## 2023-07-07 PROCEDURE — 85025 COMPLETE CBC W/AUTO DIFF WBC: CPT

## 2023-07-07 PROCEDURE — 80053 COMPREHEN METABOLIC PANEL: CPT

## 2023-07-07 RX ORDER — POTASSIUM CHLORIDE 1.5 G/1.58G
40 POWDER, FOR SOLUTION ORAL ONCE
Status: COMPLETED | OUTPATIENT
Start: 2023-07-07 | End: 2023-07-07

## 2023-07-07 RX ORDER — ONDANSETRON 2 MG/ML
4 INJECTION INTRAMUSCULAR; INTRAVENOUS ONCE
Status: COMPLETED | OUTPATIENT
Start: 2023-07-07 | End: 2023-07-07

## 2023-07-07 RX ORDER — POTASSIUM CHLORIDE 1500 MG/1
40 TABLET, EXTENDED RELEASE ORAL ONCE
Status: COMPLETED | OUTPATIENT
Start: 2023-07-07 | End: 2023-07-07

## 2023-07-07 RX ORDER — HYDROMORPHONE HYDROCHLORIDE 1 MG/ML
0.5 INJECTION, SOLUTION INTRAMUSCULAR; INTRAVENOUS; SUBCUTANEOUS ONCE
Status: COMPLETED | OUTPATIENT
Start: 2023-07-07 | End: 2023-07-07

## 2023-07-07 RX ADMIN — ONDANSETRON 4 MG: 2 INJECTION INTRAMUSCULAR; INTRAVENOUS at 22:00

## 2023-07-07 RX ADMIN — SODIUM CHLORIDE 1000 ML: 9 INJECTION, SOLUTION INTRAVENOUS at 22:00

## 2023-07-07 RX ADMIN — HYDROMORPHONE HYDROCHLORIDE 0.5 MG: 1 INJECTION, SOLUTION INTRAMUSCULAR; INTRAVENOUS; SUBCUTANEOUS at 22:00

## 2023-07-07 RX ADMIN — ONDANSETRON 4 MG: 2 INJECTION INTRAMUSCULAR; INTRAVENOUS at 23:14

## 2023-07-07 ASSESSMENT — ACTIVITIES OF DAILY LIVING (ADL)
ADLS_ACUITY_SCORE: 35
ADLS_ACUITY_SCORE: 35

## 2023-07-07 NOTE — TELEPHONE ENCOUNTER
Due to pt not having next LoC scheduled, writer added pt to TC Referrals for review list. Please f/u accordingly after clinical review.     Misatonie Dorinda CAMARENA   7.6.2023 1901      ----- Message from Lacie Espino sent at 7/6/2023  6:35 PM CDT -----  Transition Clinic Referral   [place]      Please Check Type of Referral Requested:       __X__THERAPY: The Transition clinic is able to schedule patients without current medical insurance        Referring Provider Contact Name: Lacie Espino; Phone Number: [phone]    Reason for Transition Clinic Referral: Pt needs therapy. Was unable to schedule pt due to having Medicaid and [place]. Not appointments were available. Pt does not have a current therapist. Pt needs a transitional therapist until Pt can find a permanent one with her insurance.      What Would Be Helpful from the Transition Clinic: Pt would like to be close to home and Pt is deaf and will need an interpretor.     Needs: Yes sign language    Does Patient Have Access to Technology: yes    Patient E-mail Address: [email or web address]    Current Patient Phone Number: [phone]    Clinician Gender Preference Yes Female    Patient location preference: Closest to [place] where pt lives.    Lacie Espino

## 2023-07-07 NOTE — TELEPHONE ENCOUNTER
Lab Results   Component Value Date    POTASSIUM 3.4 07/06/2023       S-(situation):   Patient calling (via ) with concerns of lab results.  Patient concerned with LOW POTASSIUM    B-(background):   Patient seen in office today for vomiting and diarrhea    A-(assessment):       R-(recommendations):   Patient requesting call with lab results and next steps.    Patient instructed to call back if symptoms change or if new symptoms present. Patient verbalizes agreement with plan.    RENATA Quiros  [place], [place]  [phone]      
Armando states they mailed her ondansetron and it shows it is in her mailbox now  
Left message via  stating that her medication is in her mailbox and that she can pick it up and start to take it now, if further questions she can call [phone]  
Used an . Relayed Dr. Rodriguez's message     Pt states she is still vomiting frequently. Pt has not picked up ondansetron due to pharmacy not having it in stock.     Pt unsure if they have a different dose in stock.     Pt ok with writer calling to see if they have a different mg in stock.    Pt states she can only go to [place] due to insurance reasons       
FT female born vi  at 38 weeks gestation.  Maternal labs normal.  5-14, length 19.5 in.  Mom O pos, raquel neg  General: alert, active NAD,   HEENT:  AFOF, molding, Red Reflex bilaterally,  No cleft palate, gums normal,  TM's normal, neck supple  Clavicles:  Intact, without crepitus  Chest:  clear BS,  symmetrical  Cardiac: no murmur,  NSR  Abd:  no HSM, soft, cord dry and clamped  Genitalia:  normal external  ( x ) female               Ext:  normal  Skin: no jaundice,  normal  Neuro:  active,  no focal signs,  spine normal    Imp/Plan:  Normal exam of full term female infant

## 2023-07-08 VITALS
HEIGHT: 65 IN | SYSTOLIC BLOOD PRESSURE: 161 MMHG | DIASTOLIC BLOOD PRESSURE: 69 MMHG | RESPIRATION RATE: 20 BRPM | HEART RATE: 57 BPM | TEMPERATURE: 98.2 F | OXYGEN SATURATION: 98 % | BODY MASS INDEX: 35.67 KG/M2 | WEIGHT: 214.07 LBS

## 2023-07-08 LAB — BACTERIA UR CULT: NO GROWTH

## 2023-07-08 PROCEDURE — 96361 HYDRATE IV INFUSION ADD-ON: CPT

## 2023-07-08 PROCEDURE — 96365 THER/PROPH/DIAG IV INF INIT: CPT

## 2023-07-08 PROCEDURE — 250N000011 HC RX IP 250 OP 636: Mod: JZ | Performed by: EMERGENCY MEDICINE

## 2023-07-08 RX ORDER — POTASSIUM CHLORIDE 7.45 MG/ML
10 INJECTION INTRAVENOUS ONCE
Status: COMPLETED | OUTPATIENT
Start: 2023-07-08 | End: 2023-07-08

## 2023-07-08 RX ADMIN — POTASSIUM CHLORIDE 10 MEQ: 7.46 INJECTION, SOLUTION INTRAVENOUS at 01:39

## 2023-07-08 ASSESSMENT — ACTIVITIES OF DAILY LIVING (ADL)
ADLS_ACUITY_SCORE: 35
ADLS_ACUITY_SCORE: 35

## 2023-07-08 NOTE — ED NOTES
This RN informed patient that per charge RN we do not have a taxi voucher available tonight. Patient states she doesn't know if her insurance covers rides home from the ER and that the nurses always provide taxi vouchers home. Patient states she doesn't have money to pay for a cab and does not have anyone she can call for a ride since they will be sleeping. This RN attempted calling customer service numbers on patient's insurance cards but both [facility] and [facility] are only open M-F during business hours.

## 2023-07-08 NOTE — ED TRIAGE NOTES
Pt BIBA from home for abdominal and vaginal pain, watery diarrhea, nausea and vomiting for 7 days. Pt states little PO intake. States overdose on 3 tylenol tablets every 3hrs starting yesterday pain and not as a way to end life. Pt states has not started rx antibiotics for UTI yet as pharmacy was out of stock. No known fevers, but yes chills.     Triage Assessment       Row Name 07/07/23 1943       Triage Assessment (Adult)    Airway WDL WDL       Respiratory WDL    Respiratory WDL X;rhythm/pattern    Rhythm/Pattern, Respiratory tachypneic       Skin Circulation/Temperature WDL    Skin Circulation/Temperature WDL WDL       Cardiac WDL    Cardiac WDL WDL       Peripheral/Neurovascular WDL    Peripheral Neurovascular WDL WDL       Cognitive/Neuro/Behavioral WDL    Cognitive/Neuro/Behavioral WDL WDL
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
## 2023-07-08 NOTE — ED NOTES
This RN asked patient how she will get home. Patient initially stated she will walk home. This RN asked patient if she thinks that is a safe idea and that this RN thinks that would be unsafe. Patient states she doesn't have a choice. This RN told patient that charge states patient can stay in the lobby until she is able to get a ride. Patient contacted cousin who will  patient. Patient declining wheelchair and ambulating with steady gait to lobby chair. Patient declining blankets while waiting in lobby.

## 2023-07-08 NOTE — ED NOTES
Patient tried drinking potassium mixed with apple juice and started belching immediately after drinking a sip.

## 2023-07-08 NOTE — DISCHARGE INSTRUCTIONS
You were seen in the Emergency Department today for abdominal pain, nausea, and vomiting.     Over the next few days, rest and drink plenty of water and other fluids.  Fizzy liquids like ginger ale might feel better inyour stomach.  Eat foods that are gentle on your stomach, like the BRAT diet (Banana, Rice, Apple Sauce, Toast).      You can take 600mg of Ibuprofen (Motrin, Advil) by mouth with food every 6-8 hours for pain (nomore than 3200mg in 24hrs).    You may also take 650-1000mg of Acetaminophen (Tylenol) along with the Ibuprofen.  Take no more than 3000mg in 24hrs and write down the times you are taking both medications toensure appropriate time in between doses.    You can continue to use the immodium and diarrhea your doctor discussed with you.     Your urine did not look infected so you do not need to be on antibiotics.       Please return to the ER if you experience fever, inability to keep food/fluids down, worsening pain, and/or for any other new or concerning symptoms, otherwise please follow up with your primary doctor in 1-2 days for recheck.     Below is some information you might find useful.     Thank you for choosing [facility]. It was a pleasure taking care of you today!

## 2023-07-11 ENCOUNTER — TELEPHONE (OUTPATIENT)
Dept: BEHAVIORAL HEALTH | Facility: CLINIC | Age: 48
End: 2023-07-11
Payer: COMMERCIAL

## 2023-07-11 NOTE — TELEPHONE ENCOUNTER
Writer spoke with pt and scheduled initial TC therapy appointment on 07/26/2023 @  2:00 pm. Writer sent intake documents via [employer]. Writer will reply to referral source.Tracker completed.    Salud Stevenson  07/11/2023  1150    ----- Message from Lacie Espino sent at 7/6/2023  6:35 PM CDT -----  Transition Clinic Referral   [place]      Please Check Type of Referral Requested:       __X__THERAPY: The Transition clinic is able to schedule patients without current medical insurance        Referring Provider Contact Name: Lacie Espino; Phone Number: [phone]    Reason for Transition Clinic Referral: Pt needs therapy. Was unable to schedule pt due to having [place] and [place]. Not appointments were available. Pt does not have a current therapist. Pt needs a transitional therapist until Pt can find a permanent one with her insurance.      What Would Be Helpful from the Transition Clinic: Pt would like to be close to home and Pt is deaf and will need an interpretor.     Needs: Yes sign language    Does Patient Have Access to Technology: yes    Patient E-mail Address: [email or web address]    Current Patient Phone Number: [phone]    Clinician Gender Preference Yes Female    Patient location preference: [place]osest to [place] where pt lives.    Lacie Thomaskhoigurjit

## 2023-07-11 NOTE — TELEPHONE ENCOUNTER
"Per Beatriz \"Schedule TC and refer as needed\"    Called patient number which connects directly to an . First attempt at reaching patient. Left message asking for a return call to schedule with the TC. Mychart message also sent. Will postpone for follow up tomorrow.    Cherelle Almeida  Transition Clinic Coordinator  Date and Time: 07/11/23 8:00 AM      ----- Message from Lacie Espino sent at 7/6/2023  6:35 PM CDT -----  Transition Clinic Referral   [place]      Please Check Type of Referral Requested:       __X__THERAPY: The Transition clinic is able to schedule patients without current medical insurance        Referring Provider Contact Name: Lacie Espino; Phone Number: [phone]    Reason for Transition Clinic Referral: Pt needs therapy. Was unable to schedule pt due to having Medicaid and [place]. Not appointments were available. Pt does not have a current therapist. Pt needs a transitional therapist until Pt can find a permanent one with her insurance.      What Would Be Helpful from the Transition Clinic: Pt would like to be close to home and Pt is deaf and will need an interpretor.     Needs: Yes sign language    Does Patient Have Access to Technology: yes    Patient E-mail Address: [email or web address]    Current Patient Phone Number: [phone]    Clinician Gender Preference Yes Female    Patient location preference: [place]osest to [place] where pt lives.    Lacie WilliamkhoiGreater Baltimore Medical Center                   "

## 2023-07-12 ENCOUNTER — TELEPHONE (OUTPATIENT)
Dept: BEHAVIORAL HEALTH | Facility: CLINIC | Age: 48
End: 2023-07-12
Payer: COMMERCIAL

## 2023-07-12 NOTE — TELEPHONE ENCOUNTER
Writer spoke with patient and  on phone and cancelled tc therapy appointment as patient has long term therapy with [facility].    Salud Stevenson  07/12/2023  132

## 2023-07-15 ENCOUNTER — HOSPITAL ENCOUNTER (EMERGENCY)
Facility: CLINIC | Age: 48
Discharge: HOME | End: 2023-07-16
Attending: FAMILY MEDICINE | Admitting: FAMILY MEDICINE
Payer: COMMERCIAL

## 2023-07-15 ENCOUNTER — NURSE TRIAGE (OUTPATIENT)
Dept: NURSING | Facility: CLINIC | Age: 48
End: 2023-07-15
Payer: COMMERCIAL

## 2023-07-15 VITALS
RESPIRATION RATE: 18 BRPM | OXYGEN SATURATION: 99 % | DIASTOLIC BLOOD PRESSURE: 85 MMHG | TEMPERATURE: 98.8 F | HEART RATE: 73 BPM | SYSTOLIC BLOOD PRESSURE: 165 MMHG

## 2023-07-15 DIAGNOSIS — J68.0: ICD-10-CM

## 2023-07-15 DIAGNOSIS — A69.20 ERYTHEMA MIGRANS (LYME DISEASE): ICD-10-CM

## 2023-07-15 PROCEDURE — 99284 EMERGENCY DEPT VISIT MOD MDM: CPT | Mod: 25

## 2023-07-16 ENCOUNTER — APPOINTMENT (OUTPATIENT)
Dept: RADIOLOGY | Facility: CLINIC | Age: 48
End: 2023-07-16
Attending: FAMILY MEDICINE
Payer: COMMERCIAL

## 2023-07-16 LAB
ANION GAP SERPL CALCULATED.3IONS-SCNC: 9 MMOL/L (ref 5–18)
BASOPHILS # BLD AUTO: 0 10E3/UL (ref 0–0.2)
BASOPHILS NFR BLD AUTO: 0 %
BNP SERPL-MCNC: 99 PG/ML (ref 0–71)
BUN SERPL-MCNC: 11 MG/DL (ref 8–22)
CALCIUM SERPL-MCNC: 8.9 MG/DL (ref 8.5–10.5)
CHLORIDE BLD-SCNC: 110 MMOL/L (ref 98–107)
CO2 SERPL-SCNC: 23 MMOL/L (ref 22–31)
CREAT SERPL-MCNC: 0.74 MG/DL (ref 0.6–1.1)
EOSINOPHIL # BLD AUTO: 0.1 10E3/UL (ref 0–0.7)
EOSINOPHIL NFR BLD AUTO: 1 %
ERYTHROCYTE [DISTWIDTH] IN BLOOD BY AUTOMATED COUNT: 14 % (ref 10–15)
GFR SERPL CREATININE-BSD FRML MDRD: >90 ML/MIN/1.73M2
GLUCOSE BLD-MCNC: 111 MG/DL (ref 70–125)
HCT VFR BLD AUTO: 41.8 % (ref 35–47)
HGB BLD-MCNC: 13.9 G/DL (ref 11.7–15.7)
IMM GRANULOCYTES # BLD: 0 10E3/UL
IMM GRANULOCYTES NFR BLD: 0 %
LACTATE SERPL-SCNC: 0.9 MMOL/L (ref 0.7–2)
LYMPHOCYTES # BLD AUTO: 2.3 10E3/UL (ref 0.8–5.3)
LYMPHOCYTES NFR BLD AUTO: 27 %
MAGNESIUM SERPL-MCNC: 2.3 MG/DL (ref 1.8–2.6)
MCH RBC QN AUTO: 28.4 PG (ref 26.5–33)
MCHC RBC AUTO-ENTMCNC: 33.3 G/DL (ref 31.5–36.5)
MCV RBC AUTO: 86 FL (ref 78–100)
MONOCYTES # BLD AUTO: 0.3 10E3/UL (ref 0–1.3)
MONOCYTES NFR BLD AUTO: 4 %
NEUTROPHILS # BLD AUTO: 5.9 10E3/UL (ref 1.6–8.3)
NEUTROPHILS NFR BLD AUTO: 68 %
NRBC # BLD AUTO: 0 10E3/UL
NRBC BLD AUTO-RTO: 0 /100
PLATELET # BLD AUTO: 243 10E3/UL (ref 150–450)
POTASSIUM BLD-SCNC: 3.9 MMOL/L (ref 3.5–5)
RBC # BLD AUTO: 4.89 10E6/UL (ref 3.8–5.2)
SODIUM SERPL-SCNC: 142 MMOL/L (ref 136–145)
WBC # BLD AUTO: 8.7 10E3/UL (ref 4–11)

## 2023-07-16 PROCEDURE — 250N000012 HC RX MED GY IP 250 OP 636 PS 637: Performed by: FAMILY MEDICINE

## 2023-07-16 PROCEDURE — 250N000013 HC RX MED GY IP 250 OP 250 PS 637: Performed by: FAMILY MEDICINE

## 2023-07-16 PROCEDURE — 83880 ASSAY OF NATRIURETIC PEPTIDE: CPT | Performed by: FAMILY MEDICINE

## 2023-07-16 PROCEDURE — 250N000009 HC RX 250: Performed by: FAMILY MEDICINE

## 2023-07-16 PROCEDURE — 71046 X-RAY EXAM CHEST 2 VIEWS: CPT

## 2023-07-16 PROCEDURE — 94640 AIRWAY INHALATION TREATMENT: CPT

## 2023-07-16 PROCEDURE — 83735 ASSAY OF MAGNESIUM: CPT | Performed by: FAMILY MEDICINE

## 2023-07-16 PROCEDURE — 86618 LYME DISEASE ANTIBODY: CPT | Performed by: FAMILY MEDICINE

## 2023-07-16 PROCEDURE — 80048 BASIC METABOLIC PNL TOTAL CA: CPT | Performed by: FAMILY MEDICINE

## 2023-07-16 PROCEDURE — 83605 ASSAY OF LACTIC ACID: CPT | Performed by: FAMILY MEDICINE

## 2023-07-16 PROCEDURE — 85025 COMPLETE CBC W/AUTO DIFF WBC: CPT | Performed by: FAMILY MEDICINE

## 2023-07-16 PROCEDURE — 36415 COLL VENOUS BLD VENIPUNCTURE: CPT | Performed by: FAMILY MEDICINE

## 2023-07-16 RX ORDER — PREDNISONE 20 MG/1
40 TABLET ORAL ONCE
Status: COMPLETED | OUTPATIENT
Start: 2023-07-16 | End: 2023-07-16

## 2023-07-16 RX ORDER — IPRATROPIUM BROMIDE AND ALBUTEROL SULFATE 2.5; .5 MG/3ML; MG/3ML
3 SOLUTION RESPIRATORY (INHALATION) ONCE
Status: COMPLETED | OUTPATIENT
Start: 2023-07-16 | End: 2023-07-16

## 2023-07-16 RX ORDER — DOXYCYCLINE 100 MG/1
100 CAPSULE ORAL 2 TIMES DAILY
Qty: 20 CAPSULE | Refills: 0 | Status: SHIPPED | OUTPATIENT
Start: 2023-07-16 | End: 2023-07-26

## 2023-07-16 RX ORDER — DOXYCYCLINE 100 MG/1
100 CAPSULE ORAL ONCE
Status: COMPLETED | OUTPATIENT
Start: 2023-07-16 | End: 2023-07-16

## 2023-07-16 RX ORDER — PREDNISONE 20 MG/1
20 TABLET ORAL ONCE
Status: COMPLETED | OUTPATIENT
Start: 2023-07-16 | End: 2023-07-16

## 2023-07-16 RX ORDER — PREDNISONE 20 MG/1
40 TABLET ORAL DAILY
Qty: 8 TABLET | Refills: 0 | Status: SHIPPED | OUTPATIENT
Start: 2023-07-16 | End: 2023-07-19

## 2023-07-16 RX ADMIN — DOXYCYCLINE 100 MG: 100 CAPSULE ORAL at 01:35

## 2023-07-16 RX ADMIN — PREDNISONE 20 MG: 20 TABLET ORAL at 01:55

## 2023-07-16 RX ADMIN — PREDNISONE 40 MG: 20 TABLET ORAL at 01:55

## 2023-07-16 RX ADMIN — IPRATROPIUM BROMIDE AND ALBUTEROL SULFATE 3 ML: .5; 3 SOLUTION RESPIRATORY (INHALATION) at 01:35

## 2023-07-16 ASSESSMENT — ENCOUNTER SYMPTOMS
BACK PAIN: 0
COUGH: 1
SHORTNESS OF BREATH: 1
NAUSEA: 0
BLOOD IN STOOL: 0
FEVER: 0
ABDOMINAL PAIN: 0
RECTAL PAIN: 1
VOMITING: 0
DIARRHEA: 1
NUMBNESS: 1

## 2023-07-16 ASSESSMENT — ACTIVITIES OF DAILY LIVING (ADL): ADLS_ACUITY_SCORE: 35

## 2023-07-16 NOTE — ED PROVIDER NOTES
EMERGENCY DEPARTMENT ENCOUNTER      NAME: Marleny Viera  AGE: 47 year old female  YOB: 1975  MRN: [id number]  EVALUATION DATE & TIME: 7/15/2023 11:07 PM    PCP: Maria Fernanda Schumacher    ED PROVIDER: Isiah Paredes M.D.    Chief Complaint   Patient presents with     Mass     Lt axilla     Rash     Bilateral axillae       FINAL IMPRESSION:  1. Bronchitis, chemical (H)    2. Erythema migrans (Lyme disease)        ED COURSE & MEDICAL DECISION MAKING:    Pertinent Labs & Imaging studies independently interpreted by me. (See chart for details)  11:55 PM Patient seen and examined, reviewed prior emergency department visit from July 9 when patient presented with diarrhea, at that time vitally stable and CT scan of the abdomen was negative.  Patient presents today with bilateral axilla rash, more prominent on the left with some pain and tenderness.  On exam she has an erythematous rash with central clearing in the left axilla, similar rash in the right axilla.  Concern for possible erythema migrans, doxycycline ordered as well as Lyme testing.  On exam patient also was noted to have bilateral lower extremity mottling although the extremities themselves are warm, she notes that her feet felt cold and numb for couple of days.  Dorsalis pedis pulses are intact.  Labs are ordered for evaluation for possible renal disease or infection although patient is afebrile, not hypotensive or tachycardic.  Chest x-ray ordered for patient's shortness of breath and wheezing.  Considered pulmonary embolism valuation with D-dimer or CT PE but low risk by Wells criteria and PERC negative.  1:13 AM labs independently interpreted by me with normal white blood cell count, reassuring lactate, reassuring basic panel.  Chest x-ray independently interpreted by me does not demonstrate any acute findings.  DuoNeb is ordered and patient is stable for discharge on doxycycline and prednisone for her acute asthma/bronchitis.    At the  conclusion of the encounter I discussed the results of all of the tests and the disposition. The questions were answered. The patient or family acknowledged understanding and was agreeable with the care plan.     Medical Decision Making    History:    Supplemental history from: Documented in chart, if applicable    External Record(s) reviewed: Documented in chart, if applicable.    Work Up:    Chart documentation includes differential considered and any EKGs or imaging independently interpreted by provider, where specified.    In additional to work up documented, I considered the following work up: Documented in chart, if applicable.    External consultation:    Discussion of management with another provider: Documented in chart, if applicable    Complicating factors:    Care impacted by chronic illness: Hypertension and Mental Health, Deafness    Care affected by social determinants of health: N/A    Disposition considerations: Discharge. I prescribed additional prescription strength medication(s) as charted. I considered admission, but discharged patient after significant clinical improvement.        PROCEDURES:       MEDICATIONS GIVEN IN THE EMERGENCY:  Medications   predniSONE (DELTASONE) tablet 40 mg (has no administration in time range)   predniSONE (DELTASONE) tablet 20 mg (has no administration in time range)   doxycycline hyclate (VIBRAMYCIN) capsule 100 mg (100 mg Oral $Given 7/16/23 0135)   ipratropium - albuterol 0.5 mg/2.5 mg/3 mL (DUONEB) neb solution 3 mL (3 mLs Nebulization $Given 7/16/23 0135)       NEW PRESCRIPTIONS STARTED AT TODAY'S ER VISIT  New Prescriptions    DOXYCYCLINE HYCLATE (VIBRAMYCIN) 100 MG CAPSULE    Take 1 capsule (100 mg) by mouth 2 times daily for 10 days    PREDNISONE (DELTASONE) 20 MG TABLET    Take 2 tablets (40 mg) by mouth daily for 4 days       =================================================================    HPI    Patient information was obtained from: The patient       Marleny Viera is a 47 year old female with a pertinent history of ADHD, Drug abuse, Alcohol dependence, Bipolar Depression, Pulmonary emboli, Anxiety who presents to this ED via walk-in for evaluation of mass and rash.    The patient waentcanoeing with her friends in the [facility] since Monday (5 days ago). She came back home yesterday and has been having difficulty breathing with coughs. She thinks her respiratory symptoms may be attributed to the air pollution from the [employer]. She has a history of asthma and was relying on her inhaler during her trip.    On a separate note, she also suffered a bee sting on Tuesday during the canoe trip. She used an epi pen immediately. When she got home, her left arm was swollen and numb, but it improved after icing it. She also noticed a lump to her left armpit with soreness. She noticed some lumps developing to the armpits bilaterally. She associates soreness to the left armpit only. The patient endorses bilateral leg swelling and bilateral foot numbness and coldness for the last 3 days.     She also notes having on and off diarrhea for a while now and she has been seen in the hospital to manage it. She has some rectum irritation but denies blood in stool.    Otherwise, the patient denied having abdominal pain, chest pain, back pain, fever, nausea, vomiting, and any other medical complaints or concerns at this time.    Per chart review, the patient was seen at [facility] on 07/09/2023 for diarrhea for the past 7 days. Blood work was normal. CT abdomen was reassuring. Patient was discharged with instructions to stay well-hydrated and follow-up with her primary doctor within one week.    REVIEW OF SYSTEMS   Review of Systems   Constitutional: Negative for fever.   Respiratory: Positive for cough and shortness of breath.    Cardiovascular: Positive for leg swelling (bilateral). Negative for chest pain.   Gastrointestinal: Positive for diarrhea  (intermittent) and rectal pain. Negative for abdominal pain, blood in stool, nausea and vomiting.   Musculoskeletal: Negative for back pain.   Skin:        Lumps to the armpits bilaterally   Neurological: Positive for numbness (left arm).   All other systems reviewed and are negative.     All other systems reviewed and negative    PAST MEDICAL HISTORY:  Past Medical History:   Diagnosis Date     Accidental overdose, initial encounter 7/7/2022     ADHD (attention deficit hyperactivity disorder)      Alcohol dependence (H)      Anxiety      Arthritis      Asthma      Bipolar depression (H)      Deafness      Drug abuse, nondependent (H) 10/29/2004    Overview:  polydrug abuse per psych notes ; Other, mixed, or unspecified nondependent drug abuse, unspecified     History of blood clots      History of transfusion      Hypertension      Kidney stones 2015     Major depressive disorder, recurrent episode, severe (H) 5/17/2016    Overview:  s/p suicide attempt Epic      Migraine      Obstructive sleep apnea 6/1/2016     Pulmonary emboli (H)      Status post total right knee replacement 1/18/2016       PAST SURGICAL HISTORY:  Past Surgical History:   Procedure Laterality Date     BIOPSY BREAST Right     2019... Also had fluid drained from left breast under surgery also in 2019     BREAST SURGERY       MAMMOPLASTY REDUCTION Bilateral 2017     TOTAL HIP ARTHROPLASTY Bilateral      TOTAL KNEE ARTHROPLASTY Right      TUBAL LIGATION       US BREAST CORE BIOPSY RIGHT Right 11/21/2019       CURRENT MEDICATIONS:    Current Facility-Administered Medications   Medication     predniSONE (DELTASONE) tablet 20 mg     predniSONE (DELTASONE) tablet 40 mg     Current Outpatient Medications   Medication     doxycycline hyclate (VIBRAMYCIN) 100 MG capsule     predniSONE (DELTASONE) 20 MG tablet     albuterol (PROAIR HFA/PROVENTIL HFA/VENTOLIN HFA) 108 (90 Base) MCG/ACT inhaler     albuterol (PROVENTIL) (2.5 MG/3ML) 0.083% neb solution      Blood Pressure Monitoring (BLOOD PRESSURE KIT) KIT     cephALEXin (KEFLEX) 500 MG capsule     cetirizine (ZYRTEC) 10 MG tablet     clindamycin (CLEOCIN) 300 MG capsule     colchicine (COLCYRS) 0.6 MG tablet     diphenhydrAMINE (BENADRYL) 25 MG capsule     EPINEPHrine (ANY BX GENERIC EQUIV) 0.3 MG/0.3ML injection 2-pack     gabapentin (NEURONTIN) 100 MG capsule     hydrOXYzine (ATARAX) 25 MG tablet     hydrOXYzine (ATARAX) 25 MG tablet     metoprolol tartrate (LOPRESSOR) 25 MG tablet     montelukast (SINGULAIR) 10 MG tablet     mupirocin (BACTROBAN) 2 % external ointment     omeprazole (PRILOSEC) 20 MG DR capsule     ondansetron (ZOFRAN ODT) 4 MG ODT tab     polyethylene glycol (MIRALAX) 17 GM/Dose powder     SYMBICORT 80-4.5 MCG/ACT Inhaler       ALLERGIES:  Allergies   Allergen Reactions     Amoxicillin Itching and Shortness Of Breath     Bee Pollen Anaphylaxis     Contrast [Iodixanol] Shortness Of Breath and Rash     Pt stated she reacted to the contrast given for her CT in past. She experienced shortness of breath, throat tightening, and rash on chest, and they gave her an injection to counter the symptoms.      Covid-19 (Mrna) Vaccine Shortness Of Breath     Pfizer COVID-19 Vaccine     Hymenoptera Allergenic Extract [Wasp Venom Protein] Anaphylaxis     Iodine Hives and Unknown     Pt stated that she was injected with CT CONTRAST in the past and got hives, SOB, and nausea. Please pre-medicate patient in the future.      Wasp Venom Protein Starter Kit [Wasp Venom Protein] Itching, Shortness Of Breath, Swelling and Difficulty breathing     Adhesive Tape Unknown     Bee Venom Unknown     Food Allergy Formula Unknown     Red meat, chocolate     Geodon [Ziprasidone] Unknown     Ibuprofen Other (See Comments)     Kidney function, Kidney function     Latex Unknown     Added based on information entered during case entry, please review and add reactions, type, and severity as needed     Morphine Unknown      "Prochlorperazine Other (See Comments)     Risperdal [Risperidone] Unknown     Risperidone Analogues [Risperidone] Other (See Comments)     Shellfish Allergy Unknown     Theobroma Oil [Theobroma Grandiflorum Seed Butter] Unknown     Trazodone Other (See Comments)     Elevated creatinine     Zoloft [Sertraline] Unknown     Hydrochlorothiazide Rash       FAMILY HISTORY:  Family History   Problem Relation Age of Onset     Cancer Mother      Breast Cancer Mother      Cerebrovascular Disease Father        SOCIAL HISTORY:   Social History     Socioeconomic History     Marital status:    Tobacco Use     Smoking status: Never     Smokeless tobacco: Never   Vaping Use     Vaping Use: Never used   Substance and Sexual Activity     Alcohol use: Yes     Comment: Alcoholic Drinks/day: dependence     Drug use: Not Currently     Sexual activity: Never   Social History Narrative    . No children.   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  Nonsmoker.  No alcohol use.  Tesha Pelaez MD         VITALS:  BP (!) 165/85   Pulse 73   Temp 98.8  F (37.1  C) (Oral)   Resp 18   SpO2 99%     PHYSICAL EXAM:  Physical Exam  Vitals and nursing note reviewed.   Constitutional:       Appearance: Normal appearance.   HENT:      Head: Normocephalic and atraumatic.      Right Ear: External ear normal.      Left Ear: External ear normal.      Nose: Nose normal.      Mouth/Throat:      Mouth: Mucous membranes are moist.   Eyes:      Extraocular Movements: Extraocular movements intact.      Conjunctiva/sclera: Conjunctivae normal.      Pupils: Pupils are equal, round, and reactive to light.   Cardiovascular:      Rate and Rhythm: Normal rate and regular rhythm.   Pulmonary:      Effort: Pulmonary effort is normal.      Breath sounds: No rales.      Comments: Audible upper airway wheezing  Abdominal:      General: Abdomen is flat. There is no distension.      Palpations: Abdomen is soft.      " Tenderness: There is no abdominal tenderness. There is no guarding.   Musculoskeletal:         General: Normal range of motion.      Cervical back: Normal range of motion and neck supple.      Right lower leg: No edema.      Left lower leg: No edema.      Comments: Bilateral axilla 10 cm erythematous macule with more prominent on the left.   Lymphadenopathy:      Cervical: No cervical adenopathy.   Skin:     General: Skin is warm and dry.   Neurological:      General: No focal deficit present.      Mental Status: She is alert and oriented to person, place, and time. Mental status is at baseline.      Comments: No gross focal neurologic deficits   Psychiatric:         Mood and Affect: Mood normal.         Behavior: Behavior normal.         Thought Content: Thought content normal.          LAB:  All pertinent labs reviewed and interpreted.  Results for orders placed or performed during the hospital encounter of 07/15/23   Chest XR,  PA & LAT    Impression    IMPRESSION: Negative chest. No interval change.   Basic metabolic panel   Result Value Ref Range    Sodium 142 136 - 145 mmol/L    Potassium 3.9 3.5 - 5.0 mmol/L    Chloride 110 (H) 98 - 107 mmol/L    Carbon Dioxide (CO2) 23 22 - 31 mmol/L    Anion Gap 9 5 - 18 mmol/L    Urea Nitrogen 11 8 - 22 mg/dL    Creatinine 0.74 0.60 - 1.10 mg/dL    Calcium 8.9 8.5 - 10.5 mg/dL    Glucose 111 70 - 125 mg/dL    GFR Estimate >90 >60 mL/min/1.73m2   Result Value Ref Range    Magnesium 2.3 1.8 - 2.6 mg/dL   Lactic acid whole blood   Result Value Ref Range    Lactic Acid 0.9 0.7 - 2.0 mmol/L   CBC with platelets and differential   Result Value Ref Range    WBC Count 8.7 4.0 - 11.0 10e3/uL    RBC Count 4.89 3.80 - 5.20 10e6/uL    Hemoglobin 13.9 11.7 - 15.7 g/dL    Hematocrit 41.8 35.0 - 47.0 %    MCV 86 78 - 100 fL    MCH 28.4 26.5 - 33.0 pg    MCHC 33.3 31.5 - 36.5 g/dL    RDW 14.0 10.0 - 15.0 %    Platelet Count 243 150 - 450 10e3/uL    % Neutrophils 68 %    % Lymphocytes 27 %     % Monocytes 4 %    % Eosinophils 1 %    % Basophils 0 %    % Immature Granulocytes 0 %    NRBCs per 100 WBC 0 <1 /100    Absolute Neutrophils 5.9 1.6 - 8.3 10e3/uL    Absolute Lymphocytes 2.3 0.8 - 5.3 10e3/uL    Absolute Monocytes 0.3 0.0 - 1.3 10e3/uL    Absolute Eosinophils 0.1 0.0 - 0.7 10e3/uL    Absolute Basophils 0.0 0.0 - 0.2 10e3/uL    Absolute Immature Granulocytes 0.0 <=0.4 10e3/uL    Absolute NRBCs 0.0 10e3/uL   B-Type Natriuretic Peptide ([facility] Only)   Result Value Ref Range    BNP 99 (H) 0 - 71 pg/mL       RADIOLOGY:  Reviewed all pertinent imaging. Please see official radiology report.  Chest XR,  PA & LAT   Final Result   IMPRESSION: Negative chest. No interval change.          I, Gregory Whitaker, am serving as a scribe to document services personally performed by Dr. Paredes based on my observation and the provider's statements to me. I, Isiah Paredes MD attest that Gregory Whitaker is acting in a scribe capacity, has observed my performance of the services and has documented them in accordance with my direction.    Isiah Paredes M.D.  Emergency Medicine  [facility] EMERGENCY ROOM  [address] [facility] [address] [phone]  Dept: [phone]       Isiah Paredes MD  07/16/23 0146       Isiah Paredes MD  07/16/23 0154

## 2023-07-16 NOTE — ED TRIAGE NOTES
"In for bilateral axillae rash, ring-like, and pt reports feeling a lump under her Lt axilla.    She went camping on Tuesday, she does not know if she was bitten by any ticks. Rash is itching, states that it makes her shoulders feel tingling \"like pins\" but CMS to BUE is okay.    Pt reports she used her epi pen on Tuesday as well due to a bee sting on her Lt arm; she reports having \"some difficulty breathing\" at the time but only used one of her 2 pens. ABCs are intact here in triage and VSS.     Triage Assessment     Row Name 07/15/23 [id number]       Triage Assessment (Adult)    Airway WDL WDL       Respiratory WDL    Respiratory WDL WDL       Skin Circulation/Temperature WDL    Skin Circulation/Temperature WDL WDL       Cardiac WDL    Cardiac WDL WDL       Peripheral/Neurovascular WDL    Peripheral Neurovascular WDL WDL       Cognitive/Neuro/Behavioral WDL    Cognitive/Neuro/Behavioral WDL WDL              "

## 2023-07-16 NOTE — TELEPHONE ENCOUNTER
When I took a shower today, I felt a red [place] on left armpit, like a rash. I just noticed it today. It covers about 1/2 of the armpit. It itches a little bit. It burns. I have also been having diarrhea: it has started 7/1 until today.    Triaged to a disposition of see provider within  24 hrs: urgent care or walk in clinic tomorrow when it reopens.     Debbi Harper RN Triage Nurse Advisor 9:35 PM 7/15/2023  Reason for Disposition   Looks like a boil, infected sore, deep ulcer or other infected rash    Additional Information   Negative: [1] Sudden onset of rash (within last 2 hours) AND [2] difficulty breathing or swallowing   Negative: Sounds like a life-threatening emergency to the triager   Negative: Athlete's Foot suspected (i.e., itchy rash between the toes)   Negative: Impetigo suspected (i.e., painless infected superficial small sores, less than 1 inch or 2.5 cm, often covered by a soft, yellow-brown scab or crust; sometimes occurring near nasal openings)   Negative: Insect bite(s) suspected   Negative: Jock Itch suspected (i.e., itchy rash on inner thighs near genital area)   Negative: Localized lump (or swelling) without redness or rash   Negative: Poison ivy, oak, or sumac contact suspected   Negative: Rash of female genital area (vulva)   Negative: Rash of male genital area (penis or scrotum)   Negative: Redness of immunization site   Negative: Ringworm suspected (i.e., round pink patch, sometimes looks like ring, usually 1/2 to 1 inch [12-25 mm],  in size, slowly increasing in size)   Negative: Shingles suspected (i.e., painful rash, multiple small blisters grouped together in one area of body; dermatomal distribution)   Negative: Small spot, skin growth, or mole   Negative: Sores or skin ulcer, not a rash   Negative: Wound infection suspected (i.e., pain, spreading redness, or pus; in a cut, puncture, scrape or sutured wound)   Negative: [1] Localized purple or blood-colored spots or dots AND [2]  not from injury or friction AND [3] fever   Negative: Patient sounds very sick or weak to the triager   Negative: [1] Red area or streak AND [2] fever   Negative: [1] Rash is painful to touch AND [2] fever   Negative: [1] Looks infected (spreading redness, pus) AND [2] large red area (> 2 in. or 5 cm)   Negative: [1] Looks infected (spreading redness, pus) AND [2] diabetes mellitus or weak immune system (e.g., HIV positive, cancer chemo, splenectomy, organ transplant, chronic steroids)   Negative: [1] Localized purple or blood-colored spots or dots AND [2] not from injury or friction AND [3] no fever   Negative: [1] Looks infected (spreading redness, pus) AND [2] no fever    Protocols used: Rash or Redness - [id number]

## 2023-07-17 LAB — B BURGDOR IGG+IGM SER QL: 0.13

## 2023-07-18 ASSESSMENT — ENCOUNTER SYMPTOMS
COUGH: 1
DIARRHEA: 1
MYALGIAS: 1
PARESTHESIAS: 0
ABDOMINAL PAIN: 1
PALPITATIONS: 0
BREAST MASS: 0
CONSTIPATION: 0
ARTHRALGIAS: 1
FREQUENCY: 0
HEMATOCHEZIA: 0
NAUSEA: 1
DYSURIA: 1
HEMATURIA: 0
SHORTNESS OF BREATH: 0
NERVOUS/ANXIOUS: 1
DIZZINESS: 1
FEVER: 0
WEAKNESS: 1
HEADACHES: 1
JOINT SWELLING: 0
HEARTBURN: 1
SORE THROAT: 0
EYE PAIN: 0
CHILLS: 1

## 2023-07-18 ASSESSMENT — ACTIVITIES OF DAILY LIVING (ADL)
CURRENT_FUNCTION: MONEY MANAGEMENT REQUIRES ASSISTANCE
CURRENT_FUNCTION: PREPARING MEALS REQUIRES ASSISTANCE
CURRENT_FUNCTION: BATHING REQUIRES ASSISTANCE
CURRENT_FUNCTION: MEDICATION ADMINISTRATION REQUIRES ASSISTANCE
CURRENT_FUNCTION: LAUNDRY REQUIRES ASSISTANCE
CURRENT_FUNCTION: SHOPPING REQUIRES ASSISTANCE

## 2023-07-18 ASSESSMENT — ASTHMA QUESTIONNAIRES: ACT_TOTALSCORE: 13

## 2023-07-19 ENCOUNTER — OFFICE VISIT (OUTPATIENT)
Dept: INTERNAL MEDICINE | Facility: CLINIC | Age: 48
End: 2023-07-19
Payer: COMMERCIAL

## 2023-07-19 VITALS
SYSTOLIC BLOOD PRESSURE: 144 MMHG | HEART RATE: 73 BPM | WEIGHT: 222 LBS | RESPIRATION RATE: 18 BRPM | BODY MASS INDEX: 36.99 KG/M2 | HEIGHT: 65 IN | TEMPERATURE: 97.6 F | DIASTOLIC BLOOD PRESSURE: 78 MMHG | OXYGEN SATURATION: 97 %

## 2023-07-19 DIAGNOSIS — J45.40 MODERATE PERSISTENT ASTHMA WITHOUT COMPLICATION: ICD-10-CM

## 2023-07-19 DIAGNOSIS — Z13.29 SCREENING FOR ENDOCRINE, NUTRITIONAL, METABOLIC AND IMMUNITY DISORDER: ICD-10-CM

## 2023-07-19 DIAGNOSIS — Z13.21 SCREENING FOR ENDOCRINE, NUTRITIONAL, METABOLIC AND IMMUNITY DISORDER: ICD-10-CM

## 2023-07-19 DIAGNOSIS — E53.8 VITAMIN B12 DEFICIENCY (NON ANEMIC): Primary | ICD-10-CM

## 2023-07-19 DIAGNOSIS — R53.83 OTHER FATIGUE: ICD-10-CM

## 2023-07-19 DIAGNOSIS — Z00.00 MEDICARE ANNUAL WELLNESS VISIT, SUBSEQUENT: ICD-10-CM

## 2023-07-19 DIAGNOSIS — Z13.0 SCREENING FOR ENDOCRINE, NUTRITIONAL, METABOLIC AND IMMUNITY DISORDER: ICD-10-CM

## 2023-07-19 DIAGNOSIS — I10 ESSENTIAL HYPERTENSION: ICD-10-CM

## 2023-07-19 DIAGNOSIS — N95.1 PERIMENOPAUSE: ICD-10-CM

## 2023-07-19 DIAGNOSIS — E66.01 MORBID OBESITY (H): ICD-10-CM

## 2023-07-19 DIAGNOSIS — Z13.228 SCREENING FOR ENDOCRINE, NUTRITIONAL, METABOLIC AND IMMUNITY DISORDER: ICD-10-CM

## 2023-07-19 DIAGNOSIS — E55.9 VITAMIN D DEFICIENCY: ICD-10-CM

## 2023-07-19 DIAGNOSIS — G62.9 NEUROPATHY: ICD-10-CM

## 2023-07-19 DIAGNOSIS — N95.1 MENOPAUSAL SYNDROME (HOT FLASHES): ICD-10-CM

## 2023-07-19 DIAGNOSIS — R73.03 PREDIABETES: ICD-10-CM

## 2023-07-19 DIAGNOSIS — K76.0 FATTY LIVER: ICD-10-CM

## 2023-07-19 PROBLEM — T74.21XA SEXUAL ASSAULT OF ADULT, INITIAL ENCOUNTER: Status: RESOLVED | Noted: 2022-07-04 | Resolved: 2023-07-19

## 2023-07-19 PROBLEM — M65.4 DE QUERVAIN'S DISEASE (RADIAL STYLOID TENOSYNOVITIS): Status: ACTIVE | Noted: 2017-07-11

## 2023-07-19 LAB
ALBUMIN SERPL BCG-MCNC: 4.1 G/DL (ref 3.5–5.2)
ALP SERPL-CCNC: 90 U/L (ref 35–104)
ALT SERPL W P-5'-P-CCNC: 18 U/L (ref 0–50)
ANION GAP SERPL CALCULATED.3IONS-SCNC: 10 MMOL/L (ref 7–15)
AST SERPL W P-5'-P-CCNC: 18 U/L (ref 0–45)
BILIRUB SERPL-MCNC: 0.5 MG/DL
BUN SERPL-MCNC: 15.3 MG/DL (ref 6–20)
CALCIUM SERPL-MCNC: 8.8 MG/DL (ref 8.6–10)
CHLORIDE SERPL-SCNC: 108 MMOL/L (ref 98–107)
CREAT SERPL-MCNC: 0.86 MG/DL (ref 0.51–0.95)
DEPRECATED HCO3 PLAS-SCNC: 25 MMOL/L (ref 22–29)
ESTRADIOL SERPL-MCNC: 15 PG/ML
FSH SERPL IRP2-ACNC: 20.9 MIU/ML
GFR SERPL CREATININE-BSD FRML MDRD: 82 ML/MIN/1.73M2
GLUCOSE SERPL-MCNC: 118 MG/DL (ref 70–99)
HBA1C MFR BLD: 6.2 % (ref 0–5.6)
LH SERPL-ACNC: 8.7 MIU/ML
POTASSIUM SERPL-SCNC: 4 MMOL/L (ref 3.4–5.3)
PROGEST SERPL-MCNC: 0.1 NG/ML
PROT SERPL-MCNC: 6.7 G/DL (ref 6.4–8.3)
SODIUM SERPL-SCNC: 143 MMOL/L (ref 136–145)
TSH SERPL DL<=0.005 MIU/L-ACNC: 3.5 UIU/ML (ref 0.3–4.2)
VIT B12 SERPL-MCNC: <150 PG/ML (ref 232–1245)

## 2023-07-19 PROCEDURE — 83036 HEMOGLOBIN GLYCOSYLATED A1C: CPT | Performed by: NURSE PRACTITIONER

## 2023-07-19 PROCEDURE — 83002 ASSAY OF GONADOTROPIN (LH): CPT | Performed by: NURSE PRACTITIONER

## 2023-07-19 PROCEDURE — 99214 OFFICE O/P EST MOD 30 MIN: CPT | Mod: 25 | Performed by: NURSE PRACTITIONER

## 2023-07-19 PROCEDURE — 82306 VITAMIN D 25 HYDROXY: CPT | Performed by: NURSE PRACTITIONER

## 2023-07-19 PROCEDURE — 36415 COLL VENOUS BLD VENIPUNCTURE: CPT | Performed by: NURSE PRACTITIONER

## 2023-07-19 PROCEDURE — 82670 ASSAY OF TOTAL ESTRADIOL: CPT | Performed by: NURSE PRACTITIONER

## 2023-07-19 PROCEDURE — 80053 COMPREHEN METABOLIC PANEL: CPT | Performed by: NURSE PRACTITIONER

## 2023-07-19 PROCEDURE — 83001 ASSAY OF GONADOTROPIN (FSH): CPT | Performed by: NURSE PRACTITIONER

## 2023-07-19 PROCEDURE — 84443 ASSAY THYROID STIM HORMONE: CPT | Performed by: NURSE PRACTITIONER

## 2023-07-19 PROCEDURE — G0439 PPPS, SUBSEQ VISIT: HCPCS | Performed by: NURSE PRACTITIONER

## 2023-07-19 PROCEDURE — 82607 VITAMIN B-12: CPT | Performed by: NURSE PRACTITIONER

## 2023-07-19 PROCEDURE — 84144 ASSAY OF PROGESTERONE: CPT | Performed by: NURSE PRACTITIONER

## 2023-07-19 PROCEDURE — T1013 SIGN LANG/ORAL INTERPRETER: HCPCS | Mod: U3

## 2023-07-19 ASSESSMENT — ENCOUNTER SYMPTOMS
WEAKNESS: 1
HEARTBURN: 1
EYE PAIN: 0
CONSTIPATION: 0
COUGH: 1
MYALGIAS: 1
PARESTHESIAS: 0
FEVER: 0
ABDOMINAL PAIN: 1
DIZZINESS: 1
DIARRHEA: 1
ARTHRALGIAS: 1
DYSURIA: 1
SHORTNESS OF BREATH: 0
NAUSEA: 1
HEMATURIA: 0
JOINT SWELLING: 0
NERVOUS/ANXIOUS: 1
FREQUENCY: 0
BREAST MASS: 0
HEADACHES: 1
PALPITATIONS: 0
SORE THROAT: 0
HEMATOCHEZIA: 0
CHILLS: 1

## 2023-07-19 ASSESSMENT — ACTIVITIES OF DAILY LIVING (ADL)
CURRENT_FUNCTION: MONEY MANAGEMENT REQUIRES ASSISTANCE
CURRENT_FUNCTION: PREPARING MEALS REQUIRES ASSISTANCE
CURRENT_FUNCTION: MEDICATION ADMINISTRATION REQUIRES ASSISTANCE
CURRENT_FUNCTION: SHOPPING REQUIRES ASSISTANCE
CURRENT_FUNCTION: LAUNDRY REQUIRES ASSISTANCE
CURRENT_FUNCTION: BATHING REQUIRES ASSISTANCE

## 2023-07-19 NOTE — PATIENT INSTRUCTIONS
Your labs are processing at this time, I released results to [email or web address] once they are back.    Please watch the air quality alerts, try to stay inside when the area is bad outside.    Continue to use your nebulizers and your rescue albuterol inhaler as needed.    Talk with your therapist as scheduled in August.    I will see you back in 3 months for follow-up, please let me know before then if anything comes up.
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
## 2023-07-20 ENCOUNTER — MYC MEDICAL ADVICE (OUTPATIENT)
Dept: INTERNAL MEDICINE | Facility: CLINIC | Age: 48
End: 2023-07-20
Payer: COMMERCIAL

## 2023-07-20 ENCOUNTER — TELEPHONE (OUTPATIENT)
Dept: INTERNAL MEDICINE | Facility: CLINIC | Age: 48
End: 2023-07-20
Payer: COMMERCIAL

## 2023-07-20 LAB — DEPRECATED CALCIDIOL+CALCIFEROL SERPL-MC: 29 UG/L (ref 20–75)

## 2023-07-20 NOTE — TELEPHONE ENCOUNTER
7-20-23    Test Results    Who ordered the test:  latoya    Type of test: Lab    Date of test:  7-19    Where was the test performed:  [facility]    What are your questions/concerns?:  Pt would like a call back w/ explanation of lab results     Could we send this information to you in [facility] or would you prefer to receive a phone call?:   Patient would prefer a phone call   Okay to leave a detailed message?: Yes at Cell number on file:    Telephone Information:   Mobile [phone]

## 2023-07-21 RX ORDER — CYANOCOBALAMIN 1000 UG/ML
1000 INJECTION, SOLUTION INTRAMUSCULAR; SUBCUTANEOUS
Status: ACTIVE | OUTPATIENT
Start: 2023-07-21 | End: 2023-08-18

## 2023-07-21 RX ORDER — CYANOCOBALAMIN 1000 UG/ML
1000 INJECTION, SOLUTION INTRAMUSCULAR; SUBCUTANEOUS
Status: ACTIVE | OUTPATIENT
Start: 2023-09-05 | End: 2024-08-29

## 2023-07-21 NOTE — TELEPHONE ENCOUNTER
Please call patient with results:    Your B12 levels are very low.  The best idea would be starting you on B12 injections, I would suggest doing 1 weekly for the next 2 weeks then monthly.  This likely is why you are having some of the numbness and tingling in your feet like we talked about.     Your electrolytes, kidney, liver function was normal.  Your blood sugar was elevated at 118 but you were not fasted.     Your hemoglobin A1c your 90-day look back at your blood sugar was 6.2% still in the prediabetes range.  Continue to work on diet and exercise.     Your vitamin D levels were normal.     Your thyroid levels were normal.     Your progesterone, estradiol or estrogen, and FSH are all in the perimenopause area and level.  Your LH or luteinizing hormone is right on the borderline of this.  I suspect your missed period for 4 months is related to perimenopause like we discussed in office.  Your labs show this.     You will need nurse only visits for the B12 injections.     We will recheck your B12 levels in a month.

## 2023-07-24 ENCOUNTER — OFFICE VISIT (OUTPATIENT)
Dept: INTERNAL MEDICINE | Facility: CLINIC | Age: 48
End: 2023-07-24
Payer: COMMERCIAL

## 2023-07-24 VITALS
HEART RATE: 60 BPM | BODY MASS INDEX: 37.32 KG/M2 | WEIGHT: 224 LBS | RESPIRATION RATE: 18 BRPM | HEIGHT: 65 IN | SYSTOLIC BLOOD PRESSURE: 124 MMHG | TEMPERATURE: 98.1 F | DIASTOLIC BLOOD PRESSURE: 72 MMHG | OXYGEN SATURATION: 98 %

## 2023-07-24 DIAGNOSIS — Z78.0 MENOPAUSE: ICD-10-CM

## 2023-07-24 DIAGNOSIS — N95.1 MENOPAUSAL SYNDROME (HOT FLASHES): ICD-10-CM

## 2023-07-24 DIAGNOSIS — E53.8 VITAMIN B12 DEFICIENCY (NON ANEMIC): ICD-10-CM

## 2023-07-24 PROCEDURE — 99213 OFFICE O/P EST LOW 20 MIN: CPT | Mod: 25 | Performed by: NURSE PRACTITIONER

## 2023-07-24 PROCEDURE — 96372 THER/PROPH/DIAG INJ SC/IM: CPT | Performed by: NURSE PRACTITIONER

## 2023-07-24 RX ADMIN — CYANOCOBALAMIN 1000 MCG: 1000 INJECTION, SOLUTION INTRAMUSCULAR; SUBCUTANEOUS at 14:50

## 2023-07-24 NOTE — PROGRESS NOTES
Assessment & Plan     Menopause/Menopausal syndrome (hot flashes): Amenorrhea for 6 months, levels of hormones are in menopausal levels. Referral placed for GYN for hormone replacement discussion. She will discuss possible Effexor with her therapist.   - Ob/Gyn Referral    Vitamin B12 deficiency (non anemic): First dose given today in clinic, repeat in 2 weeks, then monthly. Recheck levels in 1 month.     [facility]    Rosemarie Farmer is a 47 year old, presenting for the following health issues:  Abnormal Bleeding Problem (No period for 6 months and B12 shot)        7/24/2023     2:32 PM   Additional Questions   Roomed by Lakshmi CAMARENA     The patient presents today with concerns of no period for 6 months. I saw her 5 days ago, and checked her hormone levels, progesterone, Estradiol, LH, and FSH. All of her levels were in the menopausal range.    Discussed at her age it was pretty normal to have this happen. She is having hot flashed and night sweats. Discussed she could go see a GYN to discuss hormone therapy (estradiol patch or pill, and progesterone pill). She would not be a candidate for Testosterone as she has her uterus intact.    Also discussed trying venlafaxine or Effexor for the vasomotor symptoms. She will discuss this further with her therapist on 08/14/23.    She will get her first b12 shot today.     A  was used for the duration of her visit today.     History of Present Illness       Reason for visit:  B12 vaccine and talk about my period with doctor    She eats 2-3 servings of fruits and vegetables daily.She consumes 2 sweetened beverage(s) daily.She exercises with enough effort to increase her heart rate 9 or less minutes per day.  She exercises with enough effort to increase her heart rate 3 or less days per week.   She is taking medications regularly.     Review of Systems   Genitourinary:  Positive for menstrual problem.     "  Constitutional, HEENT, cardiovascular, pulmonary, gi and gu systems are negative, except as otherwise noted.      Objective    /72 (BP Location: Right arm, Patient Position: Sitting)   Pulse 60   Temp 98.1  F (36.7  C)   Resp 18   Ht 1.651 m (5' 5\")   Wt 101.6 kg (224 lb)   SpO2 98%   BMI 37.28 kg/m    Body mass index is 37.28 kg/m .  Physical Exam   GENERAL: healthy, alert and no distress  EYES: Eyes grossly normal to inspection  RESP: Equal chest rise and fall, able to talk in full sentences without shortness of breath  MS: no gross musculoskeletal defects noted  SKIN: no suspicious lesions or rashes  NEURO: Normal strength and tone, mentation intact and speech normal  PSYCH: mentation appears normal, affect normal/bright        "

## 2023-07-24 NOTE — PATIENT INSTRUCTIONS
To schedule your appointment at [facility] call [phone] for your hormones/menopause.    The other option would be Venlafaxine or Effexor for the hot flashed.    First b12 injection given today. Follow up on 08/03 for your second shot, then monthly after.    Follow up with me in clinic 10/23 as scheduled.

## 2023-07-25 NOTE — TELEPHONE ENCOUNTER
LMTCB through . Pt was seen in office 7/24, these are lab results from 7/19, unsure if pt still needs them or not?

## 2023-07-25 NOTE — TELEPHONE ENCOUNTER
Call back from patient with  - reports that she has already gotten results and denies other concerns. Closing this encounter.

## 2023-07-31 ENCOUNTER — TELEPHONE (OUTPATIENT)
Dept: INTERNAL MEDICINE | Facility: CLINIC | Age: 48
End: 2023-07-31
Payer: COMMERCIAL

## 2023-07-31 NOTE — TELEPHONE ENCOUNTER
7-31-23  [facility] Referrals:  Pt called & stated [facility] didn't get the faxed order for OBGYN placed on 7-24, could you refax.  (No need to send replay back to TC)  Tex

## 2023-07-31 NOTE — TELEPHONE ENCOUNTER
Order sent to [facility]    ~Melyssa COOPER [facility] Referral Coordinator

## 2023-08-14 NOTE — PROGRESS NOTES
Around 2pm- about 20 mins after patient had her Pfizer Covid vaccine,  Cherry called RN Susanne and asked to check on patient. As per , patient felt warm & flushed on her face. Immediately, Susanne asked writer for help (as currently I was scheduling another patient for an appointment). Immediately I checked patient and assessed and with the help of , patient denies difficulty breathing but just feeling warm & asked to checked if she has wheezes. Noticed some redness on patient's face. Oxygen sat was 98% and heart rate was 60's. Auscultated lungs and were clear and no Stridor was heard. Manager Brianna came and brought a wheelchair and immediately brought patient in the private room for better monitoring.  By 2:05 pm, hooked patient to machine & BP was 186/101 (thru right arm using regular cuff size) oxygen sat was 98%, NM=66, RR=20/min. Lungs auscultated and were still clear. Instructed patient to relax and take some deep breath.  By 2:10 pm, HR was 61, oxygen sat was 99% and patient felt warmer and denies trouble breathing. Lungs sounds auscultated and noted scattered wheezes but no stridor noted. As per patient she has asthma & takes inhaler. Immediately notified Brianna and asked to please call 911 (as no nebs or inhaler available).   By 2:12, auscultated patient again and lungs were now clear. Patient verbalized feeling better and ask for some water. BP was now 190/105, HR=61, RR=20, oxygen sat 98%.   2:20- Lungs sounds still clear, patient felt better and able to drink water without any problem.   EMS came and checked on patient and she refused to go to the hospital.  By 2:37, BP still 191/85, HR=67, RR=20 and temp was 98.9. Patient eager to go home but instructed to wait few minutes until blood pressure is better. Instructed to relax and do some deep breathing.  By 2:44 BP was down to 167/84 (taken on left arm & used a bigger cuff as patient has big arm  circumference), oxygen sat was 99%, RR=18, IA=69.   Patient now more stable, better blood pressure and was send home with instructions to call 911 if anything serious reactions and to check with Primary MD before getting her second dose. Patient agreed to the plan.     response to care/treatments:

## 2023-08-15 ENCOUNTER — APPOINTMENT (OUTPATIENT)
Dept: CT IMAGING | Facility: HOSPITAL | Age: 48
End: 2023-08-15
Attending: STUDENT IN AN ORGANIZED HEALTH CARE EDUCATION/TRAINING PROGRAM
Payer: COMMERCIAL

## 2023-08-15 ENCOUNTER — HOSPITAL ENCOUNTER (EMERGENCY)
Facility: HOSPITAL | Age: 48
Discharge: HOME | End: 2023-08-15
Attending: EMERGENCY MEDICINE | Admitting: EMERGENCY MEDICINE
Payer: COMMERCIAL

## 2023-08-15 VITALS
OXYGEN SATURATION: 99 % | TEMPERATURE: 98.3 F | RESPIRATION RATE: 26 BRPM | SYSTOLIC BLOOD PRESSURE: 175 MMHG | HEART RATE: 68 BPM | DIASTOLIC BLOOD PRESSURE: 74 MMHG

## 2023-08-15 DIAGNOSIS — R10.84 GENERALIZED ABDOMINAL PAIN: ICD-10-CM

## 2023-08-15 DIAGNOSIS — R11.10 VOMITING AND DIARRHEA: ICD-10-CM

## 2023-08-15 DIAGNOSIS — R19.7 VOMITING AND DIARRHEA: ICD-10-CM

## 2023-08-15 LAB
ALBUMIN SERPL BCG-MCNC: 3.9 G/DL (ref 3.5–5.2)
ALBUMIN UR-MCNC: NEGATIVE MG/DL
ALP SERPL-CCNC: 94 U/L (ref 35–104)
ALT SERPL W P-5'-P-CCNC: 19 U/L (ref 0–50)
ANION GAP SERPL CALCULATED.3IONS-SCNC: 9 MMOL/L (ref 7–15)
APPEARANCE UR: CLEAR
AST SERPL W P-5'-P-CCNC: 29 U/L (ref 0–45)
BASOPHILS # BLD AUTO: 0 10E3/UL (ref 0–0.2)
BASOPHILS NFR BLD AUTO: 0 %
BILIRUB SERPL-MCNC: 0.2 MG/DL
BILIRUB UR QL STRIP: NEGATIVE
BUN SERPL-MCNC: 13.2 MG/DL (ref 6–20)
CALCIUM SERPL-MCNC: 9 MG/DL (ref 8.6–10)
CHLORIDE SERPL-SCNC: 108 MMOL/L (ref 98–107)
COLOR UR AUTO: ABNORMAL
CREAT SERPL-MCNC: 0.86 MG/DL (ref 0.51–0.95)
DEPRECATED HCO3 PLAS-SCNC: 25 MMOL/L (ref 22–29)
EOSINOPHIL # BLD AUTO: 0.1 10E3/UL (ref 0–0.7)
EOSINOPHIL NFR BLD AUTO: 1 %
ERYTHROCYTE [DISTWIDTH] IN BLOOD BY AUTOMATED COUNT: 13.4 % (ref 10–15)
FLUAV RNA SPEC QL NAA+PROBE: NEGATIVE
FLUBV RNA RESP QL NAA+PROBE: NEGATIVE
GFR SERPL CREATININE-BSD FRML MDRD: 82 ML/MIN/1.73M2
GLUCOSE SERPL-MCNC: 114 MG/DL (ref 70–99)
GLUCOSE UR STRIP-MCNC: NEGATIVE MG/DL
HCT VFR BLD AUTO: 39.2 % (ref 35–47)
HGB BLD-MCNC: 13.2 G/DL (ref 11.7–15.7)
HGB UR QL STRIP: ABNORMAL
IMM GRANULOCYTES # BLD: 0 10E3/UL
IMM GRANULOCYTES NFR BLD: 0 %
KETONES UR STRIP-MCNC: NEGATIVE MG/DL
LEUKOCYTE ESTERASE UR QL STRIP: NEGATIVE
LIPASE SERPL-CCNC: 32 U/L (ref 13–60)
LYMPHOCYTES # BLD AUTO: 2.5 10E3/UL (ref 0.8–5.3)
LYMPHOCYTES NFR BLD AUTO: 34 %
MCH RBC QN AUTO: 28.4 PG (ref 26.5–33)
MCHC RBC AUTO-ENTMCNC: 33.7 G/DL (ref 31.5–36.5)
MCV RBC AUTO: 84 FL (ref 78–100)
MONOCYTES # BLD AUTO: 0.3 10E3/UL (ref 0–1.3)
MONOCYTES NFR BLD AUTO: 5 %
MUCOUS THREADS #/AREA URNS LPF: PRESENT /LPF
NEUTROPHILS # BLD AUTO: 4.2 10E3/UL (ref 1.6–8.3)
NEUTROPHILS NFR BLD AUTO: 60 %
NITRATE UR QL: NEGATIVE
NRBC # BLD AUTO: 0 10E3/UL
NRBC BLD AUTO-RTO: 0 /100
PH UR STRIP: 5 [PH] (ref 5–7)
PLATELET # BLD AUTO: 252 10E3/UL (ref 150–450)
POTASSIUM SERPL-SCNC: 3.9 MMOL/L (ref 3.4–5.3)
PROT SERPL-MCNC: 6.8 G/DL (ref 6.4–8.3)
RBC # BLD AUTO: 4.65 10E6/UL (ref 3.8–5.2)
RBC URINE: 2 /HPF
RSV RNA SPEC NAA+PROBE: NEGATIVE
SARS-COV-2 RNA RESP QL NAA+PROBE: NEGATIVE
SODIUM SERPL-SCNC: 142 MMOL/L (ref 136–145)
SP GR UR STRIP: 1.02 (ref 1–1.03)
SQUAMOUS EPITHELIAL: 3 /HPF
UROBILINOGEN UR STRIP-MCNC: <2 MG/DL
WBC # BLD AUTO: 7.1 10E3/UL (ref 4–11)
WBC URINE: 1 /HPF

## 2023-08-15 PROCEDURE — 87507 IADNA-DNA/RNA PROBE TQ 12-25: CPT | Performed by: EMERGENCY MEDICINE

## 2023-08-15 PROCEDURE — 36415 COLL VENOUS BLD VENIPUNCTURE: CPT | Performed by: STUDENT IN AN ORGANIZED HEALTH CARE EDUCATION/TRAINING PROGRAM

## 2023-08-15 PROCEDURE — 80053 COMPREHEN METABOLIC PANEL: CPT | Performed by: STUDENT IN AN ORGANIZED HEALTH CARE EDUCATION/TRAINING PROGRAM

## 2023-08-15 PROCEDURE — 258N000003 HC RX IP 258 OP 636: Performed by: EMERGENCY MEDICINE

## 2023-08-15 PROCEDURE — 83690 ASSAY OF LIPASE: CPT | Performed by: STUDENT IN AN ORGANIZED HEALTH CARE EDUCATION/TRAINING PROGRAM

## 2023-08-15 PROCEDURE — 85025 COMPLETE CBC W/AUTO DIFF WBC: CPT | Performed by: STUDENT IN AN ORGANIZED HEALTH CARE EDUCATION/TRAINING PROGRAM

## 2023-08-15 PROCEDURE — 96375 TX/PRO/DX INJ NEW DRUG ADDON: CPT

## 2023-08-15 PROCEDURE — 99285 EMERGENCY DEPT VISIT HI MDM: CPT | Mod: 25

## 2023-08-15 PROCEDURE — 96361 HYDRATE IV INFUSION ADD-ON: CPT

## 2023-08-15 PROCEDURE — 96374 THER/PROPH/DIAG INJ IV PUSH: CPT | Mod: 59

## 2023-08-15 PROCEDURE — 87637 SARSCOV2&INF A&B&RSV AMP PRB: CPT | Performed by: EMERGENCY MEDICINE

## 2023-08-15 PROCEDURE — 74176 CT ABD & PELVIS W/O CONTRAST: CPT

## 2023-08-15 PROCEDURE — 81001 URINALYSIS AUTO W/SCOPE: CPT | Performed by: EMERGENCY MEDICINE

## 2023-08-15 PROCEDURE — 250N000011 HC RX IP 250 OP 636: Mod: JZ | Performed by: EMERGENCY MEDICINE

## 2023-08-15 RX ORDER — ONDANSETRON 4 MG/1
4 TABLET, ORALLY DISINTEGRATING ORAL EVERY 8 HOURS PRN
Qty: 10 TABLET | Refills: 0 | Status: SHIPPED | OUTPATIENT
Start: 2023-08-15 | End: 2023-08-18

## 2023-08-15 RX ORDER — KETOROLAC TROMETHAMINE 15 MG/ML
15 INJECTION, SOLUTION INTRAMUSCULAR; INTRAVENOUS ONCE
Status: COMPLETED | OUTPATIENT
Start: 2023-08-15 | End: 2023-08-15

## 2023-08-15 RX ORDER — DICYCLOMINE HCL 20 MG
20 TABLET ORAL 2 TIMES DAILY PRN
Qty: 20 TABLET | Refills: 0 | Status: SHIPPED | OUTPATIENT
Start: 2023-08-15 | End: 2023-08-25

## 2023-08-15 RX ORDER — ONDANSETRON 2 MG/ML
4 INJECTION INTRAMUSCULAR; INTRAVENOUS ONCE
Status: COMPLETED | OUTPATIENT
Start: 2023-08-15 | End: 2023-08-15

## 2023-08-15 RX ADMIN — KETOROLAC TROMETHAMINE 15 MG: 15 INJECTION, SOLUTION INTRAMUSCULAR; INTRAVENOUS at 20:50

## 2023-08-15 RX ADMIN — ONDANSETRON 4 MG: 2 INJECTION INTRAMUSCULAR; INTRAVENOUS at 20:23

## 2023-08-15 RX ADMIN — SODIUM CHLORIDE 1000 ML: 9 INJECTION, SOLUTION INTRAVENOUS at 20:23

## 2023-08-15 ASSESSMENT — ENCOUNTER SYMPTOMS
CHILLS: 1
HEMATURIA: 0
DIFFICULTY URINATING: 0
ABDOMINAL PAIN: 1
SHORTNESS OF BREATH: 0
DIARRHEA: 1
VOMITING: 1
DYSURIA: 0
NAUSEA: 1

## 2023-08-15 ASSESSMENT — ACTIVITIES OF DAILY LIVING (ADL): ADLS_ACUITY_SCORE: 35

## 2023-08-15 NOTE — ED TRIAGE NOTES
Abdominal pain, nausea, vomiting and diarrhea for one week via ASL interpretation of patient's current medical concerns.

## 2023-08-16 LAB

## 2023-08-16 NOTE — DISCHARGE INSTRUCTIONS
The abdominal CT scan, urinalysis, and laboratory test all appear reassuring here today in the ED. your symptoms are likely related to a viral gastroenteritis.      Take the prescribed Zofran as needed for any further episodes of nausea vomiting.  Use the prescribed Bentyl as needed for any further abdominal pain in addition to over-the-counter ibuprofen.    Follow-up with your primary care provider for reevaluation or return back to ED sooner for any worsening abdominal pain, vomiting, diarrhea, fevers, or any other new or concerning symptoms.

## 2023-08-16 NOTE — ED PROVIDER NOTES
EMERGENCY DEPARTMENT ENCOUNTER      NAME: Marleny Viera  AGE: 47 year old female  YOB: 1975  MRN: [id number]  EVALUATION DATE & TIME: 8/15/2023  7:37 PM    PCP: Maria Fernanda Schumacher    ED PROVIDER: Laura Salomon DO      Chief Complaint   Patient presents with    Abdominal Pain         FINAL IMPRESSION:  1. Generalized abdominal pain    2. Vomiting and diarrhea          ED COURSE & MEDICAL DECISION MAKIN-year-old female presented to the ED for evaluation of diffuse abdominal pain with associated nausea, vomiting, diarrhea that been ongoing for the last week.  Upon arrival to the ED the patient was noted to be hypertensive.  She was otherwise hemodynamically stable.  The patient did not appear to be in any obvious distress or discomfort at the time for initial evaluation.  On exam the patient was noted to have mild diffuse abdominal tenderness to palpation.  She did not have evidence of an acute abdomen, however.  The remainder of her physical exam was unremarkable.    Following her initial evaluation the patient was given IV fluids, Zofran, and Toradol.    CBC, CMP, lipase, and UA were all reassuring.  COVID testing was negative.    CT scan of abdomen does not show any acute abnormalities to explain the patient's abdominal pain.      With the help of the  the patient was reevaluated and informed of the reassuring lab, UA, and abdominal CT scan results.  The patient was informed that her symptoms like represent a viral gastroenteritis.  After educating and reassuring the patient she felt comfortable returning home.  The patient was sent home with Zofran to use as needed for any further episodes of nausea vomiting.  She was also sent home with Bentyl to use as needed for any further abdominal pain.  The patient was instructed to follow-up with her primary care provider for reevaluation or to return back to ED sooner for any worsening abdominal pain, vomiting, diarrhea, or any other  new or concerning symptoms.    Pertinent Labs & Imaging studies reviewed. (See chart for details)  7:52 PM I met with the patient to gather history and to perform my initial exam. We discussed plans for the ED course, including diagnostic testing and treatment.       At the conclusion of the encounter I discussed the results of all of the tests and the disposition. The questions were answered. The patient or family acknowledged understanding and was agreeable with the care plan.     Medical Decision Making    History:  Supplemental history from: Documented in chart, if applicable  External Record(s) reviewed: Documented in chart, if applicable.    Work Up:  Chart documentation includes differential considered and any EKGs or imaging independently interpreted by provider, where specified.  In additional to work up documented, I considered the following work up: Documented in chart, if applicable.      Complicating factors:  Care impacted by chronic illness: Hypertension  Care affected by social determinants of health: N/A    Disposition considerations: Discharge. I prescribed additional prescription strength medication(s) as charted. N/A.      PPE worn: n95 mask, goggles    MEDICATIONS GIVEN IN THE EMERGENCY:  Medications   0.9% sodium chloride BOLUS (0 mLs Intravenous Stopped 8/15/23 2255)   ondansetron (ZOFRAN) injection 4 mg (4 mg Intravenous $Given 8/15/23 2023)   ketorolac (TORADOL) injection 15 mg (15 mg Intravenous $Given 8/15/23 2050)       NEW PRESCRIPTIONS STARTED AT TODAY'S ER VISIT  Discharge Medication List as of 8/15/2023 10:55 PM        START taking these medications    Details   dicyclomine (BENTYL) 20 MG tablet Take 1 tablet (20 mg) by mouth 2 times daily as needed (abdominal pain), Disp-20 tablet, R-0, Local Print      ondansetron (ZOFRAN ODT) 4 MG ODT tab Take 1 tablet (4 mg) by mouth every 8 hours as needed for nausea, Disp-10 tablet, R-0, Local Print                 =================================================================    HPI    Patient information was obtained from: patient     Use of : Yes (In person) Language: American Sign Language      Marleny Viera is a 47 year old female with a pertinent history of hypertension, didelphic uterus, and mental health diagnoses who presents to this ED via walk-in for evaluation of abdominal pain.    The patient presents with diffuse abdominal tenderness, nausea, vomiting, and diarrhea for a little over 1 week. She had dark stools today and has had some chills. She has been using Tylenol to manage the pain.     She denies hematemesis, chest pain, shortness of breath, urinary symptoms, or any other complaints at this time.     REVIEW OF SYSTEMS   Review of Systems   Constitutional:  Positive for chills.   Respiratory:  Negative for shortness of breath.    Cardiovascular:  Negative for chest pain.   Gastrointestinal:  Positive for abdominal pain, diarrhea, nausea and vomiting.        Positive for dark stools. Negative for hematemesis   Genitourinary:  Negative for difficulty urinating, dysuria, hematuria and urgency.   All other systems reviewed and are negative.       PAST MEDICAL HISTORY:  Past Medical History:   Diagnosis Date    Accidental overdose, initial encounter 7/7/2022    ADHD (attention deficit hyperactivity disorder)     Alcohol dependence (H)     Anxiety     Arthritis     Asthma     Bipolar depression (H)     Deafness     Drug abuse, nondependent (H) 10/29/2004    Overview:  polydrug abuse per psych notes ; Other, mixed, or unspecified nondependent drug abuse, unspecified    History of blood clots     History of transfusion     Hypertension     Kidney stones 2015    Major depressive disorder, recurrent episode, severe (H) 5/17/2016    Overview:  s/p suicide attempt Epic     Migraine     Obstructive sleep apnea 6/1/2016    Pulmonary emboli (H)     Status post total right knee replacement 1/18/2016        PAST SURGICAL HISTORY:  Past Surgical History:   Procedure Laterality Date    BIOPSY BREAST Right     2019... Also had fluid drained from left breast under surgery also in 2019    BREAST SURGERY      MAMMOPLASTY REDUCTION Bilateral 2017    TOTAL HIP ARTHROPLASTY Bilateral     TOTAL KNEE ARTHROPLASTY Right     TUBAL LIGATION      US BREAST CORE BIOPSY RIGHT Right 11/21/2019           CURRENT MEDICATIONS:    dicyclomine (BENTYL) 20 MG tablet  ondansetron (ZOFRAN ODT) 4 MG ODT tab  albuterol (PROAIR HFA/PROVENTIL HFA/VENTOLIN HFA) 108 (90 Base) MCG/ACT inhaler  albuterol (PROVENTIL) (2.5 MG/3ML) 0.083% neb solution  Blood Pressure Monitoring (BLOOD PRESSURE KIT) KIT  cetirizine (ZYRTEC) 10 MG tablet  EPINEPHrine (ANY BX GENERIC EQUIV) 0.3 MG/0.3ML injection 2-pack  gabapentin (NEURONTIN) 100 MG capsule  hydrOXYzine (ATARAX) 25 MG tablet  metoprolol tartrate (LOPRESSOR) 25 MG tablet  montelukast (SINGULAIR) 10 MG tablet  omeprazole (PRILOSEC) 20 MG DR capsule  polyethylene glycol (MIRALAX) 17 GM/Dose powder  SYMBICORT 80-4.5 MCG/ACT Inhaler        ALLERGIES:  Allergies   Allergen Reactions    Amoxicillin Itching and Shortness Of Breath    Bee Pollen Anaphylaxis    Contrast [Iodixanol] Shortness Of Breath and Rash     Pt stated she reacted to the contrast given for her CT in past. She experienced shortness of breath, throat tightening, and rash on chest, and they gave her an injection to counter the symptoms.     Covid-19 (Mrna) Vaccine Shortness Of Breath     Pfizer COVID-19 Vaccine    Hymenoptera Allergenic Extract [Wasp Venom Protein] Anaphylaxis    Iodine Hives and Unknown     Pt stated that she was injected with CT CONTRAST in the past and got hives, SOB, and nausea. Please pre-medicate patient in the future.     Wasp Venom Protein Starter Kit [Wasp Venom Protein] Itching, Shortness Of Breath, Swelling and Difficulty breathing    Adhesive Tape Unknown    Bee Venom Unknown    Food Allergy Formula Unknown      "Red meat, chocolate    Geodon [Ziprasidone] Unknown    Ibuprofen Other (See Comments)     Kidney function, Kidney function    Latex Unknown     Added based on information entered during case entry, please review and add reactions, type, and severity as needed    Morphine Unknown    Prochlorperazine Other (See Comments)    Risperdal [Risperidone] Unknown    Risperidone Analogues [Risperidone] Other (See Comments)    Shellfish Allergy Unknown    Theobroma Oil [Theobroma Grandiflorum Seed Butter] Unknown    Trazodone Other (See Comments)     Elevated creatinine    Zoloft [Sertraline] Unknown    Hydrochlorothiazide Rash       FAMILY HISTORY:  Family History   Problem Relation Age of Onset    Cancer Mother     Breast Cancer Mother     Cerebrovascular Disease Father        SOCIAL HISTORY:   Social History     Socioeconomic History    Marital status:    Tobacco Use    Smoking status: Never    Smokeless tobacco: Never   Vaping Use    Vaping Use: Never used   Substance and Sexual Activity    Alcohol use: Yes     Comment: Alcoholic Drinks/day: dependence    Drug use: Not Currently    Sexual activity: Never   Social History Narrative    . No children.   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  Nonsmoker.  No alcohol use.  Tesha Pelaez MD         VITALS:  BP (!) 175/74   Pulse 68   Temp 98.3  F (36.8  C) (Temporal)   Resp 26   SpO2 99%     PHYSICAL EXAM    General presentation: Alert, Vital signs reviewed. NAD  HENT: ENT inspection is normal. Oropharynx is moist and clear.   Eye: Pupils are equal and reactive to light. EOMI  Neck: The neck is supple, with full ROM, with no evidence of meningismus.  Pulmonary: Currently in no acute respiratory distress. Normal, non labored respirations, the lung sounds are normal with good equal air movement. Clear to auscultation bilaterally.   Circulatory: Regular rate and rhythm. Peripheral pulses are strong and equal. No murmurs, " rubs, or gallops.   Abdominal: The abdomen is soft.  Mild tenderness to palpation noted diffusely throughout the abdomen.  No rigidity, guarding, or rebound. Bowel sounds normal.   Neurologic: Alert, oriented to person, place, and time. No motor deficit. No sensory deficit. Cranial nerves II through XII are intact.  Musculoskeletal: No extremity tenderness. Full range of motion in all extremities. No extremity edema.   Skin: Skin color is normal. No rash. Warm. Dry to touch.      LAB:  All pertinent labs reviewed and interpreted.  Results for orders placed or performed during the hospital encounter of 08/15/23   CT Abdomen Pelvis w/o Contrast    Impression    IMPRESSION:   1.  No acute findings in the abdomen and pelvis on noncontrast CT.  2.  Myomatous uterus.     Comprehensive metabolic panel   Result Value Ref Range    Sodium 142 136 - 145 mmol/L    Potassium 3.9 3.4 - 5.3 mmol/L    Chloride 108 (H) 98 - 107 mmol/L    Carbon Dioxide (CO2) 25 22 - 29 mmol/L    Anion Gap 9 7 - 15 mmol/L    Urea Nitrogen 13.2 6.0 - 20.0 mg/dL    Creatinine 0.86 0.51 - 0.95 mg/dL    Calcium 9.0 8.6 - 10.0 mg/dL    Glucose 114 (H) 70 - 99 mg/dL    Alkaline Phosphatase 94 35 - 104 U/L    AST 29 0 - 45 U/L    ALT 19 0 - 50 U/L    Protein Total 6.8 6.4 - 8.3 g/dL    Albumin 3.9 3.5 - 5.2 g/dL    Bilirubin Total 0.2 <=1.2 mg/dL    GFR Estimate 82 >60 mL/min/1.73m2   Result Value Ref Range    Lipase 32 13 - 60 U/L   CBC with platelets and differential   Result Value Ref Range    WBC Count 7.1 4.0 - 11.0 10e3/uL    RBC Count 4.65 3.80 - 5.20 10e6/uL    Hemoglobin 13.2 11.7 - 15.7 g/dL    Hematocrit 39.2 35.0 - 47.0 %    MCV 84 78 - 100 fL    MCH 28.4 26.5 - 33.0 pg    MCHC 33.7 31.5 - 36.5 g/dL    RDW 13.4 10.0 - 15.0 %    Platelet Count 252 150 - 450 10e3/uL    % Neutrophils 60 %    % Lymphocytes 34 %    % Monocytes 5 %    % Eosinophils 1 %    % Basophils 0 %    % Immature Granulocytes 0 %    NRBCs per 100 WBC 0 <1 /100    Absolute  Neutrophils 4.2 1.6 - 8.3 10e3/uL    Absolute Lymphocytes 2.5 0.8 - 5.3 10e3/uL    Absolute Monocytes 0.3 0.0 - 1.3 10e3/uL    Absolute Eosinophils 0.1 0.0 - 0.7 10e3/uL    Absolute Basophils 0.0 0.0 - 0.2 10e3/uL    Absolute Immature Granulocytes 0.0 <=0.4 10e3/uL    Absolute NRBCs 0.0 10e3/uL   UA with Microscopic reflex to Culture    Specimen: Urine, Midstream   Result Value Ref Range    Color Urine Light Yellow Colorless, Straw, Light Yellow, Yellow    Appearance Urine Clear Clear    Glucose Urine Negative Negative mg/dL    Bilirubin Urine Negative Negative    Ketones Urine Negative Negative mg/dL    Specific Gravity Urine 1.017 1.001 - 1.030    Blood Urine 0.1 mg/dL (A) Negative    pH Urine 5.0 5.0 - 7.0    Protein Albumin Urine Negative Negative mg/dL    Urobilinogen Urine <2.0 <2.0 mg/dL    Nitrite Urine Negative Negative    Leukocyte Esterase Urine Negative Negative    Mucus Urine Present (A) None Seen /LPF    RBC Urine 2 <=2 /HPF    WBC Urine 1 <=5 /HPF    Squamous Epithelials Urine 3 (H) <=1 /HPF   Symptomatic Influenza A/B, RSV, & SARS-CoV2 PCR (COVID-19) Nose    Specimen: Nose; Swab   Result Value Ref Range    Influenza A PCR Negative Negative    Influenza B PCR Negative Negative    RSV PCR Negative Negative    SARS CoV2 PCR Negative Negative       RADIOLOGY:  Reviewed all pertinent imaging. Please see official radiology report.  CT Abdomen Pelvis w/o Contrast   Final Result   IMPRESSION:    1.  No acute findings in the abdomen and pelvis on noncontrast CT.   2.  Myomatous uterus.               I, José Miguel Chaney , am serving as a scribe to document services personally performed by Laura Salomon DO based on my observation and the provider's statements to me. I, Laura Salomon, attest that José Miguel Chaney is acting in a scribe capacity, has observed my performance of the services and has documented them in accordance with my direction.    Laura Salomon DO  Emergency Medicine  [facility]  EMERGENCY DEPARTMENT  [address] [place] [id number]  [phone]       Laura Salomon DO  08/16/23 0043

## 2023-08-18 ENCOUNTER — HOSPITAL ENCOUNTER (EMERGENCY)
Facility: CLINIC | Age: 48
Discharge: HOME | End: 2023-08-18
Attending: EMERGENCY MEDICINE | Admitting: EMERGENCY MEDICINE
Payer: COMMERCIAL

## 2023-08-18 ENCOUNTER — APPOINTMENT (OUTPATIENT)
Dept: CT IMAGING | Facility: CLINIC | Age: 48
End: 2023-08-18
Attending: EMERGENCY MEDICINE
Payer: COMMERCIAL

## 2023-08-18 VITALS
RESPIRATION RATE: 24 BRPM | HEIGHT: 65 IN | HEART RATE: 71 BPM | WEIGHT: 225 LBS | TEMPERATURE: 97.7 F | OXYGEN SATURATION: 95 % | SYSTOLIC BLOOD PRESSURE: 165 MMHG | DIASTOLIC BLOOD PRESSURE: 73 MMHG | BODY MASS INDEX: 37.49 KG/M2

## 2023-08-18 DIAGNOSIS — R10.9 FLANK PAIN: ICD-10-CM

## 2023-08-18 DIAGNOSIS — R31.9 HEMATURIA, UNSPECIFIED TYPE: ICD-10-CM

## 2023-08-18 LAB
ALBUMIN SERPL-MCNC: 3.7 G/DL (ref 3.5–5)
ALBUMIN UR-MCNC: 20 MG/DL
ALP SERPL-CCNC: 91 U/L (ref 45–120)
ALT SERPL W P-5'-P-CCNC: 18 U/L (ref 0–45)
ANION GAP SERPL CALCULATED.3IONS-SCNC: 8 MMOL/L (ref 5–18)
APPEARANCE UR: CLEAR
AST SERPL W P-5'-P-CCNC: 18 U/L (ref 0–40)
BASOPHILS # BLD AUTO: 0 10E3/UL (ref 0–0.2)
BASOPHILS NFR BLD AUTO: 0 %
BILIRUB SERPL-MCNC: 0.4 MG/DL (ref 0–1)
BILIRUB UR QL STRIP: NEGATIVE
BUN SERPL-MCNC: 12 MG/DL (ref 8–22)
CALCIUM SERPL-MCNC: 8.7 MG/DL (ref 8.5–10.5)
CHLORIDE BLD-SCNC: 107 MMOL/L (ref 98–107)
CO2 SERPL-SCNC: 26 MMOL/L (ref 22–31)
COLOR UR AUTO: YELLOW
CREAT SERPL-MCNC: 0.84 MG/DL (ref 0.6–1.1)
EOSINOPHIL # BLD AUTO: 0.1 10E3/UL (ref 0–0.7)
EOSINOPHIL NFR BLD AUTO: 1 %
ERYTHROCYTE [DISTWIDTH] IN BLOOD BY AUTOMATED COUNT: 13.8 % (ref 10–15)
GFR SERPL CREATININE-BSD FRML MDRD: 85 ML/MIN/1.73M2
GLUCOSE BLD-MCNC: 107 MG/DL (ref 70–125)
GLUCOSE UR STRIP-MCNC: NEGATIVE MG/DL
HCT VFR BLD AUTO: 39 % (ref 35–47)
HGB BLD-MCNC: 13.3 G/DL (ref 11.7–15.7)
HGB UR QL STRIP: ABNORMAL
IMM GRANULOCYTES # BLD: 0 10E3/UL
IMM GRANULOCYTES NFR BLD: 0 %
KETONES UR STRIP-MCNC: NEGATIVE MG/DL
LEUKOCYTE ESTERASE UR QL STRIP: NEGATIVE
LIPASE SERPL-CCNC: 21 U/L (ref 0–52)
LYMPHOCYTES # BLD AUTO: 1.8 10E3/UL (ref 0.8–5.3)
LYMPHOCYTES NFR BLD AUTO: 25 %
MCH RBC QN AUTO: 28.7 PG (ref 26.5–33)
MCHC RBC AUTO-ENTMCNC: 34.1 G/DL (ref 31.5–36.5)
MCV RBC AUTO: 84 FL (ref 78–100)
MONOCYTES # BLD AUTO: 0.3 10E3/UL (ref 0–1.3)
MONOCYTES NFR BLD AUTO: 4 %
MUCOUS THREADS #/AREA URNS LPF: PRESENT /LPF
NEUTROPHILS # BLD AUTO: 5 10E3/UL (ref 1.6–8.3)
NEUTROPHILS NFR BLD AUTO: 70 %
NITRATE UR QL: NEGATIVE
NRBC # BLD AUTO: 0 10E3/UL
NRBC BLD AUTO-RTO: 0 /100
PH UR STRIP: 5.5 [PH] (ref 5–7)
PLATELET # BLD AUTO: 257 10E3/UL (ref 150–450)
POTASSIUM BLD-SCNC: 3.4 MMOL/L (ref 3.5–5)
PROT SERPL-MCNC: 7.1 G/DL (ref 6–8)
RBC # BLD AUTO: 4.64 10E6/UL (ref 3.8–5.2)
RBC URINE: 9 /HPF
SODIUM SERPL-SCNC: 141 MMOL/L (ref 136–145)
SP GR UR STRIP: 1.02 (ref 1–1.03)
SQUAMOUS EPITHELIAL: 7 /HPF
UROBILINOGEN UR STRIP-MCNC: <2 MG/DL
WBC # BLD AUTO: 7.2 10E3/UL (ref 4–11)
WBC URINE: 1 /HPF

## 2023-08-18 PROCEDURE — 74176 CT ABD & PELVIS W/O CONTRAST: CPT

## 2023-08-18 PROCEDURE — 83690 ASSAY OF LIPASE: CPT | Performed by: EMERGENCY MEDICINE

## 2023-08-18 PROCEDURE — 80053 COMPREHEN METABOLIC PANEL: CPT | Performed by: EMERGENCY MEDICINE

## 2023-08-18 PROCEDURE — 36415 COLL VENOUS BLD VENIPUNCTURE: CPT | Performed by: EMERGENCY MEDICINE

## 2023-08-18 PROCEDURE — 250N000011 HC RX IP 250 OP 636: Performed by: EMERGENCY MEDICINE

## 2023-08-18 PROCEDURE — 81001 URINALYSIS AUTO W/SCOPE: CPT | Performed by: EMERGENCY MEDICINE

## 2023-08-18 PROCEDURE — 85025 COMPLETE CBC W/AUTO DIFF WBC: CPT | Performed by: EMERGENCY MEDICINE

## 2023-08-18 PROCEDURE — 99284 EMERGENCY DEPT VISIT MOD MDM: CPT | Mod: 25

## 2023-08-18 RX ORDER — ONDANSETRON 4 MG/1
4 TABLET, ORALLY DISINTEGRATING ORAL ONCE
Status: COMPLETED | OUTPATIENT
Start: 2023-08-18 | End: 2023-08-18

## 2023-08-18 RX ADMIN — ONDANSETRON 4 MG: 4 TABLET, ORALLY DISINTEGRATING ORAL at 20:35

## 2023-08-18 ASSESSMENT — ACTIVITIES OF DAILY LIVING (ADL)
ADLS_ACUITY_SCORE: 35
ADLS_ACUITY_SCORE: 35

## 2023-08-18 ASSESSMENT — ENCOUNTER SYMPTOMS
ABDOMINAL PAIN: 1
HEMATURIA: 1
FLANK PAIN: 1
VOMITING: 1

## 2023-08-18 NOTE — ED TRIAGE NOTES
Arrives to ED with c/o hematuria. D/c'd from [facility] on Tuesday for same sx. Endorses L flank pain that radiates to abd. Denies fever/chills. Reports 6 episodes of hematuria today. Emesis x2 last night.      Triage Assessment       Row Name 08/18/23 2915       Triage Assessment (Adult)    Airway WDL WDL       Respiratory WDL    Respiratory WDL X;rhythm/pattern    Rhythm/Pattern, Respiratory tachypneic       Skin Circulation/Temperature WDL    Skin Circulation/Temperature WDL WDL       Cardiac WDL    Cardiac WDL X  HTN       Peripheral/Neurovascular WDL    Peripheral Neurovascular WDL WDL       Cognitive/Neuro/Behavioral WDL    Cognitive/Neuro/Behavioral WDL WDL

## 2023-08-19 NOTE — ED NOTES
Patient discharged to home at this time.  Discharge instructions reviewed and provided to patient.  All instructions reviewed and questions answered.  Medication education provided for all new medications.  Patient stable upon discharge.  Cyn Ha RN on 8/18/2023 at 11:21 PM

## 2023-08-19 NOTE — ED NOTES
not available- [facility] followed up on their ETA. Utilized a pen and paper to communicate with pt. Zofran given for nausea. Pt complains of generalized back and abdominal pain and blood in her urine.

## 2023-08-19 NOTE — ED PROVIDER NOTES
EMERGENCY DEPARTMENT ENCOUNTER      NAME: Marleny Viera  AGE: 47 year old female  YOB: 1975  MRN: [id number]  EVALUATION DATE & TIME: 2023  7:43 PM    PCP: Maria Fernanda Schumacher    ED PROVIDER: Laura Salomon DO      Chief Complaint   Patient presents with    Hematuria         FINAL IMPRESSION:  1. Hematuria, unspecified type    2. Flank pain          ED COURSE & MEDICAL DECISION MAKIN-year-old female presented to the ED for evaluation of hematuria, nausea vomiting, and left flank pain that has been ongoing for the last 1 to 2 days.  Of note, I saw the patient for abdominal pain and nausea and vomiting a few days ago in the [facility] ED. work-up at that time which included abdominal CT scan, urinalysis, and laboratory tests were all reassuring.  Here in the ED the patient was again noted to be hypertensive upon arrival.  She was otherwise hemodynamically stable.  The patient did not appear to be in any obvious distress or discomfort at the time of her initial evaluation.  The patient's physical exam was unremarkable.  She did not have any reproducible abdominal or flank tenderness to palpation.    The urinalysis did not show faint hematuria but there was no evidence of urinary tract infection.  CBC, CMP, and lipase were reassuring.  CT scan of the abdomen was obtained to ensure that she was not experiencing a kidney stone.  CT scan of the abdomen was unremarkable.    The patient was reevaluated and informed of the reassuring work-up results with the help of an .  The exact etiology for the patient's hematuria remains unclear.  She was instructed to continue monitoring her urine for hematuria.  The patient was instructed to follow-up with [facility] for further evaluation if she continues to experience hematuria.  The patient was instructed to continue taking her previously prescribed ODT Zofran.  The patient was instructed to return back to ED sooner for any  worsening abdominal pain, nausea or vomiting, or any other new or concerning symptoms.    Pertinent Labs & Imaging studies reviewed. (See chart for details)    9:56 PM I met with the patient to gather history and to perform my initial exam. We discussed plans for the ED course, including diagnostic testing and treatment.       At the conclusion of the encounter I discussed the results of all of the tests and the disposition. The questions were answered. The patient or family acknowledged understanding and was agreeable with the care plan.     Medical Decision Making    History:  Supplemental history from: Documented in chart, if applicable  External Record(s) reviewed: Documented in chart, if applicable.    Work Up:  Chart documentation includes differential considered and any EKGs or imaging independently interpreted by provider, where specified.  In additional to work up documented, I considered the following work up: Documented in chart, if applicable.    External consultation:  Discussion of management with another provider: Documented in chart, if applicable    Complicating factors:  Care impacted by chronic illness: Chronic Lung Disease, Hypertension, and Mental Health  Care affected by social determinants of health: N/A    Disposition considerations: Discharge. No recommendations on prescription strength medication(s). N/A.      PPE worn: n95 mask, goggles    MEDICATIONS GIVEN IN THE EMERGENCY:  Medications   ondansetron (ZOFRAN ODT) ODT tab 4 mg (4 mg Oral $Given 8/18/23 2035)       NEW PRESCRIPTIONS STARTED AT TODAY'S ER VISIT  Discharge Medication List as of 8/18/2023 11:22 PM             =================================================================    HPI    Patient information was obtained from: Patient     Use of : Yes (In Person) - Language American Sign Language    Marleny Viera is a 47 year old female with a pertinent medical history of bilateral deafness, nephrolithiasis, uterine  leiomyoma, didelphic uterus, PE, HTN, fatty liver, asthma, obesity, alcohol dependence, drug abuse, anxiety, who presents to the ED for evaluation of flank pain and hematuria.    Per Chart Review, patient was seen at the [facility] ED on 08/15/23 for evaluation of  diffuse abdominal pain with associated nausea, vomiting, diarrhea that been ongoing for one week.  Upon arrival to the ED the patient was noted to be hypertensive. She was otherwise hemodynamically stable. On exam the patient was noted to have mild diffuse abdominal tenderness to palpation. Following her initial evaluation the patient was given IV fluids, Zofran, and Toradol. CBC, CMP, lipase, and UA were all reassuring. COVID testing was negative. CT scan of abdomen did not show any acute abnormalities to explain the patient's abdominal pain. With the help of the  the patient was reevaluated and informed of the reassuring lab, UA, and abdominal CT scan results. The patient was sent home with Zofran to use as needed for any further episodes of nausea vomiting. She was also sent home with Bentyl to use as needed for any further abdominal pain. The patient was instructed to follow-up with her primary care provider for reevaluation or to return back to ED sooner for any worsening abdominal pain, vomiting, diarrhea, or any other new or concerning symptoms.    Patient endorses the history above, and she endorses taking the Zofran she was prescribed, but she notes that she has still been vomiting despite this. She also endorses taking the Bentyl she was prescribed, and states that this has provided relief of her abdominal pain. She also mentions that she regularly takes Bentyl as needed.    Patient reports that today she newly developed left flank pain that radiates to her left lower quadrant abdomen, which primarily onsets while urinating. She also endorses having hematuria today at approximately 5:00 PM. She notes she  called a nurse triage line regarding her symptoms who prompted her to visit an ED. She endorses a PMH of kidney stones. She notes she is currently going through menopause. She denies any other complications at this time.    REVIEW OF SYSTEMS   Review of Systems   Gastrointestinal:  Positive for abdominal pain (LLQ, secondary to flank pain) and vomiting.   Genitourinary:  Positive for flank pain (left) and hematuria.   All other systems reviewed and are negative.      PAST MEDICAL HISTORY:  Past Medical History:   Diagnosis Date    Accidental overdose, initial encounter 7/7/2022    ADHD (attention deficit hyperactivity disorder)     Alcohol dependence (H)     Anxiety     Arthritis     Asthma     Bipolar depression (H)     Deafness     Drug abuse, nondependent (H) 10/29/2004    Overview:  polydrug abuse per psych notes ; Other, mixed, or unspecified nondependent drug abuse, unspecified    History of blood clots     History of transfusion     Hypertension     Kidney stones 2015    Major depressive disorder, recurrent episode, severe (H) 5/17/2016    Overview:  s/p suicide attempt Epic     Migraine     Obstructive sleep apnea 6/1/2016    Pulmonary emboli (H)     Status post total right knee replacement 1/18/2016       PAST SURGICAL HISTORY:  Past Surgical History:   Procedure Laterality Date    BIOPSY BREAST Right     2019... Also had fluid drained from left breast under surgery also in 2019    BREAST SURGERY      MAMMOPLASTY REDUCTION Bilateral 2017    TOTAL HIP ARTHROPLASTY Bilateral     TOTAL KNEE ARTHROPLASTY Right     TUBAL LIGATION      US BREAST CORE BIOPSY RIGHT Right 11/21/2019       CURRENT MEDICATIONS:    albuterol (PROAIR HFA/PROVENTIL HFA/VENTOLIN HFA) 108 (90 Base) MCG/ACT inhaler  albuterol (PROVENTIL) (2.5 MG/3ML) 0.083% neb solution  Blood Pressure Monitoring (BLOOD PRESSURE KIT) KIT  cetirizine (ZYRTEC) 10 MG tablet  dicyclomine (BENTYL) 20 MG tablet  EPINEPHrine (ANY BX GENERIC EQUIV) 0.3 MG/0.3ML  injection 2-pack  gabapentin (NEURONTIN) 100 MG capsule  hydrOXYzine (ATARAX) 25 MG tablet  metoprolol tartrate (LOPRESSOR) 25 MG tablet  montelukast (SINGULAIR) 10 MG tablet  omeprazole (PRILOSEC) 20 MG DR capsule  polyethylene glycol (MIRALAX) 17 GM/Dose powder  SYMBICORT 80-4.5 MCG/ACT Inhaler        ALLERGIES:  Allergies   Allergen Reactions    Amoxicillin Itching and Shortness Of Breath    Bee Pollen Anaphylaxis    Contrast [Iodixanol] Shortness Of Breath and Rash     Pt stated she reacted to the contrast given for her CT in past. She experienced shortness of breath, throat tightening, and rash on chest, and they gave her an injection to counter the symptoms.     Covid-19 (Mrna) Vaccine Shortness Of Breath     Pfizer COVID-19 Vaccine    Hymenoptera Allergenic Extract [Wasp Venom Protein] Anaphylaxis    Iodine Hives and Unknown     Pt stated that she was injected with CT CONTRAST in the past and got hives, SOB, and nausea. Please pre-medicate patient in the future.     Wasp Venom Protein Starter Kit [Wasp Venom Protein] Itching, Shortness Of Breath, Swelling and Difficulty breathing    Adhesive Tape Unknown    Bee Venom Unknown    Food Allergy Formula Unknown     Red meat, chocolate    Geodon [Ziprasidone] Unknown    Ibuprofen Other (See Comments)     Kidney function, Kidney function    Latex Unknown     Added based on information entered during case entry, please review and add reactions, type, and severity as needed    Morphine Unknown    Prochlorperazine Other (See Comments)    Risperdal [Risperidone] Unknown    Risperidone Analogues [Risperidone] Other (See Comments)    Shellfish Allergy Unknown    Theobroma Oil [Theobroma Grandiflorum Seed Butter] Unknown    Trazodone Other (See Comments)     Elevated creatinine    Zoloft [Sertraline] Unknown    Hydrochlorothiazide Rash       FAMILY HISTORY:  Family History   Problem Relation Age of Onset    Cancer Mother     Breast Cancer Mother     Cerebrovascular Disease  "Father        SOCIAL HISTORY:   Social History     Socioeconomic History    Marital status:    Tobacco Use    Smoking status: Never    Smokeless tobacco: Never   Vaping Use    Vaping Use: Never used   Substance and Sexual Activity    Alcohol use: Yes     Comment: Alcoholic Drinks/day: dependence    Drug use: Not Currently    Sexual activity: Never   Social History Narrative    . No children.   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  Nonsmoker.  No alcohol use.  Tesha Pelaez MD         VITALS:  BP (!) 165/73   Pulse 71   Temp 97.7  F (36.5  C) (Oral)   Resp 24   Ht 1.651 m (5' 5\")   Wt 102.1 kg (225 lb)   SpO2 95%   BMI 37.44 kg/m      PHYSICAL EXAM    General presentation: Alert, Vital signs reviewed. NAD  HENT: ENT inspection is normal. Oropharynx is moist and clear.   Eye: Pupils are equal and reactive to light. EOMI  Neck: The neck is supple, with full ROM, with no evidence of meningismus.  Pulmonary: Currently in no acute respiratory distress. Normal, non labored respirations, the lung sounds are normal with good equal air movement. Clear to auscultation bilaterally.   Circulatory: Regular rate and rhythm. Peripheral pulses are strong and equal. No murmurs, rubs, or gallops.   Abdominal: The abdomen is soft. Nontender. No rigidity, guarding, or rebound. Bowel sounds normal.   Neurologic: Alert, oriented to person, place, and time. No motor deficit. No sensory deficit. Cranial nerves II through XII are intact.  Musculoskeletal: No extremity tenderness. Full range of motion in all extremities. No extremity edema.   Skin: Skin color is normal. No rash. Warm. Dry to touch.      LAB:  All pertinent labs reviewed and interpreted.  Results for orders placed or performed during the hospital encounter of 08/18/23   CT Abdomen Pelvis w/o Contrast    Impression    IMPRESSION:     1.  No renal stones or hydronephrosis. Distal ureters are obscured by dense " metallic streak artifact from bilateral total hip arthroplasties but no hydroureters is apparent. Evaluation of the urinary bladder is also limited by metallic streak artifact.    2.   Mild sigmoid diverticulosis without acute diverticulitis.    3.  Stable morphology of multi-fibroid uterus.     UA with Microscopic reflex to Culture    Specimen: Urine, Clean Catch   Result Value Ref Range    Color Urine Yellow Colorless, Straw, Light Yellow, Yellow    Appearance Urine Clear Clear    Glucose Urine Negative Negative mg/dL    Bilirubin Urine Negative Negative    Ketones Urine Negative Negative mg/dL    Specific Gravity Urine 1.025 1.001 - 1.030    Blood Urine >1.0 mg/dL (A) Negative    pH Urine 5.5 5.0 - 7.0    Protein Albumin Urine 20 (A) Negative mg/dL    Urobilinogen Urine <2.0 <2.0 mg/dL    Nitrite Urine Negative Negative    Leukocyte Esterase Urine Negative Negative    Mucus Urine Present (A) None Seen /LPF    RBC Urine 9 (H) <=2 /HPF    WBC Urine 1 <=5 /HPF    Squamous Epithelials Urine 7 (H) <=1 /HPF   Comprehensive metabolic panel   Result Value Ref Range    Sodium 141 136 - 145 mmol/L    Potassium 3.4 (L) 3.5 - 5.0 mmol/L    Chloride 107 98 - 107 mmol/L    Carbon Dioxide (CO2) 26 22 - 31 mmol/L    Anion Gap 8 5 - 18 mmol/L    Urea Nitrogen 12 8 - 22 mg/dL    Creatinine 0.84 0.60 - 1.10 mg/dL    Calcium 8.7 8.5 - 10.5 mg/dL    Glucose 107 70 - 125 mg/dL    Alkaline Phosphatase 91 45 - 120 U/L    AST 18 0 - 40 U/L    ALT 18 0 - 45 U/L    Protein Total 7.1 6.0 - 8.0 g/dL    Albumin 3.7 3.5 - 5.0 g/dL    Bilirubin Total 0.4 0.0 - 1.0 mg/dL    GFR Estimate 85 >60 mL/min/1.73m2   Result Value Ref Range    Lipase 21 0 - 52 U/L   CBC with platelets and differential   Result Value Ref Range    WBC Count 7.2 4.0 - 11.0 10e3/uL    RBC Count 4.64 3.80 - 5.20 10e6/uL    Hemoglobin 13.3 11.7 - 15.7 g/dL    Hematocrit 39.0 35.0 - 47.0 %    MCV 84 78 - 100 fL    MCH 28.7 26.5 - 33.0 pg    MCHC 34.1 31.5 - 36.5 g/dL    RDW  13.8 10.0 - 15.0 %    Platelet Count 257 150 - 450 10e3/uL    % Neutrophils 70 %    % Lymphocytes 25 %    % Monocytes 4 %    % Eosinophils 1 %    % Basophils 0 %    % Immature Granulocytes 0 %    NRBCs per 100 WBC 0 <1 /100    Absolute Neutrophils 5.0 1.6 - 8.3 10e3/uL    Absolute Lymphocytes 1.8 0.8 - 5.3 10e3/uL    Absolute Monocytes 0.3 0.0 - 1.3 10e3/uL    Absolute Eosinophils 0.1 0.0 - 0.7 10e3/uL    Absolute Basophils 0.0 0.0 - 0.2 10e3/uL    Absolute Immature Granulocytes 0.0 <=0.4 10e3/uL    Absolute NRBCs 0.0 10e3/uL       RADIOLOGY:  Reviewed all pertinent imaging. Please see official radiology report.  CT Abdomen Pelvis w/o Contrast   Final Result   IMPRESSION:       1.  No renal stones or hydronephrosis. Distal ureters are obscured by dense metallic streak artifact from bilateral total hip arthroplasties but no hydroureters is apparent. Evaluation of the urinary bladder is also limited by metallic streak artifact.      2.   Mild sigmoid diverticulosis without acute diverticulitis.      3.  Stable morphology of multi-fibroid uterus.               I, Randy Charles, am serving as a scribe to document services personally performed by Laura Salomon DO based on my observation and the provider's statements to me. I, Laura Salomon, attest that Randy Charles is acting in a scribe capacity, has observed my performance of the services and has documented them in accordance with my direction.    Laura Salomon DO  Emergency Medicine  [facility] EMERGENCY ROOM  [address] [facility] [address] [facility] [id number] [phone]       Laura Salomon DO  08/19/23 0005

## 2023-08-19 NOTE — DISCHARGE INSTRUCTIONS
Your urinalysis shows evidence of microscopic hematuria.  The exact cause of this remains unclear at this time.  Abdominal CT scan and all laboratory tests are otherwise reassuring once again.      Follow-up with your primary care provider for reevaluation within the next week.  Return back to ED sooner for any worsening pain, vomiting, fevers, or any other new or concerning symptoms.

## 2023-08-22 ENCOUNTER — OFFICE VISIT (OUTPATIENT)
Dept: INTERNAL MEDICINE | Facility: CLINIC | Age: 48
End: 2023-08-22
Payer: COMMERCIAL

## 2023-08-22 VITALS
TEMPERATURE: 98.1 F | WEIGHT: 223.1 LBS | SYSTOLIC BLOOD PRESSURE: 146 MMHG | OXYGEN SATURATION: 100 % | DIASTOLIC BLOOD PRESSURE: 84 MMHG | BODY MASS INDEX: 37.17 KG/M2 | HEART RATE: 63 BPM | RESPIRATION RATE: 16 BRPM | HEIGHT: 65 IN

## 2023-08-22 DIAGNOSIS — R10.32 LLQ ABDOMINAL PAIN: ICD-10-CM

## 2023-08-22 DIAGNOSIS — T39.1X1D: Primary | ICD-10-CM

## 2023-08-22 DIAGNOSIS — T78.40XD ALLERGIC REACTION, SUBSEQUENT ENCOUNTER: ICD-10-CM

## 2023-08-22 PROCEDURE — 99495 TRANSJ CARE MGMT MOD F2F 14D: CPT | Performed by: INTERNAL MEDICINE

## 2023-08-22 RX ORDER — DIPHENHYDRAMINE HCL 50 MG
50 CAPSULE ORAL
COMMUNITY
Start: 2023-08-21 | End: 2024-05-03

## 2023-08-22 ASSESSMENT — ASTHMA QUESTIONNAIRES: ACT_TOTALSCORE: 14

## 2023-08-22 NOTE — PATIENT INSTRUCTIONS
Call [facility] at [phone] to get appointment scheduled for your vitamin B12 shot.     Maximum tylenol a day is 4 g a day. This is 8 tablets of 500 mg a day.

## 2023-08-22 NOTE — PROGRESS NOTES
Assessment & Plan   Problem List Items Addressed This Visit          Nervous and Auditory    LLQ abdominal pain     Patient has had multiple ED presentations over last week for abdominal pain with negative CT A/P x2. She has appt upcoming with OB to discuss if pain is related to ovaries. Pain is not present during our visit today.            Immune    Allergic reaction, subsequent encounter     Patient had reaction to NAC at [facility] 8/20/23. After initial bolus was complete, had onset of throat pain, feeling of fullness/swelling around mouth and tongue, numbness in face, difficulty breathing, diffuse red rash on race, shoulders and neck. Given IM epi and IV benadryl with rapid improvement in symptoms. Watched overnight and did well without recurrence. Then this morning had feeling of numbness and swelling in tongue that also improved with epi and benadryl.   - NAC added to allergy list  - Discussed with patient the importance of not taking more than 4g per day of APAP         Relevant Medications    diphenhydrAMINE (BENADRYL) 50 MG capsule       Other    Toxic effect of acetaminophen, accidental or unintentional, subsequent encounter - Primary     APAP level 41 on presentation to ED 8/20. She was taking 1500 mg Q3-4 hours for abdominal pain.   - APAP level normalized prior to leaving ED  - Instructed that max tylenol per day is 4g             [facility]) [email or web address]  [facility]  I spent a total of 37 minutes on the day of the visit.   Time spent by me doing chart review, history and exam, documentation and further activities per the note     MED REC REQUIRED  Post Medication Reconciliation Status:  Discharge medications reconciled, continue medications without change    FUTURE APPOINTMENTS:       - Follow up with PCP    Linda Lemus MD  [facility] Farmer is a 47 year old, presenting for the following  health issues:  Hospital F/U (Patients states still having effect from the allergy still taking the benadryl and epipen, tongue was swollen this morning. Did nebulizer this morning as well)        8/22/2023     3:51 PM   Additional Questions   Roomed by misti walls   Accompanied by self     HPI     Hospital Follow-up Visit:    Hospital/Nursing Home/IP Rehab Facility:  [facility]   Date of Admission: 08/20/2023  Date of Discharge: 08/21/2023  Reason(s) for Admission: Tylenol ingestion, accidental or unintentional, initial encounter (Primary Dx);   Abdominal pain, suprapubic     Was your hospitalization related to COVID-19? No   Problems taking medications regularly:  None  Medication changes since discharge: Yes, Benadryl, Glycolax    Problems adhering to non-medication therapy:      Summary of hospitalization:  CareEverywhere information obtained and reviewed  Diagnostic Tests/Treatments reviewed.  Follow up needed: none  Other Healthcare Providers Involved in Patient s Care:         None  Update since discharge: improved.       Patient presented to ED 8/20/23 with c/f APAP toxicity. She had been taking 1500 mg APAP every 3-4 hours for abdominal pain (had been seen in ED 8/15 and 8/18 for this with negative CT A/P both times). APAP level on arrival was elevated at 41. Tox was consulted and recommended NAC. Per chart, just as initial bolus of NAC was completed, she developed throat pain, feeling of fullness/swelling around mouth and tongue, numbness in face, difficulty breathing, diffuse red rash on race, shoulders and neck. Given IM epi and IV benadryl with rapid improvement in symptoms. She was also given IV solumedrol and IV famotidine. Admitted for monitoring overnight in case of rebound reaction. Did well and was discharged AM of 8/21.     Today she tells me that tongue was numb and slightly swollen this morning. Used epi pen and benadryl with improvement in symptoms again. Right now is feeling well.  "    Continues to have intermittent LLQ abdominal pain that she feels is related to her ovaries. Does have OB/GYN appt coming up.     Plan of care communicated with patient             Review of Systems   Constitutional, HEENT, cardiovascular, pulmonary, GI, , musculoskeletal, neuro, skin, endocrine and psych systems are negative, except as otherwise noted.      Objective    BP (!) 146/84   Pulse 63   Temp 98.1  F (36.7  C) (Oral)   Resp 16   Ht 1.638 m (5' 4.5\")   Wt 101.2 kg (223 lb 1.6 oz)   LMP  (LMP Unknown)   SpO2 100%   BMI 37.70 kg/m    Body mass index is 37.7 kg/m .  Physical Exam   GENERAL: healthy, alert and no distress  EYES: Eyes grossly normal to inspection and conjunctivae and sclerae normal  HENT: nose and mouth without ulcers or lesions  RESP: breathing comfortably and speaking in full sentences on room air with no respiratory distress or coughing  CV: warm and well perfused  ABDOMEN: soft, nontender  SKIN: no suspicious lesions or rashes on exposed skin  NEURO: No focal deficits, mentation intact and speech normal  PSYCH: mentation appears normal, affect normal/bright                "

## 2023-08-22 NOTE — PROGRESS NOTES
Tongue was numb and slightly swollen this morning. Used epi pen and benadryl with improvement in symptoms.

## 2023-08-23 ENCOUNTER — MYC MEDICAL ADVICE (OUTPATIENT)
Dept: INTERNAL MEDICINE | Facility: CLINIC | Age: 48
End: 2023-08-23

## 2023-08-24 ENCOUNTER — TELEPHONE (OUTPATIENT)
Dept: INTERNAL MEDICINE | Facility: CLINIC | Age: 48
End: 2023-08-24

## 2023-08-24 ENCOUNTER — ALLIED HEALTH/NURSE VISIT (OUTPATIENT)
Dept: FAMILY MEDICINE | Facility: CLINIC | Age: 48
End: 2023-08-24
Payer: COMMERCIAL

## 2023-08-24 ENCOUNTER — MYC MEDICAL ADVICE (OUTPATIENT)
Dept: FAMILY MEDICINE | Facility: CLINIC | Age: 48
End: 2023-08-24

## 2023-08-24 DIAGNOSIS — Z01.30 BLOOD PRESSURE CHECK: ICD-10-CM

## 2023-08-24 DIAGNOSIS — T39.1X1D: Primary | ICD-10-CM

## 2023-08-24 DIAGNOSIS — I10 ESSENTIAL HYPERTENSION: ICD-10-CM

## 2023-08-24 DIAGNOSIS — Z79.899 MEDICATION MANAGEMENT: Primary | ICD-10-CM

## 2023-08-24 DIAGNOSIS — E53.8 VITAMIN B12 DEFICIENCY (NON ANEMIC): Primary | ICD-10-CM

## 2023-08-24 PROBLEM — T78.40XD ALLERGIC REACTION, SUBSEQUENT ENCOUNTER: Status: ACTIVE | Noted: 2023-08-24

## 2023-08-24 PROBLEM — R10.32 LLQ ABDOMINAL PAIN: Status: ACTIVE | Noted: 2023-08-24

## 2023-08-24 PROCEDURE — 99207 PR NO CHARGE NURSE ONLY: CPT

## 2023-08-24 PROCEDURE — 96372 THER/PROPH/DIAG INJ SC/IM: CPT | Performed by: NURSE PRACTITIONER

## 2023-08-24 PROCEDURE — T1013 SIGN LANG/ORAL INTERPRETER: HCPCS | Mod: U3

## 2023-08-24 RX ORDER — CYANOCOBALAMIN 1000 UG/ML
1000 INJECTION, SOLUTION INTRAMUSCULAR; SUBCUTANEOUS ONCE
Status: COMPLETED | OUTPATIENT
Start: 2023-08-24 | End: 2023-08-24

## 2023-08-24 RX ADMIN — CYANOCOBALAMIN 1000 MCG: 1000 INJECTION, SOLUTION INTRAMUSCULAR; SUBCUTANEOUS at 14:05

## 2023-08-24 NOTE — ASSESSMENT & PLAN NOTE
Patient had reaction to NAC at [facility] 8/20/23. After initial bolus was complete, had onset of throat pain, feeling of fullness/swelling around mouth and tongue, numbness in face, difficulty breathing, diffuse red rash on race, shoulders and neck. Given IM epi and IV benadryl with rapid improvement in symptoms. Watched overnight and did well without recurrence. Then this morning had feeling of numbness and swelling in tongue that also improved with epi and benadryl.   - NAC added to allergy list  - Discussed with patient the importance of not taking more than 4g per day of APAP

## 2023-08-24 NOTE — ASSESSMENT & PLAN NOTE
APAP level 41 on presentation to ED 8/20. She was taking 1500 mg Q3-4 hours for abdominal pain.   - APAP level normalized prior to leaving ED  - Instructed that max tylenol per day is 4g

## 2023-08-24 NOTE — TELEPHONE ENCOUNTER
General Call    Contacts         Type Contact Phone/Fax    08/24/2023 03:11 PM CDT Phone (Incoming) VireaMarleny white (Self) [phone] (M)          Reason for Call:  Lab draw after getting B-12 injections    What are your questions or concerns:  Pt called and is wondering when she should have her labs work drawn after having a B-12 injection?    Date of last appointment with provider: 07/24/2023 with PCP    Could we send this information to you in [facility] or would you prefer to receive a phone call?:   Patient would prefer a phone call   Okay to leave a detailed message?: Yes at Cell number on file:    Telephone Information:   Mobile [phone]     Cami Millan

## 2023-08-24 NOTE — TELEPHONE ENCOUNTER
General Call    Contacts         Type Contact Phone/Fax    08/24/2023 03:17 PM CDT Phone (Incoming) Marleny Viera (Self) [phone] (M)          Reason for Call: Pt called to request skilled nursing orders    What are your questions or concerns:  Pt states she needs orders for a nurse to come to where she lives to do blood pressure checks and also for assistance with other needs.    Date of last appointment with provider: 07/24/2023    Could we send this information to you in [facility] or would you prefer to receive a phone call?:   Patient would prefer a phone call   Okay to leave a detailed message?: Yes at Cell number on file:    Telephone Information:   Mobile [phone]     Cami Millan

## 2023-08-24 NOTE — ASSESSMENT & PLAN NOTE
Patient has had multiple ED presentations over last week for abdominal pain with negative CT A/P x2. She has appt upcoming with OB to discuss if pain is related to ovaries. Pain is not present during our visit today.

## 2023-08-28 ENCOUNTER — TELEPHONE (OUTPATIENT)
Dept: INTERNAL MEDICINE | Facility: CLINIC | Age: 48
End: 2023-08-28

## 2023-08-28 ENCOUNTER — TRANSFERRED RECORDS (OUTPATIENT)
Dept: HEALTH INFORMATION MANAGEMENT | Facility: CLINIC | Age: 48
End: 2023-08-28
Payer: COMMERCIAL

## 2023-08-29 ENCOUNTER — TRANSFERRED RECORDS (OUTPATIENT)
Dept: HEALTH INFORMATION MANAGEMENT | Facility: CLINIC | Age: 48
End: 2023-08-29
Payer: COMMERCIAL

## 2023-08-30 LAB — PHQ9 SCORE: 5

## 2023-08-31 NOTE — TELEPHONE ENCOUNTER
Prior Authorization Approval    Medication: EPINEPHRINE 0.3 MG/0.3ML IJ SOAJ  Authorization Effective Date: 8/1/2023  Authorization Expiration Date: 8/30/2024  Approved Dose/Quantity: 2/2  Reference #: [id number]   Insurance Company: [employer]-Specialty [employer]'s - Phone [phone] Fax [phone]  Expected CoPay:       CoPay Card Available:      Financial Assistance Needed:   Which Pharmacy is filling the prescription: [facility] [id number] - [place], [place] - [address]  Pharmacy Notified: Yes  Patient Notified: Yes

## 2023-09-01 ENCOUNTER — MEDICAL CORRESPONDENCE (OUTPATIENT)
Dept: HEALTH INFORMATION MANAGEMENT | Facility: CLINIC | Age: 48
End: 2023-09-01

## 2023-09-05 ENCOUNTER — TELEPHONE (OUTPATIENT)
Dept: INTERNAL MEDICINE | Facility: CLINIC | Age: 48
End: 2023-09-05
Payer: COMMERCIAL

## 2023-09-05 DIAGNOSIS — I10 ESSENTIAL HYPERTENSION: ICD-10-CM

## 2023-09-05 DIAGNOSIS — T39.1X1D: Primary | ICD-10-CM

## 2023-09-05 NOTE — TELEPHONE ENCOUNTER
Pt is requesting a referral for home care since [facility]. Please place the same referral but anywhere except [facility] care as they are fully booked. Please have the referral be placed for multiple facilities as pt has had trouble getting into a home care facility.

## 2023-09-06 NOTE — TELEPHONE ENCOUNTER
If you would have looked in the chart, this was already placed on 08/25/23. The rejection from [facility] was from the first home care referral placed on the same date. She should have an active referral in place. She is coming in to be seen today, will discuss in office with her today.

## 2023-09-20 ENCOUNTER — NURSE TRIAGE (OUTPATIENT)
Dept: NURSING | Facility: CLINIC | Age: 48
End: 2023-09-20
Payer: COMMERCIAL

## 2023-09-20 DIAGNOSIS — J30.2 SEASONAL ALLERGIC RHINITIS, UNSPECIFIED TRIGGER: ICD-10-CM

## 2023-09-20 RX ORDER — CETIRIZINE HYDROCHLORIDE 10 MG/1
10 TABLET ORAL DAILY
Qty: 30 TABLET | Refills: 3 | Status: SHIPPED | OUTPATIENT
Start: 2023-09-20 | End: 2024-01-11

## 2023-09-20 NOTE — TELEPHONE ENCOUNTER
Nurse Triage SBAR    Is this a 2nd Level Triage? YES, LICENSED PRACTITIONER REVIEW IS REQUIRED    Situation: Diarrhea    Background:   Patient calling reports starting 5 new medication in the past 2 weeks with diarrhea over the past 3 days. Reports 7 episodes of diarrhea in a 24 hour period with a lot of sweating and overall not feeling well. Tried Pepto bismol with no relief. Denies blood in the stool fever and dehydration. Patient advised to see PCP within 24 hours with patient requesting message sent to PCP for advice.     Assessment: Diarrhea    Protocol Recommended Disposition:   See PCP Within 24 Hours    Recommendation: Provider input and call back to patient     Routed to provider    Does the patient meet one of the following criteria for ADS visit consideration? 16+ years old, with an [facility] PCP     TIP  Providers, please consider if this condition is appropriate for management at one of our Acute and Diagnostic Services sites.     If patient is a good candidate, please use dotphrase <dot>triageresponse and select Refer to ADS to document.      Reason for Disposition   [1] SEVERE diarrhea (e.g., 7 or more times / day more than normal) AND [2] present > 24 hours (1 day)    Additional Information   Negative: Shock suspected (e.g., cold/pale/clammy skin, too weak to stand, low BP, rapid pulse)   Negative: Difficult to awaken or acting confused (e.g., disoriented, slurred speech)   Negative: Sounds like a life-threatening emergency to the triager   Negative: Vomiting also present and worse than the diarrhea   Negative: [1] Blood in stool AND [2] without diarrhea   Negative: Diarrhea in a cancer patient who is currently (or recently) receiving chemotherapy or radiation therapy, or cancer patient who has metastatic or end-stage cancer and is receiving palliative care   Negative: Diarrhea begins while taking an antibiotic by mouth (oral antibiotic)   Negative: [1] SEVERE abdominal pain (e.g., excruciating) AND [2]  present > 1 hour   Negative: [1] SEVERE abdominal pain AND [2] age > 60 years   Negative: [1] Blood in the stool AND [2] moderate or large amount of blood   Negative: Black or tarry bowel movements  (Exception: Chronic-unchanged black-grey BMs AND is taking iron pills or Pepto-Bismol.)   Negative: [1] Drinking very little AND [2] dehydration suspected (e.g., no urine > 12 hours, very dry mouth, very lightheaded)   Negative: Patient sounds very sick or weak to the triager   Negative: [1] SEVERE diarrhea (e.g., 7 or more times / day more than normal) AND [2] age > 60 years   Negative: [1] Constant abdominal pain AND [2] present > 2 hours   Negative: [1] Fever > 103 F (39.4 C) AND [2] not able to get the fever down using Fever Care Advice    Protocols used: Diarrhea-A-AH

## 2023-09-22 ENCOUNTER — MYC MEDICAL ADVICE (OUTPATIENT)
Dept: INTERNAL MEDICINE | Facility: CLINIC | Age: 48
End: 2023-09-22
Payer: COMMERCIAL

## 2023-09-22 NOTE — TELEPHONE ENCOUNTER
Attempted to contact, no answer.     Message left with .     Closing encounter after 3rd failed attempt, writer sending mychart also

## 2023-09-22 NOTE — TELEPHONE ENCOUNTER
Reason for Call:  Other call back - FYI    Detailed comments: patient called via  services wondering why someone had called her; I saw the Mychart message from the RN stating Mraia Fernanda wanted to see patient for her diarrhea. I relayed this to the patient and the patient states she is no longer having diarrhea and doesn't want an appointment.     Phone Number Patient can be reached at: Home number on file [phone] (home)    Best Time: Any    Can we leave a detailed message on this number? Not Applicable    Call taken on 9/22/2023 at 4:41 PM by Jennifer Perez CMA

## 2023-09-25 ENCOUNTER — NURSE TRIAGE (OUTPATIENT)
Dept: NURSING | Facility: CLINIC | Age: 48
End: 2023-09-25

## 2023-09-25 ENCOUNTER — MYC MEDICAL ADVICE (OUTPATIENT)
Dept: INTERNAL MEDICINE | Facility: CLINIC | Age: 48
End: 2023-09-25

## 2023-09-25 DIAGNOSIS — T39.1X1D: ICD-10-CM

## 2023-09-25 DIAGNOSIS — I10 ESSENTIAL HYPERTENSION: Primary | ICD-10-CM

## 2023-09-25 NOTE — TELEPHONE ENCOUNTER
The second home care referral that is on, was placed a month ago was for this 08/25/23.     She should not need an additional referral placed.

## 2023-09-25 NOTE — TELEPHONE ENCOUNTER
Piedad Guzman  RN for patient is calling about skilled nursing referral. States she called 9/6 as [facility] was full and she is needing an entirely new referral for skilled home nursing care for patient.. She would like a call back as soon as possible. Ok to leave message. [phone]  Joyce Munroe RN on 9/25/2023 at 11:00 AM       Reason for Disposition   Requesting referral to a specialist    Additional Information   Negative: New-onset or worsening symptoms, see that protocol (e.g., diarrhea, runny nose, sore throat)   Negative: Medicine question not related to refill or renewal   Negative: Requesting a renewal or refill of a medicine patient is currently taking   Negative: Questions or concerns about high blood pressure   Negative: Nursing judgment   Negative: Nursing judgment   Negative: Nursing judgment   Negative: Requesting lab results and adult stable (no new symptoms, not worsening)    Protocols used: Information Only Call - No Triage-A-OH

## 2023-09-25 NOTE — TELEPHONE ENCOUNTER
S-(situation): Request for new home care referral    B-(background): See referrals tab - Referral created 8/25/23    Referral Information    Referral # Creation Date Referral Status Status Update    [id number]          A-(assessment): call to [phone], connected to , who then left message for Piedad Guzman asking for call back to clarify need.    R-(recommendations): No action at this time, awaiting call back for clarification. May need fax number to send referral

## 2023-09-28 PROCEDURE — 99283 EMERGENCY DEPT VISIT LOW MDM: CPT

## 2023-09-28 PROCEDURE — 51798 US URINE CAPACITY MEASURE: CPT

## 2023-09-28 PROCEDURE — 99284 EMERGENCY DEPT VISIT MOD MDM: CPT

## 2023-09-29 ENCOUNTER — HOSPITAL ENCOUNTER (EMERGENCY)
Facility: CLINIC | Age: 48
Discharge: HOME | End: 2023-09-29
Attending: EMERGENCY MEDICINE | Admitting: EMERGENCY MEDICINE
Payer: COMMERCIAL

## 2023-09-29 VITALS
OXYGEN SATURATION: 97 % | DIASTOLIC BLOOD PRESSURE: 80 MMHG | SYSTOLIC BLOOD PRESSURE: 168 MMHG | HEIGHT: 65 IN | HEART RATE: 65 BPM | WEIGHT: 221.6 LBS | TEMPERATURE: 97.5 F | RESPIRATION RATE: 15 BRPM | BODY MASS INDEX: 36.92 KG/M2

## 2023-09-29 DIAGNOSIS — S31.41XA VAGINAL LACERATION, INITIAL ENCOUNTER: ICD-10-CM

## 2023-09-29 LAB
C TRACH DNA SPEC QL NAA+PROBE: NEGATIVE
CLUE CELLS: NORMAL
N GONORRHOEA DNA SPEC QL NAA+PROBE: NEGATIVE
TRICHOMONAS, WET PREP: NORMAL
WBC'S/HIGH POWER FIELD, WET PREP: NORMAL
YEAST, WET PREP: NORMAL

## 2023-09-29 PROCEDURE — 87491 CHLMYD TRACH DNA AMP PROBE: CPT | Performed by: EMERGENCY MEDICINE

## 2023-09-29 PROCEDURE — 87591 N.GONORRHOEAE DNA AMP PROB: CPT | Performed by: EMERGENCY MEDICINE

## 2023-09-29 PROCEDURE — 87210 SMEAR WET MOUNT SALINE/INK: CPT | Performed by: EMERGENCY MEDICINE

## 2023-09-29 ASSESSMENT — ACTIVITIES OF DAILY LIVING (ADL): ADLS_ACUITY_SCORE: 33

## 2023-09-29 NOTE — ED PROVIDER NOTES
EMERGENCY DEPARTMENT ENCOUNTER      NAME: Marleny Viera  AGE: 47 year old female  YOB: 1975  MRN: [id number]  EVALUATION DATE & TIME: 9/29/2023 12:09 AM    PCP: Maria Fernanda Schumacher    ED PROVIDER: Troy Shin M.D.      Chief Complaint   Patient presents with    Dysuria    Vaginal Problem         FINAL IMPRESSION:  1. Vaginal laceration, initial encounter          ED COURSE & MEDICAL DECISION MAKING:    Pertinent Labs & Imaging studies reviewed. (See chart for details)  47 year old female presents to the Emergency Department for evaluation of postcoital vaginal pain.  Patient had intercourse 2 nights in a row and has had pain since then.  At difficulty with urination because of burning when she pees.  On examination has some swelling of the labia majora in addition has a small abrasion over the posterior fornix of the vagina.  No other signs of major laceration.  Did do GC chlamydia and wet prep.  Wet prep is negative.  GC committee are pending.  Doubts any obvious signs of PID.  Out that she needs antibiotics.  Discussed supportive care including ice packs and did give her a peribottle.  She will follow-up with her primary next week.  She will return immediately if she is unable to urinate for 6 to 8 hours.  Patient states understanding.  Discharge home    12:40 AM I met with the patient to gather history and to perform my initial exam. I discussed the plan for care while in the Emergency Department.   12:56 AM Bladder scan showed 1mL.   1:08 AM I performed pelvic exam. We discussed the plan for discharge and the patient is agreeable. Reviewed supportive cares, symptomatic treatment, outpatient follow up, and reasons to return to the Emergency Department. Patient to be discharged by ED RN.      At the conclusion of the encounter I discussed the results of all of the tests and the disposition. The questions were answered. The patient or family acknowledged understanding and was agreeable with the  "care plan.     Medical Decision Making    History:  Supplemental history from: Documented in chart, if applicable  External Record(s) reviewed: Documented in chart, if applicable.    Work Up:  Chart documentation includes differential considered and any EKGs or imaging independently interpreted by provider, where specified.  In additional to work up documented, I considered the following work up: Documented in chart, if applicable.    External consultation:  Discussion of management with another provider: Documented in chart, if applicable    Complicating factors:  Care impacted by chronic illness: Hypertension and Mental Health  Care affected by social determinants of health: N/A    Disposition considerations: Discharge. No recommendations on prescription strength medication(s). See documentation for any additional details.         MEDICATIONS GIVEN IN THE EMERGENCY:  Medications - No data to display    NEW PRESCRIPTIONS STARTED AT TODAY'S ER VISIT  New Prescriptions    No medications on file          =================================================================    HPI    Patient information was obtained from: Patient    Use of :  used at bedside        Marleny Viera is a 47 year old female with a pertinent history of HTN, PE, YESICA, depression, who presents to this ED for evaluation of dysuria and vaginal pain.    Patient reports that yesterday, she had sexual intercourse. During this, she states that she suddenly felt as if there was a \"tear in her vagina\" and developed some pain to vaginal area afterwards. She reports associating difficulties urinating secondary due to a \"burning sensation\" as well as some slight vaginal bleeding. Patient also states her abdomen feels \"full.\" Patient has taken Tylenol. Otherwise, she denies any vaginal discharge. She  notes allergies to Ibuprofen. No prior history of STDs. No other complaints at this time.    PAST MEDICAL HISTORY:  Past Medical " History:   Diagnosis Date    Accidental overdose, initial encounter 7/7/2022    ADHD (attention deficit hyperactivity disorder)     Alcohol dependence (H)     Anxiety     Arthritis     Asthma     Bipolar depression (H)     Deafness     Drug abuse, nondependent (H) 10/29/2004    Overview:  polydrug abuse per psych notes ; Other, mixed, or unspecified nondependent drug abuse, unspecified    History of blood clots     History of transfusion     Hypertension     Kidney stones 2015    Major depressive disorder, recurrent episode, severe (H) 5/17/2016    Overview:  s/p suicide attempt Epic     Migraine     Obstructive sleep apnea 6/1/2016    Pulmonary emboli (H)     Status post total right knee replacement 1/18/2016       PAST SURGICAL HISTORY:  Past Surgical History:   Procedure Laterality Date    BIOPSY BREAST Right     2019... Also had fluid drained from left breast under surgery also in 2019    BREAST SURGERY      MAMMOPLASTY REDUCTION Bilateral 2017    TOTAL HIP ARTHROPLASTY Bilateral     TOTAL KNEE ARTHROPLASTY Right     TUBAL LIGATION      US BREAST CORE BIOPSY RIGHT Right 11/21/2019           CURRENT MEDICATIONS:    Current Facility-Administered Medications   Medication    cyanocobalamin injection 1,000 mcg     Current Outpatient Medications   Medication    albuterol (PROAIR HFA/PROVENTIL HFA/VENTOLIN HFA) 108 (90 Base) MCG/ACT inhaler    albuterol (PROVENTIL) (2.5 MG/3ML) 0.083% neb solution    Blood Pressure Monitoring (BLOOD PRESSURE KIT) KIT    cetirizine (ZYRTEC) 10 MG tablet    diphenhydrAMINE (BENADRYL) 50 MG capsule    EPINEPHrine (ANY BX GENERIC EQUIV) 0.3 MG/0.3ML injection 2-pack    gabapentin (NEURONTIN) 100 MG capsule    hydrOXYzine (ATARAX) 25 MG tablet    metoprolol tartrate (LOPRESSOR) 25 MG tablet    montelukast (SINGULAIR) 10 MG tablet    omeprazole (PRILOSEC) 20 MG DR capsule    polyethylene glycol (MIRALAX) 17 GM/Dose powder    SYMBICORT 80-4.5 MCG/ACT Inhaler         ALLERGIES:  Allergies    Allergen Reactions    Acetylcysteine Rash and Other (See Comments)    Amoxicillin Itching and Shortness Of Breath    Bee Pollen Anaphylaxis    Contrast [Iodixanol] Shortness Of Breath and Rash     Pt stated she reacted to the contrast given for her CT in past. She experienced shortness of breath, throat tightening, and rash on chest, and they gave her an injection to counter the symptoms.     Covid-19 (Mrna) Vaccine Shortness Of Breath     Pfizer COVID-19 Vaccine    Hymenoptera Allergenic Extract [Wasp Venom Protein] Anaphylaxis    Iodine Hives and Unknown     Pt stated that she was injected with CT CONTRAST in the past and got hives, SOB, and nausea. Please pre-medicate patient in the future.     Wasp Venom Protein Starter Kit [Wasp Venom Protein] Itching, Shortness Of Breath, Swelling and Difficulty breathing    Adhesive Tape Unknown    Bee Venom Unknown    Food Allergy Formula Unknown     Red meat, chocolate    Geodon [Ziprasidone] Unknown    Ibuprofen Other (See Comments)     Kidney function, Kidney function    Latex Unknown     Added based on information entered during case entry, please review and add reactions, type, and severity as needed    Morphine Unknown    Prochlorperazine Other (See Comments)    Risperdal [Risperidone] Unknown    Risperidone Analogues [Risperidone] Other (See Comments)    Shellfish Allergy Unknown    Theobroma Oil [Theobroma Grandiflorum Seed Butter] Unknown    Trazodone Other (See Comments)     Elevated creatinine    Zoloft [Sertraline] Unknown    Hydrochlorothiazide Rash       FAMILY HISTORY:  Family History   Problem Relation Age of Onset    Cancer Mother     Breast Cancer Mother     Cerebrovascular Disease Father        SOCIAL HISTORY:   Social History     Socioeconomic History    Marital status:    Tobacco Use    Smoking status: Never    Smokeless tobacco: Never   Vaping Use    Vaping Use: Never used   Substance and Sexual Activity    Alcohol use: Yes     Comment:  "Alcoholic Drinks/day: dependence    Drug use: Not Currently    Sexual activity: Never   Social History Narrative    . No children.   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  Nonsmoker.  No alcohol use.  Tesha Pelaez MD         VITALS:  BP (!) 167/85   Pulse 83   Temp 97.5  F (36.4  C) (Temporal)   Resp 15   Ht 1.651 m (5' 5\")   Wt 100.5 kg (221 lb 9.6 oz)   LMP  (LMP Unknown)   SpO2 97%   BMI 36.88 kg/m      PHYSICAL EXAM    Physical Exam  Exam conducted with a chaperone present.   Constitutional:       General: She is not in acute distress.     Appearance: Normal appearance. She is well-developed.   HENT:      Head: Normocephalic and atraumatic.   Eyes:      General: No scleral icterus.     Conjunctiva/sclera: Conjunctivae normal.   Cardiovascular:      Rate and Rhythm: Normal rate.   Pulmonary:      Effort: Pulmonary effort is normal. No respiratory distress.   Abdominal:      General: Abdomen is flat.   Genitourinary:     Vagina: Signs of injury present. Tenderness and bleeding present. No vaginal discharge.          Comments: Swelling of the bilateral labia.  There is a small abrasion over the posterior fornix.  Minimal amount of bleeding.  No discharge.  Musculoskeletal:      Cervical back: Normal range of motion and neck supple.   Skin:     General: Skin is warm and dry.      Findings: No rash.   Neurological:      Mental Status: She is alert and oriented to person, place, and time.           LAB:  All pertinent labs reviewed and interpreted.  Labs Ordered and Resulted from Time of ED Arrival to Time of ED Departure - No data to display    RADIOLOGY:  Reviewed all pertinent imaging. Please see official radiology report.  No orders to display             IAlicia, am serving as a scribe to document services personally performed by Dr. Troy Shin, based on my observation and the provider's statements to me. ITroy MD attest that " Alicia Boston is acting in a scribe capacity, has observed my performance of the services and has documented them in accordance with my direction.    Troy Shin M.D.  Emergency Medicine  [facility] EMERGENCY ROOM  [address] [facility] [address]  [phone]  Dept: [phone]       Troy Shin MD  09/29/23 0451

## 2023-09-29 NOTE — ED TRIAGE NOTES
Patient reports that she is having a hard time to urinate and her vagina is swollen s/p sexual  intercourse yesterday. Today it is worse pain.  Also reports that there is a tear in the skin at the bottom of the vagina   Triage Assessment       Row Name 09/28/23 0272       Triage Assessment (Adult)    Airway WDL WDL       Respiratory WDL    Respiratory WDL WDL       Skin Circulation/Temperature WDL    Skin Circulation/Temperature WDL WDL       Cardiac WDL    Cardiac WDL WDL       Peripheral/Neurovascular WDL    Peripheral Neurovascular WDL WDL       Cognitive/Neuro/Behavioral WDL    Cognitive/Neuro/Behavioral WDL WDL       Home Coma Scale    Best Eye Response 4-->(E4) spontaneous    Best Motor Response 6-->(M6) obeys commands    Best Verbal Response 5-->(V5) oriented    Alicia Coma Scale Score 15

## 2023-09-29 NOTE — ED NOTES
Bed: [facility]-10  Expected date:   Expected time:   Means of arrival:   Comments:  Hold for  ESTELA HEBERT

## 2023-10-02 NOTE — TELEPHONE ENCOUNTER
Contacted  Piedad Guzman. We discussed that care coordination would be helpful in coordinating home care agency that is accepting patients.     I told her we could make a note that care coordination should be contacting  to coordinate.

## 2023-10-03 ENCOUNTER — PATIENT OUTREACH (OUTPATIENT)
Dept: CARE COORDINATION | Facility: CLINIC | Age: 48
End: 2023-10-03
Payer: COMMERCIAL

## 2023-10-03 NOTE — PROGRESS NOTES
Clinic Care Coordination Contact    Situation: Patient chart reviewed by care coordinator.    Background: [facility] Referral to assist with locating a home care agency for MSU and BP checks.    Assessment: Writer left a  for patient's RN Case Manager.  Per chart review patient has [facility].  They are able to do bubble packs for MSU.  Patient's RN case manager should be able to locate a home care agency for patient.  Will consult with her and determine what the ask is.    Plan/Recommendations: awaiting for call back.

## 2023-10-05 NOTE — PROGRESS NOTES
Clinic Care Coordination Contact      Writer received a VM from both Alicia and Piedad Guzman through  services asking for a call back.  On Piedad Guzman's VM she asked for writer to email instead as the best form of communication.  Writer called her back and left a VM with  services asking for a call back and stating that writer was unable to communicate via email due to security reasons.    Awaiting a call back

## 2023-10-05 NOTE — PROGRESS NOTES
Clinic Care Coordination Contact      Situation: Patient chart reviewed by care coordinator.     Background: [facility] Referral to assist with locating a home care agency for MSU and BP checks.     Assessment: Writer left a VM for patient's RN Case Manager again and covering RN Case manager.  Also left a VM for patient informing her that I was waiting to hear back from her RN Case manager prior to moving forward with looking for home care.  Per chart review patient has [facility].  They are able to do bubble packs for MSU.  Patient's RN case manager should be able to locate a home care agency for patient.  Will consult with her and determine what the ask is.     Plan/Recommendations: awaiting for call back.

## 2023-10-06 NOTE — PROGRESS NOTES
Clinic Care Coordination Contact      Incoming call received from Piedad Abraham RN Case Manager from [facility] [employer].  Consulted regarding patient's most recent request for home care.  She stated patient is asking for a RN for every other week for BP check's, and labs.  Patient already receives MSU through [facility] and would not need this service.  Piedad Guzman and writer confirming this may be more mental health driven.  BP at office visit 7/2023 124/72 P 60.  Elevated in ED 9/29 167/85  Pulse 83 and hospital admission office visit 8/22 BP (!) 146/84  Pulse 63.    It is recommended that patient follow up with PCP for BP management and clinic nurse for BP checks.      Patient would not qualify for home care for every other week BP checks.

## 2023-10-11 ENCOUNTER — APPOINTMENT (OUTPATIENT)
Dept: CT IMAGING | Facility: CLINIC | Age: 48
End: 2023-10-11
Attending: EMERGENCY MEDICINE
Payer: COMMERCIAL

## 2023-10-11 ENCOUNTER — APPOINTMENT (OUTPATIENT)
Dept: MRI IMAGING | Facility: CLINIC | Age: 48
End: 2023-10-11
Attending: PHYSICIAN ASSISTANT
Payer: COMMERCIAL

## 2023-10-11 ENCOUNTER — PATIENT OUTREACH (OUTPATIENT)
Dept: CARE COORDINATION | Facility: CLINIC | Age: 48
End: 2023-10-11
Payer: COMMERCIAL

## 2023-10-11 ENCOUNTER — HOSPITAL ENCOUNTER (EMERGENCY)
Facility: CLINIC | Age: 48
Discharge: HOME | End: 2023-10-12
Attending: EMERGENCY MEDICINE
Payer: COMMERCIAL

## 2023-10-11 DIAGNOSIS — M54.12 CERVICAL RADICULOPATHY: ICD-10-CM

## 2023-10-11 LAB
ANION GAP SERPL CALCULATED.3IONS-SCNC: 12 MMOL/L (ref 7–15)
ATRIAL RATE - MUSE: 78 BPM
BASO+EOS+MONOS # BLD AUTO: NORMAL 10*3/UL
BASO+EOS+MONOS NFR BLD AUTO: NORMAL %
BASOPHILS # BLD AUTO: 0 10E3/UL (ref 0–0.2)
BASOPHILS NFR BLD AUTO: 0 %
BUN SERPL-MCNC: 9 MG/DL (ref 6–20)
CALCIUM SERPL-MCNC: 9.5 MG/DL (ref 8.6–10)
CHLORIDE SERPL-SCNC: 104 MMOL/L (ref 98–107)
CREAT SERPL-MCNC: 0.83 MG/DL (ref 0.51–0.95)
D DIMER PPP FEU-MCNC: 1.07 UG/ML FEU (ref 0–0.5)
DEPRECATED HCO3 PLAS-SCNC: 27 MMOL/L (ref 22–29)
DIASTOLIC BLOOD PRESSURE - MUSE: 96 MMHG
EGFRCR SERPLBLD CKD-EPI 2021: 86 ML/MIN/1.73M2
EOSINOPHIL # BLD AUTO: 0.1 10E3/UL (ref 0–0.7)
EOSINOPHIL NFR BLD AUTO: 2 %
ERYTHROCYTE [DISTWIDTH] IN BLOOD BY AUTOMATED COUNT: 13.2 % (ref 10–15)
GLUCOSE SERPL-MCNC: 105 MG/DL (ref 70–99)
HCT VFR BLD AUTO: 40.9 % (ref 35–47)
HGB BLD-MCNC: 13.9 G/DL (ref 11.7–15.7)
IMM GRANULOCYTES # BLD: 0 10E3/UL
IMM GRANULOCYTES NFR BLD: 0 %
INTERPRETATION ECG - MUSE: NORMAL
LYMPHOCYTES # BLD AUTO: 2.1 10E3/UL (ref 0.8–5.3)
LYMPHOCYTES NFR BLD AUTO: 31 %
MCH RBC QN AUTO: 28.3 PG (ref 26.5–33)
MCHC RBC AUTO-ENTMCNC: 34 G/DL (ref 31.5–36.5)
MCV RBC AUTO: 83 FL (ref 78–100)
MONOCYTES # BLD AUTO: 0.3 10E3/UL (ref 0–1.3)
MONOCYTES NFR BLD AUTO: 5 %
NEUTROPHILS # BLD AUTO: 4.2 10E3/UL (ref 1.6–8.3)
NEUTROPHILS NFR BLD AUTO: 62 %
NRBC # BLD AUTO: 0 10E3/UL
NRBC BLD AUTO-RTO: 0 /100
P AXIS - MUSE: 22 DEGREES
PLATELET # BLD AUTO: 272 10E3/UL (ref 150–450)
POTASSIUM SERPL-SCNC: 3.3 MMOL/L (ref 3.4–5.3)
PR INTERVAL - MUSE: 148 MS
QRS DURATION - MUSE: 80 MS
QT - MUSE: 400 MS
QTC - MUSE: 456 MS
R AXIS - MUSE: -7 DEGREES
RBC # BLD AUTO: 4.91 10E6/UL (ref 3.8–5.2)
SODIUM SERPL-SCNC: 143 MMOL/L (ref 135–145)
SYSTOLIC BLOOD PRESSURE - MUSE: 211 MMHG
T AXIS - MUSE: -16 DEGREES
TROPONIN T SERPL HS-MCNC: <6 NG/L
VENTRICULAR RATE- MUSE: 78 BPM
WBC # BLD AUTO: 6.8 10E3/UL (ref 4–11)

## 2023-10-11 PROCEDURE — 93005 ELECTROCARDIOGRAM TRACING: CPT | Performed by: EMERGENCY MEDICINE

## 2023-10-11 PROCEDURE — 72141 MRI NECK SPINE W/O DYE: CPT

## 2023-10-11 PROCEDURE — 250N000011 HC RX IP 250 OP 636: Mod: JZ | Performed by: EMERGENCY MEDICINE

## 2023-10-11 PROCEDURE — 71275 CT ANGIOGRAPHY CHEST: CPT

## 2023-10-11 PROCEDURE — 85379 FIBRIN DEGRADATION QUANT: CPT | Performed by: PHYSICIAN ASSISTANT

## 2023-10-11 PROCEDURE — 84484 ASSAY OF TROPONIN QUANT: CPT | Performed by: PHYSICIAN ASSISTANT

## 2023-10-11 PROCEDURE — 99285 EMERGENCY DEPT VISIT HI MDM: CPT | Mod: 25

## 2023-10-11 PROCEDURE — 70551 MRI BRAIN STEM W/O DYE: CPT

## 2023-10-11 PROCEDURE — 85025 COMPLETE CBC W/AUTO DIFF WBC: CPT | Performed by: PHYSICIAN ASSISTANT

## 2023-10-11 PROCEDURE — 80048 BASIC METABOLIC PNL TOTAL CA: CPT | Performed by: PHYSICIAN ASSISTANT

## 2023-10-11 PROCEDURE — 96376 TX/PRO/DX INJ SAME DRUG ADON: CPT

## 2023-10-11 PROCEDURE — 96374 THER/PROPH/DIAG INJ IV PUSH: CPT | Mod: 59

## 2023-10-11 PROCEDURE — 36415 COLL VENOUS BLD VENIPUNCTURE: CPT | Performed by: PHYSICIAN ASSISTANT

## 2023-10-11 PROCEDURE — 250N000011 HC RX IP 250 OP 636: Performed by: EMERGENCY MEDICINE

## 2023-10-11 PROCEDURE — 96375 TX/PRO/DX INJ NEW DRUG ADDON: CPT

## 2023-10-11 PROCEDURE — 250N000011 HC RX IP 250 OP 636: Mod: JZ | Performed by: PHYSICIAN ASSISTANT

## 2023-10-11 RX ORDER — LORAZEPAM 2 MG/ML
1 INJECTION INTRAMUSCULAR ONCE
Status: COMPLETED | OUTPATIENT
Start: 2023-10-11 | End: 2023-10-11

## 2023-10-11 RX ORDER — LIDOCAINE 40 MG/G
CREAM TOPICAL
Status: CANCELLED | OUTPATIENT
Start: 2023-10-11

## 2023-10-11 RX ORDER — DIPHENHYDRAMINE HYDROCHLORIDE 50 MG/ML
50 INJECTION INTRAMUSCULAR; INTRAVENOUS ONCE
Status: DISCONTINUED | OUTPATIENT
Start: 2023-10-11 | End: 2023-10-12 | Stop reason: HOSPADM

## 2023-10-11 RX ORDER — DIPHENHYDRAMINE HYDROCHLORIDE 50 MG/ML
50 INJECTION INTRAMUSCULAR; INTRAVENOUS ONCE
Status: CANCELLED | OUTPATIENT
Start: 2023-10-12 | End: 2023-10-12

## 2023-10-11 RX ORDER — DIPHENHYDRAMINE HYDROCHLORIDE 50 MG/ML
50 INJECTION INTRAMUSCULAR; INTRAVENOUS ONCE
Status: COMPLETED | OUTPATIENT
Start: 2023-10-11 | End: 2023-10-11

## 2023-10-11 RX ORDER — METHYLPREDNISOLONE SODIUM SUCCINATE 125 MG/2ML
125 INJECTION, POWDER, LYOPHILIZED, FOR SOLUTION INTRAMUSCULAR; INTRAVENOUS ONCE
Status: COMPLETED | OUTPATIENT
Start: 2023-10-11 | End: 2023-10-11

## 2023-10-11 RX ORDER — IOPAMIDOL 755 MG/ML
75 INJECTION, SOLUTION INTRAVASCULAR ONCE
Status: COMPLETED | OUTPATIENT
Start: 2023-10-11 | End: 2023-10-11

## 2023-10-11 RX ORDER — METHYLPREDNISOLONE SODIUM SUCCINATE 40 MG/ML
40 INJECTION, POWDER, LYOPHILIZED, FOR SOLUTION INTRAMUSCULAR; INTRAVENOUS EVERY 4 HOURS
Status: CANCELLED | OUTPATIENT
Start: 2023-10-11 | End: 2023-10-12

## 2023-10-11 RX ADMIN — DIPHENHYDRAMINE HYDROCHLORIDE 50 MG: 50 INJECTION, SOLUTION INTRAMUSCULAR; INTRAVENOUS at 23:24

## 2023-10-11 RX ADMIN — LORAZEPAM 1 MG: 2 INJECTION INTRAMUSCULAR; INTRAVENOUS at 20:20

## 2023-10-11 RX ADMIN — DIPHENHYDRAMINE HYDROCHLORIDE 50 MG: 50 INJECTION, SOLUTION INTRAMUSCULAR; INTRAVENOUS at 20:20

## 2023-10-11 RX ADMIN — LORAZEPAM 1 MG: 2 INJECTION INTRAMUSCULAR; INTRAVENOUS at 21:08

## 2023-10-11 RX ADMIN — IOPAMIDOL 75 ML: 755 INJECTION, SOLUTION INTRAVENOUS at 22:41

## 2023-10-11 RX ADMIN — METHYLPREDNISOLONE SODIUM SUCCINATE 125 MG: 125 INJECTION, POWDER, FOR SOLUTION INTRAMUSCULAR; INTRAVENOUS at 20:20

## 2023-10-11 ASSESSMENT — ACTIVITIES OF DAILY LIVING (ADL)
ADLS_ACUITY_SCORE: 35
ADLS_ACUITY_SCORE: 35

## 2023-10-11 NOTE — ED TRIAGE NOTES
Pt has had 3 days of right shoulder pain and chest pain. The pain goes into her right upper back as well. She reports a new cough as well. Denies any injury to the shoulder and no hx of cardiac events. Took 1,000 mg tylenol PTA. An  was called in.

## 2023-10-11 NOTE — ED PROVIDER NOTES
EMERGENCY DEPARTMENT ENCOUNTER   NAME: Marleny Viera ; AGE: 47 year old female ; YOB: 1975 ; MRN: [id number] ; PCP: Maria Fernanda Schumacher     Evaluation Date & Time: No admission date for patient encounter.    ED Provider: Cherelle Pedro PA-C    CHIEF COMPLAINT     Shoulder Pain, Chest Pain, and Shortness of Breath      ED COURSE, MEDICAL DECISION MAKING, PLAN     ED course:   5:40 PM I met with the patient, obtained history, performed an initial exam, and discussed options and plan for diagnostics and treatment here in the ED.   5:45 PM I staffed the patient with Dr. Lawler.  7:51 PM: Discussed lab results and plan for imaging.   8:23 PM: Dr. Lawler spoke with the radiologist regarding pre-contrast medications.    8:56 PM: Nurse reports pt is at MRI, but is continuing to feel very anxious and requesting further medication for this. Additional 1mg of Ativan ordered.   11:16 PM: Pt back from imaging. On re-evaluation she has an intermittent dry cough and states she feels wheezy. Additional benadryl ordered. Will closely monitor.   11:45 PM: Pt having a snack. No coughing or wheezing. Will continue to monitor.   12:16 AM: Pt reassessed. Continues snacking. No cough or wheezing. Discharge planning discussed with Dr. Quiroz. Will have patient follow up with spine specialty as an outpatient.     History:  Supplemental history from: Documented in chart, if applicable  External Record(s) reviewed: Documented in chart, if applicable.    Work Up:  Chart documentation includes differentials considered and any EKGs or imaging independently interpreted by provider, where specified.  In additional to work up documented, I considered the following work up: Documented in chart, if applicable.    External consultation:  Discussion of management with another provider: Documented in chart, if applicable    Complicating factors:  Care impacted by chronic illness: Chronic Lung Disease, Hypertension, and Mental  Health  Care affected by social determinants of health: N/A    Disposition considerations: Discharge. No recommendations on prescription strength medication(s). See documentation for any additional details.      MDM:     Patient is a 47 year old female presenting to the ED as a walk-in for right upper extremity pain and numbness that radiates into her shoulder, upper back, right side of her face, right side of her neck, and right side of her chest.  States this started about 3 days ago.  Was seen by her orthopedic doctor for her chronic right shoulder pain and they told her to be evaluated in the ER for potential stroke.    Patient is nontoxic in appearance here.  She is in no acute distress.  She has palpable pain to her right shoulder and right side of her neck, but otherwise the exam is unremarkable.  She really has no focal neuro deficits.  Able to move her extremities freely.  Sensations intact.  She is quite hypertensive at 211/96 in the setting of known chronic hypertension. She is otherwise vitally normal.     Differentials include, but are not limited to CVA, ACS, PE, musculoskeletal pain with radiculopathy.    CBC collected and is unremarkable.  BMP collected and with no clinically relevant abnormalities.   D-dimer collected and is elevated at 1.07. She has had elevated d-dimers in the past with her last one being back in February and was 0.58.   Troponin level < 6.     MRI non contrast of the brain and cervical spine ordered.  1 mg of IV Ativan provided prior to imaging.  Patient got MRI, she was complaining of still feeling very anxious and was requesting further medication.  Another 1 mg of IV Ativan was ordered.    With the elevated D-dimer and history of PE, further workup needed. She has an allergy to IV contrast and therefore discussed moving forward with VQ scan, however this will not be available today/tonight. With this, we discussed doing a pre-contrast medication regimen.   After discussing  this with patient she was comfortable with this. We will plan on pre-medicating patient with 125 mg of Solu-Medrol and 50 mg of Benadryl now and will repeat a dose of Benadryl right before imaging. This plan was discussed between Dr. Lawler and the radiologist and CT tech.     On return from imaging, patient noted a little bit of a dry cough and like she was having some wheezing.  Oxygenation remains normal.  Lungs sound clear.  No oropharynx swelling appreciated.  Because of patient's symptoms and she of IV contrast allergy, we did elect to move forward with an additional 50 mg of IV Benadryl.    On reassessment, patient is eating a snack and appears comfortable.  No further cough appreciated.  No wheezing appreciated.  She continues without any oropharynx swelling.    MRI imaging of the neck reveals a narrow spinal canal, severe central canal stenosis and moderate to severe bilateral neural foraminal narrowing at the level of C5-C6.  There may also be subtle edema within the cord at the upper C6 level versus artifact.  Short-term follow-up with sedation has been recommended by the radiologist.  There is also some central canal stenosis noted at C4-C5 as well as C6-C7.  MRI of the brain is unremarkable. No acute or subacute infarct, masses, hemorrhages, or fluid collections.     CT chest angio PE rule out is unremarkable.  No evidence of PE, dissection, or aneurysm.  Does have a benign noncalcified pulmonary nodule in the right lower lobe that is not needing follow-up per radiology.    Symptoms seem consistent with cervical radiculopathy as evidenced by the MRI images.    No evidence to suggest a more acutely serious or life-threatening condition at this point.    Spine  referral provided.  Recommended following up with them ASAP.    In the meantime, OTC pain management with Tylenol and ibuprofen.  Topical analgesics.  Ice/heat.        *All pertinent Labs & Imaging studies reviewed. (See chart for  details)     FINAL IMPRESSION:    ICD-10-CM    1. Cervical radiculopathy  M54.12 Spine  Referral          MEDICATIONS GIVEN IN THE EMERGENCY DEPARTMENT:  Medications   diphenhydrAMINE (BENADRYL) injection 50 mg (has no administration in time range)   LORazepam (ATIVAN) injection 1 mg (1 mg Intravenous $Given 10/11/23 2020)   methylPREDNISolone sodium succinate (solu-MEDROL) injection 125 mg (125 mg Intravenous $Given 10/11/23 2020)   diphenhydrAMINE (BENADRYL) injection 50 mg (50 mg Intravenous $Given 10/11/23 2020)   LORazepam (ATIVAN) injection 1 mg (1 mg Intravenous $Given 10/11/23 2108)   iopamidol (ISOVUE-370) solution 75 mL (75 mLs Intravenous $Given 10/11/23 2241)   diphenhydrAMINE (BENADRYL) injection 50 mg (50 mg Intravenous $Given 10/11/23 2324)         NEW PRESCRIPTIONS STARTED AT TODAY'S ED VISIT:  New Prescriptions    No medications on file       Discussed the results of all the tests and the disposition with patient and family/guardians.   All questions were answered.   The patient and/or family/guardian acknowledged understanding and was agreeable with the care plan.    HISTORY OF PRESENT ILLNESS   Patient information was obtained from: Patient   Use of Intrepreter:  at Bedside    Marleny Viera is a 47 year old female with a pertinent history of HTN, PE, prediabetes, asthma, anxiety, alcohol dependence, nondependent drug abuse, who presents to the ED by walk-in for evaluation of shoulder pain.     Patient reports that three days ago, she developed pain and numbness to right arm. She states that pain starts in right hand fingers, and radiates up towards her shoulder. Since onset, she mentions her symptoms have been worsening. Today, she states that pain and numbness has radiated up to the right side of her face including her jaw, right upper back, and right collarbone. Patient reports she was at her annual visit with [facility] for her shoulder and was sent to the ED for  further evaluation with concerns of stroke. Per nursing report, patient took 1000 mg Tylenol prior to ED arrival. She notes associating mild headache, right vision blurriness, and some dizziness that started earlier today. Otherwise, she denies any tingling. Patient reports a history of pulmonary embolism. No personal cardiac history of history of diabetes. Patient reports family history of stroke and heart attack in her uncle and brother. No other complaints at this time.    MEDICAL HISTORY     Past Medical History:   Diagnosis Date    Accidental overdose, initial encounter 7/7/2022    ADHD (attention deficit hyperactivity disorder)     Alcohol dependence (H)     Anxiety     Arthritis     Asthma     Bipolar depression (H)     Deafness     Drug abuse, nondependent (H) 10/29/2004    History of blood clots     History of transfusion     Hypertension     Kidney stones 2015    Major depressive disorder, recurrent episode, severe (H) 5/17/2016    Migraine     Obstructive sleep apnea 6/1/2016    Pulmonary emboli (H)     Status post total right knee replacement 1/18/2016       Past Surgical History:   Procedure Laterality Date    BIOPSY BREAST Right     2019... Also had fluid drained from left breast under surgery also in 2019    BREAST SURGERY      MAMMOPLASTY REDUCTION Bilateral 2017    TOTAL HIP ARTHROPLASTY Bilateral     TOTAL KNEE ARTHROPLASTY Right     TUBAL LIGATION      US BREAST CORE BIOPSY RIGHT Right 11/21/2019       Family History   Problem Relation Age of Onset    Cancer Mother     Breast Cancer Mother     Cerebrovascular Disease Father        Social History     Tobacco Use    Smoking status: Never    Smokeless tobacco: Never   Vaping Use    Vaping Use: Never used   Substance Use Topics    Alcohol use: Yes     Comment: Alcoholic Drinks/day: dependence    Drug use: Not Currently       albuterol (PROAIR HFA/PROVENTIL HFA/VENTOLIN HFA) 108 (90 Base) MCG/ACT inhaler  albuterol (PROVENTIL) (2.5 MG/3ML) 0.083% neb  solution  Blood Pressure Monitoring (BLOOD PRESSURE KIT) KIT  cetirizine (ZYRTEC) 10 MG tablet  diphenhydrAMINE (BENADRYL) 50 MG capsule  EPINEPHrine (ANY BX GENERIC EQUIV) 0.3 MG/0.3ML injection 2-pack  gabapentin (NEURONTIN) 100 MG capsule  hydrOXYzine (ATARAX) 25 MG tablet  metoprolol tartrate (LOPRESSOR) 25 MG tablet  montelukast (SINGULAIR) 10 MG tablet  omeprazole (PRILOSEC) 20 MG DR capsule  polyethylene glycol (MIRALAX) 17 GM/Dose powder  SYMBICORT 80-4.5 MCG/ACT Inhaler        PHYSICAL EXAM     First Vitals:  Patient Vitals for the past 24 hrs:   BP Temp Temp src Pulse Resp SpO2 Weight   10/11/23 1955 (!) 182/81 -- -- -- -- -- --   10/11/23 1954 -- -- -- 83 20 -- --   10/11/23 1618 (!) 211/96 97.4  F (36.3  C) Temporal 88 22 100 % 99.8 kg (220 lb)         PHYSICAL EXAM:   CONSTITUTIONAL: No acute distress. Well developed and well nourished.   EYES: PERRL. Conjunctivae clear b/l.    MOUTH: Pink and moist.    NECK: Tenderness to the right side of the neck with palpation. FROM. Supple.   CARDIO: Adequate perfusion. Regular rate. Regular rhythm. No murmurs.   PULMONARY: No respiratory distress. CTA b/l. Oxygenating well on RA. No retractions or accessory muscle use.   UPPER EXTREMITIES: Tenderness to the right shoulder and upper arm. FROM. No edema. Sensations intact.   LOWER EXTREMITIES: FROM. No edema.   SKIN: Natural color. Warm. Dry. Intact. No bruising. No rashes. No diaphoresis.   NEURO: No focal deficits. Cranial nerves intact. No nystagmus. No facial droop.  strength is strong and equal b/l. Upper and lower extremity strength is strong and equal b/l. No pronator drift.   PSYCH: Calm and cooperative.       RESULTS     LAB:  All pertinent labs reviewed and interpreted  Labs Ordered and Resulted from Time of ED Arrival to Time of ED Departure   D DIMER QUANTITATIVE - Abnormal       Result Value    D-Dimer Quantitative 1.07 (*)    BASIC METABOLIC PANEL - Abnormal    Sodium 143      Potassium 3.3 (*)      Chloride 104      Carbon Dioxide (CO2) 27      Anion Gap 12      Urea Nitrogen 9.0      Creatinine 0.83      GFR Estimate 86      Calcium 9.5      Glucose 105 (*)    TROPONIN T, HIGH SENSITIVITY - Normal    Troponin T, High Sensitivity <6     CBC WITH PLATELETS AND DIFFERENTIAL    WBC Count 6.8      RBC Count 4.91      Hemoglobin 13.9      Hematocrit 40.9      MCV 83      MCH 28.3      MCHC 34.0      RDW 13.2      Platelet Count 272      % Neutrophils 62      % Lymphocytes 31      % Monocytes 5      Mids % (Monos, Eos, Basos)        % Eosinophils 2      % Basophils 0      % Immature Granulocytes 0      NRBCs per 100 WBC 0      Absolute Neutrophils 4.2      Absolute Lymphocytes 2.1      Absolute Monocytes 0.3      Mids Abs (Monos, Eos, Basos)        Absolute Eosinophils 0.1      Absolute Basophils 0.0      Absolute Immature Granulocytes 0.0      Absolute NRBCs 0.0         RADIOLOGY:  CT Chest Pulmonary Embolism w Contrast   Final Result   IMPRESSION:   1.  No PE, dissection, or aneurysm.      2.  Mild mosaic perfusion which is nonspecific, most typical for air trapping secondary to small airway inflammation.      3.  Benign noncalcified pulmonary nodule in the right lower lobe base anteriorly and no follow-up is recommended given interval stability.      MR Brain w/o Contrast   Final Result   IMPRESSION:   HEAD MRI:   1. Normal head MRI.      CERVICAL SPINE MRI:   1.  The exam is significantly degraded by motion.   2.  The spinal canal is narrow on a congenital basis due to short pedicles.   3.  At C5-C6 there is severe central canal stenosis and moderate to severe bilateral neural foraminal narrowing. There may be subtle edema within the cord at the upper C6 level versus artifact. Short-term follow-up with sedation is recommended.   4.  At C4-C5 there is moderate central canal stenosis.   5.  At C6-C7 there is moderate central canal stenosis and moderate bilateral neural foraminal narrowing.         Cervical spine  MRI w/o contrast   Final Result   IMPRESSION:   HEAD MRI:   1. Normal head MRI.      CERVICAL SPINE MRI:   1.  The exam is significantly degraded by motion.   2.  The spinal canal is narrow on a congenital basis due to short pedicles.   3.  At C5-C6 there is severe central canal stenosis and moderate to severe bilateral neural foraminal narrowing. There may be subtle edema within the cord at the upper C6 level versus artifact. Short-term follow-up with sedation is recommended.   4.  At C4-C5 there is moderate central canal stenosis.   5.  At C6-C7 there is moderate central canal stenosis and moderate bilateral neural foraminal narrowing.             ECG:  EKG was collected and independently interpreted by myself and also confirmed by Dr. Lawler.    Findings: Sinus rhythm with rate of 78. Age indeterminate infarct, but not new compared to EKG in February. No interval prolongation. No ischemia.     Comparisons:  February 16 2023. No significant changes.       PROCEDURES   N/A      IAlicia, am serving as a scribe to document services personally performed by Cherelle Pedro PA-C, based on my observation and the provider's statements to me. ICherelle PA-C attest that Alicia Boston is acting in a scribe capacity, has observed my performance of the services and has documented them in accordance with my direction.     Some or all of this documentation has been completed using dictation software and mild grammatical errors may be present. Please contact me with any concerns regarding this.       Cherelle Pedro PA-C  Emergency Medicine   [facility] EMERGENCY ROOM       Cherelle Pedro PA-C  10/12/23 0043

## 2023-10-11 NOTE — ED PROVIDER NOTES
Emergency Department Midlevel Supervisory Note     I personally saw the patient and performed a substantive portion of the visit including all aspects of the medical decision making.    ED Course:  5:45 PM  Cherelle Pedro PA-C  staffed patient with me. I agree with their assessment and plan of management, and I will see the patient.  6:00 PM I met with the patient to introduce myself, gather additional history, perform my initial exam, and discuss the plan.   8:00 PM I discussed contrast allergy with radiology.  8:15 PM I discussed the plan with CT technician.   8:30 PM I updated the patient with the plan.     Brief HPI:     Marleny Viera is a 47 year old female who presents for evaluation of shoulder pain patient describing chest pain and shoulder pain.  It is radiating in nature.  Also numbness to the right arm.  See MELISSA note for further details on HPI.    Patient reports that three days ago, she developed pain and numbness to right arm. She states that pain starts in right hand fingers, and radiates up towards her shoulder. Since onset, she mentions her symptoms have been worsening. Today, she states that pain and numbness has radiated up to the right side of her face including her jaw, right upper back, and right collarbone. Patient reports she was at her annual visit with [facility] for her shoulder and was sent to the ED for further evaluation with concerns of stroke. She notes associating mild headache, right vision blurriness, and some dizziness.     I, Aury Boo, am serving as a scribe to document services personally performed by Johnathon Lawler MD, based on my observations and the provider's statements to me.   I, Johnathon Lawler MD attest that Aury Boo was acting in a scribe capacity, has observed my performance of the services and has documented them in accordance with my direction.    Brief Physical Exam: BP (!) 182/81   Pulse 83   Temp 97.4  F (36.3  C) (Temporal)   Resp 20    Wt 99.8 kg (220 lb)   LMP  (LMP Unknown)   SpO2 100%   BMI 36.61 kg/m    Constitutional:  Alert, in no acute distress  EYES: Conjunctivae clear  HENT:  Atraumatic, normocephalic  Respiratory:  Respirations even, unlabored, in no acute respiratory distress  Cardiovascular:  Regular rate and rhythm, good peripheral perfusion  GI: Soft, nondistended, nontender, no palpable masses, no rebound, no guarding   Musculoskeletal:  No edema. No cyanosis. Range of motion major extremities intact.    Integument: Warm, Dry, No erythema, No rash.   Neurologic:  Alert & oriented, no focal deficits noted  Psych: Normal mood and affect     MDM:  Patient presenting with chest pain and arm pain as well as some arm symptoms that at least on initial history and examination seem most consistent with cervical radicular pain.  Patient does have multiple medical problems and frequent emergency department visits for issues related to her chronic medical issues.  Initial work-up in the emergency department was notable for an ECG that was nonischemic.  There was no significant ST changes.  The high-sensitivity troponin was negative.  There was somewhat of a pleuritic component to her symptoms I could not PERC apply the patient due to previous pulmonary embolism in her history.  Unfortunately, her D-dimer was elevated.  We do not have VQ scan capabilities here in this emergency department the patient does have a documented contrast allergy.  Ultimately discussed the case with radiology as well as the CT technician and we premedicated the patient with steroids and antihistamines.  Anticipate post return from MRIs we will proceed with CT scan imaging to exclude pulmonary embolism.     10:36 PM  Patient is awaiting CT results for pulmonary embolism.  MRIs demonstrating degenerative disease that I think supports her cervical radiculopathy diagnosis.  No severe motor deficits at this point and I think ultimately as long as a CT pulmonary  embolism study is negative we will proceed with discharge symptomatic management and close follow-up with our spine team on outpatient basis.  Please see MELISSA note for further details.       1. Cervical radiculopathy        Labs and Imaging:  Results for orders placed or performed during the hospital encounter of 10/11/23   MR Brain w/o Contrast    Impression    IMPRESSION:  HEAD MRI:  1. Normal head MRI.    CERVICAL SPINE MRI:  1.  The exam is significantly degraded by motion.  2.  The spinal canal is narrow on a congenital basis due to short pedicles.  3.  At C5-C6 there is severe central canal stenosis and moderate to severe bilateral neural foraminal narrowing. There may be subtle edema within the cord at the upper C6 level versus artifact. Short-term follow-up with sedation is recommended.  4.  At C4-C5 there is moderate central canal stenosis.  5.  At C6-C7 there is moderate central canal stenosis and moderate bilateral neural foraminal narrowing.     Cervical spine MRI w/o contrast    Impression    IMPRESSION:  HEAD MRI:  1. Normal head MRI.    CERVICAL SPINE MRI:  1.  The exam is significantly degraded by motion.  2.  The spinal canal is narrow on a congenital basis due to short pedicles.  3.  At C5-C6 there is severe central canal stenosis and moderate to severe bilateral neural foraminal narrowing. There may be subtle edema within the cord at the upper C6 level versus artifact. Short-term follow-up with sedation is recommended.  4.  At C4-C5 there is moderate central canal stenosis.  5.  At C6-C7 there is moderate central canal stenosis and moderate bilateral neural foraminal narrowing.     D dimer quantitative   Result Value Ref Range    D-Dimer Quantitative 1.07 (H) 0.00 - 0.50 ug/mL FEU   Basic metabolic panel   Result Value Ref Range    Sodium 143 135 - 145 mmol/L    Potassium 3.3 (L) 3.4 - 5.3 mmol/L    Chloride 104 98 - 107 mmol/L    Carbon Dioxide (CO2) 27 22 - 29 mmol/L    Anion Gap 12 7 - 15 mmol/L     Urea Nitrogen 9.0 6.0 - 20.0 mg/dL    Creatinine 0.83 0.51 - 0.95 mg/dL    GFR Estimate 86 >60 mL/min/1.73m2    Calcium 9.5 8.6 - 10.0 mg/dL    Glucose 105 (H) 70 - 99 mg/dL   Result Value Ref Range    Troponin T, High Sensitivity <6 <=14 ng/L   CBC with platelets and differential   Result Value Ref Range    WBC Count 6.8 4.0 - 11.0 10e3/uL    RBC Count 4.91 3.80 - 5.20 10e6/uL    Hemoglobin 13.9 11.7 - 15.7 g/dL    Hematocrit 40.9 35.0 - 47.0 %    MCV 83 78 - 100 fL    MCH 28.3 26.5 - 33.0 pg    MCHC 34.0 31.5 - 36.5 g/dL    RDW 13.2 10.0 - 15.0 %    Platelet Count 272 150 - 450 10e3/uL    % Neutrophils 62 %    % Lymphocytes 31 %    % Monocytes 5 %    Mids % (Monos, Eos, Basos)      % Eosinophils 2 %    % Basophils 0 %    % Immature Granulocytes 0 %    NRBCs per 100 WBC 0 <1 /100    Absolute Neutrophils 4.2 1.6 - 8.3 10e3/uL    Absolute Lymphocytes 2.1 0.8 - 5.3 10e3/uL    Absolute Monocytes 0.3 0.0 - 1.3 10e3/uL    Mids Abs (Monos, Eos, Basos)      Absolute Eosinophils 0.1 0.0 - 0.7 10e3/uL    Absolute Basophils 0.0 0.0 - 0.2 10e3/uL    Absolute Immature Granulocytes 0.0 <=0.4 10e3/uL    Absolute NRBCs 0.0 10e3/uL     I have reviewed the relevant laboratory and radiology studies    Procedures:  I was present for the key portions of this procedure: none    Johnathon Lawler MD  [facility] EMERGENCY ROOM  [address] [id number] [phone]       Johnathon Lawler MD  10/11/23 3141

## 2023-10-11 NOTE — PROGRESS NOTES
Clinic Care Coordination Contact    Situation: Patient chart reviewed by care coordinator.    Background: Referral received to assist with locating a home care agency for qow bp checks and MSU.      Assessment: Patient currently has [facility].  Patient is requesting every other week bp checks.  Patient would not qualify for bp cks by her insurance.  Patient is independent and able to come to the clinic for BP checks.  Consulted with both mental health  and PCP.    Plan/Recommendations: Both agreed that it is best for patient to come to the clinic for BP checks.  Patient notified via [employer]   updated and she will reinforce this teaching.

## 2023-10-12 ENCOUNTER — TELEPHONE (OUTPATIENT)
Dept: INTERNAL MEDICINE | Facility: CLINIC | Age: 48
End: 2023-10-12

## 2023-10-12 ENCOUNTER — OFFICE VISIT (OUTPATIENT)
Dept: FAMILY MEDICINE | Facility: CLINIC | Age: 48
End: 2023-10-12
Payer: COMMERCIAL

## 2023-10-12 ENCOUNTER — PATIENT OUTREACH (OUTPATIENT)
Dept: CARE COORDINATION | Facility: CLINIC | Age: 48
End: 2023-10-12

## 2023-10-12 ENCOUNTER — NURSE TRIAGE (OUTPATIENT)
Dept: NURSING | Facility: CLINIC | Age: 48
End: 2023-10-12

## 2023-10-12 VITALS
OXYGEN SATURATION: 97 % | SYSTOLIC BLOOD PRESSURE: 163 MMHG | DIASTOLIC BLOOD PRESSURE: 86 MMHG | HEART RATE: 105 BPM | WEIGHT: 220 LBS | BODY MASS INDEX: 36.61 KG/M2 | TEMPERATURE: 97.4 F | RESPIRATION RATE: 32 BRPM

## 2023-10-12 VITALS
HEART RATE: 111 BPM | BODY MASS INDEX: 37.37 KG/M2 | SYSTOLIC BLOOD PRESSURE: 164 MMHG | WEIGHT: 224.56 LBS | OXYGEN SATURATION: 98 % | DIASTOLIC BLOOD PRESSURE: 96 MMHG

## 2023-10-12 DIAGNOSIS — R05.1 ACUTE COUGH: Primary | ICD-10-CM

## 2023-10-12 DIAGNOSIS — R00.0 TACHYCARDIA: ICD-10-CM

## 2023-10-12 DIAGNOSIS — M25.511 ACUTE PAIN OF RIGHT SHOULDER: ICD-10-CM

## 2023-10-12 DIAGNOSIS — I10 ESSENTIAL HYPERTENSION: ICD-10-CM

## 2023-10-12 LAB
FLUAV RNA SPEC QL NAA+PROBE: NEGATIVE
FLUBV RNA RESP QL NAA+PROBE: NEGATIVE
RSV RNA SPEC NAA+PROBE: NEGATIVE
SARS-COV-2 RNA RESP QL NAA+PROBE: NEGATIVE

## 2023-10-12 PROCEDURE — 99214 OFFICE O/P EST MOD 30 MIN: CPT | Performed by: PHYSICIAN ASSISTANT

## 2023-10-12 PROCEDURE — 87637 SARSCOV2&INF A&B&RSV AMP PRB: CPT | Performed by: PHYSICIAN ASSISTANT

## 2023-10-12 RX ORDER — METOPROLOL SUCCINATE 50 MG/1
50 TABLET, EXTENDED RELEASE ORAL DAILY
Qty: 90 TABLET | Refills: 3 | Status: SHIPPED | OUTPATIENT
Start: 2023-10-12 | End: 2024-05-03

## 2023-10-12 RX ORDER — QUETIAPINE FUMARATE 50 MG/1
50 TABLET, FILM COATED ORAL AT BEDTIME
COMMUNITY
Start: 2023-09-21

## 2023-10-12 RX ORDER — PROGESTERONE 200 MG/1
200 CAPSULE ORAL DAILY
COMMUNITY
Start: 2023-08-28

## 2023-10-12 RX ORDER — CITALOPRAM HYDROBROMIDE 20 MG/1
TABLET ORAL
COMMUNITY
Start: 2023-09-21 | End: 2024-05-09

## 2023-10-12 RX ORDER — ESTRADIOL 0.05 MG/D
PATCH, EXTENDED RELEASE TRANSDERMAL
COMMUNITY
Start: 2023-08-28

## 2023-10-12 ASSESSMENT — ANXIETY QUESTIONNAIRES
GAD7 TOTAL SCORE: 13
IF YOU CHECKED OFF ANY PROBLEMS ON THIS QUESTIONNAIRE, HOW DIFFICULT HAVE THESE PROBLEMS MADE IT FOR YOU TO DO YOUR WORK, TAKE CARE OF THINGS AT HOME, OR GET ALONG WITH OTHER PEOPLE: SOMEWHAT DIFFICULT
GAD7 TOTAL SCORE: 13
3. WORRYING TOO MUCH ABOUT DIFFERENT THINGS: MORE THAN HALF THE DAYS
4. TROUBLE RELAXING: MORE THAN HALF THE DAYS
7. FEELING AFRAID AS IF SOMETHING AWFUL MIGHT HAPPEN: MORE THAN HALF THE DAYS
2. NOT BEING ABLE TO STOP OR CONTROL WORRYING: MORE THAN HALF THE DAYS
6. BECOMING EASILY ANNOYED OR IRRITABLE: MORE THAN HALF THE DAYS
1. FEELING NERVOUS, ANXIOUS, OR ON EDGE: SEVERAL DAYS
5. BEING SO RESTLESS THAT IT IS HARD TO SIT STILL: MORE THAN HALF THE DAYS

## 2023-10-12 ASSESSMENT — PATIENT HEALTH QUESTIONNAIRE - PHQ9
SUM OF ALL RESPONSES TO PHQ QUESTIONS 1-9: 16
10. IF YOU CHECKED OFF ANY PROBLEMS, HOW DIFFICULT HAVE THESE PROBLEMS MADE IT FOR YOU TO DO YOUR WORK, TAKE CARE OF THINGS AT HOME, OR GET ALONG WITH OTHER PEOPLE: SOMEWHAT DIFFICULT
SUM OF ALL RESPONSES TO PHQ QUESTIONS 1-9: 16

## 2023-10-12 ASSESSMENT — ASTHMA QUESTIONNAIRES: ACT_TOTALSCORE: 13

## 2023-10-12 NOTE — PROGRESS NOTES
"  Assessment & Plan     Acute cough  Will check covid test today  - Symptomatic Influenza A/B, RSV, & SARS-CoV2 PCR (COVID-19)  - Symptomatic Influenza A/B, RSV, & SARS-CoV2 PCR (COVID-19) Nose    Essential hypertension  Bp not at goal, increase metoprolol to 50 mg daily  - metoprolol succinate ER (TOPROL XL) 50 MG 24 hr tablet  Dispense: 90 tablet; Refill: 3    Tachycardia  Continue to monitor    Acute pain of right shoulder  chronic      Follow up with pcp next week to recheck blood pressure and all symptoms,  ED with any new or worsening symptoms     BMI:   Estimated body mass index is 37.37 kg/m  as calculated from the following:    Height as of 9/28/23: 1.651 m (5' 5\").    Weight as of this encounter: 101.9 kg (224 lb 9 oz).   Weight management plan: not addressed        Jeannette Cr PA-C  [facility] REGINALDO Farmer is a 47 year old, presenting for the following health issues:  Follow Up (ER Shoulder Pain   Chest Pain   Shortness of Breath/They thought she had stroke/Vision is blurry/Yellow skin on right side of face/Cough/Feeling like heart beating faster/Over all not feeling well at all)      History of Present Illness     Asthma:  She presents for follow up of asthma.  She has some cough, some wheezing, and no shortness of breath.  She is using a relief medication daily. She typically misses taking her controller medication 3 time(s) per week. Patient is aware of the following triggers: unaware of any triggers, cold air, emotions, exercise or sports, humidity, pollens, smoke and strong odors and fumes. The patient has had a visit to the Emergency Room, Urgent Care or Hospital due to asthma since the last clinic visit. She has been to the Emergency Room or Urgent Care 1 time.She has had a Hospitalization 1 time.    Back Pain:  She presents for follow up of back pain. Patient's back pain is a new problem.    Original cause of back pain: a near-fall  First noticed back " pain: yesterday  Patient feels back pain: comes and goesLocation of back pain:  Right upper back, left upper back, right side of neck, right shoulder, right buttock and right side of waist  Description of back pain: dull ache and stabbing  Back pain spreads: right thigh, right knee, right shoulder and right side of neck    Since patient first noticed back pain, pain is: always present, but gets better and worse  Does back pain interfere with her job:  Not applicable  On a scale of 1-10 (10 being the worst), patient describes pain as:  8  What makes back pain worse: bending, coughing, certain positions, lying down, standing and stress   Acupuncture: not tried  Acetaminophen: not tried  Activity or exercise: not tried  Chiropractor:  Not tried  Cold: not tried  Heat: not tried  Massage: not tried  Muscle relaxants: not tried  NSAIDS: not tried  Opioids: not tried  Physical Therapy: not tried  Rest: not helpful  Steroid Injection: not helpful  Stretching: not helpful  Surgery: not tried  TENS unit: not tried  Topical pain relievers: not tried  Other healthcare providers patient is seeing for back pain: None    Mental Health Follow-up:  Patient presents to follow-up on Depression & Anxiety.Patient's depression since last visit has been:  Medium  The patient is having other symptoms associated with depression.  Patient's anxiety since last visit has been:  Medium  The patient is having other symptoms associated with anxiety.  Any significant life events: financial concerns, housing concerns, grief or loss and health concerns  Patient is not feeling anxious or having panic attacks.  Patient has no concerns about alcohol or drug use.    Hypertension: She presents for follow up of hypertension.  She does check blood pressure  regularly outside of the clinic. Outpatient blood pressures have not been over 140/90. She follows a low salt diet.     Headaches:   Since the patient's last clinic visit, headaches are: no change  The  "patient is getting headaches:  From pain hand to arm to shoulder to up back and little spraying to my chest right and start heart little faster  She is able to do normal daily activities when she has a migraine.  The patient is taking the following rescue/relief medications:  Other   Patient states \"I get only a small amount of relief\" from the rescue/relief medications.   The patient is taking the following medications to prevent migraines:  Other  In the past 4 weeks, the patient has gone to an Urgent Care or Emergency Room 0 times times due to headaches.    Vascular Disease:  She presents for follow up of vascular disease.    She takes nitroglycerin a few times monthly.  She is not taking daily aspirin.She consumes 4 sweetened beverage(s) daily.She exercises with enough effort to increase her heart rate 30 to 60 minutes per day.  She exercises with enough effort to increase her heart rate 3 or less days per week.   She is taking medications regularly.     ED follow up:was seen at [facility] ED 10/11/2023, per chart review:  Patient presenting with chest pain and arm pain as well as some arm symptoms that at least on initial history and examination seem most consistent with cervical radicular pain.  Patient does have multiple medical problems and frequent emergency department visits for issues related to her chronic medical issues.  Initial work-up in the emergency department was notable for an ECG that was nonischemic.  There was no significant ST changes.  The high-sensitivity troponin was negative.  There was somewhat of a pleuritic component to her symptoms I could not PERC apply the patient due to previous pulmonary embolism in her history.  Unfortunately, her D-dimer was elevated.  We do not have VQ scan capabilities here in this emergency department the patient does have a documented contrast allergy.  Ultimately discussed the case with radiology as well as the CT technician and we premedicated the patient " with steroids and antihistamines.  Anticipate post return from MRIs we will proceed with CT scan imaging to exclude pulmonary embolism.     Today she is seen with  via ipad,   She reports she was seen at [place] for shoulder surgery follow-up and was told she looked a little bit yellow and on one side of her face was drooping and she should go to the emergency room she is very concerned about having had a stroke because her dad and her uncle both had strokes we discussed at the visit that she did have an MRI at the emergency department that was normal and this is reassuring that she did not have a stroke.  She also has a history of pulmonary embolus and is worried about blood clots again discussed results of her CT done in the emergency department that was normal.    Reports 3 days of not feeling well.  Feels tired and has pain in shoulder, did have pain in her legs 2 days ago but none today or yesterday.  has mild dry cough, denies fevers, chills,  has had increase in her heart rate and blood pressure, she does take metoprolol for HTN.  Does not feel any improvement since leaving the ED.           Review of Systems   Constitutional, HEENT, cardiovascular, pulmonary, GI, , musculoskeletal, neuro, skin, endocrine and psych systems are negative, except as otherwise noted.      Objective    BP (!) 164/96   Pulse 111   Wt 101.9 kg (224 lb 9 oz)   LMP  (LMP Unknown)   SpO2 98%   BMI 37.37 kg/m    Body mass index is 37.37 kg/m .  Physical Exam   GENERAL: healthy, alert and no distress  NECK: no adenopathy, no asymmetry, masses, or scars and thyroid normal to palpation  RESP: lungs clear to auscultation - no rales, rhonchi or wheezes  CV: increased rate, regular rhythm no murmur, click or rub, no peripheral edema and peripheral pulses strong  ABDOMEN: soft, nontender, no hepatosplenomegaly, no masses and bowel sounds normal  MS: no gross musculoskeletal defects noted, no edema  NEURO: Normal strength  and tone, mentation intact and speech normal, no facial asymmetry

## 2023-10-12 NOTE — ED NOTES
SWAT was called to CT to be in the room with injection of Contrast. Pt tolerated scan, vitals were stable, HR did elevate from 70's to 80's . O2 sats were stable. As Pt got up from the CT table she signed that she had some chest tightness. O2 was 95%. Primary RN took her back to [facility] bed by wheelchair.    ALISA HIGGINBOTHAM RN     no

## 2023-10-12 NOTE — ED NOTES
Pt states she is feeling well enough to go home.    Patient discharged to home at this time.  Discharge instructions reviewed and provided to patient.  All instructions reviewed and questions answered.  Medication education provided for all new medications.  Patient stable upon discharge.  Cyn Ha RN on 10/12/2023 at 12:53 AM

## 2023-10-12 NOTE — ED NOTES
Pt came out of her room requesting the .  Pt was informed via written communication that she was sent away as the patient stated no further questions or needs after discharge instructions were reviewed and pt was informed how to exit.    Pt stated that no one addressed her mental health needs.  Pt was asked if she felt she needed to be evaluated and she stated yes.  MD and charge were notified.   was paged back and pt was returned to her room.  Awaiting  to discuss further with pt.  Cyn Ha RN on 10/12/2023 at 1:53 AM

## 2023-10-12 NOTE — ED NOTES
Pt came out of her room and stated that she was tired.  Pt used written communication to tell this nurse that she was going home because she was tired.  Pt was asked if she wanted to be evaluated for her mental health and she again pointed to the written comment stating she was tired and going home.  MD and charge nurse updated.  Pt escorted herself out.  Pt was informed to return if she felt she needed evaluation.  Cyn Ha RN on 10/12/2023 at 1:56 AM

## 2023-10-12 NOTE — COMMUNITY RESOURCES LIST (ENGLISH)
10/12/2023   [facility]  N/A  For questions about this resource list or additional care needs, please contact your primary care clinic or care manager.  Phone: [phone]   Email: N/A   Address: [address] [place] [id number]   Hours: N/A        Financial Stability       Utility payment assistance  1  [employer] ([facility]) [facility] - Energy Assistance Distance: 1.35 miles      Phone/Virtual   [address] [place] [address]  Language: English, Hmong, Puerto Rican, Tuvaluan  Hours: Mon - Fri 8:00 AM - 12:00 PM , Mon - Fri 1:00 PM - 4:30 PM  Fees: Free   Phone: (131) 738-9405 Email: eatorie@caprw.org Website: http://www.caprw.org     2  [place] Public [employer] - [place]'s Telephone Assistance Plan (TAP) and Federal Lifeline and Affordable Connectivity Program (ACP) Distance: 1.76 miles      Phone/Virtual   [address] [place] [address]  Language: English  Fees: Free   Phone: (625) 692-1720 Email: consumer.puc@[facility]. Website: https://[place]/puc/consumers/telephone/          Food and Nutrition       Food pantry  3  [facility] Distance: 1.43 miles      Pickup   [address] [place] [address]  Language: English  Hours: Mon 10:00 AM - 11:30 AM , Thu 10:00 AM - 11:30 AM  Fees: Free   Phone: (310) 648-6561 Email: info@[employer].org Website: https://[facility].org/find-support/partner-organizations/housing-assistance/     4  [facility] - Food Distribution Program Distance: 1.89 miles      In-Person   [address] [place] [address]  Language: English, Hmong, Tuvaluan  Hours: Tue 3:00 PM - 5:00 PM  Fees: Free   Phone: (720) 175-7325 Email: info@[employer].org Website: http://theHon[facility] Center[facility].org/programs/gwlkzd-bgbevcrqw-ojtkmk/     SNAP application assistance  5  [place] Department of Human Services - [place]  (SNAP) Distance: 4.86 miles      Phone/Virtual   [address] [address] [place] [address]  Language: English, Hmong, Honduran, British, Congolese, Macedonian  Hours: Mon - Fri 9:00 AM - 5:00 PM  Fees: Free   Phone: (238) 831-6363 Website: https://[place].gov/dhs/people-we-serve/adults/economic-assistance/food-nutrition/programs-and-services/supplemental-nutrition-assistance-program.jsp     6  [facility] Distance: 5.05 miles      In-Person, Phone/Virtual   [address] [place] [address]  Language: English  Hours: Mon - Fri 8:00 AM - 4:30 PM  Fees: Free   Phone: (642) 983-2269 Email: michael@[facility]. Website: https://www.[facility]./your-government/departments/health-and-wellness     Soup kitchen or free meals  7  [facility] - [employer] - Loaves and Fishes Distance: 2.27 miles      Pickup   [address] [place] [address]  Language: English  Hours: Wed 5:30 PM - 6:30 PM  Fees: Free   Phone: (105) 977-5501 Email: office@[facility].org Website: https://www.[facility].org     8  [facility] - Loaves and Fishes Distance: 5.53 miles      Pickup   [address] [place] [address]  Language: English  Hours: Mon - Fri 5:00 PM - 6:00 PM  Fees: Free   Phone: (748) 261-6544 Email: lea@[employer] Website: http://www.[facility] HospitalDL[facility]ributors.org/          Transportation       Free or low-cost transportation  9  [facility] [address] - Free Bus and Gas Cards Distance: 3.67 miles      Pickup   [address] [place] [address]  Language: English  Hours: Mon - Fri 9:00 AM - 4:00 PM , Sun 9:30 AM - 12:00 PM  Fees: Free   Phone: (662) 963-2894 Email: jaja@[facility].org Website: http://[facility].org/[facility]/Verdi/     10  [facility] Bus Loop Distance: 5.3 miles      In-Person   [address] [place] [address]  Language: English  Hours: Mon - Fri 9:00 AM  - 5:00 PM  Fees: Free   Phone: (285) 173-5084 Email: info@[employer] Website: https://www.[employer]/     Transportation to medical appointments  11  [employer] Ride Distance: 5.7 miles      In-Person   [address] [address]  Language: English  Hours: Mon - Thu 6:00 AM - 6:00 PM , Fri 6:00 AM - 5:00 PM  Fees: Insurance, Self Pay   Phone: (167) 963-5231 Email: office@[employer] Website: https://www.[employer]/     12  [facility] - Non-Emergency Medical Transportation Distance: 5.99 miles      In-Person   [address] [place] [address]  Language: English  Hours: Mon - Fri 8:00 AM - 4:00 PM Appt. Only  Fees: Self Pay   Phone: (963) 528-4027 Website: http://www.[employer].org/Medical-Services/Emergency-medical-services/Non-emergency-transportation/          Important Numbers & Websites       Emergency Services   911  [facility] Services   311  Poison Control   (955) 491-7197  Suicide Prevention Lifeline   (221) 431-4476 (TALK)  Child Abuse Hotline   (806) 462-8339 (4-A-Child)  Sexual Assault Hotline   (216) 234-5799 (HOPE)  National Runaway Safeline   (655) 224-4296 (RUNAWAY)  All-Options Talkline   (533) 904-4793  Substance Abuse Referral   (435) 567-4324 (HELP)

## 2023-10-12 NOTE — TELEPHONE ENCOUNTER
S-(situation):   Patient says she is not fine, and upset that she was sent home.    B-(background):   D dimer was really high, did scan MRI & CT did not find anything  Patient feels they did not check anything  Patient was at ED last night and discharged with Spinal referral    A-(assessment):   Coughing a lot, dizzy, right shoulder pain  Pain in right shoulder and into chest  Currently feeling the same    R-(recommendations):    RN did call [facility] and they felt there is nothing else they can do everything was done diagnostically was done in the ED.    RN confirmed with patient that she will be here [facility]  at 2:10 pm for ED follow up      RENATA Frazier  [facility]

## 2023-10-12 NOTE — DISCHARGE INSTRUCTIONS
Please follow-up with the spine specialist ASAP.  A referral for them has been provided.    Your blood pressure has been elevated here.  Please follow-up with your PCP regarding this.  Some of this may be secondary to pain and anxiety, however if this continues to be elevated it will certainly need to be addressed.

## 2023-10-13 ENCOUNTER — NURSE TRIAGE (OUTPATIENT)
Dept: NURSING | Facility: CLINIC | Age: 48
End: 2023-10-13
Payer: COMMERCIAL

## 2023-10-13 NOTE — TELEPHONE ENCOUNTER
Call received via     Marleny accidentally took > double dose of Metoprolol tonight.    Today she was prescribed Metoprolol Succinate 50 mg (Toprol XL)  Previously she was taking Metoprolol Tartrate 75 mg (Lopressor)    ~6:30 pm - Took 75 mg Lopressor  ~7:30 pm - Took 50 mg Toprol XL    Advised to contact Poison Control - [phone]  Pt call transferred to Poison Control    Pat Cervantes RN  [facility]      Reason for Disposition   MORE THAN A DOUBLE DOSE of a prescription or over-the-counter (OTC) drug    Protocols used: Medication Question Call-A-

## 2023-10-13 NOTE — TELEPHONE ENCOUNTER
"BP machine is broke had to throw it away and pt cannot afford another.     Denies lightheadness dizziness    Pain to spine and neck to right arm, [facility].    9/10 pain to spine.     Pt states took medication for migraine and did not work.     \"Hospital's MRI said pinched nerve\"    Heat pad used last night.     Pt states HR 110s. Pt using nebs for cough.     No further questions. Pt hung up on writer      "

## 2023-10-13 NOTE — TELEPHONE ENCOUNTER
Noted, let's follow up and make sure she is doing okay today (heart rate is not under 60, blood pressure is not under 100/60).

## 2023-10-13 NOTE — TELEPHONE ENCOUNTER
Vaginal bleeding large amount no known risk factors or cause. Recommend go to ED now.       Reason for Disposition    Passed tissue (e.g., gray-white)    Additional Information    Negative: Shock suspected (e.g., cold/pale/clammy skin, too weak to stand, low BP, rapid pulse)    Negative: Difficult to awaken or acting confused (e.g., disoriented, slurred speech)    Negative: Passed out (i.e., lost consciousness, collapsed and was not responding)    Negative: Sounds like a life-threatening emergency to the triager    Negative: SEVERE abdominal pain    Negative: SEVERE dizziness (e.g., unable to stand, requires support to walk, feels like passing out now)    Negative: SEVERE vaginal bleeding (e.g., soaking 2 pads or tampons per hour and present 2 or more hours; 1 menstrual cup every 2 hours)    Negative: Patient sounds very sick or weak to the triager    Negative: MODERATE vaginal bleeding (e.g., soaking 1 pad or tampon per hour and present > 6 hours; 1 menstrual cup every 6 hours)    Negative: [1] Constant abdominal pain AND [2] present > 2 hours    Negative: Pale skin (pallor) of new-onset or worsening    Protocols used: Vaginal Bleeding - [id number]

## 2023-11-03 ENCOUNTER — OFFICE VISIT (OUTPATIENT)
Dept: INTERNAL MEDICINE | Facility: CLINIC | Age: 48
End: 2023-11-03
Payer: COMMERCIAL

## 2023-11-03 ENCOUNTER — APPOINTMENT (OUTPATIENT)
Dept: LAB | Facility: CLINIC | Age: 48
End: 2023-11-03
Payer: COMMERCIAL

## 2023-11-03 ENCOUNTER — MEDICAL CORRESPONDENCE (OUTPATIENT)
Dept: HEALTH INFORMATION MANAGEMENT | Facility: CLINIC | Age: 48
End: 2023-11-03

## 2023-11-03 VITALS
TEMPERATURE: 97.8 F | RESPIRATION RATE: 20 BRPM | HEART RATE: 80 BPM | SYSTOLIC BLOOD PRESSURE: 158 MMHG | DIASTOLIC BLOOD PRESSURE: 80 MMHG | OXYGEN SATURATION: 97 % | BODY MASS INDEX: 37.82 KG/M2 | HEIGHT: 65 IN | WEIGHT: 227 LBS

## 2023-11-03 DIAGNOSIS — I10 ESSENTIAL HYPERTENSION: Primary | ICD-10-CM

## 2023-11-03 DIAGNOSIS — Z13.0 SCREENING FOR ENDOCRINE, NUTRITIONAL, METABOLIC AND IMMUNITY DISORDER: ICD-10-CM

## 2023-11-03 DIAGNOSIS — Z13.29 SCREENING FOR ENDOCRINE, NUTRITIONAL, METABOLIC AND IMMUNITY DISORDER: ICD-10-CM

## 2023-11-03 DIAGNOSIS — E53.8 VITAMIN B12 DEFICIENCY (NON ANEMIC): ICD-10-CM

## 2023-11-03 DIAGNOSIS — J45.40 MODERATE PERSISTENT ASTHMA WITHOUT COMPLICATION: ICD-10-CM

## 2023-11-03 DIAGNOSIS — R73.03 PREDIABETES: ICD-10-CM

## 2023-11-03 DIAGNOSIS — Z13.21 SCREENING FOR ENDOCRINE, NUTRITIONAL, METABOLIC AND IMMUNITY DISORDER: ICD-10-CM

## 2023-11-03 DIAGNOSIS — K76.0 FATTY LIVER: ICD-10-CM

## 2023-11-03 DIAGNOSIS — N39.46 MIXED INCONTINENCE URGE AND STRESS: ICD-10-CM

## 2023-11-03 DIAGNOSIS — Z13.228 SCREENING FOR ENDOCRINE, NUTRITIONAL, METABOLIC AND IMMUNITY DISORDER: ICD-10-CM

## 2023-11-03 LAB
ALBUMIN SERPL BCG-MCNC: 3.8 G/DL (ref 3.5–5.2)
ALP SERPL-CCNC: 88 U/L (ref 35–104)
ALT SERPL W P-5'-P-CCNC: 20 U/L (ref 0–50)
ANION GAP SERPL CALCULATED.3IONS-SCNC: 12 MMOL/L (ref 7–15)
AST SERPL W P-5'-P-CCNC: 19 U/L (ref 0–45)
BASOPHILS # BLD AUTO: 0 10E3/UL (ref 0–0.2)
BASOPHILS NFR BLD AUTO: 0 %
BILIRUB SERPL-MCNC: 0.4 MG/DL
BUN SERPL-MCNC: 7.8 MG/DL (ref 6–20)
CALCIUM SERPL-MCNC: 8.8 MG/DL (ref 8.6–10)
CHLORIDE SERPL-SCNC: 111 MMOL/L (ref 98–107)
CHOLEST SERPL-MCNC: 165 MG/DL
CREAT SERPL-MCNC: 0.86 MG/DL (ref 0.51–0.95)
DEPRECATED HCO3 PLAS-SCNC: 20 MMOL/L (ref 22–29)
EGFRCR SERPLBLD CKD-EPI 2021: 82 ML/MIN/1.73M2
EOSINOPHIL # BLD AUTO: 0.1 10E3/UL (ref 0–0.7)
EOSINOPHIL NFR BLD AUTO: 2 %
ERYTHROCYTE [DISTWIDTH] IN BLOOD BY AUTOMATED COUNT: 13.1 % (ref 10–15)
GLUCOSE SERPL-MCNC: 129 MG/DL (ref 70–99)
HBA1C MFR BLD: 6.1 % (ref 0–5.6)
HCT VFR BLD AUTO: 39.5 % (ref 35–47)
HDLC SERPL-MCNC: 38 MG/DL
HGB BLD-MCNC: 13.3 G/DL (ref 11.7–15.7)
IMM GRANULOCYTES # BLD: 0 10E3/UL
IMM GRANULOCYTES NFR BLD: 0 %
LDLC SERPL CALC-MCNC: 104 MG/DL
LYMPHOCYTES # BLD AUTO: 1.8 10E3/UL (ref 0.8–5.3)
LYMPHOCYTES NFR BLD AUTO: 31 %
MCH RBC QN AUTO: 28.1 PG (ref 26.5–33)
MCHC RBC AUTO-ENTMCNC: 33.7 G/DL (ref 31.5–36.5)
MCV RBC AUTO: 83 FL (ref 78–100)
MONOCYTES # BLD AUTO: 0.3 10E3/UL (ref 0–1.3)
MONOCYTES NFR BLD AUTO: 5 %
NEUTROPHILS # BLD AUTO: 3.5 10E3/UL (ref 1.6–8.3)
NEUTROPHILS NFR BLD AUTO: 62 %
NONHDLC SERPL-MCNC: 127 MG/DL
PLATELET # BLD AUTO: 236 10E3/UL (ref 150–450)
POTASSIUM SERPL-SCNC: 3.4 MMOL/L (ref 3.4–5.3)
PROT SERPL-MCNC: 6.7 G/DL (ref 6.4–8.3)
RBC # BLD AUTO: 4.74 10E6/UL (ref 3.8–5.2)
SODIUM SERPL-SCNC: 143 MMOL/L (ref 135–145)
TRIGL SERPL-MCNC: 116 MG/DL
VIT B12 SERPL-MCNC: 245 PG/ML (ref 232–1245)
WBC # BLD AUTO: 5.7 10E3/UL (ref 4–11)

## 2023-11-03 PROCEDURE — T1013 SIGN LANG/ORAL INTERPRETER: HCPCS | Mod: U3

## 2023-11-03 PROCEDURE — 99214 OFFICE O/P EST MOD 30 MIN: CPT | Mod: 25 | Performed by: NURSE PRACTITIONER

## 2023-11-03 PROCEDURE — 80053 COMPREHEN METABOLIC PANEL: CPT | Performed by: NURSE PRACTITIONER

## 2023-11-03 PROCEDURE — 90480 ADMN SARSCOV2 VAC 1/ONLY CMP: CPT | Performed by: NURSE PRACTITIONER

## 2023-11-03 PROCEDURE — 85025 COMPLETE CBC W/AUTO DIFF WBC: CPT | Performed by: NURSE PRACTITIONER

## 2023-11-03 PROCEDURE — 82607 VITAMIN B-12: CPT | Performed by: NURSE PRACTITIONER

## 2023-11-03 PROCEDURE — 36415 COLL VENOUS BLD VENIPUNCTURE: CPT | Performed by: NURSE PRACTITIONER

## 2023-11-03 PROCEDURE — 80061 LIPID PANEL: CPT | Performed by: NURSE PRACTITIONER

## 2023-11-03 PROCEDURE — 83036 HEMOGLOBIN GLYCOSYLATED A1C: CPT | Performed by: NURSE PRACTITIONER

## 2023-11-03 PROCEDURE — 91320 SARSCV2 VAC 30MCG TRS-SUC IM: CPT | Performed by: NURSE PRACTITIONER

## 2023-11-03 RX ORDER — PRAZOSIN HYDROCHLORIDE 1 MG/1
1 CAPSULE ORAL AT BEDTIME
COMMUNITY
Start: 2023-10-24

## 2023-11-03 NOTE — PATIENT INSTRUCTIONS
COVID shot given today.    Continue monitoring blood pressures at home.  Goals are under 140/90.    Your labs are processing at this time, we will call you with results once they are back.    We will fax your form for incontinent supplies over.     Follow-up in 6 months for recheck, before then if anything comes up.

## 2023-11-03 NOTE — PROGRESS NOTES
Assessment & Plan     Essential hypertension: Blood pressure today was 150/96; 158/80. She continues to check home blood pressures. Goals are less than 140/90; greater than 100/60. She is on Metoprolol. Stable   - Comprehensive metabolic panel (BMP + Alb, Alk Phos, ALT, AST, Total. Bili, TP)    Moderate persistent asthma without complication: She continues on Symbicort and Albuterol, Zyrtec and Singulair. Stable.     Prediabetes: Will recheck her A1c today.   - Hemoglobin A1c    Mixed incontinence urge and stress: Mixture of stress and urge incontinence. Ordered incontinent products for her.     Fatty liver: Known elevated liver enzymes in the past, will recheck.  - Comprehensive metabolic panel (BMP + Alb, Alk Phos, ALT, AST, Total. Bili, TP)    Vitamin B12 deficiency (non anemic): Will check her labs.  - CBC with platelets and differential  - Vitamin B12    Screening for endocrine, nutritional, metabolic and immunity disorder  - Lipid panel reflex to direct LDL Fasting    Maria Fernanda Schumacher CNP  [facility]    Rosemarie Farmer is a 48 year old, presenting for the following health issues:  Follow Up (Needs forms filled out and labs drawn)        11/3/2023     2:07 PM   Additional Questions   Roomed by Lakshmi CAMARENA       History of Present Illness       Hypertension: She presents for follow up of hypertension.  She does not check blood pressure  regularly outside of the clinic. Outside blood pressures have been over 140/90. She follows a low salt diet.     Reason for visit:  Form from [facility] need proof form filled and give me to mail it    She eats 4 or more servings of fruits and vegetables daily.She consumes 2 sweetened beverage(s) daily.She exercises with enough effort to increase her heart rate 9 or less minutes per day.  She exercises with enough effort to increase her heart rate 3 or less days per week.   She is taking medications regularly.     The patient presents today  "for follow up. She is fasted today, will check her labs.    An  was used for the duration of her visit today.    She needs a diet form filled out for [facility], did this today and sent it with her. She is gluten free, is eating fruits, veggies, and protein sources such as ham and chicken.    She also had paperwork for incontinent products. She wears them to bed. She has mixed stress and urge incontinence.    She reports that her Symbicort will not be covered in the New Year, will try a prior authorization to get this covered for her then, as it has worked well for her asthma.    She is checking her blood pressures at home, they have been running within goal.    She would like a COVID shot today.   Review of Systems   Constitutional, HEENT, cardiovascular, pulmonary, GI, , musculoskeletal, neuro, skin, endocrine and psych systems are negative, except as otherwise noted.      Objective    BP (!) 158/80   Pulse 80   Temp 97.8  F (36.6  C)   Resp 20   Ht 1.651 m (5' 5\")   Wt 103 kg (227 lb)   LMP  (LMP Unknown)   SpO2 97%   BMI 37.77 kg/m    Body mass index is 37.77 kg/m .  Physical Exam   GENERAL: healthy, alert and no distress  EYES: Eyes grossly normal to inspection, PERRL and conjunctivae and sclerae normal  RESP: lungs clear to auscultation - no rales, rhonchi or wheezes  CV: regular rate and rhythm, normal S1 S2, no S3 or S4, no murmur, click or rub, no peripheral edema  MS: no gross musculoskeletal defects noted  SKIN: no suspicious lesions or rashes  NEURO: Normal strength and tone, mentation intact and speech normal  PSYCH: mentation appears normal, affect normal/bright              "

## 2023-11-10 ENCOUNTER — TRANSFERRED RECORDS (OUTPATIENT)
Dept: HEALTH INFORMATION MANAGEMENT | Facility: CLINIC | Age: 48
End: 2023-11-10
Payer: COMMERCIAL

## 2023-12-11 ENCOUNTER — TRANSFERRED RECORDS (OUTPATIENT)
Dept: HEALTH INFORMATION MANAGEMENT | Facility: CLINIC | Age: 48
End: 2023-12-11
Payer: COMMERCIAL

## 2023-12-14 ENCOUNTER — TRANSFERRED RECORDS (OUTPATIENT)
Dept: HEALTH INFORMATION MANAGEMENT | Facility: CLINIC | Age: 48
End: 2023-12-14
Payer: COMMERCIAL

## 2024-01-03 ENCOUNTER — NURSE TRIAGE (OUTPATIENT)
Dept: INTERNAL MEDICINE | Facility: CLINIC | Age: 49
End: 2024-01-03
Payer: COMMERCIAL

## 2024-01-03 NOTE — TELEPHONE ENCOUNTER
Pt was experiencing congestion, coughing, and right ear pain this week. Pt woke up from her nap today with blood on her pillow where her ear was. Denied any known head trauma or injury. Suspected ear perforation - appointment scheduled. Advised pt that she may need a antibiotic drop and/or oral antibiotic based on providers findings.    Reason for Disposition   Ear pain    Additional Information   Negative: Pink or red swelling behind the ear   Negative: Stiff neck (can't touch chin to chest)   Negative: Patient sounds very sick or weak to the triager   Negative: Severe earache pain   Negative: Fever > 103 F (39.4 C)   Negative: Pointed object was inserted into the ear canal (e.g., a pencil, stick, or wire)   Negative: White, yellow, or green discharge   Negative: Bloody or clear ear discharge and after a head or face injury   Negative: Unexplained bleeding and lasts > 10 minutes or large amount  (Exception: If a few drops of blood, continue with triage.)   Negative: Fever > 103 F (39.4 C)   Negative: Patient sounds very sick or weak to the triager    Protocols used: Earache-A-OH, Ear - [id number]    Lori Napier, RN, BSN

## 2024-01-11 DIAGNOSIS — J30.2 SEASONAL ALLERGIC RHINITIS, UNSPECIFIED TRIGGER: ICD-10-CM

## 2024-01-11 DIAGNOSIS — G47.00 PERSISTENT INSOMNIA: ICD-10-CM

## 2024-01-11 DIAGNOSIS — G43.909 MIGRAINE: Primary | ICD-10-CM

## 2024-01-11 RX ORDER — HYDROXYZINE HYDROCHLORIDE 25 MG/1
TABLET, FILM COATED ORAL
Qty: 270 TABLET | Refills: 0 | Status: SHIPPED | OUTPATIENT
Start: 2024-01-11 | End: 2024-02-09

## 2024-01-11 RX ORDER — RIZATRIPTAN BENZOATE 10 MG/1
10 TABLET ORAL
Qty: 18 TABLET | Refills: 2 | Status: SHIPPED | OUTPATIENT
Start: 2024-01-11

## 2024-01-11 RX ORDER — CETIRIZINE HYDROCHLORIDE 10 MG/1
10 TABLET ORAL DAILY
Qty: 30 TABLET | Refills: 8 | Status: SHIPPED | OUTPATIENT
Start: 2024-01-11 | End: 2024-05-03

## 2024-01-11 NOTE — TELEPHONE ENCOUNTER
Medication Question or Refill    Contacts         Type Contact Phone/Fax    01/11/2024 12:16 PM CST Phone (Incoming) Marleny Viera (Self) [phone] (M)     via assistance by  (did not provide name)        Patient requesting the following refill for medication below.  Unable to find on patient's med list.    Patient would like a refill as soon as possible.    What medication are you calling about (include dose and sig)?:     rizatriptan  (Maxalt)  10 mg.  Per patient.    Preferred Pharmacy:     [facility] [place] [id number] - [place], [place] - [address] [place] [address]  Phone: [phone] Fax: [phone]      Controlled Substance Agreement on file:   CSA -- Patient Level:    CSA: None found at the patient level.       Who prescribed the medication?: Maria Fernanda Schumacher    Do you need a refill? Yes    When did you use the medication last? Did not say    Patient offered an appointment? Yes: Patient scheduled to see Brianna Hurst on 1/15/2024    Do you have any questions or concerns?  No    Could we send this information to you in [facility] or would you prefer to receive a phone call?:   No preference   Okay to leave a detailed message?: No at Other phone number:  [phone] to connect to

## 2024-01-15 ENCOUNTER — TRANSFERRED RECORDS (OUTPATIENT)
Dept: HEALTH INFORMATION MANAGEMENT | Facility: CLINIC | Age: 49
End: 2024-01-15

## 2024-01-23 ENCOUNTER — TRANSFERRED RECORDS (OUTPATIENT)
Dept: HEALTH INFORMATION MANAGEMENT | Facility: CLINIC | Age: 49
End: 2024-01-23
Payer: COMMERCIAL

## 2024-01-29 ENCOUNTER — OFFICE VISIT (OUTPATIENT)
Dept: PHYSICAL MEDICINE AND REHAB | Facility: CLINIC | Age: 49
End: 2024-01-29
Attending: EMERGENCY MEDICINE
Payer: COMMERCIAL

## 2024-01-29 ENCOUNTER — HOSPITAL ENCOUNTER (EMERGENCY)
Facility: HOSPITAL | Age: 49
Discharge: HOME | End: 2024-01-29
Payer: COMMERCIAL

## 2024-01-29 VITALS
SYSTOLIC BLOOD PRESSURE: 169 MMHG | DIASTOLIC BLOOD PRESSURE: 96 MMHG | RESPIRATION RATE: 16 BRPM | TEMPERATURE: 98.4 F | HEIGHT: 65 IN | WEIGHT: 220 LBS | BODY MASS INDEX: 36.65 KG/M2 | OXYGEN SATURATION: 98 % | HEART RATE: 97 BPM

## 2024-01-29 VITALS
HEIGHT: 65 IN | BODY MASS INDEX: 37.64 KG/M2 | OXYGEN SATURATION: 98 % | DIASTOLIC BLOOD PRESSURE: 80 MMHG | WEIGHT: 225.9 LBS | SYSTOLIC BLOOD PRESSURE: 162 MMHG | HEART RATE: 102 BPM

## 2024-01-29 DIAGNOSIS — M47.812 CERVICAL SPONDYLOSIS: ICD-10-CM

## 2024-01-29 DIAGNOSIS — M54.12 CERVICAL RADICULOPATHY: ICD-10-CM

## 2024-01-29 DIAGNOSIS — H66.91 ACUTE OTITIS MEDIA, RIGHT: ICD-10-CM

## 2024-01-29 DIAGNOSIS — M48.02 CERVICAL STENOSIS OF SPINAL CANAL: Primary | ICD-10-CM

## 2024-01-29 PROCEDURE — 99204 OFFICE O/P NEW MOD 45 MIN: CPT | Performed by: STUDENT IN AN ORGANIZED HEALTH CARE EDUCATION/TRAINING PROGRAM

## 2024-01-29 PROCEDURE — 99283 EMERGENCY DEPT VISIT LOW MDM: CPT | Mod: 25

## 2024-01-29 RX ORDER — CEFDINIR 300 MG/1
300 CAPSULE ORAL 2 TIMES DAILY
Qty: 20 CAPSULE | Refills: 0 | Status: SHIPPED | OUTPATIENT
Start: 2024-01-29 | End: 2024-02-08

## 2024-01-29 ASSESSMENT — PAIN SCALES - GENERAL: PAINLEVEL: EXTREME PAIN (8)

## 2024-01-29 NOTE — PROGRESS NOTES
Entire visit conducted with an in-person .    ASSESSMENT:  Marleny Viera is a 48 year old female presents for consultation at the request of Johnathon Lawler who presents today for new patient evaluation of :         Diagnoses and all orders for this visit:  Cervical stenosis of spinal canal  -     Adult Neurosurgery  Referral; Future  Cervical radiculopathy  -     Spine  Referral  -     Adult Neurosurgery  Referral; Future       Patient is neurologically intact on exam. No myelopathic or red flag symptoms.    Patient's symptoms are suggestive of cervical spondylosis, stenosis, and radiculopathy. She has been performing conservative treatment with PT and gabapentin with some improvement but continues to have pain in the neck and shoulders. Her MRI shows severe stenosis of C5-6 and she also reports an anaphylactic allergy to iodinated contrast products. In light of that, we discussed that CARLYLE would unfortunately not be safe to pursue. As patient has thus far exhausted conservative options and is not a candidate for interventions, will refer to Neurosurgery for further evaluation.    PLAN:  Reviewed spine anatomy and disease process. Discussed diagnosis and treatment options with the patient today. A shared decision making model was used. The patient's values and choices were respected. The following represents what was discussed and decided upon by the provider and the patient.    1. DIAGNOSTIC TESTS  No new imaging orders at this time.    2. PHYSICAL THERAPY  Patient is already in PT or has a pending referral to begin soon.  Discussed the importance of core strengthening, ROM, stretching exercises with the patient and how each of these entities is important in decreasing pain.  Explained to the patient that the purpose of physical therapy is to teach the patient a home exercise program.  These exercises need to be performed every day in order to decrease pain and prevent  future occurrences of pain.  Likened it to brushing one's teeth.      3. MEDICATIONS:  Discussed multiple medication options today with patient. Discussed risks, side effects, and proper use of medications. Patient verbalized understanding.  No new medications ordered at this visit.    4. INTERVENTIONS:  Interventional treatments are not indicated for patient's presentation.  Patient has an anaphylactic allergy to iodinated contrast    5. OTHER REFERRALS:  No other referrals at this time.    6. FOLLOW-UP  As needed.    Advised patient to call the Spine Center if symptoms worsen or you have problems controlling bladder and bowel function.   ______________________________________________________________________    SUBJECTIVE:   Marleny Viera  is a 48 year old female who presents today for new patient evaluation.    Patient reports neck pain going into bilateral arms began in October 2023. She was initially seen at [facility] and started with therapy and between October to now has had improvement of arm pain symptoms but continues to have neck and shoulder pain. She has 3 more visits of PT left. She states due to a latex allergy she was referred to [facility] from [facility] (maybe iodine allergy?).    No numbness or weakness of arms, occasional paresthesias in hand. Will be undergoing right thumb surgery in March at [facility].  No prior neck surgeries    -Treatment to Date: as above      Oswestry (FORREST) Questionnaire        1/29/2024     1:12 PM   OSWESTRY DISABILITY INDEX   Count 10   Sum 19   Oswestry Score (%) 38 %       Neck Disability Index:      1/29/2024     1:13 PM   Neck Disability Index (  Jeremi H. and Velia STAFFORD. 1991. All rights reserved.; used with permission)   SECTION 1 - PAIN INTENSITY 4   SECTION 2 - PERSONAL CARE 0   SECTION 3 - LIFTING 5   SECTION 4 - READING 4   SECTION 5 - HEADACHES 3   SECTION 6 - CONCENTRATION 3   SECTION 7 - WORK 5   SECTION 8 - DRIVING 2   SECTION 9 - SLEEPING 5    SECTION 10 - RECREATION 4   Count 10   Sum 35   Raw Score: /50 35   Neck Disability Index Score: (%) 70 %              -Medications:    Current Outpatient Medications   Medication    albuterol (PROAIR HFA/PROVENTIL HFA/VENTOLIN HFA) 108 (90 Base) MCG/ACT inhaler    albuterol (PROVENTIL) (2.5 MG/3ML) 0.083% neb solution    Blood Pressure Monitoring (BLOOD PRESSURE KIT) KIT    cetirizine (ZYRTEC) 10 MG tablet    citalopram (CELEXA) 20 MG tablet    diphenhydrAMINE (BENADRYL) 50 MG capsule    EPINEPHrine (ANY BX GENERIC EQUIV) 0.3 MG/0.3ML injection 2-pack    estradiol (VIVELLE-DOT) 0.05 MG/24HR bi-weekly patch    gabapentin (NEURONTIN) 100 MG capsule    hydrOXYzine HCl (ATARAX) 25 MG tablet    metoprolol succinate ER (TOPROL XL) 50 MG 24 hr tablet    montelukast (SINGULAIR) 10 MG tablet    polyethylene glycol (MIRALAX) 17 GM/Dose powder    prazosin (MINIPRESS) 1 MG capsule    PROMETRIUM 200 MG capsule    QUEtiapine (SEROQUEL) 50 MG tablet    rizatriptan (MAXALT) 10 MG tablet    SYMBICORT 80-4.5 MCG/ACT Inhaler     Current Facility-Administered Medications   Medication    cyanocobalamin injection 1,000 mcg       Allergies   Allergen Reactions    Acetylcysteine Rash and Other (See Comments)    Amoxicillin Itching and Shortness Of Breath    Bee Pollen Anaphylaxis    Contrast [Iodixanol] Shortness Of Breath and Rash     Pt stated she reacted to the contrast given for her CT in past. She experienced shortness of breath, throat tightening, and rash on chest, and they gave her an injection to counter the symptoms.     Covid-19 (Mrna) Vaccine Shortness Of Breath     Pfizer COVID-19 Vaccine    Hymenoptera Allergenic Extract [Wasp Venom Protein] Anaphylaxis    Iodine Hives and Unknown     Pt stated that she was injected with CT CONTRAST in the past and got hives, SOB, and nausea. Please pre-medicate patient in the future.     Wasp Venom Protein Starter Kit [Wasp Venom Protein] Itching, Shortness Of Breath, Swelling and  Difficulty breathing    Adhesive Tape Unknown    Bee Venom Unknown    Food Allergy Formula Unknown     Red meat, chocolate    Geodon [Ziprasidone] Unknown    Ibuprofen Other (See Comments)     Kidney function, Kidney function    Latex Unknown     Added based on information entered during case entry, please review and add reactions, type, and severity as needed    Morphine Unknown    Prochlorperazine Other (See Comments)    Risperdal [Risperidone] Unknown    Risperidone Analogues [Risperidone] Other (See Comments)    Shellfish Allergy Unknown    Theobroma Oil [Theobroma Grandiflorum Seed Butter] Unknown    Trazodone Other (See Comments)     Elevated creatinine    Zoloft [Sertraline] Unknown    Hydrochlorothiazide Rash       Past Medical History:   Diagnosis Date    Accidental overdose, initial encounter 7/7/2022    ADHD (attention deficit hyperactivity disorder)     Alcohol dependence (H)     Anxiety     Arthritis     Asthma     Bipolar depression (H)     Deafness     Drug abuse, nondependent (H) 10/29/2004    Overview:  polydrug abuse per psych notes ; Other, mixed, or unspecified nondependent drug abuse, unspecified    History of blood clots     History of transfusion     Hypertension     Kidney stones 2015    Major depressive disorder, recurrent episode, severe (H) 5/17/2016    Overview:  s/p suicide attempt Epic     Migraine     Obstructive sleep apnea 6/1/2016    Pulmonary emboli (H)     Status post total right knee replacement 1/18/2016        Patient Active Problem List   Diagnosis    Essential hypertension    Insomnia    Asthma, moderate persistent, uncomplicated    Borderline personality disorder (H)    Uterine leiomyoma, unspecified location    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    Didelphic uterus    Personal history of PE (pulmonary embolism)    Migraine    Fatty liver    CMC DJD(carpometacarpal degenerative joint disease), localized primary    Obesity (BMI  "35.0-39.9) with comorbidity (H)    Other dysphagia    Bilateral deafness    Lumbar back sprain, initial encounter    Prediabetes    Obstructive sleep apnea syndrome    Arthritis of carpometacarpal (CMC) joint of right thumb    Accidental overdose, initial encounter    Severe depression (H)    Anxiety, generalized    Asthma, persistent controlled    Attention deficit hyperactivity disorder (ADHD)    De Quervain's disease (radial styloid tenosynovitis)    Status post total right knee replacement    Toxic effect of acetaminophen, accidental or unintentional, subsequent encounter    LLQ abdominal pain    Allergic reaction, subsequent encounter       Past Surgical History:   Procedure Laterality Date    BIOPSY BREAST Right     2019... Also had fluid drained from left breast under surgery also in 2019    BREAST SURGERY      MAMMOPLASTY REDUCTION Bilateral 2017    TOTAL HIP ARTHROPLASTY Bilateral     TOTAL KNEE ARTHROPLASTY Right     TUBAL LIGATION      US BREAST CORE BIOPSY RIGHT Right 11/21/2019       Family History   Problem Relation Age of Onset    Cancer Mother     Breast Cancer Mother     Cerebrovascular Disease Father        Reviewed past medical, surgical, and family history with patient found on new patient intake packet located in EMR Media tab.     SOCIAL HX: Reviewed    ROS: Specifically negative for bowel/bladder dysfunction, balance changes, headache, dizziness, foot drop, fevers, chills, appetite changes, nausea/vomiting, unexplained weight loss. Otherwise 13 systems reviewed are negative. Please see the patient's intake questionnaire from today for details.    OBJECTIVE:  BP (!) 162/80 (BP Location: Right arm, Patient Position: Sitting, Cuff Size: Adult Regular)   Pulse 102   Ht 5' 5\" (1.651 m)   Wt 225 lb 14.4 oz (102.5 kg)   SpO2 98%   BMI 37.59 kg/m      PHYSICAL EXAMINATION:  --CONSTITUTIONAL: Vital signs as above. No acute distress. The patient is well nourished and well groomed.  --PSYCHIATRIC: " The patient is awake, alert, oriented to person, place, time and answering questions appropriately with clear speech. Appropriate mood and affect   --RESPIRATORY: Normal rhythm and effort. No abnormal accessory muscle breathing patterns noted.   --GROSS MOTOR: Easily arises from a seated position.  --CERVICAL SPINE: Inspection reveals no evidence of deformity. Range of motion is not limited in cervical flexion, extension, lateral rotation. Mild tenderness to palpation cervical paraspinals, no tenderness in spinous processes.  Spurling maneuver negative bilaterally.  --SHOULDERS: Full range of motion bilaterally. Negative empty can.  --UPPER EXTREMITY MOTOR TESTING:  Wrist flexion left 5/5, right 5/5  Wrist extension left 5/5, right 5/5  Pronators left 5/5, right 5/5  Biceps left 5/5, right 5/5   Triceps left 5/5, right 5/5   Shoulder abduction left 5/5, right 5/5   left 5/5, right 5/5  --NEUROLOGIC: Sensation to upper extremities is intact bilaterally.  Negative Thompson's bilaterally.    --VASCULAR: Warm upper limbs bilaterally. Capillary refill in the upper extremities is less than 1 second.    RESULTS: Available medical records from [facility] and any other outside records were reviewed today.     Imaging:  Available relevant imaging was personally reviewed and interpreted today. The images were shown to the patient and the findings were explained using a spine model.    MRI Cervical Spine 10/11/23:    CERVICAL SPINE MRI:  1.  The exam is significantly degraded by motion.  2.  The spinal canal is narrow on a congenital basis due to short pedicles.  3.  At C5-C6 there is severe central canal stenosis and moderate to severe bilateral neural foraminal narrowing. There may be subtle edema within the cord at the upper C6 level versus artifact. Short-term follow-up with sedation is recommended.  4.  At C4-C5 there is moderate central canal stenosis.  5.  At C6-C7 there is moderate central canal stenosis and  moderate bilateral neural foraminal narrowing.

## 2024-01-29 NOTE — ED TRIAGE NOTES
Patient presents here with right ear pain accompanied by dizziness. She is noting swelling in the canal as well. She denies drainage from the canal. Patient reports taking tylenol for pain with minimal relief.

## 2024-01-29 NOTE — PATIENT INSTRUCTIONS
~Spine Center Scheduling #[phone].  ~Please call our [facility] Nurse Navigation #[phone] with any questions or concerns about your treatment plan, if symptoms worsen and you would like to be seen urgently, or if you have problems controlling bladder and bowel function.  ~For any future flareups or new symptoms, recommend follow-up in clinic or contact the nurse navigator line.  ~Please note that any My Chart messages may take multiple days for a response due to the high volume of patients seen in clinic.  Anything sent Friday night or after will be answered the following week when able.

## 2024-01-29 NOTE — ED PROVIDER NOTES
ED PROVIDER NOTE    EMERGENCY DEPARTMENT ENCOUNTER      NAME: Marleny Viera  AGE: 48 year old female  YOB: 1975  MRN: [id number]  EVALUATION DATE & TIME: No admission date for patient encounter.    PCP: Maria Fernanda Schumacher    ED PROVIDER: Verito Campuzano PA-C      Chief Complaint   Patient presents with    Otalgia         FINAL IMPRESSION:  1. Acute otitis media, right          MEDICAL DECISION MAKING:    Pertinent Labs & Imaging studies reviewed. (See chart for details)  Marleny Viera is a 48 year old female who presents for evaluation of right ear pain x 5 days.  History of chronic ear infections in the past.  Deaf at baseline. Recent URI symptoms. Vitals unremarkable.. On exam well-appearing in no acute distress.  Posterior pharynx pink and moist.  Uvula midline.  No tonsillar swelling.  Left ear with no canal erythema, tenderness palpation of external ear, and TM pearly gray with bulging and bony landmarks intact.  Right ear with hyperparesthesia ACS with even touching the ear or inserting the otoscope.  I was able to get a glance of the tympanic membrane which showed bulging TM, erythema, and fluid behind the ear consistent with acute otitis media.  The canal is not erythematous or have any crusting or discharge consistent with otitis externa.  No real tenderness to palpation behind the ear, swelling, redness that would be consistent with mastoiditis.  No fevers or constitutional symptoms.  Will treat with cefdinir given history to penicillins.  Discussed strict return precautions.  All questions were answered.  This encounter was conducted using a in person .     Medical Decision Making  Obtained supplemental history:Supplemental history obtained?: No  Reviewed external records: External records reviewed?: Outpatient Record: Spine Clinic Visit 1/29/24  Care impacted by chronic illness:Chronic Lung Disease and Hypertension  Care significantly affected by social  determinants of health:N/A  Did you consider but not order tests?: Work up considered but not performed and documented in chart, if applicable  Did you interpret images independently?: Independent interpretation of ECG and images noted in documentation, when applicable.  Consultation discussion with other provider:Did you involve another provider (consultant, , pharmacy, etc.)?: No  Discharge. I prescribed additional prescription strength medication(s) as charted. See documentation for any additional details.      ED COURSE:  5:35 PM I met and introduced myself to the patient. I gathered initial history and performed my physical exam. We discussed plan for initial workup.   1745 We discussed the plan for discharge and the patient is agreeable. Reviewed supportive cares, symptomatic treatment, outpatient follow up, and reasons to return to the Emergency Department. Patient to be discharged by ED RN.             MEDICATIONS GIVEN IN THE EMERGENCY:  Medications - No data to display    NEW PRESCRIPTIONS STARTED AT TODAY'S ER VISIT  New Prescriptions    No medications on file          =================================================================    HPI    Patient information was obtained from: Patient   Use of Intrepreter: American Sign Language (In Person)    Marleny Viera is a 48 year old female with a pertinent history of asthma, deafness, and HTN who presents to the ED by walk in for evaluation of otalgia.     The patient reports 5 days of ongoing right ear pain. She describes the pain as a swelling sensation. With the ear pain she has had intermittent headaches and dizziness. A couple days ago she noticed some blood in her ear, but she has not had any persistent discharge from her ear. She does not use Q-tips to clean her ears or any other foreign bodies. She has had ear infections in the past, most recently 3-4 years ago, and had ear tubes as a child. She denies any history of ruptured ear drums.    She has  had a runny nose and congestion. She has not had any fevers or sore throat.    REVIEW OF SYSTEMS   See HPI, otherwise all other systems reviewed and are negative    PAST MEDICAL HISTORY:  Past Medical History:   Diagnosis Date    Accidental overdose, initial encounter 7/7/2022    ADHD (attention deficit hyperactivity disorder)     Alcohol dependence (H)     Anxiety     Arthritis     Asthma     Bipolar depression (H)     Deafness     Drug abuse, nondependent (H) 10/29/2004    Overview:  polydrug abuse per psych notes ; Other, mixed, or unspecified nondependent drug abuse, unspecified    History of blood clots     History of transfusion     Hypertension     Kidney stones 2015    Major depressive disorder, recurrent episode, severe (H) 5/17/2016    Overview:  s/p suicide attempt Epic     Migraine     Obstructive sleep apnea 6/1/2016    Pulmonary emboli (H)     Status post total right knee replacement 1/18/2016       PAST SURGICAL HISTORY:  Past Surgical History:   Procedure Laterality Date    BIOPSY BREAST Right     2019... Also had fluid drained from left breast under surgery also in 2019    BREAST SURGERY      MAMMOPLASTY REDUCTION Bilateral 2017    TOTAL HIP ARTHROPLASTY Bilateral     TOTAL KNEE ARTHROPLASTY Right     TUBAL LIGATION      US BREAST CORE BIOPSY RIGHT Right 11/21/2019           CURRENT MEDICATIONS:      Current Facility-Administered Medications:     cyanocobalamin injection 1,000 mcg, 1,000 mcg, Intramuscular, Q30 Days, Maria Fernanda Schumacher, CNP    Current Outpatient Medications:     albuterol (PROAIR HFA/PROVENTIL HFA/VENTOLIN HFA) 108 (90 Base) MCG/ACT inhaler, INHALE 2 PUFFS BY MOUTH EVERY FOUR HOURS AS NEEDED FOR WHEEZING OR SHORTNESS OF BREATH, Disp: 8.5 g, Rfl: 1    albuterol (PROVENTIL) (2.5 MG/3ML) 0.083% neb solution, USE 1 VIAL NEBULIZER FOUR TIMES PER WEEK, Disp: 180 mL, Rfl: 11    Blood Pressure Monitoring (BLOOD PRESSURE KIT) KIT, , Disp: , Rfl:     cetirizine (ZYRTEC) 10 MG tablet, TAKE 1  TABLET (10 MG) BY MOUTH DAILY, Disp: 30 tablet, Rfl: 8    citalopram (CELEXA) 20 MG tablet, , Disp: , Rfl:     diphenhydrAMINE (BENADRYL) 50 MG capsule, Take 50 mg by mouth, Disp: , Rfl:     EPINEPHrine (ANY BX GENERIC EQUIV) 0.3 MG/0.3ML injection 2-pack, INJECT 0.3 MLS (0.3 MG) INTO THE MUSCLE AS NEEDED FOR ANAPHYLAXIS, Disp: 2 each, Rfl: 1    estradiol (VIVELLE-DOT) 0.05 MG/24HR bi-weekly patch, 1 application to skin Transdermal Two times a Week for 90 days, Disp: , Rfl:     gabapentin (NEURONTIN) 100 MG capsule, TAKE 1 CAPSULE BY MOUTH TWICE A DAY FOR MIGRAINES AND ANXIETY, Disp: 180 capsule, Rfl: 3    hydrOXYzine HCl (ATARAX) 25 MG tablet, TAKE 3 TABLETS BY MOUTH THREE TIMES A DAY AS NEEDED FOR ANXIETY OR INSOMNIA ., Disp: 270 tablet, Rfl: 0    metoprolol succinate ER (TOPROL XL) 50 MG 24 hr tablet, Take 1 tablet (50 mg) by mouth daily, Disp: 90 tablet, Rfl: 3    montelukast (SINGULAIR) 10 MG tablet, Take 1 tablet (10 mg) by mouth At Bedtime, Disp: 90 tablet, Rfl: 3    polyethylene glycol (MIRALAX) 17 GM/Dose powder, Take 17 g (1 capful) by mouth daily, Disp: 527 g, Rfl: 0    prazosin (MINIPRESS) 1 MG capsule, , Disp: , Rfl:     PROMETRIUM 200 MG capsule, 1 cap Orally nightly for 90 days, Disp: , Rfl:     QUEtiapine (SEROQUEL) 50 MG tablet, , Disp: , Rfl:     rizatriptan (MAXALT) 10 MG tablet, Take 1 tablet (10 mg) by mouth at onset of headache for migraine, Disp: 18 tablet, Rfl: 2    SYMBICORT 80-4.5 MCG/ACT Inhaler, Inhale 2 puffs into the lungs 2 times daily, Disp: 10.2 g, Rfl: 11    ALLERGIES:  Allergies   Allergen Reactions    Acetylcysteine Rash and Other (See Comments)    Amoxicillin Itching and Shortness Of Breath    Bee Pollen Anaphylaxis    Contrast [Iodixanol] Shortness Of Breath and Rash     Pt stated she reacted to the contrast given for her CT in past. She experienced shortness of breath, throat tightening, and rash on chest, and they gave her an injection to counter the symptoms.     Covid-19  "(Mrna) Vaccine Shortness Of Breath     Pfizer COVID-19 Vaccine    Hymenoptera Allergenic Extract [Wasp Venom Protein] Anaphylaxis    Iodine Hives and Unknown     Pt stated that she was injected with CT CONTRAST in the past and got hives, SOB, and nausea. Please pre-medicate patient in the future.     Wasp Venom Protein Starter Kit [Wasp Venom Protein] Itching, Shortness Of Breath, Swelling and Difficulty breathing    Adhesive Tape Unknown    Bee Venom Unknown    Food Allergy Formula Unknown     Red meat, chocolate    Geodon [Ziprasidone] Unknown    Ibuprofen Other (See Comments)     Kidney function, Kidney function    Latex Unknown     Added based on information entered during case entry, please review and add reactions, type, and severity as needed    Morphine Unknown    Prochlorperazine Other (See Comments)    Risperdal [Risperidone] Unknown    Risperidone Analogues [Risperidone] Other (See Comments)    Shellfish Allergy Unknown    Theobroma Oil [Theobroma Grandiflorum Seed Butter] Unknown    Trazodone Other (See Comments)     Elevated creatinine    Zoloft [Sertraline] Unknown    Hydrochlorothiazide Rash       FAMILY HISTORY:  Family History   Problem Relation Age of Onset    Cancer Mother     Breast Cancer Mother     Cerebrovascular Disease Father          VITALS:  Vitals:    01/29/24 1522   BP: (!) 169/96   Pulse: 97   Resp: 16   Temp: 98.4  F (36.9  C)   TempSrc: Oral   SpO2: 98%   Weight: 99.8 kg (220 lb)   Height: 1.651 m (5' 5\")       PHYSICAL EXAM      First Vitals:  Patient Vitals for the past 24 hrs:   BP Temp Temp src Pulse Resp SpO2 Height Weight   01/29/24 1522 (!) 169/96 98.4  F (36.9  C) Oral 97 16 98 % 1.651 m (5' 5\") 99.8 kg (220 lb)       Physical Exam  Constitutional:       General: She is not in acute distress.     Appearance: Normal appearance. She is not ill-appearing.   HENT:      Head: Normocephalic and atraumatic.      Right Ear: Decreased hearing noted. Tenderness present. No drainage. A " middle ear effusion is present. No foreign body. No mastoid tenderness. No hemotympanum. Tympanic membrane is injected, erythematous and bulging. Tympanic membrane is not scarred or perforated.      Left Ear: Decreased hearing noted. No drainage. No foreign body. No mastoid tenderness. No hemotympanum. Tympanic membrane is not injected, scarred, perforated or erythematous.      Ears:      Comments: Completely deaf at baseline     Nose: No congestion or rhinorrhea.      Mouth/Throat:      Mouth: Mucous membranes are moist.      Pharynx: No pharyngeal swelling, oropharyngeal exudate, posterior oropharyngeal erythema or uvula swelling.      Tonsils: No tonsillar exudate or tonsillar abscesses. 1+ on the right. 1+ on the left.   Neurological:      Mental Status: She is alert.             LAB:  Labs Ordered and Resulted from Time of ED Arrival to Time of ED Departure - No data to display    RADIOLOGY:  No orders to display         ICalixto, am serving as a scribe to document services personally performed by Verito Campuzano PA-C based on my observations and the provider's statements to me.  I, Verito Campuzano PA-C, attest that Calixto Jolly is acting in a scribe capacity, has observed my performance of the services and has documented them in accordance with my direction.    Verito Campuzano PA-C  Emergency Medicine   [facility] EMERGENCY DEPARTMENT           Verito Campuzano PA-C  01/29/24 0901

## 2024-01-29 NOTE — LETTER
1/29/2024         RE: Marleny Viera  [address]  [place] [address]        Dear Colleague,    Thank you for referring your patient, Marleny Viera, to the [facility]. Please see a copy of my visit note below.    Entire visit conducted with an in-person .    ASSESSMENT:  Marleny Viera is a 48 year old female presents for consultation at the request of Johnathon Lawler who presents today for new patient evaluation of :         Diagnoses and all orders for this visit:  Cervical stenosis of spinal canal  -     Adult Neurosurgery  Referral; Future  Cervical radiculopathy  -     Spine  Referral  -     Adult Neurosurgery  Referral; Future       Patient is neurologically intact on exam. No myelopathic or red flag symptoms.    Patient's symptoms are suggestive of cervical spondylosis, stenosis, and radiculopathy. She has been performing conservative treatment with PT and gabapentin with some improvement but continues to have pain in the neck and shoulders. Her MRI shows severe stenosis of C5-6 and she also reports an anaphylactic allergy to iodinated contrast products. In light of that, we discussed that CARLYLE would unfortunately not be safe to pursue. As patient has thus far exhausted conservative options and is not a candidate for interventions, will refer to Neurosurgery for further evaluation.    PLAN:  Reviewed spine anatomy and disease process. Discussed diagnosis and treatment options with the patient today. A shared decision making model was used. The patient's values and choices were respected. The following represents what was discussed and decided upon by the provider and the patient.    1. DIAGNOSTIC TESTS  No new imaging orders at this time.    2. PHYSICAL THERAPY  Patient is already in PT or has a pending referral to begin soon.  Discussed the importance of core strengthening, ROM, stretching exercises with the patient and how  each of these entities is important in decreasing pain.  Explained to the patient that the purpose of physical therapy is to teach the patient a home exercise program.  These exercises need to be performed every day in order to decrease pain and prevent future occurrences of pain.  Likened it to brushing one's teeth.      3. MEDICATIONS:  Discussed multiple medication options today with patient. Discussed risks, side effects, and proper use of medications. Patient verbalized understanding.  No new medications ordered at this visit.    4. INTERVENTIONS:  Interventional treatments are not indicated for patient's presentation.  Patient has an anaphylactic allergy to iodinated contrast    5. OTHER REFERRALS:  No other referrals at this time.    6. FOLLOW-UP  As needed.    Advised patient to call the Spine Center if symptoms worsen or you have problems controlling bladder and bowel function.   ______________________________________________________________________    SUBJECTIVE:   Marleny Viera  is a 48 year old female who presents today for new patient evaluation.    Patient reports neck pain going into bilateral arms began in October 2023. She was initially seen at [facility] and started with therapy and between October to now has had improvement of arm pain symptoms but continues to have neck and shoulder pain. She has 3 more visits of PT left. She states due to a latex allergy she was referred to [facility] from [facility] (maybe iodine allergy?).    No numbness or weakness of arms, occasional paresthesias in hand. Will be undergoing right thumb surgery in March at [facility].  No prior neck surgeries    -Treatment to Date: as above      Oswestry (FORREST) Questionnaire        1/29/2024     1:12 PM   OSWESTRY DISABILITY INDEX   Count 10   Sum 19   Oswestry Score (%) 38 %       Neck Disability Index:      1/29/2024     1:13 PM   Neck Disability Index (  Jeremi H. and Velia C. 1991. All rights reserved.; used  with permission)   SECTION 1 - PAIN INTENSITY 4   SECTION 2 - PERSONAL CARE 0   SECTION 3 - LIFTING 5   SECTION 4 - READING 4   SECTION 5 - HEADACHES 3   SECTION 6 - CONCENTRATION 3   SECTION 7 - WORK 5   SECTION 8 - DRIVING 2   SECTION 9 - SLEEPING 5   SECTION 10 - RECREATION 4   Count 10   Sum 35   Raw Score: /50 35   Neck Disability Index Score: (%) 70 %              -Medications:    Current Outpatient Medications   Medication     albuterol (PROAIR HFA/PROVENTIL HFA/VENTOLIN HFA) 108 (90 Base) MCG/ACT inhaler     albuterol (PROVENTIL) (2.5 MG/3ML) 0.083% neb solution     Blood Pressure Monitoring (BLOOD PRESSURE KIT) KIT     cetirizine (ZYRTEC) 10 MG tablet     citalopram (CELEXA) 20 MG tablet     diphenhydrAMINE (BENADRYL) 50 MG capsule     EPINEPHrine (ANY BX GENERIC EQUIV) 0.3 MG/0.3ML injection 2-pack     estradiol (VIVELLE-DOT) 0.05 MG/24HR bi-weekly patch     gabapentin (NEURONTIN) 100 MG capsule     hydrOXYzine HCl (ATARAX) 25 MG tablet     metoprolol succinate ER (TOPROL XL) 50 MG 24 hr tablet     montelukast (SINGULAIR) 10 MG tablet     polyethylene glycol (MIRALAX) 17 GM/Dose powder     prazosin (MINIPRESS) 1 MG capsule     PROMETRIUM 200 MG capsule     QUEtiapine (SEROQUEL) 50 MG tablet     rizatriptan (MAXALT) 10 MG tablet     SYMBICORT 80-4.5 MCG/ACT Inhaler     Current Facility-Administered Medications   Medication     cyanocobalamin injection 1,000 mcg       Allergies   Allergen Reactions     Acetylcysteine Rash and Other (See Comments)     Amoxicillin Itching and Shortness Of Breath     Bee Pollen Anaphylaxis     Contrast [Iodixanol] Shortness Of Breath and Rash     Pt stated she reacted to the contrast given for her CT in past. She experienced shortness of breath, throat tightening, and rash on chest, and they gave her an injection to counter the symptoms.      Covid-19 (Mrna) Vaccine Shortness Of Breath     Pfizer COVID-19 Vaccine     Hymenoptera Allergenic Extract [Wasp Venom Protein]  Anaphylaxis     Iodine Hives and Unknown     Pt stated that she was injected with CT CONTRAST in the past and got hives, SOB, and nausea. Please pre-medicate patient in the future.      Wasp Venom Protein Starter Kit [Wasp Venom Protein] Itching, Shortness Of Breath, Swelling and Difficulty breathing     Adhesive Tape Unknown     Bee Venom Unknown     Food Allergy Formula Unknown     Red meat, chocolate     Geodon [Ziprasidone] Unknown     Ibuprofen Other (See Comments)     Kidney function, Kidney function     Latex Unknown     Added based on information entered during case entry, please review and add reactions, type, and severity as needed     Morphine Unknown     Prochlorperazine Other (See Comments)     Risperdal [Risperidone] Unknown     Risperidone Analogues [Risperidone] Other (See Comments)     Shellfish Allergy Unknown     Theobroma Oil [Theobroma Grandiflorum Seed Butter] Unknown     Trazodone Other (See Comments)     Elevated creatinine     Zoloft [Sertraline] Unknown     Hydrochlorothiazide Rash       Past Medical History:   Diagnosis Date     Accidental overdose, initial encounter 7/7/2022     ADHD (attention deficit hyperactivity disorder)      Alcohol dependence (H)      Anxiety      Arthritis      Asthma      Bipolar depression (H)      Deafness      Drug abuse, nondependent (H) 10/29/2004    Overview:  polydrug abuse per psych notes ; Other, mixed, or unspecified nondependent drug abuse, unspecified     History of blood clots      History of transfusion      Hypertension      Kidney stones 2015     Major depressive disorder, recurrent episode, severe (H) 5/17/2016    Overview:  s/p suicide attempt Epic      Migraine      Obstructive sleep apnea 6/1/2016     Pulmonary emboli (H)      Status post total right knee replacement 1/18/2016        Patient Active Problem List   Diagnosis     Essential hypertension     Insomnia     Asthma, moderate persistent, uncomplicated     Borderline personality disorder  (H)     Uterine leiomyoma, unspecified location     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     Didelphic uterus     Personal history of PE (pulmonary embolism)     Migraine     Fatty liver     CMC DJD(carpometacarpal degenerative joint disease), localized primary     Obesity (BMI 35.0-39.9) with comorbidity (H)     Other dysphagia     Bilateral deafness     Lumbar back sprain, initial encounter     Prediabetes     Obstructive sleep apnea syndrome     Arthritis of carpometacarpal (CMC) joint of right thumb     Accidental overdose, initial encounter     Severe depression (H)     Anxiety, generalized     Asthma, persistent controlled     Attention deficit hyperactivity disorder (ADHD)     De Quervain's disease (radial styloid tenosynovitis)     Status post total right knee replacement     Toxic effect of acetaminophen, accidental or unintentional, subsequent encounter     LLQ abdominal pain     Allergic reaction, subsequent encounter       Past Surgical History:   Procedure Laterality Date     BIOPSY BREAST Right     2019... Also had fluid drained from left breast under surgery also in 2019     BREAST SURGERY       MAMMOPLASTY REDUCTION Bilateral 2017     TOTAL HIP ARTHROPLASTY Bilateral      TOTAL KNEE ARTHROPLASTY Right      TUBAL LIGATION       US BREAST CORE BIOPSY RIGHT Right 11/21/2019       Family History   Problem Relation Age of Onset     Cancer Mother      Breast Cancer Mother      Cerebrovascular Disease Father        Reviewed past medical, surgical, and family history with patient found on new patient intake packet located in EMR Media tab.     SOCIAL HX: Reviewed    ROS: Specifically negative for bowel/bladder dysfunction, balance changes, headache, dizziness, foot drop, fevers, chills, appetite changes, nausea/vomiting, unexplained weight loss. Otherwise 13 systems reviewed are negative. Please see the patient's intake questionnaire from today for  "details.    OBJECTIVE:  BP (!) 162/80 (BP Location: Right arm, Patient Position: Sitting, Cuff Size: Adult Regular)   Pulse 102   Ht 5' 5\" (1.651 m)   Wt 225 lb 14.4 oz (102.5 kg)   SpO2 98%   BMI 37.59 kg/m      PHYSICAL EXAMINATION:  --CONSTITUTIONAL: Vital signs as above. No acute distress. The patient is well nourished and well groomed.  --PSYCHIATRIC: The patient is awake, alert, oriented to person, place, time and answering questions appropriately with clear speech. Appropriate mood and affect   --RESPIRATORY: Normal rhythm and effort. No abnormal accessory muscle breathing patterns noted.   --GROSS MOTOR: Easily arises from a seated position.  --CERVICAL SPINE: Inspection reveals no evidence of deformity. Range of motion is not limited in cervical flexion, extension, lateral rotation. Mild tenderness to palpation cervical paraspinals, no tenderness in spinous processes.  Spurling maneuver negative bilaterally.  --SHOULDERS: Full range of motion bilaterally. Negative empty can.  --UPPER EXTREMITY MOTOR TESTING:  Wrist flexion left 5/5, right 5/5  Wrist extension left 5/5, right 5/5  Pronators left 5/5, right 5/5  Biceps left 5/5, right 5/5   Triceps left 5/5, right 5/5   Shoulder abduction left 5/5, right 5/5   left 5/5, right 5/5  --NEUROLOGIC: Sensation to upper extremities is intact bilaterally.  Negative Thompson's bilaterally.    --VASCULAR: Warm upper limbs bilaterally. Capillary refill in the upper extremities is less than 1 second.    RESULTS: Available medical records from [facility] and any other outside records were reviewed today.     Imaging:  Available relevant imaging was personally reviewed and interpreted today. The images were shown to the patient and the findings were explained using a spine model.    MRI Cervical Spine 10/11/23:    CERVICAL SPINE MRI:  1.  The exam is significantly degraded by motion.  2.  The spinal canal is narrow on a congenital basis due to short " pedicles.  3.  At C5-C6 there is severe central canal stenosis and moderate to severe bilateral neural foraminal narrowing. There may be subtle edema within the cord at the upper C6 level versus artifact. Short-term follow-up with sedation is recommended.  4.  At C4-C5 there is moderate central canal stenosis.  5.  At C6-C7 there is moderate central canal stenosis and moderate bilateral neural foraminal narrowing.       Again, thank you for allowing me to participate in the care of your patient.        Sincerely,        Izaiah Kapoor MD

## 2024-01-29 NOTE — DISCHARGE INSTRUCTIONS
You are seen in the emergency department for ear pain.  Your exam is consistent with an ear infection of the right ear.  I will prescribe an antibiotic. Please return to the ER if you develop any worsening symptoms including worsening pain or swelling in or behind the ear, blood draining from the ear, fevers, nausea/vomiting, or any other concerning symptoms. You can take tylenol/ibuprofen for the pain in the meantime.

## 2024-02-08 ENCOUNTER — HOSPITAL ENCOUNTER (EMERGENCY)
Facility: CLINIC | Age: 49
Discharge: HOME | End: 2024-02-08
Admitting: PHYSICIAN ASSISTANT
Payer: COMMERCIAL

## 2024-02-08 ENCOUNTER — APPOINTMENT (OUTPATIENT)
Dept: CT IMAGING | Facility: CLINIC | Age: 49
End: 2024-02-08
Attending: PHYSICIAN ASSISTANT
Payer: COMMERCIAL

## 2024-02-08 VITALS
WEIGHT: 226 LBS | HEART RATE: 95 BPM | RESPIRATION RATE: 20 BRPM | OXYGEN SATURATION: 99 % | HEIGHT: 65 IN | BODY MASS INDEX: 37.65 KG/M2 | TEMPERATURE: 98.1 F | DIASTOLIC BLOOD PRESSURE: 81 MMHG | SYSTOLIC BLOOD PRESSURE: 135 MMHG

## 2024-02-08 DIAGNOSIS — R10.10 PAIN OF UPPER ABDOMEN: ICD-10-CM

## 2024-02-08 DIAGNOSIS — R19.5 DARK STOOLS: ICD-10-CM

## 2024-02-08 LAB
ALBUMIN SERPL BCG-MCNC: 3.9 G/DL (ref 3.5–5.2)
ALBUMIN UR-MCNC: 20 MG/DL
ALP SERPL-CCNC: 98 U/L (ref 40–150)
ALT SERPL W P-5'-P-CCNC: 10 U/L (ref 0–50)
ANION GAP SERPL CALCULATED.3IONS-SCNC: 9 MMOL/L (ref 7–15)
APPEARANCE UR: ABNORMAL
AST SERPL W P-5'-P-CCNC: 16 U/L (ref 0–45)
BACTERIA #/AREA URNS HPF: ABNORMAL /HPF
BASOPHILS # BLD AUTO: 0 10E3/UL (ref 0–0.2)
BASOPHILS NFR BLD AUTO: 0 %
BILIRUB SERPL-MCNC: 0.5 MG/DL
BILIRUB UR QL STRIP: NEGATIVE
BUN SERPL-MCNC: 7.8 MG/DL (ref 6–20)
CALCIUM SERPL-MCNC: 8.7 MG/DL (ref 8.6–10)
CAOX CRY #/AREA URNS HPF: ABNORMAL /HPF
CHLORIDE SERPL-SCNC: 107 MMOL/L (ref 98–107)
COLOR UR AUTO: YELLOW
CREAT SERPL-MCNC: 0.81 MG/DL (ref 0.51–0.95)
DEPRECATED HCO3 PLAS-SCNC: 25 MMOL/L (ref 22–29)
EGFRCR SERPLBLD CKD-EPI 2021: 88 ML/MIN/1.73M2
EOSINOPHIL # BLD AUTO: 0.1 10E3/UL (ref 0–0.7)
EOSINOPHIL NFR BLD AUTO: 1 %
ERYTHROCYTE [DISTWIDTH] IN BLOOD BY AUTOMATED COUNT: 13.3 % (ref 10–15)
GLUCOSE SERPL-MCNC: 116 MG/DL (ref 70–99)
GLUCOSE UR STRIP-MCNC: NEGATIVE MG/DL
HCT VFR BLD AUTO: 39.8 % (ref 35–47)
HGB BLD-MCNC: 13.7 G/DL (ref 11.7–15.7)
HGB UR QL STRIP: ABNORMAL
HYALINE CASTS: 2 /LPF
IMM GRANULOCYTES # BLD: 0 10E3/UL
IMM GRANULOCYTES NFR BLD: 0 %
KETONES UR STRIP-MCNC: NEGATIVE MG/DL
LEUKOCYTE ESTERASE UR QL STRIP: NEGATIVE
LIPASE SERPL-CCNC: 20 U/L (ref 13–60)
LYMPHOCYTES # BLD AUTO: 2.1 10E3/UL (ref 0.8–5.3)
LYMPHOCYTES NFR BLD AUTO: 30 %
MCH RBC QN AUTO: 28.1 PG (ref 26.5–33)
MCHC RBC AUTO-ENTMCNC: 34.4 G/DL (ref 31.5–36.5)
MCV RBC AUTO: 82 FL (ref 78–100)
MONOCYTES # BLD AUTO: 0.3 10E3/UL (ref 0–1.3)
MONOCYTES NFR BLD AUTO: 4 %
MUCOUS THREADS #/AREA URNS LPF: PRESENT /LPF
NEUTROPHILS # BLD AUTO: 4.5 10E3/UL (ref 1.6–8.3)
NEUTROPHILS NFR BLD AUTO: 65 %
NITRATE UR QL: NEGATIVE
NRBC # BLD AUTO: 0 10E3/UL
NRBC BLD AUTO-RTO: 0 /100
PH UR STRIP: 5.5 [PH] (ref 5–7)
PLATELET # BLD AUTO: 262 10E3/UL (ref 150–450)
POTASSIUM SERPL-SCNC: 3.7 MMOL/L (ref 3.4–5.3)
PROT SERPL-MCNC: 7 G/DL (ref 6.4–8.3)
RBC # BLD AUTO: 4.87 10E6/UL (ref 3.8–5.2)
RBC URINE: 5 /HPF
SODIUM SERPL-SCNC: 141 MMOL/L (ref 135–145)
SP GR UR STRIP: 1.02 (ref 1–1.03)
SQUAMOUS EPITHELIAL: 9 /HPF
UROBILINOGEN UR STRIP-MCNC: <2 MG/DL
WBC # BLD AUTO: 7 10E3/UL (ref 4–11)
WBC URINE: 3 /HPF

## 2024-02-08 PROCEDURE — 83690 ASSAY OF LIPASE: CPT | Performed by: PHYSICIAN ASSISTANT

## 2024-02-08 PROCEDURE — 96376 TX/PRO/DX INJ SAME DRUG ADON: CPT

## 2024-02-08 PROCEDURE — 74176 CT ABD & PELVIS W/O CONTRAST: CPT

## 2024-02-08 PROCEDURE — 85025 COMPLETE CBC W/AUTO DIFF WBC: CPT | Performed by: PHYSICIAN ASSISTANT

## 2024-02-08 PROCEDURE — 81001 URINALYSIS AUTO W/SCOPE: CPT | Performed by: PHYSICIAN ASSISTANT

## 2024-02-08 PROCEDURE — 96374 THER/PROPH/DIAG INJ IV PUSH: CPT

## 2024-02-08 PROCEDURE — 36415 COLL VENOUS BLD VENIPUNCTURE: CPT | Performed by: PHYSICIAN ASSISTANT

## 2024-02-08 PROCEDURE — 99285 EMERGENCY DEPT VISIT HI MDM: CPT | Mod: 25

## 2024-02-08 PROCEDURE — 82040 ASSAY OF SERUM ALBUMIN: CPT | Performed by: PHYSICIAN ASSISTANT

## 2024-02-08 PROCEDURE — 96361 HYDRATE IV INFUSION ADD-ON: CPT

## 2024-02-08 PROCEDURE — 250N000011 HC RX IP 250 OP 636: Performed by: PHYSICIAN ASSISTANT

## 2024-02-08 PROCEDURE — 258N000003 HC RX IP 258 OP 636: Performed by: PHYSICIAN ASSISTANT

## 2024-02-08 RX ORDER — FAMOTIDINE 20 MG/1
20 TABLET, FILM COATED ORAL 2 TIMES DAILY
Qty: 30 TABLET | Refills: 0 | Status: SHIPPED | OUTPATIENT
Start: 2024-02-08 | End: 2024-08-06

## 2024-02-08 RX ORDER — ONDANSETRON 2 MG/ML
4 INJECTION INTRAMUSCULAR; INTRAVENOUS ONCE
Status: COMPLETED | OUTPATIENT
Start: 2024-02-08 | End: 2024-02-08

## 2024-02-08 RX ADMIN — SODIUM CHLORIDE 1000 ML: 9 INJECTION, SOLUTION INTRAVENOUS at 14:40

## 2024-02-08 RX ADMIN — ONDANSETRON 4 MG: 2 INJECTION INTRAMUSCULAR; INTRAVENOUS at 16:48

## 2024-02-08 RX ADMIN — ONDANSETRON 4 MG: 2 INJECTION INTRAMUSCULAR; INTRAVENOUS at 14:42

## 2024-02-08 ASSESSMENT — ACTIVITIES OF DAILY LIVING (ADL): ADLS_ACUITY_SCORE: 35

## 2024-02-08 NOTE — DISCHARGE INSTRUCTIONS
Your symptoms might represent a peptic ulcer.  Sometimes these can bleed and cause darker stools.  These can also cause pain in your upper abdomen.    I have prescribed 2 medications to help.    I would try a more bland diet over the next couple of weeks to allow your stomach to heal.    If symptoms are not improving or worsening you should follow-up with GI.  I placed a referral for your convenience.    If symptoms worsen please return to the ER.

## 2024-02-08 NOTE — ED PROVIDER NOTES
EMERGENCY DEPARTMENT ENCOUNTER   NAME: Marleny Viera ; AGE: 48 year old female ; YOB: 1975 ; MRN: [id number] ; PCP: Maria Fernanda Schumacher     Evaluation Date & Time: No admission date for patient encounter.    ED Provider: Cherelle Pedro PA-C    CHIEF COMPLAINT     Melena, Nausea, and Abdominal Pain      FINAL ASSESSMENT       ICD-10-CM    1. Pain of upper abdomen  R10.10 Adult GI  Referral - Consult Only      2. Dark stools  R19.5 Adult GI  Referral - Consult Only          ED COURSE, MEDICAL DECISION MAKING, PLAN     ED course   2:22 PM: Evaluated patient in an overflow triage area due to critical ER volumes.  Performed brief physical exam.  Plan for labs and imaging.  Awaiting .  4:24 PM: Re-visited with patient now that the  is here.   4:36 PM: Nurse reports patient is still feeling nauseated. Will try an additional 4mg of Zofran.   6:00 PM Updated patient on all labs and imaging. Plan for discharge.   _____________________________________________________________________    Marleny is a 48 year old female presenting with right lower abdominal pain and dark to black loose stools that started 2 days ago.  She states she has had some nausea and a couple episodes of vomiting.  She has not had any fevers.  She denies any new medications.  Never had any symptoms like this before in the past.  States she has not had any abdominal surgeries previously.    On exam patient is nontoxic-appearing.  No acute distress.  Right upper quadrant abdominal pain with palpation. Mild epigastric pain. No back pain or CVA tenderness.  Heart and lung sound good.  She is vitally normal.    Considered acute appendicitis, ureterolithiasis, cholecystitis, gastritis, peptic ulcer disease, pancreatitis, GI bleed, GI malignancy.     CBC without anemia or leukocytosis.  CMP unremarkable.  UA obtained showing a small amount of blood and calcium oxalate crystals otherwise unremarkable  with no evidence of infection.  Lipase WNL  hCG testing declined by patient.    Initially consider doing ultrasound imaging given the right upper abdominal pain to rule out cholecystitis, however with her report of black stools and normal liver enzymes/bilirubin, elected to move forward with a noncontrast CT due to her severe iodine allergy. Radiologist read describes no acute findings to explain patient's abdominal pain/dark stools.     Difficult to fully say what is causing patient's symptoms, however there does not appear to be any acutely serious or life threatening process going on. No acute process seen on CT to suggest appendicitis, cholecystitis, pancreatitis, ureterolithiasis, masses, obstruction, etc. Patient is overall very well appearing. No peritoneal s/s present. No fevers. No persistent vomiting. No anemia. No leukocytosis. Lipase normal. Vitally normal.    At this point, I do not feel further testing, imaging, nor hospitalization is warranted.     I considered the possibility of a bleeding duodenal ulcer. Will treat presumptively for this with Omeprazole and pepcid.   I have provided her with a referral to GI in the event symptoms persist.   Recommended a bland diet and avoiding NSAIDs.     Indications for re-evaluation back in the ER discussed with patient.     *All pertinent lab & imaging studies independently reviewed. (See chart for details)   *Discussed the results of all the tests and plan with patient and family/guardians.   *All questions were answered.   *The patient and/or family/guardian acknowledged understanding and was agreeable with the care plan.      HISTORY OF PRESENT ILLNESS   Patient information was obtained from: Patient    Use of Intrepreter: Written word initially then      Marleny Viera is a 48 year old female with a pertinent history of HTN, asthma, anxiety/depression, obesity, borderline personality disorder, bilateral deafness who presents to the ED by  means of walk-in for evaluation of 2 days of right lower abdominal pain and loose dark to black stools.    She states that she has been nauseated and has had an episode or 2 of vomiting.    She denies having any fevers.    She tells me she has never had any abdominal or pelvic surgeries in the past.  She denies being on any new medications.      No other concerns.      MEDICAL HISTORY     Past Medical History:   Diagnosis Date    Accidental overdose, initial encounter 7/7/2022    ADHD (attention deficit hyperactivity disorder)     Alcohol dependence (H)     Anxiety     Arthritis     Asthma     Bipolar depression (H)     Deafness     Drug abuse, nondependent (H) 10/29/2004    History of blood clots     History of transfusion     Hypertension     Kidney stones 2015    Major depressive disorder, recurrent episode, severe (H) 5/17/2016    Migraine     Obstructive sleep apnea 6/1/2016    Pulmonary emboli (H)     Status post total right knee replacement 1/18/2016       Past Surgical History:   Procedure Laterality Date    BIOPSY BREAST Right     2019... Also had fluid drained from left breast under surgery also in 2019    BREAST SURGERY      MAMMOPLASTY REDUCTION Bilateral 2017    TOTAL HIP ARTHROPLASTY Bilateral     TOTAL KNEE ARTHROPLASTY Right     TUBAL LIGATION      US BREAST CORE BIOPSY RIGHT Right 11/21/2019       Family History   Problem Relation Age of Onset    Cancer Mother     Breast Cancer Mother     Cerebrovascular Disease Father        Social History     Tobacco Use    Smoking status: Never     Passive exposure: Never    Smokeless tobacco: Never   Vaping Use    Vaping Use: Never used   Substance Use Topics    Alcohol use: Yes     Comment: Alcoholic Drinks/day: dependence    Drug use: Not Currently       famotidine (PEPCID) 20 MG tablet  omeprazole (PRILOSEC) 20 MG DR capsule  albuterol (PROAIR HFA/PROVENTIL HFA/VENTOLIN HFA) 108 (90 Base) MCG/ACT inhaler  albuterol (PROVENTIL) (2.5 MG/3ML) 0.083% neb  "solution  Blood Pressure Monitoring (BLOOD PRESSURE KIT) KIT  cefdinir (OMNICEF) 300 MG capsule  cetirizine (ZYRTEC) 10 MG tablet  citalopram (CELEXA) 20 MG tablet  diphenhydrAMINE (BENADRYL) 50 MG capsule  EPINEPHrine (ANY BX GENERIC EQUIV) 0.3 MG/0.3ML injection 2-pack  estradiol (VIVELLE-DOT) 0.05 MG/24HR bi-weekly patch  gabapentin (NEURONTIN) 100 MG capsule  hydrOXYzine HCl (ATARAX) 25 MG tablet  metoprolol succinate ER (TOPROL XL) 50 MG 24 hr tablet  montelukast (SINGULAIR) 10 MG tablet  polyethylene glycol (MIRALAX) 17 GM/Dose powder  prazosin (MINIPRESS) 1 MG capsule  PROMETRIUM 200 MG capsule  QUEtiapine (SEROQUEL) 50 MG tablet  rizatriptan (MAXALT) 10 MG tablet  SYMBICORT 80-4.5 MCG/ACT Inhaler          PHYSICAL EXAM     First Vitals:  Patient Vitals for the past 24 hrs:   BP Temp Temp src Pulse Resp SpO2 Height Weight   02/08/24 1445 (!) 146/88 -- -- 93 -- 97 % -- --   02/08/24 1327 (!) 179/79 98.1  F (36.7  C) Temporal 98 22 99 % 1.651 m (5' 5\") 102.5 kg (226 lb)         PHYSICAL EXAM:   Constitutional: No acute distress.  Neuro: Awake and alert.   Psych: Calm and cooperative.  Eyes: PERRL. EOMI. Conjunctivae clear.    Mouth: Pink and moist.   Cardio: Regular rate. Adequate perfusion to extremities. Regular rhythm. No murmurs.  Pulmonary: Oxygenating well on RA. No labored breathing. CTA b/l.  Abdomen: Epigastric and RUQ abdominal pain with palpation. No Delmar sign. BS present. Soft and non-distended. No rebound, guarding.   Back: Appears to move freely. No pain over the thoracic/lumbar spine. No paraspinal tenderness. No CVA tenderness.   Upper extremities: Moves freely.    Lower extremities: Moves freely. No edema.    Skin: Natural color. Warm, dry, intact.       RESULTS     LAB:  All pertinent labs reviewed and interpreted  Labs Ordered and Resulted from Time of ED Arrival to Time of ED Departure   COMPREHENSIVE METABOLIC PANEL - Abnormal       Result Value    Sodium 141      Potassium 3.7      " Carbon Dioxide (CO2) 25      Anion Gap 9      Urea Nitrogen 7.8      Creatinine 0.81      GFR Estimate 88      Calcium 8.7      Chloride 107      Glucose 116 (*)     Alkaline Phosphatase 98      AST 16      ALT 10      Protein Total 7.0      Albumin 3.9      Bilirubin Total 0.5     ROUTINE UA WITH MICROSCOPIC REFLEX TO CULTURE - Abnormal    Color Urine Yellow      Appearance Urine Turbid (*)     Glucose Urine Negative      Bilirubin Urine Negative      Ketones Urine Negative      Specific Gravity Urine 1.024      Blood Urine 0.1 mg/dL (*)     pH Urine 5.5      Protein Albumin Urine 20 (*)     Urobilinogen Urine <2.0      Nitrite Urine Negative      Leukocyte Esterase Urine Negative      Bacteria Urine Few (*)     Mucus Urine Present (*)     Calcium Oxalate Crystals Urine Few (*)     RBC Urine 5 (*)     WBC Urine 3      Squamous Epithelials Urine 9 (*)     Hyaline Casts Urine 2     LIPASE - Normal    Lipase 20     CBC WITH PLATELETS AND DIFFERENTIAL    WBC Count 7.0      RBC Count 4.87      Hemoglobin 13.7      Hematocrit 39.8      MCV 82      MCH 28.1      MCHC 34.4      RDW 13.3      Platelet Count 262      % Neutrophils 65      % Lymphocytes 30      % Monocytes 4      % Eosinophils 1      % Basophils 0      % Immature Granulocytes 0      NRBCs per 100 WBC 0      Absolute Neutrophils 4.5      Absolute Lymphocytes 2.1      Absolute Monocytes 0.3      Absolute Eosinophils 0.1      Absolute Basophils 0.0      Absolute Immature Granulocytes 0.0      Absolute NRBCs 0.0         RADIOLOGY:  CT Abdomen Pelvis w/o Contrast   Final Result   IMPRESSION:    1.  No evidence of nephroureterolithiasis.   2.  Myomatous uterus.   3.  Additional findings as above.             ECG:    N/A      PROCEDURES     None             History:  Supplemental history from: Documented in chart  External Record(s) reviewed: Documented in chart    Work Up:  Chart documentation includes differentials considered and any EKGs or imaging independently  interpreted by provider, where specified.  In additional to work up documented, I considered the following work up: Documented in chart, if applicable.    External consultation:  Discussion of management with another provider: Documented in chart, if applicable    Complicating factors:  Care impacted by chronic illness: HTN   Care affected by social determinants of health: N/A    Disposition considerations: Discharge. I prescribed additional prescription strength medication(s) as charted. See documentation for any additional details.    FINAL IMPRESSION:    ICD-10-CM    1. Pain of upper abdomen  R10.10 Adult GI  Referral - Consult Only      2. Dark stools  R19.5 Adult GI  Referral - Consult Only            MEDICATIONS GIVEN IN THE EMERGENCY DEPARTMENT:  Medications   sodium chloride 0.9% BOLUS 1,000 mL (0 mLs Intravenous Stopped 2/8/24 1637)   ondansetron (ZOFRAN) injection 4 mg (4 mg Intravenous $Given 2/8/24 1442)   ondansetron (ZOFRAN) injection 4 mg (4 mg Intravenous $Given 2/8/24 1648)         NEW PRESCRIPTIONS STARTED AT TODAY'S ED VISIT:  New Prescriptions    FAMOTIDINE (PEPCID) 20 MG TABLET    Take 1 tablet (20 mg) by mouth 2 times daily    OMEPRAZOLE (PRILOSEC) 20 MG DR CAPSULE    Take 1 capsule (20 mg) by mouth daily Take once daily in the morning 30 minutes prior to food and drink.              Some or all of this documentation has been completed using dictation software and mild grammatical errors may be present. Please contact me with any concerns regarding this.       Cherelle Pedro PA-C  Emergency Medicine   [facility] EMERGENCY ROOM       Cherelle Pedro PA-C  02/08/24 0876

## 2024-02-08 NOTE — ED TRIAGE NOTES
Patient has 5 black stools last night, pain to right lower abdomen x2 days. Pain 8/10 and does not radiate. Denies SOB, dizziness. Denies taking iron supplements or pepto bismol.

## 2024-02-09 ENCOUNTER — DOCUMENTATION ONLY (OUTPATIENT)
Dept: GASTROENTEROLOGY | Facility: CLINIC | Age: 49
End: 2024-02-09
Payer: COMMERCIAL

## 2024-02-09 DIAGNOSIS — G47.00 PERSISTENT INSOMNIA: ICD-10-CM

## 2024-02-09 RX ORDER — HYDROXYZINE HYDROCHLORIDE 25 MG/1
TABLET, FILM COATED ORAL
Qty: 168 TABLET | Refills: 0 | Status: SHIPPED | OUTPATIENT
Start: 2024-02-09 | End: 2024-04-11

## 2024-02-09 NOTE — PROGRESS NOTES
Called [facility] to request that they fax over medical records pertaining to recent referral.      [employer] Representative noted that would a consult from 2018.     Clinic Information:  [facility]  Phone #: [phone] extension [phone] sk

## 2024-02-13 NOTE — PROGRESS NOTES
E--mailed follow-up request after no records received.     Clinic Information:  [facility]  Phone #: [phone] extension [phone]  Fax #: [phone]  E-mail: [email or web address]       SK

## 2024-02-19 DIAGNOSIS — M54.2 CERVICALGIA: Primary | ICD-10-CM

## 2024-02-19 NOTE — CONFIDENTIAL NOTE
Neurosurgery  SPINE PATIENTS - NEW PROTOCOL PREVISIT    RECORDS RECEIVED FROM: Referred by     Izaiah Kapoor MD      REASON FOR VISIT:   M54.12 (ICD-10-CM) - Cervical radiculopathy   M48.02 (ICD-10-CM) - Cervical stenosis of spinal canal      Date of Appt: 2/26/24   NOTES (FOR ALL VISITS) STATUS DETAILS   OFFICE NOTE from referring provider Internal    OFFICE NOTE from other specialist Received 1/23/24 [place]   DISCHARGE SUMMARY from hospital N/A    DISCHARGE REPORT from ER Internal 10/11/23   OPERATIVE REPORT N/A    EMG REPORT N/A    MEDICATION LIST Internal    IMAGING  (FOR ALL VISITS)     MRI (HEAD, NECK, SPINE) Internal Cervical 10/11/23   XRAY (SPINE) *NEUROSURGERY* Need    CT (HEAD, NECK, SPINE) Internal Cervical 5/14/22

## 2024-02-26 ENCOUNTER — OFFICE VISIT (OUTPATIENT)
Dept: NEUROSURGERY | Facility: CLINIC | Age: 49
End: 2024-02-26
Attending: STUDENT IN AN ORGANIZED HEALTH CARE EDUCATION/TRAINING PROGRAM
Payer: COMMERCIAL

## 2024-02-26 ENCOUNTER — PRE VISIT (OUTPATIENT)
Dept: NEUROSURGERY | Facility: CLINIC | Age: 49
End: 2024-02-26

## 2024-02-26 VITALS
DIASTOLIC BLOOD PRESSURE: 84 MMHG | BODY MASS INDEX: 37.11 KG/M2 | SYSTOLIC BLOOD PRESSURE: 181 MMHG | WEIGHT: 223 LBS | HEART RATE: 67 BPM | OXYGEN SATURATION: 98 %

## 2024-02-26 DIAGNOSIS — M54.12 CERVICAL RADICULOPATHY: ICD-10-CM

## 2024-02-26 DIAGNOSIS — M48.02 CERVICAL STENOSIS OF SPINAL CANAL: ICD-10-CM

## 2024-02-26 PROCEDURE — 99205 OFFICE O/P NEW HI 60 MIN: CPT | Performed by: NEUROLOGICAL SURGERY

## 2024-02-26 PROCEDURE — T1013 SIGN LANG/ORAL INTERPRETER: HCPCS | Mod: U3

## 2024-02-26 RX ORDER — ACETAMINOPHEN 325 MG/1
325-650 TABLET ORAL EVERY 6 HOURS PRN
COMMUNITY

## 2024-02-26 ASSESSMENT — PAIN SCALES - GENERAL: PAINLEVEL: EXTREME PAIN (8)
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
## 2024-03-06 ENCOUNTER — OFFICE VISIT (OUTPATIENT)
Dept: INTERNAL MEDICINE | Facility: CLINIC | Age: 49
End: 2024-03-06
Payer: COMMERCIAL

## 2024-03-06 ENCOUNTER — TELEPHONE (OUTPATIENT)
Dept: INTERNAL MEDICINE | Facility: CLINIC | Age: 49
End: 2024-03-06

## 2024-03-06 VITALS
HEART RATE: 81 BPM | RESPIRATION RATE: 20 BRPM | OXYGEN SATURATION: 98 % | BODY MASS INDEX: 37.99 KG/M2 | HEIGHT: 65 IN | SYSTOLIC BLOOD PRESSURE: 150 MMHG | TEMPERATURE: 97.9 F | DIASTOLIC BLOOD PRESSURE: 90 MMHG | WEIGHT: 228 LBS

## 2024-03-06 DIAGNOSIS — R10.9 LEFT FLANK PAIN: ICD-10-CM

## 2024-03-06 DIAGNOSIS — R73.03 PREDIABETES: ICD-10-CM

## 2024-03-06 DIAGNOSIS — K76.0 FATTY LIVER: ICD-10-CM

## 2024-03-06 DIAGNOSIS — J45.998 ASTHMA, PERSISTENT CONTROLLED: ICD-10-CM

## 2024-03-06 DIAGNOSIS — I10 PRIMARY HYPERTENSION: ICD-10-CM

## 2024-03-06 DIAGNOSIS — B35.3 TINEA PEDIS OF BOTH FEET: ICD-10-CM

## 2024-03-06 LAB
ALBUMIN UR-MCNC: ABNORMAL MG/DL
APPEARANCE UR: CLEAR
BACTERIA #/AREA URNS HPF: ABNORMAL /HPF
BASOPHILS # BLD AUTO: 0 10E3/UL (ref 0–0.2)
BASOPHILS NFR BLD AUTO: 0 %
BILIRUB UR QL STRIP: NEGATIVE
CAOX CRY #/AREA URNS HPF: ABNORMAL /HPF
COLOR UR AUTO: YELLOW
EOSINOPHIL # BLD AUTO: 0.1 10E3/UL (ref 0–0.7)
EOSINOPHIL NFR BLD AUTO: 1 %
ERYTHROCYTE [DISTWIDTH] IN BLOOD BY AUTOMATED COUNT: 13.4 % (ref 10–15)
GLUCOSE UR STRIP-MCNC: NEGATIVE MG/DL
HBA1C MFR BLD: 6.3 % (ref 0–5.6)
HCT VFR BLD AUTO: 39.3 % (ref 35–47)
HGB BLD-MCNC: 13.3 G/DL (ref 11.7–15.7)
HGB UR QL STRIP: ABNORMAL
IMM GRANULOCYTES # BLD: 0 10E3/UL
IMM GRANULOCYTES NFR BLD: 0 %
KETONES UR STRIP-MCNC: NEGATIVE MG/DL
LEUKOCYTE ESTERASE UR QL STRIP: NEGATIVE
LYMPHOCYTES # BLD AUTO: 2.1 10E3/UL (ref 0.8–5.3)
LYMPHOCYTES NFR BLD AUTO: 31 %
MCH RBC QN AUTO: 28.3 PG (ref 26.5–33)
MCHC RBC AUTO-ENTMCNC: 33.8 G/DL (ref 31.5–36.5)
MCV RBC AUTO: 84 FL (ref 78–100)
MONOCYTES # BLD AUTO: 0.3 10E3/UL (ref 0–1.3)
MONOCYTES NFR BLD AUTO: 5 %
MUCOUS THREADS #/AREA URNS LPF: PRESENT /LPF
NEUTROPHILS # BLD AUTO: 4.4 10E3/UL (ref 1.6–8.3)
NEUTROPHILS NFR BLD AUTO: 63 %
NITRATE UR QL: NEGATIVE
PH UR STRIP: 5.5 [PH] (ref 5–8)
PLATELET # BLD AUTO: 247 10E3/UL (ref 150–450)
RBC # BLD AUTO: 4.7 10E6/UL (ref 3.8–5.2)
RBC #/AREA URNS AUTO: ABNORMAL /HPF
SP GR UR STRIP: >=1.03 (ref 1–1.03)
SQUAMOUS #/AREA URNS AUTO: ABNORMAL /LPF
UROBILINOGEN UR STRIP-ACNC: 0.2 E.U./DL
WBC # BLD AUTO: 7 10E3/UL (ref 4–11)
WBC #/AREA URNS AUTO: ABNORMAL /HPF

## 2024-03-06 PROCEDURE — 91320 SARSCV2 VAC 30MCG TRS-SUC IM: CPT | Performed by: NURSE PRACTITIONER

## 2024-03-06 PROCEDURE — T1013 SIGN LANG/ORAL INTERPRETER: HCPCS | Mod: U3

## 2024-03-06 PROCEDURE — 81001 URINALYSIS AUTO W/SCOPE: CPT | Performed by: NURSE PRACTITIONER

## 2024-03-06 PROCEDURE — 85025 COMPLETE CBC W/AUTO DIFF WBC: CPT | Performed by: NURSE PRACTITIONER

## 2024-03-06 PROCEDURE — 90480 ADMN SARSCOV2 VAC 1/ONLY CMP: CPT | Performed by: NURSE PRACTITIONER

## 2024-03-06 PROCEDURE — 36415 COLL VENOUS BLD VENIPUNCTURE: CPT | Performed by: NURSE PRACTITIONER

## 2024-03-06 PROCEDURE — 83036 HEMOGLOBIN GLYCOSYLATED A1C: CPT | Performed by: NURSE PRACTITIONER

## 2024-03-06 PROCEDURE — 99214 OFFICE O/P EST MOD 30 MIN: CPT | Performed by: NURSE PRACTITIONER

## 2024-03-06 PROCEDURE — 80053 COMPREHEN METABOLIC PANEL: CPT | Performed by: NURSE PRACTITIONER

## 2024-03-06 RX ORDER — TERBINAFINE HYDROCHLORIDE 250 MG/1
TABLET ORAL
COMMUNITY
Start: 2024-02-29 | End: 2024-03-06

## 2024-03-06 RX ORDER — PRENATAL VIT 91/IRON/FOLIC/DHA 28-975-200
COMBINATION PACKAGE (EA) ORAL 2 TIMES DAILY
Qty: 30 G | Refills: 2 | Status: SHIPPED | OUTPATIENT
Start: 2024-03-06 | End: 2024-08-06

## 2024-03-06 RX ORDER — LOSARTAN POTASSIUM 25 MG/1
25 TABLET ORAL DAILY
Qty: 90 TABLET | Refills: 3 | Status: SHIPPED | OUTPATIENT
Start: 2024-03-06 | End: 2024-05-03

## 2024-03-06 NOTE — TELEPHONE ENCOUNTER
Labs results still pending. Provider will review once resulted and provide patient with recommendations.

## 2024-03-06 NOTE — PROGRESS NOTES
Assessment & Plan     Prediabetes: A1c today was 6.3%, continues in the prediabetes range, continue with diet and exercise.   - Comprehensive metabolic panel (BMP + Alb, Alk Phos, ALT, AST, Total. Bili, TP)  - Hemoglobin A1c    Fatty liver: Will check labs today.       Tinea pedis of both feet: stop oral terbinafine, will start topical.   - terbinafine (LAMISIL) 1 % external cream  Dispense: 30 g; Refill: 2    Left flank pain: Left flank pain on palpation, will check labs today, and a urine.   - CBC with platelets and differential  - Comprehensive metabolic panel (BMP + Alb, Alk Phos, ALT, AST, Total. Bili, TP)  - UA Macroscopic with reflex to Microscopic and Culture - Lab Collect    Primary hypertension: Blood pressure at home, and today in clinic is mildly elevated at 150/90; will start on Losartan. She will check home blood pressures, and follow up with her new primary care in 6 weeks.   - losartan (COZAAR) 25 MG tablet  Dispense: 90 tablet; Refill: 3    Asthma, persistent controlled:     Rosemarie Farmer is a 48 year old, presenting for the following health issues:  Follow Up (Diabetes and asthma, cramping spasms left kidney) and Arthritis        3/6/2024    11:00 AM   Additional Questions   Roomed by Lakshmi CAMRAENA     History of Present Illness       Reason for visit:  Blood lab and vaccine follow upShe consumes 2 sweetened beverage(s) daily. She exercises with enough effort to increase her heart rate 4 days per week.   She is taking medications regularly.     The patient presents today for diabetes and blood pressure follow up.    She reports that her home blood pressures have been running high, around 157-150/90. Will start her on low dose losartan.    She is due for an A1c recheck as well.    Discussed immunizations today, will give her a COVID booster, she will start the Shingrix vaccine at her local pharmacy.    She reports that she started on Terbinafine orally from her podiatrist, and it is making her not  "feel well, has been making her have some left sided flank pain. She only took one dose. Will try treating her with topical terbinafine instead.    Her asthma has been controlled.    She denies other concerns today.    An  was used for the duration of her visit today.         Review of Systems  Constitutional, HEENT, cardiovascular, pulmonary, GI, , musculoskeletal, neuro, skin, endocrine and psych systems are negative, except as otherwise noted.      Objective    BP (!) 150/90 (BP Location: Right arm, Patient Position: Sitting)   Pulse 81   Temp 97.9  F (36.6  C)   Resp 20   Ht 1.651 m (5' 5\")   Wt 103.4 kg (228 lb)   LMP  (LMP Unknown)   SpO2 98%   BMI 37.94 kg/m    Body mass index is 37.94 kg/m .  Physical Exam   GENERAL: alert and no distress  EYES: Eyes grossly normal to inspection  RESP: lungs clear to auscultation - no rales, rhonchi or wheezes  CV: regular rate and rhythm, normal S1 S2, no S3 or S4, no murmur, click or rub, no peripheral edema  MS: Left sided flank pain, on palpation, radiation to front.  SKIN: no suspicious lesions or rashes  NEURO: Normal strength and tone  PSYCH: mentation appears normal, affect normal/bright      Signed Electronically by: Maria Fernanda Schumacher CNP    "

## 2024-03-06 NOTE — PATIENT INSTRUCTIONS
Start the Losartan 25 mg daily.  Check your blood pressures 1 hour after your medications, record readings.    Stop the oral terbinafine medication and start the topical terbinafine twice a day for the next 14 days.    Your labs are processing, I will release results to [facility] once they are back.    Follow-up with your new primary care provider in 6 to 8 weeks for blood pressure recheck.    You received your COVID booster today in office.    Get your shingles or zoster vaccines (Shingrix) at your local pharmacy.  I would wait at least a month before doing this after the COVID shot today.

## 2024-03-06 NOTE — TELEPHONE ENCOUNTER
Test Results    Contacts         Type Contact Phone/Fax    03/06/2024 12:22 PM CST Phone (Incoming) Marleny Viera (Self) [phone] (M)            Who ordered the test:  Dr Schumacher    Type of test: Lab    Date of test:  3/6/24    Where was the test performed:  [facility]    What are your questions/concerns?:  results    Could we send this information to you in [employer] or would you prefer to receive a phone call?:   Patient would prefer a phone call   Okay to leave a detailed message?: Yes at Cell number on file:    Telephone Information:   Mobile [phone]

## 2024-03-07 LAB
ALBUMIN SERPL BCG-MCNC: 4 G/DL (ref 3.5–5.2)
ALP SERPL-CCNC: 90 U/L (ref 40–150)
ALT SERPL W P-5'-P-CCNC: 16 U/L (ref 0–50)
ANION GAP SERPL CALCULATED.3IONS-SCNC: 11 MMOL/L (ref 7–15)
AST SERPL W P-5'-P-CCNC: 15 U/L (ref 0–45)
BILIRUB SERPL-MCNC: 0.5 MG/DL
BUN SERPL-MCNC: 11.2 MG/DL (ref 6–20)
CALCIUM SERPL-MCNC: 8.9 MG/DL (ref 8.6–10)
CHLORIDE SERPL-SCNC: 105 MMOL/L (ref 98–107)
CREAT SERPL-MCNC: 0.86 MG/DL (ref 0.51–0.95)
DEPRECATED HCO3 PLAS-SCNC: 25 MMOL/L (ref 22–29)
EGFRCR SERPLBLD CKD-EPI 2021: 82 ML/MIN/1.73M2
GLUCOSE SERPL-MCNC: 134 MG/DL (ref 70–99)
POTASSIUM SERPL-SCNC: 3.3 MMOL/L (ref 3.4–5.3)
PROT SERPL-MCNC: 6.8 G/DL (ref 6.4–8.3)
SODIUM SERPL-SCNC: 141 MMOL/L (ref 135–145)

## 2024-03-15 ENCOUNTER — HOSPITAL ENCOUNTER (EMERGENCY)
Facility: HOSPITAL | Age: 49
Discharge: HOME | End: 2024-03-15
Attending: EMERGENCY MEDICINE | Admitting: EMERGENCY MEDICINE
Payer: COMMERCIAL

## 2024-03-15 VITALS
WEIGHT: 226 LBS | RESPIRATION RATE: 20 BRPM | OXYGEN SATURATION: 96 % | BODY MASS INDEX: 37.61 KG/M2 | TEMPERATURE: 98 F | DIASTOLIC BLOOD PRESSURE: 86 MMHG | SYSTOLIC BLOOD PRESSURE: 187 MMHG | HEART RATE: 77 BPM

## 2024-03-15 DIAGNOSIS — F32.A DEPRESSION, UNSPECIFIED DEPRESSION TYPE: ICD-10-CM

## 2024-03-15 DIAGNOSIS — R45.851 SUICIDAL THOUGHTS: ICD-10-CM

## 2024-03-15 PROCEDURE — 250N000013 HC RX MED GY IP 250 OP 250 PS 637: Performed by: EMERGENCY MEDICINE

## 2024-03-15 PROCEDURE — 99285 EMERGENCY DEPT VISIT HI MDM: CPT

## 2024-03-15 RX ORDER — PRAZOSIN HYDROCHLORIDE 1 MG/1
1 CAPSULE ORAL ONCE
Status: COMPLETED | OUTPATIENT
Start: 2024-03-15 | End: 2024-03-15

## 2024-03-15 RX ORDER — METOPROLOL TARTRATE 25 MG/1
25 TABLET, FILM COATED ORAL ONCE
Status: COMPLETED | OUTPATIENT
Start: 2024-03-15 | End: 2024-03-15

## 2024-03-15 RX ADMIN — PRAZOSIN HYDROCHLORIDE 1 MG: 1 CAPSULE ORAL at 22:25

## 2024-03-15 RX ADMIN — METOPROLOL TARTRATE 25 MG: 25 TABLET, FILM COATED ORAL at 23:28

## 2024-03-15 ASSESSMENT — COLUMBIA-SUICIDE SEVERITY RATING SCALE - C-SSRS
3. HAVE YOU BEEN THINKING ABOUT HOW YOU MIGHT KILL YOURSELF?: NO
2. HAVE YOU ACTUALLY HAD ANY THOUGHTS OF KILLING YOURSELF IN THE PAST MONTH?: YES
5. HAVE YOU STARTED TO WORK OUT OR WORKED OUT THE DETAILS OF HOW TO KILL YOURSELF? DO YOU INTEND TO CARRY OUT THIS PLAN?: NO
1. IN THE PAST MONTH, HAVE YOU WISHED YOU WERE DEAD OR WISHED YOU COULD GO TO SLEEP AND NOT WAKE UP?: NO
4. HAVE YOU HAD THESE THOUGHTS AND HAD SOME INTENTION OF ACTING ON THEM?: YES
6. HAVE YOU EVER DONE ANYTHING, STARTED TO DO ANYTHING, OR PREPARED TO DO ANYTHING TO END YOUR LIFE?: NO

## 2024-03-15 ASSESSMENT — ACTIVITIES OF DAILY LIVING (ADL)
ADLS_ACUITY_SCORE: 35
ADLS_ACUITY_SCORE: 33
ADLS_ACUITY_SCORE: 35

## 2024-03-15 NOTE — ED TRIAGE NOTES
Pt here and feeling suicidal for 4 weeks.  Grieving for her aunt passing away.  Due to see Psychiatrist on April 12th.  Pt denies a specific plan but verbalizing she needs help with her mental health.

## 2024-03-16 ENCOUNTER — HOSPITAL ENCOUNTER (EMERGENCY)
Facility: CLINIC | Age: 49
Discharge: HOME | End: 2024-03-16
Attending: EMERGENCY MEDICINE | Admitting: EMERGENCY MEDICINE
Payer: COMMERCIAL

## 2024-03-16 VITALS
HEART RATE: 84 BPM | WEIGHT: 226 LBS | BODY MASS INDEX: 37.61 KG/M2 | DIASTOLIC BLOOD PRESSURE: 130 MMHG | TEMPERATURE: 98 F | OXYGEN SATURATION: 98 % | RESPIRATION RATE: 17 BRPM | SYSTOLIC BLOOD PRESSURE: 192 MMHG

## 2024-03-16 DIAGNOSIS — F41.9 ANXIETY: ICD-10-CM

## 2024-03-16 PROCEDURE — 99283 EMERGENCY DEPT VISIT LOW MDM: CPT

## 2024-03-16 RX ORDER — LORAZEPAM 1 MG/1
1 TABLET ORAL ONCE
Status: COMPLETED | OUTPATIENT
Start: 2024-03-16 | End: 2024-03-16

## 2024-03-16 ASSESSMENT — COLUMBIA-SUICIDE SEVERITY RATING SCALE - C-SSRS
6. HAVE YOU EVER DONE ANYTHING, STARTED TO DO ANYTHING, OR PREPARED TO DO ANYTHING TO END YOUR LIFE?: NO
5. HAVE YOU STARTED TO WORK OUT OR WORKED OUT THE DETAILS OF HOW TO KILL YOURSELF? DO YOU INTEND TO CARRY OUT THIS PLAN?: YES
IS THE PATIENT NOT ABLE TO COMPLETE C-SSRS: REFUSES TO ANSWER
3. HAVE YOU BEEN THINKING ABOUT HOW YOU MIGHT KILL YOURSELF?: YES
1. IN THE PAST MONTH, HAVE YOU WISHED YOU WERE DEAD OR WISHED YOU COULD GO TO SLEEP AND NOT WAKE UP?: NO
2. HAVE YOU ACTUALLY HAD ANY THOUGHTS OF KILLING YOURSELF IN THE PAST MONTH?: YES
4. HAVE YOU HAD THESE THOUGHTS AND HAD SOME INTENTION OF ACTING ON THEM?: NO

## 2024-03-16 ASSESSMENT — ACTIVITIES OF DAILY LIVING (ADL)
ADLS_ACUITY_SCORE: 33
ADLS_ACUITY_SCORE: 35

## 2024-03-16 NOTE — ED NOTES
Pt aware of ETA for , pt has no needs at this time.  This nurse currently communicating via written notes.

## 2024-03-16 NOTE — ED PROVIDER NOTES
EMERGENCY DEPARTMENT NOTE     Name: Marleny Viera    Age/Sex: 48 year old female   MRN: [id number]   Evaluation Date & Time:  3/15/2024  6:54 PM    PCP:    Maria Fernanda Schumacher   ED Provider: Miquel Fernandez D.O.       CHIEF COMPLAINT    Suicidal       DIAGNOSIS & DISPOSITION/MEDICAL DECISION MAKING     1. Depression, unspecified depression type    2. Suicidal thoughts        Marleny Viera is a 48 year old year old female with a relevant past history of HTN, anxiety, severe depression, ADHD, MDD, bipolar depression, and alcohol dependence, who presents to this ED via walk-in for evaluation of suicidal ideations.      Medical Decision Making  Patient on exam endorsed predominant symptoms of worsening depression after recent loss of her aunt.  She has been feeling somewhat disorganized after recent travel and time spent in [place].  Patient reports she has had some passive suicidal thoughts without specific plan.  Patient was interviewed by the DEC  and crisis resources were provided.  Patient does have upcoming appointments with her therapist and psychiatrist.  In discussion patient was comfortable with discharge to home with crisis resources.  Patient was concerned about mild elevation in blood pressure and was given PM doses of antihypertensives.  Return criteria discussed and if the patient is feeling overwhelmed or has recurrent suicidal thoughts will return to the emergency department.    Interventions: Prazosin, metoprolol  Discharge Vital Signs:BP (!) 187/86 (BP Location: Left arm)   Pulse 77   Temp 98  F (36.7  C)   Resp 20   Wt 102.5 kg (226 lb)   LMP  (LMP Unknown)   SpO2 96%   BMI 37.61 kg/m       DISPOSITION: Home    Diagnostic studies:  Imaging:  No orders to display      Lab:  Labs Ordered and Resulted from Time of ED Arrival to Time of ED Departure - No data to display            Triage note reviewed:Pt here and feeling suicidal for 4 weeks.  Grieving for her aunt passing away.  Due to  see Psychiatrist on April 12th.  Pt denies a specific plan but verbalizing she needs help with her mental health.            History:  Supplemental history from: Documented in chart  External Record(s) reviewed: [facility] office visit March 6, 2024    Work Up:  Chart documentation includes differential considered and any EKGs or imaging independently interpreted by provider, where specified.  In additional to work up documented, I considered the following work up: NA    External consultation:  Discussion of management with another provider: DEC asessor    Complicating factors:  Care impacted by chronic illness: Hypertension and Mental Health  Care affected by social determinants of health: Access to medical care    Disposition considerations: Discharge. I recommended the patient continue their current prescription strength medication(s): Citalopram, Seroquel. I considered admission, but discharged the patient after share decision making conversation.    At the conclusion of the encounter I discussed the results of all of the tests and the disposition. The questions were answered. The patient or family acknowledged understanding and was agreeable with the care plan.    TOTAL CRITICAL CARE TIME (EXCLUDING PROCEDURES): Not applicable    PROCEDURES:   None    EMERGENCY DEPARTMENT COURSE   7:34 PM I met with the patient to gather history and to perform my initial exam.  We discussed treatment options and the plan for care while in the Emergency Department.    ED INTERVENTIONS     Medications   prazosin (MINIPRESS) capsule 1 mg (1 mg Oral $Given 3/15/24 1756)   metoprolol tartrate (LOPRESSOR) tablet 25 mg (25 mg Oral $Given 3/15/24 7438)       DISCHARGE MEDICATIONS        Review of your medicines        UNREVIEWED medicines. Ask your doctor about these medicines        Dose / Directions   acetaminophen 325 MG tablet  Commonly known as: TYLENOL      Dose: 325-650 mg  Take 325-650 mg by mouth every 6 hours as needed for  mild pain  Refills: 0     * albuterol 108 (90 Base) MCG/ACT inhaler  Commonly known as: PROAIR HFA/PROVENTIL HFA/VENTOLIN HFA  Used for: Moderate persistent asthma without complication      INHALE 2 PUFFS BY MOUTH EVERY FOUR HOURS AS NEEDED FOR WHEEZING OR SHORTNESS OF BREATH  Quantity: 8.5 g  Refills: 1     * albuterol (2.5 MG/3ML) 0.083% neb solution  Commonly known as: PROVENTIL  Used for: Asthma, moderate persistent, uncomplicated      USE 1 VIAL NEBULIZER FOUR TIMES PER WEEK  Quantity: 180 mL  Refills: 11     cetirizine 10 MG tablet  Commonly known as: zyrTEC  Used for: Seasonal allergic rhinitis, unspecified trigger      Dose: 10 mg  TAKE 1 TABLET (10 MG) BY MOUTH DAILY  Quantity: 30 tablet  Refills: 8     citalopram 20 MG tablet  Commonly known as: celeXA      Refills: 0     diphenhydrAMINE 50 MG capsule  Commonly known as: BENADRYL      Dose: 50 mg  Take 50 mg by mouth  Refills: 0     EPINEPHrine 0.3 MG/0.3ML injection 2-pack  Commonly known as: ANY BX GENERIC EQUIV  Used for: Allergic reaction, subsequent encounter      Dose: 0.3 mg  INJECT 0.3 MLS (0.3 MG) INTO THE MUSCLE AS NEEDED FOR ANAPHYLAXIS  Quantity: 2 each  Refills: 1     estradiol 0.05 MG/24HR bi-weekly patch  Commonly known as: VIVELLE-DOT      1 application to skin Transdermal Two times a Week for 90 days  Refills: 0     famotidine 20 MG tablet  Commonly known as: PEPCID      Dose: 20 mg  Take 1 tablet (20 mg) by mouth 2 times daily  Quantity: 30 tablet  Refills: 0     gabapentin 100 MG capsule  Commonly known as: NEURONTIN  Used for: History of headache      TAKE 1 CAPSULE BY MOUTH TWICE A DAY FOR MIGRAINES AND ANXIETY  Quantity: 180 capsule  Refills: 3     hydrOXYzine HCl 25 MG tablet  Commonly known as: ATARAX  Used for: Persistent insomnia      TAKE 3 TABLETS BY MOUTH THREE TIMES A DAY AS NEEDED FOR ANXIETY OR INSOMNIA .  Quantity: 168 tablet  Refills: 0     losartan 25 MG tablet  Commonly known as: COZAAR  Used for: Primary hypertension       Dose: 25 mg  Take 1 tablet (25 mg) by mouth daily  Quantity: 90 tablet  Refills: 3     metoprolol succinate ER 50 MG 24 hr tablet  Commonly known as: TOPROL XL  Used for: Essential hypertension      Dose: 50 mg  Take 1 tablet (50 mg) by mouth daily  Quantity: 90 tablet  Refills: 3     montelukast 10 MG tablet  Commonly known as: SINGULAIR  Used for: Asthma, moderate persistent, uncomplicated      Dose: 10 mg  Take 1 tablet (10 mg) by mouth At Bedtime  Quantity: 90 tablet  Refills: 3     NONFORMULARY      Nerve vitamin  Refills: 0     omeprazole 20 MG DR capsule  Commonly known as: PriLOSEC      Dose: 20 mg  Take 1 capsule (20 mg) by mouth daily Take once daily in the morning 30 minutes prior to food and drink.  Quantity: 30 capsule  Refills: 0     polyethylene glycol 17 GM/Dose powder  Commonly known as: MIRALAX  Used for: Acute right-sided low back pain without sciatica      Dose: 1 capful.  Take 17 g (1 capful) by mouth daily  Quantity: 527 g  Refills: 0     prazosin 1 MG capsule  Commonly known as: MINIPRESS      Refills: 0     Prometrium 200 MG capsule  Generic drug: progesterone      1 cap Orally nightly for 90 days  Refills: 0     QUEtiapine 50 MG tablet  Commonly known as: SEROquel      Refills: 0     rizatriptan 10 MG tablet  Commonly known as: MAXALT  Used for: Migraine      Dose: 10 mg  Take 1 tablet (10 mg) by mouth at onset of headache for migraine  Quantity: 18 tablet  Refills: 2     Symbicort 80-4.5 MCG/ACT Inhaler  Used for: Moderate persistent asthma without complication  Generic drug: budesonide-formoterol      Dose: 2 puff  Inhale 2 puffs into the lungs 2 times daily  Quantity: 10.2 g  Refills: 11     terbinafine 1 % external cream  Commonly known as: lamISIL  Used for: Tinea pedis of both feet      Apply topically 2 times daily  Quantity: 30 g  Refills: 2           * This list has 2 medication(s) that are the same as other medications prescribed for you. Read the directions carefully, and ask  your doctor or other care provider to review them with you.                    INFORMATION SOURCE AND LIMITATIONS    History/Exam limitations: N/A  Patient information was obtained from: Patient  Use of : Yes (In person) - Language ASL    HISTORY OF PRESENT ILLNESS   Marleny Viera is a 48 year old year old female with a relevant past history of HTN, anxiety, severe depression, ADHD, MDD, bipolar depression, and alcohol dependence, who presents to this ED via walk-in for evaluation of suicidal ideations.    Patient reports she came to the ER because se researched online that all the [facility] crisis centers are currently full and wanted to get into crisis center. Notes she has been quite depressed and been contemplating committing suicide. Mentions she was in [place] 4 weeks ago because her aunt passed away and she went to clean out her aunt's house and returned to [place] this past Tuesday and reports she hasn't been doing well since. Patient denies being actively suicidal with a specific suicidal plan. Notes it has been hard for her to come to the ER and mentions since she has been out-of-state, she has been more depressed and reports having looser stools. Denies alcohol use or drug use and notes she is currently in AA and has been sober for 12 years. Mentions she lost her temper yesterday and shaved her hair. Notes she hasn't been able to eat regularly.    Patient has a doctors appt. and Psychiatrist appt. (4/12) the second week of April. Denies cold symptoms, viral URI symptoms, sore throat, vomiting, or diarrhea. Denies prior history of in-patient psych hospital stay. No other reported complaints or concerns at this time.      REVIEW OF SYSTEMS:   All other systems reviewed and are negative except as noted above in HPI.    PATIENT HISTORY     Past Medical History:   Diagnosis Date    Accidental overdose, initial encounter 7/7/2022    ADHD (attention deficit hyperactivity disorder)     Alcohol  dependence (H)     Anxiety     Arthritis     Asthma     Bipolar depression (H)     Deafness     Drug abuse, nondependent (H) 10/29/2004    History of blood clots     History of transfusion     Hypertension     Kidney stones 2015    Major depressive disorder, recurrent episode, severe (H) 5/17/2016    Migraine     Obstructive sleep apnea 6/1/2016    Pulmonary emboli (H)     Status post total right knee replacement 1/18/2016     Patient Active Problem List   Diagnosis    Primary hypertension    Insomnia    Asthma, moderate persistent, uncomplicated    Borderline personality disorder (H)    Uterine leiomyoma, unspecified location    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    Didelphic uterus    Personal history of PE (pulmonary embolism)    Migraine    Fatty liver    CMC DJD(carpometacarpal degenerative joint disease), localized primary    Obesity (BMI 35.0-39.9) with comorbidity (H)    Other dysphagia    Bilateral deafness    Lumbar back sprain, initial encounter    Prediabetes    Obstructive sleep apnea syndrome    Arthritis of carpometacarpal (CMC) joint of right thumb    Accidental overdose, initial encounter    Severe depression (H)    Anxiety, generalized    Asthma, persistent controlled    Attention deficit hyperactivity disorder (ADHD)    De Quervain's disease (radial styloid tenosynovitis)    Status post total right knee replacement    Toxic effect of acetaminophen, accidental or unintentional, subsequent encounter    LLQ abdominal pain    Allergic reaction, subsequent encounter     Past Surgical History:   Procedure Laterality Date    BIOPSY BREAST Right     2019... Also had fluid drained from left breast under surgery also in 2019    BREAST SURGERY      MAMMOPLASTY REDUCTION Bilateral 2017    TOTAL HIP ARTHROPLASTY Bilateral     TOTAL KNEE ARTHROPLASTY Right     TUBAL LIGATION      US BREAST CORE BIOPSY RIGHT Right 11/21/2019       Allergies   Allergen Reactions     Acetylcysteine Rash and Other (See Comments)    Amoxicillin Itching and Shortness Of Breath    Bee Pollen Anaphylaxis    Contrast [Iodixanol] Shortness Of Breath and Rash     Pt stated she reacted to the contrast given for her CT in past. She experienced shortness of breath, throat tightening, and rash on chest, and they gave her an injection to counter the symptoms.     Covid-19 (Mrna) Vaccine Shortness Of Breath     Pfizer COVID-19 Vaccine    Hymenoptera Allergenic Extract [Wasp Venom Protein] Anaphylaxis    Iodine Hives and Unknown     Pt stated that she was injected with CT CONTRAST in the past and got hives, SOB, and nausea. Please pre-medicate patient in the future.     Wasp Venom Protein Starter Kit [Wasp Venom Protein] Itching, Shortness Of Breath, Swelling and Difficulty breathing    Adhesive Tape Unknown    Aspirin     Bee Venom Unknown    Food Allergy Formula Unknown     Red meat, chocolate    Geodon [Ziprasidone] Unknown    Ibuprofen Other (See Comments)     Kidney function, Kidney function    Latex Unknown     Added based on information entered during case entry, please review and add reactions, type, and severity as needed    Morphine Unknown    Prochlorperazine Other (See Comments)    Risperdal [Risperidone] Unknown    Risperidone Analogues [Risperidone] Other (See Comments)    Shellfish Allergy Unknown    Theobroma Oil [Theobroma Grandiflorum Seed Butter] Unknown    Trazodone Other (See Comments)     Elevated creatinine    Zoloft [Sertraline] Unknown    Hydrochlorothiazide Rash       OUTPATIENT MEDICATIONS     Discharge Medication List as of 3/15/2024 11:35 PM         Vitals:    03/15/24 1838 03/15/24 2200   BP: (!) 196/90 (!) 187/86   BP Location: Left arm Left arm   Cuff Size: Adult Regular    Pulse: 92 77   Resp: 20    Temp: 98  F (36.7  C)    SpO2: 96%    Weight: 102.5 kg (226 lb)        Physical Exam   Constitutional: Oriented to person, place, and time. Appears well-developed and  well-nourished.   HEENT:    Head: Atraumatic.   Neck: Normal range of motion. Neck supple.   Cardiovascular: Normal rate, regular rhythm and normal heart sounds.    Pulmonary/Chest: Normal effort  and breath sounds normal.   Abdominal: Soft. Bowel sounds are normal.   Musculoskeletal: Normal range of motion.   Neurological: Alert and oriented to person, place, and time. Normal strength. No sensory deficit. No cranial nerve deficit.  Skin: Skin is warm and dry.   Psychiatric: Normal mood and affect. Behavior is normal. Thought content normal.     DIAGNOSTICS    LABORATORY FINDINGS (REVIEWED AND INTERPRETED):  Labs Ordered and Resulted from Time of ED Arrival to Time of ED Departure - No data to display      IMAGING (REVIEWED AND INTERPRETED):  No orders to display             I, Macy Obrien, am serving as a scribe to document services personally performed by Miquel Fernandez D.O., based on my observation and the provider s statements to me.    I, Miquel Fernandez D.O., attest that Macy Obrien is acting in a scribe capacity, has observed my performance of the services and has documented them in accordance with my direction.    Miquel Fernandez D.O.  EMERGENCY MEDICINE   03/15/24  [facility] EMERGENCY DEPARTMENT  [address] [place] [phone]  Dept: [phone]     Miquel Fernandez DO  03/17/24 0225

## 2024-03-16 NOTE — DISCHARGE INSTRUCTIONS
Aftercare Plan  If I am feeling unsafe or I am in a crisis, I will:   Contact my established care providers   Call the National Suicide Prevention Lifeline: 841.332.8153   Go to the nearest emergency room   Call 911     Warning signs that I or other people might notice when a crisis is developing for me:     I am having increasing suicidal thoughts that turn to plans with intent or means  I am having additional urges to self-harm    My emotions are of hopelessness; feeling like there's no way out.  Rage or anger.  Engaging in risky activities without thinking  Withdrawing from family/friends  Dramatic mood swings  Drastic personality changes   Use of alcohol or drugs  Postings on social media  Neglect of personal hygiene or cares      Things I am able to do on my own to cope or help me feel better:    Other things to Try:  Spending quality time with loved ones  Staying hydrated  Eating balanced meals  Going for a walk every day  Take care of daily responsibilities/needs  Focus on positive self-talk vs negative self-talk     Things that I am able to do with others to cope or help me better:   Other things to Try:  Exercise  Music  Deep breathing  Meditations  Journal  Self-regulate  Self check-in  Ask for help     Things I can use or do for distraction:   Other things to Try:  Reach out to/spend time with family, friends  Shower  Exercise  Chores or do a project  Listen to music  Watch movie/TV  Listening to music  Journaling  Reading a book  Meditating  Call a friend     Changes I can make to support my mental health and wellness:    -I will abstain from all mood altering chemicals not currently prescribed to me   -I will attend scheduled mental health therapy and psychiatric appointments and follow all   recommendations  -I will commit to 30 minutes of self care daily - this can be as simple as taking a shower, going for a   walk, cooking a meal, read, writing, etc  -I will practice square breathing when I begin to  "feel anxious - in breath through the nose for the count   of 4 and the first line on the square. Out breath through the mouth for the count of 4 for the second line   of the square. Repeat to complete the square. Repeat the square as many times as needed.  - I will use distraction skills of: going for walks, watching TV, spending time outside, calling a friend or   family member  -Use community resources, including hotline numbers, [facility] crisis and support meetings  -Maintain a daily schedule/routine  -Practice deep breathing skills  -Download a meditation libby and spend 15-20 minutes per day mediating/relaxing. Some apps to   download include: Calm, Headspace and Insight Timer. All 3 of these apps have free version     People in my life that I can ask for help:   Family  Friends      Your [facility] has a mental health crisis team you can call 24/7: [facility] Mobile Crisis  [phone] (adults)  [phone] (children)          Crisis Lines  Crisis Text Line  Text 965807  You will be connected with a trained live crisis counselor to provide support.    kike HernandezA a 370200 o texto a 442-AYUDAME en WhatsApp    The [employer] (LGBTQ Youth Crisis Line)  2.240.315.6889  text START to 589-330      Community [employer]  Fast Tracker  Linking people to mental health and substance use disorder resources  [employer].org     [place] Mental Health Warm Line  Peer to peer support  Monday thru Saturday, 12 pm to 10 pm  733.350.0991 or 0.025.542.9336  Text \"Support\" to 93232    National [employer] on Mental Illness (ULISES)  995.061.7375 or 1.888.ULISES.HELPS      Mental Health Apps  My3  https://mySovTechpp.org/    VirtualHopeBox  https://[employer] [place].org/apps/virtual-hope-box/      Crisis Lines  Crisis Text Line  Text 317575  You will be connected with a trained live crisis counselor to provide support.    gosia Hernandezo  GENNA a 286383 o texto a 442-AYUDAME en WhatsApp    National Hope Line  " "1.800.SUICIDE [9825292]      Community Resources  Fast Tracker  Linking people to mental health and substance use disorder resources  [employer].org     [place] Mental Health Warm Line  Peer to peer support  Monday thru Saturday, 12 pm to 10 pm  470.222.3890 or 4.045.813.6868  Text \"Support\" to 07213    National [place] on Mental Illness (ULISES)  781.585.6348 or 1.888.ULISES.HELPS      Mental Health Apps  My3  https://AppCastpp.org/    VirtualHopeBox  https://[employer]/apps/virtual-hope-box/      Additional Information  Today you were seen by a licensed mental health professional through Triage and Transition services, Behavioral Healthcare Providers ([facility])  for a crisis assessment in the Emergency Department at [facility].  It is recommended that you follow up with your established providers (psychiatrist, mental health therapist, and/or primary care doctor - as relevant) as soon as possible. Coordinators from [facility] will be calling you in the next 24-48 hours to ensure that you have the resources you need.  You can also contact [facility] coordinators directly at [phone]. You may have been scheduled for or offered an appointment with a mental health provider. [facility] maintains an extensive network of licensed behavioral health providers to connect patients with the services they need.  We do not charge providers a fee to participate in our referral network.  We match patients with providers based on a patient's specific needs, insurance coverage, and location.  Our first effort will be to refer you to a provider within your care system, and will utilize providers outside your care system as needed.      [facility]  [address] [place] [address]  [phone]    [facility] has a group that meets Mondays at 10am. Check out their website https://www.griefshare.org/groups/768052  756.680.3137    [facility] also has several listed I would check out " https://namimn.org/the-deaf-and-hard-of-hearing/         Crisis Residences    [facility]  [address]  [place] [address]  Phone: [phone]    Fax: [phone]    [facility]  Phone: [phone]  Fax: [phone]    [facility]  [address] [place] [address]    [facility]  [address] [place] [address]    [facility]  [address] [place] [address]    [facility]  [address] [place] [address]  Phone: [phone]  Fax: [phone]    [facility]  Phone: [phone]  Fax: [phone]

## 2024-03-16 NOTE — ED TRIAGE NOTES
Patient here, video  device not working, pt reports that she is out of al her meds, they are at the pharmacy, but pt pharmacy not open on the weekend, patient reports that they do not wish that they are dead, but have thought about suicide for the past 4 weeks, they report that they have a method, but will only use  to translate it, pt brought to safe room, 1:1 sitter at the bedside, pt nurse given report, patient changed into rust colored scrubs.  Romi Haider RN.......3/16/2024 6:15 PM

## 2024-03-16 NOTE — ED NOTES
"ASL ipad from another floor was obtained, both ipads are in room and are \"on hold, waiting for the next \". Pt changed into behavioral scrubs. Room made safe and 1:1 attendant in room for pts safety.   "

## 2024-03-16 NOTE — ED PROVIDER NOTES
EMERGENCY DEPARTMENT ENCOUNTER      NAME: Marleny Viera  AGE: 48 year old female  YOB: 1975  MRN: [id number]  EVALUATION DATE & TIME: 3/16/2024  6:01 PM    PCP: Maria Fernanda Schuamcher    ED PROVIDER: Latha Liu MD      Chief Complaint   Patient presents with    Medication Refill    Suicidal         FINAL IMPRESSION:  No diagnosis found.      ED COURSE & MEDICAL DECISION MAKING:    Pertinent Labs & Imaging studies reviewed. (See chart for details)  48 year old female presents to the Emergency Department for evaluation of anxiety and depression.  She does not feel safe at home.  She was seen at [facility] yesterday.  She has not been able to take her medications since Thursday.  She is otherwise asymptomatic, no headache, chest pain, shortness of breath, constitutional symptoms.  She is comfortable with waiting for DEC assessment.  She denies overt suicidal plan.  While in the emergency department, she received a call from [facility] that they do have a mental health bed for her.  She states that she is excited to go to this facility and would like to go home and pack.  She feels safe leaving and states that she does not feel suicidal at this time and is now she has a place to go.  They have an  that we will meet her there and 9 PM.  Patient will therefore be discharged so that she can keep her bed at [facility] at 9 PM.  Had initially consulted pharmacy to go over her medications.  Patient declines at this time stating that she will take care of her medications, she is more interested in leaving to be able to make her appointment at [facility] at 9 PM.    At the conclusion of the encounter I discussed the results of all of the tests and the disposition. The questions were answered. The patient or family acknowledged understanding and was agreeable with the care plan.     7:26 PM Patient states [facility] health services called and said the patient has an  admission bed at 9 pm. They will have an  waiting for her at 9 pm. She is eager to go home and pack to go to [facility]. She feels safe leaving as she now has a safe place to go. This is certainly a good plan, I do not want her to lose her bed. We will arrange for her to be discharged to go to [facility].     Medical Decision Making  Obtained supplemental history:Supplemental history obtained?: No  Reviewed external records: External records reviewed?: No  Care impacted by chronic illness:Mental Health  Care significantly affected by social determinants of health:Access to Medical Care  Did you consider but not order tests?: Work up considered but not performed and documented in chart, if applicable  Did you interpret images independently?: Independent interpretation of ECG and images noted in documentation, when applicable.  Consultation discussion with other provider:Did you involve another provider (consultant, , pharmacy, etc.)?: I discussed the care with another health care provider, see documentation for details.  Discharge. No recommendations on prescription strength medication(s). See documentation for any additional details.      MEDICATIONS GIVEN IN THE EMERGENCY:  Medications - No data to display    NEW PRESCRIPTIONS STARTED AT TODAY'S ER VISIT  New Prescriptions    No medications on file          =================================================================    HPI    Patient information was obtained from: Patient    Use of : In person         Marleny Viera is a 48 year old female with a pertinent history of anxiety, bipolar depression, polysubstance abuse, alcohol dependence who presents to this ED for evaluation of anxiety and depression.  She reports that she has been feeling this way for the last 4 weeks.  She was seen in the emergency department on Friday.  She last took her medications on Thursday.  She has not yet taken her medications today.   She reports that she cannot get her mental health medications refilled until Monday.  She has not seen her therapist in quite some time as she missed her last appointment.  She has an appointment scheduled in April.  She states that she is having suicidal thoughts.  She previously overdosed, she reports that she had anaphylactic reaction with the overdose, she is scared about the anaphylactic reaction that occurred with her last overdose and unable to further elaborate.  She denies any homicidal ideation.  She states that she has been feeling more depressed over the last 4 weeks that she lost her aunt.  She does not feel she has any support at home.  She otherwise has no constitutional symptoms, no recent illnesses.      PAST MEDICAL HISTORY:  Past Medical History:   Diagnosis Date    Accidental overdose, initial encounter 7/7/2022    ADHD (attention deficit hyperactivity disorder)     Alcohol dependence (H)     Anxiety     Arthritis     Asthma     Bipolar depression (H)     Deafness     Drug abuse, nondependent (H) 10/29/2004    Overview:  polydrug abuse per psych notes ; Other, mixed, or unspecified nondependent drug abuse, unspecified    History of blood clots     History of transfusion     Hypertension     Kidney stones 2015    Major depressive disorder, recurrent episode, severe (H) 5/17/2016    Overview:  s/p suicide attempt Epic     Migraine     Obstructive sleep apnea 6/1/2016    Pulmonary emboli (H)     Status post total right knee replacement 1/18/2016       PAST SURGICAL HISTORY:  Past Surgical History:   Procedure Laterality Date    BIOPSY BREAST Right     2019... Also had fluid drained from left breast under surgery also in 2019    BREAST SURGERY      MAMMOPLASTY REDUCTION Bilateral 2017    TOTAL HIP ARTHROPLASTY Bilateral     TOTAL KNEE ARTHROPLASTY Right     TUBAL LIGATION      US BREAST CORE BIOPSY RIGHT Right 11/21/2019           CURRENT MEDICATIONS:    acetaminophen (TYLENOL) 325 MG  tablet  albuterol (PROAIR HFA/PROVENTIL HFA/VENTOLIN HFA) 108 (90 Base) MCG/ACT inhaler  albuterol (PROVENTIL) (2.5 MG/3ML) 0.083% neb solution  cetirizine (ZYRTEC) 10 MG tablet  citalopram (CELEXA) 20 MG tablet  diphenhydrAMINE (BENADRYL) 50 MG capsule  EPINEPHrine (ANY BX GENERIC EQUIV) 0.3 MG/0.3ML injection 2-pack  estradiol (VIVELLE-DOT) 0.05 MG/24HR bi-weekly patch  famotidine (PEPCID) 20 MG tablet  gabapentin (NEURONTIN) 100 MG capsule  hydrOXYzine HCl (ATARAX) 25 MG tablet  losartan (COZAAR) 25 MG tablet  metoprolol succinate ER (TOPROL XL) 50 MG 24 hr tablet  montelukast (SINGULAIR) 10 MG tablet  NONFORMULARY  omeprazole (PRILOSEC) 20 MG DR capsule  polyethylene glycol (MIRALAX) 17 GM/Dose powder  prazosin (MINIPRESS) 1 MG capsule  PROMETRIUM 200 MG capsule  QUEtiapine (SEROQUEL) 50 MG tablet  rizatriptan (MAXALT) 10 MG tablet  SYMBICORT 80-4.5 MCG/ACT Inhaler  terbinafine (LAMISIL) 1 % external cream        ALLERGIES:  Allergies   Allergen Reactions    Acetylcysteine Rash and Other (See Comments)    Amoxicillin Itching and Shortness Of Breath    Bee Pollen Anaphylaxis    Contrast [Iodixanol] Shortness Of Breath and Rash     Pt stated she reacted to the contrast given for her CT in past. She experienced shortness of breath, throat tightening, and rash on chest, and they gave her an injection to counter the symptoms.     Covid-19 (Mrna) Vaccine Shortness Of Breath     Pfizer COVID-19 Vaccine    Hymenoptera Allergenic Extract [Wasp Venom Protein] Anaphylaxis    Iodine Hives and Unknown     Pt stated that she was injected with CT CONTRAST in the past and got hives, SOB, and nausea. Please pre-medicate patient in the future.     Wasp Venom Protein Starter Kit [Wasp Venom Protein] Itching, Shortness Of Breath, Swelling and Difficulty breathing    Adhesive Tape Unknown    Aspirin     Bee Venom Unknown    Food Allergy Formula Unknown     Red meat, chocolate    Geodon [Ziprasidone] Unknown    Ibuprofen Other (See  "Comments)     Kidney function, Kidney function    Latex Unknown     Added based on information entered during case entry, please review and add reactions, type, and severity as needed    Morphine Unknown    Prochlorperazine Other (See Comments)    Risperdal [Risperidone] Unknown    Risperidone Analogues [Risperidone] Other (See Comments)    Shellfish Allergy Unknown    Theobroma Oil [Theobroma Grandiflorum Seed Butter] Unknown    Trazodone Other (See Comments)     Elevated creatinine    Zoloft [Sertraline] Unknown    Hydrochlorothiazide Rash       FAMILY HISTORY:  Family History   Problem Relation Age of Onset    Cancer Mother     Breast Cancer Mother     Cerebrovascular Disease Father        SOCIAL HISTORY:   Social History     Socioeconomic History    Marital status:    Tobacco Use    Smoking status: Never     Passive exposure: Never    Smokeless tobacco: Never   Vaping Use    Vaping Use: Never used   Substance and Sexual Activity    Alcohol use: Yes     Comment: Alcoholic Drinks/day: dependence    Drug use: Not Currently    Sexual activity: Never   Social History Narrative    . No children.   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  Nonsmoker.  No alcohol use.  Tesha Pelaez MD       Social Determinants of Health     Financial Resource Strain: High Risk (1/4/2024)    Financial Resource Strain     Within the past 12 months, have you or your family members you live with been unable to get utilities (heat, electricity) when it was really needed?: Yes   Food Insecurity: High Risk (1/4/2024)    Food Insecurity     Within the past 12 months, did you worry that your food would run out before you got money to buy more?: Yes     Within the past 12 months, did the food you bought just not last and you didn t have money to get more?: Yes   Transportation Needs: High Risk (1/4/2024)    Transportation Needs     Within the past 12 months, has lack of transportation kept " you from medical appointments, getting your medicines, non-medical meetings or appointments, work, or from getting things that you need?: Yes   Interpersonal Safety: Low Risk  (3/6/2024)    Interpersonal Safety     Do you feel physically and emotionally safe where you currently live?: Yes     Within the past 12 months, have you been hit, slapped, kicked or otherwise physically hurt by someone?: No     Within the past 12 months, have you been humiliated or emotionally abused in other ways by your partner or ex-partner?: No   Housing Stability: Low Risk  (1/4/2024)    Housing Stability     Do you have housing? : Yes     Are you worried about losing your housing?: No       VITALS:  BP (!) 194/85   Pulse 81   Temp 98  F (36.7  C) (Oral)   Resp 16   Wt 102.5 kg (226 lb)   LMP  (LMP Unknown)   SpO2 99%   BMI 37.61 kg/m      PHYSICAL EXAM    Constitutional: Well developed, Well nourished, anxious  HENT: Normocephalic, Atraumatic, Bilateral external ears normal, Oropharynx normal, mucous membranes moist, Nose normal.   Neck- Normal range of motion, No tenderness, Supple, No stridor.  Eyes: PERRL, EOMI, Conjunctiva normal, No discharge.   Respiratory: Normal breath sounds, No respiratory distress  Cardiovascular: Normal heart rate, Regular rhythm  GI: Bowel sounds normal, Soft, No tenderness,   Musculoskeletal: No edema. Good range of motion in all major joints. No tenderness to palpation or major deformities noted.   Integument: Warm, Dry, No erythema, No rash  Neurologic: Alert & oriented x 3, Normal motor function, Normal sensory function, No focal deficits noted. Normal gait.   Psychiatric: anxious     LAB:  All pertinent labs reviewed and interpreted.       RADIOLOGY:  Reviewed all pertinent imaging. Please see official radiology report.  No orders to display         Latha Liu MD  Emergency Medicine  [facility] EMERGENCY ROOM  [address]  [address]  [phone]         Latha Liu MD  03/16/24 1931

## 2024-03-16 NOTE — CONSULTS
Diagnostic Evaluation Consultation  Crisis Assessment    Patient Name: Marleny Viear  Age:  48 year old  Legal Sex: female  Gender Identity: female  Pronouns:   Race: White  Ethnicity: Not  or   Language: American Sign Language      Patient was assessed: Virtual: [facility] Crisis Assessment Start Time: 2302 Crisis Assessment Stop Time: 2333  Patient location: [facility] EMERGENCY DEPARTMENT                                 Referral Data and Chief Complaint  Marleny Viera presents to the ED by  self. Patient is presenting to the ED for the following concerns: Recent loss, Depression, Anxiety.   Factors that make the mental health crisis life threatening or complex are:  Assessment met with patient and utilized a . patient reports that she was in [facility] for the past 4 weeks and just returned this week. Her favorite aunt recently passed away and she was cleaning out her aunts house.  Patient is struggling with the loss as the aunt that passed away was a favorite of hers.  Patient states that she has struggled with her mental health off and on for years and has worked with [facility] through the crisis resident program.  She reports they have a desk group and she works with a specific program for the deaf community that she has found very helpful over the years.  Patient reports she has been calling [facility] every day for the past 4 days and they have been full.  Patient is frustrated with calling and was hoping we could provide some support..      Informed Consent and Assessment Methods  Explained the crisis assessment process, including applicable information disclosures and limits to confidentiality, assessed understanding of the process, and obtained consent to proceed with the assessment.  Assessment methods included conducting a formal interview with patient, review of medical records, collaboration with medical staff, and obtaining relevant  collateral information from family and community providers when available.  : done     Patient response to interventions: eager to participate, verbalizes understanding  Coping skills were attempted to reduce the crisis:  Called crisis residence for 3 days     History of the Crisis   Patient has a mental health history she could not quite remember and reports likely depression anxiety.  Patient was hospitalized over 10 years ago in [place] after a suicide attempt of overdosing and reports he experienced was not positive.  Patient states that she is 12 years sober and still attends AA meetings.  Patient currently denies suicidal ideation.  No history of self-harm.  Patient is prescribed medication through a psychiatrist which she takes regularly.  Patient has a meeting with her psychiatrist and therapist in 2 weeks.  Patient has worked with her therapist for several years and has a positive relationship.    Brief Psychosocial History  Family:  , Children    Support System:     Employment Status:  disabled  Source of Income:  disability  Financial Environmental Concerns:  none  Current Hobbies:  interaction with pets, outdoor activities  Barriers in Personal Life:       Significant Clinical History  Current Anxiety Symptoms:  panic attack, excessive worry  Current Depression/Trauma:  avoidance, difficulty concentrating, withdrawl/isolation, crying or feels like crying, sadness  Current Somatic Symptoms:     Current Psychosis/Thought Disturbance:   (Denies)  Current Eating Symptoms:     Chemical Use History:  Alcohol: None  Benzodiazepines: None  Opiates: None  Cocaine: None  Marijuana: None  Other Use: None   Past diagnosis:  Anxiety Disorder, Depression  Family history:  No known history of mental health or chemical health concerns  Past treatment:  Individual therapy  Details of most recent treatment:     Other relevant history:          Collateral Information  Is there collateral information: No      Collateral information name, relationship, phone number:       What happened today:       What is different about patient's functioning:       Concern about alcohol/drug use:      What do you think the patient needs:      Has patient made comments about wanting to kill themselves/others:      If d/c is recommended, can they take part in safety/aftercare planning:       Additional collateral information:        Risk Assessment  [place] Suicide Severity Rating Scale Full Clinical Version:  Suicidal Ideation  Q1 Wish to be Dead (Lifetime): Yes  Q2 Non-Specific Active Suicidal Thoughts (Lifetime): Yes  3. Active Suicidal Ideation with any Methods (Not Plan) Without Intent to Act (Lifetime): Yes  Q5 Active Suicidal Ideation with Specific Plan and Intent (Lifetime): Yes  Q6 Suicide Behavior (Lifetime): yes     Suicidal Behavior (Lifetime)  Actual Attempt (Lifetime): Yes  Total Number of Actual Attempts (Lifetime): 1  Actual Attempt Description (Lifetime): Intentional overdose  Has subject engaged in non-suicidal self-injurious behavior? (Lifetime): No  Interrupted Attempts (Lifetime): No  Aborted or Self-Interrupted Attempt (Lifetime): No  Preparatory Acts or Behavior (Lifetime): No    [place] Suicide Severity Rating Scale Recent:   Suicidal Ideation (Recent)  Q1 Wished to be Dead (Past Month): no  Q2 Suicidal Thoughts (Past Month): yes  Q3 Suicidal Thought Method: no  Q4 Suicidal Intent without Specific Plan: no  Q5 Suicide Intent with Specific Plan: no  Level of Risk per Screen: low risk  Intensity of Ideation (Recent)  Most Severe Ideation Rating (Past 1 Month): 1  Frequency (Past 1 Month): Less than once a week  Duration (Past 1 Month): Fleeting, few seconds or minutes  Controllability (Past 1 Month): Easily able to control thoughts  Deterrents (Past 1 Month): Deterrents definitely stopped you from attempting suicide  Reasons for Ideation (Past 1 Month): Does not apply  Suicidal Behavior (Recent)  Actual Attempt  (Past 3 Months): No  Has subject engaged in non-suicidal self-injurious behavior? (Past 3 Months): No  Interrupted Attempts (Past 3 Months): No  Total Number of Interrupted Attempts (Past 3 Months): 0  Aborted or Self-Interrupted Attempt (Past 3 Months): No  Preparatory Acts or Behavior (Past 3 Months): No    Environmental or Psychosocial Events: loss of a loved one  Protective Factors: Protective Factors: strong bond to family unit, community support, or employment, responsibilities and duties to others, including pets and children, able to access care without barriers, sense of importance of health and wellness, good treatment engagement, supportive ongoing medical and mental health care relationships, help seeking, good impulse control, good problem-solving, coping, and conflict resolution skills    Does the patient have thoughts of harming others? Feels Like Hurting Others: no  Previous Attempt to Hurt Others: no    Is the patient engaging in sexually inappropriate behavior?           Mental Status Exam   Affect: Appropriate  Appearance: Appropriate  Attention Span/Concentration: Attentive  Eye Contact: Engaged    Fund of Knowledge: Appropriate   Language /Speech Content: Fluent  Language /Speech Volume: Normal  Language /Speech Rate/Productions: Articulate  Recent Memory: Intact  Remote Memory: Intact  Mood: Sad  Orientation to Person: Yes   Orientation to Place: Yes  Orientation to Time of Day: Yes  Orientation to Date: Yes     Situation (Do they understand why they are here?): Yes  Psychomotor Behavior: Normal  Thought Content: Clear  Thought Form: Intact     Mini-Cog Assessment  Number of Words Recalled:    Clock-Drawing Test:     Three Item Recall:    Mini-Cog Total Score:       Medication  Psychotropic medications:   Medication Orders - Psychiatric (From admission, onward)      None             Current Care Team  Patient Care Team:  Maria Fernanda Schumacher CNP as PCP - General (Nurse Practitioner -  Gerontology)  Harpreet Lim DPM as Assigned Surgical Provider  Gilda Savage MD as MD (Family Medicine)  Mariana Aguilar AuD as Audiologist (Audiology)  Izaiah Kapoor MD as Assigned Neuroscience Provider  Jeannette Cr PA-C as Assigned PCP    Diagnosis  Patient Active Problem List   Diagnosis Code    Primary hypertension I10    Insomnia G47.00    Asthma, moderate persistent, uncomplicated J45.40    Borderline personality disorder (H) F60.3    Uterine leiomyoma, unspecified location D25.9    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 Z22.321    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    Personal history of PE (pulmonary embolism) Z86.711    Migraine G43.909    Fatty liver K76.0    CMC DJD(carpometacarpal degenerative joint disease), localized primary M19.049    Obesity (BMI 35.0-39.9) with comorbidity (H) E66.01    Other dysphagia R13.19    Bilateral deafness H91.93    Lumbar back sprain, initial encounter S33.5XXA    Prediabetes R73.03    Obstructive sleep apnea syndrome G47.33    Arthritis of carpometacarpal (CMC) joint of right thumb M18.11    Accidental overdose, initial encounter T50.901A    Severe depression (H) F32.2    Anxiety, generalized F41.1    Asthma, persistent controlled J45.998    Attention deficit hyperactivity disorder (ADHD) F90.9    De Quervain's disease (radial styloid tenosynovitis) M65.4    Status post total right knee replacement Z96.651    Toxic effect of acetaminophen, accidental or unintentional, subsequent encounter T39.1X1D    LLQ abdominal pain R10.32    Allergic reaction, subsequent encounter T78.40XD       Primary Problem This Admission  Active Hospital Problems    Anxiety, generalized        Clinical Summary and Substantiation of Recommendations   Patient is denying suicidal ideation.  Patient is reporting significant grief related to the loss of her beloved aunt.  Patient typically utilizes crisis residence when she is struggling with her mental  health.  Patient reports crisis residence has been full and patient was unsure of what to do to seek help.  Patient does have upcoming appointments with her therapist and psychiatrist.  She is medication compliant feels her medications are accurate.  Patient verbalized a desire to discharge.   put resources in patient's AVS for ongoing grief support.                          Patient coping skills attempted to reduce the crisis:  Called crisis residence for 3 days    Disposition  Recommended disposition: Individual Therapy, Medication Management        Reviewed case and recommendations with attending provider. Attending Name: Dr. Fernandez       Attending concurs with disposition: yes       Patient and/or validated legal guardian concurs with disposition:   yes       Final disposition:  discharge    Legal status on admission: Voluntary/Patient has signed consent for treatment    Assessment Details   Total duration spent with the patient: 31 min     CPT code(s) utilized: 34478 - Psychotherapy for Crisis - 60 (30-74*) min    Karina Abarca [facility], Psychotherapist  [facility] - Triage & Transition Services  Callback: [phone]

## 2024-03-17 NOTE — ED NOTES
Security called and pts belonging searched placed in pt belonging bag with pt labels and removed from room. pt wanded by security, RN was present for this. MD verbalized allowing pt to have her phone and  with her in the room. MD okay with bathroom door to be unlocked. MD okay with RN providing the pt with food and something to drink from ER, both provided to pt. 1:1 remains at bedside for safety.

## 2024-03-18 ENCOUNTER — HOSPITAL ENCOUNTER (EMERGENCY)
Facility: HOSPITAL | Age: 49
Discharge: HOME | End: 2024-03-18
Attending: EMERGENCY MEDICINE | Admitting: EMERGENCY MEDICINE
Payer: COMMERCIAL

## 2024-03-18 VITALS
BODY MASS INDEX: 36.65 KG/M2 | OXYGEN SATURATION: 97 % | DIASTOLIC BLOOD PRESSURE: 83 MMHG | HEART RATE: 76 BPM | TEMPERATURE: 98.6 F | SYSTOLIC BLOOD PRESSURE: 192 MMHG | WEIGHT: 220 LBS | RESPIRATION RATE: 20 BRPM | HEIGHT: 65 IN

## 2024-03-18 DIAGNOSIS — Z76.89 FREQUENT PATIENT IN EMERGENCY DEPARTMENT: ICD-10-CM

## 2024-03-18 DIAGNOSIS — Z76.5 MALINGERING: ICD-10-CM

## 2024-03-18 DIAGNOSIS — F60.3 BORDERLINE PERSONALITY DISORDER (H): ICD-10-CM

## 2024-03-18 LAB
ALBUMIN SERPL BCG-MCNC: 4.1 G/DL (ref 3.5–5.2)
ALP SERPL-CCNC: 97 U/L (ref 40–150)
ALT SERPL W P-5'-P-CCNC: 16 U/L (ref 0–50)
AMPHETAMINES UR QL SCN: NORMAL
ANION GAP SERPL CALCULATED.3IONS-SCNC: 11 MMOL/L (ref 7–15)
APAP SERPL-MCNC: <5 UG/ML (ref 10–30)
AST SERPL W P-5'-P-CCNC: 18 U/L (ref 0–45)
BARBITURATES UR QL SCN: NORMAL
BASOPHILS # BLD AUTO: 0 10E3/UL (ref 0–0.2)
BASOPHILS NFR BLD AUTO: 0 %
BENZODIAZ UR QL SCN: NORMAL
BILIRUB DIRECT SERPL-MCNC: <0.2 MG/DL (ref 0–0.3)
BILIRUB SERPL-MCNC: 0.5 MG/DL
BUN SERPL-MCNC: 12.8 MG/DL (ref 6–20)
BZE UR QL SCN: NORMAL
CALCIUM SERPL-MCNC: 8.9 MG/DL (ref 8.6–10)
CANNABINOIDS UR QL SCN: NORMAL
CHLORIDE SERPL-SCNC: 110 MMOL/L (ref 98–107)
CREAT SERPL-MCNC: 0.86 MG/DL (ref 0.51–0.95)
DEPRECATED HCO3 PLAS-SCNC: 24 MMOL/L (ref 22–29)
EGFRCR SERPLBLD CKD-EPI 2021: 82 ML/MIN/1.73M2
EOSINOPHIL # BLD AUTO: 0.1 10E3/UL (ref 0–0.7)
EOSINOPHIL NFR BLD AUTO: 1 %
ERYTHROCYTE [DISTWIDTH] IN BLOOD BY AUTOMATED COUNT: 13.4 % (ref 10–15)
ETHANOL SERPL-MCNC: <0.01 G/DL
FENTANYL UR QL: NORMAL
GLUCOSE SERPL-MCNC: 121 MG/DL (ref 70–99)
HCG SERPL QL: NEGATIVE
HCT VFR BLD AUTO: 40.7 % (ref 35–47)
HGB BLD-MCNC: 13.8 G/DL (ref 11.7–15.7)
IMM GRANULOCYTES # BLD: 0 10E3/UL
IMM GRANULOCYTES NFR BLD: 0 %
INR PPP: 1.04 (ref 0.85–1.15)
LYMPHOCYTES # BLD AUTO: 2.2 10E3/UL (ref 0.8–5.3)
LYMPHOCYTES NFR BLD AUTO: 37 %
MCH RBC QN AUTO: 28 PG (ref 26.5–33)
MCHC RBC AUTO-ENTMCNC: 33.9 G/DL (ref 31.5–36.5)
MCV RBC AUTO: 83 FL (ref 78–100)
MONOCYTES # BLD AUTO: 0.2 10E3/UL (ref 0–1.3)
MONOCYTES NFR BLD AUTO: 3 %
NEUTROPHILS # BLD AUTO: 3.5 10E3/UL (ref 1.6–8.3)
NEUTROPHILS NFR BLD AUTO: 59 %
NRBC # BLD AUTO: 0 10E3/UL
NRBC BLD AUTO-RTO: 0 /100
OPIATES UR QL SCN: NORMAL
PCP QUAL URINE (ROCHE): NORMAL
PLATELET # BLD AUTO: 275 10E3/UL (ref 150–450)
POTASSIUM SERPL-SCNC: 3.1 MMOL/L (ref 3.4–5.3)
PROT SERPL-MCNC: 7.1 G/DL (ref 6.4–8.3)
RBC # BLD AUTO: 4.93 10E6/UL (ref 3.8–5.2)
SALICYLATES SERPL-MCNC: <0.3 MG/DL
SODIUM SERPL-SCNC: 145 MMOL/L (ref 135–145)
WBC # BLD AUTO: 6 10E3/UL (ref 4–11)

## 2024-03-18 PROCEDURE — 80179 DRUG ASSAY SALICYLATE: CPT | Performed by: EMERGENCY MEDICINE

## 2024-03-18 PROCEDURE — 93005 ELECTROCARDIOGRAM TRACING: CPT | Performed by: EMERGENCY MEDICINE

## 2024-03-18 PROCEDURE — 82248 BILIRUBIN DIRECT: CPT | Performed by: EMERGENCY MEDICINE

## 2024-03-18 PROCEDURE — 80307 DRUG TEST PRSMV CHEM ANLYZR: CPT | Performed by: EMERGENCY MEDICINE

## 2024-03-18 PROCEDURE — 85610 PROTHROMBIN TIME: CPT | Performed by: EMERGENCY MEDICINE

## 2024-03-18 PROCEDURE — 99284 EMERGENCY DEPT VISIT MOD MDM: CPT

## 2024-03-18 PROCEDURE — 82077 ASSAY SPEC XCP UR&BREATH IA: CPT | Performed by: EMERGENCY MEDICINE

## 2024-03-18 PROCEDURE — 84703 CHORIONIC GONADOTROPIN ASSAY: CPT | Performed by: EMERGENCY MEDICINE

## 2024-03-18 PROCEDURE — 36415 COLL VENOUS BLD VENIPUNCTURE: CPT | Performed by: EMERGENCY MEDICINE

## 2024-03-18 PROCEDURE — 82374 ASSAY BLOOD CARBON DIOXIDE: CPT | Performed by: EMERGENCY MEDICINE

## 2024-03-18 PROCEDURE — 85025 COMPLETE CBC W/AUTO DIFF WBC: CPT | Performed by: EMERGENCY MEDICINE

## 2024-03-18 PROCEDURE — 80143 DRUG ASSAY ACETAMINOPHEN: CPT | Performed by: EMERGENCY MEDICINE

## 2024-03-18 RX ORDER — HYDROXYZINE HYDROCHLORIDE 25 MG/1
25 TABLET, FILM COATED ORAL EVERY 4 HOURS PRN
Status: DISCONTINUED | OUTPATIENT
Start: 2024-03-18 | End: 2024-03-18 | Stop reason: HOSPADM

## 2024-03-18 RX ORDER — QUETIAPINE FUMARATE 25 MG/1
100 TABLET, FILM COATED ORAL 3 TIMES DAILY PRN
Status: DISCONTINUED | OUTPATIENT
Start: 2024-03-18 | End: 2024-03-18 | Stop reason: HOSPADM

## 2024-03-18 ASSESSMENT — COLUMBIA-SUICIDE SEVERITY RATING SCALE - C-SSRS
3. HAVE YOU BEEN THINKING ABOUT HOW YOU MIGHT KILL YOURSELF?: YES
1. IN THE PAST MONTH, HAVE YOU WISHED YOU WERE DEAD OR WISHED YOU COULD GO TO SLEEP AND NOT WAKE UP?: YES
6. HAVE YOU EVER DONE ANYTHING, STARTED TO DO ANYTHING, OR PREPARED TO DO ANYTHING TO END YOUR LIFE?: YES
2. HAVE YOU ACTUALLY HAD ANY THOUGHTS OF KILLING YOURSELF IN THE PAST MONTH?: YES

## 2024-03-18 ASSESSMENT — ACTIVITIES OF DAILY LIVING (ADL)
ADLS_ACUITY_SCORE: 35

## 2024-03-18 NOTE — ED NOTES
Patient now reporting taking 4-6 extra strength tylenol at 1200 today. Called  to update with correct information for running tylenol and salicylate level.

## 2024-03-18 NOTE — ED PROVIDER NOTES
EMERGENCY DEPARTMENT ENCOUNTER      NAME: Marleny Viera  AGE: 48 year old female  YOB: 1975  MRN: [id number]  EVALUATION DATE & TIME: 3/18/2024  1:44 PM    PCP: Maria Fernanda Schumacher    ED PROVIDER: Flavia Krishna M.D.      Chief Complaint   Patient presents with    Psychiatric Evaluation         FINAL IMPRESSION:  1. Frequent patient in emergency department    2. Malingering    3. Borderline personality disorder (H)          ED COURSE & MEDICAL DECISION MAKING:    ED Course as of 03/18/24 1907   Mon Mar 18, 2024   1347 I went to assess patient, she requests through writing in person , in person  called by bedside MD   1357 I discussed patient HPI through writing, which she is agreeable to. Per patient, ok with plan to dispo per DEC  team, and with reported tylenol ingestion at 6pm yesterday ( 20 hours ago) of 40 tablets of unknown type of OTC tylenol (325mg vs. 500mg tabs / caps unknown) will check tylenol level, apply nomogram and d/w poison control, RN drawing LFTs/INR,  en route to discuss case, salicylate level and EtOH and EKG and tox and chemistry pending   1436 Pt now reports she took 4-6 tylenol around noon today. Labs still pending,  now in person at bedside   1541 Tylenol level undetectable, and after claimed ingestion specifically all at 6pm yesterday and alleged ingestion also today 4-6 pills at noon, therefore reassuringly with nromal LFTs and undetectable tylenol level, any tylenol ingestion is very unlikely and taking 40 tablets incredibly unlikely, no NAC indicated, medi ally cleared. DEC assessment ordered   1559 Pt reassessed, updated we are awaiting DEC ,  at bedside, patient frequently looks away from  to avoid communication but we were able to discuss situation eventually with many attempts to communicate. She does show me a [facility] (nontoxic  "level) and asks why she needs 1:1, I expressed that she does need 1:1 if suicidal ideations pending DEC dispo. She says her  recommends psychiatric admission, but farhatmtely ok with plan to dispo per DEC who can perform a thorough crisis assessment through the ED. Patient does go from insisting that she is suicidal and needs psychiatric admission to also c/o inconvenience and perceived lack of necessity of 1:1 and medical evaluation/assessment process in the ED which is atypical.    1723 Patient making many calls to nursing and staff, contacted her  who offered another dispo per bedside RN, patient again gently reminded DEC will dispo with her given her claimed SI and recent claimed toxic ingestion and frequent ED visits.   1736 Per [facility] assessment expected between 6pm and 7pm   1825 I spoke with Leann from DEC who will evaluate patient now   1840 DEC  attempted to speak with patient and patient refused DEC assessment, but given SI claimed and reported overdose, will need to dispo per DEC. I spoke again with patient who is now open to DEC assessment, updated DEC  that mellisa is now willing to speak with them. She did use  to express shei s not suicidal. When I point to her paper at bedside on which she wrote that she is suicidal, she signs that she is not good with language, using very fluent language and with fluent full sentences on her handwritten note which is very legible. Patient with clear manipulative type expression as she expressed clearly to me in writing and through in person  that she overddosed and has SI, then when DEC assessment insisted upon she says \"why? I am not suicidal - and as you know I didn't even TAKE the tylenol!\" With negative acetaminiophen level and normal INR and LFTs inconsistent wtih bolusing 40 tablets of tylenol, with contributing known borderline PD. DEC assessment now occurring as that is contingency for " "discharge in the setting of claimed OD and additionally with expressed clear suicidal ideations with plan on ED arrival and also with overall concern about her welfare with many recent ED visits. It is notably concerning that patient changes her story when she now has somewhere to go, after being invited ot her 's house for dinner, but I did express to her that I need to ensure she is safe and has the resources available to ensure she is ok going forward, and that is ultimately more important than making dinner arrangements tonight, and DEC assessment can occur now or later, whatever she prefers, but must ultimately occur prior to discharge, she reluctantly agrees to talk with DEC now,  still at bedside.   1904 I spoke with DEC  Leann, patient denies ingestion to DEC, denies overdose, denies SI, no plans to harm self or others, patient does state she is here for \"mental health\", denies risk factors. Patient with likely malingering, poor engagement, denying plans to harm self, ultimately should discharge with known established mental health follow up. Patient discharged after being provided with extensive anticipatory guidance and given return precautions, importance of PMD follow-up emphasized.        Pertinent Labs & Imaging studies reviewed. (See chart for details)    N95 worn  A face shield was worn also  COVID PPE    Medical Decision Making  Obtained supplemental history:Supplemental history obtained?: Documented in chart  Reviewed external records: External records reviewed?: Documented in chart  Care impacted by chronic illness:Mental Health  Care significantly affected by social determinants of health:N/A  Did you consider but not order tests?: Work up considered but not performed and documented in chart, if applicable  Did you interpret images independently?: Independent interpretation of ECG and images noted in documentation, when applicable.  Consultation discussion with other " provider:Did you involve another provider (consultant, MH, pharmacy, etc.)?: I discussed the care with another health care provider, see documentation for details.  Discharge. No recommendations on prescription strength medication(s). I considered admission, but discharged patient after significant clinical improvement.    At the conclusion of the encounter I discussed the results of all of the tests and the disposition. The questions were answered. The patient or family acknowledged understanding and was agreeable with the care plan.     MEDICATIONS GIVEN IN THE EMERGENCY:  Medications   QUEtiapine (SEROquel) tablet 100 mg (has no administration in time range)   hydrOXYzine HCl (ATARAX) tablet 25 mg (has no administration in time range)       NEW PRESCRIPTIONS STARTED AT TODAY'S ER VISIT  New Prescriptions    No medications on file          =================================================================    HPI      Marleny Viera is a 48 year old female with PMHx of extensive MH history with borderline PD and frequent ED visits, prior toxic tylenol ingestion, asthma, deaf and communicates with ASL, depression, anxiety disorder, undomiciled who presents to the ED today via private vehicle with suicidal ideations.    She reports that she was at [facility] overnight. Per chart review, no [facility] ER note seen but she was in the ED 3/16/2024 seeking housing, placed in mental health bed at [facility] and left the ED without SI.    She says that at 6pm yesterday (20 hours ago) she took 40 over-the-counter tylenol pills to kill herself. No other drugs or alcohol, no tylenol before or after that ingestion, no nausea/vomiting, no abdominal cramps, not pregnant, no fever/cough, she then went to [facility] and had blood tests done in the ER and was discharged at 3am to go back to [facility], does not want to be there any longer. She reports continued suicidal ideations.       REVIEW OF  SYSTEMS   All other systems reviewed and are negative except as noted above in HPI.    PAST MEDICAL HISTORY:  Past Medical History:   Diagnosis Date    Accidental overdose, initial encounter 7/7/2022    ADHD (attention deficit hyperactivity disorder)     Alcohol dependence (H)     Anxiety     Arthritis     Asthma     Bipolar depression (H)     Deafness     Drug abuse, nondependent (H) 10/29/2004    Overview:  polydrug abuse per psych notes ; Other, mixed, or unspecified nondependent drug abuse, unspecified    History of blood clots     History of transfusion     Hypertension     Kidney stones 2015    Major depressive disorder, recurrent episode, severe (H) 5/17/2016    Overview:  s/p suicide attempt Epic     Migraine     Obstructive sleep apnea 6/1/2016    Pulmonary emboli (H)     Status post total right knee replacement 1/18/2016       PAST SURGICAL HISTORY:  Past Surgical History:   Procedure Laterality Date    BIOPSY BREAST Right     2019... Also had fluid drained from left breast under surgery also in 2019    BREAST SURGERY      MAMMOPLASTY REDUCTION Bilateral 2017    TOTAL HIP ARTHROPLASTY Bilateral     TOTAL KNEE ARTHROPLASTY Right     TUBAL LIGATION      US BREAST CORE BIOPSY RIGHT Right 11/21/2019       CURRENT MEDICATIONS:    acetaminophen (TYLENOL) 325 MG tablet  albuterol (PROAIR HFA/PROVENTIL HFA/VENTOLIN HFA) 108 (90 Base) MCG/ACT inhaler  albuterol (PROVENTIL) (2.5 MG/3ML) 0.083% neb solution  cetirizine (ZYRTEC) 10 MG tablet  citalopram (CELEXA) 20 MG tablet  diphenhydrAMINE (BENADRYL) 50 MG capsule  EPINEPHrine (ANY BX GENERIC EQUIV) 0.3 MG/0.3ML injection 2-pack  estradiol (VIVELLE-DOT) 0.05 MG/24HR bi-weekly patch  famotidine (PEPCID) 20 MG tablet  gabapentin (NEURONTIN) 100 MG capsule  hydrOXYzine HCl (ATARAX) 25 MG tablet  losartan (COZAAR) 25 MG tablet  metoprolol succinate ER (TOPROL XL) 50 MG 24 hr tablet  montelukast (SINGULAIR) 10 MG tablet  NONFORMULARY  omeprazole (PRILOSEC) 20 MG   capsule  polyethylene glycol (MIRALAX) 17 GM/Dose powder  prazosin (MINIPRESS) 1 MG capsule  PROMETRIUM 200 MG capsule  QUEtiapine (SEROQUEL) 50 MG tablet  rizatriptan (MAXALT) 10 MG tablet  SYMBICORT 80-4.5 MCG/ACT Inhaler  terbinafine (LAMISIL) 1 % external cream        ALLERGIES:  Allergies   Allergen Reactions    Acetylcysteine Rash and Other (See Comments)    Amoxicillin Itching and Shortness Of Breath    Bee Pollen Anaphylaxis    Contrast [Iodixanol] Shortness Of Breath and Rash     Pt stated she reacted to the contrast given for her CT in past. She experienced shortness of breath, throat tightening, and rash on chest, and they gave her an injection to counter the symptoms.     Covid-19 (Mrna) Vaccine Shortness Of Breath     [place] COVID-19 Vaccine    Hymenoptera Allergenic Extract [Wasp Venom Protein] Anaphylaxis    Iodine Hives and Unknown     Pt stated that she was injected with CT CONTRAST in the past and got hives, SOB, and nausea. Please pre-medicate patient in the future.     Wasp Venom Protein Starter Kit [Wasp Venom Protein] Itching, Shortness Of Breath, Swelling and Difficulty breathing    Adhesive Tape Unknown    Aspirin     Bee Venom Unknown    Food Allergy Formula Unknown     Red meat, chocolate    Geodon [Ziprasidone] Unknown    Ibuprofen Other (See Comments)     Kidney function, Kidney function    Latex Unknown     Added based on information entered during case entry, please review and add reactions, type, and severity as needed    Morphine Unknown    Prochlorperazine Other (See Comments)    Risperdal [Risperidone] Unknown    Risperidone Analogues [Risperidone] Other (See Comments)    Shellfish Allergy Unknown    Theobroma Oil [Theobroma Grandiflorum Seed Butter] Unknown    Trazodone Other (See Comments)     Elevated creatinine    Zoloft [Sertraline] Unknown    Hydrochlorothiazide Rash       FAMILY HISTORY:  Family History   Problem Relation Age of Onset    Cancer Mother     Breast Cancer Mother  "    Cerebrovascular Disease Father        SOCIAL HISTORY:   Social History     Socioeconomic History    Marital status:    Tobacco Use    Smoking status: Never     Passive exposure: Never    Smokeless tobacco: Never   Vaping Use    Vaping Use: Never used   Substance and Sexual Activity    Alcohol use: Yes     Comment: Alcoholic Drinks/day: dependence    Drug use: Not Currently    Sexual activity: Never   Social History Narrative    . No children.   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  Nonsmoker.  No alcohol use.  Tesha Pelaez MD       Social Determinants of Health     Financial Resource Strain: High Risk (1/4/2024)    Financial Resource Strain     Within the past 12 months, have you or your family members you live with been unable to get utilities (heat, electricity) when it was really needed?: Yes   Food Insecurity: High Risk (1/4/2024)    Food Insecurity     Within the past 12 months, did you worry that your food would run out before you got money to buy more?: Yes     Within the past 12 months, did the food you bought just not last and you didn t have money to get more?: Yes   Transportation Needs: High Risk (1/4/2024)    Transportation Needs     Within the past 12 months, has lack of transportation kept you from medical appointments, getting your medicines, non-medical meetings or appointments, work, or from getting things that you need?: Yes   Interpersonal Safety: Low Risk  (3/6/2024)    Interpersonal Safety     Do you feel physically and emotionally safe where you currently live?: Yes     Within the past 12 months, have you been hit, slapped, kicked or otherwise physically hurt by someone?: No     Within the past 12 months, have you been humiliated or emotionally abused in other ways by your partner or ex-partner?: No   Housing Stability: Low Risk  (1/4/2024)    Housing Stability     Do you have housing? : Yes     Are you worried about losing your " "housing?: No       VITALS:  Patient Vitals for the past 24 hrs:   BP Temp Pulse Resp SpO2 Height Weight   03/18/24 1725 (!) 180/92 -- 85 20 97 % -- --   03/18/24 1442 (!) 160/81 -- 92 22 99 % -- --   03/18/24 1333 (!) 149/81 98.6  F (37  C) 90 20 100 % 1.651 m (5' 5\") 99.8 kg (220 lb)       PHYSICAL EXAM    GENERAL: Awake, alert.  In no acute distress.   HEENT: Normocephalic, atraumatic.  Pupils equal, round and reactive.  Conjunctiva normal.  EOMI.  NECK: No stridor or apparent deformity.  PULMONARY: Symmetrical breath sounds without distress.  Lungs clear to auscultation bilaterally without wheezes, rhonchi or rales.  CARDIO: Regular rate and rhythm.  No significant murmur, rub or gallop.  Radial pulses strong and symmetrical.  ABDOMINAL: Abdomen soft, non-distended and non-tender to palpation.  No CVAT, no palpable hepatosplenomegaly.  EXTREMITIES: No lower extremity swelling or edema.    NEURO: Alert and oriented to person, place and time.  Cranial nerves grossly intact.  No focal motor deficit.  PSYCH: Atypical affect  SKIN: No rashes      LAB:  All pertinent labs reviewed and interpreted.  Results for orders placed or performed during the hospital encounter of 03/18/24   Basic metabolic panel   Result Value Ref Range    Sodium 145 135 - 145 mmol/L    Potassium 3.1 (L) 3.4 - 5.3 mmol/L    Chloride 110 (H) 98 - 107 mmol/L    Carbon Dioxide (CO2) 24 22 - 29 mmol/L    Anion Gap 11 7 - 15 mmol/L    Urea Nitrogen 12.8 6.0 - 20.0 mg/dL    Creatinine 0.86 0.51 - 0.95 mg/dL    GFR Estimate 82 >60 mL/min/1.73m2    Calcium 8.9 8.6 - 10.0 mg/dL    Glucose 121 (H) 70 - 99 mg/dL   Ethyl Alcohol Level   Result Value Ref Range    Alcohol ethyl <0.01 <=0.01 g/dL   HCG qualitative Blood   Result Value Ref Range    hCG Serum Qualitative Negative Negative   Result Value Ref Range    INR 1.04 0.85 - 1.15   Hepatic function panel   Result Value Ref Range    Protein Total 7.1 6.4 - 8.3 g/dL    Albumin 4.1 3.5 - 5.2 g/dL    Bilirubin " Total 0.5 <=1.2 mg/dL    Alkaline Phosphatase 97 40 - 150 U/L    AST 18 0 - 45 U/L    ALT 16 0 - 50 U/L    Bilirubin Direct <0.20 0.00 - 0.30 mg/dL   Acetaminophen level   Result Value Ref Range    Acetaminophen <5.0 (L) 10.0 - 30.0 ug/mL   Salicylate level   Result Value Ref Range    Salicylate <0.3   mg/dL   CBC with platelets and differential   Result Value Ref Range    WBC Count 6.0 4.0 - 11.0 10e3/uL    RBC Count 4.93 3.80 - 5.20 10e6/uL    Hemoglobin 13.8 11.7 - 15.7 g/dL    Hematocrit 40.7 35.0 - 47.0 %    MCV 83 78 - 100 fL    MCH 28.0 26.5 - 33.0 pg    MCHC 33.9 31.5 - 36.5 g/dL    RDW 13.4 10.0 - 15.0 %    Platelet Count 275 150 - 450 10e3/uL    % Neutrophils 59 %    % Lymphocytes 37 %    % Monocytes 3 %    % Eosinophils 1 %    % Basophils 0 %    % Immature Granulocytes 0 %    NRBCs per 100 WBC 0 <1 /100    Absolute Neutrophils 3.5 1.6 - 8.3 10e3/uL    Absolute Lymphocytes 2.2 0.8 - 5.3 10e3/uL    Absolute Monocytes 0.2 0.0 - 1.3 10e3/uL    Absolute Eosinophils 0.1 0.0 - 0.7 10e3/uL    Absolute Basophils 0.0 0.0 - 0.2 10e3/uL    Absolute Immature Granulocytes 0.0 <=0.4 10e3/uL    Absolute NRBCs 0.0 10e3/uL   Urine Drug Screen Panel   Result Value Ref Range    Amphetamines Urine Screen Negative Screen Negative    Barbituates Urine Screen Negative Screen Negative    Benzodiazepine Urine Screen Negative Screen Negative    Cannabinoids Urine Screen Negative Screen Negative    Cocaine Urine Screen Negative Screen Negative    Fentanyl Qual Urine Screen Negative Screen Negative    Opiates Urine Screen Negative Screen Negative    PCP Urine Screen Negative Screen Negative         EKG:    Reviewed and interpreted as: 1411 normal sinus rhythm with HR 79, no ST changes, T wave inversion isolated to lead III, morphologically similar to prior EKG from 10/11/2023      I have independently reviewed and interpreted the EKG(s) documented above.         Flavia Krishna MD  03/18/24 4111

## 2024-03-18 NOTE — ED PROVIDER NOTES
[facility] EMERGENCY DEPARTMENT   ED Mental Health Observation - Initiation Note    Marleny Viera was placed into observation at 2:06 PM on 3/18/2024 for Mental health crisis.   Patient is expected to be under observation status for a minimum of eight hours.    MD Erendira Martinez Erin E, MD  03/18/24 1784

## 2024-03-18 NOTE — ED NOTES
"Via dry erase board, patient endorses tylenol overdose yesterday evening as a suicide attempt. Was seen and discharged from [facility] but continues to endorse suicidal ideation, \"don't feel safe yet\", and is requesting inpatient treatment.  "

## 2024-03-18 NOTE — ED NOTES
Pt refused assistant from 1:1 and keep wanting to use her call light to call RN. 1:1 stated that if pt need anything to tell 1:1. Pt refused and only want to talk to RN or MD.

## 2024-03-18 NOTE — ED NOTES
Patient does not want to wait for DEC and is requesting discharge from Dr. Krishna. Writer spoke with ED provider who states DEC will be determining disposition. Patient was updated with this information. Writer offered PRN seroquel and hydroxyzine but she refused. She is asking for losartan and metoprolol which she takes at home. Provider notified.

## 2024-03-18 NOTE — ED NOTES
Bed: [id number]  Expected date: 3/18/24  Expected time: 12:44 PM  Means of arrival:   Comments:  PSYCH

## 2024-03-18 NOTE — ED TRIAGE NOTES
Patient using ASL, difficulty using Yas to help with communication (  can not hear writer) patient suicidal, was at [facility], and currently was staying at [facility]. Would like inpatient treatment

## 2024-03-18 NOTE — ED NOTES
Pt want to talk to RN and want to use her call light. She ask for water and was told that the 1:1 can not leave her alone in her room and need to be with her. Pt seem mad and did not like the idea of a 1:1 by her side.1:1 walk with her to the ice machine and give her some ice.

## 2024-03-18 NOTE — ED NOTES
Patient states her aunt  approximately 5 weeks ago and she has been dealing with grief since. She has difficulty falling asleep and staying asleep, has not been eating or drinking well. She endorses ongoing suicidal thoughts with intent to overdose.  She is intermittently tearful during the conversation. She is refusing to change into behavioral health scrubs and wants to wait for DEC assessment and plan.   1:1 attendant remains in room for continuous face-to-face observation.

## 2024-03-18 NOTE — ED NOTES
ASL speaking patient here with Suicidal Ideation per triage RN.  has been requested on-site by ED HUC Michelle FORTE Awaiting ETA. Currently communicating with patient in writing via dry-erase board.  SHANTHI Virk, at bedside for continuous observation.

## 2024-03-18 NOTE — ED NOTES
Pt want to leave and go to a friend house () She felt like she fine now and will like to be discharge after the DEC call.

## 2024-03-18 NOTE — ED NOTES
Patient has used call light several times despite having 1:1 attendant at bedside. Via , patient states she just spoke with her  who invited her to stay at their house for the next week. Patient is asking to be discharged, said she no longer wants to wait for DEC assessment, and would like to stay with her . Pt was reminded of the purposes for the DEC assessment, including high risk for suicide per patient's presenting complaints. She is now dismissive of this.

## 2024-03-18 NOTE — ED NOTES
Pt refused to change into behavioral health scrubs and will not let us take her belongings until she talk to DEC. Pt had a red wallet on the tray table.

## 2024-03-18 NOTE — ED NOTES
Pt now telling 1:1 that she refused to talk to DEC and want to talk to MD about discharge. RN was notified

## 2024-03-19 ENCOUNTER — MEDICAL CORRESPONDENCE (OUTPATIENT)
Dept: HEALTH INFORMATION MANAGEMENT | Facility: CLINIC | Age: 49
End: 2024-03-19
Payer: COMMERCIAL

## 2024-03-19 NOTE — CONSULTS
"Diagnostic Evaluation Consultation  Crisis Assessment    Patient Name: Marleny Viera  Age:  48 year old  Legal Sex: female  Gender Identity: female  Pronouns:   Race: White  Ethnicity: Not  or   Language: American Sign Language      Patient was assessed: Virtual: [facility] Crisis Assessment Start Time: 1854 Crisis Assessment Stop Time: 1904  Patient location: [facility] EMERGENCY DEPARTMENT                                 Referral Data and Chief Complaint  Marleny Viera presents to the ED by  self. Patient is presenting to the ED for the following concerns: Suicidal ideation, Depression.   Factors that make the mental health crisis life threatening or complex are:  Pt presents for suicide ideation and inconsistent claims of suicide attempt. Pt, per provider, calimed she had overdosed on 40 Tylenol medications, which was not confirmed by her labs. Pt reports, \"I never overdosed on Tylenol today. I am not even suicidal. I came here for other issues.\" Pt originally refused to engage in DEC assessment, but decided to comply after provider , nospoke with her. Pt denies SI/SIB/SA/HI and denies plans, means, or intent to harm herself or others. Pt reports that she feels safe to discharge and reports she is planning on staying with her  for the week. Pt expresses much frustration with her ED provider for misunderstanding that, \"I didn't overdose, I just said what my lab was.\" Pt presents as irritable, minimally responsive, and oriented..      Informed Consent and Assessment Methods  Explained the crisis assessment process, including applicable information disclosures and limits to confidentiality, assessed understanding of the process, and obtained consent to proceed with the assessment.  Assessment methods included conducting a formal interview with patient, review of medical records, collaboration with medical staff, and obtaining relevant collateral information from family " and community providers when available.  : done     Patient response to interventions: acceptance expressed, verbalizes understanding  Coping skills were attempted to reduce the crisis:  talked to      History of the Crisis   This is pt 4th ED visit in [facility] many days. Pt was placed in a mental health Bed at [facility] on Saturday. Pt reports history of depression, anxiety, and ADHD. Per chart history, pt has a history of borderline personality disorder. Pt has history of freqent ED visits for suicide ideation, last yesterday, and reports her last inpatient stay was in 2023. Pt denies history of self-harm, homicidal ideation, delusions, and hallucinations.    Brief Psychosocial History  Family:  , Children no  Support System:  Other (specify) (KATIE Jalloh sponsor)  Employment Status:  disabled  Source of Income:  disability  Financial Environmental Concerns:  none  Current Hobbies:  interaction with pets, outdoor activities  Barriers in Personal Life:  mental health concerns    Significant Clinical History  Current Anxiety Symptoms:     Current Depression/Trauma:  impaired decision making, irritable  Current Somatic Symptoms:  anxious  Current Psychosis/Thought Disturbance:  impulsive, displaces blame  Current Eating Symptoms:     Chemical Use History:  Alcohol: None  Benzodiazepines: None  Opiates: None  Cocaine: None  Marijuana: None  Other Use: None   Past diagnosis:  Anxiety Disorder, Depression, ADHD  Family history:  No known history of mental health or chemical health concerns  Past treatment:  Individual therapy, Case management, Psychiatric Medication Management, AA/NA, Inpatient Hospitalization  Details of most recent treatment:  Pt reports she sees a therapist, , and psychiatrist for medication management.  Other relevant history:  Pt reports that she lives in a mobile home alone. Pt denies legal history. Pt denies history of  trauma.       Collateral Information  Is there  collateral information: No (Pt refused consent)     Collateral information name, relationship, phone number:       What happened today:       What is different about patient's functioning:       Concern about alcohol/drug use:      What do you think the patient needs:      Has patient made comments about wanting to kill themselves/others:      If d/c is recommended, can they take part in safety/aftercare planning:       Additional collateral information:        Risk Assessment  [place] Suicide Severity Rating Scale Full Clinical Version:  Suicidal Ideation  Q1 Wish to be Dead (Lifetime): Yes  Q2 Non-Specific Active Suicidal Thoughts (Lifetime): Yes  3. Active Suicidal Ideation with any Methods (Not Plan) Without Intent to Act (Lifetime): Yes  Q4 Active Suicidal Ideation with Some Intent to Act, Without Specific Plan (Lifetime): Yes  Q5 Active Suicidal Ideation with Specific Plan and Intent (Lifetime): Yes  Q6 Suicide Behavior (Lifetime): yes     Suicidal Behavior (Lifetime)  Actual Attempt (Lifetime): Yes  Total Number of Actual Attempts (Lifetime): 1  Actual Attempt Description (Lifetime): overdose  Has subject engaged in non-suicidal self-injurious behavior? (Lifetime): No  Interrupted Attempts (Lifetime): No  Aborted or Self-Interrupted Attempt (Lifetime): No  Preparatory Acts or Behavior (Lifetime): No    [place] Suicide Severity Rating Scale Recent:   Suicidal Ideation (Recent)  Q1 Wished to be Dead (Past Month): yes  Q2 Suicidal Thoughts (Past Month): yes  Q3 Suicidal Thought Method: yes  Q4 Suicidal Intent without Specific Plan: yes  Q5 Suicide Intent with Specific Plan: yes  Within the Past 3 Months?: yes  Level of Risk per Screen: high risk  Intensity of Ideation (Recent)  Most Severe Ideation Rating (Past 1 Month): 2  Frequency (Past 1 Month): Once a week  Duration (Past 1 Month): Less than 1 hour/some of the time  Controllability (Past 1 Month): Easily able to control thoughts  Deterrents (Past 1  Month): Deterrents definitely stopped you from attempting suicide  Reasons for Ideation (Past 1 Month): Mostly to get attention, revenge, or a reaction from others  Suicidal Behavior (Recent)  Actual Attempt (Past 3 Months): Yes  Total Number of Actual Attempts (Past 3 Months): 1  Has subject engaged in non-suicidal self-injurious behavior? (Past 3 Months): No  Interrupted Attempts (Past 3 Months): No  Aborted or Self-Interrupted Attempt (Past 3 Months): No  Preparatory Acts or Behavior (Past 3 Months): No    Environmental or Psychosocial Events: loss of a loved one  Protective Factors: Protective Factors: strong bond to family unit, community support, or employment, responsibilities and duties to others, including pets and children, able to access care without barriers, sense of importance of health and wellness, good treatment engagement, supportive ongoing medical and mental health care relationships, help seeking, good impulse control, good problem-solving, coping, and conflict resolution skills    Does the patient have thoughts of harming others? Feels Like Hurting Others: no  Previous Attempt to Hurt Others: no  Is the patient engaging in sexually inappropriate behavior?: no    Is the patient engaging in sexually inappropriate behavior?  no        Mental Status Exam   Affect: Appropriate  Appearance: Appropriate  Attention Span/Concentration: Attentive  Eye Contact: Variable    Fund of Knowledge: Appropriate   Language /Speech Content: Fluent  Language /Speech Volume: Normal  Language /Speech Rate/Productions: Normal  Recent Memory: Intact  Remote Memory: Intact  Mood: Irritable  Orientation to Person: Yes   Orientation to Place: Yes  Orientation to Time of Day: Yes  Orientation to Date: Yes     Situation (Do they understand why they are here?): Yes  Psychomotor Behavior: Normal  Thought Content: Clear  Thought Form: Goal Directed     Mini-Cog Assessment  Number of Words Recalled:    Clock-Drawing Test:      Three Item Recall:    Mini-Cog Total Score:       Medication  Psychotropic medications:   Medication Orders - Psychiatric (From admission, onward)      None             Current Care Team  Patient Care Team:  Maria Fernanda Schumacher CNP as PCP - General (Nurse Practitioner - Gerontology)  Harpreet Lim DPM as Assigned Surgical Provider  Gilda Savage MD as MD (Family Medicine)  Mariana Aguilar AuD as Audiologist (Audiology)  Jeannette Cr PA-C as Assigned PCP  Augie Lr MD as Assigned Neuroscience Provider    Diagnosis  Patient Active Problem List   Diagnosis Code    Primary hypertension I10    Insomnia G47.00    Asthma, moderate persistent, uncomplicated J45.40    Borderline personality disorder (H) F60.3    Uterine leiomyoma, unspecified location D25.9    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 Z22.321    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    Personal history of PE (pulmonary embolism) Z86.711    Migraine G43.909    Fatty liver K76.0    CMC DJD(carpometacarpal degenerative joint disease), localized primary M19.049    Obesity (BMI 35.0-39.9) with comorbidity (H) E66.01    Other dysphagia R13.19    Bilateral deafness H91.93    Lumbar back sprain, initial encounter S33.5XXA    Prediabetes R73.03    Obstructive sleep apnea syndrome G47.33    Arthritis of carpometacarpal (CMC) joint of right thumb M18.11    Accidental overdose, initial encounter T50.901A    Severe depression (H) F32.2    Anxiety, generalized F41.1    Asthma, persistent controlled J45.998    Attention deficit hyperactivity disorder (ADHD) F90.9    De Quervain's disease (radial styloid tenosynovitis) M65.4    Status post total right knee replacement Z96.651    Toxic effect of acetaminophen, accidental or unintentional, subsequent encounter T39.1X1D    LLQ abdominal pain R10.32    Allergic reaction, subsequent encounter T78.40XD       Primary Problem This Admission  Active Hospital Problems    *Borderline  personality disorder (H)        Clinical Summary and Substantiation of Recommendations   After therapeutic assessment, intervention and aftercare planning by ED care team and [facility] and in consultation with attending provider, the patient's circumstances and mental state were appropriate for outpatient management. It is the recommendation of this clinician that pt discharge with OP MH support. A this time the pt is not presenting as an acute risk to self or others due to the following factors: Pt denies SI/SIB/SA/HI and denies plans, means, or intent to harm herself or others. Pt reports that she never attempted suicide, which was confrimed by pt labs. Pt has a history of ED visits recently for suicide ideation. Pt reports she is staying with her  this week and has providers to support her for mental health appointments.                          Patient coping skills attempted to reduce the crisis:  talked to     Disposition  Recommended disposition: Individual Therapy, Medication Management        Reviewed case and recommendations with attending provider. Attending Name: Dr. Krishna       Attending concurs with disposition: yes       Patient and/or validated legal guardian concurs with disposition:   yes       Final disposition:  discharge    Legal status on admission:      Assessment Details   Total duration spent with the patient: 10 min     CPT code(s) utilized: Non-Billable    BRI Adams, LADC, Psychotherapist  [facility] - Triage & Transition Services  Callback: [phone]

## 2024-03-20 LAB
ATRIAL RATE - MUSE: 79 BPM
DIASTOLIC BLOOD PRESSURE - MUSE: NORMAL MMHG
INTERPRETATION ECG - MUSE: NORMAL
P AXIS - MUSE: 25 DEGREES
PR INTERVAL - MUSE: 160 MS
QRS DURATION - MUSE: 84 MS
QT - MUSE: 412 MS
QTC - MUSE: 472 MS
R AXIS - MUSE: -6 DEGREES
SYSTOLIC BLOOD PRESSURE - MUSE: NORMAL MMHG
T AXIS - MUSE: -13 DEGREES
VENTRICULAR RATE- MUSE: 79 BPM

## 2024-03-21 ENCOUNTER — NURSE TRIAGE (OUTPATIENT)
Dept: NURSING | Facility: CLINIC | Age: 49
End: 2024-03-21
Payer: COMMERCIAL

## 2024-03-21 ENCOUNTER — TELEPHONE (OUTPATIENT)
Dept: INTERNAL MEDICINE | Facility: CLINIC | Age: 49
End: 2024-03-21
Payer: COMMERCIAL

## 2024-03-21 NOTE — TELEPHONE ENCOUNTER
Was just disconnected. Sent message to PCP. Needs some vitamins. Has a nerve in her neck that is bothering her so she takes vitamins D3 and zinc. In a crisis house right now and any medication has to be authorized by MD. MD should call the nursing office:  RENATA Hitchcock, [phone].  Send prescription to  [facility] on [address] [facility].  Dasia Doyle RN  [facility]    Reason for Disposition   Caller wants to use a complementary or alternative medicine    Additional Information   Negative: Intentional drug overdose and suicidal thoughts or ideas   Negative: Drug overdose and triager unable to answer question   Negative: Caller requesting a renewal or refill of a medicine patient is currently taking   Negative: Caller requesting information unrelated to medicine   Negative: Caller requesting information about COVID-19 Vaccine   Negative: Caller requesting information about Emergency Contraception   Negative: Caller requesting information about Combined Birth Control Pills   Negative: Caller requesting information about Progestin Birth Control Pills   Negative: Caller requesting information about Post-Op pain or medicines   Negative: Caller requesting a prescription antibiotic (such as penicillin) for Strep throat and has a positive culture result   Negative: Caller requesting a prescription anti-viral med (such as Tamiflu) and has influenza (flu) symptoms   Negative: Immunization reaction suspected   Negative: Rash while taking a medicine or within 3 days of stopping it   Negative: Asthma and having symptoms of asthma (cough, wheezing, etc.)   Negative: Symptom of illness (e.g., headache, abdominal pain, earache, vomiting) that are more than mild   Negative: Breastfeeding questions about mother's medicines and diet   Negative: MORE THAN A DOUBLE DOSE of a prescription or over-the-counter (OTC) drug   Negative: DOUBLE DOSE (an extra dose or lesser amount) of prescription drug and any symptoms  (e.g., dizziness, nausea, pain, sleepiness)   Negative: DOUBLE DOSE (an extra dose or lesser amount) of over-the-counter (OTC) drug and any symptoms (e.g., dizziness, nausea, pain, sleepiness)   Negative: Took another person's prescription drug   Negative: DOUBLE DOSE (an extra dose or lesser amount) of prescription drug and NO symptoms  (Exception: A double dose of antibiotics.)   Negative: Diabetes drug error or overdose (e.g., took wrong type of insulin or took extra dose)   Negative: Caller has medication question about med NOT prescribed by PCP and triager unable to answer question (e.g., compatibility with other med, storage)   Negative: Prescription not at pharmacy and was prescribed by PCP recently  (Exception: triager has access to EMR and prescription is recorded there. Go to Home Care and confirm for pharmacy.)   Negative: Pharmacy calling with prescription question and triager unable to answer question   Negative: Caller has URGENT medicine question about med that PCP or specialist prescribed and triager unable to answer question   Negative: Caller has NON-URGENT medicine question about med that PCP or specialist prescribed and triager unable to answer question    Protocols used: Medication Question Call-A-[place]

## 2024-03-21 NOTE — TELEPHONE ENCOUNTER
RN took call from Coretta.  Reviewed Med List with Coretta.    Advised needs to schedule an appointment to establish care (as med list is not aligning with patient reported).      Call was disconnected.

## 2024-03-21 NOTE — TELEPHONE ENCOUNTER
Pt calling from a crisis home and is requesting vitamins d3 and zinc. She stated she can't buy them otc because they have strict rules. She is wondering if her pcp can call the crisis home nurse line at [phone]    -Called had dropped when pt was giving the number,  stated she will call back.

## 2024-03-21 NOTE — TELEPHONE ENCOUNTER
Needs to establish care with new provider- can schedule with ozzy if acute needs- not sure what she is asking for-

## 2024-03-21 NOTE — TELEPHONE ENCOUNTER
LVMTCB for Coretta RN- was going to clarify what the questions were and what was needed regarding the medications.     Patient also needs to establish care with a different provider.

## 2024-04-01 ENCOUNTER — HOSPITAL ENCOUNTER (EMERGENCY)
Facility: CLINIC | Age: 49
Discharge: HOME | End: 2024-04-01
Attending: EMERGENCY MEDICINE | Admitting: EMERGENCY MEDICINE
Payer: COMMERCIAL

## 2024-04-01 ENCOUNTER — NURSE TRIAGE (OUTPATIENT)
Dept: NURSING | Facility: CLINIC | Age: 49
End: 2024-04-01
Payer: COMMERCIAL

## 2024-04-01 VITALS
TEMPERATURE: 97.5 F | WEIGHT: 220 LBS | HEART RATE: 66 BPM | BODY MASS INDEX: 36.61 KG/M2 | SYSTOLIC BLOOD PRESSURE: 151 MMHG | RESPIRATION RATE: 18 BRPM | OXYGEN SATURATION: 98 % | DIASTOLIC BLOOD PRESSURE: 80 MMHG

## 2024-04-01 DIAGNOSIS — M54.42 ACUTE BILATERAL LOW BACK PAIN WITH LEFT-SIDED SCIATICA: ICD-10-CM

## 2024-04-01 LAB
ATRIAL RATE - MUSE: 70 BPM
DIASTOLIC BLOOD PRESSURE - MUSE: NORMAL MMHG
INTERPRETATION ECG - MUSE: NORMAL
P AXIS - MUSE: 20 DEGREES
PR INTERVAL - MUSE: 152 MS
QRS DURATION - MUSE: 84 MS
QT - MUSE: 414 MS
QTC - MUSE: 447 MS
R AXIS - MUSE: -4 DEGREES
SYSTOLIC BLOOD PRESSURE - MUSE: NORMAL MMHG
T AXIS - MUSE: -16 DEGREES
VENTRICULAR RATE- MUSE: 70 BPM

## 2024-04-01 PROCEDURE — 250N000012 HC RX MED GY IP 250 OP 636 PS 637: Performed by: EMERGENCY MEDICINE

## 2024-04-01 PROCEDURE — 99284 EMERGENCY DEPT VISIT MOD MDM: CPT | Mod: 25

## 2024-04-01 PROCEDURE — 93005 ELECTROCARDIOGRAM TRACING: CPT | Performed by: EMERGENCY MEDICINE

## 2024-04-01 PROCEDURE — 250N000011 HC RX IP 250 OP 636: Performed by: EMERGENCY MEDICINE

## 2024-04-01 PROCEDURE — 250N000013 HC RX MED GY IP 250 OP 250 PS 637: Performed by: EMERGENCY MEDICINE

## 2024-04-01 PROCEDURE — 96372 THER/PROPH/DIAG INJ SC/IM: CPT | Performed by: EMERGENCY MEDICINE

## 2024-04-01 RX ORDER — METHYLPREDNISOLONE 4 MG
TABLET, DOSE PACK ORAL
Qty: 21 TABLET | Refills: 0 | Status: SHIPPED | OUTPATIENT
Start: 2024-04-01 | End: 2024-04-11

## 2024-04-01 RX ORDER — ACETAMINOPHEN 325 MG/1
975 TABLET ORAL ONCE
Status: COMPLETED | OUTPATIENT
Start: 2024-04-01 | End: 2024-04-01

## 2024-04-01 RX ORDER — LIDOCAINE 4 G/G
1 PATCH TOPICAL ONCE
Status: DISCONTINUED | OUTPATIENT
Start: 2024-04-01 | End: 2024-04-01 | Stop reason: HOSPADM

## 2024-04-01 RX ORDER — OXYCODONE HYDROCHLORIDE 5 MG/1
5 TABLET ORAL EVERY 6 HOURS PRN
Qty: 10 TABLET | Refills: 0 | Status: SHIPPED | OUTPATIENT
Start: 2024-04-01 | End: 2024-04-04

## 2024-04-01 RX ORDER — KETOROLAC TROMETHAMINE 30 MG/ML
30 INJECTION, SOLUTION INTRAMUSCULAR; INTRAVENOUS ONCE
Status: COMPLETED | OUTPATIENT
Start: 2024-04-01 | End: 2024-04-01

## 2024-04-01 RX ORDER — PREDNISONE 20 MG/1
60 TABLET ORAL ONCE
Status: COMPLETED | OUTPATIENT
Start: 2024-04-01 | End: 2024-04-01

## 2024-04-01 RX ADMIN — KETOROLAC TROMETHAMINE 30 MG: 30 INJECTION, SOLUTION INTRAMUSCULAR at 18:59

## 2024-04-01 RX ADMIN — ACETAMINOPHEN 975 MG: 325 TABLET ORAL at 18:55

## 2024-04-01 RX ADMIN — PREDNISONE 60 MG: 20 TABLET ORAL at 18:55

## 2024-04-01 RX ADMIN — LIDOCAINE 1 PATCH: 4 PATCH TOPICAL at 18:59

## 2024-04-01 ASSESSMENT — COLUMBIA-SUICIDE SEVERITY RATING SCALE - C-SSRS
2. HAVE YOU ACTUALLY HAD ANY THOUGHTS OF KILLING YOURSELF IN THE PAST MONTH?: NO
1. IN THE PAST MONTH, HAVE YOU WISHED YOU WERE DEAD OR WISHED YOU COULD GO TO SLEEP AND NOT WAKE UP?: NO
6. HAVE YOU EVER DONE ANYTHING, STARTED TO DO ANYTHING, OR PREPARED TO DO ANYTHING TO END YOUR LIFE?: NO

## 2024-04-01 ASSESSMENT — ACTIVITIES OF DAILY LIVING (ADL)
ADLS_ACUITY_SCORE: 35
ADLS_ACUITY_SCORE: 35

## 2024-04-01 NOTE — DISCHARGE INSTRUCTIONS
As needed for pain control at home, take:  - over-the-counter ibuprofen 800mg by mouth every 8 hours (max dose 2400mg in 24 hours)  - over-the-counter acetaminophen 1g by mouth every 6 hours (max dose 4g in 24 hours)  - prescribed steroids (methylprednisolone) to help reduce any inflammation  - prescribed oxycodone for breakthrough pain  - prescribed tizanidine for muscle spasm  - over-the-counter lidocaine patches to painful area (up to 12 hours on, then 12 hours off)  - over-the-counter Tiger Balm patches to painful area (1-2 per day)  - ice pack to painful area up to 20 minutes every 1 hour   - back exercises 30 minutes daily    The worst thing you can do is lie still in bed and not move. This will result in worsened tightening of the low back muscles and worse pain. You need to move as much as possible to help keep the muscles loose.    Follow up with your Primary Care provider in 2 days for a recheck.    Return to the Emergency Department for any inability to urinate, inability to move your legs, or any other concerns.

## 2024-04-01 NOTE — TELEPHONE ENCOUNTER
"With , for the last 5 days lower back and both legs have started to have pain with walking or sitting, starts in lower back.  Pt did have a fall in March 2024.  Pt states a lot of pain and pt has had more falls.  Pain is constant for the past 5 days, pain \"8\" on 0/10 pain scale.  Pain prevents pt from sleeping.  OTC pain medicine has not helped.  Pt transferred to  to schedule OV for today, if no appt available pt aware of Urgent Care hours/options.  Salud Diego RN  [facility] Nurse [facility]visor    Reason for Disposition   SEVERE back pain (e.g., excruciating, unable to do any normal activities) and not improved after pain medicine and CARE ADVICE    Additional Information   Negative: Passed out (i.e., fainted, collapsed and was not responding)   Negative: Shock suspected (e.g., cold/pale/clammy skin, too weak to stand, low BP, rapid pulse)   Negative: Sounds like a life-threatening emergency to the triager   Negative: Major injury to the back (e.g., MVA, fall > 10 feet or 3 meters, penetrating injury, etc.)   Negative: Pain in the upper back over the ribs (rib cage) that radiates (travels) into the chest   Negative: Pain in the upper back over the ribs (rib cage) and worsened by coughing (or clearly increases with breathing)   Negative: Back pain during pregnancy   Negative: SEVERE back pain of sudden onset and age > 60 years   Negative: SEVERE abdominal pain (e.g., excruciating)   Negative: Abdominal pain and age > 60 years   Negative: Unable to urinate (or only a few drops) and bladder feels very full   Negative: Loss of bladder or bowel control (urine or bowel incontinence; wetting self, leaking stool) of new-onset   Negative: Numbness (loss of sensation) in groin or rectal area   Negative: Pain radiates into groin, scrotum   Negative: Blood in urine (red, pink, or tea-colored)   Negative: Vomiting and pain over lower ribs of back (i.e., flank - kidney area)   Negative: Weakness of a leg " or foot (e.g., unable to bear weight, dragging foot)   Negative: Patient sounds very sick or weak to the triager   Negative: Fever > 100.4 F (38.0 C) and flank pain   Negative: Pain or burning with passing urine (urination)    Protocols used: Back Pain-A-OH

## 2024-04-01 NOTE — ED TRIAGE NOTES
used for triage assessment. Patient presents to the ED with left lower back pain that radiates down left leg and left foot. This pain started on March 27th. She can feel pain that radiates down both legs but mostly on the left side going down to her foot. She feels very weak and has fallen a few times since this has started. EKG done in triage.

## 2024-04-01 NOTE — ED PROVIDER NOTES
EMERGENCY DEPARTMENT ENCOUNTER      NAME: Marleny Viera  AGE: 48 year old female  YOB: 1975  MRN: [id number]  EVALUATION DATE & TIME: No admission date for patient encounter.    PCP: Maria Fernanda Schumacher    ED PROVIDER: Rudy Betts M.D.      Chief Complaint   Patient presents with    Generalized Weakness    Back Pain    Leg Pain    Fall         IMPRESSION  1. Acute bilateral low back pain with left-sided sciatica        PLAN  - NSAIDs, tizanidine, oxycodone, medroldosepak, Lidoderm patches, Tiger Balm patches, ice, back exercises, avoid bedrest  - close PCP & spine clinic follow up  - discharge to home    ED COURSE & MEDICAL DECISION MAKING    ED Course as of 04/02/24 0015   Mon Apr 01, 2024   1900 Patient seen in the waiting room due to boarding crisis.    48yoF with history of deafness (communicates via ASL), obesity, HTN, asthma, borderline personality disorder presenting for evaluation of 5 days of ongoing atraumatic low back pain radiating down left leg with mild tingling to left foot. No fracture. No urinary retention. Notes she has chronic incontinence issues and wears a diaper at baseline; no saddle numbness. Denies history of back surgery. No fall or trauma. Took no meds for pain. Called her clinic and directed to the ED for concerns for sciatica. Notes pain worse with walking; thus making it hard to walk.    Normal vitals on presentation. Calm on exam with clear lungs, normal work of breathing, no abdominal tenderness, diffuse lower lumbar back tenderness (L>R) with mild midline tenderness but no overlying skin changes or palpable abnormality, positive straight leg-raise test on left with pain radiating down to foot, mild subjective tingling to left foot but sensation otherwise intact throughout BLE and 5/5 strength throughout BLE as well with no ankle clonus.    Has low back pain with sciatica. No clinical concern for cauda equina syndrome, spinal epidural abscess, spinal epidural  hematoma. May certainly have herniated disc with impingement; no red flags for emergent neurosurgical emergency now though with no weakness. I offered patient lumbar spine MRI but she declined this here now and would rather try meds and follow up in spine clinic; reasonable to me. Given NSAIDs, steroids, Lidoderm, tizanidine, ice pack, back exercises and short course of oxycodone for home. Patient comfortable with this plan; no further questions at this time.     Patient able to walk on her own after meds; ok for outpatient management. Return precautions and need for PCP follow up discussed and understood. No further questions at the time of discharge.    --------------------------------------------------------------------------------   --------------------------------------------------------------------------------     6:30 PM Due to a shortage of available emergency department rooms, with the patient's permission I met with the patient for the initial interview and physical examination in a triage room. Discussed plan for treatment and workup in the ED.     6:52 PM I discussed the plan for discharge with the patient, and patient is agreeable. We discussed supportive cares at home and reasons for return to the ER including new or worsening symptoms - all questions and concerns addressed. Patient to be discharged by RN.       This patient involved a high degree of complexity in medical decision making, as significant risks were present and assessed. Recent encounters & results in medical record reviewed by me.    All workup (i.e. any EKG/labs/imaging as per charting below) reviewed and independently interpreted by me. See respective sections for details.    Broad differential considered for this patient, including but not limited to:  sciatica, herniated disc, vertebral fracture, spinal epidural abscess, spinal epidural hematoma, acute aortic syndrome, peripheral neuropathy, electrolyte derangement, thyroid  disease      See additional MDM below if interested.      MEDICATIONS GIVEN IN THE EMERGENCY DEPARTMENT  Medications   ketorolac (TORADOL) injection 30 mg (30 mg Intramuscular $Given 4/1/24 1859)   acetaminophen (TYLENOL) tablet 975 mg (975 mg Oral $Given 4/1/24 1855)   tiZANidine (ZANAFLEX) tablet 4 mg (4 mg Oral Not Given 4/1/24 2006)   predniSONE (DELTASONE) tablet 60 mg (60 mg Oral $Given 4/1/24 1855)       NEW PRESCRIPTIONS STARTED AT TODAY'S ER VISIT  Discharge Medication List as of 4/1/2024  8:07 PM        START taking these medications    Details   methylPREDNISolone (MEDROL DOSEPAK) 4 MG tablet therapy pack Follow Package Directions, Disp-21 tablet, R-0, Local Print      oxyCODONE (ROXICODONE) 5 MG tablet Take 1 tablet (5 mg) by mouth every 6 hours as needed, Disp-10 tablet, R-0, Local Print      tiZANidine (ZANAFLEX) 4 MG tablet Take 1 tablet (4 mg) by mouth 3 times daily as needed for muscle spasms, Disp-20 tablet, R-0, Local Print           CONTINUE these medications which have NOT CHANGED    Details   acetaminophen (TYLENOL) 325 MG tablet Take 325-650 mg by mouth every 6 hours as needed for mild pain, Historical      albuterol (PROAIR HFA/PROVENTIL HFA/VENTOLIN HFA) 108 (90 Base) MCG/ACT inhaler INHALE 2 PUFFS BY MOUTH EVERY FOUR HOURS AS NEEDED FOR WHEEZING OR SHORTNESS OF BREATH, Disp-8.5 g, R-1, E-PrescribePharmacy may dispense brand covered by insurance (Proair, or proventil or ventolin or generic albuterol inhaler)      albuterol (PROVENTIL) (2.5 MG/3ML) 0.083% neb solution USE 1 VIAL NEBULIZER FOUR TIMES PER WEEK, Disp-180 mL, R-11, E-Prescribe      cetirizine (ZYRTEC) 10 MG tablet TAKE 1 TABLET (10 MG) BY MOUTH DAILY, Disp-30 tablet, R-8, E-PrescribeMaximum Refills Reached      citalopram (CELEXA) 20 MG tablet Historical      diphenhydrAMINE (BENADRYL) 50 MG capsule Take 50 mg by mouth, Historical      EPINEPHrine (ANY BX GENERIC EQUIV) 0.3 MG/0.3ML injection 2-pack INJECT 0.3 MLS (0.3 MG) INTO  THE MUSCLE AS NEEDED FOR ANAPHYLAXIS, Disp-2 each, R-1, E-PrescribeMaximum Refills Reached      estradiol (VIVELLE-DOT) 0.05 MG/24HR bi-weekly patch 1 application to skin Transdermal Two times a Week for 90 days, Historical      famotidine (PEPCID) 20 MG tablet Take 1 tablet (20 mg) by mouth 2 times daily, Disp-30 tablet, R-0, E-Prescribe      gabapentin (NEURONTIN) 100 MG capsule TAKE 1 CAPSULE BY MOUTH TWICE A DAY FOR MIGRAINES AND ANXIETY, Disp-180 capsule, R-3, E-Prescribe      hydrOXYzine HCl (ATARAX) 25 MG tablet TAKE 3 TABLETS BY MOUTH THREE TIMES A DAY AS NEEDED FOR ANXIETY OR INSOMNIA ., Disp-168 tablet, R-0, E-PrescribeAuthorized Quantity Exceeded      losartan (COZAAR) 25 MG tablet Take 1 tablet (25 mg) by mouth daily, Disp-90 tablet, R-3, E-Prescribe      metoprolol succinate ER (TOPROL XL) 50 MG 24 hr tablet Take 1 tablet (50 mg) by mouth daily, Disp-90 tablet, R-3, E-Prescribe      montelukast (SINGULAIR) 10 MG tablet Take 1 tablet (10 mg) by mouth At Bedtime, Disp-90 tablet, R-3, E-Prescribe      NONFORMULARY Nerve vitamin, Historical      omeprazole (PRILOSEC) 20 MG DR capsule Take 1 capsule (20 mg) by mouth daily Take once daily in the morning 30 minutes prior to food and drink., Disp-30 capsule, R-0, E-Prescribe      polyethylene glycol (MIRALAX) 17 GM/Dose powder Take 17 g (1 capful) by mouth daily, Disp-527 g, R-0, E-Prescribe      prazosin (MINIPRESS) 1 MG capsule Historical      PROMETRIUM 200 MG capsule 1 cap Orally nightly for 90 days, SERENITY, Historical      QUEtiapine (SEROQUEL) 50 MG tablet Historical      rizatriptan (MAXALT) 10 MG tablet Take 1 tablet (10 mg) by mouth at onset of headache for migraine, Disp-18 tablet, R-2, E-Prescribe      SYMBICORT 80-4.5 MCG/ACT Inhaler Inhale 2 puffs into the lungs 2 times daily, Disp-10.2 g, R-11, SERENITY, E-Prescribe      terbinafine (LAMISIL) 1 % external cream Apply topically 2 times dailyDisp-30 g, [id number]                  =================================================================      HPI  Use of : Yes (In person) - DONNA Viera is a 48 year old female with a pertinent history of alcohol use disorder, asthma, PE, hypertension, deafness, anxiety, and borderline personality disorder who presents to this ED via walk in for evaluation of generalized weakness.     Per Chart Review:   4/1/24 The patient called her nurse triage line and reported lower back pain and leg pain for five days. This pain is provoked by walking or sitting. She reported that she fell in March and has been falling more frequently. She tried over the counter medications, which did not provide relief. She was transferred to the  for an office visit, but there were none available today, so she was referred to the ED for evaluation.     The patient reports the onset of lower back pain four days ago (3/28) which radiates into both of her legs. She states that her left leg is much more painful than her right, and she has had difficulty walking due to pain. She has taken tylenol, which did not provide relief. She has fallen six times since the pain began on 3/28. She does not use a walker at home. She notes that her left leg is colder than her right leg, and she endorses numbness in her left foot and lower leg. She denies a history of back surgery. She notes diarrhea recently. The patient denies saddle anesthesia, urinary changes, and any other symptoms or complaints at this time.     --------------- MEDICAL HISTORY ---------------  PAST MEDICAL HISTORY:  Reviewed independently by me.  Past Medical History:   Diagnosis Date    Accidental overdose, initial encounter 7/7/2022    ADHD (attention deficit hyperactivity disorder)     Alcohol dependence (H)     Anxiety     Arthritis     Asthma     Bipolar depression (H)     Deafness     Drug abuse, nondependent (H) 10/29/2004    Overview:  polydrug abuse per psych notes ; Other,  mixed, or unspecified nondependent drug abuse, unspecified    History of blood clots     History of transfusion     Hypertension     Kidney stones 2015    Major depressive disorder, recurrent episode, severe (H) 5/17/2016    Overview:  s/p suicide attempt Epic     Migraine     Obstructive sleep apnea 6/1/2016    Pulmonary emboli (H)     Status post total right knee replacement 1/18/2016     Patient Active Problem List   Diagnosis    Primary hypertension    Insomnia    Asthma, moderate persistent, uncomplicated    Borderline personality disorder (H)    Uterine leiomyoma, unspecified location    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    Didelphic uterus    Personal history of PE (pulmonary embolism)    Migraine    Fatty liver    CMC DJD(carpometacarpal degenerative joint disease), localized primary    Obesity (BMI 35.0-39.9) with comorbidity (H)    Other dysphagia    Bilateral deafness    Lumbar back sprain, initial encounter    Prediabetes    Obstructive sleep apnea syndrome    Arthritis of carpometacarpal (CMC) joint of right thumb    Accidental overdose, initial encounter    Severe depression (H)    Anxiety, generalized    Asthma, persistent controlled    Attention deficit hyperactivity disorder (ADHD)    De Quervain's disease (radial styloid tenosynovitis)    Status post total right knee replacement    Toxic effect of acetaminophen, accidental or unintentional, subsequent encounter    LLQ abdominal pain    Allergic reaction, subsequent encounter       PAST SURGICAL HISTORY:  Reviewed independently by me.  Past Surgical History:   Procedure Laterality Date    BIOPSY BREAST Right     2019... Also had fluid drained from left breast under surgery also in 2019    BREAST SURGERY      MAMMOPLASTY REDUCTION Bilateral 2017    TOTAL HIP ARTHROPLASTY Bilateral     TOTAL KNEE ARTHROPLASTY Right     TUBAL LIGATION      US BREAST CORE BIOPSY RIGHT Right 11/21/2019       CURRENT  MEDICATIONS:    Reviewed independently by me.    Current Facility-Administered Medications:     cyanocobalamin injection 1,000 mcg, 1,000 mcg, Intramuscular, Q30 Days, Maria Fernanda Schumacher CNP    Current Outpatient Medications:     methylPREDNISolone (MEDROL DOSEPAK) 4 MG tablet therapy pack, Follow Package Directions, Disp: 21 tablet, Rfl: 0    oxyCODONE (ROXICODONE) 5 MG tablet, Take 1 tablet (5 mg) by mouth every 6 hours as needed, Disp: 10 tablet, Rfl: 0    tiZANidine (ZANAFLEX) 4 MG tablet, Take 1 tablet (4 mg) by mouth 3 times daily as needed for muscle spasms, Disp: 20 tablet, Rfl: 0    acetaminophen (TYLENOL) 325 MG tablet, Take 325-650 mg by mouth every 6 hours as needed for mild pain, Disp: , Rfl:     albuterol (PROAIR HFA/PROVENTIL HFA/VENTOLIN HFA) 108 (90 Base) MCG/ACT inhaler, INHALE 2 PUFFS BY MOUTH EVERY FOUR HOURS AS NEEDED FOR WHEEZING OR SHORTNESS OF BREATH, Disp: 8.5 g, Rfl: 1    albuterol (PROVENTIL) (2.5 MG/3ML) 0.083% neb solution, USE 1 VIAL NEBULIZER FOUR TIMES PER WEEK, Disp: 180 mL, Rfl: 11    cetirizine (ZYRTEC) 10 MG tablet, TAKE 1 TABLET (10 MG) BY MOUTH DAILY, Disp: 30 tablet, Rfl: 8    citalopram (CELEXA) 20 MG tablet, , Disp: , Rfl:     diphenhydrAMINE (BENADRYL) 50 MG capsule, Take 50 mg by mouth, Disp: , Rfl:     EPINEPHrine (ANY BX GENERIC EQUIV) 0.3 MG/0.3ML injection 2-pack, INJECT 0.3 MLS (0.3 MG) INTO THE MUSCLE AS NEEDED FOR ANAPHYLAXIS, Disp: 2 each, Rfl: 1    estradiol (VIVELLE-DOT) 0.05 MG/24HR bi-weekly patch, 1 application to skin Transdermal Two times a Week for 90 days, Disp: , Rfl:     famotidine (PEPCID) 20 MG tablet, Take 1 tablet (20 mg) by mouth 2 times daily, Disp: 30 tablet, Rfl: 0    gabapentin (NEURONTIN) 100 MG capsule, TAKE 1 CAPSULE BY MOUTH TWICE A DAY FOR MIGRAINES AND ANXIETY, Disp: 180 capsule, Rfl: 3    hydrOXYzine HCl (ATARAX) 25 MG tablet, TAKE 3 TABLETS BY MOUTH THREE TIMES A DAY AS NEEDED FOR ANXIETY OR INSOMNIA ., Disp: 168 tablet, Rfl: 0     losartan (COZAAR) 25 MG tablet, Take 1 tablet (25 mg) by mouth daily, Disp: 90 tablet, Rfl: 3    metoprolol succinate ER (TOPROL XL) 50 MG 24 hr tablet, Take 1 tablet (50 mg) by mouth daily, Disp: 90 tablet, Rfl: 3    montelukast (SINGULAIR) 10 MG tablet, Take 1 tablet (10 mg) by mouth At Bedtime, Disp: 90 tablet, Rfl: 3    NONFORMULARY, Nerve vitamin, Disp: , Rfl:     omeprazole (PRILOSEC) 20 MG DR capsule, Take 1 capsule (20 mg) by mouth daily Take once daily in the morning 30 minutes prior to food and drink., Disp: 30 capsule, Rfl: 0    polyethylene glycol (MIRALAX) 17 GM/Dose powder, Take 17 g (1 capful) by mouth daily, Disp: 527 g, Rfl: 0    prazosin (MINIPRESS) 1 MG capsule, , Disp: , Rfl:     PROMETRIUM 200 MG capsule, 1 cap Orally nightly for 90 days, Disp: , Rfl:     QUEtiapine (SEROQUEL) 50 MG tablet, , Disp: , Rfl:     rizatriptan (MAXALT) 10 MG tablet, Take 1 tablet (10 mg) by mouth at onset of headache for migraine, Disp: 18 tablet, Rfl: 2    SYMBICORT 80-4.5 MCG/ACT Inhaler, Inhale 2 puffs into the lungs 2 times daily, Disp: 10.2 g, Rfl: 11    terbinafine (LAMISIL) 1 % external cream, Apply topically 2 times daily, Disp: 30 g, Rfl: 2    ALLERGIES:  Reviewed independently by me.  Allergies   Allergen Reactions    Acetylcysteine Rash and Other (See Comments)    Amoxicillin Itching and Shortness Of Breath    Bee Pollen Anaphylaxis    Contrast [Iodixanol] Shortness Of Breath and Rash     Pt stated she reacted to the contrast given for her CT in past. She experienced shortness of breath, throat tightening, and rash on chest, and they gave her an injection to counter the symptoms.     Covid-19 (Mrna) Vaccine Shortness Of Breath     Pfizer COVID-19 Vaccine    Hymenoptera Allergenic Extract [Wasp Venom Protein] Anaphylaxis    Iodine Hives and Unknown     Pt stated that she was injected with CT CONTRAST in the past and got hives, SOB, and nausea. Please pre-medicate patient in the future.     Wasp Venom Protein  "Starter Kit [Wasp Venom Protein] Itching, Shortness Of Breath, Swelling and Difficulty breathing    Adhesive Tape Unknown    Aspirin     Bee Venom Unknown    Food Allergy Formula Unknown     Red meat, chocolate    Geodon [Ziprasidone] Unknown    Latex Unknown     Added based on information entered during case entry, please review and add reactions, type, and severity as needed    Morphine Unknown    Prochlorperazine Other (See Comments)    Risperdal [Risperidone] Unknown    Risperidone Analogues [Risperidone] Other (See Comments)    Shellfish Allergy Unknown    Theobroma Oil [Theobroma Grandiflorum Seed Butter] Unknown    Trazodone Other (See Comments)     Elevated creatinine    Zoloft [Sertraline] Unknown    Hydrochlorothiazide Rash       FAMILY HISTORY:  Reviewed independently by me.  Family History   Problem Relation Age of Onset    Cancer Mother     Breast Cancer Mother     Cerebrovascular Disease Father          SOCIAL HISTORY:   Reviewed independently by me.  Social History     Socioeconomic History    Marital status:    Tobacco Use    Smoking status: Never     Passive exposure: Never    Smokeless tobacco: Never   Vaping Use    Vaping Use: Never used   Substance and Sexual Activity    Alcohol use: Yes     Comment: Alcoholic Drinks/day: dependence    Drug use: Not Currently    Sexual activity: Never   Social History Narrative    . No children.   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  Nonsmoker.  No alcohol use.  Tesha Pelaez MD       Social Determinants of Health     Financial Resource Strain: High Risk (1/4/2024)    Financial Resource Strain     Within the past 12 months, have you or your family members you live with been unable to get utilities (heat, electricity) when it was really needed?: Yes   Food Insecurity: High Risk (1/4/2024)    Food Insecurity     Within the past 12 months, did you worry that your food would run out before you got money to " buy more?: Yes     Within the past 12 months, did the food you bought just not last and you didn t have money to get more?: Yes   Transportation Needs: High Risk (1/4/2024)    Transportation Needs     Within the past 12 months, has lack of transportation kept you from medical appointments, getting your medicines, non-medical meetings or appointments, work, or from getting things that you need?: Yes   Interpersonal Safety: Low Risk  (3/6/2024)    Interpersonal Safety     Do you feel physically and emotionally safe where you currently live?: Yes     Within the past 12 months, have you been hit, slapped, kicked or otherwise physically hurt by someone?: No     Within the past 12 months, have you been humiliated or emotionally abused in other ways by your partner or ex-partner?: No   Housing Stability: Low Risk  (1/4/2024)    Housing Stability     Do you have housing? : Yes     Are you worried about losing your housing?: No       --------------- PHYSICAL EXAM ---------------  Nursing notes and vitals independently reviewed by me.  VITALS:  Vitals:    04/01/24 1601 04/01/24 1915   BP: 124/61 (!) 151/80   BP Location: Left arm    Patient Position: Semi-Joseph's    Cuff Size: Adult Regular    Pulse: 79 66   Resp: 25 18   Temp: 97.5  F (36.4  C)    TempSrc: Temporal    SpO2: 97% 98%   Weight: 99.8 kg (220 lb)        PHYSICAL EXAM:    General:  alert, interactive, no distress, baseline deafness  Eyes:  conjunctivae clear, conjugate gaze  HENT:  atraumatic, nose with no rhinorrhea, oropharynx clear  Neck:  no meningismus  Cardiovascular:  HR 80s during exam, regular rhythm, no murmurs, brisk cap refill  Chest:  no chest wall tenderness  Pulmonary:  no stridor, normal phonation, normal work of breathing, clear lungs bilaterally  Abdomen:  soft, nondistended, nontender  :  no CVA tenderness  Back:  Notable lower lumbar back tenderness, left greater than right. Mild tenderness over midline with no overt skin change or palpable  fluctuance. Positive straight leg raise on left leg. 5/5 strength to BLE. Subjective tingling to left foot, sensation otherwise intact throughout BLE. No ankle clonus.   Musculoskeletal:  no pretibial edema, no calf tenderness. Gross ROM intact to joints of extremities with no obvious deformities.  Skin:  warm, dry, no rash  Neuro:  awake, alert, answers questions appropriately via , follows commands, moves all limbs, see above under back exam  Psych:  calm, normal affect            --------------- ADDITIONAL MDM ---------------  History:  - I considered systemic symptoms of the presenting illness.  - Supplemental history from:       -- patient  - External Record(s) reviewed:       -- Inpatient/outpatient record (outside ED visit 3/17/24), prior labs (blood/urine 3/18/24), prior imaging (X-rays 3/18/24)       -- see above ED course & MDM for further details    Workup:  - Chart documentation above includes differential considered and any EKGs or imaging independently interpreted by provider.  - emergent/severe conditions considered and evaluated for: see above differential & MDM  - In additional to work up documented, I considered the following work up:       -- MRI lumbar spine, blood, urine       -- see above ED course & MDM for further details    External Consultation:  - Discussion of management with another provider:       -- ED pharmacist re: meds       -- see above charting for additional    Complicating Factors:  - Care impacted by chronic illness:       -- see above MDM, past medical history, & problem list  - Care affected by social determinants of health:       -- see above social history       -- Access to Affordable Healthcare    Disposition Considerations:  - Discharge       -- I considered escalation of care with admission to the hospital, but ultimately discharged the patient per decision making above       -- I recommended the patient continue their current prescription strength  medication(s) as charted above in current medications list       -- I prescribed prescription strength medication(s) as charted above       -- I recommended over-the-counter medication(s) as charted above & in discharge instructions           I, Naima Kayli, am serving as a scribe to document services personally performed by Dr. Rudy Betts based on my observation and the provider's statements to me. I, Rudy Betts MD attest that Naima Milan is acting in a scribe capacity, has observed my performance of the services and has documented them in accordance with my direction.      Rudy Betts MD  04/01/24  Emergency Medicine  [facility] EMERGENCY ROOM  [address] [place] [id number]  [phone]  Dept: [phone]      Rudy Betts MD  04/02/24 0018

## 2024-04-04 ENCOUNTER — TELEPHONE (OUTPATIENT)
Dept: OTHER | Age: 49
End: 2024-04-04

## 2024-04-04 NOTE — TELEPHONE ENCOUNTER
Called Marleny to discuss her upcoming appointment with Dr. Lr on 04/08/2024. She verbalized willingness to discuss a proposed cervical decompression as the next step of her treatment since she failed conservative measures including PT.  Informed Marleny that there was no clinical concern for cauda equina syndrome, spinal epidural abscess, spinal epidural hematoma during her evaluation at [facility] ED on 4/1/2024 for lumbar radiculopathy on the left . She verbalized understanding and agreement.  Theresa Whitlock RN, CNRN

## 2024-04-04 NOTE — TELEPHONE ENCOUNTER
Appt:  Patient is calling in to schedule an appt with the spine team.   She indicated that she has numbness and tingling in her genital area and her symptoms began 1-2 weeks ago.  Pt descrived her pain has severe in her low back and into her left leg and affecting her ability to walk.      will be provided when you call her, Acute bilateral low back pain with left-sided sciatica, Seen at the [facility] ED by Tatianna Barrett no imaging w/ref     Please contact patient to triage and schedule.  Nothing available in 48 hours with Surgical MELISSA, no MRI.     Could we send this information to you in [employer] or would you prefer to receive a phone call?:   Patient would prefer a phone call   Okay to leave a detailed message?: Yes at Cell number on file:    Telephone Information:   Mobile [phone]

## 2024-04-09 LAB — PHQ9 SCORE: 7

## 2024-04-10 ENCOUNTER — THERAPY VISIT (OUTPATIENT)
Dept: PHYSICAL THERAPY | Facility: REHABILITATION | Age: 49
End: 2024-04-10
Attending: NEUROLOGICAL SURGERY
Payer: COMMERCIAL

## 2024-04-10 DIAGNOSIS — M48.02 CERVICAL STENOSIS OF SPINAL CANAL: ICD-10-CM

## 2024-04-10 DIAGNOSIS — M54.12 CERVICAL RADICULOPATHY: ICD-10-CM

## 2024-04-10 DIAGNOSIS — G47.00 PERSISTENT INSOMNIA: ICD-10-CM

## 2024-04-10 PROCEDURE — 97110 THERAPEUTIC EXERCISES: CPT | Mod: GP

## 2024-04-10 PROCEDURE — 97161 PT EVAL LOW COMPLEX 20 MIN: CPT | Mod: GP

## 2024-04-10 NOTE — PROGRESS NOTES
PHYSICAL THERAPY EVALUATION  Type of Visit: Evaluation    See electronic medical record for Abuse and Falls Screening details.    Subjective       Presenting condition or subjective complaint: Pt presents to  PT for cervical pain R>L.  Pt reports she has had neck pain for the past 2 years. Per pt the MD told her she  has  arthritis. She reports that her neck pain is mostly on the R side. She  notes that driving and looking over her right shoulder while driving  cause an increased amount of pain. Pt reports feeling tight in her R sided neck. Pt reports her pain at worst  is 7/10, 0/10 at best. Pt has used  ice and medication to  help  relieve symptoms which she finds to be helpful. She reports no issues with sleeping.  Date of onset: 02/26/24 (order date)    Relevant medical history: Arthritis; Asthma; Bladder or bowel problems; Depression; Mental Illness; Migraines or headaches; Neck injury; Overweight; Pain at night or rest; Rheumatoid arthritis   Dates & types of surgery: Right knee relp. 2012 and 2013 left hip relp. And 2014 right hip relp.    Prior diagnostic imaging/testing results: MRI     Prior therapy history for the same diagnosis, illness or injury: No      Prior Level of Function  Transfers: Independent, Assistive equipment  Ambulation: Independent, Assistive equipment  ADL: Independent, Assistive equipment  IADL:     Living Environment  Social support: Alone   Type of home: Other   Stairs to enter the home: No       Ramp: Yes   Stairs inside the home: No       Help at home: Meals on wheels/meal service; Emergency call system  Equipment owned: Grab bars; Raised toilet seat; Bath bench     Employment: No    Hobbies/Interests: Riding horse and treving and camping    Patient goals for therapy:      Pain assessment:      Objective   CERVICAL SPINE EVALUATION  PAIN: Pain Quality: tightness  INTEGUMENTARY (edema, incisions):   POSTURE: Sitting Posture: Rounded shoulders, Forward head, Lordosis increased  GAIT:    Weightbearing Status:   Assistive Device(s):   Gait Deviations: Awilda decreased  BALANCE/PROPRIOCEPTION:   WEIGHTBEARING ALIGNMENT:   ROM:   (Degrees) Left AROM Right AROM    Cervical Flexion 4.7 cm tip of nose to sternum    Cervical Extension 20.2 cm tip of nose to sternum    Cervical Side bend 14.7 cm ear lobe to  acromion 12.6 cm ear lobe to  acromion    Cervical Rotation 62 degrees 44 degrees    Cervical Protrusion     Cervical Retraction     Thoracic Flexion     Thoracic Extension     Thoracic Rotation       Left AROM Left PROM Right AROM Right PROM   Shoulder Flexion wnl  ~140 degrees    Shoulder Extension       Shoulder Abduction wnl  ~ 120  degrees    Shoulder Adduction       Shoulder IR       Shoulder ER       Shoulder Horiz Abduction       Shoulder Horiz Adduction       Pain: pain  with R cervical rotation, decreased  shoulder ROM d/t neck pain  End Feel: empty    MYOTOMES:   DTR S:   CORD SIGNS:   DERMATOMES: WNL  NEURAL TENSION:   FLEXIBILITY:    SPECIAL TESTS:    Left Right   Alar Ligament    Cervical Flexion-Rotation     Cervical Rot/Lateral Flex     Compression     Distraction     Spurling s Negative with  ext, positive with R  and L  sidebending    Thoracic Outlet Screen ([place], Adson)     Transverse Ligament     Vertebral Artery     Cotton Roll Test     Craniocervical Flexor Endurance Test     Mannheimer Test            PALPATION: No tenderness to palpation, pt reports significant tightness with palpation to R UT, and scalenes        Assessment & Plan   CLINICAL IMPRESSIONS  Medical Diagnosis: Cervical radiculopathy, Cervical stenosis of spinal canal    Treatment Diagnosis: Right sided cervical pain, decreased ROM, poor posture   Impression/Assessment: Patient is a 48 year old female with bilateral R>L cervical pain complaints.  The following significant findings have been identified: Pain, Decreased ROM/flexibility, Decreased joint mobility, Decreased strength, Impaired muscle performance,  Decreased activity tolerance, and Impaired posture. These impairments interfere with their ability to perform self care tasks, work tasks, recreational activities, household chores, driving , household mobility, and community mobility as compared to previous level of function.     Clinical Decision Making (Complexity):  Clinical Presentation: Stable/Uncomplicated  Clinical Presentation Rationale: based on medical and personal factors listed in PT evaluation  Clinical Decision Making (Complexity): Moderate complexity    PLAN OF CARE  Treatment Interventions:  Modalities: E-stim  Interventions: Manual Therapy, Neuromuscular Re-education, Therapeutic Activity, Therapeutic Exercise, Self-Care/Home Management    Long Term Goals     PT Goal 1  Goal Identifier: HEP  Goal Description: Pt will demonstrate understanding and independece of HEP in 30 days.  Rationale: to maximize safety and independence with performance of ADLs and functional tasks  Goal Progress: initial  Target Date: 05/10/24  PT Goal 2  Goal Identifier: ROM  Goal Description: Pt will improve right cervical AROM to equal left  side by  the end of therapy in order  to drive with increasd ease.  Goal Progress: initial  Target Date: 07/09/24  PT Goal 3  Goal Identifier: NDI  Goal Description: Pt will improve NDI by 8.5% by the end of therapy in order to demonstrate minimum clinically significant difference in score to demonstrate improved functional mobility and improved ADLs.  Goal Progress: initial  Target Date: 07/09/24      Frequency of Treatment: 1 x / week  Duration of Treatment: 90 days    Recommended Referrals to Other Professionals:   Education Assessment:   Learner/Method: Patient;Demonstration    Risks and benefits of evaluation/treatment have been explained.   Patient/Family/caregiver agrees with Plan of Care.     Evaluation Time:     PT Eval, Low Complexity Minutes (66706): 30       Signing Clinician: Amina Thorne PT      [facility]  Rehabilitation Services                                                                                   OUTPATIENT PHYSICAL THERAPY      PLAN OF TREATMENT FOR OUTPATIENT REHABILITATION   Patient's Last Name, First Name, Marleny Aguirre YOB: 1975   Provider's Name   [facility]   Medical Record No.  [id number]     Onset Date: 02/26/24 (order date)  Start of Care Date: 04/10/24     Medical Diagnosis:  Cervical radiculopathy, Cervical stenosis of spinal canal      PT Treatment Diagnosis:  Right sided cervical pain, decreased ROM, poor posture Plan of Treatment  Frequency/Duration: 1 x / week/ 90 days    Certification date from 04/10/24 to 07/09/24         See note for plan of treatment details and functional goals     Amina Thorne, PT                         I CERTIFY THE NEED FOR THESE SERVICES FURNISHED UNDER        THIS PLAN OF TREATMENT AND WHILE UNDER MY CARE .             Physician Signature               Date    X_____________________________________________________                  Referring Provider:  Augie Lr    Initial Assessment  See Epic Evaluation- Start of Care Date: 04/10/24

## 2024-04-11 ENCOUNTER — TRANSFERRED RECORDS (OUTPATIENT)
Dept: HEALTH INFORMATION MANAGEMENT | Facility: CLINIC | Age: 49
End: 2024-04-11

## 2024-04-11 ENCOUNTER — OFFICE VISIT (OUTPATIENT)
Dept: PHYSICAL MEDICINE AND REHAB | Facility: CLINIC | Age: 49
End: 2024-04-11
Attending: EMERGENCY MEDICINE
Payer: COMMERCIAL

## 2024-04-11 VITALS
HEART RATE: 92 BPM | BODY MASS INDEX: 36.65 KG/M2 | HEIGHT: 65 IN | WEIGHT: 220 LBS | SYSTOLIC BLOOD PRESSURE: 185 MMHG | DIASTOLIC BLOOD PRESSURE: 86 MMHG

## 2024-04-11 DIAGNOSIS — F40.240 CLAUSTROPHOBIA: ICD-10-CM

## 2024-04-11 DIAGNOSIS — M54.42 ACUTE BILATERAL LOW BACK PAIN WITH LEFT-SIDED SCIATICA: Primary | ICD-10-CM

## 2024-04-11 PROCEDURE — 99214 OFFICE O/P EST MOD 30 MIN: CPT | Performed by: PHYSICIAN ASSISTANT

## 2024-04-11 RX ORDER — DIAZEPAM 5 MG
TABLET ORAL
Qty: 2 TABLET | Refills: 0 | Status: SHIPPED | OUTPATIENT
Start: 2024-04-11 | End: 2024-08-06

## 2024-04-11 RX ORDER — HYDROXYZINE HYDROCHLORIDE 25 MG/1
TABLET, FILM COATED ORAL
Qty: 168 TABLET | Refills: 0 | Status: SHIPPED | OUTPATIENT
Start: 2024-04-11 | End: 2024-07-16

## 2024-04-11 RX ORDER — LIDOCAINE 50 MG/G
OINTMENT TOPICAL
COMMUNITY
Start: 2024-03-18 | End: 2024-08-06

## 2024-04-11 ASSESSMENT — PAIN SCALES - GENERAL: PAINLEVEL: EXTREME PAIN (8)

## 2024-04-11 NOTE — LETTER
4/11/2024         RE: Marleny Viera  [address]  [place] [facility] [id number]        Dear Colleague,    Thank you for referring your patient, Marleny Viera, to the [facility]. Please see a copy of my visit note below.    ASSESSMENT: Marleny Viera is a 48 year old female with past medical history significant for migraine, deafness, asthma, obstructive sleep apnea, fatty liver, obesity, prediabetes, hypertension, history of accidental overdose, de Quervain's disease, CMC DJD, borderline personality disorder, depression, anxiety, ADHD, insomnia, history of PE who presents today for new patient evaluation of acute, severe left low back pain with radiation into the left lower extremity with associated numbness, tingling, and weakness.  Patient has not had any dedicated imaging of the lumbar spine.  Symptoms are concerning for a disc bulge/protrusion with left-sided neural compromise.  Patient is somewhat of a difficult historian.  She describes diffuse symptoms involving the entire left lower extremity.  She also endorses loss of bladder control.  It sounds like this is a chronic issue for her but has been worse over the past 2 weeks.  She endorses numbness on the left side of her groin.  She has not had any loss of bowel control.  Strength testing reveals full strength on the right side.  She has breakaway weakness in all muscle groups of the left side.  I think cauda equina syndrome is unlikely given unilateral systems and preservation of bowel function but we will check a stat MRI lumbar spine given change in bladder function.  A CT abdomen pelvis from February 2024 showed multilevel disc degeneration and facet arthropathy.    PLAN:  A shared decision making model was used.  The patient's values and choices were respected.  The following represents what was discussed and decided upon by the physician assistant and the patient.  A professional  was present for the  visit.    1.  DIAGNOSTIC TESTS:  - I reviewed a CT abdomen pelvis from February 2024.  - I ordered a stat MRI lumbar spine for further evaluation.  Patient endorses worsening bladder function and left-sided groin numbness.    2.  PHYSICAL THERAPY: No physical therapy was ordered we will await the results of her MRI.    3.  MEDICATIONS:  - Valium 5 mg, or 2 with no refills was prescribed for claustrophobia before her MRI.  - Patient completed a Medrol Dosepak.  - Patient can continue Tylenol as needed.  - Patient takes gabapentin for migraines and anxiety.    4.  INTERVENTIONS: No interventions were ordered today.  Will await the results of her MRI.    5.  PATIENT EDUCATION:  - I told the patient that if she develops right-sided pain/numbness/weakness, loss of bowel control, worsening bladder function, or worsening numbness and weakness in the left leg she should proceed immediately to the emergency department.  She voiced understanding to this.  - If the MRI lumbar spine shows severe neural compromise I would recommend a referral to neurosurgery.    6.  FOLLOW-UP:   My nurse will call the patient with the results of her MRI lumbar spine.  If she has questions or concerns in the meantime, she should not hesitate to call.      SUBJECTIVE:  Marleny Viera  Is a 48 year old female who presents today as a referral from the emergency department (April 1, 2024) for new patient evaluation of low back pain with radiation into the left lower extremity associated numbness, tingling, weakness.  Patient reports that pain began 2 weeks ago.  She denies any injury or event to cause the pain but states that she had 6 falls over the winter.  Pain is getting worse.  She has started to require a walker for assistance due to weakness in her leg.  She has also had a change in her bladder function.  Patient reports that she has chronic urinary incontinence.  She wears depends at night for this but can wear regular briefs during the day.   However, since this pain began she has had worsening of her bladder function.  She states that she cannot feel the sensation of having a full bladder so she leaks before she is able to reach the toilet.  She also reports numbness on the left side of her groin/genital region.  She has not had any loss of bowel control.  She has full sensation of the right side of her genital region.    Patient complains of left-sided low back pain.  Pain radiates into the left buttock.  She describes pain that radiates all the way down the left leg to the foot involving the entire left leg.  She has numbness and tingling in the same area as her pain.  She states her left foot feels cold.  She feels weakness in the left leg.  She denies fevers.  She rates her pain today as an 8 out of 10.  At its worst it is an 8 out of 10.  At its best it is an 8 out of 10.  Pain is aggravated with walking.  Pain is alleviated with applying ice and heat.    Treatment to date:  - Medrol Dosepak  - Tizanidine  - Tylenol  - Gabapentin  - No physical therapy for low back pain  - No lumbar spine surgeries  - No lumbar spine injections.    Current Outpatient Medications   Medication Sig Dispense Refill     acetaminophen (TYLENOL) 325 MG tablet Take 325-650 mg by mouth every 6 hours as needed for mild pain       albuterol (PROAIR HFA/PROVENTIL HFA/VENTOLIN HFA) 108 (90 Base) MCG/ACT inhaler INHALE 2 PUFFS BY MOUTH EVERY FOUR HOURS AS NEEDED FOR WHEEZING OR SHORTNESS OF BREATH 8.5 g 1     albuterol (PROVENTIL) (2.5 MG/3ML) 0.083% neb solution USE 1 VIAL NEBULIZER FOUR TIMES PER WEEK 180 mL 11     cetirizine (ZYRTEC) 10 MG tablet TAKE 1 TABLET (10 MG) BY MOUTH DAILY 30 tablet 8     citalopram (CELEXA) 20 MG tablet        diphenhydrAMINE (BENADRYL) 50 MG capsule Take 50 mg by mouth       EPINEPHrine (ANY BX GENERIC EQUIV) 0.3 MG/0.3ML injection 2-pack INJECT 0.3 MLS (0.3 MG) INTO THE MUSCLE AS NEEDED FOR ANAPHYLAXIS 2 each 1     estradiol (VIVELLE-DOT) 0.05  MG/24HR bi-weekly patch 1 application to skin Transdermal Two times a Week for 90 days       famotidine (PEPCID) 20 MG tablet Take 1 tablet (20 mg) by mouth 2 times daily 30 tablet 0     gabapentin (NEURONTIN) 100 MG capsule TAKE 1 CAPSULE BY MOUTH TWICE A DAY FOR MIGRAINES AND ANXIETY 180 capsule 3     hydrOXYzine HCl (ATARAX) 25 MG tablet TAKE 3 TABLETS BY MOUTH THREE TIMES A DAY AS NEEDED FOR ANXIETY OR INSOMNIA . 168 tablet 0     losartan (COZAAR) 25 MG tablet Take 1 tablet (25 mg) by mouth daily 90 tablet 3     methylPREDNISolone (MEDROL DOSEPAK) 4 MG tablet therapy pack Follow Package Directions 21 tablet 0     metoprolol succinate ER (TOPROL XL) 50 MG 24 hr tablet Take 1 tablet (50 mg) by mouth daily 90 tablet 3     montelukast (SINGULAIR) 10 MG tablet Take 1 tablet (10 mg) by mouth At Bedtime 90 tablet 3     NONFORMULARY Nerve vitamin       omeprazole (PRILOSEC) 20 MG DR capsule Take 1 capsule (20 mg) by mouth daily Take once daily in the morning 30 minutes prior to food and drink. 30 capsule 0     polyethylene glycol (MIRALAX) 17 GM/Dose powder Take 17 g (1 capful) by mouth daily 527 g 0     prazosin (MINIPRESS) 1 MG capsule        PROMETRIUM 200 MG capsule 1 cap Orally nightly for 90 days       QUEtiapine (SEROQUEL) 50 MG tablet        rizatriptan (MAXALT) 10 MG tablet Take 1 tablet (10 mg) by mouth at onset of headache for migraine 18 tablet 2     SYMBICORT 80-4.5 MCG/ACT Inhaler Inhale 2 puffs into the lungs 2 times daily 10.2 g 11     terbinafine (LAMISIL) 1 % external cream Apply topically 2 times daily 30 g 2     tiZANidine (ZANAFLEX) 4 MG tablet Take 1 tablet (4 mg) by mouth 3 times daily as needed for muscle spasms 20 tablet 0     Current Facility-Administered Medications   Medication Dose Route Frequency Provider Last Rate Last Admin     cyanocobalamin injection 1,000 mcg  1,000 mcg Intramuscular Q30 Days Maria Fernanda Schumacher, PEBBLES           Allergies   Allergen Reactions     Acetylcysteine Rash and  Other (See Comments)     Amoxicillin Itching and Shortness Of Breath     Bee Pollen Anaphylaxis     Contrast [Iodixanol] Shortness Of Breath and Rash     Pt stated she reacted to the contrast given for her CT in past. She experienced shortness of breath, throat tightening, and rash on chest, and they gave her an injection to counter the symptoms.      Covid-19 (Mrna) Vaccine Shortness Of Breath     Pfizer COVID-19 Vaccine     Hymenoptera Allergenic Extract [Wasp Venom Protein] Anaphylaxis     Iodine Hives and Unknown     Pt stated that she was injected with CT CONTRAST in the past and got hives, SOB, and nausea. Please pre-medicate patient in the future.      Wasp Venom Protein Starter Kit [Wasp Venom Protein] Itching, Shortness Of Breath, Swelling and Difficulty breathing     Adhesive Tape Unknown     Aspirin      Bee Venom Unknown     Food Allergy Formula Unknown     Red meat, chocolate     Geodon [Ziprasidone] Unknown     Latex Unknown     Added based on information entered during case entry, please review and add reactions, type, and severity as needed     Morphine Unknown     Prochlorperazine Other (See Comments)     Risperdal [Risperidone] Unknown     Risperidone Analogues [Risperidone] Other (See Comments)     Shellfish Allergy Unknown     Theobroma Oil [Theobroma Grandiflorum Seed Butter] Unknown     Trazodone Other (See Comments)     Elevated creatinine     Zoloft [Sertraline] Unknown     Hydrochlorothiazide Rash       Past Medical History:   Diagnosis Date     Accidental overdose, initial encounter 7/7/2022     ADHD (attention deficit hyperactivity disorder)      Alcohol dependence (H)      Anxiety      Arthritis      Asthma      Bipolar depression (H)      Deafness      Drug abuse, nondependent (H) 10/29/2004    Overview:  polydrug abuse per psych notes ; Other, mixed, or unspecified nondependent drug abuse, unspecified     History of blood clots      History of transfusion      Hypertension      Kidney  stones 2015     Major depressive disorder, recurrent episode, severe (H) 5/17/2016    Overview:  s/p suicide attempt Epic      Migraine      Obstructive sleep apnea 6/1/2016     Pulmonary emboli (H)      Status post total right knee replacement 1/18/2016        Patient Active Problem List   Diagnosis     Primary hypertension     Insomnia     Asthma, moderate persistent, uncomplicated     Borderline personality disorder (H)     Uterine leiomyoma, unspecified location     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     Didelphic uterus     Personal history of PE (pulmonary embolism)     Migraine     Fatty liver     CMC DJD(carpometacarpal degenerative joint disease), localized primary     Obesity (BMI 35.0-39.9) with comorbidity (H)     Other dysphagia     Bilateral deafness     Lumbar back sprain, initial encounter     Prediabetes     Obstructive sleep apnea syndrome     Arthritis of carpometacarpal (CMC) joint of right thumb     Accidental overdose, initial encounter     Severe depression (H)     Anxiety, generalized     Asthma, persistent controlled     Attention deficit hyperactivity disorder (ADHD)     De Quervain's disease (radial styloid tenosynovitis)     Status post total right knee replacement     Toxic effect of acetaminophen, accidental or unintentional, subsequent encounter     LLQ abdominal pain     Allergic reaction, subsequent encounter       Past Surgical History:   Procedure Laterality Date     BIOPSY BREAST Right     2019... Also had fluid drained from left breast under surgery also in 2019     BREAST SURGERY       MAMMOPLASTY REDUCTION Bilateral 2017     TOTAL HIP ARTHROPLASTY Bilateral      TOTAL KNEE ARTHROPLASTY Right      TUBAL LIGATION       US BREAST CORE BIOPSY RIGHT Right 11/21/2019       Family History   Problem Relation Age of Onset     Cancer Mother      Breast Cancer Mother      Cerebrovascular Disease Father        Review of systems: Positive for  numbness/tingling, weakness, loss of bladder control, wetting pants, inability to urinate, pain much worse in neck, trip/stumble/falls, difficulty swallowing.  Negative for loss of bowel control, headache, difficulty with hand skills, fevers, unintentional weight loss.      OBJECTIVE:  PHYSICAL EXAMINATION:    CONSTITUTIONAL:  Vital signs as above.  No acute distress.  The patient is well nourished and well groomed.  Patient is deaf.  PSYCHIATRIC:  The patient is awake, alert, oriented to person, place, time and answering questions appropriately with clear speech.    HEENT: Normocephalic, atraumatic.  Sclera clear.  Neck is supple.  SKIN:  Skin over the face, bilateral lower extremities, and posterior torso is clean, dry, intact without rashes.    GAIT:  Gait is antalgic, favoring the left.  She uses a 4 wheeled walker for assistance.    STANDING EXAMINATION: Tender to palpation left lower lumbar paraspinous muscles.  MUSCLE STRENGTH:  The patient has breakaway weakness in all muscle groups of the left side.  5/5 strength for the right hip flexors, knee flexors/extensors, ankle dorsiflexors/plantar flexors, great toe extensors, ankle evertors/invertors.  NEUROLOGICAL: 2+ patellar, and trace achilles reflexes bilaterally.  Negative Babinski's bilaterally.  No ankle clonus bilaterally.  Diminished sensation left lower extremity throughout.  MUSCULOSKELETAL: Straight leg raise positive on the left, negative on the right.    RESULTS: I reviewed a CT abdomen pelvis from February 8, 2024.  This shows multilevel disc degeneration and facet arthropathy.      Again, thank you for allowing me to participate in the care of your patient.        Sincerely,        Akilah Loredo PA-C

## 2024-04-11 NOTE — PATIENT INSTRUCTIONS
[facility] Scheduling    Please call [phone] to schedule your image(s) (select option#1).

## 2024-04-17 ENCOUNTER — HOSPITAL ENCOUNTER (EMERGENCY)
Facility: CLINIC | Age: 49
Discharge: HOME | End: 2024-04-17
Attending: EMERGENCY MEDICINE | Admitting: EMERGENCY MEDICINE
Payer: COMMERCIAL

## 2024-04-17 ENCOUNTER — APPOINTMENT (OUTPATIENT)
Dept: CT IMAGING | Facility: CLINIC | Age: 49
End: 2024-04-17
Attending: EMERGENCY MEDICINE
Payer: COMMERCIAL

## 2024-04-17 VITALS
DIASTOLIC BLOOD PRESSURE: 104 MMHG | OXYGEN SATURATION: 100 % | HEART RATE: 86 BPM | SYSTOLIC BLOOD PRESSURE: 187 MMHG | RESPIRATION RATE: 20 BRPM | WEIGHT: 220 LBS | TEMPERATURE: 97.2 F | BODY MASS INDEX: 36.61 KG/M2

## 2024-04-17 DIAGNOSIS — R91.1 PULMONARY NODULE: ICD-10-CM

## 2024-04-17 DIAGNOSIS — R11.2 NAUSEA VOMITING AND DIARRHEA: ICD-10-CM

## 2024-04-17 DIAGNOSIS — R19.7 NAUSEA VOMITING AND DIARRHEA: ICD-10-CM

## 2024-04-17 DIAGNOSIS — D25.9 UTERINE LEIOMYOMA, UNSPECIFIED LOCATION: ICD-10-CM

## 2024-04-17 DIAGNOSIS — R10.31 RIGHT LOWER QUADRANT ABDOMINAL PAIN: ICD-10-CM

## 2024-04-17 DIAGNOSIS — K76.0 HEPATIC STEATOSIS: ICD-10-CM

## 2024-04-17 LAB
ALBUMIN SERPL BCG-MCNC: 3.9 G/DL (ref 3.5–5.2)
ALBUMIN UR-MCNC: 20 MG/DL
ALP SERPL-CCNC: 97 U/L (ref 40–150)
ALT SERPL W P-5'-P-CCNC: 21 U/L (ref 0–50)
ANION GAP SERPL CALCULATED.3IONS-SCNC: 9 MMOL/L (ref 7–15)
APPEARANCE UR: CLEAR
AST SERPL W P-5'-P-CCNC: 15 U/L (ref 0–45)
BASOPHILS # BLD AUTO: 0 10E3/UL (ref 0–0.2)
BASOPHILS NFR BLD AUTO: 0 %
BILIRUB SERPL-MCNC: 0.2 MG/DL
BILIRUB UR QL STRIP: NEGATIVE
BUN SERPL-MCNC: 11.6 MG/DL (ref 6–20)
CALCIUM SERPL-MCNC: 8.7 MG/DL (ref 8.6–10)
CHLORIDE SERPL-SCNC: 109 MMOL/L (ref 98–107)
COLOR UR AUTO: YELLOW
CREAT SERPL-MCNC: 0.8 MG/DL (ref 0.51–0.95)
DEPRECATED HCO3 PLAS-SCNC: 24 MMOL/L (ref 22–29)
EGFRCR SERPLBLD CKD-EPI 2021: 89 ML/MIN/1.73M2
EOSINOPHIL # BLD AUTO: 0.1 10E3/UL (ref 0–0.7)
EOSINOPHIL NFR BLD AUTO: 1 %
ERYTHROCYTE [DISTWIDTH] IN BLOOD BY AUTOMATED COUNT: 13.4 % (ref 10–15)
GLUCOSE SERPL-MCNC: 177 MG/DL (ref 70–99)
GLUCOSE UR STRIP-MCNC: 100 MG/DL
HCT VFR BLD AUTO: 39.5 % (ref 35–47)
HGB BLD-MCNC: 13.3 G/DL (ref 11.7–15.7)
HGB UR QL STRIP: ABNORMAL
HYALINE CASTS: 1 /LPF
IMM GRANULOCYTES # BLD: 0 10E3/UL
IMM GRANULOCYTES NFR BLD: 0 %
KETONES UR STRIP-MCNC: NEGATIVE MG/DL
LEUKOCYTE ESTERASE UR QL STRIP: NEGATIVE
LIPASE SERPL-CCNC: 34 U/L (ref 13–60)
LYMPHOCYTES # BLD AUTO: 2.5 10E3/UL (ref 0.8–5.3)
LYMPHOCYTES NFR BLD AUTO: 32 %
MCH RBC QN AUTO: 28.4 PG (ref 26.5–33)
MCHC RBC AUTO-ENTMCNC: 33.7 G/DL (ref 31.5–36.5)
MCV RBC AUTO: 84 FL (ref 78–100)
MONOCYTES # BLD AUTO: 0.4 10E3/UL (ref 0–1.3)
MONOCYTES NFR BLD AUTO: 5 %
MUCOUS THREADS #/AREA URNS LPF: PRESENT /LPF
NEUTROPHILS # BLD AUTO: 4.8 10E3/UL (ref 1.6–8.3)
NEUTROPHILS NFR BLD AUTO: 62 %
NITRATE UR QL: NEGATIVE
NRBC # BLD AUTO: 0 10E3/UL
NRBC BLD AUTO-RTO: 0 /100
PH UR STRIP: 5.5 [PH] (ref 5–7)
PLATELET # BLD AUTO: 248 10E3/UL (ref 150–450)
POTASSIUM SERPL-SCNC: 3.8 MMOL/L (ref 3.4–5.3)
PROT SERPL-MCNC: 6.7 G/DL (ref 6.4–8.3)
RBC # BLD AUTO: 4.68 10E6/UL (ref 3.8–5.2)
RBC URINE: 2 /HPF
SODIUM SERPL-SCNC: 142 MMOL/L (ref 135–145)
SP GR UR STRIP: 1.03 (ref 1–1.03)
SQUAMOUS EPITHELIAL: 4 /HPF
UROBILINOGEN UR STRIP-MCNC: <2 MG/DL
WBC # BLD AUTO: 7.8 10E3/UL (ref 4–11)
WBC URINE: 1 /HPF

## 2024-04-17 PROCEDURE — 83690 ASSAY OF LIPASE: CPT | Performed by: EMERGENCY MEDICINE

## 2024-04-17 PROCEDURE — 99285 EMERGENCY DEPT VISIT HI MDM: CPT | Mod: 25

## 2024-04-17 PROCEDURE — 999N000157 HC STATISTIC RCP TIME EA 10 MIN

## 2024-04-17 PROCEDURE — 96361 HYDRATE IV INFUSION ADD-ON: CPT

## 2024-04-17 PROCEDURE — 250N000011 HC RX IP 250 OP 636: Performed by: EMERGENCY MEDICINE

## 2024-04-17 PROCEDURE — 74176 CT ABD & PELVIS W/O CONTRAST: CPT

## 2024-04-17 PROCEDURE — 250N000009 HC RX 250: Performed by: EMERGENCY MEDICINE

## 2024-04-17 PROCEDURE — 85025 COMPLETE CBC W/AUTO DIFF WBC: CPT | Performed by: EMERGENCY MEDICINE

## 2024-04-17 PROCEDURE — 36415 COLL VENOUS BLD VENIPUNCTURE: CPT | Performed by: EMERGENCY MEDICINE

## 2024-04-17 PROCEDURE — 82040 ASSAY OF SERUM ALBUMIN: CPT | Performed by: EMERGENCY MEDICINE

## 2024-04-17 PROCEDURE — 258N000003 HC RX IP 258 OP 636: Performed by: EMERGENCY MEDICINE

## 2024-04-17 PROCEDURE — 250N000013 HC RX MED GY IP 250 OP 250 PS 637: Performed by: EMERGENCY MEDICINE

## 2024-04-17 PROCEDURE — 96374 THER/PROPH/DIAG INJ IV PUSH: CPT

## 2024-04-17 PROCEDURE — 81001 URINALYSIS AUTO W/SCOPE: CPT | Performed by: EMERGENCY MEDICINE

## 2024-04-17 RX ORDER — DICYCLOMINE HYDROCHLORIDE 10 MG/1
20 CAPSULE ORAL ONCE
Status: COMPLETED | OUTPATIENT
Start: 2024-04-17 | End: 2024-04-17

## 2024-04-17 RX ORDER — ONDANSETRON 2 MG/ML
4 INJECTION INTRAMUSCULAR; INTRAVENOUS EVERY 30 MIN PRN
Status: DISCONTINUED | OUTPATIENT
Start: 2024-04-17 | End: 2024-04-17 | Stop reason: HOSPADM

## 2024-04-17 RX ORDER — IPRATROPIUM BROMIDE AND ALBUTEROL SULFATE 2.5; .5 MG/3ML; MG/3ML
3 SOLUTION RESPIRATORY (INHALATION) ONCE
Status: COMPLETED | OUTPATIENT
Start: 2024-04-17 | End: 2024-04-17

## 2024-04-17 RX ORDER — DICYCLOMINE HYDROCHLORIDE 10 MG/1
10 CAPSULE ORAL 4 TIMES DAILY PRN
Qty: 14 CAPSULE | Refills: 0 | Status: SHIPPED | OUTPATIENT
Start: 2024-04-17 | End: 2024-05-03

## 2024-04-17 RX ORDER — KETOROLAC TROMETHAMINE 15 MG/ML
15 INJECTION, SOLUTION INTRAMUSCULAR; INTRAVENOUS ONCE
Status: COMPLETED | OUTPATIENT
Start: 2024-04-17 | End: 2024-04-17

## 2024-04-17 RX ORDER — ONDANSETRON 4 MG/1
4 TABLET, ORALLY DISINTEGRATING ORAL EVERY 8 HOURS PRN
Qty: 16 TABLET | Refills: 0 | Status: SHIPPED | OUTPATIENT
Start: 2024-04-17 | End: 2024-08-06

## 2024-04-17 RX ADMIN — KETOROLAC TROMETHAMINE 15 MG: 15 INJECTION, SOLUTION INTRAMUSCULAR; INTRAVENOUS at 03:04

## 2024-04-17 RX ADMIN — IPRATROPIUM BROMIDE AND ALBUTEROL SULFATE 3 ML: .5; 3 SOLUTION RESPIRATORY (INHALATION) at 02:34

## 2024-04-17 RX ADMIN — SODIUM CHLORIDE 1000 ML: 9 INJECTION, SOLUTION INTRAVENOUS at 03:04

## 2024-04-17 RX ADMIN — DICYCLOMINE HYDROCHLORIDE 20 MG: 10 CAPSULE ORAL at 04:24

## 2024-04-17 ASSESSMENT — ENCOUNTER SYMPTOMS
DIARRHEA: 1
ABDOMINAL PAIN: 1
NAUSEA: 1
VOMITING: 1

## 2024-04-17 ASSESSMENT — ACTIVITIES OF DAILY LIVING (ADL)
ADLS_ACUITY_SCORE: 33
ADLS_ACUITY_SCORE: 35

## 2024-04-17 ASSESSMENT — COLUMBIA-SUICIDE SEVERITY RATING SCALE - C-SSRS
1. IN THE PAST MONTH, HAVE YOU WISHED YOU WERE DEAD OR WISHED YOU COULD GO TO SLEEP AND NOT WAKE UP?: NO
6. HAVE YOU EVER DONE ANYTHING, STARTED TO DO ANYTHING, OR PREPARED TO DO ANYTHING TO END YOUR LIFE?: NO
2. HAVE YOU ACTUALLY HAD ANY THOUGHTS OF KILLING YOURSELF IN THE PAST MONTH?: NO

## 2024-04-17 NOTE — Clinical Note
Marleny Viera was seen and treated in our emergency department on 4/17/2024.  She may return to work on 04/19/2024.       If you have any questions or concerns, please don't hesitate to call.      Chente Ly MD

## 2024-04-17 NOTE — PROVIDER NOTIFICATION
04/17/24 0235   Tech Time   $Tech Time (10 minute increments) 2   Vital Signs   Resp 20   Pulse 70   Pulse Rate Source Monitor   Oximeter Heart Rate 70 bpm   Patient Position Semi-Joseph's   Oxygen Therapy   Daily Review of Necessity (O2 Therapy) completed   Flow (L/min) (Oxygen Therapy) 0   Oxygen Concentration (%) 21   Device (Oxygen Therapy) none (room air)   Oxygen Therapy   SpO2 99 %   O2 Device None (Room air)   Assessment   Respiratory WDL WDL;cough   Rhythm/Pattern, Respiratory depth regular;pattern regular;unlabored   Cough Frequency infrequent   Cough Type nonproductive   Nebulizer Assessment & Treatment   Daily Review of Necessity (SVN) completed   Nebulizer Device Mask   Pretreatment Heart Rate (beats/min) 70   Pretreatment Resp Rate (breaths/min) 20   Pretreatment O2 sats - (TCU only) 99   Pretreat Breath Sounds - Bilat - All Lobes clear   Patient Position semi-Joseph's   Respiratory Treatment Status (SVN) given   Breath Sounds Post-Respiratory Treatment   Posttreatment Heart Rate (beats/min) 72   Posttreatment Resp Rate (breaths/min) 20   Post treatment O2 Sats - (TCU only) 100   Posttreatment Assessment (SVN) vital signs unchanged;breath sounds unchanged   Signs of Intolerance (SVN) none   Breath Sounds Posttreatment All Fields All Fields   Breath Sounds Posttreatment All Fields Posterior:;no change;clear   Treatment/Therapy   Cough And Deep Breathing done independently per patient     Patient states has infrequent,non-productive cough. Has home inhaler and nebs that are used 1x day before bed. Patient did not use neb or inhaler today. Posterior BS pre/post neb clear bilaterally. Remains on RA.

## 2024-04-17 NOTE — DISCHARGE INSTRUCTIONS
Recommend good hydration and may use Bentyl to help with the diarrhea control.  Recommend caution with the Bentyl as continued use could lead to constipation after the diarrhea resolves.  Make sure to eat a bland high-fiber diet which will help soak up some of the fluid and form more solid stools.  As noted in diagnosis the CT scan did show hepatic steatosis or fatty liver as discussed, as well as a right lung nodule which you would be recommended to have follow-up CT in 6 months though likely benign.  Additionally multiple uterine fibroids were noted by urology.

## 2024-04-17 NOTE — ED PROVIDER NOTES
NAME: Marleny Viera  AGE: 48 year old female  YOB: 1975  MRN: [id number]  EVALUATION DATE & TIME: 2024 12:24 AM    PCP: Maria Fernanda Schumacher    ED PROVIDER: Chente Ly M.D.      Chief Complaint   Patient presents with    Abdominal Pain     FINAL IMPRESSION:  1. Nausea vomiting and diarrhea    2. Right lower quadrant abdominal pain    3. Pulmonary nodule    4. Hepatic steatosis    5. Uterine leiomyoma, unspecified location        MEDICAL DECISION MAKIN:35 AM I met with the patient, obtained history, performed an initial exam, and discussed options and plan for diagnostics and treatment here in the ED.  2:43 AM Rechecked and updated patient.    Patient was clinically assessed and consented to treatment. After assessment, medical decision making and workup were discussed with the patient. The patient was agreeable to plan for testing, workup, and treatment.  Pertinent Labs & Imaging studies reviewed. (See chart for details)     Medical Decision Making  Obtained supplemental history:Supplemental history obtained?: Documented in chart  Reviewed external records: External records reviewed?: Documented in chart  Care impacted by chronic illness:N/A  Care significantly affected by social determinants of health:N/A  Did you consider but not order tests?: In addition to work-up documented, I considered the following work up:   Did you interpret images independently?: Independent interpretation of ECG and images noted in documentation, when applicable.  Consultation discussion with other provider:Did you involve another provider (consultant, , pharmacy, etc.)?: No  Discharge. I prescribed additional prescription strength medication(s) as charted. See documentation for any additional details.    Marleny Viera is a 48 year old female who presents with abdominal pain with nausea, vomiting, and diarrhea.   Differential diagnosis includes but not limited to appendicitis, colitis,  diverticulitis, dehydration, acute kidney injury, urinary tract fraction.  Patient presenting for nausea, vomiting, diarrhea and then abdominal pain.  Patient is tender towards the right lower quadrant but a little bit higher.  Main concern will be for appendicitis.  Patient may also have diverticulitis or colitis.  IV established and labs were sent.  Patient did have a leukocytosis but stable hemoglobin, no electrolyte abnormalities or LFT abnormalities.  Lipase was normal and IV fluids, antiemetics, pain medication given.  Patient's pain improved and patient sent for CT scan.  Due to patient's contrast allergy noncontrast study was done.  Patient tolerated the procedure and CT scan did not show any appendicitis or acute diverticulitis.  Otherwise CT scan was unremarkable for any acute process and we discussed the incidental finding such as fatty liver.  Patient much more comfortable now and will give a trial of oral intake including a dose of Bentyl to see if this helps with the diarrhea.  Patient has not been out of the country and I do not recommend antibiotics at this time but rather expectant management and likely clearance on his own.  Patient will be given Zofran and Bentyl to help with the nausea/vomiting and diarrhea respectively.  Patient comfortable with plan and indications and recommendations were explained through .  Patient's questions were fully answered and patient will be discharged when she finishes her IV fluids after the dose of Bentyl.    0 minutes of critical care time    MEDICATIONS GIVEN IN THE EMERGENCY:  Medications   sodium chloride 0.9% BOLUS 1,000 mL (1,000 mLs Intravenous $New Bag 4/17/24 0304)   ondansetron (ZOFRAN) injection 4 mg (has no administration in time range)   dicyclomine (BENTYL) capsule 20 mg (has no administration in time range)   ketorolac (TORADOL) injection 15 mg (15 mg Intravenous $Given 4/17/24 0304)   ipratropium - albuterol 0.5 mg/2.5 mg/3 mL (DUONEB)  neb solution 3 mL (3 mLs Nebulization $Given 4/17/24 2820)       NEW PRESCRIPTIONS STARTED AT TODAY'S ER VISIT:  New Prescriptions    DICYCLOMINE (BENTYL) 10 MG CAPSULE    Take 1 capsule (10 mg) by mouth 4 times daily as needed (diarrhea)    ONDANSETRON (ZOFRAN ODT) 4 MG ODT TAB    Take 1 tablet (4 mg) by mouth every 8 hours as needed for nausea          =================================================================    HPI    Patient information was obtained from: Patient    Use of : American sign language, Yes (MARTII  Phone In Person) - Language American sign language        Marleny Viera is a 48 year old female with a past medical history of deafness, asthma, bipolar disorder, pulmonary embolism, kidney stones, who presents for abdominal pain with nausea and diarrhea.  Patient reports feeling otherwise healthy until 2 days ago now when she developed some diffuse abdominal tenderness and nausea and vomiting that also proceeded to diarrhea.  Patient reports that this seemed to get better over the course the day today after having a full day of the symptoms.  She thought things were better however then returned with some right side abdominal tenderness that was more intense.  Patient also feels nauseous but no vomiting.  Patient does report continued diarrhea.  No blood in the diarrhea, no blood in vomitus.    REVIEW OF SYSTEMS   Review of Systems   Gastrointestinal:  Positive for abdominal pain, diarrhea, nausea and vomiting.        PAST MEDICAL HISTORY:  Past Medical History:   Diagnosis Date    Accidental overdose, initial encounter 7/7/2022    ADHD (attention deficit hyperactivity disorder)     Alcohol dependence (H)     Anxiety     Arthritis     Asthma     Bipolar depression (H)     Deafness     Drug abuse, nondependent (H) 10/29/2004    Overview:  polydrug abuse per psych notes ; Other, mixed, or unspecified nondependent drug abuse, unspecified    History of blood clots     History of  transfusion     Hypertension     Kidney stones 2015    Major depressive disorder, recurrent episode, severe (H) 5/17/2016    Overview:  s/p suicide attempt Epic     Migraine     Obstructive sleep apnea 6/1/2016    Pulmonary emboli (H)     Status post total right knee replacement 1/18/2016       PAST SURGICAL HISTORY:  Past Surgical History:   Procedure Laterality Date    BIOPSY BREAST Right     2019... Also had fluid drained from left breast under surgery also in 2019    BREAST SURGERY      MAMMOPLASTY REDUCTION Bilateral 2017    TOTAL HIP ARTHROPLASTY Bilateral     TOTAL KNEE ARTHROPLASTY Right     TUBAL LIGATION      US BREAST CORE BIOPSY RIGHT Right 11/21/2019       CURRENT MEDICATIONS:      Current Facility-Administered Medications:     cyanocobalamin injection 1,000 mcg, 1,000 mcg, Intramuscular, Q30 Days, Maria Fernanda Schumacher CNP    dicyclomine (BENTYL) capsule 20 mg, 20 mg, Oral, Once, Chente Ly MD    ondansetron (ZOFRAN) injection 4 mg, 4 mg, Intravenous, Q30 Min PRN, Chente Ly MD    sodium chloride 0.9% BOLUS 1,000 mL, 1,000 mL, Intravenous, Once, Chente Ly MD, Last Rate: 1,000 mL/hr at 04/17/24 0304, 1,000 mL at 04/17/24 0304    Current Outpatient Medications:     dicyclomine (BENTYL) 10 MG capsule, Take 1 capsule (10 mg) by mouth 4 times daily as needed (diarrhea), Disp: 14 capsule, Rfl: 0    ondansetron (ZOFRAN ODT) 4 MG ODT tab, Take 1 tablet (4 mg) by mouth every 8 hours as needed for nausea, Disp: 16 tablet, Rfl: 0    acetaminophen (TYLENOL) 325 MG tablet, Take 325-650 mg by mouth every 6 hours as needed for mild pain, Disp: , Rfl:     albuterol (PROAIR HFA/PROVENTIL HFA/VENTOLIN HFA) 108 (90 Base) MCG/ACT inhaler, INHALE 2 PUFFS BY MOUTH EVERY FOUR HOURS AS NEEDED FOR WHEEZING OR SHORTNESS OF BREATH, Disp: 8.5 g, Rfl: 1    albuterol (PROVENTIL) (2.5 MG/3ML) 0.083% neb solution, USE 1 VIAL NEBULIZER FOUR TIMES PER WEEK, Disp: 180 mL, Rfl: 11     cetirizine (ZYRTEC) 10 MG tablet, TAKE 1 TABLET (10 MG) BY MOUTH DAILY, Disp: 30 tablet, Rfl: 8    citalopram (CELEXA) 20 MG tablet, , Disp: , Rfl:     diazepam (VALIUM) 5 MG tablet, Take 1 tab 2 hrs before MRI, take 1 tab 1 hr before MRI only if needed, Disp: 2 tablet, Rfl: 0    diphenhydrAMINE (BENADRYL) 50 MG capsule, Take 50 mg by mouth, Disp: , Rfl:     EPINEPHrine (ANY BX GENERIC EQUIV) 0.3 MG/0.3ML injection 2-pack, INJECT 0.3 MLS (0.3 MG) INTO THE MUSCLE AS NEEDED FOR ANAPHYLAXIS, Disp: 2 each, Rfl: 1    estradiol (VIVELLE-DOT) 0.05 MG/24HR bi-weekly patch, 1 application to skin Transdermal Two times a Week for 90 days, Disp: , Rfl:     famotidine (PEPCID) 20 MG tablet, Take 1 tablet (20 mg) by mouth 2 times daily, Disp: 30 tablet, Rfl: 0    gabapentin (NEURONTIN) 100 MG capsule, TAKE 1 CAPSULE BY MOUTH TWICE A DAY FOR MIGRAINES AND ANXIETY, Disp: 180 capsule, Rfl: 3    hydrOXYzine HCl (ATARAX) 25 MG tablet, TAKE 3 TABLETS BY MOUTH THREE TIMES A DAY AS NEEDED FOR ANXIETY OR INSOMNIA ., Disp: 168 tablet, Rfl: 0    lidocaine (XYLOCAINE) 5 % external ointment, Apply topically to the painful area(s) 3 times a day, Disp: , Rfl:     losartan (COZAAR) 25 MG tablet, Take 1 tablet (25 mg) by mouth daily, Disp: 90 tablet, Rfl: 3    metoprolol succinate ER (TOPROL XL) 50 MG 24 hr tablet, Take 1 tablet (50 mg) by mouth daily, Disp: 90 tablet, Rfl: 3    montelukast (SINGULAIR) 10 MG tablet, Take 1 tablet (10 mg) by mouth At Bedtime, Disp: 90 tablet, Rfl: 3    NONFORMULARY, Nerve vitamin, Disp: , Rfl:     omeprazole (PRILOSEC) 20 MG DR capsule, Take 1 capsule (20 mg) by mouth daily Take once daily in the morning 30 minutes prior to food and drink., Disp: 30 capsule, Rfl: 0    polyethylene glycol (MIRALAX) 17 GM/Dose powder, Take 17 g (1 capful) by mouth daily, Disp: 527 g, Rfl: 0    prazosin (MINIPRESS) 1 MG capsule, , Disp: , Rfl:     PROMETRIUM 200 MG capsule, 1 cap Orally nightly for 90 days, Disp: , Rfl:      QUEtiapine (SEROQUEL) 50 MG tablet, , Disp: , Rfl:     rizatriptan (MAXALT) 10 MG tablet, Take 1 tablet (10 mg) by mouth at onset of headache for migraine, Disp: 18 tablet, Rfl: 2    SYMBICORT 80-4.5 MCG/ACT Inhaler, Inhale 2 puffs into the lungs 2 times daily, Disp: 10.2 g, Rfl: 11    terbinafine (LAMISIL) 1 % external cream, Apply topically 2 times daily, Disp: 30 g, Rfl: 2    tiZANidine (ZANAFLEX) 4 MG tablet, Take 1 tablet (4 mg) by mouth 3 times daily as needed for muscle spasms, Disp: 20 tablet, Rfl: 0    ALLERGIES:  Allergies   Allergen Reactions    Acetylcysteine Rash and Other (See Comments)    Amoxicillin Itching and Shortness Of Breath    Bee Pollen Anaphylaxis    Contrast [Iodixanol] Shortness Of Breath and Rash     Pt stated she reacted to the contrast given for her CT in past. She experienced shortness of breath, throat tightening, and rash on chest, and they gave her an injection to counter the symptoms.     Covid-19 (Mrna) Vaccine Shortness Of Breath     Pfizer COVID-19 Vaccine    Hymenoptera Allergenic Extract [Wasp Venom Protein] Anaphylaxis    Iodine Hives and Unknown     Pt stated that she was injected with CT CONTRAST in the past and got hives, SOB, and nausea. Please pre-medicate patient in the future.     Wasp Venom Protein Starter Kit [Wasp Venom Protein] Itching, Shortness Of Breath, Swelling and Difficulty breathing    Adhesive Tape Unknown    Aspirin     Bee Venom Unknown    Food Allergy Formula Unknown     Red meat, chocolate    Geodon [Ziprasidone] Unknown    Latex Unknown     Added based on information entered during case entry, please review and add reactions, type, and severity as needed    Morphine Unknown    Prochlorperazine Other (See Comments)    Risperdal [Risperidone] Unknown    Risperidone Analogues [Risperidone] Other (See Comments)    Shellfish Allergy Unknown    Theobroma Oil [Theobroma Grandiflorum Seed Butter] Unknown    Trazodone Other (See Comments)     Elevated  "creatinine    Zoloft [Sertraline] Unknown    Hydrochlorothiazide Rash       FAMILY HISTORY:  Family History   Problem Relation Age of Onset    Cancer Mother     Breast Cancer Mother     Cerebrovascular Disease Father        SOCIAL HISTORY:   Social History     Socioeconomic History    Marital status:    Tobacco Use    Smoking status: Never     Passive exposure: Never    Smokeless tobacco: Never   Vaping Use    Vaping status: Never Used   Substance and Sexual Activity    Alcohol use: Yes     Comment: Alcoholic Drinks/day: dependence    Drug use: Not Currently    Sexual activity: Never   Social History Narrative    . No children.   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  Nonsmoker.  No alcohol use.  Tesha Pelaez MD       Social Determinants of Health     Financial Resource Strain: High Risk (1/4/2024)    Financial Resource Strain     Within the past 12 months, have you or your family members you live with been unable to get utilities (heat, electricity) when it was really needed?: Yes   Food Insecurity: High Risk (1/4/2024)    Food Insecurity     Within the past 12 months, did you worry that your food would run out before you got money to buy more?: Yes     Within the past 12 months, did the food you bought just not last and you didn t have money to get more?: Yes   Transportation Needs: High Risk (1/4/2024)    Transportation Needs     Within the past 12 months, has lack of transportation kept you from medical appointments, getting your medicines, non-medical meetings or appointments, work, or from getting things that you need?: Yes   Interpersonal Safety: Unknown (3/17/2024)    [facility]    Humiliation, Afraid, Rape, and Kick questionnaire     Physically Abused: No     Sexually Abused: No   Housing Stability: Low Risk  (1/4/2024)    Housing Stability     Do you have housing? : Yes     Are you worried about losing your " housing?: No       PHYSICAL EXAM:    Vitals: BP (!) 187/104   Pulse 70   Temp 97.2  F (36.2  C) (Temporal)   Resp 20   Wt 99.8 kg (220 lb)   LMP  (LMP Unknown)   SpO2 99%   BMI 36.61 kg/m     Physical Exam  Vitals and nursing note reviewed.   Constitutional:       General: She is not in acute distress.     Appearance: She is well-developed and normal weight. She is not ill-appearing or toxic-appearing.   HENT:      Head: Normocephalic.   Eyes:      General: No scleral icterus.  Cardiovascular:      Rate and Rhythm: Normal rate and regular rhythm.      Heart sounds: Normal heart sounds.   Pulmonary:      Effort: Pulmonary effort is normal. No respiratory distress.      Breath sounds: Normal breath sounds.   Abdominal:      General: Abdomen is flat. Bowel sounds are decreased.      Palpations: Abdomen is soft.      Tenderness: There is abdominal tenderness in the right lower quadrant. There is no right CVA tenderness, left CVA tenderness, guarding or rebound.      Hernia: No hernia is present.   Skin:     General: Skin is warm and dry.   Neurological:      General: No focal deficit present.      Mental Status: She is alert.   Psychiatric:         Behavior: Behavior normal.           LAB:  All pertinent labs reviewed and interpreted.  Labs Ordered and Resulted from Time of ED Arrival to Time of ED Departure   COMPREHENSIVE METABOLIC PANEL - Abnormal       Result Value    Sodium 142      Potassium 3.8      Carbon Dioxide (CO2) 24      Anion Gap 9      Urea Nitrogen 11.6      Creatinine 0.80      GFR Estimate 89      Calcium 8.7      Chloride 109 (*)     Glucose 177 (*)     Alkaline Phosphatase 97      AST 15      ALT 21      Protein Total 6.7      Albumin 3.9      Bilirubin Total 0.2     ROUTINE UA WITH MICROSCOPIC REFLEX TO CULTURE - Abnormal    Color Urine Yellow      Appearance Urine Clear      Glucose Urine 100 (*)     Bilirubin Urine Negative      Ketones Urine Negative      Specific Gravity Urine 1.027       Blood Urine 0.2 mg/dL (*)     pH Urine 5.5      Protein Albumin Urine 20 (*)     Urobilinogen Urine <2.0      Nitrite Urine Negative      Leukocyte Esterase Urine Negative      Mucus Urine Present (*)     RBC Urine 2      WBC Urine 1      Squamous Epithelials Urine 4 (*)     Hyaline Casts Urine 1     LIPASE - Normal    Lipase 34     CBC WITH PLATELETS AND DIFFERENTIAL    WBC Count 7.8      RBC Count 4.68      Hemoglobin 13.3      Hematocrit 39.5      MCV 84      MCH 28.4      MCHC 33.7      RDW 13.4      Platelet Count 248      % Neutrophils 62      % Lymphocytes 32      % Monocytes 5      % Eosinophils 1      % Basophils 0      % Immature Granulocytes 0      NRBCs per 100 WBC 0      Absolute Neutrophils 4.8      Absolute Lymphocytes 2.5      Absolute Monocytes 0.4      Absolute Eosinophils 0.1      Absolute Basophils 0.0      Absolute Immature Granulocytes 0.0      Absolute NRBCs 0.0         RADIOLOGY:  CT Abdomen Pelvis w/o Contrast   Final Result   IMPRESSION:    1.  No acute findings in the abdomen or pelvis.   2.  Hepatic steatosis.   3.  Stable lobulated uterus due to multiple uterine fibroids.   4.  Bilateral hip arthroplasties with associated streak artifact obscuring details in the pelvis.   5.  Appendix appears within normal limits.   6.  No evidence for urolithiasis or hydronephrosis.   7.  Stable 4 mm nodule right lung base. Based on stability, this should represent a benign nodule.             PROCEDURES:   Procedures         Chente Ly M.D.  Emergency Medicine  [facility] Emergency Department       Chente Ly MD  04/17/24 0407

## 2024-04-17 NOTE — ED TRIAGE NOTES
Pt is coming in tonight with LRQ pain, vomiting, diarrhea that stated yesterday and has been getting worse as the day has gone on. Pt pain is 8/10 after taking Tylenol around 2230. Has been a-fibril.      Triage Assessment (Adult)       Row Name 04/17/24 0020          Triage Assessment    Airway WDL WDL        Respiratory WDL    Respiratory WDL WDL        Skin Circulation/Temperature WDL    Skin Circulation/Temperature WDL WDL        Cardiac WDL    Cardiac WDL WDL        Peripheral/Neurovascular WDL    Peripheral Neurovascular WDL WDL        Cognitive/Neuro/Behavioral WDL    Cognitive/Neuro/Behavioral WDL WDL        Alicia Coma Scale    Best Eye Response 4-->(E4) spontaneous     Best Motor Response 6-->(M6) obeys commands     Best Verbal Response 5-->(V5) oriented     Alicia Coma Scale Score 15

## 2024-04-28 ASSESSMENT — ASTHMA QUESTIONNAIRES
HOSPITALIZATION_OVERNIGHT_LAST_YEAR_TOTAL: ONE
QUESTION_5 LAST FOUR WEEKS HOW WOULD YOU RATE YOUR ASTHMA CONTROL: SOMEWHAT CONTROLLED
QUESTION_1 LAST FOUR WEEKS HOW MUCH OF THE TIME DID YOUR ASTHMA KEEP YOU FROM GETTING AS MUCH DONE AT WORK, SCHOOL OR AT HOME: SOME OF THE TIME
ACT_TOTALSCORE: 15
QUESTION_2 LAST FOUR WEEKS HOW OFTEN HAVE YOU HAD SHORTNESS OF BREATH: ONCE OR TWICE A WEEK
QUESTION_4 LAST FOUR WEEKS HOW OFTEN HAVE YOU USED YOUR RESCUE INHALER OR NEBULIZER MEDICATION (SUCH AS ALBUTEROL): TWO OR THREE TIMES PER WEEK
QUESTION_3 LAST FOUR WEEKS HOW OFTEN DID YOUR ASTHMA SYMPTOMS (WHEEZING, COUGHING, SHORTNESS OF BREATH, CHEST TIGHTNESS OR PAIN) WAKE YOU UP AT NIGHT OR EARLIER THAN USUAL IN THE MORNING: TWO OR THREE NIGHTS A WEEK
EMERGENCY_ROOM_LAST_YEAR_TOTAL: ONE
ACT_TOTALSCORE: 15

## 2024-04-28 ASSESSMENT — ANXIETY QUESTIONNAIRES
3. WORRYING TOO MUCH ABOUT DIFFERENT THINGS: NOT AT ALL
7. FEELING AFRAID AS IF SOMETHING AWFUL MIGHT HAPPEN: SEVERAL DAYS
6. BECOMING EASILY ANNOYED OR IRRITABLE: NOT AT ALL
4. TROUBLE RELAXING: NOT AT ALL
GAD7 TOTAL SCORE: 2
5. BEING SO RESTLESS THAT IT IS HARD TO SIT STILL: NOT AT ALL
7. FEELING AFRAID AS IF SOMETHING AWFUL MIGHT HAPPEN: SEVERAL DAYS
8. IF YOU CHECKED OFF ANY PROBLEMS, HOW DIFFICULT HAVE THESE MADE IT FOR YOU TO DO YOUR WORK, TAKE CARE OF THINGS AT HOME, OR GET ALONG WITH OTHER PEOPLE?: SOMEWHAT DIFFICULT
1. FEELING NERVOUS, ANXIOUS, OR ON EDGE: SEVERAL DAYS
2. NOT BEING ABLE TO STOP OR CONTROL WORRYING: NOT AT ALL
IF YOU CHECKED OFF ANY PROBLEMS ON THIS QUESTIONNAIRE, HOW DIFFICULT HAVE THESE PROBLEMS MADE IT FOR YOU TO DO YOUR WORK, TAKE CARE OF THINGS AT HOME, OR GET ALONG WITH OTHER PEOPLE: SOMEWHAT DIFFICULT
GAD7 TOTAL SCORE: 2

## 2024-04-30 NOTE — PROGRESS NOTES
DISCHARGE  Reason for Discharge: Patient has not made expected progress due to interrupted treatment attendance.  Patient has failed to schedule further appointments.    Equipment Issued: NA    Discharge Plan: Patient to continue home program.    Referring Provider:  Augie Lr        04/10/24 0500   Appointment Info   Signing clinician's name / credentials Amina Thorne PT   Total/Authorized Visits 12   Visits Used 1   Medical Diagnosis Cervical radiculopathy, Cervical stenosis of spinal canal   PT Tx Diagnosis Right sided cervical pain, decreased ROM, poor posture   Quick Adds Certification   Progress Note/Certification   Start of Care Date 04/10/24   Onset of illness/injury or Date of Surgery 02/26/24  (order date)   Therapy Frequency 1 x / week   Predicted Duration 90 days   Certification date from 04/10/24   Certification date to 07/09/24   Progress Note Due Date 06/12/24   Progress Note Completed Date 04/10/24       Present Yes    Language American Sign Language   GOALS   PT Goals 2;3   PT Goal 1   Goal Identifier HEP   Goal Description Pt will demonstrate understanding and independece of HEP in 30 days.   Rationale to maximize safety and independence with performance of ADLs and functional tasks   Goal Progress initial   Target Date 05/10/24   PT Goal 2   Goal Identifier ROM   Goal Description Pt will improve right cervical AROM to equal left  side by  the end of therapy in order  to drive with increasd ease.   Goal Progress initial   Target Date 07/09/24   PT Goal 3   Goal Identifier NDI   Goal Description Pt will improve NDI by 8.5% by the end of therapy in order to demonstrate minimum clinically significant difference in score to demonstrate improved functional mobility and improved ADLs.   Goal Progress initial   Target Date 07/09/24   Subjective Report   Subjective Report see initial eval   Objective Measures   Objective Measures Objective Measure 1   Objective  Measure 1   Objective Measure NDI   Details 58% on 4/8/24   Treatment Interventions (PT)   Interventions Therapeutic Procedure/Exercise;Manual Therapy   Therapeutic Procedure/Exercise   PTRx Ther Proc 1 Cervical ROM Side Bending   PTRx Ther Proc 1 - Details No Notes   PTRx Ther Proc 2 Cervical ROM Rotation   PTRx Ther Proc 2 - Details No Notes   PTRx Ther Proc 3 Cervical Retraction With Patient Overpressure   PTRx Ther Proc 3 - Details No Notes   Therapeutic Procedures: strength, endurance, ROM, flexibility minutes (23203) 10   Skilled Intervention Exercises to help improve cervical ROM and mobility, discussed therapy POC, demonstration & cueing provided   Patient Response/Progress pt demonstrated understanding   Manual Therapy   Manual Therapy: Mobilization, MFR, MLD, friction massage minutes (75480) 10   Manual Therapy 1 MFR, TP release to pts  RUT and scalenes in prone and supine   Skilled Intervention MT for relief of symptoms  and decreased taught bands   Patient Response/Progress pt  reported feeling looser  at the end of session   Eval/Assessments   PT Eval, Low Complexity Minutes (80849) 20   Education   Learner/Method Patient;Demonstration   Plan   Home program PTRx   Plan for next session review HEP   Total Session Time   Timed Code Treatment Minutes 20   Total Treatment Time (sum of timed and untimed services) 40

## 2024-05-02 DIAGNOSIS — J45.40 MODERATE PERSISTENT ASTHMA WITHOUT COMPLICATION: ICD-10-CM

## 2024-05-02 DIAGNOSIS — Z87.898 HISTORY OF HEADACHE: ICD-10-CM

## 2024-05-02 RX ORDER — GABAPENTIN 100 MG/1
CAPSULE ORAL
Qty: 40 CAPSULE | Refills: 0 | Status: SHIPPED | OUTPATIENT
Start: 2024-05-02 | End: 2024-05-03

## 2024-05-02 RX ORDER — MONTELUKAST SODIUM 10 MG/1
10 TABLET ORAL AT BEDTIME
Qty: 20 TABLET | Refills: 0 | Status: SHIPPED | OUTPATIENT
Start: 2024-05-02 | End: 2024-05-03

## 2024-05-03 ENCOUNTER — ORDERS ONLY (AUTO-RELEASED) (OUTPATIENT)
Dept: FAMILY MEDICINE | Facility: CLINIC | Age: 49
End: 2024-05-03

## 2024-05-03 ENCOUNTER — TELEPHONE (OUTPATIENT)
Dept: INTERNAL MEDICINE | Facility: CLINIC | Age: 49
End: 2024-05-03

## 2024-05-03 ENCOUNTER — OFFICE VISIT (OUTPATIENT)
Dept: FAMILY MEDICINE | Facility: CLINIC | Age: 49
End: 2024-05-03
Payer: COMMERCIAL

## 2024-05-03 VITALS
SYSTOLIC BLOOD PRESSURE: 122 MMHG | OXYGEN SATURATION: 95 % | RESPIRATION RATE: 16 BRPM | BODY MASS INDEX: 38.3 KG/M2 | HEART RATE: 83 BPM | WEIGHT: 229.9 LBS | HEIGHT: 65 IN | TEMPERATURE: 98.3 F | DIASTOLIC BLOOD PRESSURE: 78 MMHG

## 2024-05-03 DIAGNOSIS — J30.2 SEASONAL ALLERGIC RHINITIS, UNSPECIFIED TRIGGER: ICD-10-CM

## 2024-05-03 DIAGNOSIS — Z12.11 SCREEN FOR COLON CANCER: ICD-10-CM

## 2024-05-03 DIAGNOSIS — Z87.898 HISTORY OF HEADACHE: ICD-10-CM

## 2024-05-03 DIAGNOSIS — I10 PRIMARY HYPERTENSION: ICD-10-CM

## 2024-05-03 DIAGNOSIS — E53.8 LOW SERUM VITAMIN B12: ICD-10-CM

## 2024-05-03 DIAGNOSIS — T78.40XD ALLERGIC REACTION, SUBSEQUENT ENCOUNTER: ICD-10-CM

## 2024-05-03 DIAGNOSIS — J45.40 MODERATE PERSISTENT ASTHMA WITHOUT COMPLICATION: ICD-10-CM

## 2024-05-03 DIAGNOSIS — Z00.00 ENCOUNTER FOR MEDICAL EXAMINATION TO ESTABLISH CARE: Primary | ICD-10-CM

## 2024-05-03 DIAGNOSIS — E66.01 MORBID OBESITY (H): ICD-10-CM

## 2024-05-03 DIAGNOSIS — K76.0 FATTY LIVER: ICD-10-CM

## 2024-05-03 DIAGNOSIS — I10 ESSENTIAL HYPERTENSION: ICD-10-CM

## 2024-05-03 DIAGNOSIS — D25.9 UTERINE LEIOMYOMA, UNSPECIFIED LOCATION: ICD-10-CM

## 2024-05-03 PROBLEM — T50.901A ACCIDENTAL OVERDOSE, INITIAL ENCOUNTER: Status: RESOLVED | Noted: 2022-07-07 | Resolved: 2024-05-03

## 2024-05-03 LAB
ERYTHROCYTE [DISTWIDTH] IN BLOOD BY AUTOMATED COUNT: 13.2 % (ref 10–15)
HCT VFR BLD AUTO: 39.2 % (ref 35–47)
HGB BLD-MCNC: 13 G/DL (ref 11.7–15.7)
MCH RBC QN AUTO: 28.2 PG (ref 26.5–33)
MCHC RBC AUTO-ENTMCNC: 33.2 G/DL (ref 31.5–36.5)
MCV RBC AUTO: 85 FL (ref 78–100)
PLATELET # BLD AUTO: 245 10E3/UL (ref 150–450)
RBC # BLD AUTO: 4.61 10E6/UL (ref 3.8–5.2)
WBC # BLD AUTO: 6.6 10E3/UL (ref 4–11)

## 2024-05-03 PROCEDURE — G2211 COMPLEX E/M VISIT ADD ON: HCPCS | Performed by: NURSE PRACTITIONER

## 2024-05-03 PROCEDURE — 80048 BASIC METABOLIC PNL TOTAL CA: CPT | Performed by: NURSE PRACTITIONER

## 2024-05-03 PROCEDURE — 99214 OFFICE O/P EST MOD 30 MIN: CPT | Performed by: NURSE PRACTITIONER

## 2024-05-03 PROCEDURE — 82607 VITAMIN B-12: CPT | Performed by: NURSE PRACTITIONER

## 2024-05-03 PROCEDURE — 36415 COLL VENOUS BLD VENIPUNCTURE: CPT | Performed by: NURSE PRACTITIONER

## 2024-05-03 PROCEDURE — 85027 COMPLETE CBC AUTOMATED: CPT | Performed by: NURSE PRACTITIONER

## 2024-05-03 RX ORDER — GABAPENTIN 100 MG/1
CAPSULE ORAL
Qty: 180 CAPSULE | Refills: 1 | Status: SHIPPED | OUTPATIENT
Start: 2024-05-03

## 2024-05-03 RX ORDER — METOPROLOL SUCCINATE 25 MG/1
25 TABLET, EXTENDED RELEASE ORAL DAILY
Qty: 30 TABLET | Refills: 0 | Status: SHIPPED | OUTPATIENT
Start: 2024-05-03 | End: 2024-05-29

## 2024-05-03 RX ORDER — LOSARTAN POTASSIUM 50 MG/1
25 TABLET ORAL DAILY
Qty: 90 TABLET | Refills: 0 | Status: SHIPPED | OUTPATIENT
Start: 2024-05-03 | End: 2024-07-16

## 2024-05-03 RX ORDER — MONTELUKAST SODIUM 10 MG/1
10 TABLET ORAL AT BEDTIME
Qty: 90 TABLET | Refills: 1 | Status: SHIPPED | OUTPATIENT
Start: 2024-05-03 | End: 2024-05-03

## 2024-05-03 RX ORDER — EPINEPHRINE 0.3 MG/.3ML
0.3 INJECTION SUBCUTANEOUS PRN
Qty: 2 EACH | Refills: 1 | Status: SHIPPED | OUTPATIENT
Start: 2024-05-03 | End: 2024-07-16

## 2024-05-03 RX ORDER — CETIRIZINE HYDROCHLORIDE 10 MG/1
10 TABLET ORAL DAILY
Qty: 90 TABLET | Refills: 2 | Status: SHIPPED | OUTPATIENT
Start: 2024-05-03 | End: 2024-08-08

## 2024-05-03 RX ORDER — BUDESONIDE AND FORMOTEROL FUMARATE DIHYDRATE 160; 4.5 UG/1; UG/1
2 AEROSOL RESPIRATORY (INHALATION) 2 TIMES DAILY
Qty: 10.2 G | Refills: 5 | Status: SHIPPED | OUTPATIENT
Start: 2024-05-03

## 2024-05-03 RX ORDER — GABAPENTIN 100 MG/1
CAPSULE ORAL
Qty: 180 CAPSULE | Refills: 1 | Status: SHIPPED | OUTPATIENT
Start: 2024-05-03 | End: 2024-05-03

## 2024-05-03 RX ORDER — MONTELUKAST SODIUM 10 MG/1
10 TABLET ORAL AT BEDTIME
Qty: 90 TABLET | Refills: 1 | Status: SHIPPED | OUTPATIENT
Start: 2024-05-03 | End: 2024-10-01

## 2024-05-03 NOTE — TELEPHONE ENCOUNTER
Medication Question or Refill    Contacts         Type Contact Phone/Fax    05/03/2024 08:19 AM CDT Phone (Incoming) [facility] [place] [address] - [place], [place] - [address] (Pharmacy) [phone]            What medication are you calling about (include dose and sig)?:     Pharmacist states Rx refills approved for Montelukast and Gabapentin, however, both meds are only for a 20-day supply. Pharmacy is asking if they can get a new prescription for a full 30 day supply for both meds or a verbal order will suffice?  Pharmacy noted they have put in a medicine compliance pack.                Preferred Pharmacy:   [facility] [place] [address] - [place], [place] - [address] [place] [address]  Phone: [phone] Fax: [phone]      Controlled Substance Agreement on file:   CSA -- Patient Level:    CSA: None found at the patient level.       Who prescribed the medication?: Dr. Eric Jara    Do you need a refill? No    Could we send this information to you in [facility] or would you prefer to receive a phone call?:   Pharmacy would prefer a phone call   Okay to leave a detailed message?: Yes  [facility] [phone]

## 2024-05-03 NOTE — PROGRESS NOTES
Assessment & Plan     Encounter for medical examination to establish care  Establish care new patient today.  Previously a patient of Kia Schumacher - KAUSHAL billingsley. Information was pulled from care everywhere to the extent that it was available.     Reviewed past medical history, current medications, and current specialist receiving  We did not get a chance to update the problem list today  -Patient will take a photo of her medications on her pill pack and send it back to me if she does not recall what in her pill pack  -Made some adjustments on her blood pressure medications today  -Also made adjustments on her asthma and allergy medications today    Followed by psychiatrist  Followed by therapist  Followed by [facility] for gynecology     Patient uses pill packs    Screen for colon cancer  discussed difference between colonoscopy, FIT testing Cologuard.  Patient has previously done fit test but opts to do Cologuard this year.  Order placed  - COLOGUARD([employer])    Seasonal allergic rhinitis, unspecified trigger  Patient with seasonal allergies refilling cetirizine 10 mg patient to take this daily.  Patient also should take montelukast 10 mg daily.  Refilled both of these today   - cetirizine (ZYRTEC) 10 MG tablet  Dispense: 90 tablet; Refill: 2    Obesity (BMI 35.0-39.9) with comorbidity (H)  Briefly discussed lifestyle modifications today    Fatty liver  Patient with a history of fatty liver disease, as seen on previous imaging.  Normal hepatic function seen on previous lab results    Uterine leiomyoma, unspecified location  Followed by [facility].  Patient to have surgery in the near future.  Continue MRI scan for next week    Moderate persistent asthma without complication  Patient with uncontrolled asthma per her report.  Increasing the dose of Symbicort to a higher dose medication.  Patient to take 2 puffs twice daily rinsing and spitting afterward.  Education given on this.   Patient to continue albuterol as needed.  Currently using albuterol nebulizer at night before bed regardless of having symptoms we will recheck a CT at follow-up appointment.    See above  - budesonide-formoterol (SYMBICORT) 160-4.5 MCG/ACT Inhaler  Dispense: 10.2 g; Refill: 5  - montelukast (SINGULAIR) 10 MG tablet  Dispense: 90 tablet; Refill: 1    Asthma, moderate persistent, uncomplicated  See above  - budesonide-formoterol (SYMBICORT) 160-4.5 MCG/ACT Inhaler  Dispense: 10.2 g; Refill: 5  - montelukast (SINGULAIR) 10 MG tablet  Dispense: 90 tablet; Refill: 1    Essential hypertension  Patient with essential hypertension on metoprolol and losartan 50 mg.  Patient with no history of palpitations and given patient on a beta-blocker and has asthma and does have chronic fatigue will slowly decrease metoprolol and increase losartan.  Discussed with patient to decrease metoprolol from 50 mg to 25 mg and to increase losartan from 25 mg to 50 mg today.  Patient to follow-up with me in 2 to 4 weeks with blood pressure readings.  Blood pressure normal to low at that time will increase losartan again and to decrease metoprolol to 12.5 mg for 2 weeks.  Given beta-blockers risk of rebound tachycardia we will slowly titrate this medication  -Checking routine labs for electrolytes and renal function  - metoprolol succinate ER (TOPROL XL) 25 MG 24 hr tablet  Dispense: 30 tablet; Refill: 0  - Basic metabolic panel  (Ca, Cl, CO2, Creat, Gluc, K, Na, BUN)  - Basic metabolic panel  (Ca, Cl, CO2, Creat, Gluc, K, Na, BUN)  - losartan (COZAAR) 50 MG tablet  Dispense: 90 tablet; Refill: 0    History of headache  Patient with a history of headaches.  She does take gabapentin daily which she feels keeps her headaches away.  She is also on rizatriptan as needed for severe headache.  She feels gabapentin is very helpful at preventing her from having headaches  - gabapentin (NEURONTIN) 100 MG capsule  Dispense: 180 capsule; Refill:  "1    Allergic reaction, subsequent encounter  Patient with multiple allergies and anaphylaxis with some of these.  Have not been refilled  - EPINEPHrine (ANY BX GENERIC EQUIV) 0.3 MG/0.3ML injection 2-pack  Dispense: 2 each; Refill: 1    Low serum vitamin B12  Per lab review patient has had low B12 in the past with B12 injections.  She is getting this rechecked today and if continues to be low will do B12 injection at follow-up appointment  - Vitamin B12  - CBC with platelets  - Vitamin B12  - CBC with platelets              BMI  Estimated body mass index is 38.26 kg/m  as calculated from the following:    Height as of this encounter: 1.651 m (5' 5\").    Weight as of this encounter: 104.3 kg (229 lb 14.4 oz).             Rosemarie Farmer is a 48 year old, presenting for the following health issues:  Establish Care (Est care, regular check, blood work, sick for three days with stomach bug, and running nose. Patient has an OB so she's going to do her pap smear with them.)        5/3/2024    10:45 AM   Additional Questions   Roomed by PHILLIP-LPN   Accompanied by      History of Present Illness     Asthma:  She presents for follow up of asthma.  She has some cough, some wheezing, and some shortness of breath.  She is using a relief medication daily. She does not miss any doses of her controller medication throughout the week. Patient is aware of the following triggers: unaware of any triggers, cold air, dust mites, exercise or sports, humidity, insects/rodents, mold, pollens, smoke and strong odors and fumes. The patient has had a visit to the Emergency Room, Urgent Care or Hospital due to asthma since the last clinic visit. She has been to the Emergency Room or Urgent Care 1 time.She has had a Hospitalization 1 time.    Back Pain:  She presents for follow up of back pain. Patient's back pain is a new problem.    Original cause of back pain: a fall  First noticed back pain: more than 1 month ago  Patient feels " back pain: a few times per monthLocation of back pain:  Right lower back, left lower back, right hip and left hip  Description of back pain: cramping, dull ache and stabbing  Back pain spreads: left side of neck    Since patient first noticed back pain, pain is: always present, but gets better and worse  Does back pain interfere with her job:  Not applicable  On a scale of 1-10 (10 being the worst), patient describes pain as:  7  What makes back pain worse: bending, certain positions, standing, stress and twisting   Acupuncture: not helpful  Acetaminophen: not helpful  Activity or exercise: not helpful  Chiropractor:  Not helpful  Cold: not helpful  Heat: helpful  Massage: not helpful  Muscle relaxants: not helpful  NSAIDS: not helpful  Opioids: not helpful  Physical Therapy: helpful  Rest: helpful  Steroid Injection: not helpful  Stretching: not helpful  Surgery: not helpful  TENS unit: not helpful  Topical pain relievers: not helpful  Other healthcare providers patient is seeing for back pain: None    Mental Health Follow-up:  Patient presents to follow-up on Depression & Anxiety.Patient's depression since last visit has been:  Medium  The patient is having other symptoms associated with depression.  Patient's anxiety since last visit has been:  Medium  The patient is having other symptoms associated with anxiety.  Any significant life events: financial concerns, housing concerns, grief or loss and health concerns  Patient is not feeling anxious or having panic attacks.  Patient has no concerns about alcohol or drug use.    Diabetes:   She presents for follow up of diabetes.    She is not checking blood glucose.         She has no concerns regarding her diabetes at this time.  She is having weight gain.            Hypertension: She presents for follow up of hypertension.  She does check blood pressure  regularly outside of the clinic. Outside blood pressures have been over 140/90. She does not follow a low salt  diet.     She eats 2-3 servings of fruits and vegetables daily.She consumes 2 sweetened beverage(s) daily.She exercises with enough effort to increase her heart rate 9 or less minutes per day.  She exercises with enough effort to increase her heart rate 3 or less days per week.   She is taking medications regularly.     Lives alone - bubble pack -has a  who ordered medication for her - she is not in charge of medications (was followed by [employer])       Asthma: Symbicort and albuterol - and neb every night - albuterol is listed in her med list.      Allergies:   Doesn't like the sprays she used - would like to take something orally  Pollen and mold -     Stomach bug for three days last week - had a fever for a while and this broke and went back to normal - this has since resolved - back eating again. When she first broke the fever - started soft food and not vomiting or stomach bug      Last vitamin B12 was 11/3/24     preDiabetes - A1c 6.3% 3/6/24  Has had weight gain and weight loss.     Goes to [facility] - surgery - something with cervix and uterus - very heavy periods - irregular with a lot of pain - going to remove uterus and cervix. Has an open MRI next week and in 2 weeks will make a plan.     Post menopause - estradiol patch. Prometrium 200mg cap    Hip problems - [facility] ever year  Hx of knee and hip replacement surgery  - no pain today .     Psychiatrist for therapy at [facility] in [facility] - counselor every 3 weeks and psychiatrist every 3 months. Valium, prazosin,     Athletes foot - terbinafine - as needed - went to foot clinic previously     HTN - losartan and 25mg , metoprolol 50mg ER    Migraine HA- takes gabapentin 100mg BID, rizatriptan as needed     GERD - as needed - Famotidine - takes rarely  - triggers: carbonated beverages, spicy foods.     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                     Objective    /78   Pulse 83   Temp 98.3  F (36.8  C)  "(Oral)   Resp 16   Ht 1.651 m (5' 5\")   Wt 104.3 kg (229 lb 14.4 oz)   LMP  (LMP Unknown)   SpO2 95%   BMI 38.26 kg/m    Body mass index is 38.26 kg/m .  Physical Exam   GENERAL: alert and no distress, wearing thick glasses, deaf speaking sign language only with use of   NECK: no adenopathy, no asymmetry, masses, or scars  RESP: lungs clear to auscultation - no rales, rhonchi or wheezes  CV: regular rate and rhythm, normal S1 S2, no S3 or S4, no murmur, click or rub, no peripheral edema  ABDOMEN: Adiposity  MS: no gross musculoskeletal defects noted, no edema            Signed Electronically by: MARU Goff CNP    "

## 2024-05-04 LAB
ANION GAP SERPL CALCULATED.3IONS-SCNC: 10 MMOL/L (ref 7–15)
BUN SERPL-MCNC: 9 MG/DL (ref 6–20)
CALCIUM SERPL-MCNC: 10.4 MG/DL (ref 8.6–10)
CHLORIDE SERPL-SCNC: 104 MMOL/L (ref 98–107)
CREAT SERPL-MCNC: 0.87 MG/DL (ref 0.51–0.95)
DEPRECATED HCO3 PLAS-SCNC: 25 MMOL/L (ref 22–29)
EGFRCR SERPLBLD CKD-EPI 2021: 81 ML/MIN/1.73M2
GLUCOSE SERPL-MCNC: 129 MG/DL (ref 70–99)
POTASSIUM SERPL-SCNC: 4 MMOL/L (ref 3.4–5.3)
SODIUM SERPL-SCNC: 139 MMOL/L (ref 135–145)
VIT B12 SERPL-MCNC: 221 PG/ML (ref 232–1245)

## 2024-05-06 ENCOUNTER — TELEPHONE (OUTPATIENT)
Dept: FAMILY MEDICINE | Facility: CLINIC | Age: 49
End: 2024-05-06
Payer: COMMERCIAL

## 2024-05-06 NOTE — RESULT ENCOUNTER NOTE
MA to please call patient to schedule a MA visit for B12 injection between on May 14 -standing order should be pending already    Will discuss this at your follow-up your calcium was slightly high and your glucose was also slightly high but this also is consistent with a nonfasting state.    Your vitamin B12 is low.  I would recommend getting a B12 injection -you can schedule a MA only visit to have this done between now and may 14th.  You do have an appointment with me June 14 to get a second injection as I recommend getting these 30 days apart    Do not have any anemia at this time    Please let me know if you have any questions or concerns.    Thank you for trusting us with your care. It was a pleasure seeing you.  MARU Goff CNP on 5/6/2024 at 4:22 PM

## 2024-05-06 NOTE — TELEPHONE ENCOUNTER
"Pt wondering about lab results, stating she received a call from a nurse and wondering about her B12 results. Relayed results and following message from Brianna Hurst CNP's note 5/3: \"Low serum vitamin B12  Per lab review patient has had low B12 in the past with B12 injections.  She is getting this rechecked today and if continues to be low will do B12 injection at follow-up appointment\"    Pt agreeable to plan of care. No further questions.     Amita GARCIA RN  "

## 2024-05-07 ENCOUNTER — MYC MEDICAL ADVICE (OUTPATIENT)
Dept: INTERNAL MEDICINE | Facility: CLINIC | Age: 49
End: 2024-05-07
Payer: COMMERCIAL

## 2024-05-08 ENCOUNTER — TELEPHONE (OUTPATIENT)
Dept: FAMILY MEDICINE | Facility: CLINIC | Age: 49
End: 2024-05-08

## 2024-05-08 ENCOUNTER — ALLIED HEALTH/NURSE VISIT (OUTPATIENT)
Dept: FAMILY MEDICINE | Facility: CLINIC | Age: 49
End: 2024-05-08
Payer: COMMERCIAL

## 2024-05-08 DIAGNOSIS — E53.8 VITAMIN B12 DEFICIENCY (NON ANEMIC): Primary | ICD-10-CM

## 2024-05-08 PROCEDURE — 99207 PR NO CHARGE NURSE ONLY: CPT

## 2024-05-08 PROCEDURE — 96372 THER/PROPH/DIAG INJ SC/IM: CPT | Performed by: NURSE PRACTITIONER

## 2024-05-08 RX ADMIN — CYANOCOBALAMIN 1000 MCG: 1000 INJECTION, SOLUTION INTRAMUSCULAR; SUBCUTANEOUS at 13:18

## 2024-05-17 LAB — NONINV COLON CA DNA+OCC BLD SCRN STL QL: NEGATIVE

## 2024-05-29 DIAGNOSIS — I10 ESSENTIAL HYPERTENSION: ICD-10-CM

## 2024-05-31 DIAGNOSIS — I10 PRIMARY HYPERTENSION: ICD-10-CM

## 2024-05-31 DIAGNOSIS — M54.50 ACUTE RIGHT-SIDED LOW BACK PAIN WITHOUT SCIATICA: ICD-10-CM

## 2024-05-31 DIAGNOSIS — J30.2 SEASONAL ALLERGIC RHINITIS, UNSPECIFIED TRIGGER: ICD-10-CM

## 2024-05-31 DIAGNOSIS — T78.40XD ALLERGIC REACTION, SUBSEQUENT ENCOUNTER: ICD-10-CM

## 2024-05-31 DIAGNOSIS — J45.40 MODERATE PERSISTENT ASTHMA WITHOUT COMPLICATION: ICD-10-CM

## 2024-05-31 DIAGNOSIS — F40.240 CLAUSTROPHOBIA: ICD-10-CM

## 2024-05-31 DIAGNOSIS — I10 ESSENTIAL HYPERTENSION: ICD-10-CM

## 2024-05-31 DIAGNOSIS — G43.909 MIGRAINE: ICD-10-CM

## 2024-05-31 DIAGNOSIS — G47.00 PERSISTENT INSOMNIA: ICD-10-CM

## 2024-05-31 DIAGNOSIS — Z87.898 HISTORY OF HEADACHE: ICD-10-CM

## 2024-05-31 DIAGNOSIS — B35.3 TINEA PEDIS OF BOTH FEET: ICD-10-CM

## 2024-05-31 RX ORDER — FAMOTIDINE 20 MG/1
20 TABLET, FILM COATED ORAL 2 TIMES DAILY
Qty: 30 TABLET | Refills: 0 | OUTPATIENT
Start: 2024-05-31

## 2024-05-31 RX ORDER — ESTRADIOL 0.05 MG/D
PATCH, EXTENDED RELEASE TRANSDERMAL
OUTPATIENT
Start: 2024-05-31

## 2024-05-31 RX ORDER — METOPROLOL SUCCINATE 25 MG/1
25 TABLET, EXTENDED RELEASE ORAL DAILY
Qty: 30 TABLET | Refills: 0 | OUTPATIENT
Start: 2024-05-31

## 2024-05-31 RX ORDER — LIDOCAINE 50 MG/G
OINTMENT TOPICAL
OUTPATIENT
Start: 2024-05-31

## 2024-05-31 RX ORDER — QUETIAPINE FUMARATE 50 MG/1
50 TABLET, FILM COATED ORAL AT BEDTIME
OUTPATIENT
Start: 2024-05-31

## 2024-05-31 RX ORDER — RIZATRIPTAN BENZOATE 10 MG/1
10 TABLET ORAL
Qty: 18 TABLET | Refills: 2 | OUTPATIENT
Start: 2024-05-31

## 2024-05-31 RX ORDER — GABAPENTIN 100 MG/1
CAPSULE ORAL
Qty: 180 CAPSULE | Refills: 1 | OUTPATIENT
Start: 2024-05-31

## 2024-05-31 RX ORDER — ACETAMINOPHEN 325 MG/1
325-650 TABLET ORAL EVERY 6 HOURS PRN
OUTPATIENT
Start: 2024-05-31

## 2024-05-31 RX ORDER — DILTIAZEM HYDROCHLORIDE 60 MG/1
2 TABLET, FILM COATED ORAL 2 TIMES DAILY
Qty: 10.2 G | Refills: 11 | OUTPATIENT
Start: 2024-05-31

## 2024-05-31 RX ORDER — PRENATAL VIT 91/IRON/FOLIC/DHA 28-975-200
COMBINATION PACKAGE (EA) ORAL 2 TIMES DAILY
Qty: 30 G | Refills: 2 | OUTPATIENT
Start: 2024-05-31

## 2024-05-31 RX ORDER — CETIRIZINE HYDROCHLORIDE 10 MG/1
10 TABLET ORAL DAILY
Qty: 90 TABLET | Refills: 2 | OUTPATIENT
Start: 2024-05-31

## 2024-05-31 RX ORDER — MONTELUKAST SODIUM 10 MG/1
10 TABLET ORAL AT BEDTIME
Qty: 90 TABLET | Refills: 1 | OUTPATIENT
Start: 2024-05-31

## 2024-05-31 RX ORDER — PROGESTERONE 200 MG/1
CAPSULE ORAL
OUTPATIENT
Start: 2024-05-31

## 2024-05-31 RX ORDER — DIAZEPAM 5 MG
TABLET ORAL
Qty: 2 TABLET | Refills: 0 | OUTPATIENT
Start: 2024-05-31

## 2024-05-31 RX ORDER — EPINEPHRINE 0.3 MG/.3ML
0.3 INJECTION SUBCUTANEOUS PRN
Qty: 2 EACH | Refills: 1 | OUTPATIENT
Start: 2024-05-31

## 2024-05-31 RX ORDER — ALBUTEROL SULFATE 0.83 MG/ML
SOLUTION RESPIRATORY (INHALATION)
Qty: 180 ML | Refills: 11 | OUTPATIENT
Start: 2024-05-31

## 2024-05-31 RX ORDER — ALBUTEROL SULFATE 90 UG/1
AEROSOL, METERED RESPIRATORY (INHALATION)
Qty: 8.5 G | Refills: 1 | OUTPATIENT
Start: 2024-05-31

## 2024-05-31 RX ORDER — HYDROXYZINE HYDROCHLORIDE 25 MG/1
TABLET, FILM COATED ORAL
Qty: 168 TABLET | Refills: 0 | OUTPATIENT
Start: 2024-05-31

## 2024-05-31 RX ORDER — PRAZOSIN HYDROCHLORIDE 1 MG/1
CAPSULE ORAL
OUTPATIENT
Start: 2024-05-31

## 2024-05-31 RX ORDER — ONDANSETRON 4 MG/1
4 TABLET, ORALLY DISINTEGRATING ORAL EVERY 8 HOURS PRN
Qty: 16 TABLET | Refills: 0 | OUTPATIENT
Start: 2024-05-31

## 2024-05-31 RX ORDER — BUDESONIDE AND FORMOTEROL FUMARATE DIHYDRATE 160; 4.5 UG/1; UG/1
2 AEROSOL RESPIRATORY (INHALATION) 2 TIMES DAILY
Qty: 10.2 G | Refills: 5 | OUTPATIENT
Start: 2024-05-31

## 2024-05-31 RX ORDER — METOPROLOL SUCCINATE 25 MG/1
25 TABLET, EXTENDED RELEASE ORAL DAILY
Qty: 30 TABLET | Refills: 9 | Status: SHIPPED | OUTPATIENT
Start: 2024-05-31

## 2024-05-31 RX ORDER — LOSARTAN POTASSIUM 50 MG/1
25 TABLET ORAL DAILY
Qty: 90 TABLET | Refills: 0 | OUTPATIENT
Start: 2024-05-31

## 2024-05-31 RX ORDER — POLYETHYLENE GLYCOL 3350 17 G/17G
17 POWDER, FOR SOLUTION ORAL DAILY
Qty: 527 G | Refills: 0 | OUTPATIENT
Start: 2024-05-31

## 2024-05-31 NOTE — TELEPHONE ENCOUNTER
New Medication Request        What medication are you requesting?: PER PT, ALL OF THEM WOULDN'T SPECIFY     Reason for medication request: PATIENT OUT OF MEDICATIONS     Have you taken this medication before?: Yes    Controlled Substance Agreement on file:   CSA -- Patient Level:    CSA: None found at the patient level.         Patient offered an appointment? No    Preferred Pharmacy:   [facility] [place] [id number] - [place], [place] - [address] [place] [address]  Phone: [phone] Fax: [phone]      Could we send this information to you in [employer] or would you prefer to receive a phone call?:   Patient would prefer a phone call   Okay to leave a detailed message?: Yes at Cell number on file:    Telephone Information:   Mobile [phone]

## 2024-05-31 NOTE — TELEPHONE ENCOUNTER
Patient will need in person for refill of all of these medications - particularly the ones that are self reported.     Please asked the patient if she has any medications that    Please schedule with me in any blocked spot. May be virtual    Thank you

## 2024-06-03 NOTE — TELEPHONE ENCOUNTER
LMTCB if/when patient calls, please relay provider message and schedule an office visit for prescriptions refills thanks.  Mayra Andino MA on 6/3/2024 at 10:29 AM

## 2024-06-05 DIAGNOSIS — J45.40 MODERATE PERSISTENT ASTHMA WITHOUT COMPLICATION: ICD-10-CM

## 2024-06-06 ENCOUNTER — TRANSFERRED RECORDS (OUTPATIENT)
Dept: HEALTH INFORMATION MANAGEMENT | Facility: CLINIC | Age: 49
End: 2024-06-06
Payer: COMMERCIAL

## 2024-06-07 RX ORDER — ALBUTEROL SULFATE 90 UG/1
AEROSOL, METERED RESPIRATORY (INHALATION)
Qty: 6.7 G | Refills: 2 | Status: SHIPPED | OUTPATIENT
Start: 2024-06-07

## 2024-06-10 ENCOUNTER — TRANSFERRED RECORDS (OUTPATIENT)
Dept: HEALTH INFORMATION MANAGEMENT | Facility: CLINIC | Age: 49
End: 2024-06-10
Payer: COMMERCIAL

## 2024-06-12 PROBLEM — G47.33 OBSTRUCTIVE SLEEP APNEA SYNDROME: Status: RESOLVED | Noted: 2021-03-03 | Resolved: 2024-06-12

## 2024-06-18 ENCOUNTER — NURSE TRIAGE (OUTPATIENT)
Dept: FAMILY MEDICINE | Facility: CLINIC | Age: 49
End: 2024-06-18
Payer: COMMERCIAL

## 2024-06-18 NOTE — TELEPHONE ENCOUNTER
"S-(situation): Patient calling with  for 2 days of intermittent eye rolling/movement with blurry vision and whitish eye drainage. No pain/injury or fever. No HA or other sxs.    B-(background): Sxs came on while walking outside, but resolved with rest/sleep after she got home from her walk. Total of two episodes in the past 2 days. Has regular eye appt set for 7/16.    A-(assessment): Unknown cause of blurry vision/abnormal eye movement.    R-(recommendations): Needs to be seen today in clinic, otherwise recommended UC or ED if sxs return.worsen. Routing to PCP team for second level triage.    Leann Gonzalez RN            Reason for Disposition   Blurred vision and new or worsening    Additional Information   Negative: Followed an eye injury   Negative: Eye pain from chemical in the eye   Negative: Eye pain from foreign body in eye   Negative: Has sinus pain or pressure   Negative: SEVERE eye pain   Negative: Complete loss of vision in one or both eyes   Negative: Eyelids are very swollen (shut or almost) and fever   Negative: Eyelid (outer) is very red and fever   Negative: Foreign body sensation ('feels like something is in there') and irrigation didn't help    Answer Assessment - Initial Assessment Questions  1. ONSET: \"When did the pain start?\" (e.g., minutes, hours, days)      2 days ago  2. TIMING: \"Does the pain come and go, or has it been constant since it started?\" (e.g., constant, intermittent, fleeting)      Intermittent with activity. Episodes of eye shaking/blurry vision lasted unil she feel asleep after activity  3. SEVERITY: \"How bad is the pain?\"   (Scale 1-10; mild, moderate or severe)    - MILD (1-3): doesn't interfere with normal activities     - MODERATE (4-7): interferes with normal activities or awakens from sleep     - SEVERE (8-10): excruciating pain and patient unable to do normal activities      No pain  4. LOCATION: \"Where does it hurt?\"  (e.g., eyelid, eye, cheekbone)      NO " "pain.   5. CAUSE: \"What do you think is causing the pain?\"      No  pain present. Unsure what is causing her eyes to move/roll around  6. VISION: \"Do you have blurred vision or changes in your vision?\"       States her eyes started shaking with activity/walking. Notes blurry vision when this happens  7. EYE DISCHARGE: \"Is there any discharge (pus) from the eye(s)?\"  If Yes, ask: \"What color is it?\"       Yes, whitish drainage noted from both eyes  8. FEVER: \"Do you have a fever?\" If Yes, ask: \"What is it, how was it measured, and when did it start?\"       No  9. OTHER SYMPTOMS: \"Do you have any other symptoms?\" (e.g., headache, nasal discharge, facial rash)      No other sxs  10. PREGNANCY: \"Is there any chance you are pregnant?\" \"When was your last menstrual period?\"        Doesn't get period anymore    Protocols used: Eye Pain and Other Symptoms-A-[place]    "

## 2024-06-18 NOTE — TELEPHONE ENCOUNTER
Called to patient, have  with phone, relayed provider's recommendation.     Pt agreeable to UC. No further questions at this time.     Pay P. RN

## 2024-06-20 ENCOUNTER — APPOINTMENT (OUTPATIENT)
Dept: CT IMAGING | Facility: CLINIC | Age: 49
End: 2024-06-20
Attending: EMERGENCY MEDICINE
Payer: COMMERCIAL

## 2024-06-20 ENCOUNTER — TRANSFERRED RECORDS (OUTPATIENT)
Dept: MULTI SPECIALTY CLINIC | Facility: CLINIC | Age: 49
End: 2024-06-20

## 2024-06-20 ENCOUNTER — HOSPITAL ENCOUNTER (EMERGENCY)
Facility: CLINIC | Age: 49
Discharge: HOME | End: 2024-06-21
Attending: EMERGENCY MEDICINE | Admitting: EMERGENCY MEDICINE
Payer: COMMERCIAL

## 2024-06-20 DIAGNOSIS — T74.21XA SEXUAL ASSAULT OF ADULT, INITIAL ENCOUNTER: ICD-10-CM

## 2024-06-20 DIAGNOSIS — S00.83XA CONTUSION OF JAW, INITIAL ENCOUNTER: ICD-10-CM

## 2024-06-20 LAB — PAP SMEAR - HIM PATIENT REPORTED: NORMAL

## 2024-06-20 PROCEDURE — 99284 EMERGENCY DEPT VISIT MOD MDM: CPT | Mod: 25

## 2024-06-20 PROCEDURE — 70450 CT HEAD/BRAIN W/O DYE: CPT

## 2024-06-20 PROCEDURE — 70486 CT MAXILLOFACIAL W/O DYE: CPT

## 2024-06-20 ASSESSMENT — ACTIVITIES OF DAILY LIVING (ADL): ADLS_ACUITY_SCORE: 33

## 2024-06-21 VITALS
SYSTOLIC BLOOD PRESSURE: 189 MMHG | TEMPERATURE: 98.3 F | DIASTOLIC BLOOD PRESSURE: 85 MMHG | RESPIRATION RATE: 18 BRPM | OXYGEN SATURATION: 95 % | HEART RATE: 81 BPM

## 2024-06-21 PROCEDURE — 250N000013 HC RX MED GY IP 250 OP 250 PS 637: Performed by: EMERGENCY MEDICINE

## 2024-06-21 PROCEDURE — 250N000009 HC RX 250: Performed by: EMERGENCY MEDICINE

## 2024-06-21 PROCEDURE — 96372 THER/PROPH/DIAG INJ SC/IM: CPT | Performed by: EMERGENCY MEDICINE

## 2024-06-21 PROCEDURE — 250N000011 HC RX IP 250 OP 636: Mod: JZ | Performed by: EMERGENCY MEDICINE

## 2024-06-21 RX ORDER — DOXYCYCLINE 100 MG/1
100 CAPSULE ORAL 2 TIMES DAILY
Qty: 14 CAPSULE | Refills: 0 | Status: SHIPPED | OUTPATIENT
Start: 2024-06-21 | End: 2024-06-21

## 2024-06-21 RX ORDER — OXYCODONE HYDROCHLORIDE 5 MG/1
5 TABLET ORAL ONCE
Status: COMPLETED | OUTPATIENT
Start: 2024-06-21 | End: 2024-06-21

## 2024-06-21 RX ORDER — DOXYCYCLINE 100 MG/1
100 CAPSULE ORAL 2 TIMES DAILY
Qty: 14 CAPSULE | Refills: 0 | Status: SHIPPED | OUTPATIENT
Start: 2024-06-21 | End: 2024-08-06

## 2024-06-21 RX ORDER — DOXYCYCLINE 100 MG/1
100 CAPSULE ORAL ONCE
Status: COMPLETED | OUTPATIENT
Start: 2024-06-21 | End: 2024-06-21

## 2024-06-21 RX ORDER — METRONIDAZOLE 500 MG/1
500 TABLET ORAL 2 TIMES DAILY
Qty: 14 TABLET | Refills: 0 | Status: SHIPPED | OUTPATIENT
Start: 2024-06-21 | End: 2024-06-28

## 2024-06-21 RX ORDER — METRONIDAZOLE 500 MG/1
500 TABLET ORAL ONCE
Status: COMPLETED | OUTPATIENT
Start: 2024-06-21 | End: 2024-06-21

## 2024-06-21 RX ADMIN — LIDOCAINE HYDROCHLORIDE 500 MG: 10 INJECTION, SOLUTION EPIDURAL; INFILTRATION; INTRACAUDAL; PERINEURAL at 03:30

## 2024-06-21 RX ADMIN — DOXYCYCLINE HYCLATE 100 MG: 100 CAPSULE ORAL at 03:30

## 2024-06-21 RX ADMIN — OXYCODONE HYDROCHLORIDE 5 MG: 5 TABLET ORAL at 00:21

## 2024-06-21 RX ADMIN — METRONIDAZOLE 500 MG: 500 TABLET, FILM COATED ORAL at 03:30

## 2024-06-21 ASSESSMENT — ACTIVITIES OF DAILY LIVING (ADL)
ADLS_ACUITY_SCORE: 35

## 2024-06-21 NOTE — ED NOTES
Patient declined staying after infection to monitor for side effects, said that she had the injection in the past.  Tried to explain when she gets home and feels like her breathing or throat closing that is a 911 call.  Patient said through  that she has a nebulizer at home.  Explained that if she is having sxs the nebulizer wouldn't help.
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
## 2024-06-21 NOTE — ED NOTES
Patient anxious to leave, declined getting discharge vital signs when  blood pressure cuff was re inflating.

## 2024-06-28 ENCOUNTER — PATIENT OUTREACH (OUTPATIENT)
Dept: CARE COORDINATION | Facility: CLINIC | Age: 49
End: 2024-06-28
Payer: COMMERCIAL

## 2024-06-28 NOTE — TELEPHONE ENCOUNTER
"  Assessment & Plan     (J06.9) Upper respiratory tract infection, unspecified type  (primary encounter diagnosis)  Comment: Pt has been coughing for 3 weeks. Initially being supine triggers coughing more. Pt is finally able to lie in bed without the coughing keeping her up. Pt has persistent unproductive cough, no fevers, night sweats or chills. Pt reports SOB at rest. RR: 26. HR: 49  Plan: benzonatate (TESSALON) 200 MG capsule,         guaiFENesin (MUCINEX) 600 MG 12 hr tablet, XR         Chest 2 Views, CBC with platelets and         differential, Respiratory Panel PCR        Results pending prescription sent to pharmacy          BMI  Estimated body mass index is 27.87 kg/m  as calculated from the following:    Height as of this encounter: 1.549 m (5' 1\").    Weight as of this encounter: 66.9 kg (147 lb 8 oz).             Porter Stark is a 78 year old, presenting for the following health issues: Pt has been coughing for 3 weeks. Initially being supine triggers coughing more. Pt is finally able to lie in bed without the coughing keeping her up. Pt has persistent unproductive cough, no fevers, night sweats or chills. Pt reports SOB at rest. RR: 26. PT denies any sinus pressure. Pt denies tinnitus. Pt denies sore throat. Pt denies any N&V, regular BM's. Pt denies any dysuria. Pt denies nasal congestion.     Provided literature for Tessalon Perles and guaifenesin and how to manage upper respiratory infection naturally at home.  There are so many viruses going on that are taking a long time to subside.  They were worried about her blood pressure.  The initial systolic blood pressure is 174.  The second systolic blood pressure was 154.     Cough (Cough x 3 weeks.)      6/28/2024     2:29 PM   Additional Questions   Roomed by Thais Kwok MA   Accompanied by Her Son     HPI               Review of Systems  Constitutional, HEENT, cardiovascular, pulmonary, gi and gu systems are negative, except as otherwise " ". [phone].  Lump in left breast. She'll need diagnostic and ultrasound of left breast. Found it yesterday and told to call. Called MD and he's retired. Wants to get an appointment.  I connected with scheduling for an appointment within the next three days and advised urgent care if they can't get her in.  Dasia Doyle RN  [facility]      Dasia Doyle RN  [facility]      Reason for Disposition    Breast lump    Additional Information    Negative: Chest pain    Negative: Breastfeeding questions about baby    Negative: Breastfeeding questions about mother (breast symptoms or feeling sick)    Negative: Breastfeeding questions about mother's medicines and diet    Negative: Postpartum breast pain and swelling, not breastfeeding    Negative: Small spot, skin growth or mole    Negative: SEVERE breast pain and fever > 103 F  (39.4 C)    Negative: Patient sounds very sick or weak to the triager    Negative: Breast looks infected (spreading redness, feels hot or painful to touch) and fever    Negative: Breast looks infected (spreading redness, feels hot or painful to touch) and no fever    Negative: Painful rash and multiple small blisters grouped together (i.e., dermatomal distribution or \"band\" or \"stripe\")    Negative: Cuts, burns, or bruises of breasts and suspicious history for the injury    Protocols used: BREAST SYMPTOMS-A-OH      " noted.      Objective    There were no vitals taken for this visit.  There is no height or weight on file to calculate BMI.  Physical Exam   GENERAL: alert and no distress  HENT: ear canals and TM's normal, nose and mouth without ulcers or lesions  NECK: no adenopathy, no asymmetry, masses, or scars  RESP: inspiratory wheezes L upper anterior and L mid anterior and decreased breath sounds throughout posteriorly  CV: regular rate and rhythm, normal S1 S2, no S3 or S4, no murmur, click or rub, slight peripheral nonpitting pedal edema on the left  ABDOMEN: soft, nontender, no hepatosplenomegaly, no masses and bowel sounds normal  NEURO: Normal strength and tone, mentation intact and speech normal  PSYCH: mentation appears normal, affect normal/bright            Signed Electronically by: GRIS Noland CNP

## 2024-06-28 NOTE — PROGRESS NOTES
Clinical Product Navigator reviewed chart.   CPN Initial Information Gathering  Referral Source: ED Follow-Up  Referrals Places: Care Coordination    Patient identified internally due to high utilization with recent discharge for care management support with high probability risk of admission.      [facility] discharge report identified patient having had 10 ED admissions and 1 hospital admissions in the last 6 months. Per chart review, patient has high risk score for hospital admission or ED visit of 96.6%.     Patient would benefit from care management support for chronic disease management.    Met referral criteria for Care Coordinator; referral to be sent.    DEMETRA Deluca  Social Work Care Coordinator   Clinical Product Navigation

## 2024-07-01 ENCOUNTER — TRANSFERRED RECORDS (OUTPATIENT)
Dept: HEALTH INFORMATION MANAGEMENT | Facility: CLINIC | Age: 49
End: 2024-07-01
Payer: COMMERCIAL

## 2024-07-01 ENCOUNTER — PATIENT OUTREACH (OUTPATIENT)
Dept: CARE COORDINATION | Facility: CLINIC | Age: 49
End: 2024-07-01
Payer: COMMERCIAL

## 2024-07-01 NOTE — PROGRESS NOTES
Clinic Care Coordination Contact  Care Coordination Clinician Chart Review    Situation: Patient chart reviewed by care coordinator.    Background: Clinic Care Coordination Referral placed by Clinical Product Navigator.    Assessment: Upon chart review, patient is not a candidate for Primary Care Clinic Care Coordination enrollment due to reason stated below:  Patient is receiving duplicative services from [facility] Case Management.    Plan/Recommendations: Clinic Care Coordination Referral/order cancelled. RN/ERNESTO CC will perform no further monitoring/outreaches at this time and will remain available as needed. If new needs arise, a new Care Coordination Referral may be placed. Pt has upcoming appointment with PCP. [facility] reached out to PCP and gave update.

## 2024-07-08 ASSESSMENT — SLEEP AND FATIGUE QUESTIONNAIRES
HOW LIKELY ARE YOU TO NOD OFF OR FALL ASLEEP WHILE WATCHING TV: MODERATE CHANCE OF DOZING
HOW LIKELY ARE YOU TO NOD OFF OR FALL ASLEEP IN A CAR, WHILE STOPPED FOR A FEW MINUTES IN TRAFFIC: HIGH CHANCE OF DOZING
HOW LIKELY ARE YOU TO NOD OFF OR FALL ASLEEP WHILE SITTING AND TALKING TO SOMEONE: MODERATE CHANCE OF DOZING
HOW LIKELY ARE YOU TO NOD OFF OR FALL ASLEEP WHILE SITTING AND READING: MODERATE CHANCE OF DOZING
HOW LIKELY ARE YOU TO NOD OFF OR FALL ASLEEP WHILE LYING DOWN TO REST IN THE AFTERNOON WHEN CIRCUMSTANCES PERMIT: HIGH CHANCE OF DOZING
HOW LIKELY ARE YOU TO NOD OFF OR FALL ASLEEP WHILE SITTING INACTIVE IN A PUBLIC PLACE: HIGH CHANCE OF DOZING
HOW LIKELY ARE YOU TO NOD OFF OR FALL ASLEEP WHILE SITTING QUIETLY AFTER LUNCH WITHOUT ALCOHOL: HIGH CHANCE OF DOZING
HOW LIKELY ARE YOU TO NOD OFF OR FALL ASLEEP WHEN YOU ARE A PASSENGER IN A CAR FOR AN HOUR WITHOUT A BREAK: HIGH CHANCE OF DOZING

## 2024-07-09 NOTE — PATIENT INSTRUCTIONS
Welcome to [facility]!      We are grateful that you have chosen us to partner with you on your journey to better health!  We have included some very important information for you to read as you begin your journey towards weight loss surgery. We will discuss parts of this as you move closer to surgery but please reach out if you have any questions or concerns.      Please click on the following links and they will lead you to a printable version of each handout or copy into your browser to view/print at home:    Making Your Decision, Understanding Weight Loss Surgery  [email or web address]    A Roadmap to Your Weight Loss Surgery  [email or web address]    Honoring Choices - Your Rights  [email or web address]    Honoring Choices - [place] Health Care Directive (form that can be filled out)  [email or web address]      Insurance Coverage and Approval for Surgery  Every insurance plan is different.  Many companies approve benefits for surgery, but many have specific requirements that must be completed prior to being approved.    Verification of benefits is the patient's responsibility.  You will be responsible for any charges not covered by your insurance plan - including, but not limited to copays, deductible, out of pocket, any amount over your cap limit, etc.  Using the guideline below, please contact your insurance plan (we recommend you call the Customer Service number on the back of your card).      Once all of the requirements for surgery are complete, you will see the surgeon.  Following this visit, we will submit your information to your insurance plan for Prior Authorization.  We strongly encourage you to submit any documentation that supports your weight loss attempts to us.    Questions that are important to ask your insurance company when you call them:    Are [facility] in my network?  Is bariatric surgery  a covered benefit under my policy?  If so, what is my estimated out of pocket expense?  Are there any exclusions or cap limits on my plan for bariatric surgery?  If so, what are they?  What are the criteria necessary to be approved for bariatric surgery?  Do you require medically supervised weight loss visits for approval of surgery?  If yes, for how many months?  Within the last # of years?  Are the following procedures a covered benefit under my plan?  Laparoscopic Gastric Bypass (CPT Code 02332)  Laparoscopic Sleeve Gastrectomy (CPT Code 66608)  Nutrition/Dietitian Consult (CPT Code 28246  Nutrition/Dietitian Reassessment (CPT Code 12035  Psychological Assessment (CPT Codes 41126 & 54944      Initial labs for Bariatric Surgery    Some lab orders were placed by your doctor in the [facility] system and can be drawn at any of our outpatient hospital labs or your clinic. If you do them at one of three hospitals ([facility] in [place], [facility] in [place], [facility] in [place]) you do not need an appointment but if you'd like to do them at a clinic, you will need to schedule an appointment with that clinic.       You will need to be fasting 10-12 hours prior (you can continue to drink water) and should have them drawn sooner verses later so if any corrections need to be made we will have time to address them.      [place]'s lab is open 7 am - 4:30 pm Monday through Friday and 9am-12:30 pm on Saturdays.  [facility]'s lab is open 7 am -4:30 pm Monday through Friday and 8am to 11:30am on Saturday and Sundays.     If you would like them done at a clinic outside the [facility] system, then we will need to know where to fax the orders.   If you have any questions or would like help scheduling a lab appointment at the [facility], please give us a call on the Bariatric Nurse Line at [phone].        [place] [facility] for Bariatric Surgery  Eat 3 nutrient-rich meals each day      Don't skip meals--it will cause you to overeat later in the day! Space meals 4-6 hours apart    Eat around the same times each day to develop a routine eating schedule    Avoid snacking unless physically hungry.   Planned snacks: 1-2 times per day and no more than 150 calories    Eating protein and fiber (vegetables/fruits/whole grains) with meals helps you stay full     Choose foods with less than 10 grams of sugar and 5-10 grams of fat per serving to prevent excess calories and weight gain  Eat protein first    Protein helps with healing, maintaining muscle mass and good nutrition, and reducing hunger     For RNY Gastric Bypass, Sleeve Gastrectomy, and Duodenal Switch: aim for 60-80 grams of protein per day    Eat protein first, then veggies and the fruits or grains  Stop drinking 15-30 minutes before meals and wait 30-45 minutes after eating to drink    Drinking too soon before a meal may cause you to be too full to eat    Drinking too soon after you eat will cause the food to  wash  through your new stomach too quickly--leaving you hungry and likely to eat more    Eat S-[id number]    Take 20-30 minutes to eat each meal by taking small bites, chewing foods to applesauce consistency or 20-30 times before you swallow    Not chewing food properly can block the opening of your pouch--causing pain, nausea, vomiting    Eating foods too fast can delay those full signals and increase overeating   Slow down your eating by smaller plates/bowls, toddler utensils, putting your fork/spoon down between bites and not watching TV/computer during meals!  Make a list of activities to prevent boredom, stress and emotional eating    Go for a walk or lift weights while watching your favorite TV show    Talk to a co-worker or email/call a friend     Keep your hands and mind busy with woodworking, puzzles, knitting or painting your nails    Practice deep breathing or meditation  Drink 64 ounces of 0-Calorie drinks between meals      Water    Zero calorie Propel , Vitamin Water Zero  or [employer] , Crystal Light , Sugar-Free Reyes-Aid , and other sugar-free lemonade or flavored diallo    Decaffeinated, unsweetened coffee/ tea (1 cup or less per day)   Avoid Caffeine and Carbonation- NO STRAWS    Caffeine can irritate your new pouch, increase risk for ulcers, and can increase your appetite      Carbonation can lead to increased bloating/gas and discomfort   Work up to a total of 30-60 minutes of physical activity most days of the week    Helps with losing weight and preventing weight regain after surgery     Do a combination of cardio (walking/water exercises/biking/swimming) and strength training  Take daily vitamin/mineral supplements after surgery    Daily use of vitamins/minerals is necessary for the rest of your life      Psychological Evaluation for Bariatric Surgery      Why do I need a psychological evaluation before bariatric surgery?     The psychologist's main purpose is to provide the support and education to ensure you have the most successful outcome after surgery.   Preparing for bariatric surgery often involves changing behavior patterns. Working on these changes before surgery allows you to achieve the best results after surgery as some of these behaviors have been entrenched for many years. In addition, your relationship with food may need to change. Psychologists are experts in behavior change and are there to guide and support you as you make these important changes.   A significant life change, like losing a lot of weight, may affect many areas of your life--self-concept, physical activity and relationships with others. Your psychologist will help you prepare to adjust to these changes in a healthy way.   If you have mental health symptoms, relationship struggles, or other challenges, your psychologist will suggest how to best address these concerns so that they do not interfere with your progress toward a healthier  lifestyle.       Is the psychologist looking for reasons to say I can't have surgery?   The goal is to not find specific psychological problems. Instead, the psychologist will be working with your strengths to ensure you have the most optimal outcome after surgery.  There is no expectation that you will have everything figured out already or that you must have  perfect  behaviors before moving forward with surgery. Your psychologist is expecting that you will have some behavior changes that will need to occur concurrent with the surgery.   You can feel comfortable that the psychologist is not there to    you or your habits. The psychologist is there to provide support and encourage your progress.      What should I expect during the evaluation?   During the interview, which could take place over 2 or more sessions, the psychologist will ask about:   -weight history, current eating pattern and fluid intake, and previous attempts at weight loss   -activity level   -psychiatric history  -drug, alcohol and nicotine use   -social and developmental history  -support system   -current stressors and coping mechanisms   -understanding of the risks of surgery   -expectations for outcomes after surgery   You will complete various questionnaires that will focus on coping strategies, eating behaviors, quality of life and adverse childhood experiences in order to provide additional information to inform the assessment.   Your psychologist will likely give you some  homework assignments  to practice as you prepare for surgery. This will be individually tailored to your specific needs.   Your psychologist will talk with you about some of the typical challenges that patients might encounter after surgery and how to prepare for these.   A final report will be generated and any treatment recommendations will be discussed with you and the bariatric team to determine next steps.   If you would like, you may have any support people  (e.g., spouse) join a session of the evaluation to provide additional information.       Bariatric surgery offers a physical  tool  for weight management. Similarly, your work with the psychologist will provide you with some additional emotional and behavioral  tools  as you work toward your healthier lifestyle goals.      Support Group    Support and accountability is an important part of your weight loss journey and maintenance once you reach your health goals.  Because of this, we require that you attend at least one of our support groups before you meet with the surgeon so you get a feel for what they are like and hopefully establish a connection to others who are going through a similar process or have had surgery before so you can ask your questions.    We currently have two options for support group but they are in the virtual format only at this time.  Both groups are using Microsoft Teams for their platform and you can access it through the web or an libby that can be downloaded to a smart phone if you have one.      The Pre- and Post-op Connections Group is on the third Tuesday of the month from 6:30-8pm and is hosted by Miquel Marrufo, PhD.  If you are interested in this group, you will need to email him at [email or web address] each month and then he will in turn send you the invitation to join.      We also have a Post-op focused Connections Group the 4th Thursday of the month from 11am-12pm that is mostly geared toward post-operative patients who are past three months post-op.  This group is hosted by IBETH Packer, CBN, CIC and you can email her to join the group at [email or web address] each month and she will send you an invite similar to Miquel's group.  If you decide you would like to be a regular attender at this group, we can add you to an automatic invitation list of people.    You can see more information about available support groups that the [facility] offers to our patients  as well by following the link:    The following Support Group information can also be found on our website:  [email or web address]      Please let us know if you have any questions about all the information above and we will be talking more about this during future visits.     Thank you,   [facility] Bariatric Team

## 2024-07-10 ENCOUNTER — OFFICE VISIT (OUTPATIENT)
Dept: SURGERY | Facility: CLINIC | Age: 49
End: 2024-07-10
Payer: COMMERCIAL

## 2024-07-10 ENCOUNTER — LAB (OUTPATIENT)
Dept: LAB | Facility: CLINIC | Age: 49
End: 2024-07-10
Payer: COMMERCIAL

## 2024-07-10 VITALS
WEIGHT: 225.1 LBS | SYSTOLIC BLOOD PRESSURE: 144 MMHG | DIASTOLIC BLOOD PRESSURE: 82 MMHG | BODY MASS INDEX: 36.17 KG/M2 | HEIGHT: 66 IN

## 2024-07-10 DIAGNOSIS — E66.01 MORBID OBESITY (H): Primary | ICD-10-CM

## 2024-07-10 DIAGNOSIS — R79.9 ABNORMAL FINDING OF BLOOD CHEMISTRY, UNSPECIFIED: ICD-10-CM

## 2024-07-10 DIAGNOSIS — R73.03 PREDIABETES: ICD-10-CM

## 2024-07-10 DIAGNOSIS — E66.01 MORBID OBESITY (H): ICD-10-CM

## 2024-07-10 DIAGNOSIS — R63.30 FEEDING DIFFICULTIES, UNSPECIFIED: ICD-10-CM

## 2024-07-10 DIAGNOSIS — K76.0 FATTY LIVER: ICD-10-CM

## 2024-07-10 DIAGNOSIS — E53.8 B12 DEFICIENCY: ICD-10-CM

## 2024-07-10 LAB — FOLATE SERPL-MCNC: 18.9 NG/ML (ref 4.6–34.8)

## 2024-07-10 PROCEDURE — 84590 ASSAY OF VITAMIN A: CPT | Mod: 90

## 2024-07-10 PROCEDURE — 84630 ASSAY OF ZINC: CPT | Mod: 90

## 2024-07-10 PROCEDURE — 82728 ASSAY OF FERRITIN: CPT

## 2024-07-10 PROCEDURE — 84425 ASSAY OF VITAMIN B-1: CPT | Mod: 90

## 2024-07-10 PROCEDURE — 82746 ASSAY OF FOLIC ACID SERUM: CPT

## 2024-07-10 PROCEDURE — 83970 ASSAY OF PARATHORMONE: CPT

## 2024-07-10 PROCEDURE — 82306 VITAMIN D 25 HYDROXY: CPT

## 2024-07-10 PROCEDURE — 99000 SPECIMEN HANDLING OFFICE-LAB: CPT

## 2024-07-10 PROCEDURE — 99205 OFFICE O/P NEW HI 60 MIN: CPT | Performed by: FAMILY MEDICINE

## 2024-07-10 PROCEDURE — 36415 COLL VENOUS BLD VENIPUNCTURE: CPT

## 2024-07-10 RX ORDER — CALCIUM CARB/VITAMIN D3/VIT K1 500-100-40
1 TABLET,CHEWABLE ORAL
Qty: 100 EACH | Refills: 1 | Status: SHIPPED | OUTPATIENT
Start: 2024-07-10 | End: 2025-07-10

## 2024-07-10 RX ORDER — CYANOCOBALAMIN 1000 UG/ML
1 INJECTION, SOLUTION INTRAMUSCULAR; SUBCUTANEOUS
Qty: 10 ML | Refills: 3 | Status: SHIPPED | OUTPATIENT
Start: 2024-07-10
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
## 2024-07-11 ENCOUNTER — APPOINTMENT (OUTPATIENT)
Dept: CT IMAGING | Facility: CLINIC | Age: 49
End: 2024-07-11
Attending: EMERGENCY MEDICINE
Payer: COMMERCIAL

## 2024-07-11 ENCOUNTER — HOSPITAL ENCOUNTER (EMERGENCY)
Facility: CLINIC | Age: 49
Discharge: HOME | End: 2024-07-11
Attending: EMERGENCY MEDICINE | Admitting: EMERGENCY MEDICINE
Payer: COMMERCIAL

## 2024-07-11 VITALS
DIASTOLIC BLOOD PRESSURE: 81 MMHG | TEMPERATURE: 98.3 F | SYSTOLIC BLOOD PRESSURE: 203 MMHG | HEART RATE: 72 BPM | WEIGHT: 225 LBS | BODY MASS INDEX: 37.49 KG/M2 | RESPIRATION RATE: 16 BRPM | OXYGEN SATURATION: 98 % | HEIGHT: 65 IN

## 2024-07-11 DIAGNOSIS — R10.9 FLANK PAIN: ICD-10-CM

## 2024-07-11 DIAGNOSIS — R31.9 HEMATURIA, UNSPECIFIED TYPE: ICD-10-CM

## 2024-07-11 LAB
ALBUMIN SERPL BCG-MCNC: 4.2 G/DL (ref 3.5–5.2)
ALBUMIN UR-MCNC: 30 MG/DL
ALP SERPL-CCNC: 95 U/L (ref 40–150)
ALT SERPL W P-5'-P-CCNC: 26 U/L (ref 0–50)
ANION GAP SERPL CALCULATED.3IONS-SCNC: 10 MMOL/L (ref 7–15)
APPEARANCE UR: ABNORMAL
AST SERPL W P-5'-P-CCNC: 28 U/L (ref 0–45)
BACTERIA #/AREA URNS HPF: ABNORMAL /HPF
BASOPHILS # BLD AUTO: 0 10E3/UL (ref 0–0.2)
BASOPHILS NFR BLD AUTO: 0 %
BILIRUB SERPL-MCNC: 0.5 MG/DL
BILIRUB UR QL STRIP: NEGATIVE
BUN SERPL-MCNC: 11.4 MG/DL (ref 6–20)
CALCIUM SERPL-MCNC: 8.9 MG/DL (ref 8.6–10)
CHLORIDE SERPL-SCNC: 108 MMOL/L (ref 98–107)
COLOR UR AUTO: YELLOW
CREAT SERPL-MCNC: 0.86 MG/DL (ref 0.51–0.95)
DEPRECATED HCO3 PLAS-SCNC: 25 MMOL/L (ref 22–29)
EGFRCR SERPLBLD CKD-EPI 2021: 82 ML/MIN/1.73M2
EOSINOPHIL # BLD AUTO: 0.1 10E3/UL (ref 0–0.7)
EOSINOPHIL NFR BLD AUTO: 1 %
ERYTHROCYTE [DISTWIDTH] IN BLOOD BY AUTOMATED COUNT: 13.5 % (ref 10–15)
FERRITIN SERPL-MCNC: 121 NG/ML (ref 6–175)
GLUCOSE SERPL-MCNC: 119 MG/DL (ref 70–99)
GLUCOSE UR STRIP-MCNC: NEGATIVE MG/DL
HCT VFR BLD AUTO: 40.5 % (ref 35–47)
HGB BLD-MCNC: 13.9 G/DL (ref 11.7–15.7)
HGB UR QL STRIP: ABNORMAL
HYALINE CASTS: 4 /LPF
IMM GRANULOCYTES # BLD: 0 10E3/UL
IMM GRANULOCYTES NFR BLD: 0 %
KETONES UR STRIP-MCNC: NEGATIVE MG/DL
LEUKOCYTE ESTERASE UR QL STRIP: NEGATIVE
LIPASE SERPL-CCNC: 23 U/L (ref 13–60)
LYMPHOCYTES # BLD AUTO: 2.6 10E3/UL (ref 0.8–5.3)
LYMPHOCYTES NFR BLD AUTO: 33 %
MCH RBC QN AUTO: 28.4 PG (ref 26.5–33)
MCHC RBC AUTO-ENTMCNC: 34.3 G/DL (ref 31.5–36.5)
MCV RBC AUTO: 83 FL (ref 78–100)
MONOCYTES # BLD AUTO: 0.3 10E3/UL (ref 0–1.3)
MONOCYTES NFR BLD AUTO: 4 %
MUCOUS THREADS #/AREA URNS LPF: PRESENT /LPF
NEUTROPHILS # BLD AUTO: 4.7 10E3/UL (ref 1.6–8.3)
NEUTROPHILS NFR BLD AUTO: 61 %
NITRATE UR QL: NEGATIVE
NRBC # BLD AUTO: 0 10E3/UL
NRBC BLD AUTO-RTO: 0 /100
PH UR STRIP: 5.5 [PH] (ref 5–7)
PLATELET # BLD AUTO: 300 10E3/UL (ref 150–450)
POTASSIUM SERPL-SCNC: 3.9 MMOL/L (ref 3.4–5.3)
PROT SERPL-MCNC: 7.3 G/DL (ref 6.4–8.3)
PTH-INTACT SERPL-MCNC: 61 PG/ML (ref 15–65)
RBC # BLD AUTO: 4.9 10E6/UL (ref 3.8–5.2)
RBC URINE: 53 /HPF
SODIUM SERPL-SCNC: 143 MMOL/L (ref 135–145)
SP GR UR STRIP: 1.03 (ref 1–1.03)
SQUAMOUS EPITHELIAL: 4 /HPF
UROBILINOGEN UR STRIP-MCNC: <2 MG/DL
VIT D+METAB SERPL-MCNC: 34 NG/ML (ref 20–50)
WBC # BLD AUTO: 7.7 10E3/UL (ref 4–11)
WBC URINE: 3 /HPF

## 2024-07-11 PROCEDURE — 85025 COMPLETE CBC W/AUTO DIFF WBC: CPT | Performed by: EMERGENCY MEDICINE

## 2024-07-11 PROCEDURE — 99284 EMERGENCY DEPT VISIT MOD MDM: CPT | Mod: 25

## 2024-07-11 PROCEDURE — 96361 HYDRATE IV INFUSION ADD-ON: CPT

## 2024-07-11 PROCEDURE — 80053 COMPREHEN METABOLIC PANEL: CPT | Performed by: EMERGENCY MEDICINE

## 2024-07-11 PROCEDURE — 81001 URINALYSIS AUTO W/SCOPE: CPT | Performed by: EMERGENCY MEDICINE

## 2024-07-11 PROCEDURE — 83690 ASSAY OF LIPASE: CPT | Performed by: EMERGENCY MEDICINE

## 2024-07-11 PROCEDURE — 96360 HYDRATION IV INFUSION INIT: CPT

## 2024-07-11 PROCEDURE — 258N000003 HC RX IP 258 OP 636: Performed by: EMERGENCY MEDICINE

## 2024-07-11 PROCEDURE — 74176 CT ABD & PELVIS W/O CONTRAST: CPT

## 2024-07-11 PROCEDURE — 36415 COLL VENOUS BLD VENIPUNCTURE: CPT | Performed by: EMERGENCY MEDICINE

## 2024-07-11 RX ORDER — CIPROFLOXACIN 500 MG/1
500 TABLET, FILM COATED ORAL 2 TIMES DAILY
Qty: 20 TABLET | Refills: 0 | Status: SHIPPED | OUTPATIENT
Start: 2024-07-11 | End: 2024-07-21

## 2024-07-11 RX ORDER — KETOROLAC TROMETHAMINE 15 MG/ML
15 INJECTION, SOLUTION INTRAMUSCULAR; INTRAVENOUS ONCE
Status: COMPLETED | OUTPATIENT
Start: 2024-07-11 | End: 2024-07-11

## 2024-07-11 RX ORDER — ONDANSETRON 2 MG/ML
4 INJECTION INTRAMUSCULAR; INTRAVENOUS EVERY 30 MIN PRN
Status: DISCONTINUED | OUTPATIENT
Start: 2024-07-11 | End: 2024-07-11 | Stop reason: HOSPADM

## 2024-07-11 RX ADMIN — SODIUM CHLORIDE 1000 ML: 9 INJECTION, SOLUTION INTRAVENOUS at 02:01

## 2024-07-11 ASSESSMENT — ACTIVITIES OF DAILY LIVING (ADL)
ADLS_ACUITY_SCORE: 35

## 2024-07-11 NOTE — ED NOTES
Prescription order called over to [facility] at [address] [place] [address] Ave per pt's request. This writer left a detailed voicemail of the prescription.     Discharge instructions discussed with pt with the help of an . All questions answered. Printed prescription and AVS handed to pt. Pt states she will be driving herself back home.

## 2024-07-11 NOTE — ED TRIAGE NOTES
To ED per POV    C/o R sided flank/abd  pain x 5 days, suspects it is a kidney stone. Unable to fully urinate. +N    In-person  requested     Triage Assessment (Adult)       Row Name 07/11/24 0101          Triage Assessment    Airway WDL WDL        Respiratory WDL    Respiratory WDL WDL        Skin Circulation/Temperature WDL    Skin Circulation/Temperature WDL WDL        Cardiac WDL    Cardiac WDL WDL        Peripheral/Neurovascular WDL    Peripheral Neurovascular WDL WDL        Cognitive/Neuro/Behavioral WDL    Cognitive/Neuro/Behavioral WDL WDL

## 2024-07-11 NOTE — ED PROVIDER NOTES
EMERGENCY DEPARTMENT ENCOUNTER      NAME: Marleny Viera  AGE: 48 year old female  YOB: 1975  MRN: [id number]  EVALUATION DATE & TIME: No admission date for patient encounter.    PCP: Brianna Hurst    ED PROVIDER: Troy Shin M.D.      Chief Complaint   Patient presents with    Flank Pain         FINAL IMPRESSION:  1. Flank pain    2. Hematuria, unspecified type          ED COURSE & MEDICAL DECISION MAKING:    Pertinent Labs & Imaging studies reviewed. (See chart for details)  48 year old female presents to the Emergency Department for evaluation of right-sided flank pain.  Has history of kidney stones states this feels the same.  No tenderness over the gallbladder.  LFTs are normal.  Do not suspect hepatobiliary disease.  CT scan does not show a cause of her symptoms.  Could have passed a stone.  As she does have some blood in her urine.  Could also be UTI.  Will have her start Cipro given her multiple allergies.  No cultures are available to review recently.  Patient's labs show normal white count.  Normal kidney function.  Feeling better here after IV Toradol.  Will discharge home.  Will have her follow-up with her primary.  Return for worsening symptoms    1:30 AM I met with the patient to gather history and to perform my initial exam. I discussed the plan for care while in the Emergency Department.   3:19 AM Rechecked and updated the patient. We discussed the plan for discharge and the patient is agreeable. Reviewed supportive cares, symptomatic treatment, outpatient follow up, and reasons to return to the Emergency Department. Patient to be discharged by ED RN.     At the conclusion of the encounter I discussed the results of all of the tests and the disposition. The questions were answered. The patient or family acknowledged understanding and was agreeable with the care plan.     Medical Decision Making  Obtained supplemental history:Supplemental history obtained?: No  Reviewed external  records: External records reviewed?: Outpatient Record: General Surgery Visit 7/10/24  Care impacted by chronic illness:Chronic Lung Disease and Hypertension  Care significantly affected by social determinants of health:N/A  Did you consider but not order tests?: Work up considered but not performed and documented in chart, if applicable  Did you interpret images independently?: Independent interpretation of ECG and images noted in documentation, when applicable.  Consultation discussion with other provider:Did you involve another provider (consultant, , pharmacy, etc.)?: No  Discharge. I prescribed additional prescription strength medication(s) as charted. See documentation for any additional details.         MEDICATIONS GIVEN IN THE EMERGENCY:  Medications   ondansetron (ZOFRAN) injection 4 mg (has no administration in time range)   sodium chloride 0.9% BOLUS 1,000 mL (0 mLs Intravenous Stopped 7/11/24 0332)   ketorolac (TORADOL) injection 15 mg (15 mg Intravenous Not Given 7/11/24 0225)       NEW PRESCRIPTIONS STARTED AT TODAY'S ER VISIT  Discharge Medication List as of 7/11/2024  3:33 AM        START taking these medications    Details   ciprofloxacin (CIPRO) 500 MG tablet Take 1 tablet (500 mg) by mouth 2 times daily for 10 days, Disp-20 tablet, R-0, Local Print                =================================================================    HPI    Patient information was obtained from: Patient    Use of : Yes (In Person) - Language: American sign Language        Marleny Viera is a 48 year old female with a pertinent history of HTN, asthma, prediabetes, fatty liver, and kidney stones who presents to this ED for evaluation of flank pain.    The patient reports 5 days of worsening right flank pain that wraps around the her right side with associated nausea. She reports dysuria and urinary urgency, but denies any hematuria. She has not had any vomiting or diarrhea. She denies any fevers. She  reports a remote history of kidney stones. She has not had any prior abdominal surgeries.        PAST MEDICAL HISTORY:  Past Medical History:   Diagnosis Date    Accidental overdose, initial encounter 07/07/2022    ADHD (attention deficit hyperactivity disorder)     Alcohol dependence (H)     Anxiety     Arthritis     Asthma     B12 deficiency     Bipolar depression (H)     Blood clotting disorder (H24)     Deafness     Depressive disorder 10 year    Drug abuse, nondependent (H) 10/29/2004    Overview:  polydrug abuse per psych notes ; Other, mixed, or unspecified nondependent drug abuse, unspecified    History of blood clots     History of transfusion     Hypertension     Kidney stones 2015    Major depressive disorder, recurrent episode, severe (H) 05/17/2016    Overview:  s/p suicide attempt Epic     Migraine     Obstructive sleep apnea 06/01/2016    Pt states she does not have ROBERT, does not use CPAP    Pulmonary emboli (H)     Status post total right knee replacement 01/18/2016       PAST SURGICAL HISTORY:  Past Surgical History:   Procedure Laterality Date    BIOPSY BREAST Right     2019... Also had fluid drained from left breast under surgery also in 2019    BREAST SURGERY      MAMMOPLASTY REDUCTION Bilateral 01/01/2017    ORTHOPEDIC SURGERY  2012,2013,2014    TOTAL HIP ARTHROPLASTY Bilateral     TOTAL KNEE ARTHROPLASTY Right     TUBAL LIGATION      US BREAST CORE BIOPSY RIGHT Right 11/21/2019           CURRENT MEDICATIONS:    Current Facility-Administered Medications   Medication Dose Route Frequency Provider Last Rate Last Admin    cyanocobalamin injection 1,000 mcg  1,000 mcg Intramuscular Q30 Days Maria Fernanda Schumacher CNP   1,000 mcg at 05/08/24 1318    ondansetron (ZOFRAN) injection 4 mg  4 mg Intravenous Q30 Min PRN Troy Shin MD         Current Outpatient Medications   Medication Sig Dispense Refill    ciprofloxacin (CIPRO) 500 MG tablet Take 1 tablet (500 mg) by mouth 2 times daily for 10 days  "20 tablet 0    acetaminophen (TYLENOL) 325 MG tablet Take 325-650 mg by mouth every 6 hours as needed for mild pain      albuterol (PROAIR HFA/PROVENTIL HFA/VENTOLIN HFA) 108 (90 Base) MCG/ACT inhaler INHALE TWO PUFFS EVERY 4 HOURS AS NEEDED FOR WHEEZING OR SHORTNESS OF BREATH 6.7 g 2    albuterol (PROVENTIL) (2.5 MG/3ML) 0.083% neb solution USE 1 VIAL NEBULIZER FOUR TIMES PER WEEK 180 mL 11    budesonide-formoterol (SYMBICORT) 160-4.5 MCG/ACT Inhaler Inhale 2 puffs into the lungs 2 times daily 10.2 g 5    cetirizine (ZYRTEC) 10 MG tablet Take 1 tablet (10 mg) by mouth daily 90 tablet 2    cyanocobalamin (CYANOCOBALAMIN) 1000 MCG/ML injection Inject 1 mL (1,000 mcg) subcutaneously every 14 days 10 mL 3    diazepam (VALIUM) 5 MG tablet Take 1 tab 2 hrs before MRI, take 1 tab 1 hr before MRI only if needed 2 tablet 0    doxycycline hyclate (VIBRAMYCIN) 100 MG capsule Take 1 capsule (100 mg) by mouth 2 times daily 14 capsule 0    EPINEPHrine (ANY BX GENERIC EQUIV) 0.3 MG/0.3ML injection 2-pack Inject 0.3 mLs (0.3 mg) into the muscle as needed for anaphylaxis 2 each 1    estradiol (VIVELLE-DOT) 0.05 MG/24HR bi-weekly patch 1 application to skin Transdermal Two times a Week for 90 days      famotidine (PEPCID) 20 MG tablet Take 1 tablet (20 mg) by mouth 2 times daily 30 tablet 0    gabapentin (NEURONTIN) 100 MG capsule TAKE 1 CAPSULE BY MOUTH TWICE A DAY FOR MIGRAINES AND ANXIETY 180 capsule 1    hydrOXYzine HCl (ATARAX) 25 MG tablet TAKE 3 TABLETS BY MOUTH THREE TIMES A DAY AS NEEDED FOR ANXIETY OR INSOMNIA . 168 tablet 0    insulin syringe 31G X 5/16\" 1 ML MISC Inject 1 Units subcutaneously every 14 days 100 each 1    lidocaine (XYLOCAINE) 5 % external ointment Apply topically to the painful area(s) 3 times a day      losartan (COZAAR) 50 MG tablet Take 0.5 tablets (25 mg) by mouth daily 90 tablet 0    metoprolol succinate ER (TOPROL XL) 25 MG 24 hr tablet Take 1 tablet (25 mg) by mouth daily 30 tablet 9    montelukast " (SINGULAIR) 10 MG tablet Take 1 tablet (10 mg) by mouth at bedtime 90 tablet 1    NONFORMULARY Nerve vitamin      omeprazole (PRILOSEC) 20 MG DR capsule Take 1 capsule (20 mg) by mouth daily Take once daily in the morning 30 minutes prior to food and drink. 30 capsule 0    ondansetron (ZOFRAN ODT) 4 MG ODT tab Take 1 tablet (4 mg) by mouth every 8 hours as needed for nausea 16 tablet 0    polyethylene glycol (MIRALAX) 17 GM/Dose powder Take 17 g (1 capful) by mouth daily 527 g 0    prazosin (MINIPRESS) 1 MG capsule       PROMETRIUM 200 MG capsule 1 cap Orally nightly for 90 days      QUEtiapine (SEROQUEL) 50 MG tablet Take 50 mg by mouth at bedtime      rizatriptan (MAXALT) 10 MG tablet Take 1 tablet (10 mg) by mouth at onset of headache for migraine 18 tablet 2    SYMBICORT 80-4.5 MCG/ACT Inhaler Inhale 2 puffs into the lungs 2 times daily 10.2 g 11    terbinafine (LAMISIL) 1 % external cream Apply topically 2 times daily 30 g 2         ALLERGIES:  Allergies   Allergen Reactions    Acetylcysteine Rash and Other (See Comments)    Amoxicillin Itching and Shortness Of Breath    Bee Pollen Anaphylaxis    Chocolate Anaphylaxis    Contrast [Iodixanol] Shortness Of Breath and Rash     Pt stated she reacted to the contrast given for her CT in past. She experienced shortness of breath, throat tightening, and rash on chest, and they gave her an injection to counter the symptoms.     Covid-19 (Mrna) Vaccine Shortness Of Breath     [facility] COVID-19 Vaccine    Hymenoptera Allergenic Extract [Wasp Venom Protein] Anaphylaxis    Iodine Hives and Unknown     Pt stated that she was injected with CT CONTRAST in the past and got hives, SOB, and nausea. Please pre-medicate patient in the future.     Meat Extract Anaphylaxis    Wasp Venom Protein Starter Kit [Wasp Venom Protein] Itching, Shortness Of Breath, Swelling and Difficulty breathing    Adhesive Tape Unknown    Aspirin     Bee Venom Unknown    Food Allergy Formula Unknown     Red  "meat, chocolate    Geodon [Ziprasidone] Unknown    Latex Unknown     Added based on information entered during case entry, please review and add reactions, type, and severity as needed    Morphine Unknown    Prochlorperazine Other (See Comments)    Risperdal [Risperidone] Unknown    Risperidone Analogues [Risperidone] Other (See Comments)    Shellfish Allergy Unknown    Theobroma Oil [Theobroma Grandiflorum Seed Butter] Unknown    Trazodone Other (See Comments)     Elevated creatinine    Zoloft [Sertraline] Unknown    Hydrochlorothiazide Rash       FAMILY HISTORY:  Family History   Problem Relation Age of Onset    Cancer Mother     Breast Cancer Mother     Diabetes Mother     Hypertension Mother     Cerebrovascular Disease Father     Hyperlipidemia No family hx of     Depression No family hx of     Anxiety Disorder No family hx of     Mental Illness No family hx of     Substance Abuse No family hx of     Asthma No family hx of        SOCIAL HISTORY:   Social History     Socioeconomic History    Marital status:      Spouse name: None    Number of children: None    Years of education: None    Highest education level: None   Tobacco Use    Smoking status: Never     Passive exposure: Never    Smokeless tobacco: Never   Vaping Use    Vaping status: Never Used   Substance and Sexual Activity    Alcohol use: Yes     Comment: Alcoholic Drinks/day: dependence    Drug use: Never    Sexual activity: Not Currently     Partners: Male     Birth control/protection: None   Social History Narrative    . No children. .  Lives alone.  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  Nonsmoker.  No alcohol use.  Tesha Pelaez MD       Social Determinants of Health     Financial Resource Strain: High Risk (1/4/2024)    Financial Resource Strain     Within the past 12 months, have you or your family members you live with been unable to get utilities (heat, electricity) when it was really needed?: Yes   Food " "Insecurity: High Risk (1/4/2024)    Food Insecurity     Within the past 12 months, did you worry that your food would run out before you got money to buy more?: Yes     Within the past 12 months, did the food you bought just not last and you didn t have money to get more?: Yes   Transportation Needs: High Risk (1/4/2024)    Transportation Needs     Within the past 12 months, has lack of transportation kept you from medical appointments, getting your medicines, non-medical meetings or appointments, work, or from getting things that you need?: Yes   Interpersonal Safety: Unknown (3/17/2024)    [facility]    Humiliation, Afraid, Rape, and Kick questionnaire     Physically Abused: No     Sexually Abused: No   Housing Stability: Low Risk  (1/4/2024)    Housing Stability     Do you have housing? : Yes     Are you worried about losing your housing?: No       VITALS:  BP (!) 203/81   Pulse 72   Temp 98.3  F (36.8  C) (Oral)   Resp 16   Ht 1.651 m (5' 5\")   Wt 102.1 kg (225 lb)   LMP  (LMP Unknown)   SpO2 98%   BMI 37.44 kg/m      PHYSICAL EXAM    Physical Exam  Vitals and nursing note reviewed.   Constitutional:       General: She is not in acute distress.     Appearance: She is not diaphoretic.   HENT:      Head: Atraumatic.      Mouth/Throat:      Pharynx: No oropharyngeal exudate.   Eyes:      General: No scleral icterus.     Pupils: Pupils are equal, round, and reactive to light.   Cardiovascular:      Rate and Rhythm: Normal rate and regular rhythm.      Heart sounds: Normal heart sounds.   Pulmonary:      Effort: No respiratory distress.      Breath sounds: Normal breath sounds.   Abdominal:      Palpations: Abdomen is soft.      Tenderness: There is no abdominal tenderness. There is no guarding or rebound. Negative signs include Little's sign.   Musculoskeletal:         General: No tenderness.   Skin:     General: Skin is warm.      Findings: No rash.   Neurological:      " General: No focal deficit present.      Mental Status: She is alert.           LAB:  All pertinent labs reviewed and interpreted.  Labs Ordered and Resulted from Time of ED Arrival to Time of ED Departure   ROUTINE UA WITH MICROSCOPIC REFLEX TO CULTURE - Abnormal       Result Value    Color Urine Yellow      Appearance Urine Turbid (*)     Glucose Urine Negative      Bilirubin Urine Negative      Ketones Urine Negative      Specific Gravity Urine 1.029      Blood Urine 0.2 mg/dL (*)     pH Urine 5.5      Protein Albumin Urine 30 (*)     Urobilinogen Urine <2.0      Nitrite Urine Negative      Leukocyte Esterase Urine Negative      Bacteria Urine Few (*)     Mucus Urine Present (*)     RBC Urine 53 (*)     WBC Urine 3      Squamous Epithelials Urine 4 (*)     Hyaline Casts Urine 4 (*)    COMPREHENSIVE METABOLIC PANEL - Abnormal    Sodium 143      Potassium 3.9      Carbon Dioxide (CO2) 25      Anion Gap 10      Urea Nitrogen 11.4      Creatinine 0.86      GFR Estimate 82      Calcium 8.9      Chloride 108 (*)     Glucose 119 (*)     Alkaline Phosphatase 95      AST 28      ALT 26      Protein Total 7.3      Albumin 4.2      Bilirubin Total 0.5     LIPASE - Normal    Lipase 23     CBC WITH PLATELETS AND DIFFERENTIAL    WBC Count 7.7      RBC Count 4.90      Hemoglobin 13.9      Hematocrit 40.5      MCV 83      MCH 28.4      MCHC 34.3      RDW 13.5      Platelet Count 300      % Neutrophils 61      % Lymphocytes 33      % Monocytes 4      % Eosinophils 1      % Basophils 0      % Immature Granulocytes 0      NRBCs per 100 WBC 0      Absolute Neutrophils 4.7      Absolute Lymphocytes 2.6      Absolute Monocytes 0.3      Absolute Eosinophils 0.1      Absolute Basophils 0.0      Absolute Immature Granulocytes 0.0      Absolute NRBCs 0.0         RADIOLOGY:  Reviewed all pertinent imaging. Please see official radiology report.  CT Abdomen Pelvis w/o Contrast   Final Result   IMPRESSION:    1.  No acute process in the  abdomen or pelvis.   2.  No urolithiasis or hydronephrosis.   3.  Hepatic steatosis.   4.  Fibroid uterus.               I, Calixto Jolly, am serving as a scribe to document services personally performed by Dr. Troy Shin, based on my observation and the provider's statements to me. I, Troy Shin MD attest that Calixto Jolly is acting in a scribe capacity, has observed my performance of the services and has documented them in accordance with my direction.    Troy Shin M.D.  Emergency Medicine  [facility] EMERGENCY ROOM  [address] [place] [id number] [phone]  Dept: [phone]     Troy Shin MD  07/11/24 0440

## 2024-07-12 ENCOUNTER — TELEPHONE (OUTPATIENT)
Dept: SURGERY | Facility: CLINIC | Age: 49
End: 2024-07-12
Payer: COMMERCIAL

## 2024-07-12 ENCOUNTER — NURSE TRIAGE (OUTPATIENT)
Dept: NURSING | Facility: CLINIC | Age: 49
End: 2024-07-12
Payer: COMMERCIAL

## 2024-07-12 ENCOUNTER — PATIENT OUTREACH (OUTPATIENT)
Dept: CARE COORDINATION | Facility: CLINIC | Age: 49
End: 2024-07-12
Payer: COMMERCIAL

## 2024-07-12 NOTE — TELEPHONE ENCOUNTER
See my chart encounter.     Agnes Betts RN, CBN  [facility]  P [phone]  F [phone]

## 2024-07-12 NOTE — TELEPHONE ENCOUNTER
General Call    Contacts       Contact Date/Time Type Contact Phone/Fax    07/12/2024 08:44 AM CDT Phone (Incoming) Marleny Viera KENNETH (Self) [phone] (M)          Reason for Call:  Call back    What are your questions or concerns:  pt is very upset she can't get an appt until dec with Dr. Olivera. States she was told there is earlier appt times available      [phone]

## 2024-07-12 NOTE — TELEPHONE ENCOUNTER
General Call    Contacts       Contact Date/Time Type Contact Phone/Fax    07/12/2024 08:11 AM CDT Phone (Incoming) Marleny Viera (Self) [phone] (M)          Reason for Call:  medication questions    What are your questions or concerns:  pt had appt 7/10 and wants to switch to non surg program. She would like to know if a weight loss med can be prescribed.     Could we send this information to you in [employer] or would you prefer to receive a phone call?:   Patient would prefer a phone call   Okay to leave a detailed message?: Yes at Cell number on file:    Telephone Information:   Mobile [phone]

## 2024-07-12 NOTE — TELEPHONE ENCOUNTER
Talked with pt and attempted to schedule her with  and the dietitian. First available appt was 12/18. Pt became frustrated and hung up. Pt called with .     Agnes Betts RN, CBN  [facility]  P [phone]  F [phone]

## 2024-07-12 NOTE — PROGRESS NOTES
"[facility]  Community Health Worker Initial Outreach    Background: Primary Care - Care Coordination program identified per system criteria based on ED discharge report and reviewed for possible outreach.    CHW will not proceed with patient outreach due to the following reason:    Patient was initially reviewed by clinical product navigator for care coordination referral on 06/28/2024. On 07/01/2024, patient was chart reviewed by TORY OROZCO but found not to be a candidate for care coordination for the following reason:  \"Patient is receiving duplicative services from [facility] Case Management.\"    Please see Clinic Care Coordination - Post Hospital encounter on 07/01/2024.      Plan: Primary Care - Care Coordination episode addressed appropriately per reason noted above.        Dior Rdz  Community Health Worker  Connected Care Resource [facility]    *Connected Care Resource Team does NOT follow patient ongoing. Referrals are identified based on internal discharge reports and the outreach is to ensure patient has an understanding of their discharge instructions.  "

## 2024-07-13 ENCOUNTER — NURSE TRIAGE (OUTPATIENT)
Dept: NURSING | Facility: CLINIC | Age: 49
End: 2024-07-13
Payer: COMMERCIAL

## 2024-07-13 DIAGNOSIS — I10 PRIMARY HYPERTENSION: ICD-10-CM

## 2024-07-13 DIAGNOSIS — T78.40XD ALLERGIC REACTION, SUBSEQUENT ENCOUNTER: ICD-10-CM

## 2024-07-13 DIAGNOSIS — G47.00 PERSISTENT INSOMNIA: ICD-10-CM

## 2024-07-13 LAB — ZINC SERPL-MCNC: 90.2 UG/DL

## 2024-07-13 NOTE — TELEPHONE ENCOUNTER
Call received via    - [employer] service  -  #[phone]; near end of call, switched to  #[phone]    Tonight, ~30 min ago, (~10p), Marleny developed an itchy rash to her neck that was spreading down to her upper chest and up to her chin.  -She is also experiencing some Itching on her scalp, not severe.  - Wonders if it's due to new Rx Cipro started yesterday - took 3rd dose tonight    Denies SOB, wheezing, swelling to lips/tongue/throat    Temp 98.5  tympanic  HR = 70    Near end of call, she reports that sx's are lessening.    Advised to see PCP within 24 hours - [facility]  Care Advice reviewed - including stopping the medication.    Pat Cervantes RN  [facility]      Reason for Disposition   Hives or itching   [1] Drug allergy suspected AND [2] taking prescription medicine AND [3] widespread rash    Additional Information   Negative: [1] Life-threatening reaction (anaphylaxis) in the past to the same drug AND [2] < 2 hours since exposure   Negative: Difficulty breathing or wheezing   Negative: [1] Hoarseness or cough AND [2] started soon after 1st dose of drug   Negative: [1] Swollen tongue AND [2] started soon after 1st dose of drug   Negative: [1] Purple or blood-colored rash (spots or dots) AND [2] fever   Negative: Sounds like a life-threatening emergency to the triager   Negative: Swollen tongue   Negative: [1] Widespread hives AND [2] onset < 2 hours of exposure to 1st dose of drug   Negative: Fever   Negative: Patient sounds very sick or weak to the triager   Negative: [1] Purple or blood-colored rash (spots or dots) AND [2] no fever AND [3] sounds well to triager   Negative: [1] Taking new prescription medication AND [2] rash within 4 hours of 1st dose   Negative: Large or small blisters on skin (i.e., fluid filled bubbles or sacs)   Negative: Bloody crusts on lips or sores in mouth   Negative: Face or lip  swelling    Protocols used: Allergic Reactions - Guideline [id number], Rash - Widespread On Drugs-A-AH

## 2024-07-13 NOTE — TELEPHONE ENCOUNTER
ASL interpretor call.    Patient said she has been calling for refills of her medications several times prior to today.    I don't see any notes supporting the calls from her pharmacy.    It's been three weeks since she's had her hydroxyzine.    She is demanding refills today of the following:    Hydroxyzine 25 mg 168 tablets written 4/11/24  Losartan 50 mg. Take 1/2 table daily.  Written 5/3/24 for 90  should cover for six months  Budesonide-fomotorol written 5/3/24 with 5 refills. Should still have refills.    Also she said had an allergic reaction last night. [facility] ER on 7/11/24 and prescribed this for flank pain and hematuria in ER 7/11/24.  Had to use her epi pen. Is fine today.    I recommended she be seen in [facility] today to ask for coverage of her medications until her pcp can address this on Monday, 7/15/24. I instructed too that she bring her medication bottles with her.  Caller stated understanding and agreement.    Marleny will need a ASL interpretor.  I called the [facility] at .  I spoke to Mary Jane to let her know. She stated understanding and said they have ASL interpretors via video  Patient is planning to arrive there today between 1 and 2pm.    Nguyen WHEELER RN [facility]

## 2024-07-13 NOTE — TELEPHONE ENCOUNTER
Medication Question or Refill    Contacts       Contact Date/Time Type Contact Phone/Fax    07/13/2024 10:43 AM CDT Phone (Incoming) Viera, Marleny M (Self) [phone] (H)            What medication are you calling about (include dose and sig)?:   EPINEPHrine (ANY BX GENERIC EQUIV) 0.3 MG   Hidroxivine 25 mg  Losdxiclinehiclate 100 mg   Liosartan 50 mg      Preferred Pharmacy:  [facility] [place] [id number] - [place], [place] - [address] [place] [address]  Phone: [phone] Fax: [phone]      Controlled Substance Agreement on file:   CSA -- Patient Level:    CSA: None found at the patient level.       Who prescribed the medication?: peggy Hurst    Do you need a refill? Yes    When did you use the medication last?   7/12/2024 6/26/2024 for the rest     Patient offered an appointment? No    Do you have any questions or concerns?  Yes: no medication for the last 3 weeks. Need refill asap       Could we send this information to you in [employer] or would you prefer to receive a phone call?:   Patient would prefer a phone call   Okay to leave a detailed message?: Yes at Home number on file [phone] (home)

## 2024-07-14 LAB
ANNOTATION COMMENT IMP: NORMAL
RETINYL PALMITATE SERPL-MCNC: <0.02 MG/L
VIT A SERPL-MCNC: 0.6 MG/L

## 2024-07-15 DIAGNOSIS — G43.809 OTHER MIGRAINE WITHOUT STATUS MIGRAINOSUS, NOT INTRACTABLE: Primary | ICD-10-CM

## 2024-07-15 RX ORDER — TOPIRAMATE 25 MG/1
25 TABLET, FILM COATED ORAL 2 TIMES DAILY
Qty: 180 TABLET | Refills: 3 | Status: SHIPPED | OUTPATIENT
Start: 2024-07-15 | End: 2025-07-15

## 2024-07-15 RX ORDER — EPINEPHRINE 0.3 MG/.3ML
0.3 INJECTION SUBCUTANEOUS PRN
Qty: 2 EACH | Refills: 1 | Status: CANCELLED | OUTPATIENT
Start: 2024-07-15

## 2024-07-16 ENCOUNTER — TELEPHONE (OUTPATIENT)
Dept: FAMILY MEDICINE | Facility: CLINIC | Age: 49
End: 2024-07-16
Payer: COMMERCIAL

## 2024-07-16 LAB — VIT B1 PYROPHOSHATE BLD-SCNC: 174 NMOL/L

## 2024-07-16 RX ORDER — HYDROXYZINE HYDROCHLORIDE 25 MG/1
TABLET, FILM COATED ORAL
Qty: 180 TABLET | Refills: 2 | Status: SHIPPED | OUTPATIENT
Start: 2024-07-16

## 2024-07-16 RX ORDER — LOSARTAN POTASSIUM 50 MG/1
25 TABLET ORAL DAILY
Qty: 90 TABLET | Refills: 0 | Status: SHIPPED | OUTPATIENT
Start: 2024-07-16

## 2024-07-16 RX ORDER — EPINEPHRINE 0.3 MG/.3ML
0.3 INJECTION SUBCUTANEOUS PRN
Qty: 2 EACH | Refills: 1 | Status: SHIPPED | OUTPATIENT
Start: 2024-07-16 | End: 2024-08-08

## 2024-07-18 NOTE — TELEPHONE ENCOUNTER
PRIOR AUTHORIZATION DENIED    Medication: TERBINAFINE HCL 1 % EX CREA  Insurance Company: [employer] - Phone [phone] Fax [phone]  Denial Date: 7/18/2024  Denial Reason(s): Plan exclusion    Appeal Information: N/A  Patient Notified: No, care team must notify on denials.

## 2024-07-23 NOTE — TELEPHONE ENCOUNTER
Please notify patient that her prescription for terbinafine was denied by her insurance however she may get this over-the-counter under the brand-name Lamisil.  This is the exact same thing and should only cost a few bucks

## 2024-07-23 NOTE — TELEPHONE ENCOUNTER
Attempted to call. Unable to  leave voicemail. Will try again later. MyChart message sent also.      If/when patient returns call, please relay Brianna's message below.

## 2024-08-06 ENCOUNTER — HOSPITAL ENCOUNTER (OUTPATIENT)
Facility: CLINIC | Age: 49
Setting detail: OBSERVATION
Discharge: HOME | End: 2024-08-07
Attending: EMERGENCY MEDICINE | Admitting: INTERNAL MEDICINE
Payer: COMMERCIAL

## 2024-08-06 ENCOUNTER — APPOINTMENT (OUTPATIENT)
Dept: RADIOLOGY | Facility: CLINIC | Age: 49
End: 2024-08-06
Attending: INTERNAL MEDICINE
Payer: COMMERCIAL

## 2024-08-06 DIAGNOSIS — T78.2XXA ANAPHYLAXIS, INITIAL ENCOUNTER: ICD-10-CM

## 2024-08-06 LAB
ALBUMIN SERPL BCG-MCNC: 4.3 G/DL (ref 3.5–5.2)
ALP SERPL-CCNC: 110 U/L (ref 40–150)
ALT SERPL W P-5'-P-CCNC: 29 U/L (ref 0–50)
ANION GAP SERPL CALCULATED.3IONS-SCNC: 10 MMOL/L (ref 7–15)
AST SERPL W P-5'-P-CCNC: 30 U/L (ref 0–45)
ATRIAL RATE - MUSE: 90 BPM
BASOPHILS # BLD AUTO: 0 10E3/UL (ref 0–0.2)
BASOPHILS NFR BLD AUTO: 0 %
BILIRUB DIRECT SERPL-MCNC: <0.2 MG/DL (ref 0–0.3)
BILIRUB SERPL-MCNC: 0.3 MG/DL
BUN SERPL-MCNC: 11.9 MG/DL (ref 6–20)
CALCIUM SERPL-MCNC: 8.8 MG/DL (ref 8.8–10.4)
CHLORIDE SERPL-SCNC: 108 MMOL/L (ref 98–107)
CREAT SERPL-MCNC: 1.13 MG/DL (ref 0.51–0.95)
DIASTOLIC BLOOD PRESSURE - MUSE: 74 MMHG
EGFRCR SERPLBLD CKD-EPI 2021: 59 ML/MIN/1.73M2
EOSINOPHIL # BLD AUTO: 0.1 10E3/UL (ref 0–0.7)
EOSINOPHIL NFR BLD AUTO: 1 %
ERYTHROCYTE [DISTWIDTH] IN BLOOD BY AUTOMATED COUNT: 13.8 % (ref 10–15)
GLUCOSE SERPL-MCNC: 139 MG/DL (ref 70–99)
HCO3 SERPL-SCNC: 25 MMOL/L (ref 22–29)
HCT VFR BLD AUTO: 40.6 % (ref 35–47)
HGB BLD-MCNC: 13.9 G/DL (ref 11.7–15.7)
IMM GRANULOCYTES # BLD: 0 10E3/UL
IMM GRANULOCYTES NFR BLD: 0 %
INTERPRETATION ECG - MUSE: NORMAL
LYMPHOCYTES # BLD AUTO: 2.9 10E3/UL (ref 0.8–5.3)
LYMPHOCYTES NFR BLD AUTO: 36 %
MAGNESIUM SERPL-MCNC: 2.3 MG/DL (ref 1.7–2.3)
MCH RBC QN AUTO: 28.4 PG (ref 26.5–33)
MCHC RBC AUTO-ENTMCNC: 34.2 G/DL (ref 31.5–36.5)
MCV RBC AUTO: 83 FL (ref 78–100)
MONOCYTES # BLD AUTO: 0.3 10E3/UL (ref 0–1.3)
MONOCYTES NFR BLD AUTO: 4 %
NEUTROPHILS # BLD AUTO: 4.6 10E3/UL (ref 1.6–8.3)
NEUTROPHILS NFR BLD AUTO: 58 %
NRBC # BLD AUTO: 0 10E3/UL
NRBC BLD AUTO-RTO: 0 /100
NT-PROBNP SERPL-MCNC: 134 PG/ML (ref 0–900)
P AXIS - MUSE: 50 DEGREES
PLATELET # BLD AUTO: 297 10E3/UL (ref 150–450)
POTASSIUM SERPL-SCNC: 3.3 MMOL/L (ref 3.4–5.3)
PR INTERVAL - MUSE: 164 MS
PROT SERPL-MCNC: 7.5 G/DL (ref 6.4–8.3)
QRS DURATION - MUSE: 86 MS
QT - MUSE: 352 MS
QTC - MUSE: 430 MS
R AXIS - MUSE: 5 DEGREES
RBC # BLD AUTO: 4.89 10E6/UL (ref 3.8–5.2)
SODIUM SERPL-SCNC: 143 MMOL/L (ref 135–145)
SYSTOLIC BLOOD PRESSURE - MUSE: 174 MMHG
T AXIS - MUSE: -25 DEGREES
VENTRICULAR RATE- MUSE: 90 BPM
WBC # BLD AUTO: 7.8 10E3/UL (ref 4–11)

## 2024-08-06 PROCEDURE — 250N000009 HC RX 250: Performed by: INTERNAL MEDICINE

## 2024-08-06 PROCEDURE — 96361 HYDRATE IV INFUSION ADD-ON: CPT | Mod: 59

## 2024-08-06 PROCEDURE — 99291 CRITICAL CARE FIRST HOUR: CPT | Mod: 25

## 2024-08-06 PROCEDURE — 96374 THER/PROPH/DIAG INJ IV PUSH: CPT | Mod: 59

## 2024-08-06 PROCEDURE — 250N000009 HC RX 250: Performed by: EMERGENCY MEDICINE

## 2024-08-06 PROCEDURE — 36415 COLL VENOUS BLD VENIPUNCTURE: CPT | Performed by: EMERGENCY MEDICINE

## 2024-08-06 PROCEDURE — 272N000451 HC KIT SHRLOCK 5FR POWER PICC DOUBLE LUMEN

## 2024-08-06 PROCEDURE — 83735 ASSAY OF MAGNESIUM: CPT | Performed by: INTERNAL MEDICINE

## 2024-08-06 PROCEDURE — 36569 INSJ PICC 5 YR+ W/O IMAGING: CPT

## 2024-08-06 PROCEDURE — 250N000011 HC RX IP 250 OP 636: Performed by: EMERGENCY MEDICINE

## 2024-08-06 PROCEDURE — 93005 ELECTROCARDIOGRAM TRACING: CPT | Performed by: EMERGENCY MEDICINE

## 2024-08-06 PROCEDURE — 250N000013 HC RX MED GY IP 250 OP 250 PS 637: Performed by: INTERNAL MEDICINE

## 2024-08-06 PROCEDURE — 99223 1ST HOSP IP/OBS HIGH 75: CPT | Performed by: INTERNAL MEDICINE

## 2024-08-06 PROCEDURE — 999N000065 XR CHEST PORT 1 VIEW

## 2024-08-06 PROCEDURE — 120N000004 HC R&B MS OVERFLOW

## 2024-08-06 PROCEDURE — 85025 COMPLETE CBC W/AUTO DIFF WBC: CPT | Performed by: EMERGENCY MEDICINE

## 2024-08-06 PROCEDURE — 258N000003 HC RX IP 258 OP 636: Performed by: INTERNAL MEDICINE

## 2024-08-06 PROCEDURE — 82248 BILIRUBIN DIRECT: CPT | Performed by: INTERNAL MEDICINE

## 2024-08-06 PROCEDURE — 96376 TX/PRO/DX INJ SAME DRUG ADON: CPT | Mod: 59

## 2024-08-06 PROCEDURE — 96375 TX/PRO/DX INJ NEW DRUG ADDON: CPT

## 2024-08-06 PROCEDURE — 83880 ASSAY OF NATRIURETIC PEPTIDE: CPT | Performed by: INTERNAL MEDICINE

## 2024-08-06 PROCEDURE — 250N000011 HC RX IP 250 OP 636: Performed by: INTERNAL MEDICINE

## 2024-08-06 PROCEDURE — 80048 BASIC METABOLIC PNL TOTAL CA: CPT | Performed by: EMERGENCY MEDICINE

## 2024-08-06 PROCEDURE — 258N000003 HC RX IP 258 OP 636: Performed by: EMERGENCY MEDICINE

## 2024-08-06 RX ORDER — SODIUM CHLORIDE 9 MG/ML
INJECTION, SOLUTION INTRAVENOUS CONTINUOUS
Status: DISCONTINUED | OUTPATIENT
Start: 2024-08-06 | End: 2024-08-07

## 2024-08-06 RX ORDER — METHYLPREDNISOLONE SODIUM SUCCINATE 125 MG/2ML
125 INJECTION, POWDER, LYOPHILIZED, FOR SOLUTION INTRAMUSCULAR; INTRAVENOUS ONCE
Status: COMPLETED | OUTPATIENT
Start: 2024-08-06 | End: 2024-08-06

## 2024-08-06 RX ORDER — CALCIUM CARBONATE 500 MG/1
1000 TABLET, CHEWABLE ORAL 4 TIMES DAILY PRN
Status: DISCONTINUED | OUTPATIENT
Start: 2024-08-06 | End: 2024-08-07 | Stop reason: HOSPADM

## 2024-08-06 RX ORDER — GABAPENTIN 100 MG/1
100 CAPSULE ORAL 2 TIMES DAILY
Status: DISCONTINUED | OUTPATIENT
Start: 2024-08-06 | End: 2024-08-07 | Stop reason: HOSPADM

## 2024-08-06 RX ORDER — METHYLPREDNISOLONE SODIUM SUCCINATE 125 MG/2ML
80 INJECTION, POWDER, LYOPHILIZED, FOR SOLUTION INTRAMUSCULAR; INTRAVENOUS EVERY 8 HOURS
Status: DISCONTINUED | OUTPATIENT
Start: 2024-08-06 | End: 2024-08-07 | Stop reason: HOSPADM

## 2024-08-06 RX ORDER — MULTIVITAMIN,THERAPEUTIC
1 TABLET ORAL DAILY
COMMUNITY

## 2024-08-06 RX ORDER — LIDOCAINE 40 MG/G
CREAM TOPICAL
Status: DISCONTINUED | OUTPATIENT
Start: 2024-08-06 | End: 2024-08-07 | Stop reason: HOSPADM

## 2024-08-06 RX ORDER — MONTELUKAST SODIUM 10 MG/1
10 TABLET ORAL AT BEDTIME
Status: DISCONTINUED | OUTPATIENT
Start: 2024-08-06 | End: 2024-08-07 | Stop reason: HOSPADM

## 2024-08-06 RX ORDER — ACETAMINOPHEN 650 MG/1
650 SUPPOSITORY RECTAL EVERY 4 HOURS PRN
Status: DISCONTINUED | OUTPATIENT
Start: 2024-08-06 | End: 2024-08-07 | Stop reason: HOSPADM

## 2024-08-06 RX ORDER — NALOXONE HYDROCHLORIDE 0.4 MG/ML
0.2 INJECTION, SOLUTION INTRAMUSCULAR; INTRAVENOUS; SUBCUTANEOUS
Status: DISCONTINUED | OUTPATIENT
Start: 2024-08-06 | End: 2024-08-07 | Stop reason: HOSPADM

## 2024-08-06 RX ORDER — HYDROMORPHONE HCL IN WATER/PF 6 MG/30 ML
0.2 PATIENT CONTROLLED ANALGESIA SYRINGE INTRAVENOUS EVERY 4 HOURS PRN
Status: DISCONTINUED | OUTPATIENT
Start: 2024-08-06 | End: 2024-08-07

## 2024-08-06 RX ORDER — HYDROXYZINE HYDROCHLORIDE 25 MG/1
25 TABLET, FILM COATED ORAL 3 TIMES DAILY PRN
Status: DISCONTINUED | OUTPATIENT
Start: 2024-08-06 | End: 2024-08-07 | Stop reason: HOSPADM

## 2024-08-06 RX ORDER — ALBUTEROL SULFATE 0.83 MG/ML
2.5 SOLUTION RESPIRATORY (INHALATION) 4 TIMES DAILY
Status: DISCONTINUED | OUTPATIENT
Start: 2024-08-07 | End: 2024-08-07 | Stop reason: HOSPADM

## 2024-08-06 RX ORDER — METHYLPREDNISOLONE SODIUM SUCCINATE 125 MG/2ML
125 INJECTION, POWDER, LYOPHILIZED, FOR SOLUTION INTRAMUSCULAR; INTRAVENOUS EVERY 8 HOURS
Status: DISCONTINUED | OUTPATIENT
Start: 2024-08-06 | End: 2024-08-06

## 2024-08-06 RX ORDER — HYDROMORPHONE HCL IN WATER/PF 6 MG/30 ML
0.1 PATIENT CONTROLLED ANALGESIA SYRINGE INTRAVENOUS EVERY 4 HOURS PRN
Status: DISCONTINUED | OUTPATIENT
Start: 2024-08-06 | End: 2024-08-07

## 2024-08-06 RX ORDER — NALOXONE HYDROCHLORIDE 0.4 MG/ML
0.4 INJECTION, SOLUTION INTRAMUSCULAR; INTRAVENOUS; SUBCUTANEOUS
Status: DISCONTINUED | OUTPATIENT
Start: 2024-08-06 | End: 2024-08-07 | Stop reason: HOSPADM

## 2024-08-06 RX ORDER — PROGESTERONE 100 MG/1
200 CAPSULE ORAL DAILY
Status: DISCONTINUED | OUTPATIENT
Start: 2024-08-06 | End: 2024-08-07 | Stop reason: HOSPADM

## 2024-08-06 RX ORDER — PRAZOSIN HYDROCHLORIDE 1 MG/1
1 CAPSULE ORAL AT BEDTIME
Status: DISCONTINUED | OUTPATIENT
Start: 2024-08-07 | End: 2024-08-07 | Stop reason: HOSPADM

## 2024-08-06 RX ORDER — DIPHENHYDRAMINE HYDROCHLORIDE, ZINC ACETATE 2; .1 G/100G; G/100G
CREAM TOPICAL 3 TIMES DAILY PRN
Status: COMPLETED | OUTPATIENT
Start: 2024-08-06 | End: 2024-08-07

## 2024-08-06 RX ORDER — TOPIRAMATE 25 MG/1
25 TABLET, FILM COATED ORAL 2 TIMES DAILY
Status: DISCONTINUED | OUTPATIENT
Start: 2024-08-06 | End: 2024-08-07 | Stop reason: HOSPADM

## 2024-08-06 RX ORDER — LOSARTAN POTASSIUM 25 MG/1
25 TABLET ORAL DAILY
Status: DISCONTINUED | OUTPATIENT
Start: 2024-08-07 | End: 2024-08-07 | Stop reason: HOSPADM

## 2024-08-06 RX ORDER — LORAZEPAM 2 MG/ML
1 INJECTION INTRAMUSCULAR ONCE
Status: COMPLETED | OUTPATIENT
Start: 2024-08-06 | End: 2024-08-06

## 2024-08-06 RX ORDER — DIPHENHYDRAMINE HYDROCHLORIDE 50 MG/ML
50 INJECTION INTRAMUSCULAR; INTRAVENOUS ONCE
Status: COMPLETED | OUTPATIENT
Start: 2024-08-06 | End: 2024-08-06

## 2024-08-06 RX ORDER — CETIRIZINE HYDROCHLORIDE 10 MG/1
10 TABLET ORAL DAILY
Status: DISCONTINUED | OUTPATIENT
Start: 2024-08-07 | End: 2024-08-07 | Stop reason: HOSPADM

## 2024-08-06 RX ORDER — ACETAMINOPHEN 325 MG/1
650 TABLET ORAL EVERY 4 HOURS PRN
Status: DISCONTINUED | OUTPATIENT
Start: 2024-08-06 | End: 2024-08-07 | Stop reason: HOSPADM

## 2024-08-06 RX ORDER — ALBUTEROL SULFATE 5 MG/ML
2.5 SOLUTION RESPIRATORY (INHALATION) EVERY 4 HOURS PRN
Status: DISCONTINUED | OUTPATIENT
Start: 2024-08-06 | End: 2024-08-07

## 2024-08-06 RX ORDER — AMOXICILLIN 250 MG
2 CAPSULE ORAL 2 TIMES DAILY PRN
Status: DISCONTINUED | OUTPATIENT
Start: 2024-08-06 | End: 2024-08-07 | Stop reason: HOSPADM

## 2024-08-06 RX ORDER — DIPHENHYDRAMINE HYDROCHLORIDE 50 MG/ML
50 INJECTION INTRAMUSCULAR; INTRAVENOUS EVERY 6 HOURS PRN
Status: DISCONTINUED | OUTPATIENT
Start: 2024-08-06 | End: 2024-08-07 | Stop reason: HOSPADM

## 2024-08-06 RX ORDER — AMOXICILLIN 250 MG
1 CAPSULE ORAL 2 TIMES DAILY PRN
Status: DISCONTINUED | OUTPATIENT
Start: 2024-08-06 | End: 2024-08-07 | Stop reason: HOSPADM

## 2024-08-06 RX ORDER — QUETIAPINE FUMARATE 25 MG/1
50 TABLET, FILM COATED ORAL AT BEDTIME
Status: DISCONTINUED | OUTPATIENT
Start: 2024-08-06 | End: 2024-08-07 | Stop reason: HOSPADM

## 2024-08-06 RX ADMIN — QUETIAPINE FUMARATE 50 MG: 25 TABLET ORAL at 23:34

## 2024-08-06 RX ADMIN — HYDROXYZINE HYDROCHLORIDE 25 MG: 25 TABLET, FILM COATED ORAL at 23:33

## 2024-08-06 RX ADMIN — GLUCAGON 1 MG: 1 INJECTION, POWDER, LYOPHILIZED, FOR SOLUTION INTRAMUSCULAR; INTRAVENOUS at 20:07

## 2024-08-06 RX ADMIN — MONTELUKAST 10 MG: 10 TABLET, FILM COATED ORAL at 23:34

## 2024-08-06 RX ADMIN — FAMOTIDINE 20 MG: 10 INJECTION, SOLUTION INTRAVENOUS at 23:20

## 2024-08-06 RX ADMIN — METHYLPREDNISOLONE SODIUM SUCCINATE 125 MG: 125 INJECTION, POWDER, FOR SOLUTION INTRAMUSCULAR; INTRAVENOUS at 20:02

## 2024-08-06 RX ADMIN — FAMOTIDINE 20 MG: 10 INJECTION, SOLUTION INTRAVENOUS at 20:03

## 2024-08-06 RX ADMIN — DIPHENHYDRAMINE HYDROCHLORIDE 50 MG: 50 INJECTION INTRAMUSCULAR; INTRAVENOUS at 19:59

## 2024-08-06 RX ADMIN — METHYLPREDNISOLONE SODIUM SUCCINATE 81.25 MG: 125 INJECTION, POWDER, FOR SOLUTION INTRAMUSCULAR; INTRAVENOUS at 23:20

## 2024-08-06 RX ADMIN — GABAPENTIN 100 MG: 100 CAPSULE ORAL at 23:34

## 2024-08-06 RX ADMIN — SODIUM CHLORIDE 1000 ML: 9 INJECTION, SOLUTION INTRAVENOUS at 20:00

## 2024-08-06 RX ADMIN — EPINEPHRINE 0.5 MG: 1 INJECTION INTRAMUSCULAR; INTRAVENOUS; SUBCUTANEOUS at 19:45

## 2024-08-06 RX ADMIN — LORAZEPAM 1 MG: 2 INJECTION INTRAMUSCULAR; INTRAVENOUS at 20:00

## 2024-08-06 RX ADMIN — LIDOCAINE HYDROCHLORIDE 2 ML: 10 INJECTION, SOLUTION EPIDURAL; INFILTRATION; INTRACAUDAL; PERINEURAL at 22:21

## 2024-08-06 RX ADMIN — SODIUM CHLORIDE: 9 INJECTION, SOLUTION INTRAVENOUS at 22:20

## 2024-08-06 RX ADMIN — DIPHENHYDRAMINE HYDROCHLORIDE 50 MG: 50 INJECTION INTRAMUSCULAR; INTRAVENOUS at 23:22

## 2024-08-06 ASSESSMENT — ACTIVITIES OF DAILY LIVING (ADL)
ADLS_ACUITY_SCORE: 35
ADLS_ACUITY_SCORE: 36
ADLS_ACUITY_SCORE: 35
ADLS_ACUITY_SCORE: 35

## 2024-08-06 ASSESSMENT — ENCOUNTER SYMPTOMS
SHORTNESS OF BREATH: 1
COLOR CHANGE: 1

## 2024-08-07 ENCOUNTER — APPOINTMENT (OUTPATIENT)
Dept: ULTRASOUND IMAGING | Facility: CLINIC | Age: 49
End: 2024-08-07
Attending: INTERNAL MEDICINE
Payer: COMMERCIAL

## 2024-08-07 ENCOUNTER — APPOINTMENT (OUTPATIENT)
Dept: CT IMAGING | Facility: CLINIC | Age: 49
End: 2024-08-07
Attending: INTERNAL MEDICINE
Payer: COMMERCIAL

## 2024-08-07 VITALS
DIASTOLIC BLOOD PRESSURE: 84 MMHG | HEART RATE: 88 BPM | RESPIRATION RATE: 20 BRPM | TEMPERATURE: 98.2 F | SYSTOLIC BLOOD PRESSURE: 182 MMHG | WEIGHT: 222.9 LBS | OXYGEN SATURATION: 97 % | BODY MASS INDEX: 37.14 KG/M2 | HEIGHT: 65 IN

## 2024-08-07 LAB
ALBUMIN SERPL BCG-MCNC: 3.7 G/DL (ref 3.5–5.2)
ALBUMIN SERPL BCG-MCNC: 4.1 G/DL (ref 3.5–5.2)
ALBUMIN UR-MCNC: NEGATIVE MG/DL
ALP SERPL-CCNC: 100 U/L (ref 40–150)
ALP SERPL-CCNC: 90 U/L (ref 40–150)
ALT SERPL W P-5'-P-CCNC: 27 U/L (ref 0–50)
ALT SERPL W P-5'-P-CCNC: 27 U/L (ref 0–50)
ANION GAP SERPL CALCULATED.3IONS-SCNC: 10 MMOL/L (ref 7–15)
ANION GAP SERPL CALCULATED.3IONS-SCNC: 14 MMOL/L (ref 7–15)
APPEARANCE UR: CLEAR
AST SERPL W P-5'-P-CCNC: 25 U/L (ref 0–45)
AST SERPL W P-5'-P-CCNC: 26 U/L (ref 0–45)
BACTERIA #/AREA URNS HPF: ABNORMAL /HPF
BASOPHILS # BLD AUTO: 0 10E3/UL (ref 0–0.2)
BASOPHILS # BLD AUTO: 0 10E3/UL (ref 0–0.2)
BASOPHILS NFR BLD AUTO: 0 %
BASOPHILS NFR BLD AUTO: 0 %
BILIRUB SERPL-MCNC: 0.2 MG/DL
BILIRUB SERPL-MCNC: 0.2 MG/DL
BILIRUB UR QL STRIP: NEGATIVE
BUN SERPL-MCNC: 11.2 MG/DL (ref 6–20)
BUN SERPL-MCNC: 9 MG/DL (ref 6–20)
CALCIUM SERPL-MCNC: 7.7 MG/DL (ref 8.8–10.4)
CALCIUM SERPL-MCNC: 8.1 MG/DL (ref 8.8–10.4)
CHLORIDE SERPL-SCNC: 108 MMOL/L (ref 98–107)
CHLORIDE SERPL-SCNC: 116 MMOL/L (ref 98–107)
COLOR UR AUTO: ABNORMAL
CREAT SERPL-MCNC: 0.78 MG/DL (ref 0.51–0.95)
CREAT SERPL-MCNC: 0.95 MG/DL (ref 0.51–0.95)
EGFRCR SERPLBLD CKD-EPI 2021: 73 ML/MIN/1.73M2
EGFRCR SERPLBLD CKD-EPI 2021: >90 ML/MIN/1.73M2
EOSINOPHIL # BLD AUTO: 0 10E3/UL (ref 0–0.7)
EOSINOPHIL # BLD AUTO: 0 10E3/UL (ref 0–0.7)
EOSINOPHIL NFR BLD AUTO: 0 %
EOSINOPHIL NFR BLD AUTO: 0 %
ERYTHROCYTE [DISTWIDTH] IN BLOOD BY AUTOMATED COUNT: 13.8 % (ref 10–15)
ERYTHROCYTE [DISTWIDTH] IN BLOOD BY AUTOMATED COUNT: 13.9 % (ref 10–15)
GLUCOSE SERPL-MCNC: 209 MG/DL (ref 70–99)
GLUCOSE SERPL-MCNC: 254 MG/DL (ref 70–99)
GLUCOSE UR STRIP-MCNC: 300 MG/DL
HCG SERPL QL: NEGATIVE
HCO3 SERPL-SCNC: 16 MMOL/L (ref 22–29)
HCO3 SERPL-SCNC: 19 MMOL/L (ref 22–29)
HCT VFR BLD AUTO: 38.6 % (ref 35–47)
HCT VFR BLD AUTO: 39.1 % (ref 35–47)
HGB BLD-MCNC: 13.1 G/DL (ref 11.7–15.7)
HGB BLD-MCNC: 13.4 G/DL (ref 11.7–15.7)
HGB UR QL STRIP: ABNORMAL
IMM GRANULOCYTES # BLD: 0 10E3/UL
IMM GRANULOCYTES # BLD: 0 10E3/UL
IMM GRANULOCYTES NFR BLD: 0 %
IMM GRANULOCYTES NFR BLD: 0 %
KETONES UR STRIP-MCNC: NEGATIVE MG/DL
LACTATE SERPL-SCNC: 2.7 MMOL/L (ref 0.7–2)
LACTATE SERPL-SCNC: 3.6 MMOL/L (ref 0.7–2)
LEUKOCYTE ESTERASE UR QL STRIP: NEGATIVE
LYMPHOCYTES # BLD AUTO: 0.7 10E3/UL (ref 0.8–5.3)
LYMPHOCYTES # BLD AUTO: 0.9 10E3/UL (ref 0.8–5.3)
LYMPHOCYTES NFR BLD AUTO: 12 %
LYMPHOCYTES NFR BLD AUTO: 8 %
MAGNESIUM SERPL-MCNC: 1.8 MG/DL (ref 1.7–2.3)
MAGNESIUM SERPL-MCNC: 2.1 MG/DL (ref 1.7–2.3)
MCH RBC QN AUTO: 28.3 PG (ref 26.5–33)
MCH RBC QN AUTO: 28.8 PG (ref 26.5–33)
MCHC RBC AUTO-ENTMCNC: 33.9 G/DL (ref 31.5–36.5)
MCHC RBC AUTO-ENTMCNC: 34.3 G/DL (ref 31.5–36.5)
MCV RBC AUTO: 83 FL (ref 78–100)
MCV RBC AUTO: 84 FL (ref 78–100)
MONOCYTES # BLD AUTO: 0 10E3/UL (ref 0–1.3)
MONOCYTES # BLD AUTO: 0.1 10E3/UL (ref 0–1.3)
MONOCYTES NFR BLD AUTO: 0 %
MONOCYTES NFR BLD AUTO: 1 %
MUCOUS THREADS #/AREA URNS LPF: PRESENT /LPF
NEUTROPHILS # BLD AUTO: 6.8 10E3/UL (ref 1.6–8.3)
NEUTROPHILS # BLD AUTO: 8.4 10E3/UL (ref 1.6–8.3)
NEUTROPHILS NFR BLD AUTO: 88 %
NEUTROPHILS NFR BLD AUTO: 91 %
NITRATE UR QL: NEGATIVE
NRBC # BLD AUTO: 0 10E3/UL
NRBC # BLD AUTO: 0 10E3/UL
NRBC BLD AUTO-RTO: 0 /100
NRBC BLD AUTO-RTO: 0 /100
PH UR STRIP: 8 [PH] (ref 5–7)
PLATELET # BLD AUTO: 256 10E3/UL (ref 150–450)
PLATELET # BLD AUTO: 258 10E3/UL (ref 150–450)
POTASSIUM SERPL-SCNC: 3.2 MMOL/L (ref 3.4–5.3)
POTASSIUM SERPL-SCNC: 3.9 MMOL/L (ref 3.4–5.3)
POTASSIUM SERPL-SCNC: 3.9 MMOL/L (ref 3.4–5.3)
PROT SERPL-MCNC: 6.3 G/DL (ref 6.4–8.3)
PROT SERPL-MCNC: 7 G/DL (ref 6.4–8.3)
RBC # BLD AUTO: 4.63 10E6/UL (ref 3.8–5.2)
RBC # BLD AUTO: 4.65 10E6/UL (ref 3.8–5.2)
RBC URINE: 5 /HPF
SODIUM SERPL-SCNC: 141 MMOL/L (ref 135–145)
SODIUM SERPL-SCNC: 142 MMOL/L (ref 135–145)
SP GR UR STRIP: 1.01 (ref 1–1.03)
SQUAMOUS EPITHELIAL: 1 /HPF
UROBILINOGEN UR STRIP-MCNC: <2 MG/DL
WBC # BLD AUTO: 7.8 10E3/UL (ref 4–11)
WBC # BLD AUTO: 9.2 10E3/UL (ref 4–11)
WBC URINE: <1 /HPF

## 2024-08-07 PROCEDURE — 96376 TX/PRO/DX INJ SAME DRUG ADON: CPT

## 2024-08-07 PROCEDURE — 250N000009 HC RX 250: Performed by: INTERNAL MEDICINE

## 2024-08-07 PROCEDURE — 83735 ASSAY OF MAGNESIUM: CPT | Performed by: INTERNAL MEDICINE

## 2024-08-07 PROCEDURE — 84450 TRANSFERASE (AST) (SGOT): CPT | Performed by: INTERNAL MEDICINE

## 2024-08-07 PROCEDURE — G0378 HOSPITAL OBSERVATION PER HR: HCPCS

## 2024-08-07 PROCEDURE — 99239 HOSP IP/OBS DSCHRG MGMT >30: CPT | Performed by: INTERNAL MEDICINE

## 2024-08-07 PROCEDURE — 83605 ASSAY OF LACTIC ACID: CPT | Performed by: INTERNAL MEDICINE

## 2024-08-07 PROCEDURE — 84703 CHORIONIC GONADOTROPIN ASSAY: CPT | Performed by: INTERNAL MEDICINE

## 2024-08-07 PROCEDURE — 81001 URINALYSIS AUTO W/SCOPE: CPT | Performed by: INTERNAL MEDICINE

## 2024-08-07 PROCEDURE — 258N000003 HC RX IP 258 OP 636: Performed by: INTERNAL MEDICINE

## 2024-08-07 PROCEDURE — 84155 ASSAY OF PROTEIN SERUM: CPT | Performed by: INTERNAL MEDICINE

## 2024-08-07 PROCEDURE — 94640 AIRWAY INHALATION TREATMENT: CPT

## 2024-08-07 PROCEDURE — 999N000157 HC STATISTIC RCP TIME EA 10 MIN

## 2024-08-07 PROCEDURE — 96361 HYDRATE IV INFUSION ADD-ON: CPT

## 2024-08-07 PROCEDURE — 250N000013 HC RX MED GY IP 250 OP 250 PS 637: Performed by: INTERNAL MEDICINE

## 2024-08-07 PROCEDURE — 76770 US EXAM ABDO BACK WALL COMP: CPT

## 2024-08-07 PROCEDURE — 250N000011 HC RX IP 250 OP 636: Performed by: INTERNAL MEDICINE

## 2024-08-07 PROCEDURE — 84132 ASSAY OF SERUM POTASSIUM: CPT | Mod: 91 | Performed by: INTERNAL MEDICINE

## 2024-08-07 PROCEDURE — 85025 COMPLETE CBC W/AUTO DIFF WBC: CPT | Performed by: INTERNAL MEDICINE

## 2024-08-07 PROCEDURE — 74176 CT ABD & PELVIS W/O CONTRAST: CPT

## 2024-08-07 RX ORDER — METOPROLOL SUCCINATE 25 MG/1
25 TABLET, EXTENDED RELEASE ORAL DAILY
Status: DISCONTINUED | OUTPATIENT
Start: 2024-08-07 | End: 2024-08-07 | Stop reason: HOSPADM

## 2024-08-07 RX ORDER — ACETAMINOPHEN 325 MG/1
975 TABLET ORAL ONCE
Status: COMPLETED | OUTPATIENT
Start: 2024-08-07 | End: 2024-08-07

## 2024-08-07 RX ORDER — RIZATRIPTAN BENZOATE 5 MG/1
10 TABLET ORAL
Status: DISCONTINUED | OUTPATIENT
Start: 2024-08-07 | End: 2024-08-07 | Stop reason: HOSPADM

## 2024-08-07 RX ORDER — POTASSIUM CHLORIDE 1.5 G/1.58G
40 POWDER, FOR SOLUTION ORAL ONCE
Status: COMPLETED | OUTPATIENT
Start: 2024-08-07 | End: 2024-08-07

## 2024-08-07 RX ORDER — LORAZEPAM 0.5 MG/1
0.5 TABLET ORAL EVERY 4 HOURS PRN
Status: DISCONTINUED | OUTPATIENT
Start: 2024-08-07 | End: 2024-08-07 | Stop reason: HOSPADM

## 2024-08-07 RX ORDER — FLUTICASONE FUROATE AND VILANTEROL 200; 25 UG/1; UG/1
1 POWDER RESPIRATORY (INHALATION) DAILY
Status: DISCONTINUED | OUTPATIENT
Start: 2024-08-07 | End: 2024-08-07 | Stop reason: HOSPADM

## 2024-08-07 RX ORDER — ALBUTEROL SULFATE 0.83 MG/ML
2.5 SOLUTION RESPIRATORY (INHALATION) EVERY 4 HOURS PRN
Status: DISCONTINUED | OUTPATIENT
Start: 2024-08-07 | End: 2024-08-07 | Stop reason: HOSPADM

## 2024-08-07 RX ORDER — HYDROMORPHONE HCL IN WATER/PF 6 MG/30 ML
0.2 PATIENT CONTROLLED ANALGESIA SYRINGE INTRAVENOUS EVERY 4 HOURS PRN
Status: DISCONTINUED | OUTPATIENT
Start: 2024-08-07 | End: 2024-08-07 | Stop reason: HOSPADM

## 2024-08-07 RX ORDER — HYDROMORPHONE HCL IN WATER/PF 6 MG/30 ML
0.4 PATIENT CONTROLLED ANALGESIA SYRINGE INTRAVENOUS EVERY 4 HOURS PRN
Status: COMPLETED | OUTPATIENT
Start: 2024-08-07 | End: 2024-08-07

## 2024-08-07 RX ORDER — HYDROMORPHONE HYDROCHLORIDE 1 MG/ML
0.3 INJECTION, SOLUTION INTRAMUSCULAR; INTRAVENOUS; SUBCUTANEOUS ONCE
Status: COMPLETED | OUTPATIENT
Start: 2024-08-07 | End: 2024-08-07

## 2024-08-07 RX ORDER — POTASSIUM CHLORIDE 1500 MG/1
40 TABLET, EXTENDED RELEASE ORAL ONCE
Status: DISCONTINUED | OUTPATIENT
Start: 2024-08-07 | End: 2024-08-07 | Stop reason: ALTCHOICE

## 2024-08-07 RX ADMIN — POTASSIUM CHLORIDE 40 MEQ: 1.5 POWDER, FOR SOLUTION ORAL at 02:31

## 2024-08-07 RX ADMIN — DIPHENHYDRAMINE HYDROCHLORIDE, ZINC ACETATE: 2; .1 CREAM TOPICAL at 05:04

## 2024-08-07 RX ADMIN — LOSARTAN POTASSIUM 25 MG: 25 TABLET, FILM COATED ORAL at 08:56

## 2024-08-07 RX ADMIN — GABAPENTIN 100 MG: 100 CAPSULE ORAL at 08:56

## 2024-08-07 RX ADMIN — ACETAMINOPHEN 975 MG: 325 TABLET ORAL at 01:34

## 2024-08-07 RX ADMIN — METHYLPREDNISOLONE SODIUM SUCCINATE 81.25 MG: 125 INJECTION, POWDER, FOR SOLUTION INTRAMUSCULAR; INTRAVENOUS at 05:17

## 2024-08-07 RX ADMIN — ALBUTEROL SULFATE 2.5 MG: 2.5 SOLUTION RESPIRATORY (INHALATION) at 14:19

## 2024-08-07 RX ADMIN — CETIRIZINE HYDROCHLORIDE 10 MG: 10 TABLET, FILM COATED ORAL at 08:56

## 2024-08-07 RX ADMIN — TOPIRAMATE 25 MG: 25 TABLET, FILM COATED ORAL at 08:56

## 2024-08-07 RX ADMIN — ACETAMINOPHEN 650 MG: 325 TABLET ORAL at 13:01

## 2024-08-07 RX ADMIN — ALBUTEROL SULFATE 2.5 MG: 2.5 SOLUTION RESPIRATORY (INHALATION) at 00:11

## 2024-08-07 RX ADMIN — HYDROXYZINE HYDROCHLORIDE 25 MG: 25 TABLET, FILM COATED ORAL at 13:01

## 2024-08-07 RX ADMIN — METOPROLOL SUCCINATE 25 MG: 25 TABLET, EXTENDED RELEASE ORAL at 12:55

## 2024-08-07 RX ADMIN — PROGESTERONE 200 MG: 100 CAPSULE ORAL at 00:07

## 2024-08-07 RX ADMIN — FAMOTIDINE 20 MG: 10 INJECTION, SOLUTION INTRAVENOUS at 09:00

## 2024-08-07 RX ADMIN — ALBUTEROL SULFATE 2.5 MG: 2.5 SOLUTION RESPIRATORY (INHALATION) at 08:28

## 2024-08-07 RX ADMIN — SODIUM CHLORIDE 1000 ML: 9 INJECTION, SOLUTION INTRAVENOUS at 02:31

## 2024-08-07 RX ADMIN — HYDROMORPHONE HYDROCHLORIDE 0.2 MG: 0.2 INJECTION, SOLUTION INTRAMUSCULAR; INTRAVENOUS; SUBCUTANEOUS at 00:06

## 2024-08-07 RX ADMIN — METHYLPREDNISOLONE SODIUM SUCCINATE 81.25 MG: 125 INJECTION, POWDER, FOR SOLUTION INTRAMUSCULAR; INTRAVENOUS at 14:50

## 2024-08-07 RX ADMIN — SODIUM CHLORIDE: 9 INJECTION, SOLUTION INTRAVENOUS at 04:02

## 2024-08-07 RX ADMIN — DIPHENHYDRAMINE HYDROCHLORIDE, ZINC ACETATE: 2; .1 CREAM TOPICAL at 00:04

## 2024-08-07 RX ADMIN — HYDROMORPHONE HYDROCHLORIDE 0.4 MG: 0.2 INJECTION, SOLUTION INTRAMUSCULAR; INTRAVENOUS; SUBCUTANEOUS at 05:14

## 2024-08-07 RX ADMIN — FLUTICASONE FUROATE AND VILANTEROL TRIFENATATE 1 PUFF: 200; 25 POWDER RESPIRATORY (INHALATION) at 12:55

## 2024-08-07 RX ADMIN — DIPHENHYDRAMINE HYDROCHLORIDE 50 MG: 50 INJECTION INTRAMUSCULAR; INTRAVENOUS at 06:54

## 2024-08-07 RX ADMIN — TOPIRAMATE 25 MG: 25 TABLET, FILM COATED ORAL at 00:06

## 2024-08-07 RX ADMIN — HYDROMORPHONE HYDROCHLORIDE 0.3 MG: 1 INJECTION, SOLUTION INTRAMUSCULAR; INTRAVENOUS; SUBCUTANEOUS at 01:34

## 2024-08-07 ASSESSMENT — ACTIVITIES OF DAILY LIVING (ADL)
ADLS_ACUITY_SCORE: 24
DEPENDENT_IADLS:: INDEPENDENT
ADLS_ACUITY_SCORE: 24
ADLS_ACUITY_SCORE: 24
ADLS_ACUITY_SCORE: 36
ADLS_ACUITY_SCORE: 24
ADLS_ACUITY_SCORE: 36
ADLS_ACUITY_SCORE: 24
ADLS_ACUITY_SCORE: 36
ADLS_ACUITY_SCORE: 36

## 2024-08-07 NOTE — CONSULTS
Care Management Initial Consult    General Information  Assessment completed with: Patient, Other (ASL intrepreter),    Type of CM/SW Visit: Initial Assessment    Primary Care Provider verified and updated as needed: Yes   Readmission within the last 30 days: no previous admission in last 30 days      Reason for Consult: discharge planning  Advance Care Planning: Advance Care Planning Reviewed: present on chart, verified with patient          Communication Assessment  Patient's communication style: spoken language (English or Bilingual) (ASL)    Hearing Difficulty or Deaf: yes   Wear Glasses or Blind: yes    Cognitive  Cognitive/Neuro/Behavioral: .WDL except, speech              Best Language: 3 - Mute  Speech: unable to speak    Living Environment:   People in home: alone     Current living Arrangements: mobile home      Able to return to prior arrangements: yes       Family/Social Support:  Care provided by: self  Provides care for: no one  Marital Status:   Other (specify) (family)          Description of Support System: Supportive, Involved    Support Assessment: Adequate family and caregiver support    Current Resources:   Patient receiving home care services: No     Community Resources: County Programs, County Worker,  (CADI waiver)  Equipment currently used at home: grab bar, tub/shower, shower chair, grab bar, toilet (ramp, bed rail)  Supplies currently used at home: None    Employment/Financial:  Employment Status: employed part-time        Financial Concerns: none   Referral to Financial Worker: No       Does the patient's insurance plan have a 3 day qualifying hospital stay waiver?  Yes     Which insurance plan 3 day waiver is available? Alternative insurance waiver    Will the waiver be used for post-acute placement? Undetermined at this time    Lifestyle & Psychosocial Needs:  Social Determinants of Health     Food Insecurity: High Risk (1/4/2024)    Food Insecurity     Within the  past 12 months, did you worry that your food would run out before you got money to buy more?: Yes     Within the past 12 months, did the food you bought just not last and you didn t have money to get more?: Yes   Depression: Not at risk (2/16/2024)    PHQ-2     PHQ-2 Score: 2   Recent Concern: Depression - At risk (1/4/2024)    PHQ-2     PHQ-2 Score: 4   Housing Stability: Low Risk  (1/4/2024)    Housing Stability     Do you have housing? : Yes     Are you worried about losing your housing?: No   Tobacco Use: Low Risk  (7/11/2024)    Patient History     Smoking Tobacco Use: Never     Smokeless Tobacco Use: Never     Passive Exposure: Never   Financial Resource Strain: High Risk (1/4/2024)    Financial Resource Strain     Within the past 12 months, have you or your family members you live with been unable to get utilities (heat, electricity) when it was really needed?: Yes   Alcohol Use: Not on file   Transportation Needs: High Risk (1/4/2024)    Transportation Needs     Within the past 12 months, has lack of transportation kept you from medical appointments, getting your medicines, non-medical meetings or appointments, work, or from getting things that you need?: Yes   Physical Activity: Not on file   Interpersonal Safety: Unknown (3/17/2024)    [facility]    Humiliation, Afraid, Rape, and Kick questionnaire     Fear of Current or Ex-Partner: Not on file     Emotionally Abused: Not on file     Physically Abused: No     Sexually Abused: No   Stress: Not on file   Social Connections: Not on file   Health Literacy: Not on file       Functional Status:  Prior to admission patient needed assistance:   Dependent ADLs:: Independent  Dependent IADLs:: Independent       Mental Health Status:  Mental Health Status: No Current Concerns       Chemical Dependency Status:  Chemical Dependency Status: No Current Concerns             Values/Beliefs:  Spiritual, Cultural Beliefs, Mandaeism  Practices, Values that affect care: no  Description of Beliefs that Will Affect Care: [place]            Additional Information:  Marleny Viera is a(n) 48 year old year old female who presented with Anaphylaxis.     CM met with pt at bedside. Introduced self and role.    assisted with completing assessment. Patient lives in a(n) mobile home alone. Patient is independent with IADL/ADLs. Anticipated that patient will discharge to home. Pt drove herself here. Car is in the parking lot and anticipates driving herself home.     Pt denies any further needs at this time.    Contacts:  Extended Emergency Contact Information  Primary Emergency Contact: Zuleika Case   [place]  Mobile Phone: [phone]  Relation: Aunt  Secondary Emergency Contact: Dayan Nogueira   [place]  Mobile Phone: [phone]  Relation: Cousin    No further care management intervention anticipated at this time. Please re-consult if further needs arise. Care management signing off.      Eduardo Myers RN

## 2024-08-07 NOTE — ED PROVIDER NOTES
EMERGENCY DEPARTMENT ENCOUNTER      NAME: Marleny Viera  AGE: 48 year old female  YOB: 1975  MRN: [id number]  EVALUATION DATE & TIME: No admission date for patient encounter.    PCP: Brianna Hurst    ED PROVIDER: Eddy Gallegos MD      Chief Complaint   Patient presents with    Insect Bite    Allergic Reaction         FINAL IMPRESSION:  1. Anaphylaxis, initial encounter          ED COURSE & MEDICAL DECISION MAKIN:11 PM I met patient and performed my initial exam.   8:31 PM I rechecked and updated patient. Patient's throat is feeling better. Her uvula is less swollen. Her wheezing has resolved and the urticaria in the neck is resolving.   8:51 PM I spoke with intensivist Dr. Dennis and he is okay with patient staying at [facility].  will also defer the epi drip to me.   8:52 PM I paged hospitalist Dr. Nicholson regarding patient's admission.  9:10 PM I talked to Hospitalist Dr. Nciholson regarding patient's admission.     Pertinent Labs & Imaging studies reviewed. (See chart for details)  48 year old female presents to the Emergency Department for evaluation of reaction to bee stings    ED Course as of 08/07/24 1002   Tue Aug 06, 2024   1955 48-year-old female with history of multiple drug allergies including anaphylaxis presents with concern for allergic reaction.  I was asked to see patient in triage.  She reports she got stung by multiple bees and gave herself 2 IM autoinjectors 15 minutes apart last given about 1 hour ago    reports throat swelling, neck swelling, hives, nausea and wheezing    On exam she does have some swelling to her uvula but no swelling to her lip or tongue's, no stridor or drooling but does have some wheezing with forced exhalation as well as some urticaria to her neck and some dry heaving    Concern for anaphylaxis.  Will give another epinephrine IM injection, give glucagon since she is on metoprolol, give Solu-Medrol, IV fluids, Pepcid,  diphenhydramine   1954 Also give some Ativan as patient has a history of anxiety and also tremulousness could make her anxious   1954 Obtain EKG BMP and CBC   1954 PICC line will be placed in case we need to do epinephrine infusion   2010 Feeling a little better after the epinephrine and Solu-Medrol and Pepcid and Benadryl and Ativan   2010 If symptoms have not resolved in 15 minutes will start low-dose epi infusion and admit to the ICU   2051 Placed in the ICU secondary to need for 3 IM epi is glucagon steroids IV fluids.  Patient still has some ongoing symptoms but is improving therefore would hold off on epi infusion.  I would not place a PICC line and if her symptoms worsen start epinephrine infusion.   2136 Hospitalist saw patient and patient is reporting she is feeling worse again we will start epi infusion       Medical Decision Making  Obtained supplemental history:Supplemental history obtained?: Documented in chart  Reviewed external records: External records reviewed?: Documented in chart and Inpatient Record: Patient was seen at [facility] ED on 07/11/124.   Care impacted by chronic illness:Hypertension, Mental Health, and Other: migraines, pulmonary embolism, deafness, and insomnia  Care significantly affected by social determinants of health:N/A  Did you consider but not order tests?: Work up considered but not performed and documented in chart, if applicable  Did you interpret images independently?: Independent interpretation of ECG and images noted in documentation, when applicable.  Consultation discussion with other provider:Did you involve another provider (consultant, , pharmacy, etc.)?: I discussed the care with another health care provider, see documentation for details.  Admit.          At the conclusion of the encounter I discussed the results of all of the tests and the disposition. The questions were answered. The patient or family acknowledged understanding and was agreeable with the  care plan.     55 minutes of critical care time     MEDICATIONS GIVEN IN THE EMERGENCY:  Medications   lidocaine 1 % 0.1-1 mL (has no administration in time range)   lidocaine (LMX4) cream (has no administration in time range)   sodium chloride (PF) 0.9% PF flush 3 mL (3 mLs Intracatheter Not Given 8/7/24 0236)   sodium chloride (PF) 0.9% PF flush 3 mL (has no administration in time range)   lidocaine 1 % 0.1-5 mL (2 mLs Other $Given 8/6/24 2221)   lidocaine (LMX4) cream (has no administration in time range)   EPINEPHrine (ADRENALIN) 5 mg in sodium chloride 0.9 % 250 mL infusion CENTRAL ( Intravenous Canceled Entry 8/7/24 0149)   lidocaine 1 % 0.1-1 mL (has no administration in time range)   lidocaine (LMX4) cream (has no administration in time range)   sodium chloride (PF) 0.9% PF flush 3 mL (3 mLs Intracatheter Not Given 8/7/24 0623)   sodium chloride (PF) 0.9% PF flush 3 mL (has no administration in time range)   senna-docusate (SENOKOT-S/PERICOLACE) 8.6-50 MG per tablet 1 tablet (has no administration in time range)     Or   senna-docusate (SENOKOT-S/PERICOLACE) 8.6-50 MG per tablet 2 tablet (has no administration in time range)   calcium carbonate (TUMS) chewable tablet 1,000 mg (has no administration in time range)   sodium chloride 0.9 % infusion ( Intravenous $New Bag 8/7/24 0402)   acetaminophen (TYLENOL) tablet 650 mg (has no administration in time range)     Or   acetaminophen (TYLENOL) Suppository 650 mg (has no administration in time range)   famotidine (PEPCID) injection 20 mg (20 mg Intravenous $Given 8/7/24 0900)   methylPREDNISolone sodium succinate (solu-MEDROL) injection 81.25 mg (81.25 mg Intravenous $Given 8/7/24 0517)   diphenhydrAMINE (BENADRYL) injection 50 mg (50 mg Intravenous $Given 8/7/24 0654)   albuterol (PROVENTIL) neb solution 2.5 mg (2.5 mg Nebulization $Given 8/7/24 7707)   naloxone (NARCAN) injection 0.2 mg (has no administration in time range)     Or   naloxone (NARCAN) injection  0.4 mg (has no administration in time range)     Or   naloxone (NARCAN) injection 0.2 mg (has no administration in time range)     Or   naloxone (NARCAN) injection 0.4 mg (has no administration in time range)   cetirizine (zyrTEC) tablet 10 mg (10 mg Oral $Given 8/7/24 0856)   gabapentin (NEURONTIN) capsule 100 mg (100 mg Oral $Given 8/7/24 0856)   losartan (COZAAR) tablet 25 mg (25 mg Oral $Given 8/7/24 0856)   montelukast (SINGULAIR) tablet 10 mg (10 mg Oral $Given 8/6/24 2334)   omeprazole (PriLOSEC) CR capsule 20 mg (20 mg Oral Not Given 8/7/24 0858)   prazosin (MINIPRESS) capsule 1 mg (has no administration in time range)   progesterone (PROMETRIUM) capsule 200 mg (200 mg Oral $Given 8/7/24 0007)   QUEtiapine (SEROquel) tablet 50 mg (50 mg Oral $Given 8/6/24 2334)   topiramate (TOPAMAX) tablet 25 mg (25 mg Oral $Given 8/7/24 0856)   hydrOXYzine HCl (ATARAX) tablet 25 mg (25 mg Oral $Given 8/6/24 2333)   LORazepam (ATIVAN) tablet 0.5 mg (has no administration in time range)   HYDROmorphone (DILAUDID) injection 0.2 mg (has no administration in time range)   albuterol (PROVENTIL) neb solution 2.5 mg (has no administration in time range)   sodium chloride 0.9% BOLUS 1,000 mL (0 mLs Intravenous Stopped 8/6/24 2220)   EPINEPHrine (ADRENALIN) kit 0.3-0.5 mg (0.5 mg Intramuscular $Given 8/6/24 1945)   methylPREDNISolone sodium succinate (solu-MEDROL) injection 125 mg (125 mg Intravenous $Given 8/6/24 2002)   diphenhydrAMINE (BENADRYL) injection 50 mg (50 mg Intravenous $Given 8/6/24 1959)   famotidine (PEPCID) injection 20 mg (20 mg Intravenous $Given 8/6/24 2003)   sodium chloride (PF) 0.9% PF flush 10-40 mL (10 mLs Intracatheter $Given 8/6/24 4584)   glucagon injection 1 mg (1 mg Intravenous $Given 8/6/24 2007)   LORazepam (ATIVAN) injection 1 mg (1 mg Intravenous $Given 8/6/24 2000)   diphenhydrAMINE-zinc acetate (BENADRYL) 2-0.1 % cream ( Topical $Given 8/7/24 7559)   HYDROmorphone (PF) (DILAUDID) injection 0.3 mg  (0.3 mg Intravenous $Given 8/7/24 0134)   acetaminophen (TYLENOL) tablet 975 mg (975 mg Oral $Given 8/7/24 0134)   sodium chloride 0.9% BOLUS 1,000 mL (1,000 mLs Intravenous $New Bag 8/7/24 0231)   potassium chloride (KLOR-CON) Packet 40 mEq (40 mEq Oral or Feeding Tube $Given 8/7/24 0231)   HYDROmorphone (DILAUDID) injection 0.4 mg (0.4 mg Intravenous $Given 8/7/24 0514)       NEW PRESCRIPTIONS STARTED AT TODAY'S ER VISIT  Current Discharge Medication List             =================================================================    HPI    Patient information was obtained from: Patient    Use of :  Yes (Camera) - Language ASL        Marleny Viera is a 48 year old female with a pertinent history of Hypertension, Mental Health, migraines, pulmonary embolism, deafness, and insomnia who presents to this ED via car for evaluation of  allergic reaction from multiple bee stings.    The patient reports getting stung by multiple bee's about an hour ago while cleaning her car. Patient has anaphylactic shock to bee stings and she immediately injected 1 Epipen with no relief. After 15 minutes with no relief, patient injected another dose of Epipen. She also mentioned having an upset stomach after using the Epipen and started having diarrhea. Patient currently endorses shortness of breath, wheezing, neck swelling, tongue feeling swollen in the back of the throat, nausea along with itchiness and burning sensation in the throat. She also report some itchy rash in her neck. Patient report endorsing similar symptoms five years ago when she was stung by yellow jackets.     Patient denies any vomiting or dizziness. No other complaints at this time.           REVIEW OF SYSTEMS   Review of Systems   Respiratory:  Positive for shortness of breath.    Skin:  Positive for color change and rash.        PAST MEDICAL HISTORY:  Past Medical History:   Diagnosis Date    Accidental overdose, initial encounter 07/07/2022    ADHD  (attention deficit hyperactivity disorder)     Alcohol dependence (H)     Anxiety     Arthritis     Asthma     B12 deficiency     Bipolar depression (H)     Blood clotting disorder (H24)     Deafness     Depressive disorder 10 year    Drug abuse, nondependent (H) 10/29/2004    Overview:  polydrug abuse per psych notes ; Other, mixed, or unspecified nondependent drug abuse, unspecified    History of blood clots     History of transfusion     Hypertension     Kidney stones 2015    Major depressive disorder, recurrent episode, severe (H) 05/17/2016    Overview:  s/p suicide attempt Epic     Migraine     Obstructive sleep apnea 06/01/2016    Pt states she does not have ROBERT, does not use CPAP    Pulmonary emboli (H)     Status post total right knee replacement 01/18/2016       PAST SURGICAL HISTORY:  Past Surgical History:   Procedure Laterality Date    BIOPSY BREAST Right     2019... Also had fluid drained from left breast under surgery also in 2019    BREAST SURGERY      MAMMOPLASTY REDUCTION Bilateral 01/01/2017    ORTHOPEDIC SURGERY  2012,2013,2014    TOTAL HIP ARTHROPLASTY Bilateral     TOTAL KNEE ARTHROPLASTY Right     TUBAL LIGATION      US BREAST CORE BIOPSY RIGHT Right 11/21/2019           CURRENT MEDICATIONS:    No current outpatient medications on file.      ALLERGIES:  Allergies   Allergen Reactions    Acetylcysteine Rash and Other (See Comments)    Amoxicillin Itching and Shortness Of Breath    Bee Pollen Anaphylaxis    Chocolate Anaphylaxis    Contrast [Iodixanol] Shortness Of Breath and Rash     Pt stated she reacted to the contrast given for her CT in past. She experienced shortness of breath, throat tightening, and rash on chest, and they gave her an injection to counter the symptoms.     Covid-19 (Mrna) Vaccine Shortness Of Breath     Pfizer COVID-19 Vaccine    Hymenoptera Allergenic Extract [Wasp Venom Protein] Anaphylaxis    Iodine Hives and Unknown     Pt stated that she was injected with CT CONTRAST  "in the past and got hives, SOB, and nausea. Please pre-medicate patient in the future.     Meat Extract Anaphylaxis    Wasp Venom Protein Starter Kit [Wasp Venom Protein] Itching, Shortness Of Breath, Swelling and Difficulty breathing    Adhesive Tape Unknown    Aspirin     Bee Venom Unknown    Food Allergy Formula Unknown     Red meat, chocolate    Geodon [Ziprasidone] Unknown    Latex Unknown     Added based on information entered during case entry, please review and add reactions, type, and severity as needed    Morphine Unknown    Prochlorperazine Other (See Comments)    Risperdal [Risperidone] Unknown    Risperidone Analogues [Risperidone] Other (See Comments)    Shellfish Allergy Unknown    Theobroma Oil [Theobroma Grandiflorum Seed Butter] Unknown    Trazodone Other (See Comments)     Elevated creatinine    Zoloft [Sertraline] Unknown    Hydrochlorothiazide Rash       FAMILY HISTORY:  Family History   Problem Relation Age of Onset    Cancer Mother     Breast Cancer Mother     Diabetes Mother     Hypertension Mother     Cerebrovascular Disease Father     Hyperlipidemia No family hx of     Depression No family hx of     Anxiety Disorder No family hx of     Mental Illness No family hx of     Substance Abuse No family hx of     Asthma No family hx of        SOCIAL HISTORY:   Social History     Socioeconomic History    Marital status:    Tobacco Use    Smoking status: Never     Passive exposure: Never    Smokeless tobacco: Never   Vaping Use    Vaping status: Never Used   Substance and Sexual Activity    Alcohol use: Yes     Comment: Alcoholic Drinks/day: dependence    Drug use: Never    Sexual activity: Not Currently     Partners: Male     Birth control/protection: None   Social History Narrative    . No children. .  Lives alone.  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  Nonsmoker.  No alcohol use.  Tesha Pelaez MD       Social Determinants of Health     Financial Resource " "Strain: High Risk (1/4/2024)    Financial Resource Strain     Within the past 12 months, have you or your family members you live with been unable to get utilities (heat, electricity) when it was really needed?: Yes   Food Insecurity: High Risk (1/4/2024)    Food Insecurity     Within the past 12 months, did you worry that your food would run out before you got money to buy more?: Yes     Within the past 12 months, did the food you bought just not last and you didn t have money to get more?: Yes   Transportation Needs: High Risk (1/4/2024)    Transportation Needs     Within the past 12 months, has lack of transportation kept you from medical appointments, getting your medicines, non-medical meetings or appointments, work, or from getting things that you need?: Yes   Interpersonal Safety: Unknown (3/17/2024)    [facility]    Humiliation, Afraid, Rape, and Kick questionnaire     Physically Abused: No     Sexually Abused: No   Housing Stability: Low Risk  (1/4/2024)    Housing Stability     Do you have housing? : Yes     Are you worried about losing your housing?: No       VITALS:  BP (!) 145/70   Pulse 89   Temp 97.7  F (36.5  C) (Oral)   Resp 20   Ht 1.651 m (5' 5\")   Wt 101.1 kg (222 lb 14.4 oz)   LMP  (LMP Unknown)   SpO2 95%   BMI 37.09 kg/m      PHYSICAL EXAM      Vitals: BP (!) 145/70   Pulse 89   Temp 97.7  F (36.5  C) (Oral)   Resp 20   Ht 1.651 m (5' 5\")   Wt 101.1 kg (222 lb 14.4 oz)   LMP  (LMP Unknown)   SpO2 95%   BMI 37.09 kg/m    General: Appears in no acute distress, awake, alert, interactive.  Eyes: Conjunctivae non-injected. Sclera anicteric.  HENT: Atraumatic. Uvula is a little swollen.   Neck: Supple.  Respiratory/Chest: Respiration unlabored. Wheezing when breathing in and out.   Abdomen: non distended  Musculoskeletal: Normal extremities. No edema or erythema.  Skin: A little redness in the neck.   Neurologic: Face symmetric, moves all extremities " spontaneously. Speech clear.  Psychiatric: Oriented to person, place, and time. Affect appropriate.    LAB:  All pertinent labs reviewed and interpreted.  Results for orders placed or performed during the hospital encounter of 08/06/24   XR Chest Port 1 View    Impression    IMPRESSION: Interval installation of a right PICC line. The PICC line has its tip approximately 35 mm caudad to the approximate level of the cavoatrial junction. Otherwise negative chest x-ray.   US Renal Complete Non-Vascular    Impression    IMPRESSION:  1.  Normal kidney ultrasound. No hydronephrosis.   CT Abdomen Pelvis w/o Contrast    Impression    IMPRESSION:   1.  No definite acute abnormalities or CT findings to explain the patient's symptoms.    2.  Diffuse fatty infiltration of the liver.    3.  Myomatous uterus.    4.  Streak artifact from bilateral hip arthroplasties obscure some detail in the pelvis.     Basic metabolic panel   Result Value Ref Range    Sodium 143 135 - 145 mmol/L    Potassium 3.3 (L) 3.4 - 5.3 mmol/L    Chloride 108 (H) 98 - 107 mmol/L    Carbon Dioxide (CO2) 25 22 - 29 mmol/L    Anion Gap 10 7 - 15 mmol/L    Urea Nitrogen 11.9 6.0 - 20.0 mg/dL    Creatinine 1.13 (H) 0.51 - 0.95 mg/dL    GFR Estimate 59 (L) >60 mL/min/1.73m2    Calcium 8.8 8.8 - 10.4 mg/dL    Glucose 139 (H) 70 - 99 mg/dL   CBC with platelets and differential   Result Value Ref Range    WBC Count 7.8 4.0 - 11.0 10e3/uL    RBC Count 4.89 3.80 - 5.20 10e6/uL    Hemoglobin 13.9 11.7 - 15.7 g/dL    Hematocrit 40.6 35.0 - 47.0 %    MCV 83 78 - 100 fL    MCH 28.4 26.5 - 33.0 pg    MCHC 34.2 31.5 - 36.5 g/dL    RDW 13.8 10.0 - 15.0 %    Platelet Count 297 150 - 450 10e3/uL    % Neutrophils 58 %    % Lymphocytes 36 %    % Monocytes 4 %    % Eosinophils 1 %    % Basophils 0 %    % Immature Granulocytes 0 %    NRBCs per 100 WBC 0 <1 /100    Absolute Neutrophils 4.6 1.6 - 8.3 10e3/uL    Absolute Lymphocytes 2.9 0.8 - 5.3 10e3/uL    Absolute Monocytes 0.3 0.0  - 1.3 10e3/uL    Absolute Eosinophils 0.1 0.0 - 0.7 10e3/uL    Absolute Basophils 0.0 0.0 - 0.2 10e3/uL    Absolute Immature Granulocytes 0.0 <=0.4 10e3/uL    Absolute NRBCs 0.0 10e3/uL   Hepatic panel   Result Value Ref Range    Protein Total 7.5 6.4 - 8.3 g/dL    Albumin 4.3 3.5 - 5.2 g/dL    Bilirubin Total 0.3 <=1.2 mg/dL    Alkaline Phosphatase 110 40 - 150 U/L    AST 30 0 - 45 U/L    ALT 29 0 - 50 U/L    Bilirubin Direct <0.20 0.00 - 0.30 mg/dL   Result Value Ref Range    Magnesium 2.3 1.7 - 2.3 mg/dL   Nt probnp inpatient   Result Value Ref Range    N terminal Pro BNP Inpatient 134 0 - 900 pg/mL   UA with Microscopic reflex to Culture    Specimen: Urine, Midstream   Result Value Ref Range    Color Urine Light Yellow Colorless, Straw, Light Yellow, Yellow    Appearance Urine Clear Clear    Glucose Urine 300 (A) Negative mg/dL    Bilirubin Urine Negative Negative    Ketones Urine Negative Negative mg/dL    Specific Gravity Urine 1.010 1.001 - 1.030    Blood Urine 0.03 mg/dL (A) Negative    pH Urine 8.0 (H) 5.0 - 7.0    Protein Albumin Urine Negative Negative mg/dL    Urobilinogen Urine <2.0 <2.0 mg/dL    Nitrite Urine Negative Negative    Leukocyte Esterase Urine Negative Negative    Bacteria Urine Few (A) None Seen /HPF    Mucus Urine Present (A) None Seen /LPF    RBC Urine 5 (H) <=2 /HPF    WBC Urine <1 <=5 /HPF    Squamous Epithelials Urine 1 <=1 /HPF   Comprehensive metabolic panel   Result Value Ref Range    Sodium 142 135 - 145 mmol/L    Potassium 3.9 3.4 - 5.3 mmol/L    Carbon Dioxide (CO2) 16 (L) 22 - 29 mmol/L    Anion Gap 10 7 - 15 mmol/L    Urea Nitrogen 9.0 6.0 - 20.0 mg/dL    Creatinine 0.78 0.51 - 0.95 mg/dL    GFR Estimate >90 >60 mL/min/1.73m2    Calcium 7.7 (L) 8.8 - 10.4 mg/dL    Chloride 116 (H) 98 - 107 mmol/L    Glucose 209 (H) 70 - 99 mg/dL    Alkaline Phosphatase 90 40 - 150 U/L    AST 25 0 - 45 U/L    ALT 27 0 - 50 U/L    Protein Total 6.3 (L) 6.4 - 8.3 g/dL    Albumin 3.7 3.5 - 5.2 g/dL     Bilirubin Total 0.2 <=1.2 mg/dL   Comprehensive metabolic panel   Result Value Ref Range    Sodium 141 135 - 145 mmol/L    Potassium 3.2 (L) 3.4 - 5.3 mmol/L    Carbon Dioxide (CO2) 19 (L) 22 - 29 mmol/L    Anion Gap 14 7 - 15 mmol/L    Urea Nitrogen 11.2 6.0 - 20.0 mg/dL    Creatinine 0.95 0.51 - 0.95 mg/dL    GFR Estimate 73 >60 mL/min/1.73m2    Calcium 8.1 (L) 8.8 - 10.4 mg/dL    Chloride 108 (H) 98 - 107 mmol/L    Glucose 254 (H) 70 - 99 mg/dL    Alkaline Phosphatase 100 40 - 150 U/L    AST 26 0 - 45 U/L    ALT 27 0 - 50 U/L    Protein Total 7.0 6.4 - 8.3 g/dL    Albumin 4.1 3.5 - 5.2 g/dL    Bilirubin Total 0.2 <=1.2 mg/dL   Result Value Ref Range    Magnesium 2.1 1.7 - 2.3 mg/dL   Lactic acid whole blood   Result Value Ref Range    Lactic Acid 3.6 (H) 0.7 - 2.0 mmol/L   HCG qualitative   Result Value Ref Range    hCG Serum Qualitative Negative Negative   CBC with platelets and differential   Result Value Ref Range    WBC Count 9.2 4.0 - 11.0 10e3/uL    RBC Count 4.65 3.80 - 5.20 10e6/uL    Hemoglobin 13.4 11.7 - 15.7 g/dL    Hematocrit 39.1 35.0 - 47.0 %    MCV 84 78 - 100 fL    MCH 28.8 26.5 - 33.0 pg    MCHC 34.3 31.5 - 36.5 g/dL    RDW 13.8 10.0 - 15.0 %    Platelet Count 258 150 - 450 10e3/uL    % Neutrophils 91 %    % Lymphocytes 8 %    % Monocytes 1 %    % Eosinophils 0 %    % Basophils 0 %    % Immature Granulocytes 0 %    NRBCs per 100 WBC 0 <1 /100    Absolute Neutrophils 8.4 (H) 1.6 - 8.3 10e3/uL    Absolute Lymphocytes 0.7 (L) 0.8 - 5.3 10e3/uL    Absolute Monocytes 0.1 0.0 - 1.3 10e3/uL    Absolute Eosinophils 0.0 0.0 - 0.7 10e3/uL    Absolute Basophils 0.0 0.0 - 0.2 10e3/uL    Absolute Immature Granulocytes 0.0 <=0.4 10e3/uL    Absolute NRBCs 0.0 10e3/uL   Result Value Ref Range    Potassium 3.9 3.4 - 5.3 mmol/L   CBC with platelets and differential   Result Value Ref Range    WBC Count 7.8 4.0 - 11.0 10e3/uL    RBC Count 4.63 3.80 - 5.20 10e6/uL    Hemoglobin 13.1 11.7 - 15.7 g/dL     Hematocrit 38.6 35.0 - 47.0 %    MCV 83 78 - 100 fL    MCH 28.3 26.5 - 33.0 pg    MCHC 33.9 31.5 - 36.5 g/dL    RDW 13.9 10.0 - 15.0 %    Platelet Count 256 150 - 450 10e3/uL    % Neutrophils 88 %    % Lymphocytes 12 %    % Monocytes 0 %    % Eosinophils 0 %    % Basophils 0 %    % Immature Granulocytes 0 %    NRBCs per 100 WBC 0 <1 /100    Absolute Neutrophils 6.8 1.6 - 8.3 10e3/uL    Absolute Lymphocytes 0.9 0.8 - 5.3 10e3/uL    Absolute Monocytes 0.0 0.0 - 1.3 10e3/uL    Absolute Eosinophils 0.0 0.0 - 0.7 10e3/uL    Absolute Basophils 0.0 0.0 - 0.2 10e3/uL    Absolute Immature Granulocytes 0.0 <=0.4 10e3/uL    Absolute NRBCs 0.0 10e3/uL   Lactic acid whole blood   Result Value Ref Range    Lactic Acid 2.7 (H) 0.7 - 2.0 mmol/L   Result Value Ref Range    Magnesium 1.8 1.7 - 2.3 mg/dL   ECG 12-LEAD WITH MUSE (LHE)   Result Value Ref Range    Systolic Blood Pressure 174 mmHg    Diastolic Blood Pressure 74 mmHg    Ventricular Rate 90 BPM    Atrial Rate 90 BPM    UT Interval 164 ms    QRS Duration 86 ms     ms    QTc 430 ms    P Axis 50 degrees    R AXIS 5 degrees    T Axis -25 degrees    Interpretation ECG       Sinus rhythm  ST & T wave abnormality, consider inferolateral ischemia  Abnormal ECG  When compared with ECG of 01-Apr-2024 16:04,  No significant change was found  Confirmed by SEE ED PROVIDER NOTE FOR, ECG INTERPRETATION ([id number]),  Manuela Elena ([id number]) on 8/6/2024 10:09:17 PM         RADIOLOGY:  Reviewed all pertinent imaging. Please see official radiology report.  CT Abdomen Pelvis w/o Contrast   Final Result   IMPRESSION:    1.  No definite acute abnormalities or CT findings to explain the patient's symptoms.      2.  Diffuse fatty infiltration of the liver.      3.  Myomatous uterus.      4.  Streak artifact from bilateral hip arthroplasties obscure some detail in the pelvis.         US Renal Complete Non-Vascular   Final Result   IMPRESSION:   1.  Normal kidney ultrasound. No hydronephrosis.       XR Chest Port 1 View   Final Result   IMPRESSION: Interval installation of a right PICC line. The PICC line has its tip approximately 35 mm caudad to the approximate level of the cavoatrial junction. Otherwise negative chest x-ray.          EKG:    Performed at: 8:26 PM    Impression: Sinus rhythm    Rate: 90 BPM  Rhythm: Sinus rhythm  Axis: 5  NC Interval: 164 ms  QRS Interval: 86 ms  QTc Interval: 430 ms   ST Changes: None  Comparison:  When compared with ECG of 01-APR-2024, no significant change was found.     I have independently reviewed and interpreted the EKG(s) documented above.          I, Daria Huff, am serving as a scribe to document services personally performed by Eddy Gallegos MD based on my observation and the provider's statements to me. I, Eddy Gallegos MD, attest that Daria Huff is acting in a scribe capacity, has observed my performance of the services and has documented them in accordance with my direction.    Eddy Gallegos MD  [facility] EMERGENCY ROOM  [facility] [id number] [phone]      Eddy Gallegos MD  08/06/24 2133       Eddy Gallegos MD  08/06/24 2137       Eddy Gallegos MD  08/07/24 1002

## 2024-08-07 NOTE — PLAN OF CARE
Goal Outcome Evaluation:                      Patient ready for discharge. AVS reviewed with patient via  in room.

## 2024-08-07 NOTE — PROGRESS NOTES
Pt is now Obs. Met with pt, gave her a MOON and explained it via . Answered all questions, pt verbalized understanding and signed. Original was placed in the chart and copy was left with the pt.

## 2024-08-07 NOTE — ED NOTES
Pt symptoms appear to have cleared up.  Hives on neck have resolved, lung sounds clear, no resp distress noted.  PT states symptoms have cleared however notes a minor pain in the back of her neck that is still present.  Provider notified, at bedside.

## 2024-08-07 NOTE — MEDICATION SCRIBE - ADMISSION MEDICATION HISTORY
"Medication Scribe Admission Medication History    Admission medication history is complete. The information provided in this note is only as accurate as the sources available at the time of the update.    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 Patient took AM medications today. Does not have inhaler(s) with at this time.     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 Has migraine medications but has not needed them regularly since the procedure.     Changes made to PTA medication list:  Added:   Multivitamin   Deleted:   Doxycyline 100 mg - done June 2024  Famotidine 20 mg  Lidocaine 5% external oint  Ondansetron 4 mg ODT  Terbinafine 1% cream  Symbicort 80-4.5 mcg/act - on higher dose  Changed:   \"Nerve vitamin\" added SIG  Prazosin 1 mg added SIG    Allergies reviewed with patient and updates made in EHR: yes    Medication History Completed By: Riley Green 8/6/2024 8:55 PM    Current Facility-Administered Medications for the 8/6/24 encounter (Hospital Encounter)   Medication    cyanocobalamin injection 1,000 mcg     PTA Med List   Medication Sig Last Dose    acetaminophen (TYLENOL) 325 MG tablet Take 325-650 mg by mouth every 6 hours as needed for mild pain More than a month at PRN    albuterol (PROAIR HFA/PROVENTIL HFA/VENTOLIN HFA) 108 (90 Base) MCG/ACT inhaler INHALE TWO PUFFS EVERY 4 HOURS AS NEEDED FOR WHEEZING OR SHORTNESS OF BREATH 8/6/2024 at AM    albuterol (PROVENTIL) (2.5 MG/3ML) 0.083% neb solution USE 1 VIAL NEBULIZER FOUR TIMES PER WEEK 8/5/2024 at HS    budesonide-formoterol (SYMBICORT) 160-4.5 MCG/ACT Inhaler Inhale 2 puffs into the lungs 2 times daily 8/6/2024 at AM; 1 of 2, not with    cetirizine (ZYRTEC) 10 MG tablet Take 1 tablet (10 mg) by mouth daily 8/6/2024 at AM    cyanocobalamin (CYANOCOBALAMIN) 1000 MCG/ML injection Inject 1 mL (1,000 mcg) subcutaneously every 14 days Past " Month at 2 wk ago    EPINEPHrine (ANY BX GENERIC EQUIV) 0.3 MG/0.3ML injection 2-pack Inject 0.3 mLs (0.3 mg) into the muscle as needed for anaphylaxis 8/6/2024 at PTA    estradiol (VIVELLE-DOT) 0.05 MG/24HR bi-weekly patch 1 application to skin Transdermal Two times a Week for 90 days 8/6/2024 at on now, placed today    gabapentin (NEURONTIN) 100 MG capsule TAKE 1 CAPSULE BY MOUTH TWICE A DAY FOR MIGRAINES AND ANXIETY 8/6/2024 at AM; 1 of 2    hydrOXYzine HCl (ATARAX) 25 MG tablet TAKE 3 TABLETS BY MOUTH THREE TIMES A DAY AS NEEDED FOR ANXIETY OR INSOMNIA . 8/5/2024 at HS    losartan (COZAAR) 50 MG tablet Take 0.5 tablets (25 mg) by mouth daily 8/6/2024 at AM    metoprolol succinate ER (TOPROL XL) 25 MG 24 hr tablet Take 1 tablet (25 mg) by mouth daily 8/6/2024 at AM    montelukast (SINGULAIR) 10 MG tablet Take 1 tablet (10 mg) by mouth at bedtime 8/5/2024 at HS    multivitamin, therapeutic (THERA-VIT) TABS tablet Take 1 tablet by mouth daily 8/6/2024 at AM    NONFORMULARY Take 1 tablet by mouth daily Nerve vitamin 8/5/2024 at afternoon    omeprazole (PRILOSEC) 20 MG DR capsule Take 1 capsule (20 mg) by mouth daily Take once daily in the morning 30 minutes prior to food and drink. 8/6/2024 at AM    prazosin (MINIPRESS) 1 MG capsule Take 1 mg by mouth at bedtime 8/5/2024 at HS    PROMETRIUM 200 MG capsule Take 200 mg by mouth daily 8/5/2024 at HS    QUEtiapine (SEROQUEL) 50 MG tablet Take 50 mg by mouth at bedtime 8/5/2024 at HS    rizatriptan (MAXALT) 10 MG tablet Take 1 tablet (10 mg) by mouth at onset of headache for migraine More than a month at PRN    topiramate (TOPAMAX) 25 MG tablet Take 1 tablet (25 mg) by mouth 2 times daily 8/6/2024 at AM; 1 of 2

## 2024-08-07 NOTE — PLAN OF CARE
Goal Outcome Evaluation:      Plan of Care Reviewed With: patient          Outcome Evaluation: Pt to d/c to home with no services.

## 2024-08-07 NOTE — PLAN OF CARE
Pt arrived to the ICU around 11pm, PICC had just been placed and epinephrine gtt had not started. MD reevaluated pt and decided an epinephrine gtt was not needed. Additional benadryl given along with scheduled steroids and Pepcid. Pt lung sounds initially wheezy but improved after neb x1. Pt is SOB with activity. Around 12am, pt had sudden onset left flank pain that radiated around to her pelvis. She noted it to feel similar to her past kidney stones which she reports were in 2020 and 2021. Updated MD who assessed and placed additional orders. Medicated with PRN dilaudid with minimal reported relief. Sent for CT scan. Upon returning from CT, pt continued to complain for 10/10 pain. She was finally able to get some sleep and appeared more comfortable after about an hour. Pt was restless and appeared anxious during shift although she denied anxiety. HR in the high 90s to 110s. BP slowly returning to normal. Purewick placed for urinary frequency and increased pain. She is using her call light appropriately and able to make her needs known with the .     Of note, an in person  was used for all patient communication during shift. The  defiantly make the patient feel more comfortable and it would be extremely stressful for the patient to be here without an .

## 2024-08-07 NOTE — PROGRESS NOTES
Patient requested a PRN Albuterol nebulizer per home use.  Patient has a history of asthma and takes one nebulizer before bed every night.  Patient admitted with an allergic reaction.  Patient on room air with oxygen saturations in the upper 90s.  Breath sounds are diminished and clear with increased aeration post treatment.  Patient has a tight non productive cough with a slightly elevated respiratory rate.  RT to continue to follow

## 2024-08-07 NOTE — H&P
[facility] MEDICINE ADMISSION HISTORY AND PHYSICAL       Assessment & Plan      This is a 48 year with allergic reaction after  stung by a bee. She has 25 listed allergies       Present on Admission (POA)    1. Allergic reaction - Bee sting - She has a swelling and redness at the back of her neck lower part.     - I do not see any other skin manifestations. However, she feels like she still getting SOB and felt back of throat is likely irritated or feels closing. However, no stridor is noted.   - Have had 3 doses of IM Epi prior to my arrival  - She was referred to ICU - with plans for Epi drip    - Was given solumedrol 125 mgs IV - will continue as 80 mgs q8 (also dosed for asthma exacerbation), pepcid, benadryl  -  cardiac tele and pulse ox  - Aspiration precs  - ICU consult  - albuterol nebs, IVF and NPO   - topical benadryl back of neck       2. K of 3.3  - Electrolyte replacement, add Mag       3. Acute asthma exacerbation  - IVF, Albuterol QID and PRN q4    Chronic Medical Conditions    1. Obesity with BMI of 36-37  2. Moderate persistent asthma   3. HTN  4. History of headache    12AM - Not on EPI drip, Looked better. She just arrived in the ICU. She complained of left flank pain -- will get a UA. Hx of stones.  Progress diet as needed    2AM - K of 3.3, lactic acid is 3.6 -- 1L of NSS - follow up US     7A - CT abdomen and US - negative for acute findings, not clear cause of elev lactic acid -- if due to B2 agonist, Epi use. No fevers and abdomen is not peritoneal       VTE prophylaxis --  SCDs, if appropriate, add SQH  Diet --   NPO  Code Status -- Full  Barriers to discharge -- Admitting/Acute medical condition/s  Discharge Disposition and goals --  Unable to determine at this point, pending progress/treatment response.     PPE - I was wearing PPE including but not limited to - N95 mask, Gloves, and/or Safety glasses.  Admission Status -- , Inpatient     Care plan is based on  available information and patient's condition at encounter. Care plan was discussed and agreed to proceed by the patient/family member. Made aware that care plan may change.     I recommend to review/revise history/care plan for information/results not available during my encounter. All or some home medication/s were not resumed or was modified on admission due to safety concerns. Will have rounding AM doc  review safety to resume held/modified home medications.     Availability -- If need to coordinate care after night shift  -- Contact assigned AM rounding Hospitalist.     75 minutes spent by me on the date of service doing chart review, history, exam, diagnostic test results interpretation, care coordination with RN, RT and/or Pharm, & further activities per the note.      Abraham Nicholson MD, MPH, FACP, [facility]  Internal Medicine - Hospitalist        Chief Complaint Stung by a bee      HISTORY     - Patient was seen in ED - 1. I also met her   - Patient was coughing, wheezing, and felt like throat is closing when I met. She felt better earlier but feels coming back.  - She said, feels something in her throat, hence, she was coughing a lot.   - She has pain in the back of neck. I looked at this, and has swelling and redness at the lower part of the neck. I dont see any foreign body. This mildly itchy.    - She is here because she is being stung by a bee. She was at home cleaning her car that time.  She complained of throat swelling and difficulty swallowing. She did administer epipen x2. Not better she decided to come to ED      - Has mild chest pain/tightness. Feels SOB still     - ED course/treatments/referral - IM Epi, ICU consult. K of 3.3     - ROS --- No headache. No dizziness. No weakness. No palpitations. No abdominal pain. No nausea or vomiting. No urinary symptoms. No bleeding symptoms. No weight loss. Rest of 12 point ROS was reviewed and negative.       Past Medical History     Past Medical  History:   Diagnosis Date    Accidental overdose, initial encounter 07/07/2022    ADHD (attention deficit hyperactivity disorder)     Alcohol dependence (H)     Anxiety     Arthritis     Asthma     B12 deficiency     Bipolar depression (H)     Blood clotting disorder (H24)     Deafness     Depressive disorder 10 year    Drug abuse, nondependent (H) 10/29/2004    Overview:  polydrug abuse per psych notes ; Other, mixed, or unspecified nondependent drug abuse, unspecified    History of blood clots     History of transfusion     Hypertension     Kidney stones 2015    Major depressive disorder, recurrent episode, severe (H) 05/17/2016    Overview:  s/p suicide attempt Epic     Migraine     Obstructive sleep apnea 06/01/2016    Pt states she does not have ROBERT, does not use CPAP    Pulmonary emboli (H)     Status post total right knee replacement 01/18/2016         Surgical History     Past Surgical History:   Procedure Laterality Date    BIOPSY BREAST Right     2019... Also had fluid drained from left breast under surgery also in 2019    BREAST SURGERY      MAMMOPLASTY REDUCTION Bilateral 01/01/2017    ORTHOPEDIC SURGERY  2012,2013,2014    TOTAL HIP ARTHROPLASTY Bilateral     TOTAL KNEE ARTHROPLASTY Right     TUBAL LIGATION      US BREAST CORE BIOPSY RIGHT Right 11/21/2019        Family History      Family History   Problem Relation Age of Onset    Cancer Mother     Breast Cancer Mother     Diabetes Mother     Hypertension Mother     Cerebrovascular Disease Father     Hyperlipidemia No family hx of     Depression No family hx of     Anxiety Disorder No family hx of     Mental Illness No family hx of     Substance Abuse No family hx of     Asthma No family hx of          Social History      .  Social History     Socioeconomic History    Marital status:      Spouse name: Not on file    Number of children: Not on file    Years of education: Not on file    Highest education level: Not on file   Occupational History     "Not on file   Tobacco Use    Smoking status: Never     Passive exposure: Never    Smokeless tobacco: Never   Vaping Use    Vaping status: Never Used   Substance and Sexual Activity    Alcohol use: Yes     Comment: Alcoholic Drinks/day: dependence    Drug use: Never    Sexual activity: Not Currently     Partners: Male     Birth control/protection: None   Other Topics Concern    Not on file   Social History Narrative    . No children. .  Lives alone.  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  Nonsmoker.  No alcohol use.  Tesha Pelaez MD       Social Determinants of Health     Financial Resource Strain: High Risk (1/4/2024)    Financial Resource Strain     Within the past 12 months, have you or your family members you live with been unable to get utilities (heat, electricity) when it was really needed?: Yes   Food Insecurity: High Risk (1/4/2024)    Food Insecurity     Within the past 12 months, did you worry that your food would run out before you got money to buy more?: Yes     Within the past 12 months, did the food you bought just not last and you didn t have money to get more?: Yes   Transportation Needs: High Risk (1/4/2024)    Transportation Needs     Within the past 12 months, has lack of transportation kept you from medical appointments, getting your medicines, non-medical meetings or appointments, work, or from getting things that you need?: Yes   Physical Activity: Not on file   Stress: Not on file   Social Connections: Not on file   Interpersonal Safety: Unknown (3/17/2024)    [facility]    Humiliation, Afraid, Rape, and Kick questionnaire     Fear of Current or Ex-Partner: Not on file     Emotionally Abused: Not on file     Physically Abused: No     Sexually Abused: No   Housing Stability: Low Risk  (1/4/2024)    Housing Stability     Do you have housing? : Yes     Are you worried about losing your housing?: No          Allergies      "   Allergies   Allergen Reactions    Acetylcysteine Rash and Other (See Comments)    Amoxicillin Itching and Shortness Of Breath    Bee Pollen Anaphylaxis    Chocolate Anaphylaxis    Contrast [Iodixanol] Shortness Of Breath and Rash     Pt stated she reacted to the contrast given for her CT in past. She experienced shortness of breath, throat tightening, and rash on chest, and they gave her an injection to counter the symptoms.     Covid-19 (Mrna) Vaccine Shortness Of Breath     Pfizer COVID-19 Vaccine    Hymenoptera Allergenic Extract [Wasp Venom Protein] Anaphylaxis    Iodine Hives and Unknown     Pt stated that she was injected with CT CONTRAST in the past and got hives, SOB, and nausea. Please pre-medicate patient in the future.     Meat Extract Anaphylaxis    Wasp Venom Protein Starter Kit [Wasp Venom Protein] Itching, Shortness Of Breath, Swelling and Difficulty breathing    Adhesive Tape Unknown    Aspirin     Bee Venom Unknown    Food Allergy Formula Unknown     Red meat, chocolate    Geodon [Ziprasidone] Unknown    Latex Unknown     Added based on information entered during case entry, please review and add reactions, type, and severity as needed    Morphine Unknown    Prochlorperazine Other (See Comments)    Risperdal [Risperidone] Unknown    Risperidone Analogues [Risperidone] Other (See Comments)    Shellfish Allergy Unknown    Theobroma Oil [Theobroma Grandiflorum Seed Butter] Unknown    Trazodone Other (See Comments)     Elevated creatinine    Zoloft [Sertraline] Unknown    Hydrochlorothiazide Rash         Prior to Admission Medications      Current Facility-Administered Medications   Medication Dose Route Frequency Provider Last Rate Last Admin    cyanocobalamin injection 1,000 mcg  1,000 mcg Intramuscular Q30 Days Maria Fernanda Schumacher CNP   1,000 mcg at 05/08/24 1318    [Held by provider] EPINEPHrine (ADRENALIN) 5 mg in sodium chloride 0.9 % 250 mL infusion CENTRAL  0.01-0.3 mcg/kg/min Intravenous  Continuous Eddy Gallegos MD        EPINEPHrine (ADRENALINE) 5 mg in  mL infusion (PERIPHERAL)  0.03-0.125 mcg/kg/min Intravenous Continuous Eddy Gallegos MD   Held at 08/06/24 2030    lidocaine (LMX4) cream   Topical Q1H PRN Eddy Gallegos MD        lidocaine (LMX4) cream   Topical Q1H PRN Eddy Gallegos MD        lidocaine 1 % 0.1-1 mL  0.1-1 mL Other Q1H PRN Eddy Gallegos MD        lidocaine 1 % 0.1-5 mL  0.1-5 mL Other Q1H PRN Eddy Gallegos MD        sodium chloride (PF) 0.9% PF flush 10-40 mL  10-40 mL Intracatheter Once PRN Eddy Gallegos MD        sodium chloride (PF) 0.9% PF flush 3 mL  3 mL Intracatheter Q8H Eddy Gallegos MD   3 mL at 08/06/24 2000    sodium chloride (PF) 0.9% PF flush 3 mL  3 mL Intracatheter q1 min prn Eddy Gallegos MD         Current Outpatient Medications   Medication Sig Dispense Refill    acetaminophen (TYLENOL) 325 MG tablet Take 325-650 mg by mouth every 6 hours as needed for mild pain      albuterol (PROAIR HFA/PROVENTIL HFA/VENTOLIN HFA) 108 (90 Base) MCG/ACT inhaler INHALE TWO PUFFS EVERY 4 HOURS AS NEEDED FOR WHEEZING OR SHORTNESS OF BREATH 6.7 g 2    albuterol (PROVENTIL) (2.5 MG/3ML) 0.083% neb solution USE 1 VIAL NEBULIZER FOUR TIMES PER WEEK 180 mL 11    budesonide-formoterol (SYMBICORT) 160-4.5 MCG/ACT Inhaler Inhale 2 puffs into the lungs 2 times daily 10.2 g 5    cetirizine (ZYRTEC) 10 MG tablet Take 1 tablet (10 mg) by mouth daily 90 tablet 2    cyanocobalamin (CYANOCOBALAMIN) 1000 MCG/ML injection Inject 1 mL (1,000 mcg) subcutaneously every 14 days 10 mL 3    EPINEPHrine (ANY BX GENERIC EQUIV) 0.3 MG/0.3ML injection 2-pack Inject 0.3 mLs (0.3 mg) into the muscle as needed for anaphylaxis 2 each 1    estradiol (VIVELLE-DOT) 0.05 MG/24HR bi-weekly patch 1 application to skin Transdermal Two times a Week for 90 days      gabapentin (NEURONTIN) 100 MG capsule TAKE 1 CAPSULE BY MOUTH TWICE A DAY FOR MIGRAINES AND ANXIETY 180 capsule 1    hydrOXYzine HCl  "(ATARAX) 25 MG tablet TAKE 3 TABLETS BY MOUTH THREE TIMES A DAY AS NEEDED FOR ANXIETY OR INSOMNIA . 180 tablet 2    losartan (COZAAR) 50 MG tablet Take 0.5 tablets (25 mg) by mouth daily 90 tablet 0    metoprolol succinate ER (TOPROL XL) 25 MG 24 hr tablet Take 1 tablet (25 mg) by mouth daily 30 tablet 9    montelukast (SINGULAIR) 10 MG tablet Take 1 tablet (10 mg) by mouth at bedtime 90 tablet 1    multivitamin, therapeutic (THERA-VIT) TABS tablet Take 1 tablet by mouth daily      NONFORMULARY Take 1 tablet by mouth daily Nerve vitamin      omeprazole (PRILOSEC) 20 MG DR capsule Take 1 capsule (20 mg) by mouth daily Take once daily in the morning 30 minutes prior to food and drink. 30 capsule 0    prazosin (MINIPRESS) 1 MG capsule Take 1 mg by mouth at bedtime      PROMETRIUM 200 MG capsule Take 200 mg by mouth daily      QUEtiapine (SEROQUEL) 50 MG tablet Take 50 mg by mouth at bedtime      rizatriptan (MAXALT) 10 MG tablet Take 1 tablet (10 mg) by mouth at onset of headache for migraine 18 tablet 2    topiramate (TOPAMAX) 25 MG tablet Take 1 tablet (25 mg) by mouth 2 times daily 180 tablet 3    insulin syringe 31G X 5/16\" 1 ML MISC Inject 1 Units subcutaneously every 14 days 100 each 1           Review of Systems     A 12 point comprehensive review of systems was negative except as noted above in HPI.    PHYSICAL EXAMINATION       Vitals      Vitals: BP (!) 172/72   Pulse 98   Temp 97.7  F (36.5  C) (Temporal)   Resp (!) 35   Ht 1.651 m (5' 5\")   Wt 99.8 kg (220 lb)   LMP  (LMP Unknown)   SpO2 100%   BMI 36.61 kg/m    BMI= Body mass index is 36.61 kg/m .      Examination     General Appearance:  Alert, cooperative, no distress  Head:    Normocephalic, without obvious abnormality, atraumatic  EENT:  PERRL, conjunctiva/corneas clear, EOM's intact.   Neck:   Supple, symmetrical, trachea midline, no adenopathy; no NVE  Back:  Symmetric, no curvature, no CVA tenderness  Chest/Lungs: decreased air entry, (+) " wheezing, No tenderness or deformity. No abdominal breathing or use of accessory muscles.   Heart:    Regular rate and rhythm, S1 and S2 normal, no murmur, rub   or gallop  Abdomen: Soft, non-tender, bowel sounds active all four quadrants, not peritoneal on palpation. Not distended  Extremities:  Extremities normal, atraumatic, no swelling   Skin:  Skin color, texture, turgor normal, no rashes or lesion - swelling and redness lower part of the neck, at the back    Neurologic:  Awake and alert, No lateralizing or localizing signs           Pertinent Lab     Results for orders placed or performed during the hospital encounter of 08/06/24   Basic metabolic panel   Result Value Ref Range    Sodium 143 135 - 145 mmol/L    Potassium 3.3 (L) 3.4 - 5.3 mmol/L    Chloride 108 (H) 98 - 107 mmol/L    Carbon Dioxide (CO2) 25 22 - 29 mmol/L    Anion Gap 10 7 - 15 mmol/L    Urea Nitrogen 11.9 6.0 - 20.0 mg/dL    Creatinine 1.13 (H) 0.51 - 0.95 mg/dL    GFR Estimate 59 (L) >60 mL/min/1.73m2    Calcium 8.8 8.8 - 10.4 mg/dL    Glucose 139 (H) 70 - 99 mg/dL   CBC with platelets and differential   Result Value Ref Range    WBC Count 7.8 4.0 - 11.0 10e3/uL    RBC Count 4.89 3.80 - 5.20 10e6/uL    Hemoglobin 13.9 11.7 - 15.7 g/dL    Hematocrit 40.6 35.0 - 47.0 %    MCV 83 78 - 100 fL    MCH 28.4 26.5 - 33.0 pg    MCHC 34.2 31.5 - 36.5 g/dL    RDW 13.8 10.0 - 15.0 %    Platelet Count 297 150 - 450 10e3/uL    % Neutrophils 58 %    % Lymphocytes 36 %    % Monocytes 4 %    % Eosinophils 1 %    % Basophils 0 %    % Immature Granulocytes 0 %    NRBCs per 100 WBC 0 <1 /100    Absolute Neutrophils 4.6 1.6 - 8.3 10e3/uL    Absolute Lymphocytes 2.9 0.8 - 5.3 10e3/uL    Absolute Monocytes 0.3 0.0 - 1.3 10e3/uL    Absolute Eosinophils 0.1 0.0 - 0.7 10e3/uL    Absolute Basophils 0.0 0.0 - 0.2 10e3/uL    Absolute Immature Granulocytes 0.0 <=0.4 10e3/uL    Absolute NRBCs 0.0 10e3/uL           Pertinent Radiology

## 2024-08-07 NOTE — ED NOTES
Per md, if patient has continuing sx after 15 minutes to start epi gtt. Md aware of blood pressures and does not want to treat at this time

## 2024-08-07 NOTE — PROCEDURES
[facility]    Double Lumen PICC Placement    Date/Time: 8/6/2024 10:15 PM    Performed by: Leann Saucedo RN  Authorized by: Dami Nicholson MD  Indications: vascular access      UNIVERSAL PROTOCOL   Site Marked: Yes  Prior Images Obtained and Reviewed:  No  Required items: Required blood products, implants, devices and special equipment available    Patient identity confirmed:  Verbally with patient and arm band  NA - No sedation, light sedation, or local anesthesia  Confirmation Checklist:  Patient's identity using two indicators, relevant allergies, procedure was appropriate and matched the consent or emergent situation and correct equipment/implants were available  Time out: Immediately prior to the procedure a time out was called    Universal Protocol: the Joint Commission Universal Protocol was followed    Preparation: Patient was prepped and draped in usual sterile fashion    ESBL (mL):  1     ANESTHESIA    Anesthesia:  Local infiltration  Local Anesthetic:  Lidocaine 1% without epinephrine  Anesthetic Total (mL):  2      SEDATION    Patient Sedated: No        Preparation: skin prepped with ChloraPrep  Skin prep agent: skin prep agent completely dried prior to procedure  Sterile barriers: maximum sterile barriers were used: cap, mask, sterile gown, sterile gloves, and large sterile sheet  Hand hygiene: hand hygiene performed prior to central venous catheter insertion  Type of line used: PICC  Catheter type: double lumen  Lumen type: valved  Lumen Identification: Red and Purple  Catheter size: 5 Fr  Brand: Bard  Lot number: [id number]  Placement method: MST and tip navigation system  Number of attempts: 1  Difficulty threading catheter: no  Successful placement: yes  Orientation: right  Catheter to Vein (%): 10  Location: cephalic vein  Tip Location: SVC/RA Junction  Arm circumference: adults 10 cm  Extremity circumference: 35  Visible catheter length: 4  Total catheter  length: 42  Dressing and securement: chlorhexidine disc applied, securement device and transparent securement dressing  Post procedure assessment: placement verified by 3CG technology  PROCEDURE   Patient Tolerance:  Patient tolerated the procedure well with no immediate complicationsDescribe Procedure: Pt tolerated picc placement well. Good blood return. Flushes easily. Peak P wave achieved. Ok to use. Nurse aware.    Ok to use.   Disposal: sharps and needle count correct at the end of procedure, needles and guidewire disposed in sharps container

## 2024-08-07 NOTE — DISCHARGE SUMMARY
"[facility]  Hospitalist Discharge Summary      Date of Admission:  8/6/2024  Date of Discharge:  8/7/2024  Discharging Provider: Michel Espinoza DO  Discharge Service: Hospitalist Service    Discharge Diagnoses   Severe allergic reaction secondary to stinging insect  Hypokalemia  Elevated lactic acid level  ADHD  Anxiety and depression  Moderate persistent asthma with transient bronchoconstriction  Obstructive sleep apnea  Deafness  Class II obesity    Clinically Significant Risk Factors     # Obesity: Estimated body mass index is 37.09 kg/m  as calculated from the following:    Height as of this encounter: 1.651 m (5' 5\").    Weight as of this encounter: 101.1 kg (222 lb 14.4 oz).       Follow-ups Needed After Discharge   Follow-up Appointments     Follow-up and recommended labs and tests       Follow up with primary care provider, Brianna Hurst, within 7 days for   hospital follow- up on severe allergic reaction to stinging insect.  No   follow up labs or test are needed.    Recommend referral to Allergist given severe allergic reaction.  Patient   is requesting referral placed through PCP provider.            Discharge Disposition   Discharged to home  Condition at discharge: Stable    Hospital Course   48-year-old female with history of deafness, asthma, previous allergic reactions to insect stings and multiple listed medical allergies, who presents after being stung by yellow jacket or a bee.  She had swelling and redness on her back and neck.  She also had respiratory wheezing and was admitted to the ICU for epinephrine drip after she received Solu-Medrol and epinephrine in the emergency room.  Patient did not require invasive or noninvasive respiratory support.  Patient's symptoms improved on 8/7/2024 with above interventions.  Patient has multiple doses of IM EpiPen at home.  She reports she has not been evaluated by an allergy specialist despite multiple allergies and previous " severe reaction to insect stings.  Marleny was tolerating oral intake on time of discharge.  Was not requiring supplemental oxygen.  Had no respiratory wheezing or stridor on exam.  Patient had elevated lactic acid level on presentation which improved with IV fluids from 3.6-2.7.  Potassium improved from 3.2-3.9 on day of discharge.        Consultations This Hospital Stay   VASCULAR ACCESS ADULT IP CONSULT  INTENSIVIST IP CONSULT  CARE MANAGEMENT / SOCIAL WORK IP CONSULT    Code Status   Full Code    Time Spent on this Encounter   I, Michel Espinoza DO, personally saw the patient today and spent greater than 30 minutes discharging this patient.       Michel Espinoza DO  [facility] 3 ICU  [address] [address]  Phone: [phone]  Fax: [phone]  ______________________________________________________________________    Physical Exam   Vital Signs: Temp: 98.2  F (36.8  C) Temp src: Oral BP: (!) 182/84 Pulse: 89   Resp: 22 SpO2: 97 % O2 Device: None (Room air)    Weight: 222 lbs 14.4 oz  General Appearance:  Alert, cooperative, no distress  Head:    Normocephalic, without obvious abnormality, atraumatic  EENT:  PERRL, conjunctiva/corneas clear, EOM's intact.   Neck:    Supple, symmetrical, trachea midline, no adenopathy; no NVE  Back:  Symmetric, no curvature, no CVA tenderness  Chest/Lungs: CTAB, No tenderness or deformity. No abdominal breathing or use of accessory muscles.   Heart:    RRR, S1 and S2 normal, no murmur, rub   or gallop  Abdomen: Soft, non-tender, bowel sounds active all four quadrants, not peritoneal on palpation. Not distended  Extremities:  Extremities normal, atraumatic, no swelling   Skin:  Skin color, texture, turgor normal, no rashes or lesion - swelling and redness lower part of the neck, at the back    Neurologic:  Awake and alert, No lateralizing or localizing signs        Primary Care Physician   Brianna Hurst    Discharge Orders       Reason for your hospital stay    Severe allergic reaction to stinging insect.     Follow-up and recommended labs and tests     Follow up with primary care provider, Brianna Hurst, within 7 days for hospital follow- up on severe allergic reaction to stinging insect.  No follow up labs or test are needed.    Recommend referral to Allergist given severe allergic reaction.  Patient is requesting referral placed through PCP provider.     Activity    Your activity upon discharge: activity as tolerated     When to contact your care team    Call your primary doctor if you have any of the following:  increased shortness of breath, increased swelling.     Diet    Follow this diet upon discharge: Orders Placed This Encounter      Regular Diet Adult       Significant Results and Procedures   Most Recent 3 CBC's:  Recent Labs   Lab Test 08/07/24  0634 08/07/24  0130 08/06/24 1958   WBC 7.8 9.2 7.8   HGB 13.1 13.4 13.9   MCV 83 84 83    258 297     Most Recent 3 BMP's:  Recent Labs   Lab Test 08/07/24  0513 08/07/24  0130 08/06/24 1958    141 143   POTASSIUM 3.9  3.9 3.2* 3.3*   CHLORIDE 116* 108* 108*   CO2 16* 19* 25   BUN 9.0 11.2 11.9   CR 0.78 0.95 1.13*   ANIONGAP 10 14 10   TOO 7.7* 8.1* 8.8   * 254* 139*     Most Recent 2 LFT's:  Recent Labs   Lab Test 08/07/24  0513 08/07/24  0130   AST 25 26   ALT 27 27   ALKPHOS 90 100   BILITOTAL 0.2 0.2     Most Recent 3 Troponin's:No lab results found.  7-Day Micro Results       No results found for the last 168 hours.          Most Recent Urinalysis:  Recent Labs   Lab Test 08/07/24  0000 04/17/24  0049 03/06/24  1142   COLOR Light Yellow   < > Yellow   APPEARANCE Clear   < > Clear   URINEGLC 300*   < > Negative   URINEBILI Negative   < > Negative   URINEKETONE Negative   < > Negative   SG 1.010   < > >=1.030   UBLD 0.03 mg/dL*   < > Small*   URINEPH 8.0*   < > 5.5   PROTEIN Negative   < > Trace*   UROBILINOGEN  --   --  0.2   NITRITE Negative   < >  Negative   LEUKEST Negative   < > Negative   RBCU 5*   < > 0-2   WBCU <1   < > 0-5    < > = values in this interval not displayed.     Most Recent ESR & CRP:  Recent Labs   Lab Test 05/03/23 2134   CRP 0.5   ,   Results for orders placed or performed during the hospital encounter of 08/06/24   XR Chest Port 1 View    Narrative    EXAM: XR CHEST PORT 1 VIEW  LOCATION: [facility]  DATE: 8/6/2024    INDICATION: PICC placement  COMPARISON: June 23, 2024      Impression    IMPRESSION: Interval installation of a right PICC line. The PICC line has its tip approximately 35 mm caudad to the approximate level of the cavoatrial junction. Otherwise negative chest x-ray.   US Renal Complete Non-Vascular    Narrative    EXAM: US RENAL COMPLETE NON-VASCULAR  LOCATION: [facility]  DATE: 8/7/2024    INDICATION: left flank pain, has hematuria, hsitory of stones  COMPARISON: CT from 7/11/2024  TECHNIQUE: Routine Bilateral Renal and Bladder Ultrasound.    FINDINGS:    RIGHT KIDNEY: 10.7 cm. Normal without hydronephrosis or masses.     LEFT KIDNEY: 9.2 cm. Normal without hydronephrosis or masses.     BLADDER: Not visualized.      Impression    IMPRESSION:  1.  Normal kidney ultrasound. No hydronephrosis.   CT Abdomen Pelvis w/o Contrast    Narrative    EXAM: CT ABDOMEN AND PELVIS WITHOUT CONTRAST  LOCATION: [facility]  DATE: 08/07/2024    INDICATION: Abdominal pain, left flank pain, ultrasound kidneys normal.  COMPARISON: Multiple with most recent from 07/11/2024.  TECHNIQUE: CT scan of the abdomen and pelvis was performed without IV contrast. Multiplanar reformats were obtained. Dose reduction techniques were used.  CONTRAST: None.    FINDINGS:   LOWER CHEST: 4 mm nodule right lower lung is unchanged and should be of doubtful significance given stability for greater than one year.    HEPATOBILIARY: Diffuse fatty infiltration of the liver. No calcified  gallstones or biliary dilatation.    PANCREAS: Normal.    SPLEEN: Normal.    ADRENAL GLANDS: Normal.    KIDNEYS/BLADDER: Kidneys are negative. No urinary calculi or hydronephrosis allowing for some limited visualization of the distal ureters due to streak artifact. Bladder is mostly obscured by streak artifact.    BOWEL: Bowel is normal in caliber with no evidence for obstruction. Normal appendix. No acute bowel findings.    LYMPH NODES: Normal.    VASCULATURE: Normal.    PELVIC ORGANS: Enlarged uterus with lobulated contours compatible with underlying fibroids, similar to previous.    MUSCULOSKELETAL: Moderate degenerative changes in the spine. Bilateral hip arthroplasties.      Impression    IMPRESSION:   1.  No definite acute abnormalities or CT findings to explain the patient's symptoms.    2.  Diffuse fatty infiltration of the liver.    3.  Myomatous uterus.    4.  Streak artifact from bilateral hip arthroplasties obscure some detail in the pelvis.         Discharge Medications   Current Discharge Medication List        CONTINUE these medications which have NOT CHANGED    Details   acetaminophen (TYLENOL) 325 MG tablet Take 325-650 mg by mouth every 6 hours as needed for mild pain      albuterol (PROAIR HFA/PROVENTIL HFA/VENTOLIN HFA) 108 (90 Base) MCG/ACT inhaler INHALE TWO PUFFS EVERY 4 HOURS AS NEEDED FOR WHEEZING OR SHORTNESS OF BREATH  Qty: 6.7 g, Refills: 2    Comments: Prescription  - Pharmacy may dispense brand covered by insurance (Proair, or proventil or ventolin or generic albuterol inhaler)  Associated Diagnoses: Moderate persistent asthma without complication      albuterol (PROVENTIL) (2.5 MG/3ML) 0.083% neb solution USE 1 VIAL NEBULIZER FOUR TIMES PER WEEK  Qty: 180 mL, Refills: 11    Associated Diagnoses: Moderate persistent asthma without complication      budesonide-formoterol (SYMBICORT) 160-4.5 MCG/ACT Inhaler Inhale 2 puffs into the lungs 2 times daily  Qty: 10.2 g, Refills: 5     Associated Diagnoses: Moderate persistent asthma without complication      cetirizine (ZYRTEC) 10 MG tablet Take 1 tablet (10 mg) by mouth daily  Qty: 90 tablet, Refills: 2    Associated Diagnoses: Seasonal allergic rhinitis, unspecified trigger      cyanocobalamin (CYANOCOBALAMIN) 1000 MCG/ML injection Inject 1 mL (1,000 mcg) subcutaneously every 14 days  Qty: 10 mL, Refills: 3    Associated Diagnoses: B12 deficiency; Morbid obesity (H)      EPINEPHrine (ANY BX GENERIC EQUIV) 0.3 MG/0.3ML injection 2-pack Inject 0.3 mLs (0.3 mg) into the muscle as needed for anaphylaxis  Qty: 2 each, Refills: 1    Associated Diagnoses: Allergic reaction, subsequent encounter      estradiol (VIVELLE-DOT) 0.05 MG/24HR bi-weekly patch 1 application to skin Transdermal Two times a Week for 90 days      gabapentin (NEURONTIN) 100 MG capsule TAKE 1 CAPSULE BY MOUTH TWICE A DAY FOR MIGRAINES AND ANXIETY  Qty: 180 capsule, Refills: 1    Associated Diagnoses: History of headache      hydrOXYzine HCl (ATARAX) 25 MG tablet TAKE 3 TABLETS BY MOUTH THREE TIMES A DAY AS NEEDED FOR ANXIETY OR INSOMNIA .  Qty: 180 tablet, Refills: 2    Associated Diagnoses: Persistent insomnia      losartan (COZAAR) 50 MG tablet Take 0.5 tablets (25 mg) by mouth daily  Qty: 90 tablet, Refills: 0    Associated Diagnoses: Primary hypertension      metoprolol succinate ER (TOPROL XL) 25 MG 24 hr tablet Take 1 tablet (25 mg) by mouth daily  Qty: 30 tablet, Refills: 9    Associated Diagnoses: Essential hypertension      montelukast (SINGULAIR) 10 MG tablet Take 1 tablet (10 mg) by mouth at bedtime  Qty: 90 tablet, Refills: 1    Associated Diagnoses: Moderate persistent asthma without complication      multivitamin, therapeutic (THERA-VIT) TABS tablet Take 1 tablet by mouth daily      NONFORMULARY Take 1 tablet by mouth daily Nerve vitamin      omeprazole (PRILOSEC) 20 MG DR capsule Take 1 capsule (20 mg) by mouth daily Take once daily in the morning 30 minutes prior to  "food and drink.  Qty: 30 capsule, Refills: 0      prazosin (MINIPRESS) 1 MG capsule Take 1 mg by mouth at bedtime      PROMETRIUM 200 MG capsule Take 200 mg by mouth daily      QUEtiapine (SEROQUEL) 50 MG tablet Take 50 mg by mouth at bedtime      rizatriptan (MAXALT) 10 MG tablet Take 1 tablet (10 mg) by mouth at onset of headache for migraine  Qty: 18 tablet, Refills: 2    Associated Diagnoses: Migraine      topiramate (TOPAMAX) 25 MG tablet Take 1 tablet (25 mg) by mouth 2 times daily  Qty: 180 tablet, Refills: 3    Associated Diagnoses: Other migraine without status migrainosus, not intractable      insulin syringe 31G X 5/16\" 1 ML MISC Inject 1 Units subcutaneously every 14 days  Qty: 100 each, Refills: 1    Associated Diagnoses: B12 deficiency; Morbid obesity (H)           Allergies   Allergies   Allergen Reactions    Acetylcysteine Rash and Other (See Comments)    Amoxicillin Itching and Shortness Of Breath    Bee Pollen Anaphylaxis    Chocolate Anaphylaxis    Contrast [Iodixanol] Shortness Of Breath and Rash     Pt stated she reacted to the contrast given for her CT in past. She experienced shortness of breath, throat tightening, and rash on chest, and they gave her an injection to counter the symptoms.     Covid-19 (Mrna) Vaccine Shortness Of Breath     Pfizer COVID-19 Vaccine    Hymenoptera Allergenic Extract [Wasp Venom Protein] Anaphylaxis    Iodine Hives and Unknown     Pt stated that she was injected with CT CONTRAST in the past and got hives, SOB, and nausea. Please pre-medicate patient in the future.     Meat Extract Anaphylaxis    Wasp Venom Protein Starter Kit [Wasp Venom Protein] Itching, Shortness Of Breath, Swelling and Difficulty breathing    Adhesive Tape Unknown    Aspirin     Bee Venom Unknown    Food Allergy Formula Unknown     Red meat, chocolate    Geodon [Ziprasidone] Unknown    Latex Unknown     Added based on information entered during case entry, please review and add reactions, type, " and severity as needed    Morphine Unknown    Prochlorperazine Other (See Comments)    Risperdal [Risperidone] Unknown    Risperidone Analogues [Risperidone] Other (See Comments)    Shellfish Allergy Unknown    Theobroma Oil [Theobroma Grandiflorum Seed Butter] Unknown    Trazodone Other (See Comments)     Elevated creatinine    Zoloft [Sertraline] Unknown    Hydrochlorothiazide Rash

## 2024-08-07 NOTE — ED TRIAGE NOTES
Pt presents to the ED with c/o being stung by a bee around an hour ago. Pt did use epi pen. C/o itchiness, slight throat swelling , and difficulty swallowing.     Triage Assessment (Adult)       Row Name 08/06/24 6648          Triage Assessment    Airway WDL WDL        Respiratory WDL    Respiratory WDL WDL        Skin Circulation/Temperature WDL    Skin Circulation/Temperature WDL WDL        Cardiac WDL    Cardiac WDL WDL        Peripheral/Neurovascular WDL    Peripheral Neurovascular WDL WDL        Cognitive/Neuro/Behavioral WDL    Cognitive/Neuro/Behavioral WDL WDL

## 2024-08-08 ENCOUNTER — PATIENT OUTREACH (OUTPATIENT)
Dept: CARE COORDINATION | Facility: CLINIC | Age: 49
End: 2024-08-08

## 2024-08-08 ENCOUNTER — OFFICE VISIT (OUTPATIENT)
Dept: FAMILY MEDICINE | Facility: CLINIC | Age: 49
End: 2024-08-08
Payer: COMMERCIAL

## 2024-08-08 VITALS
RESPIRATION RATE: 22 BRPM | DIASTOLIC BLOOD PRESSURE: 90 MMHG | HEIGHT: 65 IN | TEMPERATURE: 98 F | OXYGEN SATURATION: 100 % | BODY MASS INDEX: 37.12 KG/M2 | WEIGHT: 222.8 LBS | SYSTOLIC BLOOD PRESSURE: 158 MMHG | HEART RATE: 82 BPM

## 2024-08-08 DIAGNOSIS — Z09 HOSPITAL DISCHARGE FOLLOW-UP: ICD-10-CM

## 2024-08-08 DIAGNOSIS — T78.2XXD ANAPHYLAXIS, SUBSEQUENT ENCOUNTER: Primary | ICD-10-CM

## 2024-08-08 DIAGNOSIS — J30.2 SEASONAL ALLERGIC RHINITIS, UNSPECIFIED TRIGGER: ICD-10-CM

## 2024-08-08 DIAGNOSIS — R73.03 PREDIABETES: ICD-10-CM

## 2024-08-08 DIAGNOSIS — T78.40XD ALLERGIC REACTION, SUBSEQUENT ENCOUNTER: ICD-10-CM

## 2024-08-08 LAB
ANION GAP SERPL CALCULATED.3IONS-SCNC: 11 MMOL/L (ref 7–15)
BUN SERPL-MCNC: 21.4 MG/DL (ref 6–20)
CALCIUM SERPL-MCNC: 9.1 MG/DL (ref 8.8–10.4)
CHLORIDE SERPL-SCNC: 111 MMOL/L (ref 98–107)
CREAT SERPL-MCNC: 1.09 MG/DL (ref 0.51–0.95)
EGFRCR SERPLBLD CKD-EPI 2021: 62 ML/MIN/1.73M2
GLUCOSE SERPL-MCNC: 130 MG/DL (ref 70–99)
HBA1C MFR BLD: 6.2 % (ref 0–5.6)
HCO3 SERPL-SCNC: 22 MMOL/L (ref 22–29)
POTASSIUM SERPL-SCNC: 3.7 MMOL/L (ref 3.4–5.3)
SODIUM SERPL-SCNC: 144 MMOL/L (ref 135–145)

## 2024-08-08 PROCEDURE — 83036 HEMOGLOBIN GLYCOSYLATED A1C: CPT | Performed by: NURSE PRACTITIONER

## 2024-08-08 PROCEDURE — 80048 BASIC METABOLIC PNL TOTAL CA: CPT | Performed by: NURSE PRACTITIONER

## 2024-08-08 PROCEDURE — 36415 COLL VENOUS BLD VENIPUNCTURE: CPT | Performed by: NURSE PRACTITIONER

## 2024-08-08 PROCEDURE — 99496 TRANSJ CARE MGMT HIGH F2F 7D: CPT | Performed by: NURSE PRACTITIONER

## 2024-08-08 RX ORDER — EPINEPHRINE 0.3 MG/.3ML
0.3 INJECTION SUBCUTANEOUS PRN
Qty: 2 EACH | Refills: 1 | Status: SHIPPED | OUTPATIENT
Start: 2024-08-08

## 2024-08-08 RX ORDER — CETIRIZINE HYDROCHLORIDE 10 MG/1
10 TABLET ORAL DAILY
Qty: 90 TABLET | Refills: 2 | Status: SHIPPED | OUTPATIENT
Start: 2024-08-08

## 2024-08-08 RX ORDER — CITALOPRAM HYDROBROMIDE 20 MG/1
TABLET ORAL
COMMUNITY
Start: 2024-07-08

## 2024-08-08 NOTE — PROGRESS NOTES
[facility] Care Resource Center: [facility]    Background: Transitional Care Management program identified per system criteria and reviewed by [facility] team for possible outreach.    Assessment: Upon chart review, [facility] Team member will not proceed with patient outreach related to this episode of Transitional Care Management program due to reason below:    Patient has a follow up appointment with an appropriate provider today for hospital discharge    Plan: Transitional Care Management episode addressed appropriately per reason noted above.      Zenaida Lee  Community Health Worker  [facility]  [phone]      *Connected Care Resource Team does NOT follow patient ongoing. Referrals are identified based on internal discharge reports and the outreach is to ensure patient has an understanding of their discharge instructions.

## 2024-08-08 NOTE — PROGRESS NOTES
"  Assessment & Plan     Anaphylaxis, subsequent encounter  Exam normal today. BP and pulse within normal range. NO evidence of throat swelling or angioedema. Will recheck metabolic panel to ensure resolution.   Patient is still endorsing some throat pain-It sounds has though she may have had an artificial airway from transport and I suspect there is some trauma stemming from that.    - Adult Allergy/Asthma  Referral  - Basic metabolic panel  - Basic metabolic panel    Allergic reaction, subsequent encounter  Refilling epi pen. Encouraged patient to use only if needed. Can take zyrtec daily. Did discuss taking zyrtec as well immediately after a sting or bit to help with histamine effect.    - Basic metabolic panel  - EPINEPHrine (ANY BX GENERIC EQUIV) 0.3 MG/0.3ML injection 2-pack  Dispense: 2 each; Refill: 1  - Basic metabolic panel    Seasonal allergic rhinitis, unspecified trigger  As above  - cetirizine (ZYRTEC) 10 MG tablet  Dispense: 90 tablet; Refill: 2    Prediabetes  Still in prediabetic range. Glucoses were significantly elevated during hospitalization however she had been given solumedrol as well as epinephrine which is what I think was causing the elevated readings. Glucose 130 here today-likely related to eating.     - Hemoglobin A1c  - Hemoglobin A1c          MED REC REQUIRED  Post Medication Reconciliation Status: discharge medications reconciled, continue medications without change  BMI  Estimated body mass index is 37.08 kg/m  as calculated from the following:    Height as of this encounter: 1.651 m (5' 5\").    Weight as of this encounter: 101.1 kg (222 lb 12.8 oz).             Rosemarie Farmer is a 48 year old, presenting for the following health issues:  Hospital F/U (ER for allergic reaction - doesn't feel like any improvement has happened. Feels like neck and face is still swelling as an allergic reaction )        8/8/2024     3:17 PM   Additional Questions   Roomed by junior WALTON "       Hospital Follow-up Visit:    Hospital/Nursing Home/IP Rehab Facility: [facility]  Date of Admission: 8/6/2024  Date of Discharge: 8/7/2024  Reason(s) for Admission: allergic reaction   Was the patient in the ICU or did the patient experience delirium during hospitalization?  No  Do you have any other stressors you would like to discuss with your provider? No    Problems taking medications regularly:  None  Medication changes since discharge: None  Problems adhering to non-medication therapy:  None    Summary of hospitalization:  [facility] discharge summary reviewed  Diagnostic Tests/Treatments reviewed.  Follow up needed: allergist referral   Other Healthcare Providers Involved in Patient s Care:         Specialist appointment - allergist  Update since discharge: improved.         Plan of care communicated with patient      stung by yellow jacket or a bee.  She had swelling and redness on her back and neck.  She also had respiratory wheezing and was admitted to the ICU for epinephrine drip after she received Solu-Medrol and epinephrine in the emergency room.  Patient did not require invasive or noninvasive respiratory support.  Patient's symptoms improved on 8/7/2024 with above interventions.  Patient has multiple doses of IM EpiPen at home.  She reports she has not been evaluated by an allergy specialist despite multiple allergies and previous severe reaction to insect stings.  Marleny was tolerating oral intake on time of discharge.  Was not requiring supplemental oxygen.  Had no respiratory wheezing or stridor on exam.  Patient had elevated lactic acid level on presentation which improved with IV fluids from 3.6-2.7.  Potassium improved from 3.2-3.9 on day of discharge.         Patient reports feels confused can't think clearly. Feels like bee sting caused a lot of reaction. Bee stung on back of neck. Throat and face swell. Feels totally thrown off mentally. Tried to  "eat today and felt like she was struggling. Feels like she may avoid solid foods.      Patient is concerned because it feels like kidney is being squeezed on left side. Feels like squeezing.                   Objective    BP (!) 158/90   Pulse 82   Temp 98  F (36.7  C) (Oral)   Resp 22   Ht 1.651 m (5' 5\")   Wt 101.1 kg (222 lb 12.8 oz)   LMP  (LMP Unknown)   SpO2 100%   BMI 37.08 kg/m    Body mass index is 37.08 kg/m .  Physical Exam  Constitutional:       Appearance: Normal appearance.   HENT:      Mouth/Throat:      Tongue: No lesions.      Palate: No mass.      Pharynx: No pharyngeal swelling or uvula swelling.   Cardiovascular:      Rate and Rhythm: Normal rate and regular rhythm.   Pulmonary:      Effort: Pulmonary effort is normal.      Breath sounds: Normal breath sounds.   Neurological:      General: No focal deficit present.      Mental Status: She is alert and oriented to person, place, and time.   Psychiatric:         Mood and Affect: Mood normal.         Behavior: Behavior normal.                    Signed Electronically by: MARU PURDY CNP    Answers submitted by the patient for this visit:  Patient Health Questionnaire (Submitted on 8/8/2024)  If you checked off any problems, how difficult have these problems made it for you to do your work, take care of things at home, or get along with other people?: Not difficult at all  PHQ9 TOTAL SCORE: 2    "

## 2024-08-09 ENCOUNTER — TELEPHONE (OUTPATIENT)
Dept: FAMILY MEDICINE | Facility: CLINIC | Age: 49
End: 2024-08-09
Payer: COMMERCIAL

## 2024-08-09 DIAGNOSIS — T78.2XXD ANAPHYLAXIS, SUBSEQUENT ENCOUNTER: Primary | ICD-10-CM

## 2024-08-09 DIAGNOSIS — J45.40 MODERATE PERSISTENT ASTHMA WITHOUT COMPLICATION: ICD-10-CM

## 2024-08-09 RX ORDER — ALBUTEROL SULFATE 0.83 MG/ML
SOLUTION RESPIRATORY (INHALATION)
Qty: 180 ML | Refills: 2 | Status: SHIPPED | OUTPATIENT
Start: 2024-08-09

## 2024-08-09 NOTE — TELEPHONE ENCOUNTER
Patient calling about referral for allergist that was placed yesterday, 8/8/24 at [facility] with Eve Hinkle. She called to schedule and cannot get in until December. She is looking for additional recommendations for how to get in sooner to allergist because she does not want to wait this long. Routing to provider to advise.

## 2024-08-09 NOTE — TELEPHONE ENCOUNTER
I can try placing referral to [facility] instead and see if they have any openings sooner.     You will need to see if you insurance covers this . You will need to call to schedule tel:[phone]

## 2024-08-12 ENCOUNTER — TELEPHONE (OUTPATIENT)
Dept: FAMILY MEDICINE | Facility: CLINIC | Age: 49
End: 2024-08-12
Payer: COMMERCIAL

## 2024-08-12 DIAGNOSIS — R32 URINARY INCONTINENCE: Primary | ICD-10-CM

## 2024-08-12 DIAGNOSIS — R15.9 FECAL INCONTINENCE: ICD-10-CM

## 2024-08-12 NOTE — TELEPHONE ENCOUNTER
"I do have urinary incontinence and her problem list.  Typically insurance does need a visit to rule out causes of urine leaking into categorize type of incontinence she is having.  Please have patient schedule a follow-up with me.  May be placed in any provider approved visit or gender care visit block.     Patient to be seen for \"urinary incontinence and for DME supplies\"  "

## 2024-08-12 NOTE — TELEPHONE ENCOUNTER
Patient calls and states she needs adult diapers/ pull ups. She states her insurance won't cover them. No order found in chart for adult diapers. Informed patient that I would pend an order to her provider and we will see what insurance says.     Pended order and routed for provider review.

## 2024-08-13 ENCOUNTER — TELEPHONE (OUTPATIENT)
Dept: FAMILY MEDICINE | Facility: CLINIC | Age: 49
End: 2024-08-13
Payer: COMMERCIAL

## 2024-08-13 NOTE — TELEPHONE ENCOUNTER
Patient called back. She said this is not a new issue and had spoken with her previous pcp about it. She does understand that is needs to be documented to help with insurance coverage and is ok with scheduling an OV. Scheduled visit. She had no other questions.

## 2024-08-13 NOTE — TELEPHONE ENCOUNTER
MTM referral from: Transitions of Care (recent hospital discharge or ED visit)    MTM referral outreach attempt #2 on August 13, 2024 at 9:08 AM      Outcome: Patient not reachable after several attempts, sent [email or web address] message    Use [employer]  for the carrier/Plan on the flowsheet      [employer] Message Sent    [facility]    [phone]

## 2024-08-13 NOTE — TELEPHONE ENCOUNTER
LM for patient to call back. When pt calls back, please assist with schedule below.    Thanks,  Tyshawn

## 2024-09-03 ENCOUNTER — TRANSFERRED RECORDS (OUTPATIENT)
Dept: HEALTH INFORMATION MANAGEMENT | Facility: CLINIC | Age: 49
End: 2024-09-03
Payer: COMMERCIAL

## 2024-10-01 DIAGNOSIS — J45.40 MODERATE PERSISTENT ASTHMA WITHOUT COMPLICATION: ICD-10-CM

## 2024-10-01 RX ORDER — MONTELUKAST SODIUM 10 MG/1
1 TABLET ORAL AT BEDTIME
Qty: 30 TABLET | Refills: 0 | Status: SHIPPED | OUTPATIENT
Start: 2024-10-01

## 2024-10-09 DIAGNOSIS — Z87.898 HISTORY OF HEADACHE: ICD-10-CM

## 2024-10-11 RX ORDER — GABAPENTIN 100 MG/1
CAPSULE ORAL
Qty: 60 CAPSULE | Refills: 5 | Status: SHIPPED | OUTPATIENT
Start: 2024-10-11

## 2024-10-30 DIAGNOSIS — J45.40 MODERATE PERSISTENT ASTHMA WITHOUT COMPLICATION: ICD-10-CM

## 2024-10-30 RX ORDER — BUDESONIDE AND FORMOTEROL FUMARATE DIHYDRATE 160; 4.5 UG/1; UG/1
2 AEROSOL RESPIRATORY (INHALATION) 2 TIMES DAILY
Qty: 10.2 G | Refills: 2 | Status: SHIPPED | OUTPATIENT
Start: 2024-10-30

## 2024-10-30 RX ORDER — MONTELUKAST SODIUM 10 MG/1
1 TABLET ORAL AT BEDTIME
Qty: 90 TABLET | Refills: 0 | Status: SHIPPED | OUTPATIENT
Start: 2024-10-30

## 2024-11-04 DIAGNOSIS — E66.01 MORBID OBESITY (H): ICD-10-CM

## 2024-11-04 DIAGNOSIS — E53.8 B12 DEFICIENCY: ICD-10-CM

## 2024-11-04 RX ORDER — CYANOCOBALAMIN 1000 UG/ML
INJECTION, SOLUTION INTRAMUSCULAR; SUBCUTANEOUS
Qty: 6 ML | Refills: 1 | Status: SHIPPED | OUTPATIENT
Start: 2024-11-04

## 2024-11-13 ENCOUNTER — OFFICE VISIT (OUTPATIENT)
Dept: INTERNAL MEDICINE | Facility: CLINIC | Age: 49
End: 2024-11-13
Payer: COMMERCIAL

## 2024-11-13 VITALS
WEIGHT: 218 LBS | SYSTOLIC BLOOD PRESSURE: 138 MMHG | HEIGHT: 65 IN | OXYGEN SATURATION: 99 % | HEART RATE: 82 BPM | BODY MASS INDEX: 36.32 KG/M2 | RESPIRATION RATE: 20 BRPM | TEMPERATURE: 97.8 F | DIASTOLIC BLOOD PRESSURE: 82 MMHG

## 2024-11-13 DIAGNOSIS — J30.9 ALLERGIC SINUSITIS: ICD-10-CM

## 2024-11-13 DIAGNOSIS — R04.0 BLEEDING FROM THE NOSE: Primary | ICD-10-CM

## 2024-11-13 PROCEDURE — 99214 OFFICE O/P EST MOD 30 MIN: CPT | Performed by: NURSE PRACTITIONER

## 2024-11-13 PROCEDURE — G2211 COMPLEX E/M VISIT ADD ON: HCPCS | Performed by: NURSE PRACTITIONER

## 2024-11-13 RX ORDER — LEVOCETIRIZINE DIHYDROCHLORIDE 5 MG/1
5 TABLET, FILM COATED ORAL EVERY EVENING
Qty: 90 TABLET | Refills: 2 | Status: SHIPPED | OUTPATIENT
Start: 2024-11-13

## 2024-11-13 NOTE — PROGRESS NOTES
Assessment & Plan     Bleeding from the nose  Marleny is a pleasant 49-year-old female here today with a  for evaluation of nosebleeds that have been occurring for the past almost 2 weeks.  States she has had 6 episodes.  Occurs at night while she sleeping.  Has not occurred throughout the day.  She notes for the past 2 weeks she has had some slight increase in congestion has been blowing her nose a lot more.  Notices more postnasal drip and a slight cough.  She has not used anything over-the-counter for symptoms at this time.  At this time we discussed nosebleeds are likely related to combination of blowing her nose, dry air and the congestion causing irritation to the sinus cavities.  I do recommend to prevent recurrent nosebleeds ensuring she is running a humidifier in her room at night, try saline nasal spray and saline nasal rinses.  Also encouraged avoiding any trauma to the nose with picking or excessive blowing of the nose.  We discussed if she continues to have frequent nosebleeds that she can follow-up with ENT to discuss cauterization of any bothersome blood vessels.  If she has a nosebleed that is not controlled within 20 minutes at home I recommend ER evaluation.    Allergic sinusitis  For her nasal congestion there is no findings on physical exam to indicate need for antibiotics at this time.  We discussed symptoms are likely related to allergies and her asthma.  Has been prescribed cetirizine back in August for similar symptoms but states that this has not been working.  At this time we will try switching to Xyzal that she can take daily.  I recommend that if symptoms do not improve she follow-up with her primary care provider or asthma specialist.  Follow-up as needed with any worsening or new concerns that may arise.    - levocetirizine (XYZAL) 5 MG tablet; Take 1 tablet (5 mg) by mouth every evening.    The longitudinal plan of care for the diagnosis(es)/condition(s) as  "documented were addressed during this visit. Due to the added complexity in care, I will continue to support Marleny in the subsequent management and with ongoing continuity of care.      BMI  Estimated body mass index is 36.28 kg/m  as calculated from the following:    Height as of this encounter: 1.651 m (5' 5\").    Weight as of this encounter: 98.9 kg (218 lb).         Subjective   Marleny is a 49 year old, presenting for the following health issues:  Epistaxis (Off and on for 2 weeks, congestion)      11/13/2024     3:46 PM   Additional Questions   Roomed by Lakshmi CAMARENA     Via the Health Maintenance questionnaire, the patient has reported the following services have been completed -Cervical Cancer Screening: [facility] 2024-06-20, this information has been sent to the abstraction team.  Epistaxis             ROS  Comprehensive 12-point review of systems was completed and negative except as noted in HPI.        Objective    /82 (BP Location: Right arm, Patient Position: Sitting)   Pulse 82   Temp 97.8  F (36.6  C)   Resp 20   Ht 1.651 m (5' 5\")   Wt 98.9 kg (218 lb)   LMP  (LMP Unknown)   SpO2 99%   BMI 36.28 kg/m    Body mass index is 36.28 kg/m .  Physical Exam   Constitutional: In no acute distress.  Clean appearance.  Does not appear acutely ill.  Ears: TMs without erythema or effusions.  Grossly normal hearing.   Nose: Mild nasal congestion.  No rhinorrhea.  No frontal or maxillary sinus tenderness.  Oropharynx: Mild erythema.  Posterior oropharyngeal cobblestoning noted.  Neck: Supple.  No thyromegaly or lymphadenopathy noted.  Cardiovascular: Regular rate and rhythm.   Respiratory: Normal respiratory effort.  Lung sounds clear throughout on auscultation.  Skin: Skin is pink, warm and dry.  No rashes.   Musculoskeletal: Gait normal.  Able to mount exam table without difficulties.  Psychiatric: Appropriate affect and demeanor.          Signed Electronically by: Mariana Valladares NP    "

## 2024-11-13 NOTE — PATIENT INSTRUCTIONS
The best way to prevent nosebleeds is with ensuring they air your breathing is well moisturized.  I would recommend the use of a humidifier in room.  Saline nasal spray can also help with moisture in the nose to help decrease the reoccurrence of nosebleeds.    You also want avoid picking or excessively blowing your nose.  Avoid any drying agents in the nose such as allergy nasal sprays.    If you continue to have frequent nosebleeds I would recommend a follow-up with ENT.     New Prescription for allergy medication called Xyzal sent to pharmacy for nasal congestion and post nasal drip.

## 2024-12-01 ENCOUNTER — HEALTH MAINTENANCE LETTER (OUTPATIENT)
Age: 49
End: 2024-12-01

## 2024-12-10 NOTE — PROGRESS NOTES
Assessment:   Marleny Viera is a 49 year old y.o. female with past medical history significant for migraine, deafness, asthma, obstructive sleep apnea, fatty liver, obesity, prediabetes, hypertension, history of accidental overdose, de Quervain's disease, CMC DJD, borderline personality disorder, depression, anxiety, ADHD, insomnia, history of PE  who presents today for follow-up regarding a 2-month history of neck pain with radiation into the left upper extremity with associated numbness, tingling, weakness.  My review of an MRI cervical spine from October 2023 showed multilevel degenerative changes most pronounced at C5-6 where there is an asymmetric left disc bulge with severe spinal canal stenosis and moderate to severe foraminal stenosis.  There is also moderate central canal stenosis and moderate left foraminal stenosis C6-7.  I am concerned that she has worsening spondylosis.  On exam patient reported a subjective sensory deficit left lateral upper arm.  She also had weakness left shoulder abductors, left elbow extensors, left finger flexors, left wrist extensors.     Plan:     A shared decision making plan was used.  The patient's values and choices were respected.  The following represents what was discussed and decided upon by the physician assistant and the patient.  A professional  was present for the visit.    1.  DIAGNOSTIC TESTS:  - I reviewed the MRI cervical spine from October 2023.  - I ordered an updated MRI cervical spine due to patient's weakness on exam.  She requires an open scan so this to be done through [facility].    2.  PHYSICAL THERAPY:  - Patient to do physical therapy for neck pain through [facility] in 2023.  - Patient also went to 1 session of physical therapy for neck pain April 2024.  -She should continue with home exercises.    3.  MEDICATIONS: No changes are made to the patient's medications.  - Patient takes gabapentin 100 mg twice daily.  She does  not wish to titrate her dose due to concern for her kidneys.  - Patient takes Tylenol as needed but it is not helpful.    4.  INTERVENTIONS: No interventions were ordered today.  We will await the results of her MRI.  We could consider an epidural steroid injection.  Patient does have a history of anaphylaxis with contrast dye so she would need to be pretreated.    5.  PATIENT EDUCATION: Patient is in agreement the above plan.  All questions were answered.  - Patient may also need to follow-up with neurosurgery.  She saw Dr. Lr earlier this year.  He recommended watchful waiting.    6.  FOLLOW-UP: My nurse will call the patient with the results of her MRI cervical spine.  At that time I will likely recommend that she follow-up with me to review the results and discuss treatment options.  If she has questions or concerns in the meantime, she should not hesitate to call.    Subjective:     Marleny Viera is a 49 year old female who presents today for follow-up regarding neck pain with radiation into the left upper extremity.  Patient reports that this pain began in October.  She denies any injury or event to cause the pain.  She was seen by Dr. Kapoor earlier this year for neck pain.  He referred her to see neurosurgery.  They recommended watchful waiting/conservative care.    Patient complains of bilateral neck pain.  Pain radiates into the left shoulder and down the left lateral upper arm to the left lateral elbow.  Denies pain distal to the elbow.  She is numbness and tingling in the same distribution as her pain.  She feels weak in the left arm including .  She is left-handed.  She has headaches which she attributes to her neck pain.  She has had multiple falls but attributes this to balance changes from being deaf and having knee replacement surgery.  She has chronic urinary incontinence.  She wears briefs.  This is been stable for many years.  Denies loss of bowel control.  Denies inability to  urinate.    Treatment to date:  - Physical therapy for neck pain through [facility] in 2024  - Physical therapy and fall therapy x 1 for neck pain April 2024  - No spine injections  - No spine surgeries  - Gabapentin 100 mg twice daily  - Tylenol    Review of Systems:  Positive for numbness/tingling, weakness, loss of bladder control, headache, pain much worse at night, trip/stumble/falls, difficulty with hand skills, unintentional weight loss.  Negative for loss of bowel control, inability to urinate, difficulty swallowing, fevers     Objective:   CONSTITUTIONAL:  Vital signs as above.  No acute distress.  The patient is well nourished and well groomed.    PSYCHIATRIC:  The patient is awake, alert, oriented to person, place and time.  The patient is answering questions appropriately with clear speech.  Normal affect.  HEENT: Normocephalic, atraumatic.  Sclera clear.    LYMPH: No cervical lymphadenopathy  SKIN:  Skin over the face, neck bilateral upper extremities is clean, dry, intact without rashes.  MUSCULOSKELETAL: The patient has breakaway weakness left shoulder abductors.  4/5 strength left elbow extensors, left finger flexors, left wrist extensors.  5/5 strength right shoulder abductors, bilateral elbow flexors, right elbow extensors, right wrist extensors, right finger flexors, bilateral finger abductors.  NEUROLOGICAL: 1-2+ biceps, brachioradialis, triceps reflexes bilaterally.  Negative Thompson's bilaterally.  Sensation to light touch is subjectively diminished left lateral upper arm.    RESULTS: I reviewed the report of the CT cervical spine dated March 22, 2024 from [facility].  This is normal.    I reviewed the MRI cervical spine dated October 11, 2023.  At C4-5 there is a disc bulge with moderate central canal stenosis.  At C5-6 there is an asymmetric left disc bulge with severe central canal stenosis and moderate to severe foraminal stenosis.  At C6-7 there is a disc osteophyte with  moderate central canal stenosis and moderate bilateral foraminal stenosis.

## 2024-12-12 ENCOUNTER — OFFICE VISIT (OUTPATIENT)
Dept: PHYSICAL MEDICINE AND REHAB | Facility: CLINIC | Age: 49
End: 2024-12-12
Payer: COMMERCIAL

## 2024-12-12 VITALS
BODY MASS INDEX: 35.81 KG/M2 | WEIGHT: 214.9 LBS | HEIGHT: 65 IN | SYSTOLIC BLOOD PRESSURE: 200 MMHG | DIASTOLIC BLOOD PRESSURE: 84 MMHG | HEART RATE: 75 BPM

## 2024-12-12 DIAGNOSIS — M48.02 CERVICAL STENOSIS OF SPINAL CANAL: ICD-10-CM

## 2024-12-12 DIAGNOSIS — M54.12 CERVICAL RADICULOPATHY: Primary | ICD-10-CM

## 2024-12-12 ASSESSMENT — PAIN SCALES - GENERAL: PAINLEVEL_OUTOF10: SEVERE PAIN (6)

## 2024-12-12 NOTE — LETTER
12/12/2024      Marleny Viera  [address]  [place] [facility] [address]      Dear Colleague,    Thank you for referring your patient, Marleny Viera, to the [facility]. Please see a copy of my visit note below.    Assessment:   Marleny Viera is a 49 year old y.o. female with past medical history significant for migraine, deafness, asthma, obstructive sleep apnea, fatty liver, obesity, prediabetes, hypertension, history of accidental overdose, de Quervain's disease, CMC DJD, borderline personality disorder, depression, anxiety, ADHD, insomnia, history of PE  who presents today for follow-up regarding a 2-month history of neck pain with radiation into the left upper extremity with associated numbness, tingling, weakness.  My review of an MRI cervical spine from October 2023 showed multilevel degenerative changes most pronounced at C5-6 where there is an asymmetric left disc bulge with severe spinal canal stenosis and moderate to severe foraminal stenosis.  There is also moderate central canal stenosis and moderate left foraminal stenosis C6-7.  I am concerned that she has worsening spondylosis.  On exam patient reported a subjective sensory deficit left lateral upper arm.  She also had weakness left shoulder abductors, left elbow extensors, left finger flexors, left wrist extensors.     Plan:     A shared decision making plan was used.  The patient's values and choices were respected.  The following represents what was discussed and decided upon by the physician assistant and the patient.  A professional  was present for the visit.    1.  DIAGNOSTIC TESTS:  - I reviewed the MRI cervical spine from October 2023.  - I ordered an updated MRI cervical spine due to patient's weakness on exam.  She requires an open scan so this to be done through [facility].    2.  PHYSICAL THERAPY:  - Patient to do physical therapy for neck pain through [facility] in 2023.  - Patient  also went to 1 session of physical therapy for neck pain April 2024.  -She should continue with home exercises.    3.  MEDICATIONS: No changes are made to the patient's medications.  - Patient takes gabapentin 100 mg twice daily.  She does not wish to titrate her dose due to concern for her kidneys.  - Patient takes Tylenol as needed but it is not helpful.    4.  INTERVENTIONS: No interventions were ordered today.  We will await the results of her MRI.  We could consider an epidural steroid injection.  Patient does have a history of anaphylaxis with contrast dye so she would need to be pretreated.    5.  PATIENT EDUCATION: Patient is in agreement the above plan.  All questions were answered.  - Patient may also need to follow-up with neurosurgery.  She saw Dr. Lr earlier this year.  He recommended watchful waiting.    6.  FOLLOW-UP: My nurse will call the patient with the results of her MRI cervical spine.  At that time I will likely recommend that she follow-up with me to review the results and discuss treatment options.  If she has questions or concerns in the meantime, she should not hesitate to call.    Subjective:     Marleny Viera is a 49 year old female who presents today for follow-up regarding neck pain with radiation into the left upper extremity.  Patient reports that this pain began in October.  She denies any injury or event to cause the pain.  She was seen by Dr. Kapoor earlier this year for neck pain.  He referred her to see neurosurgery.  They recommended watchful waiting/conservative care.    Patient complains of bilateral neck pain.  Pain radiates into the left shoulder and down the left lateral upper arm to the left lateral elbow.  Denies pain distal to the elbow.  She is numbness and tingling in the same distribution as her pain.  She feels weak in the left arm including .  She is left-handed.  She has headaches which she attributes to her neck pain.  She has had multiple falls but  attributes this to balance changes from being deaf and having knee replacement surgery.  She has chronic urinary incontinence.  She wears briefs.  This is been stable for many years.  Denies loss of bowel control.  Denies inability to urinate.    Treatment to date:  - Physical therapy for neck pain through [facility] in 2024  - Physical therapy and fall therapy x 1 for neck pain April 2024  - No spine injections  - No spine surgeries  - Gabapentin 100 mg twice daily  - Tylenol    Review of Systems:  Positive for numbness/tingling, weakness, loss of bladder control, headache, pain much worse at night, trip/stumble/falls, difficulty with hand skills, unintentional weight loss.  Negative for loss of bowel control, inability to urinate, difficulty swallowing, fevers     Objective:   CONSTITUTIONAL:  Vital signs as above.  No acute distress.  The patient is well nourished and well groomed.    PSYCHIATRIC:  The patient is awake, alert, oriented to person, place and time.  The patient is answering questions appropriately with clear speech.  Normal affect.  HEENT: Normocephalic, atraumatic.  Sclera clear.    LYMPH: No cervical lymphadenopathy  SKIN:  Skin over the face, neck bilateral upper extremities is clean, dry, intact without rashes.  MUSCULOSKELETAL: The patient has breakaway weakness left shoulder abductors.  4/5 strength left elbow extensors, left finger flexors, left wrist extensors.  5/5 strength right shoulder abductors, bilateral elbow flexors, right elbow extensors, right wrist extensors, right finger flexors, bilateral finger abductors.  NEUROLOGICAL: 1-2+ biceps, brachioradialis, triceps reflexes bilaterally.  Negative Thompson's bilaterally.  Sensation to light touch is subjectively diminished left lateral upper arm.    RESULTS: I reviewed the report of the CT cervical spine dated March 22, 2024 from [facility].  This is normal.    I reviewed the MRI cervical spine dated October 11, 2023.  At  C4-5 there is a disc bulge with moderate central canal stenosis.  At C5-6 there is an asymmetric left disc bulge with severe central canal stenosis and moderate to severe foraminal stenosis.  At C6-7 there is a disc osteophyte with moderate central canal stenosis and moderate bilateral foraminal stenosis.         Again, thank you for allowing me to participate in the care of your patient.        Sincerely,        Akilah Loredo PA-C

## 2025-01-23 ENCOUNTER — APPOINTMENT (OUTPATIENT)
Dept: CT IMAGING | Facility: CLINIC | Age: 50
End: 2025-01-23
Attending: STUDENT IN AN ORGANIZED HEALTH CARE EDUCATION/TRAINING PROGRAM
Payer: COMMERCIAL

## 2025-01-23 ENCOUNTER — HOSPITAL ENCOUNTER (EMERGENCY)
Facility: CLINIC | Age: 50
Discharge: HOME | End: 2025-01-23
Attending: STUDENT IN AN ORGANIZED HEALTH CARE EDUCATION/TRAINING PROGRAM
Payer: COMMERCIAL

## 2025-01-23 VITALS
DIASTOLIC BLOOD PRESSURE: 84 MMHG | BODY MASS INDEX: 35.82 KG/M2 | HEART RATE: 101 BPM | OXYGEN SATURATION: 98 % | TEMPERATURE: 97.8 F | HEIGHT: 65 IN | WEIGHT: 215 LBS | SYSTOLIC BLOOD PRESSURE: 191 MMHG | RESPIRATION RATE: 16 BRPM

## 2025-01-23 DIAGNOSIS — R19.7 NAUSEA VOMITING AND DIARRHEA: ICD-10-CM

## 2025-01-23 DIAGNOSIS — R11.2 NAUSEA VOMITING AND DIARRHEA: ICD-10-CM

## 2025-01-23 DIAGNOSIS — K62.5 BRIGHT RED BLOOD PER RECTUM: ICD-10-CM

## 2025-01-23 DIAGNOSIS — K52.9 ENTEROCOLITIS: ICD-10-CM

## 2025-01-23 LAB
ABO + RH BLD: NORMAL
ALBUMIN SERPL BCG-MCNC: 4.1 G/DL (ref 3.5–5.2)
ALBUMIN UR-MCNC: 20 MG/DL
ALP SERPL-CCNC: 109 U/L (ref 40–150)
ALT SERPL W P-5'-P-CCNC: 18 U/L (ref 0–50)
ANION GAP SERPL CALCULATED.3IONS-SCNC: 11 MMOL/L (ref 7–15)
APPEARANCE UR: CLEAR
AST SERPL W P-5'-P-CCNC: 25 U/L (ref 0–45)
ATRIAL RATE - MUSE: 98 BPM
BACTERIA #/AREA URNS HPF: ABNORMAL /HPF
BASOPHILS # BLD AUTO: 0 10E3/UL (ref 0–0.2)
BASOPHILS NFR BLD AUTO: 0 %
BILIRUB DIRECT SERPL-MCNC: <0.2 MG/DL (ref 0–0.3)
BILIRUB SERPL-MCNC: 0.3 MG/DL
BILIRUB UR QL STRIP: NEGATIVE
BLD GP AB SCN SERPL QL: NEGATIVE
BUN SERPL-MCNC: 16.7 MG/DL (ref 6–20)
CALCIUM SERPL-MCNC: 9.5 MG/DL (ref 8.8–10.4)
CAOX CRY #/AREA URNS HPF: ABNORMAL /HPF
CHLORIDE SERPL-SCNC: 109 MMOL/L (ref 98–107)
COLOR UR AUTO: YELLOW
CREAT SERPL-MCNC: 0.96 MG/DL (ref 0.51–0.95)
DIASTOLIC BLOOD PRESSURE - MUSE: NORMAL MMHG
EGFRCR SERPLBLD CKD-EPI 2021: 71 ML/MIN/1.73M2
EOSINOPHIL # BLD AUTO: 0.1 10E3/UL (ref 0–0.7)
EOSINOPHIL NFR BLD AUTO: 1 %
ERYTHROCYTE [DISTWIDTH] IN BLOOD BY AUTOMATED COUNT: 13.2 % (ref 10–15)
FLUAV RNA SPEC QL NAA+PROBE: NEGATIVE
FLUBV RNA RESP QL NAA+PROBE: NEGATIVE
GLUCOSE BLDC GLUCOMTR-MCNC: 181 MG/DL (ref 70–99)
GLUCOSE SERPL-MCNC: 128 MG/DL (ref 70–99)
GLUCOSE UR STRIP-MCNC: NEGATIVE MG/DL
HCO3 SERPL-SCNC: 20 MMOL/L (ref 22–29)
HCT VFR BLD AUTO: 39.9 % (ref 35–47)
HEMOCCULT STL QL: NEGATIVE
HGB BLD-MCNC: 13.9 G/DL (ref 11.7–15.7)
HGB UR QL STRIP: NEGATIVE
IMM GRANULOCYTES # BLD: 0 10E3/UL
IMM GRANULOCYTES NFR BLD: 0 %
INR PPP: 0.93 (ref 0.85–1.15)
INTERPRETATION ECG - MUSE: NORMAL
KETONES UR STRIP-MCNC: NEGATIVE MG/DL
LEUKOCYTE ESTERASE UR QL STRIP: NEGATIVE
LIPASE SERPL-CCNC: 39 U/L (ref 13–60)
LYMPHOCYTES # BLD AUTO: 2.3 10E3/UL (ref 0.8–5.3)
LYMPHOCYTES NFR BLD AUTO: 26 %
MAGNESIUM SERPL-MCNC: 2.2 MG/DL (ref 1.7–2.3)
MCH RBC QN AUTO: 28.5 PG (ref 26.5–33)
MCHC RBC AUTO-ENTMCNC: 34.8 G/DL (ref 31.5–36.5)
MCV RBC AUTO: 82 FL (ref 78–100)
MONOCYTES # BLD AUTO: 0.3 10E3/UL (ref 0–1.3)
MONOCYTES NFR BLD AUTO: 4 %
MUCOUS THREADS #/AREA URNS LPF: PRESENT /LPF
NEUTROPHILS # BLD AUTO: 6.2 10E3/UL (ref 1.6–8.3)
NEUTROPHILS NFR BLD AUTO: 70 %
NITRATE UR QL: NEGATIVE
NRBC # BLD AUTO: 0 10E3/UL
NRBC BLD AUTO-RTO: 0 /100
P AXIS - MUSE: 36 DEGREES
PH UR STRIP: 5.5 [PH] (ref 5–7)
PLATELET # BLD AUTO: 281 10E3/UL (ref 150–450)
POTASSIUM SERPL-SCNC: 3.6 MMOL/L (ref 3.4–5.3)
PR INTERVAL - MUSE: 158 MS
PROT SERPL-MCNC: 7.3 G/DL (ref 6.4–8.3)
QRS DURATION - MUSE: 84 MS
QT - MUSE: 338 MS
QTC - MUSE: 431 MS
R AXIS - MUSE: -5 DEGREES
RBC # BLD AUTO: 4.88 10E6/UL (ref 3.8–5.2)
RBC URINE: 0 /HPF
RSV RNA SPEC NAA+PROBE: NEGATIVE
SARS-COV-2 RNA RESP QL NAA+PROBE: NEGATIVE
SODIUM SERPL-SCNC: 140 MMOL/L (ref 135–145)
SP GR UR STRIP: 1.03 (ref 1–1.03)
SPECIMEN EXP DATE BLD: NORMAL
SQUAMOUS EPITHELIAL: 3 /HPF
SYSTOLIC BLOOD PRESSURE - MUSE: NORMAL MMHG
T AXIS - MUSE: -7 DEGREES
UROBILINOGEN UR STRIP-MCNC: <2 MG/DL
VENTRICULAR RATE- MUSE: 98 BPM
WBC # BLD AUTO: 8.9 10E3/UL (ref 4–11)
WBC URINE: 2 /HPF

## 2025-01-23 PROCEDURE — 85004 AUTOMATED DIFF WBC COUNT: CPT | Performed by: STUDENT IN AN ORGANIZED HEALTH CARE EDUCATION/TRAINING PROGRAM

## 2025-01-23 PROCEDURE — 82248 BILIRUBIN DIRECT: CPT | Performed by: STUDENT IN AN ORGANIZED HEALTH CARE EDUCATION/TRAINING PROGRAM

## 2025-01-23 PROCEDURE — 80053 COMPREHEN METABOLIC PANEL: CPT | Performed by: STUDENT IN AN ORGANIZED HEALTH CARE EDUCATION/TRAINING PROGRAM

## 2025-01-23 PROCEDURE — 82962 GLUCOSE BLOOD TEST: CPT

## 2025-01-23 PROCEDURE — 83735 ASSAY OF MAGNESIUM: CPT | Performed by: STUDENT IN AN ORGANIZED HEALTH CARE EDUCATION/TRAINING PROGRAM

## 2025-01-23 PROCEDURE — 83690 ASSAY OF LIPASE: CPT | Performed by: STUDENT IN AN ORGANIZED HEALTH CARE EDUCATION/TRAINING PROGRAM

## 2025-01-23 PROCEDURE — 258N000003 HC RX IP 258 OP 636: Performed by: STUDENT IN AN ORGANIZED HEALTH CARE EDUCATION/TRAINING PROGRAM

## 2025-01-23 PROCEDURE — 81001 URINALYSIS AUTO W/SCOPE: CPT | Performed by: STUDENT IN AN ORGANIZED HEALTH CARE EDUCATION/TRAINING PROGRAM

## 2025-01-23 PROCEDURE — 85610 PROTHROMBIN TIME: CPT | Performed by: STUDENT IN AN ORGANIZED HEALTH CARE EDUCATION/TRAINING PROGRAM

## 2025-01-23 PROCEDURE — 87637 SARSCOV2&INF A&B&RSV AMP PRB: CPT | Performed by: STUDENT IN AN ORGANIZED HEALTH CARE EDUCATION/TRAINING PROGRAM

## 2025-01-23 PROCEDURE — 250N000009 HC RX 250: Performed by: STUDENT IN AN ORGANIZED HEALTH CARE EDUCATION/TRAINING PROGRAM

## 2025-01-23 PROCEDURE — 96374 THER/PROPH/DIAG INJ IV PUSH: CPT

## 2025-01-23 PROCEDURE — 82947 ASSAY GLUCOSE BLOOD QUANT: CPT | Performed by: STUDENT IN AN ORGANIZED HEALTH CARE EDUCATION/TRAINING PROGRAM

## 2025-01-23 PROCEDURE — 99285 EMERGENCY DEPT VISIT HI MDM: CPT | Mod: 25

## 2025-01-23 PROCEDURE — 250N000011 HC RX IP 250 OP 636: Performed by: STUDENT IN AN ORGANIZED HEALTH CARE EDUCATION/TRAINING PROGRAM

## 2025-01-23 PROCEDURE — 74176 CT ABD & PELVIS W/O CONTRAST: CPT

## 2025-01-23 PROCEDURE — 93005 ELECTROCARDIOGRAM TRACING: CPT | Performed by: STUDENT IN AN ORGANIZED HEALTH CARE EDUCATION/TRAINING PROGRAM

## 2025-01-23 PROCEDURE — 86900 BLOOD TYPING SEROLOGIC ABO: CPT | Performed by: STUDENT IN AN ORGANIZED HEALTH CARE EDUCATION/TRAINING PROGRAM

## 2025-01-23 PROCEDURE — 85018 HEMOGLOBIN: CPT | Performed by: STUDENT IN AN ORGANIZED HEALTH CARE EDUCATION/TRAINING PROGRAM

## 2025-01-23 PROCEDURE — 96361 HYDRATE IV INFUSION ADD-ON: CPT

## 2025-01-23 PROCEDURE — 36415 COLL VENOUS BLD VENIPUNCTURE: CPT | Performed by: STUDENT IN AN ORGANIZED HEALTH CARE EDUCATION/TRAINING PROGRAM

## 2025-01-23 PROCEDURE — 85048 AUTOMATED LEUKOCYTE COUNT: CPT | Performed by: STUDENT IN AN ORGANIZED HEALTH CARE EDUCATION/TRAINING PROGRAM

## 2025-01-23 PROCEDURE — 96375 TX/PRO/DX INJ NEW DRUG ADDON: CPT

## 2025-01-23 PROCEDURE — 82272 OCCULT BLD FECES 1-3 TESTS: CPT | Performed by: STUDENT IN AN ORGANIZED HEALTH CARE EDUCATION/TRAINING PROGRAM

## 2025-01-23 RX ORDER — ONDANSETRON 2 MG/ML
4 INJECTION INTRAMUSCULAR; INTRAVENOUS ONCE
Status: DISCONTINUED | OUTPATIENT
Start: 2025-01-23 | End: 2025-01-24 | Stop reason: HOSPADM

## 2025-01-23 RX ORDER — OXYCODONE HYDROCHLORIDE 5 MG/1
5 TABLET ORAL ONCE
Status: DISCONTINUED | OUTPATIENT
Start: 2025-01-23 | End: 2025-01-23

## 2025-01-23 RX ORDER — ONDANSETRON 2 MG/ML
4 INJECTION INTRAMUSCULAR; INTRAVENOUS ONCE
Status: COMPLETED | OUTPATIENT
Start: 2025-01-23 | End: 2025-01-23

## 2025-01-23 RX ADMIN — PANTOPRAZOLE SODIUM 40 MG: 40 INJECTION, POWDER, FOR SOLUTION INTRAVENOUS at 19:58

## 2025-01-23 RX ADMIN — SODIUM CHLORIDE 1000 ML: 9 INJECTION, SOLUTION INTRAVENOUS at 19:55

## 2025-01-23 RX ADMIN — ONDANSETRON 4 MG: 2 INJECTION, SOLUTION INTRAMUSCULAR; INTRAVENOUS at 19:59

## 2025-01-23 ASSESSMENT — COLUMBIA-SUICIDE SEVERITY RATING SCALE - C-SSRS
2. HAVE YOU ACTUALLY HAD ANY THOUGHTS OF KILLING YOURSELF IN THE PAST MONTH?: NO
6. HAVE YOU EVER DONE ANYTHING, STARTED TO DO ANYTHING, OR PREPARED TO DO ANYTHING TO END YOUR LIFE?: NO
1. IN THE PAST MONTH, HAVE YOU WISHED YOU WERE DEAD OR WISHED YOU COULD GO TO SLEEP AND NOT WAKE UP?: NO

## 2025-01-23 ASSESSMENT — ACTIVITIES OF DAILY LIVING (ADL)
ADLS_ACUITY_SCORE: 44

## 2025-01-23 NOTE — ED PROVIDER NOTES
"EMERGENCY DEPARTMENT MIDLEVEL SUPERVISORY NOTE     I had a face to face encounter with this patient seen by the Advanced Practice Provider (MELISSA). I personally made/approved the management plan and take responsibility for the patient management. I personally saw patient and performed a substantive portion of the visit including all aspects of the medical decision making.      ED Course:  3:57 PM  Vidya Decker PA-C staffed patient with me. I agree with their assessment and plan of management, and I will see the patient.       Brief HPI:     Marleny Viera is a 49 year old year old female who presents for evaluation of generalized abdominal pain, nausea, vomiting, and diarrhea.      She states she woke up at 2 AM yesterday morning with the symptoms and they have continued over the past day.  Today, she had some bright red blood per rectum after bowel movement.  Patient wears depends and states she has had trace blood present in her depends as well, causing her concern.  She denies any known history of hemorrhoids.   no pain associated with bowel movements but has a cramping sensation in her lower back and does note some intermittent pain in the left side of her abdomen.  She is not on any blood thinners.  No history of GI bleed.  She tried taking her BP meds this morning but states she vomited shortly following so she does not think she kept any of it down.  No fevers.  No cough or sore throat.      I María Verdin, am serving as a scribe to document services personally performed by Osman Jordan MD, based on my observations and the provider's statements to me.  I, Osman Jordan MD, attest that María Verdin is acting in a scribe capacity, has observed my performance of the services and has documented them in accordance with my direction.     Brief Physical Exam: BP (!) 191/84   Pulse 101   Temp 97.8  F (36.6  C) (Oral)   Resp 16   Ht 1.651 m (5' 5\")   Wt 97.5 kg (215 lb)   LMP  (LMP " Unknown)   SpO2 98%   BMI 35.78 kg/m     Constitutional:  Alert, in no acute distress  EYES: Conjunctivae clear  HENT:  Atraumatic  Respiratory:  Respirations even, unlabored, in no acute respiratory distress  Cardiovascular:  Regular rate and rhythm, good peripheral perfusion  GI: Soft, non-distended, non-tender  Musculoskeletal:  Moves all 4 extremities equally, grossly symmetrical strength  Integument: Warm & dry. No appreciable rash, erythema.  Neurologic:  Alert & oriented, speech clear and fluent, no focal deficits noted  Psych: Normal mood and affect        MDM:    Patient is a 49-year-old ASL speaking female presenting to the emergency department for evaluation of nausea, vomiting, abdominal pain bright red blood per rectum.  On initial evaluation here she is hypertensive but otherwise hemodynamically stable and afebrile.  She is minimally distended abdomen with some tenderness in the left upper quadrant of the left mid abdomen, no guarding or rebound.  No CVA tenderness.  Rectal exam performed by MELISSA no visualized hemorrhoids or blood in the rectal vault.    Occult blood was negative here.    Labs reviewed interpreted myself.  CBC shows a normal hemoglobin.  No leukocytosis.  Overall chemistry is reassuring.  Lipase within normal limits as are LFTs.        Patient still has ongoing pain obtain noncontrasted CT as she has a contrast allergy to evaluate for possible process  such as bowel obstruction.      CT on pelvis does not show any signs of obstruction.  Findings on CT could be consistent with enteritis otherwise no acute intra-abdominal process.    Upon repeat evaluation patient is having improvement of symptoms following a 1 L fluid bolus.  I suspect probable enterocolitis as cause of her symptoms but thinks symptoms should be self-limiting.  No significant metabolic derangements or lab abnormalities here and she is tolerating oral intake.  Believe she safe for discharge home this point in time.   Discussed signs symptoms worsen condition and return precautions given        1. Bright red blood per rectum    2. Nausea vomiting and diarrhea    3. Enterocolitis            Labs and Imaging:  Results for orders placed or performed during the hospital encounter of 01/23/25   CT Abdomen Pelvis w/o Contrast    Impression    IMPRESSION:   1.  Paucity of stool in the transverse and left colon. Query diarrhea with enteritis?  2.  No other inflammatory changes or evidence of bowel obstruction.  3.  Subserosal uterine fibroids.  4.  Mosaic appearance to the lung bases usually reflects small airway disease.  5.  Other noncritical findings as noted above.  6.       UA with Microscopic reflex to Culture    Specimen: Urine, Midstream   Result Value Ref Range    Color Urine Yellow Colorless, Straw, Light Yellow, Yellow    Appearance Urine Clear Clear    Glucose Urine Negative Negative mg/dL    Bilirubin Urine Negative Negative    Ketones Urine Negative Negative mg/dL    Specific Gravity Urine 1.030 1.001 - 1.030    Blood Urine Negative Negative    pH Urine 5.5 5.0 - 7.0    Protein Albumin Urine 20 (A) Negative mg/dL    Urobilinogen Urine <2.0 <2.0 mg/dL    Nitrite Urine Negative Negative    Leukocyte Esterase Urine Negative Negative    Bacteria Urine Few (A) None Seen /HPF    Mucus Urine Present (A) None Seen /LPF    Calcium Oxalate Crystals Urine Few (A) None Seen /HPF    RBC Urine 0 <=2 /HPF    WBC Urine 2 <=5 /HPF    Squamous Epithelials Urine 3 (H) <=1 /HPF   Influenza A/B, RSV and SARS-CoV2 PCR (COVID-19) Nasopharyngeal    Specimen: Nasopharyngeal; Swab   Result Value Ref Range    Influenza A PCR Negative Negative    Influenza B PCR Negative Negative    RSV PCR Negative Negative    SARS CoV2 PCR Negative Negative   Basic metabolic panel   Result Value Ref Range    Sodium 140 135 - 145 mmol/L    Potassium 3.6 3.4 - 5.3 mmol/L    Chloride 109 (H) 98 - 107 mmol/L    Carbon Dioxide (CO2) 20 (L) 22 - 29 mmol/L    Anion Gap 11  7 - 15 mmol/L    Urea Nitrogen 16.7 6.0 - 20.0 mg/dL    Creatinine 0.96 (H) 0.51 - 0.95 mg/dL    GFR Estimate 71 >60 mL/min/1.73m2    Calcium 9.5 8.8 - 10.4 mg/dL    Glucose 128 (H) 70 - 99 mg/dL   Result Value Ref Range    Lipase 39 13 - 60 U/L   Hepatic function panel   Result Value Ref Range    Protein Total 7.3 6.4 - 8.3 g/dL    Albumin 4.1 3.5 - 5.2 g/dL    Bilirubin Total 0.3 <=1.2 mg/dL    Alkaline Phosphatase 109 40 - 150 U/L    AST 25 0 - 45 U/L    ALT 18 0 - 50 U/L    Bilirubin Direct <0.20 0.00 - 0.30 mg/dL   Result Value Ref Range    Magnesium 2.2 1.7 - 2.3 mg/dL   Glucose by meter   Result Value Ref Range    GLUCOSE BY METER POCT 181 (H) 70 - 99 mg/dL   Result Value Ref Range    INR 0.93 0.85 - 1.15   Occult blood stool   Result Value Ref Range    Occult Blood Negative Negative   CBC with platelets and differential   Result Value Ref Range    WBC Count 8.9 4.0 - 11.0 10e3/uL    RBC Count 4.88 3.80 - 5.20 10e6/uL    Hemoglobin 13.9 11.7 - 15.7 g/dL    Hematocrit 39.9 35.0 - 47.0 %    MCV 82 78 - 100 fL    MCH 28.5 26.5 - 33.0 pg    MCHC 34.8 31.5 - 36.5 g/dL    RDW 13.2 10.0 - 15.0 %    Platelet Count 281 150 - 450 10e3/uL    % Neutrophils 70 %    % Lymphocytes 26 %    % Monocytes 4 %    % Eosinophils 1 %    % Basophils 0 %    % Immature Granulocytes 0 %    NRBCs per 100 WBC 0 <1 /100    Absolute Neutrophils 6.2 1.6 - 8.3 10e3/uL    Absolute Lymphocytes 2.3 0.8 - 5.3 10e3/uL    Absolute Monocytes 0.3 0.0 - 1.3 10e3/uL    Absolute Eosinophils 0.1 0.0 - 0.7 10e3/uL    Absolute Basophils 0.0 0.0 - 0.2 10e3/uL    Absolute Immature Granulocytes 0.0 <=0.4 10e3/uL    Absolute NRBCs 0.0 10e3/uL   ECG 12-LEAD WITH MUSE (LHE)   Result Value Ref Range    Systolic Blood Pressure  mmHg    Diastolic Blood Pressure  mmHg    Ventricular Rate 98 BPM    Atrial Rate 98 BPM    OR Interval 158 ms    QRS Duration 84 ms     ms    QTc 431 ms    P Axis 36 degrees    R AXIS -5 degrees    T Axis -7 degrees    Interpretation  ECG       Sinus rhythm  Minimal voltage criteria for LVH, may be normal variant  Possible Anterior infarct , age undetermined  Abnormal ECG  When compared with ECG of 06-Aug-2024 20:26,  No significant change was found  Confirmed by SEE ED PROVIDER NOTE FOR, ECG INTERPRETATION ([id number]),  Jeannette Null ([id number]) on 1/23/2025 5:41:48 PM     Adult Type and Screen   Result Value Ref Range    ABO/RH(D) O POS     Antibody Screen Negative Negative    SPECIMEN EXPIRATION DATE [id number]       I have reviewed the relevant laboratory studies above.     I independently interpreted the following imaging study(s):   CT Abdomen Pelvis w/o Contrast   Final Result   IMPRESSION:    1.  Paucity of stool in the transverse and left colon. Query diarrhea with enteritis?   2.  No other inflammatory changes or evidence of bowel obstruction.   3.  Subserosal uterine fibroids.   4.  Mosaic appearance to the lung bases usually reflects small airway disease.   5.  Other noncritical findings as noted above.   6.                 EKG:   I reviewed and independently interpreted the patient's EKG, with comments made as listed below. Please see scanned EKG for full report.     Performed at: 15:49:22     Impression: Sinus rhythm. Minimal voltage criteria for LVH, may be normal variant. Possible Anterior infarct, age undetermined. No STEMI.      Rate: 98  Rhythm: Sinus  Axis: -5  CO Interval: 158  QRS Interval: 84  QTc Interval: 431  ST Changes: No ST changes.   Comparison: 06-Aug-2024 20:26, No significant change was found.      EKG results reviewed and interpreted by Dr. Julian ED MD.      Procedures:  I was present for the key portions of procedures documented in MELISSA/midlevel note, see midlevel note for further details.     Osman Jordan MD  [facility] EMERGENCY DEPARTMENT  [address] [place] [id number] [phone]       Osman Jordan MD  01/23/25 8219

## 2025-01-23 NOTE — ED TRIAGE NOTES
ASL patient. Pt woke this morning at 2 am with pain in her stomach bleeding from her rectum. She felt dizzy then and then went back to sleep woke at at 9 am and then 11 am with the same symptoms. BP elevated as she is not able to keep medications down. 9/10    ALISA HIGGINBOTHAM RN       Triage Assessment (Adult)       Row Name 01/23/25 1541          Triage Assessment    Airway WDL WDL        Respiratory WDL    Respiratory WDL WDL        Skin Circulation/Temperature WDL    Skin Circulation/Temperature WDL WDL        Cardiac WDL    Cardiac WDL WDL        Peripheral/Neurovascular WDL    Peripheral Neurovascular WDL WDL        Cognitive/Neuro/Behavioral WDL    Cognitive/Neuro/Behavioral WDL WDL

## 2025-01-23 NOTE — ED PROVIDER NOTES
Emergency Department Encounter   NAME: Marleny Viera ; AGE: 49 year old female ; YOB: 1975 ; MRN: [id number] ; PCP: Brianna Hurst   ED PROVIDER: Vidya Decker PA-C    Evaluation Date & Time:   No admission date for patient encounter.    CHIEF COMPLAINT:  Abdominal Pain and Nausea, Vomiting, & Diarrhea        Impression and Plan   FINAL IMPRESSION:    ICD-10-CM    1. Bright red blood per rectum  K62.5       2. Nausea vomiting and diarrhea  R11.2     R19.7           ED Course and Medical Decision Making  Marleny is a 49 year old female with PMH of HTN, moderate asthma, borderline personality disorder, bilateral deafness requiring , and anxiety presenting to the ER for evaluation of generalized abdominal pain, nausea, vomiting, and diarrhea.  She states she woke up at 2 AM yesterday morning with the symptoms and they have continued over the past day.  Today, she had some bright red blood per rectum after bowel movement.  Patient wears depends and states she has had trace blood present in her depends as well, causing her concern.  She denies any known history of hemorrhoids.  Denies any pain with bowel movements.  She is not on any blood thinners.  No history of GI bleed.  She tried taking her BP meds this morning but states she vomited shortly following so she does not think she kept any of it down.  No fevers.  No cough or sore throat.    Vitals reviewed, she is initially hypertensive in triage with BP of 178/86. On exam she is resting comfortably, nontoxic-appearing.  No acute distress. Differential diagnosis/emergent conditions considered and evaluated for includes but not limited to COVID, influenza, UTI, PUD/gastritis, GI bleed, appendicitis, pancreatitis, SBO.  Her abdomen is soft and nondistended.  She has mild diffuse tenderness throughout the abdomen, no focal areas of tenderness.  No CVA tenderness bilaterally.  Lung sounds are clear bilaterally.  Patient is agreeable to a  rectal exam, fortunately we have an in person  at this time to facilitate this.     Patient has a history of contrast allergy so will avoid CTA at this time. Will plan for occult stool testing and checking hemoglobin to help rule in or out GI bleed. Patient was signed out to oncoming physician pending lab work. Unless patient has a significant drop in her hemoglobin and occult stool is positive I do not think the 6-hour premedication preparation for CTA is indicated at this time and I think plan will likely be to proceed with CT abdomen pelvis noncontrast or no imaging at all.        Supplemental history:  Obtained supplemental history:Supplemental history obtained?: Documented in chart  Reviewed external records: External records reviewed?: No  Patient information was obtained from: patient  Use of Intrepreter: Language line solutions  and in person     Complicating Factors:  Care impacted by chronic illness:Hypertension  Care significantly affected by social determinants of health:N/A    Work Up:  Did you consider but not order tests?: In addition to work-up documented, I considered the following work up: CTA abdomen pelvis  Did you interpret images independently?: Independent interpretation of ECG and images noted in documentation, when applicable.    External Consults:  Consultation discussion with other provider: Dr. Jordan  Admission considered. [facility] pending.        ED COURSE:  1547 I met and introduced myself to the patient. I gathered initial history and performed my physical exam. We discussed plan for initial workup.   1555 I have staffed the patient with Dr. Jordan, ED MD, who has evaluated the patient and agrees with all aspects of today's care.       At the conclusion of the encounter I discussed the results of all the tests and the disposition. The questions were answered. The patient or family  "acknowledged understanding and was agreeable with the care plan.          MEDICATIONS GIVEN IN THE EMERGENCY DEPARTMENT:  Medications   oxyCODONE (ROXICODONE) tablet 5 mg (has no administration in time range)   ondansetron (ZOFRAN) injection 4 mg (has no administration in time range)   pantoprazole (PROTONIX) IV push injection 40 mg (has no administration in time range)         NEW PRESCRIPTIONS STARTED AT TODAY'S ED VISIT:  New Prescriptions    No medications on file         ISABELLE Farmer is a 49 year old female with PMH of HTN, moderate asthma, borderline personality disorder, bilateral deafness requiring , and anxiety presenting to the ER for evaluation of generalized abdominal pain, nausea, vomiting, and diarrhea.  She states she woke up at 2 AM yesterday morning with the symptoms and they have continued over the past day.  Today, she had some bright red blood per rectum after bowel movement.  Patient wears depends and states she has had trace blood present in her depends as well, causing her concern.  She denies any known history of hemorrhoids.  Denies any pain with bowel movements.  She is not on any blood thinners.  No history of GI bleed.  She tried taking her BP meds this morning but states she vomited shortly following so she does not think she kept any of it down.  No fevers.  No cough or sore throat.        Physical Exam     First Vitals:  Patient Vitals for the past 24 hrs:   BP Temp Temp src Pulse Resp SpO2 Height Weight   01/23/25 1537 (!) 178/86 97.8  F (36.6  C) Oral 101 16 98 % 1.651 m (5' 5\") 97.5 kg (215 lb)         PHYSICAL EXAM    General Appearance:  Alert, cooperative, no distress, appears stated age  HENT: Normocephalic without obvious deformity, atraumatic. Mucous membranes moist   Respiratory: No distress. Lungs clear to ausculation bilaterally. No wheezes, rhonchi or stridor  Cardiovascular: Regular rate and rhythm, no murmur. Normal cap refill. No peripheral edema  GI: " Abdomen soft, non-tender. External rectal exam finds a small external hemorrhoid at the 6 o'clock position.  No fissuring.  : No CVA tenderness  Musculoskeletal: Moving all extremities. No gross deformities  Integument: Warm, dry, no rashes or lesions  Neurologic: Alert and orientated x3. No focal deficits.  Psych: Normal mood and affect        Results     LAB:  All pertinent labs reviewed and interpreted  Labs Ordered and Resulted from Time of ED Arrival to Time of ED Departure   ROUTINE UA WITH MICROSCOPIC REFLEX TO CULTURE - Abnormal       Result Value    Color Urine Yellow      Appearance Urine Clear      Glucose Urine Negative      Bilirubin Urine Negative      Ketones Urine Negative      Specific Gravity Urine 1.030      Blood Urine Negative      pH Urine 5.5      Protein Albumin Urine 20 (*)     Urobilinogen Urine <2.0      Nitrite Urine Negative      Leukocyte Esterase Urine Negative      Bacteria Urine Few (*)     Mucus Urine Present (*)     Calcium Oxalate Crystals Urine Few (*)     RBC Urine 0      WBC Urine 2      Squamous Epithelials Urine 3 (*)    GLUCOSE BY METER - Abnormal    GLUCOSE BY METER POCT 181 (*)    GLUCOSE MONITOR NURSING POCT   INFLUENZA A/B, RSV AND SARS-COV2 PCR   BASIC METABOLIC PANEL   LIPASE   HEPATIC FUNCTION PANEL   MAGNESIUM   INR   OCCULT BLOOD STOOL   CBC WITH PLATELETS AND DIFFERENTIAL   TYPE AND SCREEN, ADULT   ABO/RH TYPE AND SCREEN       RADIOLOGY:  No orders to display         ECG:  Performed at: 15:49:22    Impression: Sinus rhythm. Minimal voltage criteria for LVH, may be normal variant. Possible Anterior infarct, age undetermined. No STEMI.     Rate: 98  Rhythm: Sinus  Axis: -5  [place] Interval: 158  QRS Interval: 84  QTc Interval: 431  ST Changes: No ST changes.   Comparison: 06-Aug-2024 20:26, No significant change was found.     EKG results reviewed and interpreted by Dr. Jordan, ED MD.       PROCEDURES:  None.       IMaría, am serving as a scribe  to document services personally performed by Vidya Decker PA-C, based on my observation and the provider's statements to me. I, Vidya Decker PA-C attest that María Verdin is acting in a scribe capacity, has observed my performance of the services and has documented them in accordance with my direction.       Vidya Decker PA-C   Emergency Medicine   [facility] EMERGENCY ROOM       Vidya Decker PA-C  01/23/25 4600

## 2025-01-24 ASSESSMENT — ASTHMA QUESTIONNAIRES
QUESTION_2 LAST FOUR WEEKS HOW OFTEN HAVE YOU HAD SHORTNESS OF BREATH: NOT AT ALL
QUESTION_1 LAST FOUR WEEKS HOW MUCH OF THE TIME DID YOUR ASTHMA KEEP YOU FROM GETTING AS MUCH DONE AT WORK, SCHOOL OR AT HOME: NONE OF THE TIME
QUESTION_4 LAST FOUR WEEKS HOW OFTEN HAVE YOU USED YOUR RESCUE INHALER OR NEBULIZER MEDICATION (SUCH AS ALBUTEROL): NOT AT ALL
ACT_TOTALSCORE: 24
QUESTION_5 LAST FOUR WEEKS HOW WOULD YOU RATE YOUR ASTHMA CONTROL: WELL CONTROLLED
ACT_TOTALSCORE: 24
QUESTION_3 LAST FOUR WEEKS HOW OFTEN DID YOUR ASTHMA SYMPTOMS (WHEEZING, COUGHING, SHORTNESS OF BREATH, CHEST TIGHTNESS OR PAIN) WAKE YOU UP AT NIGHT OR EARLIER THAN USUAL IN THE MORNING: NOT AT ALL

## 2025-01-24 NOTE — DISCHARGE INSTRUCTIONS
Fortunately workup in the emergency department today is reassuring.  Your blood counts did not show any abnormal values such as a low hemoglobin.  Your CT scan showed some inflammation of the intestines which we will commonly see in diarrhea but over the next few days your symptoms will likely begin to improve on their own.  If you develop significantly worsening abdominal pain, intractable vomiting, persistent fevers or other concerning symptoms please return to the emergency department for repeat evaluation

## 2025-01-27 ENCOUNTER — OFFICE VISIT (OUTPATIENT)
Dept: INTERNAL MEDICINE | Facility: CLINIC | Age: 50
End: 2025-01-27
Payer: COMMERCIAL

## 2025-01-27 ENCOUNTER — MYC MEDICAL ADVICE (OUTPATIENT)
Dept: FAMILY MEDICINE | Facility: CLINIC | Age: 50
End: 2025-01-27

## 2025-01-27 VITALS
SYSTOLIC BLOOD PRESSURE: 140 MMHG | OXYGEN SATURATION: 97 % | TEMPERATURE: 97.8 F | BODY MASS INDEX: 35.49 KG/M2 | WEIGHT: 213 LBS | HEIGHT: 65 IN | DIASTOLIC BLOOD PRESSURE: 80 MMHG | HEART RATE: 81 BPM | RESPIRATION RATE: 16 BRPM

## 2025-01-27 DIAGNOSIS — F10.21 ALCOHOL DEPENDENCE IN REMISSION (H): ICD-10-CM

## 2025-01-27 DIAGNOSIS — Z91.89 AT RISK FOR MEDICATION NONADHERENCE: Primary | ICD-10-CM

## 2025-01-27 DIAGNOSIS — R73.03 PREDIABETES: ICD-10-CM

## 2025-01-27 DIAGNOSIS — R10.84 ABDOMINAL PAIN, GENERALIZED: ICD-10-CM

## 2025-01-27 DIAGNOSIS — F33.2 SEVERE EPISODE OF RECURRENT MAJOR DEPRESSIVE DISORDER, WITHOUT PSYCHOTIC FEATURES (H): ICD-10-CM

## 2025-01-27 DIAGNOSIS — E66.01 MORBID OBESITY (H): ICD-10-CM

## 2025-01-27 DIAGNOSIS — K59.00 CONSTIPATION, UNSPECIFIED CONSTIPATION TYPE: ICD-10-CM

## 2025-01-27 LAB
ALBUMIN SERPL BCG-MCNC: 3.9 G/DL (ref 3.5–5.2)
ALP SERPL-CCNC: 109 U/L (ref 40–150)
ALT SERPL W P-5'-P-CCNC: 17 U/L (ref 0–50)
ANION GAP SERPL CALCULATED.3IONS-SCNC: 9 MMOL/L (ref 7–15)
AST SERPL W P-5'-P-CCNC: 21 U/L (ref 0–45)
BASOPHILS # BLD AUTO: 0 10E3/UL (ref 0–0.2)
BASOPHILS NFR BLD AUTO: 0 %
BILIRUB SERPL-MCNC: 0.6 MG/DL
BUN SERPL-MCNC: 12.8 MG/DL (ref 6–20)
CALCIUM SERPL-MCNC: 9.5 MG/DL (ref 8.8–10.4)
CHLORIDE SERPL-SCNC: 108 MMOL/L (ref 98–107)
CREAT SERPL-MCNC: 1.01 MG/DL (ref 0.51–0.95)
EGFRCR SERPLBLD CKD-EPI 2021: 67 ML/MIN/1.73M2
EOSINOPHIL # BLD AUTO: 0.1 10E3/UL (ref 0–0.7)
EOSINOPHIL NFR BLD AUTO: 2 %
ERYTHROCYTE [DISTWIDTH] IN BLOOD BY AUTOMATED COUNT: 13 % (ref 10–15)
EST. AVERAGE GLUCOSE BLD GHB EST-MCNC: 131 MG/DL
GLUCOSE SERPL-MCNC: 127 MG/DL (ref 70–99)
HBA1C MFR BLD: 6.2 % (ref 0–5.6)
HCO3 SERPL-SCNC: 25 MMOL/L (ref 22–29)
HCT VFR BLD AUTO: 39 % (ref 35–47)
HGB BLD-MCNC: 13.3 G/DL (ref 11.7–15.7)
IMM GRANULOCYTES # BLD: 0 10E3/UL
IMM GRANULOCYTES NFR BLD: 0 %
LYMPHOCYTES # BLD AUTO: 2.3 10E3/UL (ref 0.8–5.3)
LYMPHOCYTES NFR BLD AUTO: 36 %
MCH RBC QN AUTO: 28.7 PG (ref 26.5–33)
MCHC RBC AUTO-ENTMCNC: 34.1 G/DL (ref 31.5–36.5)
MCV RBC AUTO: 84 FL (ref 78–100)
MONOCYTES # BLD AUTO: 0.3 10E3/UL (ref 0–1.3)
MONOCYTES NFR BLD AUTO: 4 %
NEUTROPHILS # BLD AUTO: 3.8 10E3/UL (ref 1.6–8.3)
NEUTROPHILS NFR BLD AUTO: 58 %
PLATELET # BLD AUTO: 274 10E3/UL (ref 150–450)
POTASSIUM SERPL-SCNC: 3 MMOL/L (ref 3.4–5.3)
PROT SERPL-MCNC: 6.9 G/DL (ref 6.4–8.3)
RBC # BLD AUTO: 4.63 10E6/UL (ref 3.8–5.2)
SODIUM SERPL-SCNC: 142 MMOL/L (ref 135–145)
WBC # BLD AUTO: 6.5 10E3/UL (ref 4–11)

## 2025-01-27 PROCEDURE — G2211 COMPLEX E/M VISIT ADD ON: HCPCS | Performed by: NURSE PRACTITIONER

## 2025-01-27 PROCEDURE — 85025 COMPLETE CBC W/AUTO DIFF WBC: CPT | Performed by: NURSE PRACTITIONER

## 2025-01-27 PROCEDURE — 80053 COMPREHEN METABOLIC PANEL: CPT | Performed by: NURSE PRACTITIONER

## 2025-01-27 PROCEDURE — 99214 OFFICE O/P EST MOD 30 MIN: CPT | Performed by: NURSE PRACTITIONER

## 2025-01-27 PROCEDURE — 36415 COLL VENOUS BLD VENIPUNCTURE: CPT | Performed by: NURSE PRACTITIONER

## 2025-01-27 PROCEDURE — 83036 HEMOGLOBIN GLYCOSYLATED A1C: CPT | Performed by: NURSE PRACTITIONER

## 2025-01-27 RX ORDER — DOCUSATE SODIUM 100 MG/1
100 CAPSULE, LIQUID FILLED ORAL 2 TIMES DAILY
Qty: 60 CAPSULE | Refills: 0 | Status: SHIPPED | OUTPATIENT
Start: 2025-01-27

## 2025-01-27 NOTE — PROGRESS NOTES
Assessment & Plan     At risk for medication nonadherence  Patient spent most of the visit expressing concerns with inability to get her medications until February.  She states that financially she cannot afford to pay for anything out-of-pocket.  States that she can even afford to  stool softeners from the dollar store.  Discussed that we will place a referral at this time for care coordination to help with medication cost concerns.  - Primary Care - Care Coordination Referral; Future    Constipation, unspecified constipation type  Abdominal pain, generalized  Discussed that her abdominal cramping is likely related to constipation.  Therefore I did recommend a stool softener daily.  I did prescribe this and sent it to her pharmacy to hope for coverage so she does not have to pay out-of-pocket.  Also recommended increasing fluids such as drinking water.  Discussed that increasing fiber in her diet and drinking juices can help alleviate some of her symptoms as well.  Will recheck labs today to make sure they are stable from the ER.  We did check a CBC and a CMP.  CBC shows a normal white count and hemoglobin.  Recommend close follow-up with primary care provider symptoms persist and to discuss if anything significantly worsen she can return to the ER for further treatment if indicated.  - REVIEW OF HEALTH MAINTENANCE PROTOCOL ORDERS  - CBC with Platelets & Differential; Future  - Comprehensive metabolic panel; Future  - docusate sodium (COLACE) 100 MG capsule; Take 1 capsule (100 mg) by mouth 2 times daily.  - CBC with Platelets & Differential  - Comprehensive metabolic panel    Prediabetes  History of prediabetes.  Will recheck A1c today.  Hemoglobin A1c is stable at 6.2.  - Hemoglobin A1c; Future  - Hemoglobin A1c    Alcohol dependence in remission (H)  Sober for 12 years.    Severe episode of recurrent major depressive disorder, without psychotic features (H)  Significant mental health history.  Managed  "by psychiatry.    Morbid obesity (H)  Encouraged lifestyle modifications with diet and exercise.    The longitudinal plan of care for the diagnosis(es)/condition(s) as documented were addressed during this visit. Due to the added complexity in care, I will continue to support Marleny in the subsequent management and with ongoing continuity of care.      MED REC REQUIRED  Post Medication Reconciliation Status:     BMI  Estimated body mass index is 35.45 kg/m  as calculated from the following:    Height as of this encounter: 1.651 m (5' 5\").    Weight as of this encounter: 96.6 kg (213 lb).         Subjective   aMrleny is a 49 year old, presenting for the following health issues:  Follow Up (ER- [facility]- low back pain/cramping, constipation, blood off and on)      1/27/2025    10:47 AM   Additional Questions   Roomed by Lakshmi WALTON   Marleny is here today to follow-up with use of an ALS  from an ER visit on 1/23/2025 when she was seen for abdominal pain, blood in stool and diarrhea.  She states she has not had a bowel movement now in about 4 days.  States that she has been struggling with some constipation on and off.  Continues to have some abdominal bloating and discomfort.  Has been afebrile.  No vomiting or diarrhea.  Has not had any blood in her stool since as she has not had a bowel movement.  States that she was recommended to take a stool softener at the ER but she has not been able to take this due to cost.  She also states she has been out of her medications for several weeks due to the change in her insurance.          ROS  Comprehensive 12-point review of systems was completed and negative except as noted in HPI.        Objective    BP (!) 150/92 (BP Location: Right arm, Patient Position: Sitting)   Pulse 81   Temp 97.8  F (36.6  C)   Resp 16   Ht 1.651 m (5' 5\")   Wt 96.6 kg (213 lb)   LMP  (LMP Unknown)   SpO2 97%   BMI 35.45 kg/m    Body mass index is 35.45 kg/m .  Physical Exam "   General: Alert and oriented.  No acute distress.  Does not appear acutely ill.  Cardiovascular: Regular rate and rhythm.  Respiratory: Respirations are easy and regular.  Lung sounds clear throughout on auscultation.  Abdominal: Abdomen is soft, flat.  Normal bowel sounds.  Generalized tenderness throughout upon palpation.  No organomegaly or masses.  Negative for CVA tenderness.  Neurological: Normal gait.  Alert.  Psychological: Appropriate mood and affect.  Cooperative.              Signed Electronically by: Mariana Valladares NP

## 2025-01-27 NOTE — PATIENT INSTRUCTIONS
Schedule a shingles vaccine with your pharmacy.     You are due for annual exam with Brianna.     Go to ER if anything worsens or you develop a fever, uncontrolled vomiting or large amount of blood in the toilet such as blood clots.

## 2025-01-30 ENCOUNTER — TELEPHONE (OUTPATIENT)
Dept: FAMILY MEDICINE | Facility: CLINIC | Age: 50
End: 2025-01-30
Payer: COMMERCIAL

## 2025-01-30 DIAGNOSIS — J45.40 MODERATE PERSISTENT ASTHMA WITHOUT COMPLICATION: ICD-10-CM

## 2025-01-30 DIAGNOSIS — I10 PRIMARY HYPERTENSION: ICD-10-CM

## 2025-01-30 DIAGNOSIS — J30.9 ALLERGIC SINUSITIS: ICD-10-CM

## 2025-01-30 DIAGNOSIS — I10 ESSENTIAL HYPERTENSION: ICD-10-CM

## 2025-01-30 DIAGNOSIS — K59.00 CONSTIPATION, UNSPECIFIED CONSTIPATION TYPE: ICD-10-CM

## 2025-01-30 DIAGNOSIS — G43.909 MIGRAINE: ICD-10-CM

## 2025-01-30 DIAGNOSIS — Z87.898 HISTORY OF HEADACHE: ICD-10-CM

## 2025-01-30 DIAGNOSIS — G43.809 OTHER MIGRAINE WITHOUT STATUS MIGRAINOSUS, NOT INTRACTABLE: ICD-10-CM

## 2025-01-30 DIAGNOSIS — G47.00 PERSISTENT INSOMNIA: ICD-10-CM

## 2025-01-30 DIAGNOSIS — T78.40XD ALLERGIC REACTION, SUBSEQUENT ENCOUNTER: ICD-10-CM

## 2025-01-30 RX ORDER — MONTELUKAST SODIUM 10 MG/1
1 TABLET ORAL AT BEDTIME
Qty: 90 TABLET | Refills: 0 | OUTPATIENT
Start: 2025-01-30

## 2025-01-30 RX ORDER — GABAPENTIN 100 MG/1
CAPSULE ORAL
Qty: 60 CAPSULE | Refills: 5 | OUTPATIENT
Start: 2025-01-30

## 2025-01-30 RX ORDER — LEVOCETIRIZINE DIHYDROCHLORIDE 5 MG/1
5 TABLET, FILM COATED ORAL EVERY EVENING
Qty: 90 TABLET | Refills: 2 | OUTPATIENT
Start: 2025-01-30

## 2025-01-30 RX ORDER — DOCUSATE SODIUM 100 MG/1
100 CAPSULE, LIQUID FILLED ORAL 2 TIMES DAILY
Qty: 60 CAPSULE | Refills: 0 | OUTPATIENT
Start: 2025-01-30

## 2025-01-30 RX ORDER — ESTRADIOL 0.05 MG/D
PATCH, EXTENDED RELEASE TRANSDERMAL
OUTPATIENT
Start: 2025-01-30

## 2025-01-30 RX ORDER — TOPIRAMATE 25 MG/1
25 TABLET, FILM COATED ORAL 2 TIMES DAILY
Qty: 180 TABLET | Refills: 3 | OUTPATIENT
Start: 2025-01-30

## 2025-01-30 RX ORDER — LOSARTAN POTASSIUM 50 MG/1
25 TABLET ORAL DAILY
Qty: 90 TABLET | Refills: 0 | OUTPATIENT
Start: 2025-01-30

## 2025-01-30 RX ORDER — RIZATRIPTAN BENZOATE 10 MG/1
10 TABLET ORAL
Qty: 18 TABLET | Refills: 2 | OUTPATIENT
Start: 2025-01-30

## 2025-01-30 RX ORDER — ALBUTEROL SULFATE 90 UG/1
INHALANT RESPIRATORY (INHALATION)
Qty: 6.7 G | Refills: 2 | OUTPATIENT
Start: 2025-01-30

## 2025-01-30 RX ORDER — HYDROXYZINE HYDROCHLORIDE 25 MG/1
TABLET, FILM COATED ORAL
Qty: 180 TABLET | Refills: 2 | OUTPATIENT
Start: 2025-01-30

## 2025-01-30 RX ORDER — BUDESONIDE AND FORMOTEROL FUMARATE DIHYDRATE 160; 4.5 UG/1; UG/1
2 AEROSOL RESPIRATORY (INHALATION) 2 TIMES DAILY
Qty: 10.2 G | Refills: 2 | OUTPATIENT
Start: 2025-01-30

## 2025-01-30 RX ORDER — CITALOPRAM HYDROBROMIDE 20 MG/1
TABLET ORAL
Status: CANCELLED | OUTPATIENT
Start: 2025-01-30

## 2025-01-30 RX ORDER — QUETIAPINE FUMARATE 50 MG/1
50 TABLET, FILM COATED ORAL AT BEDTIME
Status: CANCELLED | OUTPATIENT
Start: 2025-01-30

## 2025-01-30 RX ORDER — PRAZOSIN HYDROCHLORIDE 1 MG/1
1 CAPSULE ORAL AT BEDTIME
OUTPATIENT
Start: 2025-01-30

## 2025-01-30 RX ORDER — EPINEPHRINE 0.3 MG/.3ML
0.3 INJECTION SUBCUTANEOUS PRN
Qty: 2 EACH | Refills: 1 | OUTPATIENT
Start: 2025-01-30

## 2025-01-30 RX ORDER — METOPROLOL SUCCINATE 25 MG/1
25 TABLET, EXTENDED RELEASE ORAL DAILY
Qty: 30 TABLET | Refills: 9 | OUTPATIENT
Start: 2025-01-30

## 2025-01-30 NOTE — TELEPHONE ENCOUNTER
Incoming call from patient with .     Patient insurance is changing and plan starts Feb 1st.   Her insurance will be [facility].     Wondering, where is a good pharmacy to send her medications to?     I advise her to reach out to the new insurance and see which pharmacy they works with. Once a pharmacy is found, she can let us know and we can work on sending in the scripts for her. Agreeing to this plan. No further questions at this time.     Pay P. RN

## 2025-01-30 NOTE — TELEPHONE ENCOUNTER
General Call    Contacts       Contact Date/Time Type Contact Phone/Fax    01/30/2025 11:32 AM CST Phone (Incoming) Marleny Viera (Self) [phone] (H)          Reason for Call:  new pharmacy medication request    What are your questions or concerns:  patient is requesting to have all of the pended medications sent to [facility] at this time. She states that she has been out of medications for three weeks, and her other pharmacy will not help her at this time. Please advise and send as appropriate.    All medications except the Prometrium,. Multivitamin, vitamin injection, and neb solution are requested to be sent.    Could we send this information to you in [facility] or would you prefer to receive a phone call?:   Patient would like to be contacted via [facility]

## 2025-02-03 ENCOUNTER — TELEPHONE (OUTPATIENT)
Dept: FAMILY MEDICINE | Facility: CLINIC | Age: 50
End: 2025-02-03
Payer: COMMERCIAL

## 2025-02-03 NOTE — TELEPHONE ENCOUNTER
Please send all of patients prescriptions from 01/31/25 to [facility] so she doesn't have to pay a co-pay.

## 2025-02-15 DIAGNOSIS — K21.00 GASTROESOPHAGEAL REFLUX DISEASE WITH ESOPHAGITIS WITHOUT HEMORRHAGE: ICD-10-CM

## 2025-02-16 DIAGNOSIS — J45.40 MODERATE PERSISTENT ASTHMA WITHOUT COMPLICATION: ICD-10-CM

## 2025-02-17 RX ORDER — OMEPRAZOLE 20 MG/1
CAPSULE, DELAYED RELEASE ORAL
Qty: 30 CAPSULE | Refills: 11 | OUTPATIENT
Start: 2025-02-17

## 2025-02-17 RX ORDER — ALBUTEROL SULFATE 90 UG/1
INHALANT RESPIRATORY (INHALATION)
Qty: 25.5 G | Refills: 6 | OUTPATIENT
Start: 2025-02-17

## 2025-02-22 DIAGNOSIS — I10 PRIMARY HYPERTENSION: ICD-10-CM

## 2025-02-22 DIAGNOSIS — F41.1 ANXIETY, GENERALIZED: ICD-10-CM

## 2025-02-22 DIAGNOSIS — J45.40 MODERATE PERSISTENT ASTHMA WITHOUT COMPLICATION: ICD-10-CM

## 2025-02-24 RX ORDER — PRAZOSIN HYDROCHLORIDE 1 MG/1
1 CAPSULE ORAL AT BEDTIME
Qty: 90 CAPSULE | Refills: 3 | OUTPATIENT
Start: 2025-02-24

## 2025-02-24 RX ORDER — MONTELUKAST SODIUM 10 MG/1
1 TABLET ORAL AT BEDTIME
Qty: 90 TABLET | Refills: 3 | OUTPATIENT
Start: 2025-02-24

## 2025-02-24 RX ORDER — LOSARTAN POTASSIUM 50 MG/1
25 TABLET ORAL DAILY
Qty: 50 TABLET | Refills: 2 | OUTPATIENT
Start: 2025-02-24

## 2025-03-22 DIAGNOSIS — J45.40 MODERATE PERSISTENT ASTHMA WITHOUT COMPLICATION: ICD-10-CM

## 2025-03-24 RX ORDER — BUDESONIDE AND FORMOTEROL FUMARATE DIHYDRATE 160; 4.5 UG/1; UG/1
AEROSOL RESPIRATORY (INHALATION)
Qty: 30.6 G | Refills: 3 | OUTPATIENT
Start: 2025-03-24

## 2025-03-27 ENCOUNTER — NURSE TRIAGE (OUTPATIENT)
Dept: NURSING | Facility: CLINIC | Age: 50
End: 2025-03-27
Payer: COMMERCIAL

## 2025-03-28 NOTE — TELEPHONE ENCOUNTER
Patient calling with  assisting. Patient is 49 and has not had a period in two years. Today patient flew back home from a vacation. Patient started having vaginal bleeding 5 days ago while on vacation. It was heavy out of nowhere. Patient has been using an adult diaper and is needing to change it three times a day. It is a darker red like a period but no clots. No abdominal pain. Denies injury, weakness, dizziness, pale skin.   Protocol recommends see PCP within 3 days  Patient has appointment with PCP on 3/30.   Patient care advice given. Patient will call back with any worsening symptoms.   Navya Gill RN   03/27/25 7:55 PM  [facility] Nurse Advisor    Reason for Disposition   Bleeding with > 6 soaked pads or tampons per day    Additional Information   Negative: Shock suspected (e.g., cold/pale/clammy skin, too weak to stand, low BP, rapid pulse)   Negative: Difficult to awaken or acting confused (e.g., disoriented, slurred speech)   Negative: Passed out (i.e., lost consciousness, collapsed and was not responding)   Negative: Sounds like a life-threatening emergency to the triager   Negative: Followed a genital area injury (e.g., vagina, vulva)   Negative: SEVERE abdominal pain   Negative: SEVERE dizziness (e.g., unable to stand, requires support to walk, feels like passing out now)   Negative: Sexual assault or rape (sexual intercourse or activity occurs without freely given consent), known or suspected   Negative: SEVERE vaginal bleeding (e.g., soaking 2 pads or tampons per hour and present 2 or more hours; 1 menstrual cup every 2 hours)   Negative: Patient sounds very sick or weak to the triager   Negative: MODERATE vaginal bleeding (e.g., soaking 1 pad or tampon per hour and present > 6 hours; 1 menstrual cup every 6 hours)   Negative: Pale skin (pallor) of new-onset or worsening   Negative: [1] Constant abdominal pain AND [2] present > 2 hours   Negative: Taking  Coumadin (warfarin) or other strong blood thinner, or known bleeding disorder (e.g., thrombocytopenia)    Protocols used: Vaginal Bleeding - [id number]

## 2025-04-01 DIAGNOSIS — Z87.898 HISTORY OF HEADACHE: ICD-10-CM

## 2025-04-01 DIAGNOSIS — I10 PRIMARY HYPERTENSION: ICD-10-CM

## 2025-04-01 RX ORDER — LOSARTAN POTASSIUM 50 MG/1
25 TABLET ORAL DAILY
Qty: 50 TABLET | Refills: 2 | OUTPATIENT
Start: 2025-04-01

## 2025-04-02 RX ORDER — GABAPENTIN 100 MG/1
CAPSULE ORAL
Qty: 200 CAPSULE | Refills: 4 | Status: SHIPPED | OUTPATIENT
Start: 2025-04-02

## 2025-04-05 ASSESSMENT — ANXIETY QUESTIONNAIRES
1. FEELING NERVOUS, ANXIOUS, OR ON EDGE: SEVERAL DAYS
6. BECOMING EASILY ANNOYED OR IRRITABLE: NOT AT ALL
GAD7 TOTAL SCORE: 2
4. TROUBLE RELAXING: SEVERAL DAYS
7. FEELING AFRAID AS IF SOMETHING AWFUL MIGHT HAPPEN: NOT AT ALL
GAD7 TOTAL SCORE: 2
7. FEELING AFRAID AS IF SOMETHING AWFUL MIGHT HAPPEN: NOT AT ALL
8. IF YOU CHECKED OFF ANY PROBLEMS, HOW DIFFICULT HAVE THESE MADE IT FOR YOU TO DO YOUR WORK, TAKE CARE OF THINGS AT HOME, OR GET ALONG WITH OTHER PEOPLE?: NOT DIFFICULT AT ALL
GAD7 TOTAL SCORE: 2
2. NOT BEING ABLE TO STOP OR CONTROL WORRYING: NOT AT ALL
3. WORRYING TOO MUCH ABOUT DIFFERENT THINGS: NOT AT ALL
IF YOU CHECKED OFF ANY PROBLEMS ON THIS QUESTIONNAIRE, HOW DIFFICULT HAVE THESE PROBLEMS MADE IT FOR YOU TO DO YOUR WORK, TAKE CARE OF THINGS AT HOME, OR GET ALONG WITH OTHER PEOPLE: NOT DIFFICULT AT ALL
5. BEING SO RESTLESS THAT IT IS HARD TO SIT STILL: NOT AT ALL

## 2025-04-07 DIAGNOSIS — J45.40 MODERATE PERSISTENT ASTHMA WITHOUT COMPLICATION: ICD-10-CM

## 2025-04-07 DIAGNOSIS — K59.00 CONSTIPATION, UNSPECIFIED CONSTIPATION TYPE: ICD-10-CM

## 2025-04-08 ENCOUNTER — HOSPITAL ENCOUNTER (EMERGENCY)
Facility: CLINIC | Age: 50
Discharge: HOME | End: 2025-04-08
Admitting: STUDENT IN AN ORGANIZED HEALTH CARE EDUCATION/TRAINING PROGRAM
Payer: COMMERCIAL

## 2025-04-08 VITALS
SYSTOLIC BLOOD PRESSURE: 182 MMHG | OXYGEN SATURATION: 99 % | HEIGHT: 65 IN | WEIGHT: 210 LBS | HEART RATE: 86 BPM | BODY MASS INDEX: 34.99 KG/M2 | RESPIRATION RATE: 18 BRPM | TEMPERATURE: 97.8 F | DIASTOLIC BLOOD PRESSURE: 86 MMHG
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
## 2025-04-08 PROCEDURE — 99282 EMERGENCY DEPT VISIT SF MDM: CPT

## 2025-04-08 RX ORDER — MONTELUKAST SODIUM 10 MG/1
1 TABLET ORAL AT BEDTIME
Qty: 90 TABLET | Refills: 0 | Status: SHIPPED | OUTPATIENT
Start: 2025-04-08

## 2025-04-08 RX ORDER — BUDESONIDE AND FORMOTEROL FUMARATE DIHYDRATE 160; 4.5 UG/1; UG/1
AEROSOL RESPIRATORY (INHALATION)
Qty: 30.6 G | Refills: 3 | OUTPATIENT
Start: 2025-04-08

## 2025-04-08 RX ORDER — DOCUSATE SODIUM 100 MG/1
100 CAPSULE, LIQUID FILLED ORAL 2 TIMES DAILY
Qty: 60 CAPSULE | Refills: 0 | Status: SHIPPED | OUTPATIENT
Start: 2025-04-08

## 2025-04-08 ASSESSMENT — COLUMBIA-SUICIDE SEVERITY RATING SCALE - C-SSRS
1. IN THE PAST MONTH, HAVE YOU WISHED YOU WERE DEAD OR WISHED YOU COULD GO TO SLEEP AND NOT WAKE UP?: NO
2. HAVE YOU ACTUALLY HAD ANY THOUGHTS OF KILLING YOURSELF IN THE PAST MONTH?: NO
6. HAVE YOU EVER DONE ANYTHING, STARTED TO DO ANYTHING, OR PREPARED TO DO ANYTHING TO END YOUR LIFE?: NO

## 2025-04-08 ASSESSMENT — ACTIVITIES OF DAILY LIVING (ADL): ADLS_ACUITY_SCORE: 44

## 2025-04-09 ENCOUNTER — PATIENT OUTREACH (OUTPATIENT)
Dept: CARE COORDINATION | Facility: CLINIC | Age: 50
End: 2025-04-09
Payer: COMMERCIAL
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
## 2025-04-09 NOTE — ED NOTES
AVS reviewed with patient with ALS  at beside. Patient verbalizes understanding. All questions answered.

## 2025-04-09 NOTE — LETTER
Marleny Viera  [address]  [place] [address]    Dear Marleny Viera,      I am a team member within the Connected Care Resource Center with [facility]. I recently tried to reach you to ensure you were doing well following a recent visit within our health system. I also wanted to take this chance to introduce Clinic Care Coordination.     Below is a description of Clinic Care Coordination and how this team can further assist you:       The Clinic Care Coordination team is made up of a Registered Nurse, , Financial Resource Worker, and a Community Health Worker who understand and can help navigate the health care system. The goal of clinic care coordination is to help you manage your health, improve access to care, and achieve optimal health outcomes. They work alongside your provider to assist you in determining your health and social needs, obtain health care and community resources, and provide you with necessary information and education. Clinic Care Coordination can work with you through any barriers and develop a care plan that helps coordinate and strengthen the relationship between you and your care team.    If you wish to connect with the Clinic Care Coordination Team, please let your [facility] Primary Care Provider or Clinic Care Team know and they can place a referral. The Clinic Care Coordination team will then reach out by phone to further support you.    We are focused on providing you with the highest-quality healthcare experience possible.    Sincerely,   Margaret RAMESH  Community Health Worker    Connected Care Resources   [facility]'s [address] ([phone]).
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
## 2025-04-09 NOTE — ED NOTES
Writer introduced self. ALS  at bedside. Whiteboard updated. Plan of care discussed. All questions answered. GCS 15. Patient reports right ear pain, right neck pain, and headache 8/10. Per patient, she took Tylenol PTA, offered pain meds and patient refused. VSS on RA. Bed in low position with side-rails up, call light within reach and warm blanket provided.

## 2025-04-09 NOTE — PROGRESS NOTES
Clinic Care Coordination Contact  Community Health Worker Initial Outreach    CHW Initial Information Gathering:  Referral Source: ED Follow-Up  Current living arrangement:: Not Assessed  No PCP office visit in Past Year: No  CHW Additional Questions  If ED/Hospital discharge, follow-up appointment scheduled as recommended?: Yes  Medication changes made following ED/Hospital discharge?: No  MyChart active?: Yes  Patient sent Social Drivers of Health questionnaire?: No    Patient accepts CC: No, Patient expressed no additional support is needed at this time. Patient will be sent Care Coordination introduction letter for future reference.     Margaret Daniel  Community Health Worker    Connected Care Resources   [facility]     *Connected Care Resources Team does NOT   follow patient ongoing. Referrals are identified   based on internal discharge report and the outreach is to ensure   Patient has understanding of their discharge instructions.

## 2025-04-10 ENCOUNTER — OFFICE VISIT (OUTPATIENT)
Dept: OBGYN | Facility: CLINIC | Age: 50
End: 2025-04-10
Payer: COMMERCIAL

## 2025-04-10 VITALS — SYSTOLIC BLOOD PRESSURE: 132 MMHG | DIASTOLIC BLOOD PRESSURE: 80 MMHG | BODY MASS INDEX: 35.45 KG/M2 | WEIGHT: 213 LBS

## 2025-04-10 DIAGNOSIS — N92.6 IRREGULAR BLEEDING: Primary | ICD-10-CM

## 2025-04-10 DIAGNOSIS — Z79.890 HORMONE REPLACEMENT THERAPY: ICD-10-CM

## 2025-04-10 LAB
EST. AVERAGE GLUCOSE BLD GHB EST-MCNC: 117 MG/DL
ESTRADIOL SERPL-MCNC: 38 PG/ML
FSH SERPL IRP2-ACNC: 25.7 MIU/ML
HBA1C MFR BLD: 5.7 % (ref 0–5.6)

## 2025-04-10 NOTE — PROGRESS NOTES
Gyn Visit    CC: abnormal bleeding.  She is here with a professional  in person.    History of Present Illness    Marleny Viera, a 49-year-old female, reported that she began experiencing menopause symptoms approximately two years ago, at the age of 47 and a half. She had an extremely heavy period two weeks ago, which was unprecedented in her experience. This episode of bleeding lasted for 10 days and was accompanied by dizziness. Prior to this, she had not had a period for two years, with her last period stopping abruptly. During the recent bleeding episode, she also experienced significant hot flashes and night sweats, which were severe enough to soak her bedding and necessitate the use of a fan. These symptoms subsided once the bleeding stopped.    Marleny has been using a hormone replacement therapy regimen, which includes an estrogen patch and a daily progesterone pill (Prometrium). She changes the patch twice a week and has been diligent in her medication adherence, with no missed doses except for a delay during a hospital stay. She noted that the patch adheres well and does not peel off. She has been using the same brand of patch, which is delivered by mail.    Two years ago, she started on hormone therapy after experiencing skipped periods for about four months, along with hot flashes and night sweats. Blood work at that time indicated she was perimenopausal. She does not recall the specific reason for starting hormone therapy but remembers it was recommended by a female doctor. She has not experienced any spotting or bleeding in the year and a half between her last period and the recent heavy bleeding. She also mentioned that the recent heavy period had an odor, which was unusual for her.    In January 2025, a CT scan was performed in the ER, which showed bilateral fundal subserosal fibroids, with the right one being larger than the left. The adnexa appeared normal bilaterally. Marleny does not  report any ovarian pain or other symptoms related to the fibroids. She also wondered when she should get her next Pap smear as she remembers something abnormal from her last one a year ago at [facility].  After the patient left, more records from [facility] were reviewed.  They show an LMP of 4/24/24.  There is also mention of a didelphys uterus with 2 cervices from prior radiologic testing.         Past Medical History:   Diagnosis Date    Accidental overdose, initial encounter 07/07/2022    ADHD (attention deficit hyperactivity disorder)     Alcohol dependence (H)     Anxiety     Arthritis     Asthma     B12 deficiency     Bipolar depression (H)     Blood clotting disorder     Deafness     Depressive disorder 10 year    Drug abuse, nondependent (H) 10/29/2004    Overview:  polydrug abuse per psych notes ; Other, mixed, or unspecified nondependent drug abuse, unspecified    History of blood clots     History of transfusion     Hypertension     Kidney stones 2015    Major depressive disorder, recurrent episode, severe (H) 05/17/2016    Overview:  s/p suicide attempt Epic     Migraine     Obstructive sleep apnea 06/01/2016    Pt states she does not have ROBERT, does not use CPAP    Pulmonary emboli (H)     Status post total right knee replacement 01/18/2016       Past Surgical History:   Procedure Laterality Date    BIOPSY BREAST Right     2019... Also had fluid drained from left breast under surgery also in 2019    BREAST SURGERY      MAMMOPLASTY REDUCTION Bilateral 01/01/2017    ORTHOPEDIC SURGERY  2012,2013,2014    TOTAL HIP ARTHROPLASTY Bilateral     TOTAL KNEE ARTHROPLASTY Right     TUBAL LIGATION      US BREAST CORE BIOPSY RIGHT Right 11/21/2019       Family History   Problem Relation Age of Onset    Cancer Mother     Breast Cancer Mother     Diabetes Mother     Hypertension Mother     Cerebrovascular Disease Father     Hyperlipidemia No family hx of     Depression No family hx of     Anxiety Disorder No  "family hx of     Mental Illness No family hx of     Substance Abuse No family hx of     Asthma No family hx of        Social History     Socioeconomic History    Marital status:    Tobacco Use    Smoking status: Never     Passive exposure: Never    Smokeless tobacco: Never   Vaping Use    Vaping status: Never Used   Substance and Sexual Activity    Alcohol use: Yes     Comment: Alcoholic Drinks/day: dependence    Drug use: Never    Sexual activity: Not Currently     Partners: Male     Birth control/protection: None   Social History Narrative    . No children. .  Lives alone.  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  Nonsmoker.  No alcohol use.  Tesha Pelaez MD       Social Drivers of Health     Financial Resource Strain: Low Risk  (3/27/2025)    Financial Resource Strain     Within the past 12 months, have you or your family members you live with been unable to get utilities (heat, electricity) when it was really needed?: No   Food Insecurity: Low Risk  (3/27/2025)    Food Insecurity     Within the past 12 months, did you worry that your food would run out before you got money to buy more?: No     Within the past 12 months, did the food you bought just not last and you didn t have money to get more?: No   Transportation Needs: High Risk (3/27/2025)    Transportation Needs     Within the past 12 months, has lack of transportation kept you from medical appointments, getting your medicines, non-medical meetings or appointments, work, or from getting things that you need?: Yes   Physical Activity: Sufficiently Active (3/27/2025)    Exercise Vital Sign     Days of Exercise per Week: 7 days     Minutes of Exercise per Session: 150+ min   Stress: No Stress Concern Present (3/27/2025)    [employer] - Occupational Stress Questionnaire     Feeling of Stress : Only a little   Social Connections: Unknown (3/27/2025)    Social Connection and Isolation Panel " [NHANES]     Frequency of Social Gatherings with Friends and Family: Never   Interpersonal Safety: Low Risk  (11/13/2024)    Interpersonal Safety     Do you feel physically and emotionally safe where you currently live?: Yes     Within the past 12 months, have you been hit, slapped, kicked or otherwise physically hurt by someone?: No     Within the past 12 months, have you been humiliated or emotionally abused in other ways by your partner or ex-partner?: No   Housing Stability: High Risk (3/27/2025)    Housing Stability     Do you have housing? : No     Are you worried about losing your housing?: No       Outpatient Medications Prior to Visit   Medication Sig Dispense Refill    albuterol (PROAIR HFA/PROVENTIL HFA/VENTOLIN HFA) 108 (90 Base) MCG/ACT inhaler INHALE TWO PUFFS EVERY 4 HOURS AS NEEDED FOR WHEEZING OR SHORTNESS OF BREATH 6.7 g 2    albuterol (PROVENTIL) (2.5 MG/3ML) 0.083% neb solution USE 1 VIAL NEBULIZER FOUR TIMES PER WEEK 180 mL 2    budesonide-formoterol (SYMBICORT/BREYNA) 160-4.5 MCG/ACT Inhaler Inhale 2 puffs into the lungs 2 times daily. 10.2 g 2    citalopram (CELEXA) 20 MG tablet       cyanocobalamin (CYANOCOBALAMIN) 1000 mcg/mL injection INJECT 1ML (1000MCG) SUBCUTANEOUSLY EVERY 14 DAYS 6 mL 1    docusate sodium (COLACE) 100 MG capsule Take 1 capsule (100 mg) by mouth 2 times daily. 60 capsule 0    EPINEPHrine (ANY BX GENERIC EQUIV) 0.3 MG/0.3ML injection 2-pack Inject 0.3 mLs (0.3 mg) into the muscle as needed for anaphylaxis. 2 each 1    estradiol (VIVELLE-DOT) 0.05 MG/24HR bi-weekly patch 1 application to skin Transdermal Two times a Week for 90 days      gabapentin (NEURONTIN) 100 MG capsule TAKE 1 CAPSULE BY MOUTH TWICE  DAILY FOR MIGRAINES AND ANXIETY 200 capsule 4    hydrOXYzine HCl (ATARAX) 25 MG tablet TAKE 3 TABLETS BY MOUTH THREE TIMES A DAY AS NEEDED FOR ANXIETY OR INSOMNIA . 180 tablet 2    levocetirizine (XYZAL) 5 MG tablet Take 1 tablet (5 mg) by mouth every evening. 90 tablet 2     losartan (COZAAR) 50 MG tablet Take 0.5 tablets (25 mg) by mouth daily. 90 tablet 0    metoprolol succinate ER (TOPROL XL) 25 MG 24 hr tablet Take 1 tablet (25 mg) by mouth daily. 30 tablet 9    montelukast (SINGULAIR) 10 MG tablet TAKE 1 TABLET BY MOUTH AT  BEDTIME 90 tablet 0    multivitamin, therapeutic (THERA-VIT) TABS tablet Take 1 tablet by mouth daily      NONFORMULARY Take 1 tablet by mouth daily Nerve vitamin      omeprazole (PRILOSEC) 20 MG DR capsule Take 1 capsule (20 mg) by mouth daily. Take once daily in the morning 30 minutes prior to food and drink. 30 capsule 0    prazosin (MINIPRESS) 1 MG capsule Take 1 capsule (1 mg) by mouth at bedtime. 90 capsule 0    PROMETRIUM 200 MG capsule Take 200 mg by mouth daily      QUEtiapine (SEROQUEL) 50 MG tablet Take 50 mg by mouth at bedtime      rizatriptan (MAXALT) 10 MG tablet Take 1 tablet (10 mg) by mouth at onset of headache for migraine. 18 tablet 2    topiramate (TOPAMAX) 25 MG tablet Take 1 tablet (25 mg) by mouth 2 times daily. 180 tablet 3     No facility-administered medications prior to visit.       Patient is allergic to acetylcysteine, amoxicillin, bee pollen, chocolate, contrast [iodixanol], covid-19 (mrna) vaccine, hymenoptera allergenic extract [wasp venom protein], iodine, meat extract, wasp venom protein starter kit [wasp venom protein], adhesive tape, aspirin, bee venom, food allergy formula, geodon [ziprasidone], latex, morphine, prochlorperazine, risperdal [risperidone], risperidone analogues [risperidone], shellfish allergy, theobroma oil [theobroma grandiflorum seed butter], trazodone, zoloft [sertraline], and hydrochlorothiazide.    ROS:  12 part ROS is negative aside from those symptoms in the HPI    O: /80 (BP Location: Right arm, Patient Position: Sitting, Cuff Size: Adult Large)   Wt 96.6 kg (213 lb)   LMP  (LMP Unknown)  Body mass index is 35.45 kg/m .  Physical Exam              Results: Results    - CT scan on January  23, 2025, showed bilateral fundal subserosal fibroids with the right measuring larger than the left; adnexa appeared normal bilaterally.  - July 19, 2023 - FSH 20.9, LH 8.7, estradiol 15, progesterone 0.1  - Pap 6/10/24 NILM with +HRHPV, unclear from note if there were 2 cervices and which the Pap may have been collected from         A/P: 49 year old WWF    Assessment & Plan     Irregular bleeding:  We discussed that with the lab values and length of time without a period, she wasn't fully menopausal when she started on continuous HRT.  Repeat blood work for FSH and estradiol will be done today.  Ultrasound was ordered.  Continue current hormone therapy.  Follow up after results available to determine if any changes to medication are necessary.  Schedule Pap/pelvic in June for follow up of +HPV from 2024.  Need to confirm uterine anatomy prior to Pap being collected to see if 2 are needed.         Consent was obtained from the patient to use an AI documentation tool in the creation of this note.    50 minutes spent on the date of the encounter doing chart review, review of outside records, review of test results, interpretation of tests, patient visit, and documentation     Answers submitted by the patient for this visit:  Patient Health Questionnaire (G7) (Submitted on 4/5/2025)  YESICA 7 TOTAL SCORE: 2

## 2025-04-11 DIAGNOSIS — Z78.0 POST-MENOPAUSE: Primary | ICD-10-CM

## 2025-04-11 NOTE — TELEPHONE ENCOUNTER
Medication Question or Refill        What medication are you calling about (include dose and sig)?: estradiol (VIVELLE-DOT) 0.05 MG/24HR bi-weekly patch    Preferred Pharmacy:  [facility] [address]  [address] [address]  Phone: [phone] Fax: [phone]    Who prescribed the medication?: Monisha Le MD to refill at this time following appt on 4/10/25    Do you need a refill? Yes      Do you have any questions or concerns?  Yes: patient is out of medication      Could we send this information to you [facility] or would you prefer to receive a phone call?:   Patient would prefer a phone call   Okay to leave a detailed message?: Yes at Cell number on file:    Telephone Information:   Mobile [phone]

## 2025-04-12 ENCOUNTER — HOSPITAL ENCOUNTER (EMERGENCY)
Facility: CLINIC | Age: 50
Discharge: HOME | End: 2025-04-13
Attending: EMERGENCY MEDICINE | Admitting: EMERGENCY MEDICINE
Payer: COMMERCIAL

## 2025-04-12 ENCOUNTER — NURSE TRIAGE (OUTPATIENT)
Dept: NURSING | Facility: CLINIC | Age: 50
End: 2025-04-12
Payer: COMMERCIAL

## 2025-04-12 DIAGNOSIS — N85.8 UTERINE MASS: ICD-10-CM

## 2025-04-12 DIAGNOSIS — N93.9 VAGINAL BLEEDING: ICD-10-CM

## 2025-04-12 DIAGNOSIS — D25.9 UTERINE LEIOMYOMA, UNSPECIFIED LOCATION: ICD-10-CM

## 2025-04-12 LAB
ALBUMIN SERPL BCG-MCNC: 4.1 G/DL (ref 3.5–5.2)
ALBUMIN UR-MCNC: 10 MG/DL
ALP SERPL-CCNC: 102 U/L (ref 40–150)
ALT SERPL W P-5'-P-CCNC: 15 U/L (ref 0–50)
ANION GAP SERPL CALCULATED.3IONS-SCNC: 12 MMOL/L (ref 7–15)
APPEARANCE UR: ABNORMAL
AST SERPL W P-5'-P-CCNC: 20 U/L (ref 0–45)
BASOPHILS # BLD AUTO: 0 10E3/UL (ref 0–0.2)
BASOPHILS NFR BLD AUTO: 0 %
BILIRUB SERPL-MCNC: 0.4 MG/DL
BILIRUB UR QL STRIP: NEGATIVE
BUN SERPL-MCNC: 12.1 MG/DL (ref 6–20)
CALCIUM SERPL-MCNC: 9.3 MG/DL (ref 8.8–10.4)
CHLORIDE SERPL-SCNC: 107 MMOL/L (ref 98–107)
COLOR UR AUTO: YELLOW
CREAT SERPL-MCNC: 0.9 MG/DL (ref 0.51–0.95)
CRP SERPL-MCNC: 5.63 MG/L
EGFRCR SERPLBLD CKD-EPI 2021: 77 ML/MIN/1.73M2
EOSINOPHIL # BLD AUTO: 0.1 10E3/UL (ref 0–0.7)
EOSINOPHIL NFR BLD AUTO: 1 %
ERYTHROCYTE [DISTWIDTH] IN BLOOD BY AUTOMATED COUNT: 13.5 % (ref 10–15)
GLUCOSE SERPL-MCNC: 106 MG/DL (ref 70–99)
GLUCOSE UR STRIP-MCNC: NEGATIVE MG/DL
HCO3 SERPL-SCNC: 23 MMOL/L (ref 22–29)
HCT VFR BLD AUTO: 38.6 % (ref 35–47)
HGB BLD-MCNC: 13.5 G/DL (ref 11.7–15.7)
HGB UR QL STRIP: ABNORMAL
IMM GRANULOCYTES # BLD: 0 10E3/UL
IMM GRANULOCYTES NFR BLD: 0 %
INR PPP: 0.91 (ref 0.85–1.15)
KETONES UR STRIP-MCNC: NEGATIVE MG/DL
LEUKOCYTE ESTERASE UR QL STRIP: NEGATIVE
LIPASE SERPL-CCNC: 30 U/L (ref 13–60)
LYMPHOCYTES # BLD AUTO: 3.3 10E3/UL (ref 0.8–5.3)
LYMPHOCYTES NFR BLD AUTO: 38 %
MCH RBC QN AUTO: 29 PG (ref 26.5–33)
MCHC RBC AUTO-ENTMCNC: 35 G/DL (ref 31.5–36.5)
MCV RBC AUTO: 83 FL (ref 78–100)
MONOCYTES # BLD AUTO: 0.5 10E3/UL (ref 0–1.3)
MONOCYTES NFR BLD AUTO: 5 %
MUCOUS THREADS #/AREA URNS LPF: PRESENT /LPF
NEUTROPHILS # BLD AUTO: 4.8 10E3/UL (ref 1.6–8.3)
NEUTROPHILS NFR BLD AUTO: 55 %
NITRATE UR QL: NEGATIVE
NRBC # BLD AUTO: 0 10E3/UL
NRBC BLD AUTO-RTO: 0 /100
PH UR STRIP: 6 [PH] (ref 5–7)
PLATELET # BLD AUTO: 268 10E3/UL (ref 150–450)
POTASSIUM SERPL-SCNC: 3.5 MMOL/L (ref 3.4–5.3)
PROT SERPL-MCNC: 7.1 G/DL (ref 6.4–8.3)
RBC # BLD AUTO: 4.66 10E6/UL (ref 3.8–5.2)
RBC URINE: 2 /HPF
SODIUM SERPL-SCNC: 142 MMOL/L (ref 135–145)
SP GR UR STRIP: 1.02 (ref 1–1.03)
SQUAMOUS EPITHELIAL: 1 /HPF
UROBILINOGEN UR STRIP-MCNC: NORMAL MG/DL
WBC # BLD AUTO: 8.7 10E3/UL (ref 4–11)
WBC URINE: 1 /HPF

## 2025-04-12 PROCEDURE — 83690 ASSAY OF LIPASE: CPT | Performed by: EMERGENCY MEDICINE

## 2025-04-12 PROCEDURE — 86140 C-REACTIVE PROTEIN: CPT | Performed by: EMERGENCY MEDICINE

## 2025-04-12 PROCEDURE — 82310 ASSAY OF CALCIUM: CPT | Performed by: EMERGENCY MEDICINE

## 2025-04-12 PROCEDURE — 85610 PROTHROMBIN TIME: CPT | Performed by: EMERGENCY MEDICINE

## 2025-04-12 PROCEDURE — 81001 URINALYSIS AUTO W/SCOPE: CPT | Performed by: EMERGENCY MEDICINE

## 2025-04-12 PROCEDURE — 36415 COLL VENOUS BLD VENIPUNCTURE: CPT | Performed by: EMERGENCY MEDICINE

## 2025-04-12 PROCEDURE — 250N000013 HC RX MED GY IP 250 OP 250 PS 637: Performed by: EMERGENCY MEDICINE

## 2025-04-12 PROCEDURE — 99284 EMERGENCY DEPT VISIT MOD MDM: CPT | Mod: 25

## 2025-04-12 PROCEDURE — 85025 COMPLETE CBC W/AUTO DIFF WBC: CPT | Performed by: EMERGENCY MEDICINE

## 2025-04-12 RX ORDER — ACETAMINOPHEN 325 MG/1
975 TABLET ORAL ONCE
Status: COMPLETED | OUTPATIENT
Start: 2025-04-12 | End: 2025-04-12

## 2025-04-12 RX ADMIN — ACETAMINOPHEN 975 MG: 325 TABLET ORAL at 23:09

## 2025-04-12 ASSESSMENT — ACTIVITIES OF DAILY LIVING (ADL): ADLS_ACUITY_SCORE: 44

## 2025-04-13 ENCOUNTER — APPOINTMENT (OUTPATIENT)
Dept: ULTRASOUND IMAGING | Facility: CLINIC | Age: 50
End: 2025-04-13
Attending: EMERGENCY MEDICINE
Payer: COMMERCIAL

## 2025-04-13 VITALS
WEIGHT: 210 LBS | HEART RATE: 68 BPM | RESPIRATION RATE: 18 BRPM | OXYGEN SATURATION: 98 % | BODY MASS INDEX: 34.95 KG/M2 | DIASTOLIC BLOOD PRESSURE: 74 MMHG | TEMPERATURE: 97.2 F | SYSTOLIC BLOOD PRESSURE: 162 MMHG

## 2025-04-13 PROCEDURE — 76830 TRANSVAGINAL US NON-OB: CPT

## 2025-04-13 PROCEDURE — 76856 US EXAM PELVIC COMPLETE: CPT

## 2025-04-13 ASSESSMENT — ACTIVITIES OF DAILY LIVING (ADL)
ADLS_ACUITY_SCORE: 44
ADLS_ACUITY_SCORE: 44

## 2025-04-13 NOTE — DISCHARGE INSTRUCTIONS
Please schedule a MRI of your pelvis, which I have placed the order  Please follow-up with your GYN, Dr. Le within the next week after the MRI  Tylenol 650 mg every 4 hours as needed for pain  Return to the emergency department for worsening problems or concerns

## 2025-04-13 NOTE — TELEPHONE ENCOUNTER
on the phone.  Two years of menopause, 2nd time getting period (also happened a week ago) seen on Thursday. Bleeding again. Urine is brown. Lower back pain right and left sides. Abdominal pain at 8 and constant. Her roommate will take her to the ER now.  Dasia Doyle RN  [facility]    Reason for Disposition   SEVERE abdominal pain     Around ovaries pain 8 and constant just today. Urine is very brown.    Additional Information   Negative: Shock suspected (e.g., cold/pale/clammy skin, too weak to stand, low BP, rapid pulse)   Negative: Difficult to awaken or acting confused (e.g., disoriented, slurred speech)   Negative: Passed out (i.e., lost consciousness, collapsed and was not responding)   Negative: Sounds like a life-threatening emergency to the triager   Negative: Followed a genital area injury (e.g., vagina, vulva)   Negative: [1] Vaginal discharge is main symptom AND [2] small amount of blood    Protocols used: Vaginal Bleeding - [id number]

## 2025-04-13 NOTE — ED NOTES
"\"My pee was brown, I'm having some menopause symptoms the past 2 years no years but I have been bleeding for 10 days and it hurts, 2 weeks ago and then   started again today lots of pain in my low back and ovaries 1400 today pain 10/10 pain now 8/10 no vomiting and minimal diarrhea, pt say pcp and was reffered to US    "

## 2025-04-13 NOTE — ED TRIAGE NOTES
Pt needs .  She writes that she has back and abdominal pain since earlier today.  She has darker urine than usual.  She also started to have vaginal bleeding 10d ago despite having finished menopause 2 years ago.     Triage Assessment (Adult)       Row Name 04/12/25 0913          Triage Assessment    Airway WDL WDL        Respiratory WDL    Respiratory WDL WDL        Skin Circulation/Temperature WDL    Skin Circulation/Temperature WDL WDL        Cardiac WDL    Cardiac WDL WDL        Peripheral/Neurovascular WDL    Peripheral Neurovascular WDL WDL        Cognitive/Neuro/Behavioral WDL    Cognitive/Neuro/Behavioral WDL WDL

## 2025-04-14 RX ORDER — ESTRADIOL 0.05 MG/D
PATCH, EXTENDED RELEASE TRANSDERMAL
Qty: 24 PATCH | Refills: 1 | Status: SHIPPED | OUTPATIENT
Start: 2025-04-14

## 2025-04-14 NOTE — TELEPHONE ENCOUNTER
Patient is calling to check status of medication request. States she is requesting Dr. Le to refill at this time.     Please advise, as patient is out of the medication.

## 2025-04-15 ENCOUNTER — MYC MEDICAL ADVICE (OUTPATIENT)
Dept: OBGYN | Facility: CLINIC | Age: 50
End: 2025-04-15
Payer: COMMERCIAL

## 2025-04-15 NOTE — TELEPHONE ENCOUNTER
Last OV with Dr. Le on 4/10 for AUB and HRT.     Patient arrived to ED on 4/12 where US determined    1.  Rounded masslike thickening of the fundal endometrium measuring up to 3.3 cm wide suggestive of an underlying mass such as a submucosal fibroid. Elsewhere, the endometrium is not well visualized. Nonemergent MR of the pelvis suggested for more   accurate characterization.  2.  Large exophytic subserosal fibroids of the uterine fundus.    Looks like Dr. Le wanted follow-up after results from US, so routing to provider to advise if ok for virtual visit to discuss next steps. It appears the ED recommended MRI to follow up on US findings.

## 2025-04-16 DIAGNOSIS — I10 PRIMARY HYPERTENSION: ICD-10-CM

## 2025-04-17 ENCOUNTER — TELEPHONE (OUTPATIENT)
Dept: FAMILY MEDICINE | Facility: CLINIC | Age: 50
End: 2025-04-17

## 2025-04-17 RX ORDER — LOSARTAN POTASSIUM 50 MG/1
25 TABLET ORAL DAILY
Qty: 50 TABLET | Refills: 2 | Status: SHIPPED | OUTPATIENT
Start: 2025-04-17

## 2025-04-17 NOTE — TELEPHONE ENCOUNTER
Pt calling to check on this medication. She was told that her insurance would cover the estradiol patch. Pt notified of need for prior authorization.

## 2025-04-18 NOTE — TELEPHONE ENCOUNTER
Retail Pharmacy Prior Authorization Team   Phone: [phone]    PA Initiation    Medication: ESTRADIOL 0.05 MG/24HR TD PTTW  Insurance Company: [employer] ([facility]) - Phone [phone] Fax [phone]  Pharmacy Filling the Rx: [facility] - [facility] [address]  Filling Pharmacy Phone: [phone]  Filling Pharmacy Fax:    Start Date: 4/18/2025    MEHRAN ABDI (Key: [id number])

## 2025-04-18 NOTE — TELEPHONE ENCOUNTER
PRIOR AUTHORIZATION DENIED    Medication: ESTRADIOL 0.05 MG/24HR TD PTTW  Insurance Company: [employer] ([facility]) - Phone [phone] Fax [phone]  Denial Date: 4/18/2025  Denial Reason(s): MUST TRY/FAIL FOUR COVERED ALTERNATIVES - ELESTRIN/ESTRADIOL WEEKLY PATCH, FEMRING, MENEST, PREMARIN TAB      Appeal Information: IF THE PROVIDER WOULD LIKE TO APPEAL THIS DECISION PLEASE PROVIDE THE PA TEAM WITH A LETTER OF MEDICAL NECESSITY      Patient Notified: NO

## 2025-04-21 RX ORDER — ESTRADIOL ACETATE 0.05 MG/D
1 RING VAGINAL
Qty: 1 EACH | Refills: 4 | Status: CANCELLED | OUTPATIENT
Start: 2025-04-21

## 2025-04-23 ENCOUNTER — OFFICE VISIT (OUTPATIENT)
Dept: PHYSICAL MEDICINE AND REHAB | Facility: CLINIC | Age: 50
End: 2025-04-23
Payer: COMMERCIAL

## 2025-04-23 VITALS
HEIGHT: 65 IN | BODY MASS INDEX: 36.02 KG/M2 | HEART RATE: 70 BPM | WEIGHT: 216.2 LBS | DIASTOLIC BLOOD PRESSURE: 77 MMHG | SYSTOLIC BLOOD PRESSURE: 180 MMHG

## 2025-04-23 DIAGNOSIS — M48.02 CERVICAL STENOSIS OF SPINAL CANAL: ICD-10-CM

## 2025-04-23 DIAGNOSIS — M54.12 RIGHT CERVICAL RADICULOPATHY: Primary | ICD-10-CM

## 2025-04-23 DIAGNOSIS — Z87.898 HISTORY OF HEADACHE: ICD-10-CM

## 2025-04-23 DIAGNOSIS — M48.02 FORAMINAL STENOSIS OF CERVICAL REGION: ICD-10-CM

## 2025-04-23 RX ORDER — METHOCARBAMOL 500 MG/1
500 TABLET, FILM COATED ORAL 3 TIMES DAILY PRN
Qty: 60 TABLET | Refills: 3 | Status: SHIPPED | OUTPATIENT
Start: 2025-04-23

## 2025-04-23 RX ORDER — GABAPENTIN 100 MG/1
200 CAPSULE ORAL 3 TIMES DAILY
Qty: 360 CAPSULE | Refills: 5 | Status: SHIPPED | OUTPATIENT
Start: 2025-04-23

## 2025-04-23 ASSESSMENT — PAIN SCALES - GENERAL: PAINLEVEL_OUTOF10: SEVERE PAIN (8)
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
## 2025-04-23 NOTE — PATIENT INSTRUCTIONS
~Spine Center Scheduling #[phone].  ~Please call our [facility] Nurse Navigation #[phone] with any questions or concerns about your treatment plan, if symptoms worsen and you would like to be seen urgently, or if you have problems controlling bladder and bowel function.  ~For any future flareups or new symptoms, recommend follow-up in clinic or contact the nurse navigator line.  ~Please note that any My Chart messages may take multiple days for a response due to the high volume of patients seen in clinic.  Anything sent Thursday night or after will be answered the following week when able, as Millie Parish CNP does not work in clinic on Fridays.   ~Millie Parish CNP is at the [facility] on Tuesdays, otherwise primarily at the [facility].       ~You have been referred for Physical Therapy to [facility]. They will call you to schedule an appointment.       Scheduling phone number is [phone] for [facility], or [place] location.  If you have not heard from the scheduling office within 2 business days, please call [phone] for ALL other locations.     Discussed the importance of core strengthening, ROM, stretching exercises and how each of these entities is important in decreasing pain and improving long term spine health.  The purpose of physical therapy is to teach you an individualized home exercise program.  These exercises need to be performed every day in order to decrease pain and prevent future occurrences of pain.            [facility] Scheduling #: [phone]        A muscle relaxer Methocarbamol was prescribed today to take as needed only, for muscle tightness and pain.  Please use with caution with driving due to possible sedation.

## 2025-04-28 ENCOUNTER — MYC MEDICAL ADVICE (OUTPATIENT)
Dept: FAMILY MEDICINE | Facility: CLINIC | Age: 50
End: 2025-04-28

## 2025-04-28 DIAGNOSIS — J30.9 ALLERGIC SINUSITIS: ICD-10-CM

## 2025-04-28 DIAGNOSIS — G43.809 OTHER MIGRAINE WITHOUT STATUS MIGRAINOSUS, NOT INTRACTABLE: ICD-10-CM

## 2025-04-28 DIAGNOSIS — Z78.0 POST-MENOPAUSE: ICD-10-CM

## 2025-04-28 DIAGNOSIS — I10 ESSENTIAL HYPERTENSION: ICD-10-CM

## 2025-04-28 DIAGNOSIS — J45.40 MODERATE PERSISTENT ASTHMA WITHOUT COMPLICATION: ICD-10-CM

## 2025-04-28 DIAGNOSIS — G47.00 PERSISTENT INSOMNIA: ICD-10-CM

## 2025-04-28 DIAGNOSIS — M54.12 RIGHT CERVICAL RADICULOPATHY: ICD-10-CM

## 2025-04-28 DIAGNOSIS — T78.40XD ALLERGIC REACTION, SUBSEQUENT ENCOUNTER: ICD-10-CM

## 2025-04-28 DIAGNOSIS — Z87.898 HISTORY OF HEADACHE: ICD-10-CM

## 2025-04-28 DIAGNOSIS — K59.00 CONSTIPATION, UNSPECIFIED CONSTIPATION TYPE: ICD-10-CM

## 2025-04-28 DIAGNOSIS — I10 PRIMARY HYPERTENSION: ICD-10-CM

## 2025-04-28 DIAGNOSIS — K21.00 GASTROESOPHAGEAL REFLUX DISEASE WITH ESOPHAGITIS WITHOUT HEMORRHAGE: ICD-10-CM

## 2025-04-28 DIAGNOSIS — F41.1 ANXIETY, GENERALIZED: ICD-10-CM

## 2025-04-28 NOTE — TELEPHONE ENCOUNTER
Refill Request  Medication name: Pending Prescriptions:                       Disp   Refills    docusate sodium (COLACE) 100 MG capsule   60 cap*0            Sig: Take 1 capsule (100 mg) by mouth 2 times daily.    topiramate (TOPAMAX) 25 MG tablet         180 ta*3            Sig: Take 1 tablet (25 mg) by mouth 2 times daily.    Requested Pharmacy:  [address] - [address]

## 2025-04-29 ENCOUNTER — HOSPITAL ENCOUNTER (EMERGENCY)
Facility: CLINIC | Age: 50
Discharge: HOME | End: 2025-04-29
Attending: STUDENT IN AN ORGANIZED HEALTH CARE EDUCATION/TRAINING PROGRAM | Admitting: STUDENT IN AN ORGANIZED HEALTH CARE EDUCATION/TRAINING PROGRAM
Payer: COMMERCIAL

## 2025-04-29 ENCOUNTER — TRANSFERRED RECORDS (OUTPATIENT)
Dept: HEALTH INFORMATION MANAGEMENT | Facility: CLINIC | Age: 50
End: 2025-04-29

## 2025-04-29 ENCOUNTER — TELEPHONE (OUTPATIENT)
Dept: FAMILY MEDICINE | Facility: CLINIC | Age: 50
End: 2025-04-29

## 2025-04-29 ENCOUNTER — E-VISIT (OUTPATIENT)
Dept: FAMILY MEDICINE | Facility: CLINIC | Age: 50
End: 2025-04-29
Payer: COMMERCIAL

## 2025-04-29 VITALS
TEMPERATURE: 97.4 F | DIASTOLIC BLOOD PRESSURE: 129 MMHG | BODY MASS INDEX: 36.59 KG/M2 | HEART RATE: 74 BPM | RESPIRATION RATE: 22 BRPM | WEIGHT: 219.9 LBS | SYSTOLIC BLOOD PRESSURE: 191 MMHG | OXYGEN SATURATION: 98 %

## 2025-04-29 DIAGNOSIS — N95.1 MENOPAUSAL SYNDROME (HOT FLASHES): Primary | ICD-10-CM

## 2025-04-29 DIAGNOSIS — J45.40 MODERATE PERSISTENT ASTHMA WITHOUT COMPLICATION: ICD-10-CM

## 2025-04-29 DIAGNOSIS — J30.9 ALLERGIC SINUSITIS: ICD-10-CM

## 2025-04-29 DIAGNOSIS — Z78.0 POST-MENOPAUSE: ICD-10-CM

## 2025-04-29 DIAGNOSIS — T78.40XA ALLERGIC REACTION, INITIAL ENCOUNTER: ICD-10-CM

## 2025-04-29 LAB
ATRIAL RATE - MUSE: 89 BPM
DIASTOLIC BLOOD PRESSURE - MUSE: NORMAL MMHG
INTERPRETATION ECG - MUSE: NORMAL
P AXIS - MUSE: 38 DEGREES
PR INTERVAL - MUSE: 166 MS
QRS DURATION - MUSE: 76 MS
QT - MUSE: 392 MS
QTC - MUSE: 476 MS
R AXIS - MUSE: 41 DEGREES
SYSTOLIC BLOOD PRESSURE - MUSE: NORMAL MMHG
T AXIS - MUSE: 21 DEGREES
VENTRICULAR RATE- MUSE: 89 BPM

## 2025-04-29 PROCEDURE — 99285 EMERGENCY DEPT VISIT HI MDM: CPT | Mod: 25

## 2025-04-29 PROCEDURE — 250N000011 HC RX IP 250 OP 636: Performed by: STUDENT IN AN ORGANIZED HEALTH CARE EDUCATION/TRAINING PROGRAM

## 2025-04-29 PROCEDURE — 96374 THER/PROPH/DIAG INJ IV PUSH: CPT

## 2025-04-29 PROCEDURE — 250N000013 HC RX MED GY IP 250 OP 250 PS 637: Performed by: STUDENT IN AN ORGANIZED HEALTH CARE EDUCATION/TRAINING PROGRAM

## 2025-04-29 PROCEDURE — 96372 THER/PROPH/DIAG INJ SC/IM: CPT | Performed by: EMERGENCY MEDICINE

## 2025-04-29 PROCEDURE — 93005 ELECTROCARDIOGRAM TRACING: CPT | Performed by: STUDENT IN AN ORGANIZED HEALTH CARE EDUCATION/TRAINING PROGRAM

## 2025-04-29 PROCEDURE — 250N000011 HC RX IP 250 OP 636: Performed by: EMERGENCY MEDICINE

## 2025-04-29 RX ORDER — BUDESONIDE AND FORMOTEROL FUMARATE DIHYDRATE 160; 4.5 UG/1; UG/1
2 AEROSOL RESPIRATORY (INHALATION) 2 TIMES DAILY
Qty: 10.2 G | Refills: 1 | Status: SHIPPED | OUTPATIENT
Start: 2025-04-29 | End: 2025-04-30

## 2025-04-29 RX ORDER — EPINEPHRINE 0.3 MG/.3ML
0.3 INJECTION SUBCUTANEOUS PRN
Qty: 2 EACH | Refills: 1 | Status: SHIPPED | OUTPATIENT
Start: 2025-04-29

## 2025-04-29 RX ORDER — EPINEPHRINE 1 MG/ML
0.3 INJECTION, SOLUTION INTRAMUSCULAR; SUBCUTANEOUS ONCE
Status: COMPLETED | OUTPATIENT
Start: 2025-04-29 | End: 2025-04-29

## 2025-04-29 RX ORDER — GABAPENTIN 100 MG/1
200 CAPSULE ORAL 3 TIMES DAILY
Qty: 360 CAPSULE | Refills: 4 | Status: SHIPPED | OUTPATIENT
Start: 2025-04-29

## 2025-04-29 RX ORDER — HYDROXYZINE HYDROCHLORIDE 25 MG/1
TABLET, FILM COATED ORAL
Qty: 180 TABLET | Refills: 1 | Status: SHIPPED | OUTPATIENT
Start: 2025-04-29

## 2025-04-29 RX ORDER — ESTRADIOL 0.05 MG/D
PATCH, EXTENDED RELEASE TRANSDERMAL
Qty: 24 PATCH | Refills: 1 | Status: SHIPPED | OUTPATIENT
Start: 2025-05-01 | End: 2025-04-30

## 2025-04-29 RX ORDER — MONTELUKAST SODIUM 10 MG/1
1 TABLET ORAL AT BEDTIME
Qty: 90 TABLET | Refills: 0 | Status: SHIPPED | OUTPATIENT
Start: 2025-04-29 | End: 2025-04-30

## 2025-04-29 RX ORDER — METHYLPREDNISOLONE SODIUM SUCCINATE 125 MG/2ML
125 INJECTION INTRAMUSCULAR; INTRAVENOUS ONCE
Status: DISCONTINUED | OUTPATIENT
Start: 2025-04-29 | End: 2025-04-29

## 2025-04-29 RX ORDER — FAMOTIDINE 10 MG
10 TABLET ORAL ONCE
Status: COMPLETED | OUTPATIENT
Start: 2025-04-29 | End: 2025-04-29

## 2025-04-29 RX ORDER — ALBUTEROL SULFATE 0.83 MG/ML
SOLUTION RESPIRATORY (INHALATION)
Qty: 180 ML | Refills: 1 | Status: SHIPPED | OUTPATIENT
Start: 2025-04-29 | End: 2025-04-30

## 2025-04-29 RX ORDER — TOPIRAMATE 25 MG/1
25 TABLET, FILM COATED ORAL 2 TIMES DAILY
Qty: 180 TABLET | Refills: 1 | Status: SHIPPED | OUTPATIENT
Start: 2025-04-29

## 2025-04-29 RX ORDER — QUETIAPINE FUMARATE 50 MG/1
50 TABLET, FILM COATED ORAL AT BEDTIME
OUTPATIENT
Start: 2025-04-29

## 2025-04-29 RX ORDER — DOCUSATE SODIUM 100 MG/1
100 CAPSULE, LIQUID FILLED ORAL 2 TIMES DAILY
Qty: 60 CAPSULE | Refills: 0 | Status: SHIPPED | OUTPATIENT
Start: 2025-04-29

## 2025-04-29 RX ORDER — METHOCARBAMOL 500 MG/1
500 TABLET, FILM COATED ORAL 3 TIMES DAILY PRN
Qty: 60 TABLET | Refills: 2 | OUTPATIENT
Start: 2025-04-29

## 2025-04-29 RX ORDER — ALBUTEROL SULFATE 90 UG/1
INHALANT RESPIRATORY (INHALATION)
Qty: 6.7 G | Refills: 1 | Status: SHIPPED | OUTPATIENT
Start: 2025-04-29

## 2025-04-29 RX ORDER — LOSARTAN POTASSIUM 50 MG/1
25 TABLET ORAL DAILY
Qty: 50 TABLET | Refills: 1 | Status: SHIPPED | OUTPATIENT
Start: 2025-04-29

## 2025-04-29 RX ORDER — TOPIRAMATE 25 MG/1
25 TABLET, FILM COATED ORAL 2 TIMES DAILY
Qty: 180 TABLET | Refills: 1 | OUTPATIENT
Start: 2025-04-29

## 2025-04-29 RX ORDER — DOCUSATE SODIUM 100 MG/1
100 CAPSULE, LIQUID FILLED ORAL 2 TIMES DAILY
Qty: 60 CAPSULE | Refills: 0 | OUTPATIENT
Start: 2025-04-29

## 2025-04-29 RX ORDER — ESTRADIOL 0.05 MG/D
PATCH, EXTENDED RELEASE TRANSDERMAL
Qty: 24 PATCH | Refills: 1 | OUTPATIENT
Start: 2025-05-01

## 2025-04-29 RX ORDER — PROGESTERONE 200 MG/1
200 CAPSULE ORAL DAILY
OUTPATIENT
Start: 2025-04-29

## 2025-04-29 RX ORDER — DIPHENHYDRAMINE HYDROCHLORIDE 50 MG/ML
50 INJECTION, SOLUTION INTRAMUSCULAR; INTRAVENOUS ONCE
Status: DISCONTINUED | OUTPATIENT
Start: 2025-04-29 | End: 2025-04-29

## 2025-04-29 RX ORDER — RIZATRIPTAN BENZOATE 10 MG/1
10 TABLET ORAL
Qty: 18 TABLET | Refills: 1 | Status: SHIPPED | OUTPATIENT
Start: 2025-04-29

## 2025-04-29 RX ORDER — METOPROLOL SUCCINATE 25 MG/1
25 TABLET, EXTENDED RELEASE ORAL DAILY
Qty: 30 TABLET | Refills: 7 | OUTPATIENT
Start: 2025-04-29

## 2025-04-29 RX ORDER — LEVOCETIRIZINE DIHYDROCHLORIDE 5 MG/1
5 TABLET, FILM COATED ORAL EVERY EVENING
Qty: 90 TABLET | Refills: 1 | Status: SHIPPED | OUTPATIENT
Start: 2025-04-29 | End: 2025-04-30

## 2025-04-29 RX ORDER — DIPHENHYDRAMINE HYDROCHLORIDE 50 MG/ML
50 INJECTION, SOLUTION INTRAMUSCULAR; INTRAVENOUS ONCE
Status: COMPLETED | OUTPATIENT
Start: 2025-04-29 | End: 2025-04-29

## 2025-04-29 RX ORDER — OMEPRAZOLE 20 MG/1
20 CAPSULE, DELAYED RELEASE ORAL DAILY
Qty: 30 CAPSULE | Refills: 0 | Status: SHIPPED | OUTPATIENT
Start: 2025-04-29

## 2025-04-29 RX ORDER — METHOCARBAMOL 500 MG/1
500 TABLET, FILM COATED ORAL 3 TIMES DAILY PRN
Qty: 60 TABLET | Refills: 2 | Status: SHIPPED | OUTPATIENT
Start: 2025-04-29

## 2025-04-29 RX ORDER — PRAZOSIN HYDROCHLORIDE 1 MG/1
1 CAPSULE ORAL AT BEDTIME
Qty: 90 CAPSULE | Refills: 0 | OUTPATIENT
Start: 2025-04-29

## 2025-04-29 RX ADMIN — FAMOTIDINE 10 MG: 10 TABLET ORAL at 16:13

## 2025-04-29 RX ADMIN — DIPHENHYDRAMINE HYDROCHLORIDE 50 MG: 50 INJECTION, SOLUTION INTRAMUSCULAR; INTRAVENOUS at 16:07

## 2025-04-29 RX ADMIN — EPINEPHRINE 0.3 MG: 1 INJECTION INTRAMUSCULAR; INTRAVENOUS; SUBCUTANEOUS at 15:36

## 2025-04-29 ASSESSMENT — COLUMBIA-SUICIDE SEVERITY RATING SCALE - C-SSRS
6. HAVE YOU EVER DONE ANYTHING, STARTED TO DO ANYTHING, OR PREPARED TO DO ANYTHING TO END YOUR LIFE?: NO
2. HAVE YOU ACTUALLY HAD ANY THOUGHTS OF KILLING YOURSELF IN THE PAST MONTH?: NO
1. IN THE PAST MONTH, HAVE YOU WISHED YOU WERE DEAD OR WISHED YOU COULD GO TO SLEEP AND NOT WAKE UP?: NO

## 2025-04-29 ASSESSMENT — ACTIVITIES OF DAILY LIVING (ADL)
ADLS_ACUITY_SCORE: 44
ADLS_ACUITY_SCORE: 44

## 2025-04-29 NOTE — ED PROVIDER NOTES
EMERGENCY DEPARTMENT ENCOUNTER      NAME: Marleny Viera  AGE: 49 year old female  YOB: 1975  MRN: [id number]  EVALUATION DATE & TIME: 4/29/2025  3:27 PM    PCP: Brianna Hurst    ED PROVIDER: Jamar Lieberman M.D.      Chief Complaint   Patient presents with    Allergic Reaction         FINAL IMPRESSION:  1. Allergic reaction, initial encounter          ED COURSE & MEDICAL DECISION MAKING:    Pertinent Labs & Imaging studies reviewed. (See chart for details)  49 year old female presents to the Emergency Department for evaluation of allergic reaction.  On my examination I felt that overall anaphylaxis was very unlikely.  Patient had no significant rash.  She did have some wheezing but this felt more volitional than organic and were mostly at the end of her expiration.  Patient had received epinephrine prior to me seeing her.  I did give the patient a dose of IM Benadryl and p.o. famotidine.  We did not give her any Solu-Medrol just in case there was some amount of allergy here.  Considered whether discomfort could be a cardiac issue however based on the totality of the presentation I felt this was less likely.  EKG is reassuring with no acute ST changes.  No need for chest x-ray or blood work to rule out ACS although this was considered.  Also considered whether admission was necessary however ultimately I believe that the patient will do well at home this is less likely to represent an allergic reaction.  Had a discussion with the patient and she is okay with this plan.    At the conclusion of the encounter I discussed the results of all of the tests and the disposition. The questions were answered. The patient or family acknowledged understanding and was agreeable with the care plan.     ED Course as of 04/29/25 2140   Tue Apr 29, 2025   1602 Patient has been seen by previous provider and epinephrine and Benadryl Pepcid and Solu-Medrol have been ordered per protocol.  My evaluation I think the  patient likely had a anxiety type reaction and this was less likely anaphylactic.  It does sound as if she has some and respiratory forced wheezing but the remainder of her lung sounds are normal.  There is no signs of rash or any other sort of hive reaction.  Vital signs are stable.  They were having a hard time getting an IV and I think at this point since she is already got epinephrine we could give her an IM shot of Benadryl and some p.o. Pepcid and observe her here.   1738 Patient is improved and walked the hallways without any shortness of breath.  She feels better and is requesting to go home           Medical Decision Making  Care impacted by allergies, anxiety  Discharge. No recommendations on prescription strength medication(s). I considered admission, but discharged patient after significant clinical improvement.    Spoke with orthopedics regarding presentation at the clinic and to gather additional history and information.    MIPS (CTPE, Dental pain, Bruce, Sinusitis, Asthma/COPD, Head Trauma): Not Applicable    SEPSIS: None                This patient involved a high degree of complexity in medical decision making, as significant risks were present and assessed. Recent encounters & results in medical record reviewed by me.     All workup (i.e. any EKG/labs/imaging as per charting below) reviewed and independently interpreted by me. See respective sections for details.       minutes of critical care time     MEDICATIONS GIVEN IN THE EMERGENCY:  Medications   EPINEPHrine (Anaphylaxis) (ADRENALIN) injection (vial) 0.3 mg (0.3 mg Intramuscular $Given 4/29/25 1536)   sodium chloride 0.9% BOLUS 500 mL (500 mLs Intravenous Not Given 4/29/25 1609)   diphenhydrAMINE (BENADRYL) injection 50 mg (50 mg Intravenous $Given 4/29/25 1607)   famotidine (PEPCID) tablet 10 mg (10 mg Oral $Given 4/29/25 1613)       NEW PRESCRIPTIONS STARTED AT TODAY'S ER VISIT  Discharge Medication List as of 4/29/2025  5:47 PM              =================================================================    HPI    Patient information was obtained from: patient     Use of : lindsey Viera is a 49 year old female with a pertinent history of multiple medical problems including some mental health issues as well as history is of anaphylaxis and asthma allergic reaction.  Sounds as if the patient was at the [facility] and had a cortisone shot shortly thereafter says she felt her throat swelling and palpitations.  She denied any anterior chest pain.  She said this feels like previous allergic reaction she has had.  She carries 25 allergies and has had episodes of anaphylaxis in the past.  She denied any itchiness.  No rash. Patient says she feels better after the epinephrine shot.  She says she feels wheezy.        PAST MEDICAL HISTORY:  Past Medical History:   Diagnosis Date    Accidental overdose, initial encounter 07/07/2022    ADHD (attention deficit hyperactivity disorder)     Alcohol dependence (H)     Anxiety     Arthritis     Asthma     B12 deficiency     Bipolar depression (H)     Blood clotting disorder     Deafness     Depressive disorder 10 year    Drug abuse, nondependent (H) 10/29/2004    Overview:  polydrug abuse per psych notes ; Other, mixed, or unspecified nondependent drug abuse, unspecified    History of blood clots     History of transfusion     Hypertension     Kidney stones 2015    Major depressive disorder, recurrent episode, severe (H) 05/17/2016    Overview:  s/p suicide attempt Epic     Migraine     Obstructive sleep apnea 06/01/2016    Pt states she does not have ROBERT, does not use CPAP    Pulmonary emboli (H)     Status post total right knee replacement 01/18/2016       PAST SURGICAL HISTORY:  Past Surgical History:   Procedure Laterality Date    BIOPSY BREAST Right     2019... Also had fluid drained from left breast under surgery also in 2019    BREAST SURGERY      MAMMOPLASTY REDUCTION Bilateral  01/01/2017    ORTHOPEDIC SURGERY  2012,2013,2014    TOTAL HIP ARTHROPLASTY Bilateral     TOTAL KNEE ARTHROPLASTY Right     TUBAL LIGATION      US BREAST CORE BIOPSY RIGHT Right 11/21/2019           CURRENT MEDICATIONS:    albuterol (PROAIR HFA/PROVENTIL HFA/VENTOLIN HFA) 108 (90 Base) MCG/ACT inhaler  albuterol (PROVENTIL) (2.5 MG/3ML) 0.083% neb solution  budesonide-formoterol (SYMBICORT/BREYNA) 160-4.5 MCG/ACT Inhaler  citalopram (CELEXA) 20 MG tablet  cyanocobalamin (CYANOCOBALAMIN) 1000 mcg/mL injection  docusate sodium (COLACE) 100 MG capsule  EPINEPHrine (ANY BX GENERIC EQUIV) 0.3 MG/0.3ML injection 2-pack  [START ON 5/1/2025] estradiol (VIVELLE-DOT) 0.05 MG/24HR bi-weekly patch  gabapentin (NEURONTIN) 100 MG capsule  hydrOXYzine HCl (ATARAX) 25 MG tablet  levocetirizine (XYZAL) 5 MG tablet  losartan (COZAAR) 50 MG tablet  methocarbamol (ROBAXIN) 500 MG tablet  metoprolol succinate ER (TOPROL XL) 25 MG 24 hr tablet  montelukast (SINGULAIR) 10 MG tablet  multivitamin, therapeutic (THERA-VIT) TABS tablet  NONFORMULARY  omeprazole (PRILOSEC) 20 MG DR capsule  prazosin (MINIPRESS) 1 MG capsule  PROMETRIUM 200 MG capsule  QUEtiapine (SEROQUEL) 50 MG tablet  rizatriptan (MAXALT) 10 MG tablet  topiramate (TOPAMAX) 25 MG tablet        ALLERGIES:  Allergies   Allergen Reactions    Acetylcysteine Rash and Other (See Comments)    Amoxicillin Itching and Shortness Of Breath    Bee Pollen Anaphylaxis    Chocolate Anaphylaxis    Contrast [Iodixanol] Shortness Of Breath and Rash     Pt stated she reacted to the contrast given for her CT in past. She experienced shortness of breath, throat tightening, and rash on chest, and they gave her an injection to counter the symptoms.     Covid-19 (Mrna) Vaccine Shortness Of Breath     Pfizer COVID-19 Vaccine    Hymenoptera Allergenic Extract [Wasp Venom Protein] Anaphylaxis    Iodine Hives and Unknown     Pt stated that she was injected with CT CONTRAST in the past and got hives, SOB,  "and nausea. Please pre-medicate patient in the future.     Meat Extract Anaphylaxis    Wasp Venom Protein Starter Kit [Wasp Venom Protein] Itching, Shortness Of Breath, Swelling and Difficulty breathing    Adhesive Tape Unknown    Aspirin     Bee Venom Unknown    Food Allergy Formula Unknown     Red meat, chocolate    Geodon [Ziprasidone] Unknown    Latex Unknown     Added based on information entered during case entry, please review and add reactions, type, and severity as needed    Morphine Unknown    Prochlorperazine Other (See Comments)    Risperdal [Risperidone] Unknown    Risperidone Analogues [Risperidone] Other (See Comments)    Shellfish Allergy Unknown    Theobroma Oil [Theobroma Grandiflorum Seed Butter] Unknown    Trazodone Other (See Comments)     Elevated creatinine    Zoloft [Sertraline] Unknown    Hydrochlorothiazide Rash       FAMILY HISTORY:  Family History   Problem Relation Age of Onset    Cancer Mother     Breast Cancer Mother     Diabetes Mother     Hypertension Mother     Cerebrovascular Disease Father     Hyperlipidemia No family hx of     Depression No family hx of     Anxiety Disorder No family hx of     Mental Illness No family hx of     Substance Abuse No family hx of     Asthma No family hx of        SOCIAL HISTORY:   Social History     Socioeconomic History    Marital status:      Spouse name: None    Number of children: None    Years of education: None    Highest education level: None   Tobacco Use    Smoking status: Never     Passive exposure: Never    Smokeless tobacco: Never   Vaping Use    Vaping status: Never Used   Substance and Sexual Activity    Alcohol use: Yes     Comment: Alcoholic Drinks/day: dependence    Drug use: Never    Sexual activity: Not Currently     Partners: Male     Birth control/protection: None   Social History Narrative    . No children. .  Lives alone.  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  Nonsmoker.  No alcohol use.  " Tesha Pelaez MD       Social Drivers of Health     Financial Resource Strain: Low Risk  (3/27/2025)    Financial Resource Strain     Within the past 12 months, have you or your family members you live with been unable to get utilities (heat, electricity) when it was really needed?: No   Food Insecurity: Low Risk  (3/27/2025)    Food Insecurity     Within the past 12 months, did you worry that your food would run out before you got money to buy more?: No     Within the past 12 months, did the food you bought just not last and you didn t have money to get more?: No   Transportation Needs: High Risk (3/27/2025)    Transportation Needs     Within the past 12 months, has lack of transportation kept you from medical appointments, getting your medicines, non-medical meetings or appointments, work, or from getting things that you need?: Yes   Physical Activity: Sufficiently Active (3/27/2025)    Exercise Vital Sign     Days of Exercise per Week: 7 days     Minutes of Exercise per Session: 150+ min   Stress: No Stress Concern Present (3/27/2025)    [place] [employer] - Occupational Stress Questionnaire     Feeling of Stress : Only a little   Social Connections: Unknown (3/27/2025)    Social Connection and Isolation Panel [NHANES]     Frequency of Social Gatherings with Friends and Family: Never   Interpersonal Safety: Low Risk  (11/13/2024)    Interpersonal Safety     Do you feel physically and emotionally safe where you currently live?: Yes     Within the past 12 months, have you been hit, slapped, kicked or otherwise physically hurt by someone?: No     Within the past 12 months, have you been humiliated or emotionally abused in other ways by your partner or ex-partner?: No   Housing Stability: High Risk (3/27/2025)    Housing Stability     Do you have housing? : No     Are you worried about losing your housing?: No       VITALS:  BP (!) 191/129   Pulse 74   Temp 97.4  F (36.3  C) (Temporal)    Resp 22   Wt 99.7 kg (219 lb 14.4 oz)   LMP  (LMP Unknown)   SpO2 98%   BMI 36.59 kg/m        PHYSICAL EXAM    Constitutional: Well developed, Well nourished, NAD, GCS 15  HENT: Normocephalic, Atraumatic, Bilateral external ears normal, Oropharynx normal, mucous membranes moist, Nose normal. Neck-  Normal range of motion, No tenderness, Supple, No stridor.  Eyes: PERRL, EOMI, Conjunctiva normal, No discharge.   Respiratory: Normal breath sounds, No respiratory distress, No wheezing, Speaks full sentences easily. No cough.  Cardiovascular: Normal heart rate, Regular rhythm, No murmurs, No rubs, No gallops. Chest wall nontender.  GI:Soft, No tenderness, No masses, No flank tenderness. No rebound or guarding.   Musculoskeletal: 2+ DP pulses. No edema.No cyanosis, No clubbing. Good range of motion in all major joints. No tenderness to palpation or major deformities noted.   Integument: Warm, Dry, No erythema, No rash. No petechiae.   Neurologic: Alert & oriented x 3,  CN 3-12 intact Normal motor function, Normal sensory function, No focal deficits noted. Normal gait. Normal finger to nose bilaterally  Psychiatric: Affect normal, Judgment normal, Mood normal. Cooperative.          LAB:  All pertinent labs reviewed and interpreted.  Labs Ordered and Resulted from Time of ED Arrival to Time of ED Departure - No data to display    RADIOLOGY:  Reviewed all pertinent imaging. Please see official radiology report.  No orders to display       EKG:    Performed at: 1526    Impression: sinus rhythm with PAC's.  Non specific twaves    Rate: 89  Rhythm: sinus tach  Axis: normal  WA Interval: 166  QRS Interval: 76  QTc Interval: 476  ST Changes: non specific  Comparison: jan 2025    I have independently reviewed and interpreted the EKG(s) documented above.      [facility] Documentation:   CMS Diagnoses:          Jamar Lieberman M.D.  Emergency Medicine  [facility]  EMERGENCY ROOM  [address] [place] [id number]  [phone]  Dept: [phone]       Jamar Lieberman MD  04/29/25 2145

## 2025-04-29 NOTE — DISCHARGE INSTRUCTIONS
Is a bit unclear whether this is an anaphylactic/allergic reaction to the cortisone or not today.  I would advise avoiding steroids until you can follow-up with your primary doctor or an allergist for further testing and discussion.  He can take Benadryl at home tonight and tomorrow and use your inhaler as usual

## 2025-04-29 NOTE — ED TRIAGE NOTES
Pt c/o SOB, throat tightness, and chest pain. Today at about 1400 pt has a clinic appointment to get a cortisol shot into her knee. She shortly felt flushed after the shot, went home and then felt the SOB and chest pain. Pt was seen by MD in triage. Epi given.     Triage Assessment (Adult)       Row Name 04/29/25 1544          Triage Assessment    Airway WDL WDL        Respiratory WDL    Respiratory WDL X;rhythm/pattern     Rhythm/Pattern, Respiratory tachypneic;labored;shortness of breath        Skin Circulation/Temperature WDL    Skin Circulation/Temperature WDL WDL        Cardiac WDL    Cardiac WDL WDL        Peripheral/Neurovascular WDL    Peripheral Neurovascular WDL WDL        Cognitive/Neuro/Behavioral WDL    Cognitive/Neuro/Behavioral WDL WDL

## 2025-04-30 ENCOUNTER — PATIENT OUTREACH (OUTPATIENT)
Dept: CARE COORDINATION | Facility: CLINIC | Age: 50
End: 2025-04-30
Payer: COMMERCIAL

## 2025-04-30 RX ORDER — ESTRADIOL 0.05 MG/D
PATCH, EXTENDED RELEASE TRANSDERMAL
Qty: 24 PATCH | Refills: 1 | Status: SHIPPED | OUTPATIENT
Start: 2025-05-01

## 2025-04-30 RX ORDER — ALBUTEROL SULFATE 0.83 MG/ML
SOLUTION RESPIRATORY (INHALATION)
Qty: 180 ML | Refills: 1 | Status: SHIPPED | OUTPATIENT
Start: 2025-04-30

## 2025-04-30 RX ORDER — BUDESONIDE AND FORMOTEROL FUMARATE DIHYDRATE 160; 4.5 UG/1; UG/1
2 AEROSOL RESPIRATORY (INHALATION) 2 TIMES DAILY
Qty: 10.2 G | Refills: 1 | Status: SHIPPED | OUTPATIENT
Start: 2025-04-30

## 2025-04-30 RX ORDER — MONTELUKAST SODIUM 10 MG/1
1 TABLET ORAL AT BEDTIME
Qty: 90 TABLET | Refills: 1 | Status: SHIPPED | OUTPATIENT
Start: 2025-04-30

## 2025-04-30 RX ORDER — ESTRADIOL 0.05 MG/D
1 PATCH TRANSDERMAL WEEKLY
Qty: 12 PATCH | Refills: 3 | Status: SHIPPED | OUTPATIENT
Start: 2025-04-30

## 2025-04-30 RX ORDER — LEVOCETIRIZINE DIHYDROCHLORIDE 5 MG/1
5 TABLET, FILM COATED ORAL EVERY EVENING
Qty: 90 TABLET | Refills: 1 | Status: SHIPPED | OUTPATIENT
Start: 2025-04-30

## 2025-04-30 NOTE — PROGRESS NOTES
Clinic Care Coordination Contact  Community Health Worker Initial Outreach    CHW Initial Information Gathering:  Referral Source: ED Follow-Up  Current living arrangement:: Not Assessed  No PCP office visit in Past Year: No  CHW Additional Questions  If ED/Hospital discharge, follow-up appointment scheduled as recommended?: Yes  Medication changes made following ED/Hospital discharge?: Yes, patient to be scheduled with CC RN/SW within approx 1 business day  MyChart active?: Yes    Patient accepts CC: No, Patient stated she has everything she needed . Patient will be sent Care Coordination introduction letter for future reference.     Joyce Delgado  Care   Connected Care Resources   [facility]     *Connected Care Resources Team does NOT follow patient ongoing. Referrals are identified based on internal discharge report and the outreach is to ensure Patient has understanding of their discharge instructions.

## 2025-04-30 NOTE — LETTER
.  Marleny Viera  [address]  [facility] [address]    Dear Marleny Viera,      I am a team member within the [facility] with [facility]. I recently tried to reach you to ensure you were doing well following a recent visit within our health system. I also wanted to take this chance to introduce Clinic Care Coordination.     Below is a description of Clinic Care Coordination and how this team can further assist you:       The Clinic Care Coordination team is made up of a Registered Nurse, , Financial Resource Worker, and a Community Health Worker who understand and can help navigate the health care system. The goal of clinic care coordination is to help you manage your health, improve access to care, and achieve optimal health outcomes. They work alongside your provider to assist you in determining your health and social needs, obtain health care and community resources, and provide you with necessary information and education. Clinic Care Coordination can work with you through any barriers and develop a care plan that helps coordinate and strengthen the relationship between you and your care team.    If you wish to connect with the Clinic Care Coordination Team, please let your [facility] Primary Care Provider or Clinic Care Team know and they can place a referral. The Clinic Care Coordination team will then reach out by phone to further support you.    We are focused on providing you with the highest-quality healthcare experience possible.    Sincerely,   Your care team with [facility]'s [address] [place] [address] ([phone]).

## 2025-04-30 NOTE — TELEPHONE ENCOUNTER
Provider E-Visit time total (minutes):  14 minutes    3521 -  -left voice message for patient to respond to [employer] message clarification questions    Clarification on current asthma medications    Clarification on rash description    See MyChart message  MARU Goff CNP on 4/30/2025 at 1:36 PM      Spoke with pt on phone for 12 mins:  Called and discussed asthma medications.  Rash resolved itself - fan going non-stop   Sending new estradiol patch for once weekly patch instead of twice - doesn't want the vaginal ring (Out of patch for 2 weeks -) reviewed prior PA>     Has follow up with me on Friday    MARU Goff CNP on 4/30/2025 at 8:04 PM

## 2025-05-01 NOTE — PATIENT INSTRUCTIONS
Thank you for choosing us for your care. I have placed an order for a prescription so that you can start treatment:  Orders Placed This Encounter   Medications     montelukast (SINGULAIR) 10 MG tablet     Sig: Take 1 tablet (10 mg) by mouth at bedtime.     Dispense:  90 tablet     Refill:  1     budesonide-formoterol (SYMBICORT/BREYNA) 160-4.5 MCG/ACT Inhaler     Sig: Inhale 2 puffs into the lungs 2 times daily.     Dispense:  10.2 g     Refill:  1     Pharmacy may dispense brand covered by insurance (Symbicort, Breyna, or generic budesonide-formoterol inhaler)     albuterol (PROVENTIL) (2.5 MG/3ML) 0.083% neb solution     Sig: USE 1 VIAL NEBULIZER every 4-6 hours as needed for wheeze or shortness of breath.     Dispense:  180 mL     Refill:  1     levocetirizine (XYZAL) 5 MG tablet     Sig: Take 1 tablet (5 mg) by mouth every evening.     Dispense:  90 tablet     Refill:  1     estradiol (CLIMARA) 0.05 MG/24HR weekly patch     Sig: Place 1 patch over 168 hours onto the skin once a week.     Dispense:  12 patch     Refill:  3     estradiol (VIVELLE-DOT) 0.05 MG/24HR bi-weekly patch     Sig: Place onto the skin twice a week.     Dispense:  24 patch     Refill:  1        View your full visit summary for details by clicking on the link below. Your pharmacist will able to address any questions you may have about the medication.     If you're not feeling better within 5-7 days, please schedule an appointment.  You can schedule an appointment right here in [facility], or call [phone]  If the visit is for the same symptoms as your eVisit, we'll refund the cost of your eVisit if seen within seven days.

## 2025-05-01 NOTE — TELEPHONE ENCOUNTER
Prior Authorization Not Needed per Insurance          Medication: estradiol (VIVELLE-DOT) 0.05 MG/24HR bi-weekly patch-PA NOT NEEDED/DENIED   Insurance Company: [employer] Part D - Phone [phone] Fax [phone]  Expected CoPay:      Pharmacy Filling the Rx: [facility] [address] [place], [place] - [address]  Pharmacy Notified:  No  Patient Notified:  No    Insurance states that PA is Not Needed at This Time and PA Denial on File for Requested medication. Next step is to Appeal. See TE from 4/17/2025 for Denial Rational and Appeal information.

## 2025-05-05 NOTE — TELEPHONE ENCOUNTER
Received Letter From Insurance stating that there is PA Denial on file for Requested medication as noted below.

## 2025-05-07 ENCOUNTER — TELEPHONE (OUTPATIENT)
Dept: FAMILY MEDICINE | Facility: CLINIC | Age: 50
End: 2025-05-07
Payer: COMMERCIAL

## 2025-05-07 NOTE — TELEPHONE ENCOUNTER
[facility] calling to state they cannot do the MRI due to patient being claustrophobic. They do not have any open MRI's. Patient will need to go somewhere else.

## 2025-05-08 NOTE — TELEPHONE ENCOUNTER
FYI - Status Update    Who is Calling: patient    Update: patient would like to know if an appeal will be done for the denial, or if a different medication will be prescribed at this time. Patient is unsure if she should continue taking the progesterone, as it assists with the patch. Please advise and call patient    Does caller want a call/response back: Yes     Could we send this information to you in [employer] or would you prefer to receive a phone call?:   Patient would prefer a phone call   Okay to leave a detailed message?: Yes at Home number on file [phone] (home)

## 2025-05-08 NOTE — TELEPHONE ENCOUNTER
Phone call to [facility]. Spoke with Hermelinda. She states they have large bore MRIs (70), but they do not have open bore MRIs.

## 2025-05-13 RX ORDER — ESTRADIOL 0.05 MG/D
1 PATCH TRANSDERMAL WEEKLY
Qty: 12 PATCH | Refills: 3 | Status: SHIPPED | OUTPATIENT
Start: 2025-05-13

## 2025-05-13 NOTE — TELEPHONE ENCOUNTER
Anabella- I am assuming that you can just remove these stiches at the office visit and she does not need to see the RN's first?     Dior KYLE RN

## 2025-05-14 NOTE — TELEPHONE ENCOUNTER
New 1 time weekly estradiol was ordered instead of twice weekly.  Hopefully this is covered better,    I'm not seeing the patient it looks like so I will not be able to remove the sutures.

## 2025-05-16 NOTE — TELEPHONE ENCOUNTER
100% thank you.    We will plan for that - I sent the once weekly patches on the 13th but it looks like she hasn't gotten them yet.     I'll be sure she knows about the Prometrium 200mg 14 days a month

## 2025-05-19 ENCOUNTER — TELEPHONE (OUTPATIENT)
Dept: FAMILY MEDICINE | Facility: CLINIC | Age: 50
End: 2025-05-19

## 2025-05-19 NOTE — TELEPHONE ENCOUNTER
ALEXANDER De Anda calling from  services calling to let us know they were unable to find an in-person  for patients appointment today.   Left pt a VM about an in person  not available, we will use the Virtual. If any questions, to call us back.

## 2025-05-21 NOTE — TELEPHONE ENCOUNTER
"  Disp Refills Start End SERENITY    albuterol (PROAIR HFA;PROVENTIL HFA;VENTOLIN HFA) 90 mcg/actuation inhaler 18 g 5 1/4/2021  No   Sig - Route: Inhale 2 puffs every 4 (four) hours as needed for wheezing. - Inhalation   Sent to pharmacy as: albuterol sulfate HFA 90 mcg/actuation aerosol inhaler (PROAIR HFA;PROVENTIL HFA;VENTOLIN HFA)   Notes to Pharmacy: Maximum Refills Reached   E-Prescribing Status: Receipt confirmed by pharmacy (1/4/2021  2:30 PM CST)       albuterol (PROAIR HFA;PROVENTIL HFA;VENTOLIN HFA) 90 mcg/actuation inhaler [[id number]]    Electronically signed by: Carolann Julien MD on 01/04/21 1430 Status: Active   Ordering user: Carolann Julien MD 01/04/21 1430 Authorized by: Carolann Julien MD   PRN reasons: wheezing   Frequency: Q4H PRN 01/04/21 - Until Discontinued Released by: Carolann Julien MD 01/04/21 1430   Diagnoses  Moderate persistent asthma without complication [J45.40]       Last office visit provider:  11/17/21     Requested Prescriptions   Pending Prescriptions Disp Refills     albuterol (PROAIR HFA/PROVENTIL HFA/VENTOLIN HFA) 108 (90 Base) MCG/ACT inhaler [Pharmacy Med Name: Albuterol Sulfate  (90 Base)MCG/ACT AERS] 8.5 g      Sig: INHALE 2 PUFFS BY MOUTH EVERY FOUR HOURS AS NEEDED FOR WHEEZING OR SHORTNESS OF BREATH       Asthma Maintenance Inhalers - Anticholinergics Passed - 3/2/2022  2:23 PM        Passed - Patient is age 12 years or older        Passed - Asthma control assessment score within normal limits in last 6 months     Please review ACT score.           Passed - Medication is active on med list        Passed - Recent (6 mo) or future (30 days) visit within the authorizing provider's specialty     Patient had office visit in the last 6 months or has a visit in the next 30 days with authorizing provider or within the authorizing provider's specialty.  See \"Patient Info\" tab in inbasket, or \"Choose Columns\" in Meds & Orders section of " Addended by: OLIVIER FRASER on: 5/21/2025 04:43 PM     Modules accepted: Orders     "the refill encounter.           Short-Acting Beta Agonist Inhalers Protocol  Passed - 3/2/2022  2:23 PM        Passed - Patient is age 12 or older        Passed - Asthma control assessment score within normal limits in last 6 months     Please review ACT score.           Passed - Medication is active on med list        Passed - Recent (6 mo) or future (30 days) visit within the authorizing provider's specialty     Patient had office visit in the last 6 months or has a visit in the next 30 days with authorizing provider or within the authorizing provider's specialty.  See \"Patient Info\" tab in inbasket, or \"Choose Columns\" in Meds & Orders section of the refill encounter.                 Amara Mcrae RN 03/04/22 11:50 AM  "

## 2025-05-27 ENCOUNTER — TRANSFERRED RECORDS (OUTPATIENT)
Dept: HEALTH INFORMATION MANAGEMENT | Facility: CLINIC | Age: 50
End: 2025-05-27
Payer: COMMERCIAL

## 2025-05-28 ENCOUNTER — TRANSFERRED RECORDS (OUTPATIENT)
Dept: HEALTH INFORMATION MANAGEMENT | Facility: CLINIC | Age: 50
End: 2025-05-28
Payer: COMMERCIAL

## 2025-06-12 ENCOUNTER — OFFICE VISIT (OUTPATIENT)
Dept: URGENT CARE | Facility: URGENT CARE | Age: 50
End: 2025-06-12
Payer: COMMERCIAL

## 2025-06-12 ENCOUNTER — ANCILLARY PROCEDURE (OUTPATIENT)
Dept: GENERAL RADIOLOGY | Facility: CLINIC | Age: 50
End: 2025-06-12
Attending: PHYSICIAN ASSISTANT
Payer: COMMERCIAL

## 2025-06-12 VITALS
HEART RATE: 81 BPM | OXYGEN SATURATION: 99 % | DIASTOLIC BLOOD PRESSURE: 87 MMHG | RESPIRATION RATE: 18 BRPM | SYSTOLIC BLOOD PRESSURE: 147 MMHG | TEMPERATURE: 98.4 F

## 2025-06-12 DIAGNOSIS — L03.011 CELLULITIS OF RIGHT INDEX FINGER: ICD-10-CM

## 2025-06-12 DIAGNOSIS — M25.442 FINGER JOINT SWELLING, LEFT: ICD-10-CM

## 2025-06-12 DIAGNOSIS — M79.644 FINGER PAIN, RIGHT: Primary | ICD-10-CM

## 2025-06-12 RX ORDER — SULFAMETHOXAZOLE AND TRIMETHOPRIM 800; 160 MG/1; MG/1
1 TABLET ORAL 2 TIMES DAILY
Qty: 14 TABLET | Refills: 0 | Status: SHIPPED | OUTPATIENT
Start: 2025-06-12 | End: 2025-06-19

## 2025-06-12 RX ORDER — METRONIDAZOLE 500 MG/1
500 TABLET ORAL 3 TIMES DAILY
Qty: 21 TABLET | Refills: 0 | Status: SHIPPED | OUTPATIENT
Start: 2025-06-12 | End: 2025-06-19

## 2025-06-12 ASSESSMENT — PAIN SCALES - GENERAL: PAINLEVEL_OUTOF10: SEVERE PAIN (8)

## 2025-06-12 NOTE — PROGRESS NOTES
Urgent Care Clinic Visit    Chief Complaint   Patient presents with    Finger     Last week has been experiencing pain in left pointer finger. No known injury.                6/12/2025    12:18 PM   Additional Questions   Roomed by Darcy COOPER   Accompanied by self

## 2025-06-12 NOTE — PROGRESS NOTES
Assessment & Plan     Finger pain, right  Was seen for 1 week history of soft tissue swelling of the right index finger.  I suspect this is a local soft tissue infection likely related to the cat scratch though difficult to say for certain.  Differential also includes tenosynovitis.  Will cover with Bactrim and Flagyl given she is allergic to Augmentin.  Using this medication given possible wound to cover heart related pathogens.  Recommend warm compresses relative rest she was given a volar finger splint for pain control and immobilization to allow to rest.  I recommend she follow-up for worsening symptoms fevers rapidly worsening redness swelling or not improving as expected over the next 72 hours.    Cellulitis of right index finger  As above  - sulfamethoxazole-trimethoprim (BACTRIM DS) 800-160 MG tablet  Dispense: 14 tablet; Refill: 0  - metroNIDAZOLE (FLAGYL) 500 MG tablet  Dispense: 21 tablet; Refill: 0  - XR Finger Right G/E 2 Views             Heather Hays PA-C  [facility]    Rosemarie Farmer is a 49 year old female who presents to clinic today for the following health issues:  Chief Complaint   Patient presents with    Finger     Last week has been experiencing pain in R pointer finger. No known injury.          6/12/2025    12:18 PM   Additional Questions   Roomed by Darcy COOPER   Accompanied by self     HPI  Pt was seen with assistance of ESL interpretor.    Pt is a R hand dominent female, presents to urgent care with concern re: R index finger pain, redness and swelling x 1 week.    She denies trauma.  She did fall 1 month ago but did not think she injured her finger.  Had been fine until 1 week ago.    Has a scratch just proximal to the area of redness and pain, when specifically asked recalls it was from a cat scratch from 1 day prior to the onset of the discomfort.  She denies possibility of any bite or nip from the cat. No fevers.    No T/N.    Has not had similar in  the past. No overuse identified.   No hx of other joint effusion.      Review of Systems  Constitutional, HEENT, cardiovascular, pulmonary, gi and gu systems are negative, except as otherwise noted.      Objective    BP (!) 147/87   Pulse 81   Temp 98.4  F (36.9  C) (Oral)   Resp 18   LMP  (LMP Unknown)   SpO2 99%   Physical Exam   Exam of the L index finger reveals swelling of the proximal finger from PIP to the MCP region, circumferential but does not appear to have joint effusion.  Pain with AROM and mild erythema dorsally. Just proximal to this area of erythema is a 3 cm linear scratch which pt reports is from her cat. Small abrasion dorsal finger just proximal to the nail (source unclear, but possible abrasion, pt does not think it was related to cat). Sensation and peripheral pulses intact, cap refill brisk.    Stray per my independent evaluation is negative for any acute fracture or dislocation.  Soft tissue swelling is noted.

## 2025-06-12 NOTE — PATIENT INSTRUCTIONS
Warm compresses to the area.    Start antibiotic.   Follow up with primary care provider for persistent or worsening symptoms.

## 2025-06-14 ENCOUNTER — TELEPHONE (OUTPATIENT)
Dept: OPHTHALMOLOGY | Facility: CLINIC | Age: 50
End: 2025-06-14

## 2025-06-14 ENCOUNTER — APPOINTMENT (OUTPATIENT)
Dept: RADIOLOGY | Facility: CLINIC | Age: 50
End: 2025-06-14
Attending: PHYSICIAN ASSISTANT
Payer: COMMERCIAL

## 2025-06-14 ENCOUNTER — HOSPITAL ENCOUNTER (EMERGENCY)
Facility: CLINIC | Age: 50
Discharge: HOME | End: 2025-06-14
Admitting: PHYSICIAN ASSISTANT
Payer: COMMERCIAL

## 2025-06-14 ENCOUNTER — APPOINTMENT (OUTPATIENT)
Dept: MRI IMAGING | Facility: CLINIC | Age: 50
End: 2025-06-14
Attending: PHYSICIAN ASSISTANT
Payer: COMMERCIAL

## 2025-06-14 VITALS
WEIGHT: 217 LBS | BODY MASS INDEX: 36.15 KG/M2 | RESPIRATION RATE: 26 BRPM | TEMPERATURE: 97.9 F | SYSTOLIC BLOOD PRESSURE: 189 MMHG | OXYGEN SATURATION: 99 % | HEIGHT: 65 IN | DIASTOLIC BLOOD PRESSURE: 86 MMHG | HEART RATE: 70 BPM

## 2025-06-14 DIAGNOSIS — M25.441 SWELLING OF FINGER JOINT OF RIGHT HAND: ICD-10-CM

## 2025-06-14 DIAGNOSIS — H54.61 VISION LOSS OF RIGHT EYE: ICD-10-CM

## 2025-06-14 LAB
ANION GAP SERPL CALCULATED.3IONS-SCNC: 12 MMOL/L (ref 7–15)
BASOPHILS # BLD AUTO: 0 10E3/UL (ref 0–0.2)
BASOPHILS NFR BLD AUTO: 0 %
BUN SERPL-MCNC: 16.6 MG/DL (ref 6–20)
CALCIUM SERPL-MCNC: 9.4 MG/DL (ref 8.8–10.4)
CHLORIDE SERPL-SCNC: 106 MMOL/L (ref 98–107)
CREAT SERPL-MCNC: 1.29 MG/DL (ref 0.51–0.95)
CRP SERPL-MCNC: 5.04 MG/L
EGFRCR SERPLBLD CKD-EPI 2021: 50 ML/MIN/1.73M2
EOSINOPHIL # BLD AUTO: 0.1 10E3/UL (ref 0–0.7)
EOSINOPHIL NFR BLD AUTO: 1 %
ERYTHROCYTE [DISTWIDTH] IN BLOOD BY AUTOMATED COUNT: 13.7 % (ref 10–15)
ERYTHROCYTE [SEDIMENTATION RATE] IN BLOOD BY WESTERGREN METHOD: 10 MM/HR (ref 0–30)
GLUCOSE SERPL-MCNC: 108 MG/DL (ref 70–99)
HCO3 SERPL-SCNC: 22 MMOL/L (ref 22–29)
HCT VFR BLD AUTO: 38 % (ref 35–47)
HGB BLD-MCNC: 13.2 G/DL (ref 11.7–15.7)
IMM GRANULOCYTES # BLD: 0 10E3/UL
IMM GRANULOCYTES NFR BLD: 0 %
LYMPHOCYTES # BLD AUTO: 2.5 10E3/UL (ref 0.8–5.3)
LYMPHOCYTES NFR BLD AUTO: 36 %
MCH RBC QN AUTO: 28.8 PG (ref 26.5–33)
MCHC RBC AUTO-ENTMCNC: 34.7 G/DL (ref 31.5–36.5)
MCV RBC AUTO: 83 FL (ref 78–100)
MONOCYTES # BLD AUTO: 0.2 10E3/UL (ref 0–1.3)
MONOCYTES NFR BLD AUTO: 4 %
NEUTROPHILS # BLD AUTO: 4.1 10E3/UL (ref 1.6–8.3)
NEUTROPHILS NFR BLD AUTO: 59 %
NRBC # BLD AUTO: 0 10E3/UL
NRBC BLD AUTO-RTO: 0 /100
PLATELET # BLD AUTO: 297 10E3/UL (ref 150–450)
POTASSIUM SERPL-SCNC: 3.6 MMOL/L (ref 3.4–5.3)
RBC # BLD AUTO: 4.59 10E6/UL (ref 3.8–5.2)
SODIUM SERPL-SCNC: 140 MMOL/L (ref 135–145)
WBC # BLD AUTO: 7 10E3/UL (ref 4–11)

## 2025-06-14 PROCEDURE — 93005 ELECTROCARDIOGRAM TRACING: CPT | Performed by: PHYSICIAN ASSISTANT

## 2025-06-14 PROCEDURE — 250N000011 HC RX IP 250 OP 636: Mod: JW | Performed by: PHYSICIAN ASSISTANT

## 2025-06-14 PROCEDURE — 250N000012 HC RX MED GY IP 250 OP 636 PS 637: Performed by: PHYSICIAN ASSISTANT

## 2025-06-14 PROCEDURE — 73140 X-RAY EXAM OF FINGER(S): CPT | Mod: RT

## 2025-06-14 PROCEDURE — 82310 ASSAY OF CALCIUM: CPT | Performed by: PHYSICIAN ASSISTANT

## 2025-06-14 PROCEDURE — 36415 COLL VENOUS BLD VENIPUNCTURE: CPT | Performed by: PHYSICIAN ASSISTANT

## 2025-06-14 PROCEDURE — 70544 MR ANGIOGRAPHY HEAD W/O DYE: CPT

## 2025-06-14 PROCEDURE — 96361 HYDRATE IV INFUSION ADD-ON: CPT

## 2025-06-14 PROCEDURE — 70551 MRI BRAIN STEM W/O DYE: CPT

## 2025-06-14 PROCEDURE — 99285 EMERGENCY DEPT VISIT HI MDM: CPT | Mod: 25

## 2025-06-14 PROCEDURE — 70547 MR ANGIOGRAPHY NECK W/O DYE: CPT

## 2025-06-14 PROCEDURE — 258N000003 HC RX IP 258 OP 636: Performed by: PHYSICIAN ASSISTANT

## 2025-06-14 PROCEDURE — 86140 C-REACTIVE PROTEIN: CPT | Performed by: PHYSICIAN ASSISTANT

## 2025-06-14 PROCEDURE — 250N000013 HC RX MED GY IP 250 OP 250 PS 637: Performed by: PHYSICIAN ASSISTANT

## 2025-06-14 PROCEDURE — 96374 THER/PROPH/DIAG INJ IV PUSH: CPT

## 2025-06-14 PROCEDURE — 85025 COMPLETE CBC W/AUTO DIFF WBC: CPT | Performed by: PHYSICIAN ASSISTANT

## 2025-06-14 PROCEDURE — 85652 RBC SED RATE AUTOMATED: CPT | Performed by: PHYSICIAN ASSISTANT

## 2025-06-14 RX ORDER — PREDNISONE 20 MG/1
40 TABLET ORAL ONCE
Status: COMPLETED | OUTPATIENT
Start: 2025-06-14 | End: 2025-06-14

## 2025-06-14 RX ORDER — DOXYCYCLINE 100 MG/10ML
100 INJECTION, POWDER, LYOPHILIZED, FOR SOLUTION INTRAVENOUS ONCE
Status: DISCONTINUED | OUTPATIENT
Start: 2025-06-14 | End: 2025-06-14

## 2025-06-14 RX ORDER — DOXYCYCLINE 100 MG/1
100 CAPSULE ORAL 2 TIMES DAILY
Qty: 20 CAPSULE | Refills: 0 | Status: SHIPPED | OUTPATIENT
Start: 2025-06-14 | End: 2025-06-24

## 2025-06-14 RX ORDER — PREDNISONE 20 MG/1
TABLET ORAL
Qty: 8 TABLET | Refills: 0 | Status: SHIPPED | OUTPATIENT
Start: 2025-06-14

## 2025-06-14 RX ORDER — DOXYCYCLINE 100 MG/1
100 CAPSULE ORAL ONCE
Status: COMPLETED | OUTPATIENT
Start: 2025-06-14 | End: 2025-06-14

## 2025-06-14 RX ORDER — DIAZEPAM 10 MG/2ML
2.5 INJECTION, SOLUTION INTRAMUSCULAR; INTRAVENOUS ONCE
Status: COMPLETED | OUTPATIENT
Start: 2025-06-14 | End: 2025-06-14

## 2025-06-14 RX ORDER — OXYCODONE HYDROCHLORIDE 5 MG/1
5 TABLET ORAL ONCE
Refills: 0 | Status: COMPLETED | OUTPATIENT
Start: 2025-06-14 | End: 2025-06-14

## 2025-06-14 RX ADMIN — OXYCODONE HYDROCHLORIDE 5 MG: 5 TABLET ORAL at 22:24

## 2025-06-14 RX ADMIN — PREDNISONE 40 MG: 20 TABLET ORAL at 22:24

## 2025-06-14 RX ADMIN — SODIUM CHLORIDE 1000 ML: 0.9 INJECTION, SOLUTION INTRAVENOUS at 21:01

## 2025-06-14 RX ADMIN — DOXYCYCLINE 100 MG: 100 CAPSULE ORAL at 22:24

## 2025-06-14 RX ADMIN — DIAZEPAM 2.5 MG: 5 INJECTION, SOLUTION INTRAMUSCULAR; INTRAVENOUS at 19:15

## 2025-06-14 RX ADMIN — DIAZEPAM 2.5 MG: 5 INJECTION, SOLUTION INTRAMUSCULAR; INTRAVENOUS at 19:42

## 2025-06-14 ASSESSMENT — ACTIVITIES OF DAILY LIVING (ADL)
ADLS_ACUITY_SCORE: 44

## 2025-06-14 NOTE — ED PROVIDER NOTES
EMERGENCY DEPARTMENT ENCOUNTER   NAME: Marleny Viera ; AGE: 49 year old female ; YOB: 1975 ; MRN: [id number] ; PCP: Brianna Hurst     Evaluation Date & Time: No admission date for patient encounter.    ED Provider: Cherelle Pedro PA-C    CHIEF COMPLAINT     Loss of Vision and Hand Pain      FINAL ASSESSMENT       ICD-10-CM    1. Vision loss of right eye  H54.61 Ophthalmology Referral      2. Swelling of finger joint of right hand  M25.441           ED COURSE, MEDICAL DECISION MAKING, PLAN     ED course/notable events     4:38 PM Evaluated patient in an overflow triage area due to full department/inpatient boarders in the ED.    6:50 PM Went in to discuss her reported allergy to MRI contrast. Will change to non-contrast MRI/MRAs.   8:15 PM Spoke with Dr. Hartley from [facility].   9:15 PM Rechecked and updated patient.   9:29 PM Spoke with Dr. Vera from stroke neuro.   9:49 PM Spoke with Dr. Champion from ophthalmology.   10:14 PM Rechecked and updated patient. Discharge and follow up plans discussed.   ______________________________________________________________________    Reason for visit   Marleny Viera is a 49 year old female with HTN, deafness, asthma, borderline personality disorder, alcohol dependence in remission presenting for two concerns.   1 - complete vision loss in the right eye that was first noticed 2 days ago.   2 - Ongoing right index finger pain for the last 2-3 weeks despite being on Bactrim and Flagyl.     Exam positives   No vision out of the right eye at all. Cannot discern light, movement, shapes, colors. No gaze deviation.  Left eye unaffected. No APD.     Limited ROM of the right index finger due to pain. Mostly at the MIP and MCP joint spaces on the extensor surface. Palpable pain over the MIP, proximal phalanx, MCP, and up onto the dorsum of the hand on the extensor surface. Slight swelling noted around the MCP and proximal phalanx. Very faint erythema around the same  areas with slight increased tissue warmth.    Vitals   Blood pressure elevated at 169/77, otherwise vitally normal. No suggestion of HTN emergency.     Labs   Labs reveal a mild TAMMIE with a creatinine of 1.29 and a GFR of 50.  This is changed from 0.90 and 77 respectively 2 months ago.  The rest of the labs are reassuring.  No leukocytosis.  No significantly elevated CRP.  No elevated sed rate.    EKG   EKG was collected and independently interpreted by myself and also confirmed by ED MD.  Findings: Sinus rhythm with a rate of 62.  Nonspecific T wave abnormality.  QT/QTc 410/416.  No acute ischemia or STEMI.  Comparisons: Compared to ECG from April 29, 2025, no significant changes appreciated.    Imaging   MRI brain without contrast, MRA brain without contrast, and MRA of the neck without contrast  Radiologist read:  HEAD MRI:  Normal head MRI.  HEAD MRA:  1.  No aneurysm, high flow AVM or significant stenosis identified.  2.  Variant [place] anatomy as above.  NECK MRA:  Limited examination with grossly patent carotid and vertebral arteries.    X-ray of the right finger  Independent interpretation: I am unable to appreciate any acute fracture, dislocation, retained foreign body.  Radiologist read: Normal joint spaces and alignment. No fracture. No change since the prior study.     Interventions/recheck   Some improvement in pain and anxiety with meds.   No improvement in vision noted while here in the ER.     Medications   diazepam (VALIUM) injection 2.5 mg (2.5 mg Intravenous $Given 6/14/25 1915)   diazepam (VALIUM) injection 2.5 mg (2.5 mg Intravenous $Given 6/14/25 1942)   sodium chloride 0.9% BOLUS 1,000 mL (0 mLs Intravenous Stopped 6/14/25 2229)   predniSONE (DELTASONE) tablet 40 mg (40 mg Oral $Given 6/14/25 2224)   oxyCODONE (ROXICODONE) tablet 5 mg (5 mg Oral $Given 6/14/25 2224)   doxycycline hyclate (VIBRAMYCIN) capsule 100 mg (100 mg Oral $Given 6/14/25 2224)       Procedures  PROCEDURE:  Intraocular Pressure Measurement   DEVICE USED: Tonopen   INDICATIONS: Vision loss, eye pain   PROCEDURE PROVIDER: Cherelle Pedro PA-C   SITE: Eyes   MEDICATION: None    NOTE: Administered 2 drops of above solution, rapid anesthesia achieved. Using standard technique, performed Tonopen exam, which showed intraocular pressure(s) of;  15, 16, 15 mmHg in Right eye       COMPLICATIONS: Patient tolerated procedure well, without complication        Consults   -[facility] - Dr. Hartley ; recommends switching antibiotics and having her follow up in clinic this week for recheck.   -Stroke neuro - Dr. Vera ; recommends ophthalmology follow up  -Ophthalmology - Dr. Champion ; recommends follow up in clinic in no more than 3-4 days.     Assessment   Vision loss of right eye   Swelling of finger joint of right hand     Vision loss - Unclear etiology. Neuroimaging unrevealing. Ophthalmology suggested a couple of different possibilities, however, her reported symptoms and exam findings are not really fitting with anything. She has no curtain like blackness or shooting lights to suggest a retinal detachment. She has no APD on exam to suggest a lesion on the optic nerve or NAION. She has no peripheral vision with diminished central vision to suggest CRAO/CRVO. CRP and ESR normal and she has no palpable pain over the temples to suggest GCA. Ophthalmology recommends urgent follow up in clinic this week, but does not feel emergent transfer to the [facility] is needed tonight. In the event this truly did represent NAION, there really would be no treatment especially given she has been symptomatic for 2 days already.     Right index finger pain/swelling/redness -  Considering flexor tenosynovitis, but on my exam she has no tenderness over the flexor surface of the digit. Pain is isolated to the extensor surface over the MIP and MCP with some tracking up the 2nd metacarpal some. Swelling and redness is not overly impressive and her labs are  reassuring including WBC, CRP, and ESR. She has been afebrile. Xray without evidence of fracture or dislocation. Very much doubt gout with no history of this and the location of symptoms. Considered the possibility of an inflammatory arthritis as well.   In speaking with ortho, they felt switching the antibiotic and having her follow up with them in clinic urgently this week would be appropriate. At this time, no suggestion that she needs emergent washout in the OR. Will switch her to Doxycycline and try a course of Prednisone as well.     Based on all of the above, I very much doubt an acutely serious or life threatening condition. I do not feel any further workup nor admission to the hospital is warranted.     Plan   -Disposition: Considered admission, but labs and/or imaging reassuring and/or patient feeling improved thus ultimately discharged.     -Prescription(s) provided:   Discharge Medication List as of 6/14/2025 10:30 PM        START taking these medications    Details   doxycycline hyclate (VIBRAMYCIN) 100 MG capsule Take 1 capsule (100 mg) by mouth 2 times daily for 10 days., Disp-20 capsule, R-0, E-Prescribe      predniSONE (DELTASONE) 20 MG tablet Take two tablets (= 40mg) each day for 4 (four) days, Disp-8 tablet, R-0, E-Prescribe              -Referral(s)/specialist contact information given: [facility] and [facility] ophthalmology     -Recommendations: Recommended symptomatic cares including acetaminophen as needed, ice/heat, topical analgesics , and R.I.C.E Recommended arranging a follow up with PCP.   Follow up with ophthalmology and [facility] this week.    Indications for re-evaluation in the ER discussed.   Patient/parent/guardian understanding and agreeable with the plan and will discharge to home in good condition.       Medical decision making factors  Independent historian(s): N/A  Care impacted by chronic illnesses: HTN, mental health , and deafness  External records reviewed: relevant  "previous visit(s) as detailed below under HPI  Independent interpretation: Independently interpreted EKG as charted above under \"EKG.\" , Independently interpreted labs as charted above under \"labs.\" , and Independently interpreted images as charted above under \"imaging.\"   Consults: Discussed aspects of patient's care with another healthcare professional as discussed above under \"consults.\"   Disposition: See above under \"plan\"  Prescriptions: Yes. See above under \"prescription(s) provided.\"   MIPS: Not Applicable  Critical care: N/A  Sepsis: None         HISTORY OF PRESENT ILLNESS   Patient information was obtained from: Patient   Use of Intrepreter: [employer], [employer]    Pertinent past medical history: HTN, deafness, asthma, borderline personality disorder, alcohol dependence in remission    Marleny Viera is here by means of walk in  with self for evaluation of 2 separate complaints.    1 - Right-sided vision loss  Patient states that 2 days ago she lost all vision in her right eye.  States she just sees blackness.  She states she cannot make out any type of light, movement, or even shapes.  Basically states that there is no function of that eye.  She feels like there is a pressure behind the eye and is causing a mild headache.  It hurts a little worse with eyeball movement as well.    She denies any associated neck or back pain.  No dizziness.  No abnormal drainage from the eye.  States this is never happened to her before.    2 - Right index finger swelling and pain  Patient states that she was evaluated for this previously and was given 2 different antibiotics, but symptoms have not improved any.  She actually feels like it may be worsening and moving up her arm now.  States she really has a hard time straightening out her finger and fully bending it.  Most of the pain is situated around the MIP joint and proximal phalanx on the dorsal side of the digit.  She does feel a numb/tingling sensation " "on the top of her hand up to her wrist as well.    No known injuries to this digit.  No fevers, chills, body aches.    Per my chart review  6/12/2025: [place] urgent care for 1 week of right index finger swelling.  X-ray was completed showing no acute findings other than scattered intra phalangeal joint arthrosis and severe first carpometacarpal joint arthrosis. Provider thought this represents a local soft tissue infection likely related to a cat scratch. Covered with Bactrim and Flagyl given her allergy to Augmentin.  Recommended warm compresses and was given a volar finger splint.      PHYSICAL EXAM     First Vitals:  Patient Vitals for the past 24 hrs:   BP Temp Temp src Pulse Resp SpO2 Height Weight   06/14/25 1900 (!) 189/86 -- -- 70 26 99 % -- --   06/14/25 1824 (!) 132/105 -- -- 67 26 97 % -- --   06/14/25 1548 (!) 169/77 97.9  F (36.6  C) Temporal 73 18 98 % 1.651 m (5' 5\") 98.4 kg (217 lb)       PHYSICAL EXAM:   Constitutional: No acute distress.  Neuro:  Alertness: Awake and alert. GCS 15. Answers 2 questions appropriately. Follows 2 commands appropriately.   Speech: Unable to assess (pt deaf)   Vision: No vision out of the right eye at all. Cannot discern light, movement, shapes, colors. No gaze deviation.  Left eye unaffected.   Coordination: Finger-nose-finger smooth. Gait steady.   Sensations: Sensations intact and equal to b/l cheeks, forearms, thighs, and shins.   Strength: No facial droop. No eye droop. No arm drift. No leg drift. Upper and lower extremity strength is strong and equal b/l.   Psych: Calm and cooperative.  Head: Normocephalic. No palpable pain over the temples or facial bones.   Eyes: PERRL. EOMI. Conjunctivae clear. Right eye pressures average 15.     Mouth: Pink and moist.     Neck: FROM. No palpable pain over the cervical spine. No palpable pain over the paraspinal musculature.   Cardio: Regular rate. Adequate perfusion to extremities. Regular rhythm. No murmurs.  Pulmonary: " Oxygenating well on RA. No labored breathing. No wheezes. No rales. No rhonchi.   Back: Appears to move freely.   Upper extremities: Limited ROM of the right index finger due to pain. Mostly at the MIP and MCP joint spaces on the extensor surface. Palpable pain over the MIP, proximal phalanx, MCP, and up onto the dorsum of the hand on the extensor surface. Slight swelling noted around the MCP and proximal phalanx. Very faint erythema around the same areas with slight increased tissue warmth. Distal pulses intact. Sensations intact.   Lower extremities: Moves freely. No edema. Distal pulses intact. Sensations intact.   Skin: See above under upper extremities. All other skin is natural color, warm, dry, intact.         MEDICAL HISTORY     Past Medical History:   Diagnosis Date    Accidental overdose, initial encounter 07/07/2022    ADHD (attention deficit hyperactivity disorder)     Alcohol dependence (H)     Anxiety     Arthritis     Asthma     B12 deficiency     Bipolar depression (H)     Blood clotting disorder     Deafness     Depressive disorder 10 year    Drug abuse, nondependent (H) 10/29/2004    History of blood clots     History of transfusion     Hypertension     Kidney stones 2015    Major depressive disorder, recurrent episode, severe (H) 05/17/2016    Migraine     Obstructive sleep apnea 06/01/2016    Pulmonary emboli (H)     Status post total right knee replacement 01/18/2016       Past Surgical History:   Procedure Laterality Date    BIOPSY BREAST Right     2019... Also had fluid drained from left breast under surgery also in 2019    BREAST SURGERY      MAMMOPLASTY REDUCTION Bilateral 01/01/2017    ORTHOPEDIC SURGERY  2012,2013,2014    TOTAL HIP ARTHROPLASTY Bilateral     TOTAL KNEE ARTHROPLASTY Right     TUBAL LIGATION      US BREAST CORE BIOPSY RIGHT Right 11/21/2019       Family History   Problem Relation Age of Onset    Cancer Mother     Breast Cancer Mother     Diabetes Mother     Hypertension  Mother     Cerebrovascular Disease Father     Hyperlipidemia No family hx of     Depression No family hx of     Anxiety Disorder No family hx of     Mental Illness No family hx of     Substance Abuse No family hx of     Asthma No family hx of        Social History     Tobacco Use    Smoking status: Never     Passive exposure: Never    Smokeless tobacco: Never   Vaping Use    Vaping status: Never Used   Substance Use Topics    Alcohol use: Yes     Comment: Alcoholic Drinks/day: dependence    Drug use: Never       Medications   doxycycline hyclate (VIBRAMYCIN) 100 MG capsule  predniSONE (DELTASONE) 20 MG tablet  albuterol (PROAIR HFA/PROVENTIL HFA/VENTOLIN HFA) 108 (90 Base) MCG/ACT inhaler  albuterol (PROVENTIL) (2.5 MG/3ML) 0.083% neb solution  budesonide-formoterol (SYMBICORT/BREYNA) 160-4.5 MCG/ACT Inhaler  citalopram (CELEXA) 20 MG tablet  cyanocobalamin (CYANOCOBALAMIN) 1000 mcg/mL injection  docusate sodium (COLACE) 100 MG capsule  EPINEPHrine (ANY BX GENERIC EQUIV) 0.3 MG/0.3ML injection 2-pack  estradiol (CLIMARA) 0.05 MG/24HR weekly patch  estradiol (CLIMARA) 0.05 MG/24HR weekly patch  gabapentin (NEURONTIN) 100 MG capsule  hydrOXYzine HCl (ATARAX) 25 MG tablet  levocetirizine (XYZAL) 5 MG tablet  losartan (COZAAR) 50 MG tablet  methocarbamol (ROBAXIN) 500 MG tablet  metoprolol succinate ER (TOPROL XL) 25 MG 24 hr tablet  metroNIDAZOLE (FLAGYL) 500 MG tablet  montelukast (SINGULAIR) 10 MG tablet  multivitamin, therapeutic (THERA-VIT) TABS tablet  NONFORMULARY  omeprazole (PRILOSEC) 20 MG DR capsule  prazosin (MINIPRESS) 1 MG capsule  PROMETRIUM 200 MG capsule  QUEtiapine (SEROQUEL) 50 MG tablet  rizatriptan (MAXALT) 10 MG tablet  sulfamethoxazole-trimethoprim (BACTRIM DS) 800-160 MG tablet  topiramate (TOPAMAX) 25 MG tablet        RESULTS     LAB:  All pertinent labs reviewed and interpreted  Labs Ordered and Resulted from Time of ED Arrival to Time of ED Departure   BASIC METABOLIC PANEL - Abnormal        Result Value    Sodium 140      Potassium 3.6      Chloride 106      Carbon Dioxide (CO2) 22      Anion Gap 12      Urea Nitrogen 16.6      Creatinine 1.29 (*)     GFR Estimate 50 (*)     Calcium 9.4      Glucose 108 (*)    CRP INFLAMMATION - Abnormal    CRP Inflammation 5.04 (*)    ERYTHROCYTE SEDIMENTATION RATE AUTO - Normal    Erythrocyte Sedimentation Rate 10     CBC WITH PLATELETS AND DIFFERENTIAL    WBC Count 7.0      RBC Count 4.59      Hemoglobin 13.2      Hematocrit 38.0      MCV 83      MCH 28.8      MCHC 34.7      RDW 13.7      Platelet Count 297      % Neutrophils 59      % Lymphocytes 36      % Monocytes 4      % Eosinophils 1      % Basophils 0      % Immature Granulocytes 0      NRBCs per 100 WBC 0      Absolute Neutrophils 4.1      Absolute Lymphocytes 2.5      Absolute Monocytes 0.2      Absolute Eosinophils 0.1      Absolute Basophils 0.0      Absolute Immature Granulocytes 0.0      Absolute NRBCs 0.0         RADIOLOGY:  MRA Brain ([place]) w/o Contrast   Final Result   IMPRESSION:   HEAD MRI:   Normal head MRI.      HEAD MRA:   1.  No aneurysm, high flow AVM or significant stenosis identified.   2.  Variant [place] anatomy as above.      NECK MRA:   Limited examination with grossly patent carotid and vertebral arteries.         MR Brain w/o Contrast   Final Result   IMPRESSION:   HEAD MRI:   Normal head MRI.      HEAD MRA:   1.  No aneurysm, high flow AVM or significant stenosis identified.   2.  Variant [place] anatomy as above.      NECK MRA:   Limited examination with grossly patent carotid and vertebral arteries.         MRA Neck (Carotids) wo Contrast   Final Result   IMPRESSION:   HEAD MRI:   Normal head MRI.      HEAD MRA:   1.  No aneurysm, high flow AVM or significant stenosis identified.   2.  Variant [place] anatomy as above.      NECK MRA:   Limited examination with grossly patent carotid and vertebral arteries.         XR Finger Right G/E 2 Views    Final Result   IMPRESSION: Normal joint spaces and alignment. No fracture. No change since the prior study.                  Some or all of this documentation has been completed using dictation software and grammatical errors may be present. Please contact me with any concerns regarding this.     Cherelle Pedro PA-C  Emergency Medicine   [facility] EMERGENCY ROOM       Cherelle Pedro PA-C  06/16/25 9926

## 2025-06-14 NOTE — ED TRIAGE NOTES
Pt arrives to ED with c/o 1 week of right index finger pain redness and swelling and 2 days of complete vision loss in right eye. Pt was seen at the clinic on 6/12 and dx with cellulitis to that index finger and started on flagyl and bactrim which she states are not helping with the pain or inflammation. Pt needs .      Triage Assessment (Adult)       Row Name 06/14/25 1547          Triage Assessment    Airway WDL WDL        Respiratory WDL    Respiratory WDL WDL        Skin Circulation/Temperature WDL    Skin Circulation/Temperature WDL WDL        Cardiac WDL    Cardiac WDL WDL        Peripheral/Neurovascular WDL    Peripheral Neurovascular WDL WDL        Cognitive/Neuro/Behavioral WDL    Cognitive/Neuro/Behavioral WDL WDL

## 2025-06-15 NOTE — TELEPHONE ENCOUNTER
NAYELI Pedro called from [facility] ED.    Patient complaining of complete black out of vision 2 days ago, right eye, but normal pupil response and no APD (double checked by PA and MD). Patient has HTN but no DM. Complete NLP vision loss, should be associated with APD. Symptoms not consistent with RD or VH or other issue given NLP vision. Potential Ddx includes NAION, ischemic optic neuropathy, or other optic nerve infarct - but unlikely given no APD and normal pupils. Patient has h/o psychiatric issues and is deaf. Even in the setting of NAION, no proven acute treatment 2 days out. No benefit to steroids. No reason for hyperbaric therapy 2 days out. Recommend controlling any HTN issues, refer to to ophthalmology in the next week. Advised PA to put in an adult eye referral and also to have the patient call [phone] to be added on to clinic in the next week at the [facility] eye clinic.      Saurabh Champion MD   of Ophthalmology

## 2025-06-15 NOTE — DISCHARGE INSTRUCTIONS
Your MRI images were reassuring. Your laboratory workup was also reassuring aside from mild kidney dysfunction.  Make sure to drink plenty of water.  I did recommend following up with your primary care provider to have your labs rechecked in the next 1 to 2 weeks.    We are not entirely certain what is causing the vision loss in your right eye.    In speaking with ophthalmology, they would like you to follow-up in clinic with them on either Monday or Tuesday.  If you cannot get into your regular ophthalmologist within that timeframe that is okay, but if not, I would reach out to the [place] eye specialist at the [facility] to get in with them.  Their contact number is [phone].  If for some reason they are unable to get you in urgently, you can go to the [facility] emergency department because they do have on-call eye doctors there.  Of course if there is any new or worsening symptoms do not hesitate to get into the [facility] emergency department immediately.    As for your finger swelling and pain, I will have you start taking a different antibiotic called doxycycline.  I will also give you some prednisone which is a steroid which helps with swelling and pain.  You can continue with Tylenol.  You can also use ice and heat.  [facility] would like to see you in our clinic this week.  Their contact information has been attached to this paperwork for your convenience.  If you develop worsening symptoms with the finger, do not hesitate to return to the ER sooner.    Your blood pressure was noted to be elevated here today.  There are many possible reasons for high blood pressure.  Sometimes blood pressure will increase during stressful situations, such as being anxious or in pain.  These temporary increases are not concerning.  When your blood pressure is persistently elevated over a long period of time however, you are at increased risk of having a stroke or heart attack.  Since your blood  pressure was elevated today, I recommend monitoring and following up in your regular clinic to discuss management if it persists.

## 2025-06-16 ENCOUNTER — APPOINTMENT (OUTPATIENT)
Dept: CT IMAGING | Facility: CLINIC | Age: 50
DRG: 125 | End: 2025-06-16
Attending: EMERGENCY MEDICINE
Payer: COMMERCIAL

## 2025-06-16 ENCOUNTER — OFFICE VISIT (OUTPATIENT)
Dept: INTERPRETER SERVICES | Facility: CLINIC | Age: 50
End: 2025-06-16
Attending: OPHTHALMOLOGY
Payer: COMMERCIAL

## 2025-06-16 ENCOUNTER — PATIENT OUTREACH (OUTPATIENT)
Dept: CARE COORDINATION | Facility: CLINIC | Age: 50
End: 2025-06-16

## 2025-06-16 ENCOUNTER — HOSPITAL ENCOUNTER (INPATIENT)
Facility: CLINIC | Age: 50
LOS: 2 days | Discharge: HOME | DRG: 125 | End: 2025-06-18
Attending: EMERGENCY MEDICINE | Admitting: INTERNAL MEDICINE
Payer: COMMERCIAL

## 2025-06-16 ENCOUNTER — APPOINTMENT (OUTPATIENT)
Dept: GENERAL RADIOLOGY | Facility: CLINIC | Age: 50
DRG: 125 | End: 2025-06-16
Attending: EMERGENCY MEDICINE
Payer: COMMERCIAL

## 2025-06-16 DIAGNOSIS — R51.9 NONINTRACTABLE HEADACHE, UNSPECIFIED CHRONICITY PATTERN, UNSPECIFIED HEADACHE TYPE: Primary | ICD-10-CM

## 2025-06-16 DIAGNOSIS — L08.9 FINGER INFECTION: ICD-10-CM

## 2025-06-16 DIAGNOSIS — Z71.89 OTHER SPECIFIED COUNSELING: Chronic | ICD-10-CM

## 2025-06-16 DIAGNOSIS — H33.21 RIGHT RETINAL DETACHMENT: ICD-10-CM

## 2025-06-16 DIAGNOSIS — H54.61 VISION LOSS OF RIGHT EYE: ICD-10-CM

## 2025-06-16 LAB
ALBUMIN UR-MCNC: 10 MG/DL
ANION GAP SERPL CALCULATED.3IONS-SCNC: 13 MMOL/L (ref 7–15)
APPEARANCE UR: CLEAR
ATRIAL RATE - MUSE: 62 BPM
BACTERIA #/AREA URNS HPF: ABNORMAL /HPF
BASOPHILS # BLD AUTO: 0 10E3/UL (ref 0–0.2)
BASOPHILS NFR BLD AUTO: 0 %
BILIRUB UR QL STRIP: NEGATIVE
BUN SERPL-MCNC: 20.5 MG/DL (ref 6–20)
CALCIUM SERPL-MCNC: 9.2 MG/DL (ref 8.8–10.4)
CAOX CRY #/AREA URNS HPF: ABNORMAL /HPF
CHLORIDE SERPL-SCNC: 106 MMOL/L (ref 98–107)
COLOR UR AUTO: YELLOW
CREAT SERPL-MCNC: 1.2 MG/DL (ref 0.51–0.95)
CRP SERPL-MCNC: <3 MG/L
DIASTOLIC BLOOD PRESSURE - MUSE: NORMAL MMHG
EGFRCR SERPLBLD CKD-EPI 2021: 55 ML/MIN/1.73M2
EOSINOPHIL # BLD AUTO: 0.1 10E3/UL (ref 0–0.7)
EOSINOPHIL NFR BLD AUTO: 1 %
ERYTHROCYTE [DISTWIDTH] IN BLOOD BY AUTOMATED COUNT: 14.2 % (ref 10–15)
ERYTHROCYTE [SEDIMENTATION RATE] IN BLOOD BY WESTERGREN METHOD: 9 MM/HR (ref 0–30)
GLUCOSE SERPL-MCNC: 116 MG/DL (ref 70–99)
GLUCOSE UR STRIP-MCNC: NEGATIVE MG/DL
HCO3 SERPL-SCNC: 21 MMOL/L (ref 22–29)
HCT VFR BLD AUTO: 39.3 % (ref 35–47)
HGB BLD-MCNC: 13.1 G/DL (ref 11.7–15.7)
HGB UR QL STRIP: NEGATIVE
IMM GRANULOCYTES # BLD: 0 10E3/UL
IMM GRANULOCYTES NFR BLD: 0 %
INR PPP: 0.85 (ref 0.85–1.15)
INTERPRETATION ECG - MUSE: NORMAL
KETONES UR STRIP-MCNC: NEGATIVE MG/DL
LEUKOCYTE ESTERASE UR QL STRIP: ABNORMAL
LYMPHOCYTES # BLD AUTO: 3.2 10E3/UL (ref 0.8–5.3)
LYMPHOCYTES NFR BLD AUTO: 39 %
MCH RBC QN AUTO: 28.4 PG (ref 26.5–33)
MCHC RBC AUTO-ENTMCNC: 33.3 G/DL (ref 31.5–36.5)
MCV RBC AUTO: 85 FL (ref 78–100)
MONOCYTES # BLD AUTO: 0.3 10E3/UL (ref 0–1.3)
MONOCYTES NFR BLD AUTO: 3 %
MUCOUS THREADS #/AREA URNS LPF: PRESENT /LPF
NEUTROPHILS # BLD AUTO: 4.7 10E3/UL (ref 1.6–8.3)
NEUTROPHILS NFR BLD AUTO: 56 %
NITRATE UR QL: NEGATIVE
NRBC # BLD AUTO: 0 10E3/UL
NRBC BLD AUTO-RTO: 0 /100
P AXIS - MUSE: 47 DEGREES
PH UR STRIP: 5.5 [PH] (ref 5–7)
PLATELET # BLD AUTO: 286 10E3/UL (ref 150–450)
POTASSIUM SERPL-SCNC: 4 MMOL/L (ref 3.4–5.3)
PR INTERVAL - MUSE: 166 MS
PROCALCITONIN SERPL IA-MCNC: 0.03 NG/ML
PROTHROMBIN TIME: 12 SECONDS (ref 11.8–14.8)
QRS DURATION - MUSE: 74 MS
QT - MUSE: 410 MS
QTC - MUSE: 416 MS
R AXIS - MUSE: 43 DEGREES
RBC # BLD AUTO: 4.61 10E6/UL (ref 3.8–5.2)
RBC URINE: 10 /HPF
SODIUM SERPL-SCNC: 140 MMOL/L (ref 135–145)
SP GR UR STRIP: 1.03 (ref 1–1.03)
SQUAMOUS EPITHELIAL: 2 /HPF
SYSTOLIC BLOOD PRESSURE - MUSE: NORMAL MMHG
T AXIS - MUSE: -11 DEGREES
UROBILINOGEN UR STRIP-MCNC: NORMAL MG/DL
VENTRICULAR RATE- MUSE: 62 BPM
WBC # BLD AUTO: 8.3 10E3/UL (ref 4–11)
WBC URINE: 2 /HPF

## 2025-06-16 PROCEDURE — 85652 RBC SED RATE AUTOMATED: CPT | Performed by: EMERGENCY MEDICINE

## 2025-06-16 PROCEDURE — 73200 CT UPPER EXTREMITY W/O DYE: CPT | Mod: RT

## 2025-06-16 PROCEDURE — 85004 AUTOMATED DIFF WBC COUNT: CPT | Performed by: EMERGENCY MEDICINE

## 2025-06-16 PROCEDURE — 99285 EMERGENCY DEPT VISIT HI MDM: CPT | Performed by: EMERGENCY MEDICINE

## 2025-06-16 PROCEDURE — 250N000011 HC RX IP 250 OP 636: Performed by: EMERGENCY MEDICINE

## 2025-06-16 PROCEDURE — 99285 EMERGENCY DEPT VISIT HI MDM: CPT | Mod: 25 | Performed by: EMERGENCY MEDICINE

## 2025-06-16 PROCEDURE — 96365 THER/PROPH/DIAG IV INF INIT: CPT | Performed by: EMERGENCY MEDICINE

## 2025-06-16 PROCEDURE — 36415 COLL VENOUS BLD VENIPUNCTURE: CPT | Performed by: EMERGENCY MEDICINE

## 2025-06-16 PROCEDURE — G0378 HOSPITAL OBSERVATION PER HR: HCPCS

## 2025-06-16 PROCEDURE — 96366 THER/PROPH/DIAG IV INF ADDON: CPT

## 2025-06-16 PROCEDURE — 86140 C-REACTIVE PROTEIN: CPT | Performed by: EMERGENCY MEDICINE

## 2025-06-16 PROCEDURE — 99223 1ST HOSP IP/OBS HIGH 75: CPT | Mod: GC | Performed by: OPHTHALMOLOGY

## 2025-06-16 PROCEDURE — 84145 PROCALCITONIN (PCT): CPT | Performed by: EMERGENCY MEDICINE

## 2025-06-16 PROCEDURE — 250N000013 HC RX MED GY IP 250 OP 250 PS 637: Performed by: INTERNAL MEDICINE

## 2025-06-16 PROCEDURE — 96374 THER/PROPH/DIAG INJ IV PUSH: CPT | Performed by: EMERGENCY MEDICINE

## 2025-06-16 PROCEDURE — T1013 SIGN LANG/ORAL INTERPRETER: HCPCS | Mod: U3

## 2025-06-16 PROCEDURE — 85610 PROTHROMBIN TIME: CPT | Performed by: EMERGENCY MEDICINE

## 2025-06-16 PROCEDURE — 73130 X-RAY EXAM OF HAND: CPT | Mod: RT

## 2025-06-16 PROCEDURE — 80048 BASIC METABOLIC PNL TOTAL CA: CPT | Performed by: EMERGENCY MEDICINE

## 2025-06-16 PROCEDURE — 250N000013 HC RX MED GY IP 250 OP 250 PS 637

## 2025-06-16 PROCEDURE — 258N000003 HC RX IP 258 OP 636: Performed by: INTERNAL MEDICINE

## 2025-06-16 PROCEDURE — 81001 URINALYSIS AUTO W/SCOPE: CPT | Performed by: EMERGENCY MEDICINE

## 2025-06-16 PROCEDURE — 250N000009 HC RX 250: Performed by: EMERGENCY MEDICINE

## 2025-06-16 PROCEDURE — 120N000002 HC R&B MED SURG/OB UMMC

## 2025-06-16 PROCEDURE — 99222 1ST HOSP IP/OBS MODERATE 55: CPT | Performed by: INTERNAL MEDICINE

## 2025-06-16 RX ORDER — BUDESONIDE AND FORMOTEROL FUMARATE DIHYDRATE 160; 4.5 UG/1; UG/1
2 AEROSOL RESPIRATORY (INHALATION) 2 TIMES DAILY
Status: DISCONTINUED | OUTPATIENT
Start: 2025-06-16 | End: 2025-06-18 | Stop reason: HOSPADM

## 2025-06-16 RX ORDER — PANTOPRAZOLE SODIUM 40 MG/1
40 TABLET, DELAYED RELEASE ORAL
Status: DISCONTINUED | OUTPATIENT
Start: 2025-06-16 | End: 2025-06-18 | Stop reason: HOSPADM

## 2025-06-16 RX ORDER — AMOXICILLIN 250 MG
2 CAPSULE ORAL 2 TIMES DAILY PRN
Status: DISCONTINUED | OUTPATIENT
Start: 2025-06-16 | End: 2025-06-18 | Stop reason: HOSPADM

## 2025-06-16 RX ORDER — TOPIRAMATE 25 MG/1
25 TABLET, FILM COATED ORAL 2 TIMES DAILY
Status: DISCONTINUED | OUTPATIENT
Start: 2025-06-16 | End: 2025-06-18 | Stop reason: HOSPADM

## 2025-06-16 RX ORDER — LIDOCAINE 40 MG/G
CREAM TOPICAL
Status: DISCONTINUED | OUTPATIENT
Start: 2025-06-16 | End: 2025-06-18 | Stop reason: HOSPADM

## 2025-06-16 RX ORDER — OXYCODONE HYDROCHLORIDE 5 MG/1
5 TABLET ORAL EVERY 4 HOURS PRN
Refills: 0 | Status: DISCONTINUED | OUTPATIENT
Start: 2025-06-16 | End: 2025-06-18 | Stop reason: HOSPADM

## 2025-06-16 RX ORDER — AMOXICILLIN 250 MG
1 CAPSULE ORAL 2 TIMES DAILY PRN
Status: DISCONTINUED | OUTPATIENT
Start: 2025-06-16 | End: 2025-06-18 | Stop reason: HOSPADM

## 2025-06-16 RX ORDER — LOSARTAN POTASSIUM 25 MG/1
25 TABLET ORAL DAILY
Status: DISCONTINUED | OUTPATIENT
Start: 2025-06-16 | End: 2025-06-18 | Stop reason: HOSPADM

## 2025-06-16 RX ORDER — DOCUSATE SODIUM 100 MG/1
100 CAPSULE, LIQUID FILLED ORAL 2 TIMES DAILY
Status: DISCONTINUED | OUTPATIENT
Start: 2025-06-16 | End: 2025-06-18 | Stop reason: HOSPADM

## 2025-06-16 RX ORDER — PROPARACAINE HYDROCHLORIDE 5 MG/ML
1 SOLUTION/ DROPS OPHTHALMIC ONCE
Status: COMPLETED | OUTPATIENT
Start: 2025-06-16 | End: 2025-06-16

## 2025-06-16 RX ORDER — SODIUM CHLORIDE 9 MG/ML
INJECTION, SOLUTION INTRAVENOUS CONTINUOUS
Status: ACTIVE | OUTPATIENT
Start: 2025-06-16 | End: 2025-06-17

## 2025-06-16 RX ORDER — CEFTRIAXONE 1 G/1
1 INJECTION, POWDER, FOR SOLUTION INTRAMUSCULAR; INTRAVENOUS ONCE
Status: COMPLETED | OUTPATIENT
Start: 2025-06-16 | End: 2025-06-16

## 2025-06-16 RX ORDER — METOPROLOL SUCCINATE 25 MG/1
25 TABLET, EXTENDED RELEASE ORAL DAILY
Status: DISCONTINUED | OUTPATIENT
Start: 2025-06-16 | End: 2025-06-18 | Stop reason: HOSPADM

## 2025-06-16 RX ORDER — OMEPRAZOLE 20 MG/1
20 CAPSULE, DELAYED RELEASE ORAL DAILY
Status: DISCONTINUED | OUTPATIENT
Start: 2025-06-16 | End: 2025-06-16

## 2025-06-16 RX ORDER — LEVOCETIRIZINE DIHYDROCHLORIDE 5 MG/1
5 TABLET, FILM COATED ORAL EVERY EVENING
Status: DISCONTINUED | OUTPATIENT
Start: 2025-06-16 | End: 2025-06-18 | Stop reason: HOSPADM

## 2025-06-16 RX ORDER — PRAZOSIN HYDROCHLORIDE 1 MG/1
1 CAPSULE ORAL AT BEDTIME
Status: DISCONTINUED | OUTPATIENT
Start: 2025-06-16 | End: 2025-06-18 | Stop reason: HOSPADM

## 2025-06-16 RX ORDER — THERA TABS 400 MCG
1 TAB ORAL DAILY
Status: DISCONTINUED | OUTPATIENT
Start: 2025-06-16 | End: 2025-06-18 | Stop reason: HOSPADM

## 2025-06-16 RX ORDER — NALOXONE HYDROCHLORIDE 0.4 MG/ML
0.2 INJECTION, SOLUTION INTRAMUSCULAR; INTRAVENOUS; SUBCUTANEOUS
Status: DISCONTINUED | OUTPATIENT
Start: 2025-06-16 | End: 2025-06-18 | Stop reason: HOSPADM

## 2025-06-16 RX ORDER — CALCIUM CARBONATE 500 MG/1
1000 TABLET, CHEWABLE ORAL 4 TIMES DAILY PRN
Status: DISCONTINUED | OUTPATIENT
Start: 2025-06-16 | End: 2025-06-18 | Stop reason: HOSPADM

## 2025-06-16 RX ORDER — NALOXONE HYDROCHLORIDE 0.4 MG/ML
0.4 INJECTION, SOLUTION INTRAMUSCULAR; INTRAVENOUS; SUBCUTANEOUS
Status: DISCONTINUED | OUTPATIENT
Start: 2025-06-16 | End: 2025-06-18 | Stop reason: HOSPADM

## 2025-06-16 RX ORDER — HYDROXYZINE HYDROCHLORIDE 25 MG/1
25 TABLET, FILM COATED ORAL EVERY 6 HOURS PRN
Status: DISCONTINUED | OUTPATIENT
Start: 2025-06-16 | End: 2025-06-18 | Stop reason: HOSPADM

## 2025-06-16 RX ORDER — GABAPENTIN 100 MG/1
200 CAPSULE ORAL 3 TIMES DAILY
Status: DISCONTINUED | OUTPATIENT
Start: 2025-06-16 | End: 2025-06-18 | Stop reason: HOSPADM

## 2025-06-16 RX ORDER — CEFTRIAXONE 1 G/1
1 INJECTION, POWDER, FOR SOLUTION INTRAMUSCULAR; INTRAVENOUS ONCE
Status: COMPLETED | OUTPATIENT
Start: 2025-06-17 | End: 2025-06-17

## 2025-06-16 RX ORDER — MONTELUKAST SODIUM 10 MG/1
10 TABLET ORAL AT BEDTIME
Status: DISCONTINUED | OUTPATIENT
Start: 2025-06-16 | End: 2025-06-18 | Stop reason: HOSPADM

## 2025-06-16 RX ORDER — QUETIAPINE FUMARATE 50 MG/1
50 TABLET, FILM COATED ORAL AT BEDTIME
Status: DISCONTINUED | OUTPATIENT
Start: 2025-06-16 | End: 2025-06-18 | Stop reason: HOSPADM

## 2025-06-16 RX ORDER — PROGESTERONE 100 MG/1
200 CAPSULE ORAL DAILY
Status: DISCONTINUED | OUTPATIENT
Start: 2025-06-16 | End: 2025-06-18 | Stop reason: HOSPADM

## 2025-06-16 RX ADMIN — PROPARACAINE HYDROCHLORIDE 1 DROP: 5 SOLUTION/ DROPS OPHTHALMIC at 15:43

## 2025-06-16 RX ADMIN — LEVOCETIRIZINE DIHYDROCHLORIDE 5 MG: 5 TABLET ORAL at 20:22

## 2025-06-16 RX ADMIN — TOPIRAMATE 25 MG: 25 TABLET, FILM COATED ORAL at 20:23

## 2025-06-16 RX ADMIN — OXYCODONE 5 MG: 5 TABLET ORAL at 21:33

## 2025-06-16 RX ADMIN — MONTELUKAST 10 MG: 10 TABLET, FILM COATED ORAL at 21:33

## 2025-06-16 RX ADMIN — CEFTRIAXONE SODIUM 1 G: 1 INJECTION, POWDER, FOR SOLUTION INTRAMUSCULAR; INTRAVENOUS at 14:09

## 2025-06-16 RX ADMIN — PANTOPRAZOLE SODIUM 40 MG: 40 TABLET, DELAYED RELEASE ORAL at 17:50

## 2025-06-16 RX ADMIN — THERA TABS 1 TABLET: TAB at 17:49

## 2025-06-16 RX ADMIN — GABAPENTIN 200 MG: 100 CAPSULE ORAL at 20:22

## 2025-06-16 RX ADMIN — BUDESONIDE AND FORMOTEROL FUMARATE DIHYDRATE 2 PUFF: 160; 4.5 AEROSOL RESPIRATORY (INHALATION) at 21:44

## 2025-06-16 RX ADMIN — PROGESTERONE 200 MG: 100 CAPSULE ORAL at 17:53

## 2025-06-16 RX ADMIN — SODIUM CHLORIDE: 0.9 INJECTION, SOLUTION INTRAVENOUS at 21:34

## 2025-06-16 RX ADMIN — PRAZOSIN HYDROCHLORIDE 1 MG: 1 CAPSULE ORAL at 21:33

## 2025-06-16 RX ADMIN — DOCUSATE SODIUM 100 MG: 100 CAPSULE, LIQUID FILLED ORAL at 20:23

## 2025-06-16 RX ADMIN — QUETIAPINE FUMARATE 50 MG: 50 TABLET ORAL at 21:33

## 2025-06-16 ASSESSMENT — ACTIVITIES OF DAILY LIVING (ADL)
ADLS_ACUITY_SCORE: 42
ADLS_ACUITY_SCORE: 44
ADLS_ACUITY_SCORE: 44
ADLS_ACUITY_SCORE: 42
ADLS_ACUITY_SCORE: 44
ADLS_ACUITY_SCORE: 42
ADLS_ACUITY_SCORE: 44

## 2025-06-16 ASSESSMENT — VISUAL ACUITY
OD: OTHER (SEE COMMENTS)
OS: 20/40;WITH CORRECTIVE LENSES

## 2025-06-16 NOTE — ED TRIAGE NOTES
Patient had an MRI and labs works done at the clinic was referred to ophthalmology out patient but patient could not get an appointment till July.      Triage Assessment (Adult)       Row Name 06/16/25 1027          Triage Assessment    Airway WDL WDL        Respiratory WDL    Respiratory WDL WDL        Skin Circulation/Temperature WDL    Skin Circulation/Temperature WDL WDL        Cardiac WDL    Cardiac WDL WDL        Peripheral/Neurovascular WDL    Peripheral Neurovascular WDL WDL        Cognitive/Neuro/Behavioral WDL    Cognitive/Neuro/Behavioral WDL WDL

## 2025-06-16 NOTE — PROGRESS NOTES
Reason for admission: finger infection  Primary team notified of pt arrival.  Admitted from: [place] ED  Via: wheelchair  Accompanied by: transportation  Belongings: Placed in closet  Admission Required Doc Completed: NO  Mobility Devices Utilized by Patient Provided (i.e. walker, wheelchair, etc.):  SBA  Teaching: Orientation to unit and call light- call light within reach, use of console, meal times, when to call for the RN, and enforced importance of safety.yes  IV Access:   Telemetry: No  Ht./Wt.: Completed  Code Status verified on armband: Yes  2 RN Skin Assessment Completed with: yes  Suction/Ambu bag/Flowmeter at bedside: Yes    Pt is A&Ox4, but deaf. No respiratory distress noted at this shift and denies chest pain or SOB. Complained of pain on right index finger, but stated she does not want anything for pain. Pt refused scheduled hypertension medications and stated she took them this morning and she only takes them once a day. SBA and was able to walk to the bathroom with no issue. Urinated with no difficulties and had BM today. 2 RN skin assessment completed and no major skin issue noted except swelling on right hand. Regular diet/ thin. Pt will need assistance placing meal orders due to being deaf. Call light is within reach and Pt is able to make needs known

## 2025-06-16 NOTE — CONSULTS
OPHTHALMOLOGY CONSULT NOTE  06/16/2025    Patient: Marleny Viera        ASSESSMENT/PLAN:     Marleny Viera is a 49 year old female who presented with     Macula-off total retinal detachment, right eye  Giant retinal tear, right eye  Reports onset 5 days prior to presentation on 6/16. NLP vision, reduced pupil reactivity, + APD. Nasal retinal tear ~3 clock hours.  Total RD. Recommend follow-up in Retina clinic 6/17/25. Please arrange for transportation while inpatient once the appointment has been made by the clinic scheduling staff.     High myopia, bilateral  Deaf  Communicates by ASL. Recommend thorough examination of the left eye while under anesthesia for retinal detachment repair. Monocular precautions.     Right finger infection  Failed outpatient abx. Being admitted for IV antibiotics starting 6/16/25.     Discussed and seen with Dr. Aquiles Dove, staff ophthalmologist, and discussed with Dr. Arabella Obando, vitreoretinal surgery fellow, who agreed with this assessment and plan.     Rizwan Eugene MD, PGY2  Ophthalmology Resident  [facility]    HISTORY OF PRESENTING ILLNESS:     Marleny Viera is a 49 year old female who presented on 6/16/25 with right eye vision loss.    - sudden R eye vision loss 5 days ago  - feels vision loss is complete, denies light perception  - mild eye pain, some worsening with eye movement  - tearing in the morning, improves during the daytime  - no trauma  - right-sided headache  - no scalp tenderness, jaw pain, neck pain, shoulder pain, jaw claudication, jaw fatigue      OCULAR/MEDICAL/SURGICAL HISTORIES:     Past Ocular History:   Last eye exam in January. Denies any eye issues except for high myopia.     Pertinent Systemic Medications:   Current Outpatient Medications   Medication Instructions     albuterol (PROAIR HFA/PROVENTIL HFA/VENTOLIN HFA) 108 (90 Base) MCG/ACT inhaler INHALE TWO PUFFS EVERY 4 HOURS AS NEEDED FOR WHEEZING OR SHORTNESS OF BREATH      albuterol (PROVENTIL) (2.5 MG/3ML) 0.083% neb solution USE 1 VIAL NEBULIZER every 4-6 hours as needed for wheeze or shortness of breath.     budesonide-formoterol (SYMBICORT/BREYNA) 160-4.5 MCG/ACT Inhaler 2 puffs, Inhalation, 2 TIMES DAILY     citalopram (CELEXA) 20 MG tablet      cyanocobalamin (CYANOCOBALAMIN) 1000 mcg/mL injection INJECT 1ML (1000MCG) SUBCUTANEOUSLY EVERY 14 DAYS     docusate sodium (COLACE) 100 mg, Oral, 2 TIMES DAILY     doxycycline hyclate (VIBRAMYCIN) 100 mg, Oral, 2 TIMES DAILY     EPINEPHrine (ANY BX GENERIC EQUIV) 0.3 mg, Intramuscular, PRN     estradiol (CLIMARA) 0.05 MG/24HR weekly patch 1 patch, Transdermal, WEEKLY     estradiol (CLIMARA) 0.05 MG/24HR weekly patch 1 patch, Transdermal, WEEKLY     gabapentin (NEURONTIN) 200 mg, Oral, 3 TIMES DAILY     hydrOXYzine HCl (ATARAX) 25 MG tablet TAKE 3 TABLETS BY MOUTH THREE TIMES A DAY AS NEEDED FOR ANXIETY OR INSOMNIA .     levocetirizine (XYZAL) 5 mg, Oral, EVERY EVENING     losartan (COZAAR) 25 mg, Oral, DAILY     methocarbamol (ROBAXIN) 500 mg, Oral, 3 TIMES DAILY PRN     metoprolol succinate ER (TOPROL XL) 25 mg, Oral, DAILY     metroNIDAZOLE (FLAGYL) 500 mg, Oral, 3 TIMES DAILY     montelukast (SINGULAIR) 10 mg, Oral, AT BEDTIME     multivitamin, therapeutic (THERA-VIT) TABS tablet 1 tablet, DAILY     NONFORMULARY 1 tablet, DAILY     omeprazole (PRILOSEC) 20 mg, Oral, DAILY, Take once daily in the morning 30 minutes prior to food and drink.     prazosin (MINIPRESS) 1 mg, Oral, AT BEDTIME     predniSONE (DELTASONE) 20 MG tablet Take two tablets (= 40mg) each day for 4 (four) days     Prometrium 200 mg, Oral, DAILY     QUEtiapine (SEROQUEL) 50 mg, Oral, AT BEDTIME     rizatriptan (MAXALT) 10 mg, Oral, AT ONSET OF HEADACHE     sulfamethoxazole-trimethoprim (BACTRIM DS) 800-160 MG tablet 1 tablet, Oral, 2 TIMES DAILY     topiramate (TOPAMAX) 25 mg, Oral, 2 TIMES DAILY         Past Medical History:  Past Medical History:   Diagnosis Date      Accidental overdose, initial encounter 07/07/2022     ADHD (attention deficit hyperactivity disorder)      Alcohol dependence (H)      Anxiety      Arthritis      Asthma      B12 deficiency      Bipolar depression (H)      Blood clotting disorder      Deafness      Depressive disorder 10 year     Drug abuse, nondependent (H) 10/29/2004    Overview:  polydrug abuse per psych notes ; Other, mixed, or unspecified nondependent drug abuse, unspecified     History of blood clots      History of transfusion      Hypertension      Kidney stones 2015     Major depressive disorder, recurrent episode, severe (H) 05/17/2016    Overview:  s/p suicide attempt Epic      Migraine      Obstructive sleep apnea 06/01/2016    Pt states she does not have ROBERT, does not use CPAP     Pulmonary emboli (H)      Status post total right knee replacement 01/18/2016       Past Surgical History:   Past Surgical History:   Procedure Laterality Date     BIOPSY BREAST Right     2019... Also had fluid drained from left breast under surgery also in 2019     BREAST SURGERY       MAMMOPLASTY REDUCTION Bilateral 01/01/2017     ORTHOPEDIC SURGERY  2012,2013,2014     TOTAL HIP ARTHROPLASTY Bilateral      TOTAL KNEE ARTHROPLASTY Right      TUBAL LIGATION       US BREAST CORE BIOPSY RIGHT Right 11/21/2019       Family History:  Noncontributory     Social History:  Social History     Tobacco Use     Smoking status: Never     Passive exposure: Never     Smokeless tobacco: Never   Vaping Use     Vaping status: Never Used   Substance Use Topics     Alcohol use: Yes     Comment: Alcoholic Drinks/day: dependence     Drug use: Never        EXAMINATION:     Base Eye Exam       Visual Acuity (Snellen - Linear)         Right Left    Near cc NLP 20/50 ph 20/25   Endorses NLP vision OD checked multiple times             Tonometry (Tonopen, 1:24 PM)         Right Left    Pressure 14 16              Pupils         Dark Light Shape React APD    Right 5 4 Round Slow Yes     Left 5 2.5 Round Brisk None              Visual Fields (Counting fingers)         Left Right     Full     Restrictions  Total superior temporal, inferior temporal, superior nasal, inferior nasal deficiencies              Extraocular Movement    Orthophoric. Unable to follow directions well for thorough EOM assessment. Grossly full.              Neuro/Psych       Oriented x3: Yes    Mood/Affect: Normal              Dilation       Both eyes: 1.0% Mydriacyl, 2.5% [place] Synephrine                   Slit Lamp and Fundus Exam       External Exam         Right Left    External palpable temporal pulse palpable temporal pulse              Slit Lamp Exam         Right Left    Lids/Lashes Normal Normal    Conjunctiva/Sclera White and quiet White and quiet    Cornea Clear Clear    Anterior Chamber Deep and quiet Deep and quiet    Iris Round and reactive Round and reactive    Lens 1+ NS 1+ NS              Fundus Exam         Right Left    Vitreous pigment Weaver ring    Disc Normal significant PPA    Macula macula off RD Normal    Vessels Normal Normal    Periphery giant retinal tear ~1:00-4:00. total RD degenerative changes suggestive of cobblestone                    MRA Neck (Carotids) wo Contrast (06/14/2025 8:32 PM)  MR Brain w/o Contrast (06/14/2025 8:32 PM)  MRA Brain ([place]) w/o Contrast (06/14/2025 8:32 PM)  IMPRESSION:  HEAD MRI:  Normal head MRI.     HEAD MRA:  1.  No aneurysm, high flow AVM or significant stenosis identified.  2.  Variant [place] anatomy as above.     NECK MRA:  Limited examination with grossly patent carotid and vertebral arteries.     Rizwan Eugene MD  Resident Physician, PGY2  Department of Ophthalmology  06/16/25 1:25 PM

## 2025-06-16 NOTE — ED PROVIDER NOTES
[facility] EMERGENCY DEPARTMENT ([facility])    6/16/25      ED PROVIDER NOTE   History     Chief Complaint   Patient presents with    Loss of Vision     Right eye vision lost since weds , per patient she was seen at a clinic and was told to come to the ED for ophthalmology consult    Hand Pain     Left index finger swollen and painful for more than a week     The history is provided by the patient and medical records.     Marleny Viera is a 49 year old female who is deaf with hypertension, bipolar, depression, migraines, prior PE who presents to the emergency department with right eye vision loss and right index finger pain and swelling.  She reports last Wednesday (5 days ago) she had sudden onset loss of vision in her right eye.  She reports this is complete vision loss and is unable to see light in any portion of the right eye.  She denies any eye pain but does note a dull headache.  She has noted some tearing from the right eye otherwise, no other discharge noted.  No traumatic injury noted.  She was seen in an outside ED over the weekend and had an MRI of the head which showed no evidence of infarct with recommendation to follow-up in ophthalmology clinic.  She reports she was unable to get into ophthalmology clinic and was referred into the emergency department today for ongoing evaluation.  She denies any nausea, vomiting, numbness, tingling or weakness into the extremities.  In regards to her index finger she is left-hand dominant and reports that about a week and a half ago she started to develop insidious onset pain and swelling of her right index finger.  She denies any trauma to the digit.  She was on a course of Bactrim after being seen in urgent care clinic but did not have any significant improvement with that and was nauseated by the medication.  She was subsequently seen in an outside ED and was transition from Bactrim to doxycycline as well as started on prednisone.  She has not yet  "been able to  the medication and is not currently on antibiotics.  She denies any fevers.        Physical Exam   BP: 122/73  Pulse: 62  Temp: 98  F (36.7  C)  Resp: 16  Height: 165.1 cm (5' 5\")  Weight: 98 kg (216 lb)  SpO2: 99 %    Physical Exam  General: patient is alert and oriented and in no acute distress   Head: atraumatic and normocephalic   EENT: moist mucus membranes without tonsillar erythema or exudates, pupils equal round and reactive, EOMI, no light perception present in the right eye, intraocular pressure is 17 in the right and 12 on the left  Neck: supple   Cardiovascular: regular rate and rhythm, extremities warm and well perfused, brisk capillary refill in the digits of the right hand  Pulmonary: No respiratory distress  Musculoskeletal: swelling of the right index finger between the MCP and PIP joint, able to flex to approximately 45 degrees at the right index PIP joint and 90 degrees at the MCP joint, tenderness to palpation along the right index flexor tendon sheath  Neurological: alert and oriented, moving all extremities symmetrically, CN II-XII intact, strength 5/5 and symmetric in , elbow flexion/extension, hip flexion/extension, knee flexion/extension and ankle plantar/dorsiflexion, sensation to light touch in distal upper and lower extremities intact, normal gait  Skin: warm, dry      ED Course, Procedures, & Data      Procedures            Results for orders placed or performed during the hospital encounter of 06/14/25   MRA Brain ([place]) w/o Contrast     Status: None    Narrative    EXAM: MR BRAIN W/O CONTRAST, MRA NECK (CAROTIDS) W/O CONTRAST, MRA BRAIN ([place]) W/O CONTRAST  LOCATION: [facility]  DATE: 6/14/2025    INDICATION: Sudden right sided vision loss.  COMPARISON: MRI of the brain dated 10/11/2023  TECHNIQUE:   1) Routine multiplanar multisequence head MRI without intravenous contrast.  2) 3D time-of-flight head MRA " without intravenous contrast.  3) Neck MRA without IV contrast. Stenosis measurements made according to NASCET criteria unless otherwise specified.      FINDINGS:  HEAD MRI:  INTRACRANIAL CONTENTS: No acute or subacute infarct. No mass, acute hemorrhage, or extra-axial fluid collections. Normal brain parenchymal signal. Stable large perivascular space inferior to the right lentiform nucleus. Normal ventricles and sulci.   Normal position of the cerebellar tonsils.     SELLA: No abnormality accounting for technique.    OSSEOUS STRUCTURES/SOFT TISSUES: Normal marrow signal. The major intracranial vascular flow voids are maintained.     ORBITS: No abnormality accounting for technique.     SINUSES/MASTOIDS: No paranasal sinus mucosal disease. No middle ear or mastoid effusion.     HEAD MRA:   ANTERIOR CIRCULATION: No stenosis/occlusion, aneurysm, or high flow vascular malformation. Fetal origin of the right posterior cerebral artery from the anterior circulation. Large left posterior communicating artery. 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    POSTERIOR CIRCULATION: No stenosis/occlusion, aneurysm, or high flow vascular malformation. Balanced vertebral arteries supply a normal basilar artery.     NECK MRA:   Motion degraded examination.    RIGHT CAROTID: Grossly patent carotid arteries with no obvious dissection.    LEFT CAROTID: Grossly patent carotid arteries with no obvious dissection.    VERTEBRAL ARTERIES: Grossly patent carotid arteries with no obvious dissection. Balanced vertebral arteries.    AORTIC ARCH: Classic aortic arch anatomy with no significant stenosis at the origin of the great vessels.      Impression    IMPRESSION:  HEAD MRI:  Normal head MRI.    HEAD MRA:  1.  No aneurysm, high flow AVM or significant stenosis identified.  2.  Variant [place] anatomy as above.    NECK MRA:  Limited examination with grossly patent carotid and vertebral arteries.     XR  Finger Right G/E 2 Views     Status: None    Narrative    EXAM: XR FINGER RIGHT G/E 2 VIEWS  LOCATION: [facility]  DATE: 6/14/2025    INDICATION: Ongoing pain and swelling of right index finger. Unremarkable xrays 2 days ago.  COMPARISON: 6/12/2025.      Impression    IMPRESSION: Normal joint spaces and alignment. No fracture. No change since the prior study.     MR Brain w/o Contrast     Status: None    Narrative    EXAM: MR BRAIN W/O CONTRAST, MRA NECK (CAROTIDS) W/O CONTRAST, MRA BRAIN ([place]) W/O CONTRAST  LOCATION: [facility]  DATE: 6/14/2025    INDICATION: Sudden right sided vision loss.  COMPARISON: MRI of the brain dated 10/11/2023  TECHNIQUE:   1) Routine multiplanar multisequence head MRI without intravenous contrast.  2) 3D time-of-flight head MRA without intravenous contrast.  3) Neck MRA without IV contrast. Stenosis measurements made according to NASCET criteria unless otherwise specified.      FINDINGS:  HEAD MRI:  INTRACRANIAL CONTENTS: No acute or subacute infarct. No mass, acute hemorrhage, or extra-axial fluid collections. Normal brain parenchymal signal. Stable large perivascular space inferior to the right lentiform nucleus. Normal ventricles and sulci.   Normal position of the cerebellar tonsils.     SELLA: No abnormality accounting for technique.    OSSEOUS STRUCTURES/SOFT TISSUES: Normal marrow signal. The major intracranial vascular flow voids are maintained.     ORBITS: No abnormality accounting for technique.     SINUSES/MASTOIDS: No paranasal sinus mucosal disease. No middle ear or mastoid effusion.     HEAD MRA:   ANTERIOR CIRCULATION: No stenosis/occlusion, aneurysm, or high flow vascular malformation. Fetal origin of the right posterior cerebral artery from the anterior circulation. Large left posterior communicating artery. 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    POSTERIOR CIRCULATION:  No stenosis/occlusion, aneurysm, or high flow vascular malformation. Balanced vertebral arteries supply a normal basilar artery.     NECK MRA:   Motion degraded examination.    RIGHT CAROTID: Grossly patent carotid arteries with no obvious dissection.    LEFT CAROTID: Grossly patent carotid arteries with no obvious dissection.    VERTEBRAL ARTERIES: Grossly patent carotid arteries with no obvious dissection. Balanced vertebral arteries.    AORTIC ARCH: Classic aortic arch anatomy with no significant stenosis at the origin of the great vessels.      Impression    IMPRESSION:  HEAD MRI:  Normal head MRI.    HEAD MRA:  1.  No aneurysm, high flow AVM or significant stenosis identified.  2.  Variant [place] anatomy as above.    NECK MRA:  Limited examination with grossly patent carotid and vertebral arteries.     MRA Neck (Carotids) wo Contrast     Status: None    Narrative    EXAM: MR BRAIN W/O CONTRAST, MRA NECK (CAROTIDS) W/O CONTRAST, MRA BRAIN ([place]) W/O CONTRAST  LOCATION: [facility]  DATE: 6/14/2025    INDICATION: Sudden right sided vision loss.  COMPARISON: MRI of the brain dated 10/11/2023  TECHNIQUE:   1) Routine multiplanar multisequence head MRI without intravenous contrast.  2) 3D time-of-flight head MRA without intravenous contrast.  3) Neck MRA without IV contrast. Stenosis measurements made according to NASCET criteria unless otherwise specified.      FINDINGS:  HEAD MRI:  INTRACRANIAL CONTENTS: No acute or subacute infarct. No mass, acute hemorrhage, or extra-axial fluid collections. Normal brain parenchymal signal. Stable large perivascular space inferior to the right lentiform nucleus. Normal ventricles and sulci.   Normal position of the cerebellar tonsils.     SELLA: No abnormality accounting for technique.    OSSEOUS STRUCTURES/SOFT TISSUES: Normal marrow signal. The major intracranial vascular flow voids are maintained.     ORBITS: No abnormality  accounting for technique.     SINUSES/MASTOIDS: No paranasal sinus mucosal disease. No middle ear or mastoid effusion.     HEAD MRA:   ANTERIOR CIRCULATION: No stenosis/occlusion, aneurysm, or high flow vascular malformation. Fetal origin of the right posterior cerebral artery from the anterior circulation. Large left posterior communicating artery. 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    POSTERIOR CIRCULATION: No stenosis/occlusion, aneurysm, or high flow vascular malformation. Balanced vertebral arteries supply a normal basilar artery.     NECK MRA:   Motion degraded examination.    RIGHT CAROTID: Grossly patent carotid arteries with no obvious dissection.    LEFT CAROTID: Grossly patent carotid arteries with no obvious dissection.    VERTEBRAL ARTERIES: Grossly patent carotid arteries with no obvious dissection. Balanced vertebral arteries.    AORTIC ARCH: Classic aortic arch anatomy with no significant stenosis at the origin of the great vessels.      Impression    IMPRESSION:  HEAD MRI:  Normal head MRI.    HEAD MRA:  1.  No aneurysm, high flow AVM or significant stenosis identified.  2.  Variant [place] anatomy as above.    NECK MRA:  Limited examination with grossly patent carotid and vertebral arteries.     Basic metabolic panel     Status: Abnormal   Result Value Ref Range    Sodium 140 135 - 145 mmol/L    Potassium 3.6 3.4 - 5.3 mmol/L    Chloride 106 98 - 107 mmol/L    Carbon Dioxide (CO2) 22 22 - 29 mmol/L    Anion Gap 12 7 - 15 mmol/L    Urea Nitrogen 16.6 6.0 - 20.0 mg/dL    Creatinine 1.29 (H) 0.51 - 0.95 mg/dL    GFR Estimate 50 (L) >60 mL/min/1.73m2    Calcium 9.4 8.8 - 10.4 mg/dL    Glucose 108 (H) 70 - 99 mg/dL   CRP inflammation     Status: Abnormal   Result Value Ref Range    CRP Inflammation 5.04 (H) <5.00 mg/L   Erythrocyte sedimentation rate auto     Status: Normal   Result Value Ref Range    Erythrocyte Sedimentation Rate 10 0 - 30 mm/hr   CBC  with platelets and differential     Status: None   Result Value Ref Range    WBC Count 7.0 4.0 - 11.0 10e3/uL    RBC Count 4.59 3.80 - 5.20 10e6/uL    Hemoglobin 13.2 11.7 - 15.7 g/dL    Hematocrit 38.0 35.0 - 47.0 %    MCV 83 78 - 100 fL    MCH 28.8 26.5 - 33.0 pg    MCHC 34.7 31.5 - 36.5 g/dL    RDW 13.7 10.0 - 15.0 %    Platelet Count 297 150 - 450 10e3/uL    % Neutrophils 59 %    % Lymphocytes 36 %    % Monocytes 4 %    % Eosinophils 1 %    % Basophils 0 %    % Immature Granulocytes 0 %    NRBCs per 100 WBC 0 <1 /100    Absolute Neutrophils 4.1 1.6 - 8.3 10e3/uL    Absolute Lymphocytes 2.5 0.8 - 5.3 10e3/uL    Absolute Monocytes 0.2 0.0 - 1.3 10e3/uL    Absolute Eosinophils 0.1 0.0 - 0.7 10e3/uL    Absolute Basophils 0.0 0.0 - 0.2 10e3/uL    Absolute Immature Granulocytes 0.0 <=0.4 10e3/uL    Absolute NRBCs 0.0 10e3/uL   ECG 12-Lead with MUSE - SJN,SJO,WW     Status: None   Result Value Ref Range    Systolic Blood Pressure  mmHg    Diastolic Blood Pressure  mmHg    Ventricular Rate 62 BPM    Atrial Rate 62 BPM    UT Interval 166 ms    QRS Duration 74 ms     ms    QTc 416 ms    P Axis 47 degrees    R AXIS 43 degrees    T Axis -11 degrees    Interpretation ECG       Sinus rhythm  Low voltage QRS  Nonspecific T wave abnormality  Abnormal ECG  When compared with ECG of 29-Apr-2025 15:26,  Premature atrial complexes are no longer Present  QT has shortened  Confirmed by SEE ED PROVIDER NOTE FOR, ECG INTERPRETATION ([id number]),  PARISH BATISTA ([id number]) on 6/15/2025 1:20:34 PM  Also confirmed by SEE ED PROVIDER NOTE FOR, ECG INTERPRETATION ([id number]),  Jacinta Mata ([id number])  on 6/16/2025 5:13:57 AM     CBC with Platelets & Differential     Status: None    Narrative    The following orders were created for panel order CBC with Platelets & Differential.  Procedure                               Abnormality         Status                     ---------                               -----------         ------                      CBC with platelets and ...[[id number]]                      Final result                 Please view results for these tests on the individual orders.     Medications   proparacaine (ALCAINE) 0.5 % ophthalmic solution 1 drop (has no administration in time range)     Labs Ordered and Resulted from Time of ED Arrival to Time of ED Departure - No data to display  XR Hand Right G/E 3 Views    (Results Pending)          Critical care was not performed.     Medical Decision Making  The patient's presentation was of high complexity (an acute health issue posing potential threat to life or bodily function).    The patient's evaluation involved:  ordering and/or review of 3+ test(s) in this encounter (see separate area of note for details)  discussion of management or test interpretation with another health professional (ortho, ophtho)    The patient's management necessitated moderate risk (prescription drug management including medications given in the ED) and high risk (a decision regarding hospitalization).    Assessment & Plan    Ms. Viera is a 49-year-old left-hand-dominant female with a history of hypertension, bipolar depression migraine headaches and prior PE who presents to the emergency department due to right eye vision loss and right index finger pain and swelling.  She is hemodynamically stable, afebrile and in no respiratory distress.  On exam I did not appreciate any focal neurologic deficits.  Her visual acuity is no light perception in the right eye and 20/40 in the left.  Intraocular pressure is 17 in the right and 12 in the left.  She did recently have an MR of the brain that showed no acute infarct however concern for possible CRAO versus GCA versus primary ophthalmologic etiology.  Ophthalmology is consulted and exam is consistent with retinal detachment.  Ophthalmology is continuing to follow for definitive management and upcoming operative plans.  In regards to her index finger she does  have pain and swelling along the flexor tendon sheath with decreased range of motion with flexing the digit.  Concern for possible flexor tenosynovitis.  X-ray was obtained and shows no acute fracture.  Laboratory evaluation shows no elevation in inflammatory markers.  Orthopedics was consulted and commended CT of the hand and IV antibiotics given failure of her outpatient oral treatment.  She was started on ceftriaxone and will plan to admit under observation status to medicine to continue antibiotics, orthopedic and ophthalmology consultations.    I have reviewed the nursing notes. I have reviewed the findings, diagnosis, plan and need for follow up with the patient.    New Prescriptions    No medications on file       Final diagnoses:   Finger infection   Vision loss of right eye       Agnes Chapman MD  [facility] EMERGENCY DEPARTMENT  6/16/2025        Agnes Chapman MD  06/16/25 [id number]

## 2025-06-16 NOTE — CONSULTS
[facility] Orthopedic Surgery Consultation    Marleny Viera MRN# [id number]   Age: 49 year old YOB: 1975   Date of Admission: 6/16/2025    Reason for consult: Right index finger infection   Requesting physician: Agnes Chapman MD   Level of consult: Consult, follow and place orders          Assessment and Plan:   Assessment:  Marleny Viera is a LHD 49 year old female who orthopedic surgery was consulted on regarding right index finger infection. Her exam is not consistent with abscess of FTS, recommend R index finger U/S to further evaluate for fluid collection. 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 Nevertheless, given her failed course of abx and worsening symptoms, would recommend  IV abx and reevaluation in the AM with orthopedic surgery to assess for improvement.      Plan:  - Admit to medicine  - Plan for OR: no plan for othopedic   - Anticoagulation/DVT prophylaxis: none  - Antibiotics: per primary  - Imaging: R index finger U/S  - Activity: AAT R hand  - Weight bearing:   - Pain control: per primary  - Diet: regular.  - Follow-up: TBD  - Disposition: likely home on discharge    Orthopedic surgery staff is Dr. Hobbs.    --  Princess Christianson MD  Orthopedic Surgery PGY-4         History of Present Illness:   Left-hand-dominant 49F who is deaf with a history of hypertension, bipolar disorder, prior PE who presented to the emergency department with right eye right index finger pain and swelling.  The patient says she first noticed the pain and swelling about her right index finger approximately 5 to 6 days ago. Of note, the outside ED notes the patient reported a 2-3 week history of right index pain. Since then, the swelling and pain has been persistent.  Patient reports that she was prescribed a course of Bactrim after being seen in urgent care, she did not take this medication as  it made her feel nauseated.  She was subsequently seen at an outside ED and a prescription for doxycycline and prednisone were provided but she has not started either of these medications. She describes most of her pain as being on the radial aspect of her proximal phalanx as well as her dorsal MCP. Denies trauma or open wounds in her right hand. She denies any history of gout.  She does endorse a previous episode of pain and swelling in the same finger that she reports required surgical intervention.  She has not had any fevers, chills, pain/swelling in any other joint.  She also endorses some numbness about the radial side of her distal index finger. Of note, the patient also reports complete vision loss in her right eye.     A 10 point review of systems was otherwise negative other than as noted in history above.         Past Medical History:     Past Medical History:   Diagnosis Date    Accidental overdose, initial encounter 07/07/2022    ADHD (attention deficit hyperactivity disorder)     Alcohol dependence (H)     Anxiety     Arthritis     Asthma     B12 deficiency     Bipolar depression (H)     Blood clotting disorder     Deafness     Depressive disorder 10 year    Drug abuse, nondependent (H) 10/29/2004    Overview:  polydrug abuse per psych notes ; Other, mixed, or unspecified nondependent drug abuse, unspecified    History of blood clots     History of transfusion     Hypertension     Kidney stones 2015    Major depressive disorder, recurrent episode, severe (H) 05/17/2016    Overview:  s/p suicide attempt Epic     Migraine     Obstructive sleep apnea 06/01/2016    Pt states she does not have ROBERT, does not use CPAP    Pulmonary emboli (H)     Status post total right knee replacement 01/18/2016       Patient denies any personal history of bleeding disorders, clotting disorders, or adverse reactions to anesthesia.         Past Surgical History:     Past Surgical History:   Procedure Laterality Date    BIOPSY  "BREAST Right     2019... Also had fluid drained from left breast under surgery also in 2019    BREAST SURGERY      MAMMOPLASTY REDUCTION Bilateral 01/01/2017    ORTHOPEDIC SURGERY  2012,2013,2014    TOTAL HIP ARTHROPLASTY Bilateral     TOTAL KNEE ARTHROPLASTY Right     TUBAL LIGATION      US BREAST CORE BIOPSY RIGHT Right 11/21/2019            Social History:     Social History     Socioeconomic History    Marital status:      Spouse name: Not on file    Number of children: Not on file    Years of education: Not on file    Highest education level: Not on file   Occupational History    Not on file   Tobacco Use    Smoking status: Never     Passive exposure: Never    Smokeless tobacco: Never   Vaping Use    Vaping status: Never Used   Substance and Sexual Activity    Alcohol use: Yes     Comment: Alcoholic Drinks/day: dependence    Drug use: Never    Sexual activity: Not Currently     Partners: Male     Birth control/protection: None   Other Topics Concern    Not on file   Social History Narrative    . No children. .  Lives alone.  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  Nonsmoker.  No alcohol use.  Tesha Pelaez MD       Social Drivers of Health     Financial Resource Strain: Low Risk  (3/27/2025)    Financial Resource Strain     Within the past 12 months, have you or your family members you live with been unable to get utilities (heat, electricity) when it was really needed?: No   Food Insecurity: Low Risk  (3/27/2025)    Food Insecurity     Within the past 12 months, did you worry that your food would run out before you got money to buy more?: No     Within the past 12 months, did the food you bought just not last and you didn t have money to get more?: No   Transportation Needs: High Risk (3/27/2025)    Transportation Needs     Within the past 12 months, has lack of transportation kept you from medical appointments, getting your medicines, non-medical meetings or appointments, " work, or from getting things that you need?: Yes   Physical Activity: Sufficiently Active (3/27/2025)    Exercise Vital Sign     Days of Exercise per Week: 7 days     Minutes of Exercise per Session: 150+ min   Stress: No Stress Concern Present (3/27/2025)    [employer] - Occupational Stress Questionnaire     Feeling of Stress : Only a little   Social Connections: Unknown (3/27/2025)    Social Connection and Isolation Panel [NHANES]     Frequency of Communication with Friends and Family: Not on file     Frequency of Social Gatherings with Friends and Family: Never     Attends [employer] Services: Not on file     Active Member of Clubs or Organizations: Not on file     Attends Club or Organization Meetings: Not on file     Marital Status: Not on file   Interpersonal Safety: Low Risk  (11/13/2024)    Interpersonal Safety     Do you feel physically and emotionally safe where you currently live?: Yes     Within the past 12 months, have you been hit, slapped, kicked or otherwise physically hurt by someone?: No     Within the past 12 months, have you been humiliated or emotionally abused in other ways by your partner or ex-partner?: No   Housing Stability: High Risk (3/27/2025)    Housing Stability     Do you have housing? : No     Are you worried about losing your housing?: No             Family History:     Family History   Problem Relation Age of Onset    Cancer Mother     Breast Cancer Mother     Diabetes Mother     Hypertension Mother     Cerebrovascular Disease Father     Hyperlipidemia No family hx of     Depression No family hx of     Anxiety Disorder No family hx of     Mental Illness No family hx of     Substance Abuse No family hx of     Asthma No family hx of        Patient denies known family history of bleeding, clotting, or anesthesia-related complications.            Allergies:     Allergies   Allergen Reactions    Acetylcysteine Rash and Other (See Comments)    Amoxicillin  Itching and Shortness Of Breath    Bee Pollen Anaphylaxis    Chocolate Anaphylaxis    Contrast [Iodixanol] Shortness Of Breath and Rash     Pt stated she reacted to the contrast given for her CT in past. She experienced shortness of breath, throat tightening, and rash on chest, and they gave her an injection to counter the symptoms.     Covid-19 (Mrna) Vaccine Shortness Of Breath     Pfizer COVID-19 Vaccine    Hymenoptera Allergenic Extract [Wasp Venom Protein] Anaphylaxis    Iodine Hives and Unknown     Pt stated that she was injected with CT CONTRAST in the past and got hives, SOB, and nausea. Please pre-medicate patient in the future.     Meat Extract Anaphylaxis    Wasp Venom Protein Starter Kit [Wasp Venom Protein] Itching, Shortness Of Breath, Swelling and Difficulty breathing    Adhesive Tape Unknown    Aspirin     Bee Venom Unknown    Food Allergy Formula Unknown     Red meat, chocolate    Geodon [Ziprasidone] Unknown    Latex Unknown     Added based on information entered during case entry, please review and add reactions, type, and severity as needed    Morphine Unknown    Prochlorperazine Other (See Comments)    Risperdal [Risperidone] Unknown    Risperidone Analogues [Risperidone] Other (See Comments)    Shellfish Allergy Unknown    Theobroma Oil [Theobroma Grandiflorum Seed Butter] Unknown    Trazodone Other (See Comments)     Elevated creatinine    Zoloft [Sertraline] Unknown    Hydrochlorothiazide Rash    Ibuprofen Difficulty breathing and Rash     Kidney function, Kidney function             Medications:     Prior to Admission medications    Medication Sig Last Dose Taking? Auth Provider Long Term End Date   albuterol (PROAIR HFA/PROVENTIL HFA/VENTOLIN HFA) 108 (90 Base) MCG/ACT inhaler INHALE TWO PUFFS EVERY 4 HOURS AS NEEDED FOR WHEEZING OR SHORTNESS OF BREATH   Brianna Hurst, MARU CNP Yes    albuterol (PROVENTIL) (2.5 MG/3ML) 0.083% neb solution USE 1 VIAL NEBULIZER every 4-6 hours as needed for  wheeze or shortness of breath.   Brianna Hurst APRN CNP Yes    budesonide-formoterol (SYMBICORT/BREYNA) 160-4.5 MCG/ACT Inhaler Inhale 2 puffs into the lungs 2 times daily.   Brianna Hurst APRN CNP Yes    citalopram (CELEXA) 20 MG tablet    Reported, Patient No    cyanocobalamin (CYANOCOBALAMIN) 1000 mcg/mL injection INJECT 1ML (1000MCG) SUBCUTANEOUSLY EVERY 14 DAYS   Ayana Laguerre MD     docusate sodium (COLACE) 100 MG capsule Take 1 capsule (100 mg) by mouth 2 times daily.   Brianna Hurst APRN CNP     doxycycline hyclate (VIBRAMYCIN) 100 MG capsule Take 1 capsule (100 mg) by mouth 2 times daily for 10 days.   Cherelle Pedro PA-C  6/24/25   EPINEPHrine (ANY BX GENERIC EQUIV) 0.3 MG/0.3ML injection 2-pack Inject 0.3 mLs (0.3 mg) into the muscle as needed for anaphylaxis.   Brianna Hurst APRN CNP     estradiol (CLIMARA) 0.05 MG/24HR weekly patch Place 1 patch over 168 hours onto the skin once a week.   Brianna Hurst APRN CNP Yes    estradiol (CLIMARA) 0.05 MG/24HR weekly patch Place 1 patch over 168 hours onto the skin once a week.   Brianna Hurst APRN CNP Yes    gabapentin (NEURONTIN) 100 MG capsule Take 2 capsules (200 mg) by mouth 3 times daily.   Millie Parish CNP Yes    hydrOXYzine HCl (ATARAX) 25 MG tablet TAKE 3 TABLETS BY MOUTH THREE TIMES A DAY AS NEEDED FOR ANXIETY OR INSOMNIA .   Brianna Hurst APRN CNP     levocetirizine (XYZAL) 5 MG tablet Take 1 tablet (5 mg) by mouth every evening.   Brianna Hurst APRN CNP     losartan (COZAAR) 50 MG tablet Take 0.5 tablets (25 mg) by mouth daily.   Brianna Hurst APRN CNP Yes    methocarbamol (ROBAXIN) 500 MG tablet Take 1 tablet (500 mg) by mouth 3 times daily as needed for muscle spasms.   Millie Parish CNP     metoprolol succinate ER (TOPROL XL) 25 MG 24 hr tablet Take 1 tablet (25 mg) by mouth daily.   Brianna Hurst, MARU CNP Yes    metroNIDAZOLE (FLAGYL) 500 MG tablet Take 1 tablet (500 mg) by mouth 3 times  "daily for 7 days.   Heather Hays PA-C  6/19/25   montelukast (SINGULAIR) 10 MG tablet Take 1 tablet (10 mg) by mouth at bedtime.   Brianna Hurst APRN CNP Yes    multivitamin, therapeutic (THERA-VIT) TABS tablet Take 1 tablet by mouth daily   Reported, Patient     NONFORMULARY Take 1 tablet by mouth daily Nerve vitamin   Reported, Patient     omeprazole (PRILOSEC) 20 MG DR capsule Take 1 capsule (20 mg) by mouth daily. Take once daily in the morning 30 minutes prior to food and drink.   Brianna Hurst APRN CNP No    prazosin (MINIPRESS) 1 MG capsule Take 1 capsule (1 mg) by mouth at bedtime.   Brianna Hurst APRN CNP Yes    predniSONE (DELTASONE) 20 MG tablet Take two tablets (= 40mg) each day for 4 (four) days   Cherelle Pedro PA-C No    PROMETRIUM 200 MG capsule Take 1 capsule (200 mg) by mouth daily.   Brianna Hurst APRN CNP     QUEtiapine (SEROQUEL) 50 MG tablet Take 1 tablet (50 mg) by mouth at bedtime.   Brianna Hurst APRN CNP Yes    rizatriptan (MAXALT) 10 MG tablet Take 1 tablet (10 mg) by mouth at onset of headache for migraine.   Brianna Hurst APRN CNP     sulfamethoxazole-trimethoprim (BACTRIM DS) 800-160 MG tablet Take 1 tablet by mouth 2 times daily for 7 days.   Heather Hays PA-C  6/19/25   topiramate (TOPAMAX) 25 MG tablet Take 1 tablet (25 mg) by mouth 2 times daily.   Brianna Hurst APRN CNP Yes               Physical Exam:     Vitals:    06/16/25 1032   BP: 122/73   Pulse: 62   Resp: 16   Temp: 98  F (36.7  C)   TempSrc: Oral   SpO2: 99%   Weight: 98 kg (216 lb)   Height: 1.651 m (5' 5\")       General: alert and oriented, answers questions appropriately  Neuro: EOM grossly intact  HEENT: atraumatic  Lungs: breathing comfortably on RA  Heart/Cardiovascular: well perfused    Right Upper Extremity:   - No gross deformity, skin intact  - Very mild erythema about dorsal MCP  - Circumferential swelling about P1 of right index finger extending to dorsal MCP. No swelling " about any other finger.   - TTP circumferentially about P1 segment and with palpation of dorsal MCP. Non-TTP about volar finger of P2 and P3 segments. Non-TTP about any other finger or volar/dorsal palm.  - No fluctuance, no induration   - Able to make full fist with fingers other than index finger. Index finger is about to flex to approximately 30% of full flexion.   - Motor intact distally with deltoid, FPL, EPL, and IO   - SILT median, ulnar, radial, axillary nerve distributions. SILT about UDN of index finger, endorsing diminished sensation in RDN of index finger.   - Radial pulse palpable, fingers warm and well perfused.          Imaging:   All imaging independently reviewed.     XR R hand w/o fracture, dislocation. No bony erosions or gas.          Labs:   CBC:  Lab Results   Component Value Date    WBC 8.3 06/16/2025    HGB 13.1 06/16/2025     06/16/2025    INR 0.85 06/16/2025       BMP:  Lab Results   Component Value Date     06/16/2025    POTASSIUM 4.0 06/16/2025    CHLORIDE 106 06/16/2025    CO2 21 (L) 06/16/2025    BUN 20.5 (H) 06/16/2025    CR 1.20 (H) 06/16/2025    ANIONGAP 13 06/16/2025    TOO 9.2 06/16/2025     (H) 06/16/2025       Inflammatory Markers:  Lab Results   Component Value Date    WBC 8.3 06/16/2025    CRP 0.5 05/03/2023    SED 9 06/16/2025

## 2025-06-16 NOTE — PROGRESS NOTES
[facility]    Background: Primary Care-Care Coordination initial outreach identified per system criteria and reviewed by [facility] Resource [place] team.    Assessment: Upon chart review, [facility] Team member will not proceed with patient outreach related to this episode of Primary Care-Care Coordination program due to reason below:    Patient has presented to Emergency Department, been readmitted to hospital, or transferred to another hospital.    Plan: Primary Care-Care Coordination episode addressed appropriately per reason noted above.      EVELYN Kaba  [facility]    *Connected Care Resource Team does NOT follow patient ongoing. Referrals are identified based on internal discharge reports and the outreach is to ensure patient has an understanding of their discharge instructions.

## 2025-06-16 NOTE — H&P
[facility]    History and Physical - Hospitalist Service, GOLD TEAM        Date of Admission:  6/16/2025    Assessment & Plan      Marleny Viera is a 49 year old female admitted on 6/16/2025. Patient comes to the hospital for evaluation of R eye vision loss and left index finger swelling and pain.   Patient has a PMHx significant for hypertension, bipolar disorder, depression, migraines, prior PE and patient is deaf.    at bedside during my interview.    Patient noticed sudden onset of vision loss in the R eye around 5 days ago, complete vision loss and is unable to see light in any portion of the right eye. She was seen in an outside ED over the weekend and had an MRI of the head which showed no evidence of infarct with recommendation to follow-up in ophthalmology clinic.   Patient also noticed insidious onset of pain and swelling of her right index finger, she was prescribed antibiotics and prednisone but she was not able to  this prescription. She was evaluated by the Ortho team at the ER and was recommended to start IV ABX and continue monitoring her finger.   Patient will be admitted to have Ophthalmology evaluation regarding vision loss and continue monitoring her finger.   Labs at the ER were significant for Cr 1.20.     R eye vision loss   -Ophthalmology consult.     Finger pain and swelling   Finger cellulitis ? Vs crystalline arthropathy   -Ortho following the patient and will reassess in the morning.   -Continue on empiric ABX for now.     TAMMIE vs CKD   -Cr 1.29 > 1.20  -Continue on gentle hydration.   -Continue monitoring renal function and lytes.   -Avoid nephrotoxic medications    Bipolar disorder   Depression   -Continue on PTA Seroquel.     Hypertension   -Hold PTA Losartan for now, reassess in the morning.   -Continue on PTA Toprol.           Diet: Combination Diet Regular Diet Adult    DVT Prophylaxis: Pneumatic Compression  "Devices and Anti-embolisim stockings (TEDs)  Bruce Catheter: Not present  Lines: None     Cardiac Monitoring: None  Code Status: Full Code      Clinically Significant Risk Factors Present on Admission                   # Hypertension: Noted on problem list           # Obesity: Estimated body mass index is 35.94 kg/m  as calculated from the following:    Height as of this encounter: 1.651 m (5' 5\").    Weight as of this encounter: 98 kg (216 lb).       # Financial/Environmental Concerns:    # Asthma: noted on problem list        Disposition Plan     Medically Ready for Discharge: Anticipated in 2-4 Days           Royal Quiroga MD  Hospitalist Service, [facility]  Securely message with LiveIntent (more info)  Text page via [facility] Paging/Directory   See signed in provider for up to date coverage information    ______________________________________________________________________    Chief Complaint     R eye vision loss   Finger pain and swelling     History is obtained from the patient    History of Present Illness   Marleny Viera is a 49 year old female admitted on 6/16/2025. Patient comes to the hospital for evaluation of R eye vision loss and left index finger swelling and pain.    at bedside during my interview. Patient looked comfortable. No chest pain, palpitations, shortness of breath, nausea, vomit, fever, chills or diaphoresis. Other ROS negative.        Past Medical History    Past Medical History:   Diagnosis Date    Accidental overdose, initial encounter 07/07/2022    ADHD (attention deficit hyperactivity disorder)     Alcohol dependence (H)     Anxiety     Arthritis     Asthma     B12 deficiency     Bipolar depression (H)     Blood clotting disorder     Deafness     Depressive disorder 10 year    Drug abuse, nondependent (H) 10/29/2004    Overview:  polydrug abuse per psych notes ; Other, mixed, or unspecified nondependent drug " abuse, unspecified    History of blood clots     History of transfusion     Hypertension     Kidney stones 2015    Major depressive disorder, recurrent episode, severe (H) 05/17/2016    Overview:  s/p suicide attempt Epic     Migraine     Obstructive sleep apnea 06/01/2016    Pt states she does not have ROBERT, does not use CPAP    Pulmonary emboli (H)     Status post total right knee replacement 01/18/2016       Past Surgical History   Past Surgical History:   Procedure Laterality Date    BIOPSY BREAST Right     2019... Also had fluid drained from left breast under surgery also in 2019    BREAST SURGERY      MAMMOPLASTY REDUCTION Bilateral 01/01/2017    ORTHOPEDIC SURGERY  2012,2013,2014    TOTAL HIP ARTHROPLASTY Bilateral     TOTAL KNEE ARTHROPLASTY Right     TUBAL LIGATION      US BREAST CORE BIOPSY RIGHT Right 11/21/2019       Prior to Admission Medications   Prior to Admission Medications   Prescriptions Last Dose Informant Patient Reported? Taking?   EPINEPHrine (ANY BX GENERIC EQUIV) 0.3 MG/0.3ML injection 2-pack   No No   Sig: Inject 0.3 mLs (0.3 mg) into the muscle as needed for anaphylaxis.   NONFORMULARY   Yes No   Sig: Take 1 tablet by mouth daily Nerve vitamin   PROMETRIUM 200 MG capsule   No No   Sig: Take 1 capsule (200 mg) by mouth daily.   QUEtiapine (SEROQUEL) 50 MG tablet   No No   Sig: Take 1 tablet (50 mg) by mouth at bedtime.   albuterol (PROAIR HFA/PROVENTIL HFA/VENTOLIN HFA) 108 (90 Base) MCG/ACT inhaler   No No   Sig: INHALE TWO PUFFS EVERY 4 HOURS AS NEEDED FOR WHEEZING OR SHORTNESS OF BREATH   albuterol (PROVENTIL) (2.5 MG/3ML) 0.083% neb solution   No No   Sig: USE 1 VIAL NEBULIZER every 4-6 hours as needed for wheeze or shortness of breath.   budesonide-formoterol (SYMBICORT/BREYNA) 160-4.5 MCG/ACT Inhaler   No No   Sig: Inhale 2 puffs into the lungs 2 times daily.   citalopram (CELEXA) 20 MG tablet   Yes No   cyanocobalamin (CYANOCOBALAMIN) 1000 mcg/mL injection   No No   Sig: INJECT 1ML  (1000MCG) SUBCUTANEOUSLY EVERY 14 DAYS   docusate sodium (COLACE) 100 MG capsule   No No   Sig: Take 1 capsule (100 mg) by mouth 2 times daily.   doxycycline hyclate (VIBRAMYCIN) 100 MG capsule   No No   Sig: Take 1 capsule (100 mg) by mouth 2 times daily for 10 days.   estradiol (CLIMARA) 0.05 MG/24HR weekly patch   No No   Sig: Place 1 patch over 168 hours onto the skin once a week.   estradiol (CLIMARA) 0.05 MG/24HR weekly patch   No No   Sig: Place 1 patch over 168 hours onto the skin once a week.   gabapentin (NEURONTIN) 100 MG capsule   No No   Sig: Take 2 capsules (200 mg) by mouth 3 times daily.   hydrOXYzine HCl (ATARAX) 25 MG tablet   No No   Sig: TAKE 3 TABLETS BY MOUTH THREE TIMES A DAY AS NEEDED FOR ANXIETY OR INSOMNIA .   levocetirizine (XYZAL) 5 MG tablet   No No   Sig: Take 1 tablet (5 mg) by mouth every evening.   losartan (COZAAR) 50 MG tablet   No No   Sig: Take 0.5 tablets (25 mg) by mouth daily.   methocarbamol (ROBAXIN) 500 MG tablet   No No   Sig: Take 1 tablet (500 mg) by mouth 3 times daily as needed for muscle spasms.   metoprolol succinate ER (TOPROL XL) 25 MG 24 hr tablet   No No   Sig: Take 1 tablet (25 mg) by mouth daily.   metroNIDAZOLE (FLAGYL) 500 MG tablet   No No   Sig: Take 1 tablet (500 mg) by mouth 3 times daily for 7 days.   montelukast (SINGULAIR) 10 MG tablet   No No   Sig: Take 1 tablet (10 mg) by mouth at bedtime.   multivitamin, therapeutic (THERA-VIT) TABS tablet   Yes No   Sig: Take 1 tablet by mouth daily   omeprazole (PRILOSEC) 20 MG DR capsule   No No   Sig: Take 1 capsule (20 mg) by mouth daily. Take once daily in the morning 30 minutes prior to food and drink.   prazosin (MINIPRESS) 1 MG capsule   No No   Sig: Take 1 capsule (1 mg) by mouth at bedtime.   predniSONE (DELTASONE) 20 MG tablet   No No   Sig: Take two tablets (= 40mg) each day for 4 (four) days   rizatriptan (MAXALT) 10 MG tablet   No No   Sig: Take 1 tablet (10 mg) by mouth at onset of headache for  migraine.   sulfamethoxazole-trimethoprim (BACTRIM DS) 800-160 MG tablet   No No   Sig: Take 1 tablet by mouth 2 times daily for 7 days.   topiramate (TOPAMAX) 25 MG tablet   No No   Sig: Take 1 tablet (25 mg) by mouth 2 times daily.      Facility-Administered Medications: None        Review of Systems    The 10 point Review of Systems is negative other than noted in the HPI or here.      Physical Exam   Vital Signs: Temp: 98  F (36.7  C) Temp src: Oral BP: (!) 163/67 Pulse: 63   Resp: 16 SpO2: 100 % O2 Device: None (Room air)    Weight: 216 lbs 0 oz    General Appearance: Alert, deaf, room air, no acute distress.   Respiratory: Normal respiratory effort, clear lungs.   Cardiovascular: RRR, no murmur.   GI: Obese, + BS, no tenderness to palpation.   Skin: No rash.   Other: Left hand with swollen index finger, mild erythema.     Medical Decision Making       60 MINUTES SPENT BY ME on the date of service doing chart review, history, exam, documentation & further activities per the note.      Data     I have personally reviewed the following data over the past 24 hrs:    8.3  \   13.1   / 286     140 106 20.5 (H) /  116 (H)   4.0 21 (L) 1.20 (H) \     Procal: 0.03 CRP: <3.00 Lactic Acid: N/A       INR:  0.85 PTT:  N/A   D-dimer:  N/A Fibrinogen:  N/A       Imaging results reviewed over the past 24 hrs:   Recent Results (from the past 24 hours)   XR Hand Right G/E 3 Views    Narrative    EXAM: XR HAND RIGHT G/E 3 VIEWS  LOCATION: [facility]  DATE: 6/16/2025    INDICATION: right finger pain  COMPARISON: 6/14/2025, 6/12/2025, and 3/10/2021       Impression    IMPRESSION: Moderate degenerative arthrosis of the first CMC and STT joints. No acute fracture.    CT Hand Right w/o Contrast    Narrative    EXAM: CT HAND RIGHT W/O CONTRAST  LOCATION: [facility]  DATE: 6/16/2025    INDICATION: right index finger swelling  COMPARISON:  Radiographs 06/16/2025  TECHNIQUE: Noncontrast. Axial, sagittal and coronal thin-section reconstruction. Dose reduction techniques were used.     FINDINGS:     Nonspecific subcutaneous edema throughout the second finger. No organized fluid collection or evidence of abscess. No discrete fluid within the flexor or extensor tendon sheath to suggest tenosynovitis. No apparent joint effusion. No osseous erosive or   destructive change to suggest osteomyelitis by CT. No soft tissue calcification to suggest a crystalline arthropathy. No erosions or evidence of inflammatory arthritis.    No acute fracture. Marked first CMC and moderate STT joint degenerative arthritis. Mild scattered degenerative arthritis elsewhere right hand and wrist.      Impression    IMPRESSION:  Nonspecific, circumferential subcutaneous edema about the index finger which could be seen in the setting of cellulitis. No abscess or CT evidence of osteomyelitis.

## 2025-06-17 ENCOUNTER — TELEPHONE (OUTPATIENT)
Dept: OPHTHALMOLOGY | Facility: CLINIC | Age: 50
End: 2025-06-17
Payer: COMMERCIAL

## 2025-06-17 ENCOUNTER — APPOINTMENT (OUTPATIENT)
Dept: INTERPRETER SERVICES | Facility: CLINIC | Age: 50
DRG: 125 | End: 2025-06-17
Attending: OPHTHALMOLOGY
Payer: COMMERCIAL

## 2025-06-17 ENCOUNTER — MYC MEDICAL ADVICE (OUTPATIENT)
Dept: ADMINISTRATIVE | Facility: CLINIC | Age: 50
End: 2025-06-17

## 2025-06-17 ENCOUNTER — OFFICE VISIT (OUTPATIENT)
Dept: OPHTHALMOLOGY | Facility: CLINIC | Age: 50
End: 2025-06-17
Attending: OPHTHALMOLOGY
Payer: COMMERCIAL

## 2025-06-17 DIAGNOSIS — H44.2E3 BOTH EYES AFFECTED BY DEGENERATIVE MYOPIA WITH OTHER MACULOPATHY: ICD-10-CM

## 2025-06-17 DIAGNOSIS — H43.813 PVD (POSTERIOR VITREOUS DETACHMENT), BILATERAL: ICD-10-CM

## 2025-06-17 DIAGNOSIS — H25.13 AGE-RELATED NUCLEAR CATARACT OF BOTH EYES: ICD-10-CM

## 2025-06-17 DIAGNOSIS — H33.021 RETINAL DETACHMENT OF RIGHT EYE WITH MULTIPLE BREAKS: Primary | ICD-10-CM

## 2025-06-17 DIAGNOSIS — H54.61 VISION LOSS OF RIGHT EYE: ICD-10-CM

## 2025-06-17 LAB
ANION GAP SERPL CALCULATED.3IONS-SCNC: 9 MMOL/L (ref 7–15)
BUN SERPL-MCNC: 15.6 MG/DL (ref 6–20)
CALCIUM SERPL-MCNC: 8.4 MG/DL (ref 8.8–10.4)
CHLORIDE SERPL-SCNC: 110 MMOL/L (ref 98–107)
CREAT SERPL-MCNC: 1.03 MG/DL (ref 0.51–0.95)
EGFRCR SERPLBLD CKD-EPI 2021: 66 ML/MIN/1.73M2
ERYTHROCYTE [DISTWIDTH] IN BLOOD BY AUTOMATED COUNT: 14 % (ref 10–15)
GLUCOSE SERPL-MCNC: 124 MG/DL (ref 70–99)
HCO3 SERPL-SCNC: 21 MMOL/L (ref 22–29)
HCT VFR BLD AUTO: 37.2 % (ref 35–47)
HGB BLD-MCNC: 12.2 G/DL (ref 11.7–15.7)
MCH RBC QN AUTO: 28.6 PG (ref 26.5–33)
MCHC RBC AUTO-ENTMCNC: 32.8 G/DL (ref 31.5–36.5)
MCV RBC AUTO: 87 FL (ref 78–100)
PLATELET # BLD AUTO: 248 10E3/UL (ref 150–450)
POTASSIUM SERPL-SCNC: 4.5 MMOL/L (ref 3.4–5.3)
RBC # BLD AUTO: 4.26 10E6/UL (ref 3.8–5.2)
SODIUM SERPL-SCNC: 140 MMOL/L (ref 135–145)
WBC # BLD AUTO: 6.7 10E3/UL (ref 4–11)

## 2025-06-17 PROCEDURE — 80048 BASIC METABOLIC PNL TOTAL CA: CPT | Performed by: INTERNAL MEDICINE

## 2025-06-17 PROCEDURE — G0378 HOSPITAL OBSERVATION PER HR: HCPCS

## 2025-06-17 PROCEDURE — 250N000011 HC RX IP 250 OP 636: Performed by: INTERNAL MEDICINE

## 2025-06-17 PROCEDURE — G0463 HOSPITAL OUTPT CLINIC VISIT: HCPCS | Performed by: OPHTHALMOLOGY

## 2025-06-17 PROCEDURE — 99232 SBSQ HOSP IP/OBS MODERATE 35: CPT | Performed by: INTERNAL MEDICINE

## 2025-06-17 PROCEDURE — 92250 FUNDUS PHOTOGRAPHY W/I&R: CPT | Performed by: OPHTHALMOLOGY

## 2025-06-17 PROCEDURE — 85018 HEMOGLOBIN: CPT | Performed by: INTERNAL MEDICINE

## 2025-06-17 PROCEDURE — 36415 COLL VENOUS BLD VENIPUNCTURE: CPT | Performed by: INTERNAL MEDICINE

## 2025-06-17 PROCEDURE — 96376 TX/PRO/DX INJ SAME DRUG ADON: CPT

## 2025-06-17 PROCEDURE — 92134 CPTRZ OPH DX IMG PST SGM RTA: CPT | Performed by: OPHTHALMOLOGY

## 2025-06-17 PROCEDURE — 250N000013 HC RX MED GY IP 250 OP 250 PS 637: Performed by: INTERNAL MEDICINE

## 2025-06-17 PROCEDURE — 120N000002 HC R&B MED SURG/OB UMMC

## 2025-06-17 RX ADMIN — TOPIRAMATE 25 MG: 25 TABLET, FILM COATED ORAL at 21:10

## 2025-06-17 RX ADMIN — MONTELUKAST 10 MG: 10 TABLET, FILM COATED ORAL at 21:10

## 2025-06-17 RX ADMIN — BUDESONIDE AND FORMOTEROL FUMARATE DIHYDRATE 2 PUFF: 160; 4.5 AEROSOL RESPIRATORY (INHALATION) at 08:49

## 2025-06-17 RX ADMIN — PANTOPRAZOLE SODIUM 40 MG: 40 TABLET, DELAYED RELEASE ORAL at 08:45

## 2025-06-17 RX ADMIN — BUDESONIDE AND FORMOTEROL FUMARATE DIHYDRATE 2 PUFF: 160; 4.5 AEROSOL RESPIRATORY (INHALATION) at 21:10

## 2025-06-17 RX ADMIN — PRAZOSIN HYDROCHLORIDE 1 MG: 1 CAPSULE ORAL at 21:10

## 2025-06-17 RX ADMIN — LEVOCETIRIZINE DIHYDROCHLORIDE 5 MG: 5 TABLET ORAL at 21:10

## 2025-06-17 RX ADMIN — CEFTRIAXONE SODIUM 1 G: 1 INJECTION, POWDER, FOR SOLUTION INTRAMUSCULAR; INTRAVENOUS at 17:04

## 2025-06-17 RX ADMIN — THERA TABS 1 TABLET: TAB at 08:45

## 2025-06-17 RX ADMIN — METOPROLOL SUCCINATE 25 MG: 25 TABLET, EXTENDED RELEASE ORAL at 08:45

## 2025-06-17 RX ADMIN — QUETIAPINE FUMARATE 50 MG: 50 TABLET ORAL at 21:10

## 2025-06-17 RX ADMIN — GABAPENTIN 200 MG: 100 CAPSULE ORAL at 17:05

## 2025-06-17 RX ADMIN — GABAPENTIN 200 MG: 100 CAPSULE ORAL at 21:10

## 2025-06-17 RX ADMIN — DOCUSATE SODIUM 100 MG: 100 CAPSULE, LIQUID FILLED ORAL at 21:10

## 2025-06-17 RX ADMIN — TOPIRAMATE 25 MG: 25 TABLET, FILM COATED ORAL at 08:45

## 2025-06-17 RX ADMIN — PROGESTERONE 200 MG: 100 CAPSULE ORAL at 08:45

## 2025-06-17 RX ADMIN — DOCUSATE SODIUM 100 MG: 100 CAPSULE, LIQUID FILLED ORAL at 08:45

## 2025-06-17 RX ADMIN — GABAPENTIN 200 MG: 100 CAPSULE ORAL at 08:45

## 2025-06-17 ASSESSMENT — REFRACTION_WEARINGRX
OS_CYLINDER: +0.75
OD_AXIS: 064
OS_AXIS: 127
OD_SPHERE: -16.00
OD_CYLINDER: +2.00
OS_SPHERE: -14.25

## 2025-06-17 ASSESSMENT — VISUAL ACUITY
METHOD: SNELLEN - LINEAR
OS_PH_CC: 20/30
OD_SC: NLP
CORRECTION_TYPE: GLASSES
OS_CC+: +2
OS_CC: 20/50
OS_PH_CC+: -1

## 2025-06-17 ASSESSMENT — ACTIVITIES OF DAILY LIVING (ADL)
ADLS_ACUITY_SCORE: 41
ADLS_ACUITY_SCORE: 42
ADLS_ACUITY_SCORE: 41
ADLS_ACUITY_SCORE: 42
ADLS_ACUITY_SCORE: 41
ADLS_ACUITY_SCORE: 42
ADLS_ACUITY_SCORE: 42
ADLS_ACUITY_SCORE: 41
ADLS_ACUITY_SCORE: 42
ADLS_ACUITY_SCORE: 42
ADLS_ACUITY_SCORE: 41
ADLS_ACUITY_SCORE: 42
ADLS_ACUITY_SCORE: 41
ADLS_ACUITY_SCORE: 42
ADLS_ACUITY_SCORE: 41
ADLS_ACUITY_SCORE: 42

## 2025-06-17 ASSESSMENT — CUP TO DISC RATIO
OD_RATIO: 0.4
OS_RATIO: 0.4

## 2025-06-17 ASSESSMENT — CONF VISUAL FIELD
OD_SUPERIOR_TEMPORAL_RESTRICTION: 1
OD_SUPERIOR_NASAL_RESTRICTION: 1
OD_INFERIOR_NASAL_RESTRICTION: 1
OD_INFERIOR_TEMPORAL_RESTRICTION: 1

## 2025-06-17 ASSESSMENT — TONOMETRY
OS_IOP_MMHG: 18
OD_IOP_MMHG: 15
IOP_METHOD: TONOPEN

## 2025-06-17 ASSESSMENT — SLIT LAMP EXAM - LIDS
COMMENTS: NORMAL
COMMENTS: NORMAL

## 2025-06-17 NOTE — LETTER
June 18, 2025    Marleny Viera  [address]  [place] [address]      Dear Marleny Viera:    Your health is important to us. We have discussed extensively the risk of permanent vision loss in your right eye without urgent surgery. I have offered surgery for you tomorrow, however, you have declined. I am writing to again offer urgent surgery because I do not want you to permanently lose vision in your right eye. Even with surgery there is a risk of vision loss but it will give you the best chance at recovering vision. If you desire to see a female retina surgeon, we do have one here at the [facility]: Ama Hazel MD. Our  will reach out to you to offer you a visit with her as well but this should happen as soon as possible. There are also female retina specialists in town at the [facility] (Darling Cantor MD, PhD) and at [facility] (Gelacio Muniz MD). Again, I encourage you to please accept the plan for surgery as it is the only way we could possibly recover some vision from your eye. If you declined surgery because of concerns over your ability to pay for care, please contact me so we can discuss payment options.   If you have any questions regarding this notice, please contact me at the clinic.   Sincerely,    Lexie Mcgraw MD, PhD

## 2025-06-17 NOTE — PLAN OF CARE
"Patient admitted -    VS: Blood pressure 134/69, pulse 65, temperature 98.1  F (36.7  C), temperature source Oral, resp. rate 17, height 1.651 m (5' 5\"), weight 98 kg (216 lb), SpO2 99%, not currently breastfeeding.     O2: >90% on RA no complaints of SOB or chest pain.   Output: Voiding adequate amounts w/o pain or difficulty to bathroom.   Last BM: Continent of B&B, last BM 6/1//2025   Activity: Independent in room     Skin: Appears intact, swelling on R hand    Pain: 7/10 managed with Oxycodone.    CMS: A/O x4, able to make needs known. Pt is deaf.    Dressing: None    Diet: Regular, tolerating well. No complaints of nausea or vomiting.   LDA: L KEYON - NAN.    Equipment: Personal belongings   Plan: Continue POC    Additional Info:                "

## 2025-06-17 NOTE — PROGRESS NOTES
[facility] Transportation pick-up ride set for 11:40 AM - 11:50 AM, going to the Eye Clinic in the [facility] 9th Floor. Return ride set up for 3:00 PM - 3:45 PM for the patient to come back to current room 839.

## 2025-06-17 NOTE — PLAN OF CARE
"Goal Outcome Evaluation:      Plan of Care Reviewed With: patient    Overall Patient Progress: no changeOverall Patient Progress: no change      VS: BP (!) 147/77 (BP Location: Right arm, Patient Position: Semi-Joseph's, Cuff Size: Adult Regular)   Pulse 72   Temp 98.2  F (36.8  C) (Oral)   Resp 18   Ht 1.651 m (5' 5\")   Wt 98 kg (216 lb)   LMP  (LMP Unknown)   SpO2 97%   BMI 35.94 kg/m       O2: 97% on R/A   Output: Continent of bowel and bladder   Last BM:    Activity: IND   Up for meals? bedside   Skin: Right hand some swelling   Pain: Rating pain 9, but refusing pain medications   CMS: Alert and oriented, deaf   Dressing: none   Diet: Regular whole thin   LDA: Left PIV SL   Equipment: IV pole    Plan: Con. Poc    Additional Info: Patient remains alert and oriented, call light appropriate. Deaf and utilizing  . Had her eye appointment at the [facility], currently back, IV antibiotic Rocephin given. Rated pain 9/10 pain medication oxycodone offer patient refused.                 "

## 2025-06-17 NOTE — LETTER
June 18, 2025      Re: Marleny Viera   1975    Dear Marlney Viera:    Your health is important to us. We have discussed extensively the risk of permanent vision loss in your right eye without urgent surgery. I have offered surgery for you tomorrow, however, you have declined. I am writing to again offer urgent surgery because I do not want you to permanently lose vision in your right eye. Even with surgery there is a risk of vision loss but surgery will give you the best chance at recovering vision. If you desire to see a female retina surgeon, we do have one here at the [facility]: Ama Hazel MD. We spoke with you earlier today to offer you a clinic visit with her tomorrow but you have declined to see her. We would like to reiterate that without urgent surgery you will certainly have irreversible vision loss and we strongly recommend pursuing surgery for the repair of your retinal detachment.    There are also female retina specialists in town at the [facility] (Darling Cantor MD, PhD) ([phone]) and at [facility] (Gelacio Muniz MD) ([phone]). You are welcome to see them if you prefer but you need to see them urgently    Again, I encourage you to please accept the plan for surgery as it is the only way we could possibly recover some vision from your eye. If you declined surgery because of concerns over your ability to pay for care, please contact me so we can discuss payment options. If you have any questions regarding this notice, please contact me at the clinic. This needs to be fixed quickly and the longer there is a delay, the less likely you will be to get any vision back.     Sincerely,    Lexie Mcgraw MD, PhD

## 2025-06-17 NOTE — PROGRESS NOTES
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, right eye  - Patient reports 6/11/25 onset, but uncertain duration  - Visual acuity no light perception, would not be expected from just a retinal detachment, optic nerve appears healthy, retina does not appear atrophic  - Outside MR Brain read from 6/14/25 any pathology to explain NLP vision  - Discussed risk and benefits of RD repair  - Planning for vitrectomy with gas vs oil insertion on outpatient basis at [facility] on 6/19; will confirm time and location tomorrow morning with team, to be communicated to patient prior to discharge; patient will need to arrange family to stay with her night after surgery     #Myopic degeneration, left eye  - high myope (-16 right eye, -14 left eye)  - 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 Mcgraw, who agrees with the plan    Wayne Kirby MD  PGY-3 Ophthalmology Resident  [facility]      HISTORY OF PRESENTING ILLNESS:     Marleny Viera is a 49 year old female who presented on 6/16/25 with right eye vision loss.    - sudden R eye vision loss 5 days ago  - feels vision loss is complete, denies light perception  - mild eye pain, some worsening with eye movement  - tearing in the morning, improves during the daytime  - no trauma  - right-sided headache  - no scalp tenderness, jaw pain, neck pain, shoulder pain, jaw claudication, jaw fatigue      OCULAR/MEDICAL/SURGICAL HISTORIES:     Past Ocular History:   Last eye exam in January. Denies any eye issues except for high myopia.     Pertinent Systemic Medications:   Current Outpatient Medications   Medication Instructions    albuterol (PROAIR HFA/PROVENTIL HFA/VENTOLIN HFA) 108 (90 Base) MCG/ACT inhaler INHALE TWO PUFFS  EVERY 4 HOURS AS NEEDED FOR WHEEZING OR SHORTNESS OF BREATH    albuterol (PROVENTIL) (2.5 MG/3ML) 0.083% neb solution USE 1 VIAL NEBULIZER every 4-6 hours as needed for wheeze or shortness of breath.    budesonide-formoterol (SYMBICORT/BREYNA) 160-4.5 MCG/ACT Inhaler 2 puffs, Inhalation, 2 TIMES DAILY    citalopram (CELEXA) 20 MG tablet     cyanocobalamin (CYANOCOBALAMIN) 1000 mcg/mL injection INJECT 1ML (1000MCG) SUBCUTANEOUSLY EVERY 14 DAYS    docusate sodium (COLACE) 100 mg, Oral, 2 TIMES DAILY    doxycycline hyclate (VIBRAMYCIN) 100 mg, Oral, 2 TIMES DAILY    EPINEPHrine (ANY BX GENERIC EQUIV) 0.3 mg, Intramuscular, PRN    estradiol (CLIMARA) 0.05 MG/24HR weekly patch 1 patch, Transdermal, WEEKLY    estradiol (CLIMARA) 0.05 MG/24HR weekly patch 1 patch, Transdermal, WEEKLY    gabapentin (NEURONTIN) 200 mg, Oral, 3 TIMES DAILY    hydrOXYzine HCl (ATARAX) 25 MG tablet TAKE 3 TABLETS BY MOUTH THREE TIMES A DAY AS NEEDED FOR ANXIETY OR INSOMNIA .    levocetirizine (XYZAL) 5 mg, Oral, EVERY EVENING    losartan (COZAAR) 25 mg, Oral, DAILY    methocarbamol (ROBAXIN) 500 mg, Oral, 3 TIMES DAILY PRN    metoprolol succinate ER (TOPROL XL) 25 mg, Oral, DAILY    metroNIDAZOLE (FLAGYL) 500 mg, Oral, 3 TIMES DAILY    montelukast (SINGULAIR) 10 mg, Oral, AT BEDTIME    multivitamin, therapeutic (THERA-VIT) TABS tablet 1 tablet, DAILY    NONFORMULARY 1 tablet, DAILY    omeprazole (PRILOSEC) 20 mg, Oral, DAILY, Take once daily in the morning 30 minutes prior to food and drink.    prazosin (MINIPRESS) 1 mg, Oral, AT BEDTIME    predniSONE (DELTASONE) 20 MG tablet Take two tablets (= 40mg) each day for 4 (four) days    Prometrium 200 mg, Oral, DAILY    QUEtiapine (SEROQUEL) 50 mg, Oral, AT BEDTIME    rizatriptan (MAXALT) 10 mg, Oral, AT ONSET OF HEADACHE    sulfamethoxazole-trimethoprim (BACTRIM DS) 800-160 MG tablet 1 tablet, Oral, 2 TIMES DAILY    topiramate (TOPAMAX) 25 mg, Oral, 2 TIMES DAILY         Past Medical History:  Past  Medical History:   Diagnosis Date    Accidental overdose, initial encounter 07/07/2022    ADHD (attention deficit hyperactivity disorder)     Alcohol dependence (H)     Anxiety     Arthritis     Asthma     B12 deficiency     Bipolar depression (H)     Blood clotting disorder     Deafness     Depressive disorder 10 year    Drug abuse, nondependent (H) 10/29/2004    Overview:  polydrug abuse per psych notes ; Other, mixed, or unspecified nondependent drug abuse, unspecified    History of blood clots     History of transfusion     Hypertension     Kidney stones 2015    Major depressive disorder, recurrent episode, severe (H) 05/17/2016    Overview:  s/p suicide attempt Epic     Migraine     Obstructive sleep apnea 06/01/2016    Pt states she does not have ROBERT, does not use CPAP    Pulmonary emboli (H)     Status post total right knee replacement 01/18/2016       Past Surgical History:   Past Surgical History:   Procedure Laterality Date    BIOPSY BREAST Right     2019... Also had fluid drained from left breast under surgery also in 2019    BREAST SURGERY      MAMMOPLASTY REDUCTION Bilateral 01/01/2017    ORTHOPEDIC SURGERY  2012,2013,2014    TOTAL HIP ARTHROPLASTY Bilateral     TOTAL KNEE ARTHROPLASTY Right     TUBAL LIGATION      US BREAST CORE BIOPSY RIGHT Right 11/21/2019       Family History:  Noncontributory     Social History:  Social History     Tobacco Use    Smoking status: Never     Passive exposure: Never    Smokeless tobacco: Never   Vaping Use    Vaping status: Never Used   Substance Use Topics    Alcohol use: Yes     Comment: Alcoholic Drinks/day: dependence    Drug use: Never        EXAMINATION:     Base Eye Exam       Visual Acuity (Snellen - Linear)         Right Left    Near cc NLP 20/50 ph 20/25   Endorses NLP vision OD checked multiple times             Tonometry (Tonopen, 1:24 PM)         Right Left    Pressure 14 16              Pupils         Dark Light Shape React APD    Right 5 4 Round Slow Yes     Left 5 2.5 Round Brisk None              Visual Fields (Counting fingers)         Left Right     Full     Restrictions  Total superior temporal, inferior temporal, superior nasal, inferior nasal deficiencies              Extraocular Movement    Orthophoric. Unable to follow directions well for thorough EOM assessment. Grossly full.              Neuro/Psych       Oriented x3: Yes    Mood/Affect: Normal              Dilation       Both eyes: 1.0% Mydriacyl, 2.5% [place] Synephrine                   Slit Lamp and Fundus Exam       External Exam         Right Left    External palpable temporal pulse palpable temporal pulse              Slit Lamp Exam         Right Left    Lids/Lashes Normal Normal    Conjunctiva/Sclera White and quiet White and quiet    Cornea Clear Clear    Anterior Chamber Deep and quiet Deep and quiet    Iris Round and reactive Round and reactive    Lens 1+ NS 1+ NS              Fundus Exam         Right Left    Posterior Vitreous pigment Weaver ring    Disc Normal significant PPA    Macula macula off RD Normal    Vessels Normal Normal    Periphery giant retinal tear ~1:00-4:00. total RD degenerative changes suggestive of cobblestone                    MRA Neck (Carotids) wo Contrast (06/14/2025 8:32 PM)  MR Brain w/o Contrast (06/14/2025 8:32 PM)  MRA Brain ([place]) w/o Contrast (06/14/2025 8:32 PM)  IMPRESSION:  HEAD MRI:  Normal head MRI.     HEAD MRA:  1.  No aneurysm, high flow AVM or significant stenosis identified.  2.  Variant [place] anatomy as above.     NECK MRA:  Limited examination with grossly patent carotid and vertebral arteries.     Wayne Kirby MD  PGY-3 Ophthalmology Resident  [facility]

## 2025-06-17 NOTE — TELEPHONE ENCOUNTER
Left message with  yesterday regarding referral.    Per review today-- pt seen in ED by ophthalmology yesterday and pt currently inpt-- retina detachment.    No scheduling in clinic indicated at this time.    Sp Martines RN 8:10 AM 06/17/25

## 2025-06-17 NOTE — PROGRESS NOTES
CC -   Mac-off RD right eye    INTERVAL HISTORY - Initial visit last seen at [facility] eye clinic 5/28/25 VA OD 20/400 retina attached per documentation   via iPad used during visit  No trauma  lives alone, unclear if can arrange for family to help while she is face down  notes from [facility] from 5/28/25 reviewed    PMH -   Marleny Viera is a 49 year old patient with mac-off RRD diagnosed 6/16/25 seen by resident while patient admitted for infection on her finger (had been at OSH for finger infection failed outpatient oral ABx)    Patient deaf, uses ASL sign language  bipolar on seroquel      PAST OCULAR SURGERY  None      RETINAL IMAGING:  OCT   OD - detached retina  OS - normal foveal contour, PPA nasally near nerve, no fluid, attached      ASSESSMENT & PLAN  # Total RD OD with GRT   - Dx 6/2025 uncertain duration   - NLP verified by me 6/17/25, explained and discussed multiple times with  patient maintained could not distinguish light or dark with indirect ophthalmoscope used for illumination at 1 foot distance and maximum illumination   - VA NLP no clear etiology, ON appears OK, RD does not look atrophic     - patient had brain MRI done 6/14/2025 no pathology to explain   - ?non-organic on organic overlay vs occult pathology     - d/w patient VA much worse than expected, would expect LP or HM at least   - d/w patient possibility that VA will not improve after RD repair d/t undiagnosed pathology   - given recent onset and lack of clear pathology to cause NLP would advise surgery     - plan PPV/gas vs SO/possible SBP     - r/b/a d/w patient: vision loss, blindness, infection, bleeding   - retinal detachment, need for more surgeries, need for gas or oil bubble and bubble restrictions   - cataract, diplopia, refractive change   - persistent blurriness, distortion, or scotoma   - participation/performance by fellow or resident     - patient will need to arrange family to stay with her  night after surgery        #Myopic degeneration, left eye  - high myope (-16 right eye, -14 left eye)  - staphyloma  OU on OCT, mild pigmentary change to macula  - cobblestone degernation peripherally    #PVD OU      # NS OU    Return POD #1.     Wayne Kirby MD  PGY-3 Ophthalmology Resident  [facility]      ATTESTATION     Attending Attestation:     Complete documentation of historical and exam elements from today's encounter can be found in the full encounter summary report (not reduplicated in this progress note).  I personally obtained the chief complaint(s) and history of present illness.  I confirmed and edited as necessary the review of systems, past medical/surgical history, family history, social history, and examination findings as documented by others; and I examined the patient myself.  I personally reviewed the relevant tests, images, and reports as documented above.  I personally reviewed the ophthalmic test(s) associated with this encounter, agree with the interpretation(s) as documented by the resident/fellow, and have edited the corresponding report(s) as necessary.   I formulated and edited as necessary the assessment and plan and discussed the findings and management plan with the patient and family    Lexie Mcgraw MD, PhD  , Vitreoretinal Surgery  Department of Ophthalmology  [facility]     06/18/25 10:08 AM ADDENDUM:     Patient is declining surgery presently due to emotional distress, desire to move to [place], and desire for female surgeon. Had surgery available tomorrow but patient declines. Stressed prognosis of permanent vision loss without surgery and patient expressed understanding. Sending certified letter explaining the same and referred to female surgeons in the [facility] - also offered to arrange acute follow up at [facility] with female surgeon.    Arabella Obando MD  Vitreoretinal Surgery Fellow

## 2025-06-17 NOTE — PROGRESS NOTES
Orthopedic Surgery Progress Note 06/17/2025    S: Patient seen at bedside. No acute events overnight. Pain controlled on PO. Reports swelling is about the same as yesterday.    O:  Temp: 98.2  F (36.8  C) Temp src: Oral BP: (!) 147/77 Pulse: 72   Resp: 18 SpO2: 97 % O2 Device: None (Room air)      Exam:  Gen: alert and oriented, responds to questions appropriately  Resp: non-labored on RA  MSK:  RUE:  - No gross deformity, skin intact  - No erythema  - Circumferential swelling about P1 of right index finger extending to dorsal MCP. No swelling about any other finger.   - TTP circumferentially about P1 segment and with palpation of dorsal MCP. Non-TTP about volar finger of P2 and P3 segments. Non-TTP about any other finger or volar/dorsal palm.  - No fluctuance, no induration   - Able to make full fist with fingers other than index finger. Index finger is about to flex to approximately 30% of full flexion.   - Motor intact distally with deltoid, FPL, EPL, and IO   - SILT median, ulnar, radial, axillary nerve distributions. SILT about UDN of index finger, endorsing diminished sensation in RDN of index finger.   - Radial pulse palpable, fingers warm and well perfused.      Recent Labs   Lab 06/17/25  0602 06/16/25  1219 06/14/25  1828   WBC 6.7 8.3 7.0   HGB 12.2 13.1 13.2    286 297       Culture results: n/a    Assessment: Marleny Viera is a LHD 49 year old female who orthopedic surgery was consulted on regarding right index finger cellulitis.     Her exam and R hand CT are not consistent with abscess of FTS. CT R hand consistent with cellulitis.       Plan:  - Admit to medicine  - Plan for OR: no plan for othopedic operative intervention  - Anticoagulation/DVT prophylaxis: none  - Antibiotics: per primary  - Imaging: complete  - Activity: AAT R hand  - Weight bearing:   - Pain control: per primary  - Diet: regular.  - Follow-up: 1w with hand MELISSA  - Disposition: likely home on discharge     Orthopedic  surgery staff is Dr. Hobbs.    Orthopedic Surgery will continue to follow peripherally, please page ortho on call with questions or concerns.     --  Princess Christianson MD  Orthopedic Surgery PGY-4

## 2025-06-18 ENCOUNTER — TELEPHONE (OUTPATIENT)
Dept: OPHTHALMOLOGY | Facility: CLINIC | Age: 50
End: 2025-06-18

## 2025-06-18 VITALS
OXYGEN SATURATION: 98 % | HEIGHT: 65 IN | TEMPERATURE: 98.1 F | SYSTOLIC BLOOD PRESSURE: 163 MMHG | DIASTOLIC BLOOD PRESSURE: 84 MMHG | RESPIRATION RATE: 18 BRPM | HEART RATE: 72 BPM | WEIGHT: 216 LBS | BODY MASS INDEX: 35.99 KG/M2

## 2025-06-18 PROCEDURE — G0378 HOSPITAL OBSERVATION PER HR: HCPCS

## 2025-06-18 PROCEDURE — 250N000013 HC RX MED GY IP 250 OP 250 PS 637: Performed by: INTERNAL MEDICINE

## 2025-06-18 PROCEDURE — 96376 TX/PRO/DX INJ SAME DRUG ADON: CPT

## 2025-06-18 PROCEDURE — 99239 HOSP IP/OBS DSCHRG MGMT >30: CPT | Performed by: INTERNAL MEDICINE

## 2025-06-18 PROCEDURE — 250N000011 HC RX IP 250 OP 636: Performed by: INTERNAL MEDICINE

## 2025-06-18 RX ORDER — CEFTRIAXONE 1 G/1
1 INJECTION, POWDER, FOR SOLUTION INTRAMUSCULAR; INTRAVENOUS ONCE
Status: COMPLETED | OUTPATIENT
Start: 2025-06-18 | End: 2025-06-18

## 2025-06-18 RX ADMIN — PROGESTERONE 200 MG: 100 CAPSULE ORAL at 08:55

## 2025-06-18 RX ADMIN — GABAPENTIN 200 MG: 100 CAPSULE ORAL at 08:55

## 2025-06-18 RX ADMIN — DOCUSATE SODIUM 100 MG: 100 CAPSULE, LIQUID FILLED ORAL at 08:55

## 2025-06-18 RX ADMIN — METOPROLOL SUCCINATE 25 MG: 25 TABLET, EXTENDED RELEASE ORAL at 08:56

## 2025-06-18 RX ADMIN — BUDESONIDE AND FORMOTEROL FUMARATE DIHYDRATE 2 PUFF: 160; 4.5 AEROSOL RESPIRATORY (INHALATION) at 10:15

## 2025-06-18 RX ADMIN — THERA TABS 1 TABLET: TAB at 08:55

## 2025-06-18 RX ADMIN — LOSARTAN POTASSIUM 25 MG: 25 TABLET, FILM COATED ORAL at 08:56

## 2025-06-18 RX ADMIN — CEFTRIAXONE SODIUM 1 G: 1 INJECTION, POWDER, FOR SOLUTION INTRAMUSCULAR; INTRAVENOUS at 11:56

## 2025-06-18 RX ADMIN — PANTOPRAZOLE SODIUM 40 MG: 40 TABLET, DELAYED RELEASE ORAL at 08:56

## 2025-06-18 RX ADMIN — TOPIRAMATE 25 MG: 25 TABLET, FILM COATED ORAL at 08:56

## 2025-06-18 ASSESSMENT — ACTIVITIES OF DAILY LIVING (ADL)
ADLS_ACUITY_SCORE: 41
ADLS_ACUITY_SCORE: 46
ADLS_ACUITY_SCORE: 41
DEPENDENT_IADLS:: INDEPENDENT
ADLS_ACUITY_SCORE: 46
ADLS_ACUITY_SCORE: 41
ADLS_ACUITY_SCORE: 46
ADLS_ACUITY_SCORE: 46

## 2025-06-18 NOTE — DISCHARGE SUMMARY
DISCHARGE SUMMARY    Pt discharging to: home  Transportation: lyft  AVS given and discussed: Yes, no further questions.   Medications given: Yes, discussed. No further questions.   Belongings returned: Yes, ensured all belongings packed and sent with pt. No items in security.   Comments: Escorted safely to elevators.     Reinier Winter RN

## 2025-06-18 NOTE — CONSULTS
"Care Management Initial Consult    General Information  Assessment completed with: Patient, Other (utilized in-person ),    Type of CM/SW Visit: Initial Assessment    Primary Care Provider verified and updated as needed: Yes   Readmission within the last 30 days: no previous admission in last 30 days      Reason for Consult: other (see comments) (elevated risk score)  Advance Care Planning: Advance Care Planning Reviewed: no concerns identified          Communication Assessment  Patient's communication style:  (American Sign Language)    Hearing Difficulty or Deaf: yes   Wear Glasses or Blind: yes    Cognitive  Cognitive/Neuro/Behavioral: WDL                      Living Environment:   People in home: alone     Current living Arrangements: town home      Able to return to prior arrangements: yes       Family/Social Support:  Care provided by: self  Provides care for: pet(s) (2 cats that pt reports are licensed as emotional support animals)  Marital Status:  (spouse passed away 3 yrs ago from ALS)  Support system: Friend, Other (specify) (pt reports having local friends in the deaf community and a \"mental health social\" called \"[employer]\"; emotional support animals; pt reports most of her family are able to hear, so they are not particularly close/do not communicate much)          Description of Support System: Supportive, Involved    Support Assessment: Adequate family and caregiver support, Adequate social supports (pt reports feeling that she is well-supported)    Current Resources:   Patient receiving home care services: No        Community Resources: County Programs, County Worker, Transportation Services, Other (see comment) (pt reports having a waiver, unsure of type, but seems likely CADI; waiver  through "[employer] receives Lyft passes through waiver; [employer]; lawn care/snow removal services; [employer] Meals; AA sponsor)  Equipment currently used at home: walker, standard, " shower chair, other (see comments) (scooter; bed rail; bed that allows for pt to sit upright d/t her asthma)  Supplies currently used at home: None    Employment/Financial:  Employment Status: employed part-time ([employer]; pt reports she notified her employer of her hospitalization and they are excusing her from work through next week)        Financial Concerns: none   Referral to Financial Worker: No       Does the patient's insurance plan have a 3 day qualifying hospital stay waiver?  Yes     Which insurance plan 3 day waiver is available? Alternative insurance waiver    Will the waiver be used for post-acute placement? No    Lifestyle & Psychosocial Needs:  Social Drivers of Health     Food Insecurity: Low Risk  (3/27/2025)    Food Insecurity     Within the past 12 months, did you worry that your food would run out before you got money to buy more?: No     Within the past 12 months, did the food you bought just not last and you didn t have money to get more?: No   Depression: At risk (4/14/2025)    PHQ-2     PHQ-2 Score: 3   Housing Stability: High Risk (3/27/2025)    Housing Stability     Do you have housing? : No     Are you worried about losing your housing?: No   Tobacco Use: Low Risk  (6/16/2025)    Patient History     Smoking Tobacco Use: Never     Smokeless Tobacco Use: Never     Passive Exposure: Never   Financial Resource Strain: Low Risk  (3/27/2025)    Financial Resource Strain     Within the past 12 months, have you or your family members you live with been unable to get utilities (heat, electricity) when it was really needed?: No   Alcohol Use: Not on file   Transportation Needs: High Risk (3/27/2025)    Transportation Needs     Within the past 12 months, has lack of transportation kept you from medical appointments, getting your medicines, non-medical meetings or appointments, work, or from getting things that you need?: Yes   Physical Activity: Sufficiently Active (3/27/2025)    Exercise Vital Sign      Days of Exercise per Week: 7 days     Minutes of Exercise per Session: 150+ min   Interpersonal Safety: Low Risk  (6/16/2025)    Interpersonal Safety     Do you feel physically and emotionally safe where you currently live?: Yes     Within the past 12 months, have you been hit, slapped, kicked or otherwise physically hurt by someone?: No     Within the past 12 months, have you been humiliated or emotionally abused in other ways by your partner or ex-partner?: No   Stress: No Stress Concern Present (3/27/2025)    [place] [employer] - Occupational Stress Questionnaire     Feeling of Stress : Only a little   Social Connections: Unknown (3/27/2025)    Social Connection and Isolation Panel [NHANES]     Frequency of Communication with Friends and Family: Not on file     Frequency of Social Gatherings with Friends and Family: Never     Attends Zoroastrianism Services: Not on file     Active Member of Clubs or Organizations: Not on file     Attends Club or Organization Meetings: Not on file     Marital Status: Not on file   Health Literacy: Not on file       Functional Status:  Prior to admission patient needed assistance:   Dependent ADLs:: Independent, Ambulation-walker (utilizes walker for balance)  Dependent IADLs:: Independent       Mental Health Status:  Mental Health Status: No Current Concerns       Chemical Dependency Status:  Chemical Dependency Status: No Current Concerns (Pt reported no concerns to discuss at this time, however did report that she has an AA sponsor)  Chemical Dependency Management: AA          Values/Beliefs:  Spiritual, Cultural Beliefs, Zoroastrianism Practices, Values that affect care: no               Discussed  Partnership in Safe Discharge Planning  document with patient/family: No    Additional Information:  CM/SW consult received d/t elevated unplanned readmission risk score.    Met w/ pt at bedside w/ in-person , introduced self and role, conducted CMA,  and discussed discharge planning process.  See flowsheets for CMA data.  Confirmed home address and PCP.  Pt reported she has upcoming PCP appt (see AVS), as well as upcoming psych appt and eye appt.  She reported also having tickets for a baseball game, so would like to discharge soon to get to her appts and not miss out on the ball game.  Pt had no questions/concerns for this writer.  Advised pt to alert bedside RN in the event anything comes up that she would like to speak w/ care mgmt team about.    10:00am - Case discussed in rounds this AM.  Per provider, pt does not want surgery.  Provider intends to speak w/ pt further about this today, however if she continues to decline surgical intervention at this time and wants to discharge, pt could potentially go home today.  No care mgmt needs were identified during this discussion.      Next Steps:  No further care management intervention anticipated at this time.  Please re-consult if further needs arise.  Care management signing off.        Isaias Subramanian RN  8MS Nurse Care Coordinator  [facility] Acute Care Management  Phone: [phone]  Available on Vocera: 8 Med Surg Vocera

## 2025-06-18 NOTE — PLAN OF CARE
"Patient admitted -    VS: Blood pressure (!) 163/82, pulse 65, temperature 98.2  F (36.8  C), temperature source Oral, resp. rate 18, height 1.651 m (5' 5\"), weight 98 kg (216 lb), SpO2 95%, not currently breastfeeding.       O2: >90% on RA no complaints of SOB or chest pain.   Output: Voiding adequate amounts w/o pain or difficulty to bathroom.   Last BM: Continent of B&B, no BM this shift.    Activity: Independent in room     Skin: Right hand swelling    Pain: Denies pain during shift.    CMS: A/O x4 able to make needs known. Pt is deaf.   Dressing: None    Diet: Regular, tolerating well. No complaints of nausea or vomiting.   LDA: L PIV - SL.    Equipment: Personal belongings   Plan: Continue POC   Additional Info:                "

## 2025-06-18 NOTE — TELEPHONE ENCOUNTER
"Patient called back via . Pt stated that they already had surgery and wants to go home. Writer reminded pt that they saw Dr Mcgraw yesterday regarding having eye surgery. Pt said \"no, no, no, I don't want to have surgery. I just want to go home\" (pt is currently in patient at  for other issues). Writer reminded pt of the importance of getting this surgery completed in a timely manner and that we were looking to schedule for 6/19/25. Pt again said \"I wanna go home, I wanna go home, I wanna go home\". \"I don't want surgery, no, no, no\". Pt mentioned that they are moving to [facility] and don't want to have surgery. That they want a female doctor and are going to see someone in [facility]. Writer again asked for clarification, \"so you do not want to have eye surgery scheduled\"? Patient responded \"no, no, no. I want to go home. I am moving to [facility] and will see someone there\".   Writer ended the call advising that they would reach out to Dr Mcgraw and let him know how pt feels. Writer asked if pt had any questions, they said \"no\" and hung up.   Writer called Dr Mcgraw and let them know what patient wanted to do. Dr Mcgraw advised writer to for conversation to himself and DR Obando to then follow up with the patient and reach out to care team at  to discharge patient from hospital. Also that a certified letter needs to be sent to patient, advising of what could happen to their site without the surgery. Nery Varghese on 6/18/2025 at 9:57 AM    "

## 2025-06-18 NOTE — TELEPHONE ENCOUNTER
"Pt LVM via  that they are calling back to explain why they said no to having surgery on 6/19 with Dr Mcgraw. Pt said that PCP said NO as they need to get glucose and other medical issues under control first and the pt has several appts coming up that they need to go to and having the surgery would interfere with those. And patient stated \"I don't want to waste money and I have [place] this weekend and don't want to lose the money\".   Nery Varghese on 6/18/2025 at 11:45 AM    " [facility]  Surgery Department  Operative Note    SUMMARY     Date of Procedure: 8/5/2020     Procedure: Procedure(s) (LRB):  INCISION AND DRAINAGE, BACK (Left)     Surgeon(s) and Role:     * Familia Gonzalez MD - Primary    Assisting Surgeon: None    Pre-Operative Diagnosis: Back abscess [L02.212]    Post-Operative Diagnosis: Post-Op Diagnosis Codes:     * Back abscess [L02.212]    Anesthesia: Local MAC    Technical Procedures Used:  Incision and drainage of back abscess    Indications for Procedure: 60-year-old male with back abscess    Findings of the Procedure:  6 x 4 x 2 cm left mid back abscess with evidence consistent of an infected ruptured sebaceous cyst without fully identified cyst wall throughout the cavity    Description of the Procedure:  Patient brought to operating room placed supine on the table.  Mac anesthesia was then induced.  Patient moved into the slightly bumped lateral position.  The area was prepped and draped usual sterile fashion.  A preoperative surgical time-out was then performed confirming the correct patient, procedure and preop medications given.  The obvious infected abscess area was identified and incision was made over the most fluctuant area.  Immediately a large amount of purulence and a small amount of sebum were expressed from the wound indicating a likely ruptured infected sebaceous cyst and abscess.  Wound cultures were taken.  The loculations were all broken up with finger dissection and all purulence was expressed.  A small amount of skin was excised that was slightly necrotic at the lateral-most portion of the incision.  Once the abscess had been fully drained and irrigated, the cavity measured 6 cm x 4 cm x 2 cm deep.  The dissection was carried down to level of the muscle fascia and only accomplished the skin, subcutaneous tissues and deep fat but not past the fascial level.  Local infiltration was completed with 0.25% bupivacaine with diluted  epinephrine.  The wound was then packed with Betadine-soaked Kerlix.  Surgical dressings were applied.  The patient has moved back in the supine position.  He was woken from mac anesthesia and taken to the postanesthesia care in stable condition.  He tolerated procedure well.  All sponge, needle and instrument counts were correct at the end the case.    Significant Surgical Tasks Conducted by the Assistant(s), if Applicable: N/A    Complications: No    Estimated Blood Loss (EBL): 10 mL           Implants: * No implants in log *    Specimens:   Specimen (12h ago, onward)    None                  Condition: Good    Disposition: PACU - hemodynamically stable.    Attestation: I performed the procedure.

## 2025-06-18 NOTE — DISCHARGE SUMMARY
"[facility]  Hospitalist Discharge Summary      Date of Admission:  6/16/2025  Date of Discharge:  6/18/2025  Discharging Provider: DAVON WHITEHEAD MD  Discharge Service: Hospitalist Service, GOLD TEAM 19    Discharge Diagnoses   ***    Clinically Significant Risk Factors     # Obesity: Estimated body mass index is 35.94 kg/m  as calculated from the following:    Height as of this encounter: 1.651 m (5' 5\").    Weight as of this encounter: 98 kg (216 lb).       Follow-ups Needed After Discharge   Follow-up Appointments       Hospital Follow-up with Existing Primary Care Provider (PCP)          Schedule Primary Care visit within: 7 Days           {Additional follow-up instructions/to-do's for PCP    :***}    Unresulted Labs Ordered in the Past 30 Days of this Admission       No orders found from 5/17/2025 to 6/17/2025.        These results will be followed up by ***    Discharge Disposition   {Discharge to:6521012::\"Discharged to home\"}  Condition at discharge: {CONDITION:068731::\"Stable\"}    Hospital Course   {OPTIONAL -- use to select A&P section   :283867}    Consultations This Hospital Stay   ORTHOPAEDIC SURGERY ADULT/PEDS IP CONSULT  OPHTHALMOLOGY IP CONSULT  CARE MANAGEMENT / SOCIAL WORK IP CONSULT    Code Status   Full Code    Time Spent on this Encounter   {How much time did you spend on discharge?:78212876}       DAVON WHITEHEAD MD  [facility] [address]  [address] [place] [id number]  Phone: [phone]  Fax: [phone]  ______________________________________________________________________    Physical Exam   Vital Signs: Temp: 98.1  F (36.7  C) Temp src: Oral BP: (!) 163/84 Pulse: 72   Resp: 18 SpO2: 98 % O2 Device: None (Room air)    Weight: 216 lbs 0 oz  {Recommend personal SmartPhrase or Notewriter for exam (OPTIONAL)   :732006}       Primary Care Physician   Brianna Hurst    Discharge Orders      Primary Care - Care Coordination Referral    "   Adult Eye  Referral      Reason for your hospital stay    You presented to the hospital due to experiencing sudden onset of vision loss in the right eye.  You underwent MRI brain which showed no evidence of stroke.  You were seen by ophthalmology who found that you had retinal detachment recommending surgery at this time.  You declined surgery at this time until follow-up with your primary care doctor.  We did hide ophthalmology referral for you on discharge.  You are also found to have cellulitis of your right index finger, started you on IV ceftriaxone while you are in the hospital.  Following discharge, you can take your oral antibiotics that was prescribed to you by your PCP.     Activity    Your activity upon discharge: activity as tolerated     Diet    Follow this diet upon discharge: Current Diet:Orders Placed This Encounter      Combination Diet Regular Diet Adult     Hospital Follow-up with Existing Primary Care Provider (PCP)            Significant Results and Procedures   {Data for Discharge Summary:498175}    Discharge Medications      Review of your medicines        CHANGE how you take these medications        Dose / Directions   hydrOXYzine HCl 25 MG tablet  Commonly known as: ATARAX  This may have changed:   how much to take  how to take this  when to take this  reasons to take this  Used for: Persistent insomnia      TAKE 3 TABLETS BY MOUTH THREE TIMES A DAY AS NEEDED FOR ANXIETY OR INSOMNIA .  Quantity: 180 tablet  Refills: 1            CONTINUE these medicines which have NOT CHANGED        Dose / Directions   * albuterol 108 (90 Base) MCG/ACT inhaler  Commonly known as: PROAIR HFA/PROVENTIL HFA/VENTOLIN HFA  Used for: Moderate persistent asthma without complication      INHALE TWO PUFFS EVERY 4 HOURS AS NEEDED FOR WHEEZING OR SHORTNESS OF BREATH  Quantity: 6.7 g  Refills: 1     * albuterol (2.5 MG/3ML) 0.083% neb solution  Commonly known as: PROVENTIL  Used for: Asthma, moderate  persistent, uncomplicated      USE 1 VIAL NEBULIZER every 4-6 hours as needed for wheeze or shortness of breath.  Quantity: 180 mL  Refills: 1     budesonide-formoterol 160-4.5 MCG/ACT inhaler  Commonly known as: SYMBICORT/BREYNA  Used for: Asthma, moderate persistent, uncomplicated      Dose: 2 puff  Inhale 2 puffs into the lungs 2 times daily.  Quantity: 10.2 g  Refills: 1     citalopram 20 MG tablet  Commonly known as: celeXA      Dose: 30 mg  Take 30 mg by mouth daily.  Refills: 0     docusate sodium 100 MG capsule  Commonly known as: COLACE  Used for: Constipation, unspecified constipation type      Dose: 100 mg  Take 1 capsule (100 mg) by mouth 2 times daily.  Quantity: 60 capsule  Refills: 0     doxycycline hyclate 100 MG capsule  Commonly known as: VIBRAMYCIN      Dose: 100 mg  Take 1 capsule (100 mg) by mouth 2 times daily for 10 days.  Quantity: 20 capsule  Refills: 0     EPINEPHrine 0.3 MG/0.3ML injection 2-pack  Commonly known as: ANY BX GENERIC EQUIV  Used for: Allergic reaction, subsequent encounter      Dose: 0.3 mg  Inject 0.3 mLs (0.3 mg) into the muscle as needed for anaphylaxis.  Quantity: 2 each  Refills: 1     * estradiol 0.05 MG/24HR weekly patch  Commonly known as: CLIMARA  Used for: Post-menopause, Menopausal syndrome (hot flashes)      Dose: 1 patch  Place 1 patch over 168 hours onto the skin once a week.  Quantity: 12 patch  Refills: 3     * estradiol 0.05 MG/24HR weekly patch  Commonly known as: CLIMARA  Used for: Post-menopause, Menopausal syndrome (hot flashes)      Dose: 1 patch  Place 1 patch over 168 hours onto the skin once a week.  Quantity: 12 patch  Refills: 3     gabapentin 100 MG capsule  Commonly known as: NEURONTIN  Used for: History of headache      Dose: 200 mg  Take 2 capsules (200 mg) by mouth 3 times daily.  Quantity: 360 capsule  Refills: 4     levocetirizine 5 MG tablet  Commonly known as: XYZAL  Used for: Allergic sinusitis      Dose: 5 mg  Take 1 tablet (5 mg) by  mouth every evening.  Quantity: 90 tablet  Refills: 1     losartan 50 MG tablet  Commonly known as: COZAAR  Used for: Primary hypertension      Dose: 25 mg  Take 0.5 tablets (25 mg) by mouth daily.  Quantity: 50 tablet  Refills: 1     methocarbamol 500 MG tablet  Commonly known as: ROBAXIN  Used for: Right cervical radiculopathy      Dose: 500 mg  Take 1 tablet (500 mg) by mouth 3 times daily as needed for muscle spasms.  Quantity: 60 tablet  Refills: 2     metoprolol succinate ER 25 MG 24 hr tablet  Commonly known as: TOPROL XL  Used for: Essential hypertension      Dose: 25 mg  Take 1 tablet (25 mg) by mouth daily.  Quantity: 90 tablet  Refills: 3     metroNIDAZOLE 500 MG tablet  Commonly known as: FLAGYL  Used for: Cellulitis of right index finger      Dose: 500 mg  Take 1 tablet (500 mg) by mouth 3 times daily for 7 days.  Quantity: 21 tablet  Refills: 0     montelukast 10 MG tablet  Commonly known as: SINGULAIR  Used for: Asthma, moderate persistent, uncomplicated      Dose: 1 tablet  Take 1 tablet (10 mg) by mouth at bedtime.  Quantity: 90 tablet  Refills: 1     multivitamin, therapeutic Tabs tablet      Dose: 1 tablet  Take 1 tablet by mouth daily  Refills: 0     NONFORMULARY      Dose: 1 tablet  Take 1 tablet by mouth daily Nerve vitamin  Refills: 0     omeprazole 20 MG DR capsule  Commonly known as: PriLOSEC  Used for: Gastroesophageal reflux disease with esophagitis without hemorrhage      Dose: 20 mg  Take 1 capsule (20 mg) by mouth daily. Take once daily in the morning 30 minutes prior to food and drink.  Quantity: 30 capsule  Refills: 0     prazosin 1 MG capsule  Commonly known as: MINIPRESS  Used for: Anxiety, generalized      Dose: 1 mg  Take 1 capsule (1 mg) by mouth at bedtime.  Quantity: 90 capsule  Refills: 3     predniSONE 20 MG tablet  Commonly known as: DELTASONE      Take two tablets (= 40mg) each day for 4 (four) days  Quantity: 8 tablet  Refills: 0     Prometrium 200 MG capsule  Used for:  Post-menopause  Generic drug: progesterone      Dose: 200 mg  Take 1 capsule (200 mg) by mouth daily.  Quantity: 90 capsule  Refills: 4     QUEtiapine 50 MG tablet  Commonly known as: SEROquel  Used for: Borderline personality disorder (H), Psychophysiological insomnia      Dose: 50 mg  Take 1 tablet (50 mg) by mouth at bedtime.  Quantity: 90 tablet  Refills: 4     rizatriptan 10 MG tablet  Commonly known as: MAXALT  Used for: Other migraine without status migrainosus, not intractable      Dose: 10 mg  Take 1 tablet (10 mg) by mouth at onset of headache for migraine.  Quantity: 18 tablet  Refills: 1     sulfamethoxazole-trimethoprim 800-160 MG tablet  Commonly known as: BACTRIM DS  Used for: Cellulitis of right index finger      Dose: 1 tablet  Take 1 tablet by mouth 2 times daily for 7 days.  Quantity: 14 tablet  Refills: 0     topiramate 25 MG tablet  Commonly known as: TOPAMAX  Used for: Other migraine without status migrainosus, not intractable      Dose: 25 mg  Take 1 tablet (25 mg) by mouth 2 times daily.  Quantity: 180 tablet  Refills: 1           * This list has 4 medication(s) that are the same as other medications prescribed for you. Read the directions carefully, and ask your doctor or other care provider to review them with you.                Allergies   Allergies   Allergen Reactions    Acetylcysteine Rash and Other (See Comments)    Amoxicillin Itching and Shortness Of Breath    Bee Pollen Anaphylaxis    Chocolate Anaphylaxis    Contrast [Iodixanol] Shortness Of Breath and Rash     Pt stated she reacted to the contrast given for her CT in past. She experienced shortness of breath, throat tightening, and rash on chest, and they gave her an injection to counter the symptoms.     Covid-19 (Mrna) Vaccine Shortness Of Breath     Pfizer COVID-19 Vaccine    Hymenoptera Allergenic Extract [Wasp Venom Protein] Anaphylaxis    Iodine Hives and Unknown     Pt stated that she was injected with CT CONTRAST in the  past and got hives, SOB, and nausea. Please pre-medicate patient in the future.     Meat Extract Anaphylaxis    Wasp Venom Protein Starter Kit [Wasp Venom Protein] Itching, Shortness Of Breath, Swelling and Difficulty breathing    Adhesive Tape Unknown    Aspirin     Bee Venom Unknown    Beef-Derived Drug Products     Geodon [Ziprasidone] Unknown    Latex Unknown     Added based on information entered during case entry, please review and add reactions, type, and severity as needed    Morphine Unknown    Pork Allergy     Prochlorperazine Other (See Comments)    Risperdal [Risperidone] Unknown    Risperidone Analogues [Risperidone] Other (See Comments)    Shellfish Allergy Unknown    Theobroma Oil [Theobroma Grandiflorum Seed Butter] Unknown    Trazodone Other (See Comments)     Elevated creatinine    Zoloft [Sertraline] Unknown    Hydrochlorothiazide Rash    Ibuprofen Difficulty breathing and Rash     Kidney function, Kidney function

## 2025-06-18 NOTE — TELEPHONE ENCOUNTER
LVM asking patient to call back to get them scheduled with Dr Mcgraw for Thursday 6/19 at [facility]. Provided direct call back number of [phone]. Nery Abimael on 6/18/2025 at 8:20 AM

## 2025-06-18 NOTE — PLAN OF CARE
Goal Outcome Evaluation:      Plan of Care Reviewed With: patient    Outcome Evaluation: Anticipate discharge to home when medically ready.  Has follow up appts to attend.  No care mgmt needs identified at this time.        Isaias Subramanian RN  8MS Nurse Care Coordinator  [facility] Acute Care Management  Phone: [phone]  Available on Vocera: 8 Med Surg Vocera

## 2025-06-19 ENCOUNTER — PATIENT OUTREACH (OUTPATIENT)
Dept: CARE COORDINATION | Facility: CLINIC | Age: 50
End: 2025-06-19
Payer: COMMERCIAL

## 2025-06-19 DIAGNOSIS — Z09 HOSPITAL DISCHARGE FOLLOW-UP: ICD-10-CM

## 2025-06-19 NOTE — PROGRESS NOTES
Clinic Care Coordination Contact  Care Coordination Clinician Chart Review    Situation: Patient chart reviewed by care coordinator.    Background: Clinic Care Coordination Referral received from inpatient care team for transition handoff communication following hospital admission.    Assessment: Upon chart review, patient is not a candidate for Primary Care Clinic Care Coordination enrollment due to reason stated below:  Primary Care Clinic already reached out and discussed care and next steps with patient.  Will not outreach to avoid duplication of services.    Plan/Recommendations: Clinic Care Coordination Referral/order cancelled. RN/SW CC will perform no further monitoring/outreaches at this time and will remain available as needed. If new needs arise, a new Care Coordination Referral may be placed.

## 2025-06-20 ENCOUNTER — TRANSFERRED RECORDS (OUTPATIENT)
Dept: HEALTH INFORMATION MANAGEMENT | Facility: CLINIC | Age: 50
End: 2025-06-20
Payer: COMMERCIAL

## 2025-06-20 ENCOUNTER — HOSPITAL ENCOUNTER (EMERGENCY)
Facility: HOSPITAL | Age: 50
Discharge: HOME | End: 2025-06-20
Attending: EMERGENCY MEDICINE | Admitting: EMERGENCY MEDICINE
Payer: COMMERCIAL

## 2025-06-20 VITALS
HEART RATE: 69 BPM | SYSTOLIC BLOOD PRESSURE: 188 MMHG | RESPIRATION RATE: 16 BRPM | DIASTOLIC BLOOD PRESSURE: 82 MMHG | TEMPERATURE: 98 F | BODY MASS INDEX: 34.73 KG/M2 | OXYGEN SATURATION: 98 % | WEIGHT: 208.7 LBS

## 2025-06-20 DIAGNOSIS — H33.21 RIGHT RETINAL DETACHMENT: ICD-10-CM

## 2025-06-20 PROCEDURE — 99283 EMERGENCY DEPT VISIT LOW MDM: CPT | Performed by: EMERGENCY MEDICINE

## 2025-06-20 ASSESSMENT — ACTIVITIES OF DAILY LIVING (ADL)
ADLS_ACUITY_SCORE: 48
ADLS_ACUITY_SCORE: 48

## 2025-06-21 NOTE — ED PROVIDER NOTES
EMERGENCY DEPARTMENT ENCOUNTER      NAME: Marleny Viera  AGE: 49 year old female  YOB: 1975  MRN: [id number]  EVALUATION DATE & TIME: 2025  8:18 PM    PCP: Brianna Hurst    ED PROVIDER: Vladimir Gallagher M.D.      Chief Complaint   Patient presents with    Eye Problem         FINAL IMPRESSION:  1. Right retinal detachment          ED COURSE & MEDICAL DECISION MAKIN:21 PM I met with the patient, obtained history, performed an initial exam, and discussed options and plan for diagnostics and treatment here in the ED.  9:50 PM I spoke with Dr. Mcfarland with ophthalmology about the patient.  10:10 PM We discussed the plan for discharge and the patient is agreeable. Reviewed supportive cares, symptomatic treatment, outpatient follow up, and reasons to return to the Emergency Department. Patient to be discharged by ED RN.      Pertinent Labs & Imaging studies reviewed. (See chart for details)  49 year old female presents to the Emergency Department for evaluation of eye problem. Patient appears non toxic with stable vitals signs, patient is afebrile with no tachycardia or hypoxia, no increased work of breathing.  Lungs are clear, abdomen is benign.  Per review of the medical record, attempted to review discharge summary through [facility] on 2025 but not complete at this time, however I was able to read through a progress note through ophthalmology consult note on 2025 where patient was noted to have total retinal detachment with giant retinal tear to the right eye, it appears as though they are planning to perform vitrectomy on outpatient basis.  Patient states that she has had no change in her symptoms, that said no additional new falls or trauma, fevers, vomiting.  On my exam I was able to get a faded red reflex on the right, brisk red reflex on the left, patient states she can see the floaters and flashers on the right and can only see light versus dark on the right.  No loss of vision  on the left.  Considered labs, ultrasound, MRI imaging but again as patient has already had thorough workup with diagnosis and ophthalmology consultation no indication for emergent repeat imaging at this time.  She is here more so tonight for social reasons, asking if ophthalmology could perform the surgery closer to her home and family. Patient has had difficulty coordinating secondary to her deafness, harder to use phone contacts.  Ultimately here tonight, I was able to specifically talk with ophthalmology over the phone, appreciate their consultation and assistance with this case.  They will have their special care coordinator reach out to the patient in 2 days to help arrange the outpatient surgery at a more desirable location and time.  I was also able to provide the patient with the specific ophthalmology follow-up phone number so that she may advocate and use this number herself for better care coordination.  Discussed all these recommendations with the patient who otherwise felt reassured and comfortable with discharge.  Return precaution provided.      Medical Decision Making  I discussed the care with another health care provider: Ophthalmology  Discharge. No recommendations on prescription strength medication(s). See documentation for any additional details.    MIPS (CTPE, Dental pain, Bruce, Sinusitis, Asthma/COPD, Head Trauma): Not Applicable    SEPSIS: None          At the conclusion of the encounter I discussed the results of all of the tests and the disposition. The questions were answered and return precautions provided. The patient or family acknowledged understanding and was agreeable with the care plan.         MEDICATIONS GIVEN IN THE EMERGENCY:  Medications - No data to display    NEW PRESCRIPTIONS STARTED AT TODAY'S ER VISIT  Discharge Medication List as of 6/20/2025 10:04 PM               =================================================================    HPI    Patient information was  "obtained from: patient    Use of Intrepreter: Yes (Video Call) - Language: ASL        Marleny Viera is a 49 year old female who presents with a right eye problem.    Per patient via an , she was discharged from the [facility] a few days ago. She needs to have surgery on her right eye. At the [facility] hospital they were unable to do it because her family was not willing to come that far and she would need them there to help post surgery because she is deaf. Thus, her family wanted her to go to [facility] for surgery because that is closer and she had looked on online and saw [facility] has a retinal specialist. She requests surgery either today (06/20/2025) or tomorrow (06/21/2025). Her right eye can only see light, she can see her retina \"swaying\", and she keeps having black/white floaters in her vision. Also, she has had constant headaches.    She has had no left eye problems, rash, skin changes, fever, recent head trauma, or recent falls. When she was last seen for this she was not given contact information with ophthalmology. She did not mention taking anything for her symptoms today. No daily medications were mentioned.    Chart review:  Per Chart Review, the patient was seen at [facility] from 06/16/2025-06/18/2025. She reported a sudden onset of complete vision loss in her right eye. She was found to have retinal detachment of the right eye with multiple breaks. It had started recently before this visit and there was lack of a clear pathology thus NLP advised surgery.     VITALS:  Patient Vitals for the past 24 hrs:   BP Temp Temp src Pulse Resp SpO2 Weight   06/20/25 2023 (!) 188/82 -- -- 69 -- 98 % --   06/20/25 1942 (!) 174/80 98  F (36.7  C) Temporal 80 16 97 % 94.7 kg (208 lb 11.2 oz)        PHYSICAL EXAM    Constitutional:  Awake, alert, in no apparent distress  HENT:  Normocephalic, Atraumatic. Bilateral external ears normal. Oropharynx moist. Nose " normal. Neck- Normal range of motion with no guarding, No midline cervical tenderness, Supple, No stridor.   Eyes:  PERRL, EOMI with no signs of entrapment, poor red reflex on the right  Respiratory:  Normal breath sounds, No respiratory distress, No wheezing.    Cardiovascular:  Normal heart rate, Normal rhythm, No appreciable rubs or gallops.   GI:  Soft, No tenderness, No distension, No palpable masses  Musculoskeletal:  Intact distal pulses, No edema. Good range of motion in all major joints. No tenderness to palpation or major deformities noted.  Integument:  Warm, Dry, No erythema, No rash.   Neurologic:  Alert & oriented, Normal motor function  Psychiatric:  Affect normal, Judgment normal, Mood normal.     LAB:  All pertinent labs reviewed and interpreted.       RADIOLOGY:  No orders to display          EKG:      I have independently reviewed and interpreted the EKG(s) documented above.    PROCEDURES:        [facility] Documentation:   CMS Diagnoses:       I, Edis Fernando, am serving as a scribe to document services personally performed by Vladimir Gallagher MD, based on my observation and the provider's statements to me. I, Vladimir Gallagher MD attest that Edis King is acting in a scribe capacity, has observed my performance of the services and has documented them in accordance with my direction.    Vladimir Gallagher M.D.  Emergency Medicine  [facility] EMERGENCY DEPARTMENT  [address] [place] [id number]  [phone]  Dept: [phone]      Vladimir Gallagher MD  06/21/25 0439

## 2025-06-21 NOTE — ED TRIAGE NOTES
Pt was admitted to [facility] for a head ache and eye problem.  Pt states that her vision was better until yesterday.  She said she sees light but also has floaters that she can see.  Pt states that she isnt in pain in the eye but does have a headache.  Pt is deaf.       Triage Assessment (Adult)       Row Name 06/20/25 1943          Respiratory WDL    Respiratory WDL WDL

## 2025-06-21 NOTE — DISCHARGE INSTRUCTIONS
The patient care coordinator for ophthalmology will reach out to you Monday to help coordinate better location for your surgery at the soonest available date.  If you do not hear from them Monday, the follow up phone line for ophthalmology is [phone].  Please call this number and ask for Dr. Mcgraw and they will help arrange your surgery

## 2025-06-23 ENCOUNTER — TELEPHONE (OUTPATIENT)
Dept: OPHTHALMOLOGY | Facility: CLINIC | Age: 50
End: 2025-06-23
Payer: COMMERCIAL

## 2025-06-23 NOTE — TELEPHONE ENCOUNTER
Phone call to patient to schedule surgery writer did not receive an answer but did leave voicemail for patient to call back .    [phone]        Gui Higginbotham on 6/23/2025 at 10:06 AM

## 2025-06-25 ENCOUNTER — HOSPITAL ENCOUNTER (OUTPATIENT)
Facility: CLINIC | Age: 50
End: 2025-06-25
Attending: OPHTHALMOLOGY | Admitting: OPHTHALMOLOGY
Payer: COMMERCIAL

## 2025-06-25 ENCOUNTER — TELEPHONE (OUTPATIENT)
Dept: OPHTHALMOLOGY | Facility: CLINIC | Age: 50
End: 2025-06-25

## 2025-06-25 ENCOUNTER — PREP FOR PROCEDURE (OUTPATIENT)
Dept: OPHTHALMOLOGY | Facility: CLINIC | Age: 50
End: 2025-06-25

## 2025-06-25 NOTE — TELEPHONE ENCOUNTER
Spoke with patient regarding POD1. Pt was very addiment that they will not have a ride for this post op. Writer did agree to discuss with surgeon to see if a 2 day post was an option or same day po. Writer also advised that it was ultimately up to the surgeon. Pt acknowledged understanding. Nery Varghese on 6/25/2025 at 3:38 PM

## 2025-06-25 NOTE — TELEPHONE ENCOUNTER
"Patient called writer back, telling them that \"NO NO NO, I am not doing that. I told them I was not having surgery there\". Pt then asked that no one call them back, they are scheduling elsewhere. Pt then said \"I am at another appointment\", and hung up the phone. Routing to retina surgeons. Nery Varghese on 6/25/2025 at 1:23 PM    "

## 2025-06-25 NOTE — TELEPHONE ENCOUNTER
JORDAN via  asking patient to call back to schedule surgery on 7/1 with Dr. Hazel. Provided call back number of [phone]. Nery Varghese on 6/25/2025 at 1:00 PM

## 2025-06-25 NOTE — TELEPHONE ENCOUNTER
Patient called back and decided they wanted to have surgery with Dr. Hazel after all.     Called patient to schedule surgery with Dr Hazel    Spoke with: Marleny (patient)    Date(s) of Surgery: 7/1/25    Patient aware of approximate arrival time: Yes      Tissue: Not Applicable Ordered: Not Applicable     [place] [facility] [place] [facility] [place]     [facility] H&P: [facility]     Informed patient that they need to call to schedule pre-op H&P within 30 days of surgery date: Yes    Post-Op Appt Dates: 7/2 at 1000, 7/9 at 1410, and 7/30 at 0930     Discussed with patient pre-op RN will call 2-3 days prior to surgery with arrival time and instructions:  Yes       Standard Surgery Packet Sent: Yes 06/25/25  via [employer] Message      Additional Information Sent in Packet:     Informed patient that they will need an adult  to bring patient home from surgery: Yes  : Dayan (cousin)         Additional Comments:        All patients questions were answered and was instructed to review surgical packet and call back [phone] with any questions or concerns.       Nery Varghese on 6/25/2025 at 2:54 PM

## 2025-06-26 ENCOUNTER — TELEPHONE (OUTPATIENT)
Dept: OPHTHALMOLOGY | Facility: CLINIC | Age: 50
End: 2025-06-26
Payer: COMMERCIAL

## 2025-06-26 NOTE — TELEPHONE ENCOUNTER
Spoke with patient thru ASL tty.  Patient knows surgery is cancelled on Tuesday.  Dr. Hazel wants to see her before we do surgery.  Offered patient 7.2 at 12:10 which she agreed to.    Patient is concerned because everywhere she goes is telling her they can't get her in for surgery right away.  I explained that it is dependent on surgeon availability, room availability etc.  She expressed understanding.    We also discussed the need for her to come to her one day post op.  She doesn't know if she has transportation.  I told her that if she can't come for post op she can't have surgery.  It is very important for Dr. Hazel to see her back the next day to make sure she is healing appropriately.  She said she would make arrangements.    I then had her speak with the surgery scheduler.    Teo OVALLE,CLINIC MANAGER4:11 PM June 26, 2025

## 2025-06-26 NOTE — TELEPHONE ENCOUNTER
Tried pt again, and she answered this call with the relay service. She states she was away doing other things earlier and had just arrived home. She had not yet received my voicemail message.    I explained that we would either have to RSC the surgery (currently scheduled for 7/1) or refer her to another female retinal specialist in [facility] to see if they are able to accommodate her for upcoming surgery. I explained that Dr. Hazel is not in clinic again prior to Tuesday when surgery is set.    YAMILE Ledesma 1:50 PM June 26, 2025

## 2025-06-26 NOTE — TELEPHONE ENCOUNTER
Patient called in LVM after hours on 6/25/25, asking writer to call them back with surgery time so they could secure a ride and also asked writer to reach out to family to set up a ride for her. Nery Varghese on 6/26/2025 at 9:43 AM

## 2025-06-26 NOTE — TELEPHONE ENCOUNTER
LVM for patient to come at 2:30 on July 2 instead.  Will also send a [employer] message.    Teo OVALLE,CLINIC MANAGER4:58 PM June 26, 2025

## 2025-06-26 NOTE — TELEPHONE ENCOUNTER
Called pt through relay service and left message for pt to call back directly regarding the fact that we would need the pt to come to clinic today for consultation prior to having the surgery this next Tuesday. The provider would need to see the pt today prior to having surgery next week. If pt unable to come today, we may have to look to refer elsewhere to another female surgeon as very limited OR availability here in the upcoming weeks for our female surgeon.    YAMILE Ledesma 10:30 AM June 26, 2025

## 2025-06-26 NOTE — TELEPHONE ENCOUNTER
Clinic manager spoke with patient about moving surgery to another date, as Dr. Hazel would like to see pt in clinic prior. Pt did agree to come into clinic on 7/2 at 1210 to see Dr. Hazel. Writer then spoke with patient about moving surgery date to 7/21/25 (ok'd by Dr Hazel). Pt was in agreement with this.    Called patient to reschedule surgery with Dr Hazel    Spoke with: Marleny (patient)    Date(s) of Surgery: 7/21/25    Patient aware of approximate arrival time: Yes at 0900     Tissue: Not Applicable Ordered:      [facility] of surgery: [facility]     [place] [facility] [place] [facility]     Informed patient that they need to call to schedule pre-op H&P within 30 days of surgery date: Yes    Post-Op Appt Dates: 7/22 at 1420 ([place]), 8/13 at 1420 (POW needs to be scheduled by clinic as Dr Hazel is OOO)     Discussed with patient pre-op RN will call 2-3 days prior to surgery with arrival time and instructions:  Yes       Standard Surgery Packet Sent: Yes 06/26/25  via Mail - Standard      Additional Information Sent in Packet:     Informed patient that they will need an adult  to bring patient home from surgery: Yes  : Dayan (cousin)         Additional Comments:        All patients questions were answered and was instructed to review surgical packet and call back [phone] with any questions or concerns.       Nery Varghese on 6/26/2025 at 4:12 PM

## 2025-06-30 ENCOUNTER — MYC MEDICAL ADVICE (OUTPATIENT)
Dept: OPHTHALMOLOGY | Facility: CLINIC | Age: 50
End: 2025-06-30

## 2025-06-30 ENCOUNTER — DOCUMENTATION ONLY (OUTPATIENT)
Dept: FAMILY MEDICINE | Facility: CLINIC | Age: 50
End: 2025-06-30

## 2025-06-30 ENCOUNTER — TELEPHONE (OUTPATIENT)
Dept: OPHTHALMOLOGY | Facility: CLINIC | Age: 50
End: 2025-06-30

## 2025-06-30 NOTE — TELEPHONE ENCOUNTER
[facility] Call Center    Phone Message    May a detailed message be left on voicemail: yes     Reason for Call: Other: The pt has questions for Godfrey (sp?) about the special soap she has to use prior to her upcoming surgery. Please call the pt to discuss. Thanks.      Action Taken: Message routed to:  Other: EYE    Travel Screening: Not Applicable     Date of Service:

## 2025-06-30 NOTE — TELEPHONE ENCOUNTER
Impresstot message sent reviewing no special soap needed and may shower night before or morning of surgery    Sp Martines RN 3:27 PM 06/30/25

## 2025-06-30 NOTE — TELEPHONE ENCOUNTER
"Pt called again to ask about the \"special soap\" that is indicated in her instructions that she received today. I did tell her that the surgery center does not typically require this for eye surgeries, but that we would confirm with the surgery scheduler (out this afternoon) and would be able to address any questions this Wednesday at her appointment/consultation with Dr. Hazel.    Pt also asked how long the appt might be on Wednesday, and I let her know that all appointments here can be 2-4 hours, but I said she should, realistically, plan for a possible 3-hour time period out of her schedule. Pt was happy with this.    Cherelle Ray, COA 3:27 PM June 30, 2025   "

## 2025-07-02 ENCOUNTER — PREP FOR PROCEDURE (OUTPATIENT)
Dept: OPHTHALMOLOGY | Facility: CLINIC | Age: 50
End: 2025-07-02

## 2025-07-02 ENCOUNTER — OFFICE VISIT (OUTPATIENT)
Dept: OPHTHALMOLOGY | Facility: CLINIC | Age: 50
End: 2025-07-02
Attending: OPHTHALMOLOGY
Payer: COMMERCIAL

## 2025-07-02 DIAGNOSIS — H33.051 TOTAL RETINAL DETACHMENT, RIGHT: Primary | ICD-10-CM

## 2025-07-02 PROCEDURE — 92250 FUNDUS PHOTOGRAPHY W/I&R: CPT | Performed by: OPHTHALMOLOGY

## 2025-07-02 PROCEDURE — G0463 HOSPITAL OUTPT CLINIC VISIT: HCPCS | Performed by: OPHTHALMOLOGY

## 2025-07-02 PROCEDURE — 99214 OFFICE O/P EST MOD 30 MIN: CPT | Mod: GC | Performed by: OPHTHALMOLOGY

## 2025-07-02 PROCEDURE — 92250 FUNDUS PHOTOGRAPHY W/I&R: CPT | Mod: 26 | Performed by: OPHTHALMOLOGY

## 2025-07-02 ASSESSMENT — REFRACTION_WEARINGRX
OS_AXIS: 127
OD_CYLINDER: +2.00
OD_SPHERE: -16.00
OD_AXIS: 064
OS_CYLINDER: +0.75
OS_SPHERE: -14.25

## 2025-07-02 ASSESSMENT — CONF VISUAL FIELD
OS_NORMAL: 1
OD_SUPERIOR_TEMPORAL_RESTRICTION: 1
METHOD: COUNTING FINGERS
OS_INFERIOR_TEMPORAL_RESTRICTION: 0
OD_SUPERIOR_NASAL_RESTRICTION: 1
OS_SUPERIOR_TEMPORAL_RESTRICTION: 0
OD_INFERIOR_NASAL_RESTRICTION: 1
OD_INFERIOR_TEMPORAL_RESTRICTION: 1
OS_SUPERIOR_NASAL_RESTRICTION: 0
OS_INFERIOR_NASAL_RESTRICTION: 0

## 2025-07-02 ASSESSMENT — VISUAL ACUITY
METHOD: SNELLEN - LINEAR
OS_CC: 20/60
OS_CC+: -1
CORRECTION_TYPE: GLASSES
OD_CC: LP

## 2025-07-02 ASSESSMENT — TONOMETRY
OD_IOP_MMHG: 13
OS_IOP_MMHG: 18
IOP_METHOD: TONOPEN

## 2025-07-02 ASSESSMENT — CUP TO DISC RATIO
OD_RATIO: 0.4
OS_RATIO: 0.4

## 2025-07-02 ASSESSMENT — SLIT LAMP EXAM - LIDS
COMMENTS: NORMAL
COMMENTS: NORMAL

## 2025-07-02 NOTE — PROGRESS NOTES
CC -   Mac-off RD right eye    INTERVAL HISTORY - Initial visit with me.   last seen at [facility] eye clinic 5/28/25 VA OD 20/400 retina attached documented. No trauma. Notes from [facility] from 5/28/25 reviewed. Pt saw Dr titus and refer for surgery because of  pt prefers Female surgeon. Patient reports Decreased vision started June 11,2025 Floaters right eye , no flashes No eye pain. Previously scheduled surgery with dr. Titus but cancelled. she Told her work that surgery will be 7/21/25 and they accepted so now she is ready for surgery.    [facility] - Marleny Viera is a 49 year old patient with mac-off RRD OD diagnosed 6/16/25 seen by resident while patient admitted for infection on her finger (had been at OSH for finger infection failed outpatient oral ABx)    Patient deaf, uses ASL sign language  bipolar on seroquel    Past Medical History:   Diagnosis Date    Accidental overdose, initial encounter 07/07/2022    ADHD (attention deficit hyperactivity disorder)     Alcohol dependence (H)     Anxiety     Arthritis     Asthma     B12 deficiency     Bipolar depression (H)     Blood clotting disorder     Deafness     Depressive disorder 10 year    Drug abuse, nondependent (H) 10/29/2004    Overview:  polydrug abuse per psych notes ; Other, mixed, or unspecified nondependent drug abuse, unspecified    History of blood clots     History of transfusion     Hypertension     Kidney stones 2015    Major depressive disorder, recurrent episode, severe (H) 05/17/2016    Overview:  s/p suicide attempt Epic     Migraine     Obstructive sleep apnea 06/01/2016    Pt states she does not have ROBERT, does not use CPAP    Pulmonary emboli (H)     Status post total right knee replacement 01/18/2016        PAST OCULAR SURGERY: None  RETINAL IMAGING:  OCT 6/17/25  OD - detached retina  OS - normal foveal contour, PPA nasally near nerve, no fluid, attached    ASSESSMENT & PLAN  # 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    - Dx 6/2025 likely chronic given proliferative vitreoretinopathy; mother with history of RRD; high myope; no FH of deafness   - 7/02/25 VA LP, mac off, recommend surgery; high risk of recurrent detachment discussed; has iodine allergy -> gets rashes      - plan PPV/SB/GVO OD possible lensectomy or cataract extraction or phacoemulsification -> messaged Nery to add to case    Plan for 25g Pars plana vitrectomy / membrane peel/ endolaser/ possible air fluid exchange/ possible gas vs oil / possible Scleral buckle/ possible lensectomy right eye  Surgeon procedure time:  2.5 hours  Urgency of Surgery:  1-3 weeks  Post-op apps needed: 1day; 1 week; 3 weeks  Multi surgeon case: no  H&P completed by primary care physician or PAC   needed: ASL sign language  Anesthesia: General and peribulbar block  Need for pos-op chair for positioning? No  patient will need to arrange boyfriend to stay with her night after surgery    Risks, benefits and alternatives discussed with patient: 1:1000 risk of infection/bleed/ further loss of vision; 1:100 risk of Retinal detachment and need for further surgery.   Retinal detachments can lead to vision loss despite surgery.  If you have not had cataract surgery yet, retinal surgery often accelerates the development of cataracts, requiring cataract surgery to be performed a few months after the retina surgery. Patient aware of prolonged healing after retinal surgery (up to a year after surgery) as well as possibility of air/gas/SO  instillation into eye. Instillation of those might necessitate head positions like continuous face down positioning, which would be required for up to 7 days after surgery.  If a gas bubble is placed, both air travel and ground travel to elevated altitudes would be prohibited for up to 2-3 months afterwards. This is a training facility and residents or fellows may be involved in aspects of your surgery while under my direct observation.   Patient agreed to proceed with surgery.     Explained about the very guarded visual prognosis even with successful surgery, the need for additional surgeries, the small chance we need to remove the lens during the surgery without intraocular lens placement and the risks of retina re detachment.     - r/b/a d/w patient: vision loss, blindness, infection, bleeding   - retinal detachment, need for more surgeries, need for gas or oil bubble and bubble restrictions   - cataract, diplopia, refractive change   - persistent blurriness, distortion, or scotoma   - participation/performance by fellow or resident     - patient will need to arrange boyfriend to stay with her night after surgery      # Cataract OU  - Likely VS; discussed possible removal of lens OD during this or next surgery     #Myopic degeneration, left eye  - high myope (-16 right eye, -14 left eye)  - staphyloma  OU on OCT, mild pigmentary change to macula  - cobblestone degernation peripherally  # TEAR left eye  Located superior nasally with some subretinal fluid and some demarcation line  Recommend laser retinopexy  Risks, benefits and alternatives discussed with patient and agreed to proceed  Patient will return next wee for laser left eye     #PVD OU    # NS OU    Return for POD1.     Arabella Obando MD  Vitreoretinal Surgery Fellow   ~~~~~~~~~~~~~~~~~~~~~~~~~~~~~~~~~~   Complete documentation of historical and exam elements from today's encounter can be found in the full encounter summary report (not reduplicated in this progress note).  I personally obtained the chief complaint(s) and history of present illness.  I confirmed and edited as necessary the review of systems, past medical/surgical history, family history, social history, and examination findings as documented by others; and I examined the patient myself.  I personally reviewed the relevant tests, images, and reports as documented above.  I personally reviewed the ophthalmic test(s)  associated with this encounter, agree with the interpretation(s) as documented by the resident/fellow, and have edited the corresponding report(s) as necessary.   I formulated and edited as necessary the assessment and plan and discussed the findings and management plan with the patient and family    Ama Hazel MD  Professor of Ophthalmology  Vitreo-Retinal surgeon   Department of Ophthalmology and Visual Neurosciences   [facility]  Phone: [phone]   Fax: [phone]

## 2025-07-02 NOTE — NURSING NOTE
Chief Complaints and History of Present Illnesses   Patient presents with    Retinal Evaluation      Mac-off RD right eye     Chief Complaint(s) and History of Present Illness(es)       Retinal Evaluation              Comments:  Mac-off RD right eye              Comments    Pt saw Dr titus , pt prefers Female surgeon   Decreased vision started June 11,2025  Floaters right eye , no flashes    No eye pain     Nelly Rdz COT 2:21 PM July 2, 2025

## 2025-07-03 ENCOUNTER — RESULTS FOLLOW-UP (OUTPATIENT)
Dept: OPHTHALMOLOGY | Facility: CLINIC | Age: 50
End: 2025-07-03
Payer: COMMERCIAL

## 2025-07-03 ENCOUNTER — TELEPHONE (OUTPATIENT)
Dept: OPHTHALMOLOGY | Facility: CLINIC | Age: 50
End: 2025-07-03
Payer: COMMERCIAL

## 2025-07-03 NOTE — TELEPHONE ENCOUNTER
[facility] Call Center    Phone Message    May a detailed message be left on voicemail: yes     Reason for Call: Other: Patient has a in clinic procedure scheduled for 7/9 and needs to reschedule. Please call to advise.      Action Taken: Other: eye    Travel Screening: Not Applicable

## 2025-07-03 NOTE — TELEPHONE ENCOUNTER
Done, sent my chart message - rebooked same time/date    Dayan Figueredo, VIRI 11:19 AM 07/03/2025

## 2025-07-03 NOTE — TELEPHONE ENCOUNTER
[facility] Call Center    Phone Message    May a detailed message be left on voicemail: yes     Reason for Call: Other: Pt called back and she would like to keep her in clinic procedure appt for 7/9 but it was previously cancelled. Please call pt to schedule/confirm this date. Thank you.     Action Taken: Message routed to:  [facility] EYE    Travel Screening: Not Applicable     Date of Service:

## 2025-07-07 ENCOUNTER — TELEPHONE (OUTPATIENT)
Dept: FAMILY MEDICINE | Facility: CLINIC | Age: 50
End: 2025-07-07
Payer: COMMERCIAL

## 2025-07-07 ENCOUNTER — ANCILLARY PROCEDURE (OUTPATIENT)
Dept: MAMMOGRAPHY | Facility: CLINIC | Age: 50
End: 2025-07-07
Attending: NURSE PRACTITIONER
Payer: COMMERCIAL

## 2025-07-07 DIAGNOSIS — Z12.31 VISIT FOR SCREENING MAMMOGRAM: ICD-10-CM

## 2025-07-07 PROCEDURE — 77063 BREAST TOMOSYNTHESIS BI: CPT

## 2025-07-07 NOTE — TELEPHONE ENCOUNTER
Patient calling in using , patient noting that her results are back for her mammogram and would like to know what these mean. I explained to the patient that as the results have just come back, her provider still needs to review and comment on these. I told the patient I will send this encounter to her provider as an FYI.

## 2025-07-09 ENCOUNTER — MYC MEDICAL ADVICE (OUTPATIENT)
Dept: FAMILY MEDICINE | Facility: CLINIC | Age: 50
End: 2025-07-09

## 2025-07-09 ENCOUNTER — PREP FOR PROCEDURE (OUTPATIENT)
Dept: OPHTHALMOLOGY | Facility: CLINIC | Age: 50
End: 2025-07-09

## 2025-07-09 ENCOUNTER — OFFICE VISIT (OUTPATIENT)
Dept: OPHTHALMOLOGY | Facility: CLINIC | Age: 50
End: 2025-07-09
Attending: OPHTHALMOLOGY
Payer: COMMERCIAL

## 2025-07-09 DIAGNOSIS — H33.312 HORSESHOE TEAR OF RETINA OF LEFT EYE WITHOUT DETACHMENT: Primary | ICD-10-CM

## 2025-07-09 PROCEDURE — 99207 PR DROP WITH A PROCEDURE: CPT | Performed by: OPHTHALMOLOGY

## 2025-07-09 PROCEDURE — 67145 PROPH RTA DTCHMNT PC: CPT | Mod: LT | Performed by: OPHTHALMOLOGY

## 2025-07-09 ASSESSMENT — SLIT LAMP EXAM - LIDS
COMMENTS: NORMAL
COMMENTS: NORMAL

## 2025-07-09 ASSESSMENT — REFRACTION_WEARINGRX
OD_SPHERE: -16.00
OS_SPHERE: -14.25
OD_AXIS: 064
OD_CYLINDER: +2.00
OS_CYLINDER: +0.75
OS_AXIS: 127

## 2025-07-09 ASSESSMENT — CUP TO DISC RATIO
OS_RATIO: 0.4
OD_RATIO: 0.4

## 2025-07-09 ASSESSMENT — TONOMETRY
OD_IOP_MMHG: 19
IOP_METHOD: ICARE
OS_IOP_MMHG: 18

## 2025-07-09 ASSESSMENT — VISUAL ACUITY
METHOD: SNELLEN - LINEAR
OD_CC: LP
OS_PH_CC: 20/40
CORRECTION_TYPE: GLASSES
OS_CC: 20/100
OS_PH_CC+: -1

## 2025-07-09 NOTE — LETTER
July 9, 2025        Re: Marleny Viera   1975      To Whom It May Concern:      This is to confirm that the above patient was seen on 7/9/2025.  Marleny Viera has eye surgery scheduled on 7/21/2025 and will need to remain off work for one week. She should be able to return to work 7/30/2025.    Thank you for your cooperation in this matter.  Please do not hesitate to contact me if you have any further questions.      Sincerely,      Electronically signed  RHETT PEARSON JENNIFER K

## 2025-07-09 NOTE — NURSING NOTE
Chief Complaints and History of Present Illnesses   Patient presents with    Laser Eye Surgery Left Eye     TEAR left eye     Chief Complaint(s) and History of Present Illness(es)       Laser Eye Surgery Left Eye              Comments: TEAR left eye              Comments    Pt here for laser in the left  No new problems or concerns   No eye pain     Nelly Rdz COT 2:00 PM July 9, 2025

## 2025-07-09 NOTE — PROGRESS NOTES
CC -   Mac-off RD right eye and retina tear left eye     INTERVAL HISTORY -  here for laser retinopexy left eye     [facility] - Marleny Viera is a 49 year old patient with mac-off RRD OD diagnosed 6/16/25 seen by resident while patient admitted for infection on her finger (had been at OSH for finger infection failed outpatient oral ABx)    Patient deaf, uses ASL sign language  bipolar on seroquel    Seen at [facility] eye [facility] 5/28/25 VA OD 20/400 retina attached documented. No trauma. Notes from [facility] from 5/28/25 reviewed. Pt saw Dr titus and refer for surgery because of  pt prefers Female surgeon. Patient reports Decreased vision started June 11,2025 Floaters right eye , no flashes No eye pain. Previously scheduled surgery with dr. Titus but cancelled. she Told her work that surgery will be 7/21/25 and they accepted so now she is ready for surgery.    Past Medical History:   Diagnosis Date    Accidental overdose, initial encounter 07/07/2022    ADHD (attention deficit hyperactivity disorder)     Alcohol dependence (H)     Anxiety     Arthritis     Asthma     B12 deficiency     Bipolar depression (H)     Blood clotting disorder     Deafness     Depressive disorder 10 year    Drug abuse, nondependent (H) 10/29/2004    Overview:  polydrug abuse per psych notes ; Other, mixed, or unspecified nondependent drug abuse, unspecified    History of blood clots     History of transfusion     Hypertension     Kidney stones 2015    Major depressive disorder, recurrent episode, severe (H) 05/17/2016    Overview:  s/p suicide attempt Epic     Migraine     Obstructive sleep apnea 06/01/2016    Pt states she does not have ROBERT, does not use CPAP    Pulmonary emboli (H)     Status post total right knee replacement 01/18/2016        PAST OCULAR SURGERY: None  RETINAL IMAGING:  OCT 6/17/25  OD - detached retina  OS - normal foveal contour, PPA nasally near nerve, no fluid, attached    ASSESSMENT & PLAN    # TEAR left  eye  Located superior nasally with some subretinal fluid and some demarcation line  - 07/09/25 treated with laser barricade OS SN   - will add some more laser at time of surgery  - messaged to add case modification   - Retinal return precautions discussed     # 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    - Dx 6/2025 likely chronic given proliferative vitreoretinopathy; mother with history of RRD; high myope; no FH of deafness   - 7/02/25 VA LP, mac off, recommend surgery; high risk of recurrent detachment discussed; has iodine allergy -> gets rashes      - plan PPV/SB/GVO OD possible lensectomy or cataract extraction or phacoemulsification -> messaged Nery to add to case  - Scheduled for 7/21/2025    Plan for 25g Pars plana vitrectomy / membrane peel/ endolaser/ possible air fluid exchange/ possible gas vs oil / possible Scleral buckle/ possible lensectomy right eye  Surgeon procedure time:  2.5 hours  Urgency of Surgery:  1-3 weeks  Post-op apps needed: 1day; 1 week; 3 weeks  Multi surgeon case: no  H&P completed by primary care physician or PAC   needed: ASL sign language  Anesthesia: General and peribulbar block  Need for pos-op chair for positioning? No  patient will need to arrange boyfriend to stay with her night after surgery    Risks, benefits and alternatives discussed with patient: 1:1000 risk of infection/bleed/ further loss of vision; 1:100 risk of Retinal detachment and need for further surgery.   Retinal detachments can lead to vision loss despite surgery.  If you have not had cataract surgery yet, retinal surgery often accelerates the development of cataracts, requiring cataract surgery to be performed a few months after the retina surgery. Patient aware of prolonged healing after retinal surgery (up to a year after surgery) as well as possibility of air/gas/SO  instillation into eye. Instillation of those might necessitate head positions like continuous face down  positioning, which would be required for up to 7 days after surgery.  If a gas bubble is placed, both air travel and ground travel to elevated altitudes would be prohibited for up to 2-3 months afterwards. This is a training facility and residents or fellows may be involved in aspects of your surgery while under my direct observation.  Patient agreed to proceed with surgery.     Explained about the very guarded visual prognosis even with successful surgery, the need for additional surgeries, the small chance we need to remove the lens during the surgery without intraocular lens placement and the risks of retina re detachment.     - r/b/a d/w patient: vision loss, blindness, infection, bleeding   - retinal detachment, need for more surgeries, need for gas or oil bubble and bubble restrictions   - cataract, diplopia, refractive change   - persistent blurriness, distortion, or scotoma   - participation/performance by fellow or resident     - patient will need to arrange boyfriend to stay with her night after surgery      # Cataract OU  - Likely VS; discussed possible removal of lens OD during this or next surgery     #Myopic degeneration, left eye  - high myope (-16 right eye, -14 left eye)  - staphyloma  OU on OCT, mild pigmentary change to macula  - cobblestone degernation peripherally      #PVD OU    # NS OU    RTC POD1 - VTD optos OU    Arabella Obando MD  Vitreoretinal Surgery Fellow     ~~~~~~~~~~~~~~~~~~~~~~~~~~~~~~~~~~   Complete documentation of historical and exam elements from today's encounter can be found in the full encounter summary report (not reduplicated in this progress note).  I personally obtained the chief complaint(s) and history of present illness.  I confirmed and edited as necessary the review of systems, past medical/surgical history, family history, social history, and examination findings as documented by others; and I examined the patient myself.  I personally reviewed the relevant tests,  images, and reports as documented above.  I formulated and edited as necessary the assessment and plan and discussed the findings and management plan with the patient and family and I was present for the entire procedure performed by the resident/fellow.    Ama Hazel MD  Professor of Ophthalmology  Vitreo-Retinal surgeon   Department of Ophthalmology and Visual Neurosciences   [facility]  Phone: [phone]   Fax: [phone]

## 2025-07-10 ENCOUNTER — MYC MEDICAL ADVICE (OUTPATIENT)
Dept: LAB | Facility: CLINIC | Age: 50
End: 2025-07-10
Payer: COMMERCIAL

## 2025-07-10 ENCOUNTER — TELEPHONE (OUTPATIENT)
Dept: FAMILY MEDICINE | Facility: CLINIC | Age: 50
End: 2025-07-10
Payer: COMMERCIAL

## 2025-07-10 DIAGNOSIS — R73.03 PREDIABETES: Primary | ICD-10-CM

## 2025-07-10 NOTE — TELEPHONE ENCOUNTER
Patient returned call. We discussed that pre-op appt will review what medication must be held before eye surgery and if there are additional medication that need to be completed before surgery. Patient acknowledged understanding and had no additional questions.

## 2025-07-10 NOTE — TELEPHONE ENCOUNTER
Order/Referral Request    Who is requesting: pt    Orders being requested: A1c lab    Reason service is needed/diagnosis: supposed to have A1c checked per pcp. Lab stating there's no orders and want to cancel her lab appt for tomorrow 7/11    When are orders needed by: today    Has this been discussed with Provider: Yes    Does patient have a preference on a Group/Provider/Facility? [facility]    Does patient have an appointment scheduled?: Yes: 7/11    Where to send orders: Place orders within Epic    Could we send this information to you in [employer] or would you prefer to receive a phone call?:   Patient would like to be contacted via [employer]

## 2025-07-14 ENCOUNTER — RESULTS FOLLOW-UP (OUTPATIENT)
Dept: FAMILY MEDICINE | Facility: CLINIC | Age: 50
End: 2025-07-14

## 2025-07-14 ENCOUNTER — LAB (OUTPATIENT)
Dept: LAB | Facility: CLINIC | Age: 50
End: 2025-07-14
Payer: COMMERCIAL

## 2025-07-14 DIAGNOSIS — J45.40 MODERATE PERSISTENT ASTHMA WITHOUT COMPLICATION: ICD-10-CM

## 2025-07-14 DIAGNOSIS — R73.03 PREDIABETES: ICD-10-CM

## 2025-07-14 LAB
EST. AVERAGE GLUCOSE BLD GHB EST-MCNC: 126 MG/DL
HBA1C MFR BLD: 6 % (ref 0–5.6)

## 2025-07-14 PROCEDURE — 83036 HEMOGLOBIN GLYCOSYLATED A1C: CPT

## 2025-07-14 PROCEDURE — 36415 COLL VENOUS BLD VENIPUNCTURE: CPT

## 2025-07-15 RX ORDER — BUDESONIDE AND FORMOTEROL FUMARATE DIHYDRATE 160; 4.5 UG/1; UG/1
2 AEROSOL RESPIRATORY (INHALATION) 2 TIMES DAILY
Qty: 10.2 G | Refills: 1 | Status: SHIPPED | OUTPATIENT
Start: 2025-07-15

## 2025-07-16 ENCOUNTER — OFFICE VISIT (OUTPATIENT)
Dept: FAMILY MEDICINE | Facility: CLINIC | Age: 50
End: 2025-07-16
Payer: COMMERCIAL

## 2025-07-16 VITALS
HEIGHT: 65 IN | TEMPERATURE: 98.2 F | BODY MASS INDEX: 35.85 KG/M2 | HEART RATE: 69 BPM | DIASTOLIC BLOOD PRESSURE: 78 MMHG | SYSTOLIC BLOOD PRESSURE: 148 MMHG | RESPIRATION RATE: 20 BRPM | OXYGEN SATURATION: 98 % | WEIGHT: 215.2 LBS

## 2025-07-16 DIAGNOSIS — K21.00 GASTROESOPHAGEAL REFLUX DISEASE WITH ESOPHAGITIS WITHOUT HEMORRHAGE: ICD-10-CM

## 2025-07-16 DIAGNOSIS — F32.2 SEVERE DEPRESSION (H): ICD-10-CM

## 2025-07-16 DIAGNOSIS — Z01.818 PREOP GENERAL PHYSICAL EXAM: Primary | ICD-10-CM

## 2025-07-16 DIAGNOSIS — H91.93 BILATERAL DEAFNESS: ICD-10-CM

## 2025-07-16 DIAGNOSIS — I10 PRIMARY HYPERTENSION: ICD-10-CM

## 2025-07-16 DIAGNOSIS — H33.22 LEFT RETINAL DETACHMENT: ICD-10-CM

## 2025-07-16 DIAGNOSIS — F41.1 ANXIETY, GENERALIZED: ICD-10-CM

## 2025-07-16 DIAGNOSIS — R73.03 PREDIABETES: ICD-10-CM

## 2025-07-16 DIAGNOSIS — J45.998 ASTHMA, PERSISTENT CONTROLLED: ICD-10-CM

## 2025-07-16 DIAGNOSIS — H54.61 VISION LOSS OF RIGHT EYE: ICD-10-CM

## 2025-07-16 DIAGNOSIS — K59.00 CONSTIPATION, UNSPECIFIED CONSTIPATION TYPE: ICD-10-CM

## 2025-07-16 DIAGNOSIS — Z86.711 PERSONAL HISTORY OF PE (PULMONARY EMBOLISM): ICD-10-CM

## 2025-07-16 LAB
ERYTHROCYTE [DISTWIDTH] IN BLOOD BY AUTOMATED COUNT: 13.3 % (ref 10–15)
HCT VFR BLD AUTO: 39.1 % (ref 35–47)
HGB BLD-MCNC: 13.3 G/DL (ref 11.7–15.7)
MCH RBC QN AUTO: 28.8 PG (ref 26.5–33)
MCHC RBC AUTO-ENTMCNC: 34 G/DL (ref 31.5–36.5)
MCV RBC AUTO: 85 FL (ref 78–100)
PLATELET # BLD AUTO: 323 10E3/UL (ref 150–450)
RBC # BLD AUTO: 4.62 10E6/UL (ref 3.8–5.2)
WBC # BLD AUTO: 6.4 10E3/UL (ref 4–11)

## 2025-07-16 PROCEDURE — T1013 SIGN LANG/ORAL INTERPRETER: HCPCS | Mod: U3

## 2025-07-16 RX ORDER — DOCUSATE SODIUM 100 MG/1
100 CAPSULE, LIQUID FILLED ORAL 2 TIMES DAILY
Qty: 180 CAPSULE | Refills: 2 | Status: SHIPPED | OUTPATIENT
Start: 2025-07-16

## 2025-07-16 RX ORDER — OMEPRAZOLE 20 MG/1
20 CAPSULE, DELAYED RELEASE ORAL DAILY
Qty: 90 CAPSULE | Refills: 2 | Status: SHIPPED | OUTPATIENT
Start: 2025-07-16

## 2025-07-16 NOTE — PROGRESS NOTES
Preoperative Evaluation  [place] [id number] [facility] [address]  [facility] [address]  Phone: [phone]  Fax: [phone]  Primary Provider: MARU Goff CNP  Pre-op Performing Provider: Erika Calvert MD  Jul 16, 2025 7/11/2025   Surgical Information   What procedure is being done? Both eye surgery and laser   Facility or Hospital where procedure/surgery will be performed: [facility]   Who is doing the procedure / surgery? Dr kasi calixto   Date of surgery / procedure: July 21th 2025   Time of surgery / procedure: 9am   Where do you plan to recover after surgery? Other -      Fax number for surgical facility: Note does not need to be faxed, will be available electronically in Epic.    Assessment & Plan     The proposed surgical procedure is considered LOW risk.    Preop general physical exam    Left retinal detachment    Vision loss of right eye      Primary hypertension    Personal history of PE (pulmonary embolism)    Asthma, persistent controlled    Prediabetes    Severe depression (H)    Anxiety, generalized    Bilateral deafness          - No identified additional risk factors other than previously addressed    Preoperative Medication Instructions  Antiplatelet or Anticoagulation Medication Instructions   - We reviewed the medication list and the patient is not on an antiplatelet or anticoagulation medications.    Additional Medication Instructions  We reviewed the medication list and there are no chronic medications that need to be adjusted for this procedure.    Recommendation  Approval given to proceed with proposed procedure, without further diagnostic evaluation.    The longitudinal plan of care for the diagnosis(es)/condition(s) as documented were addressed during this visit. Due to the added complexity in care, I will continue to support Marleny in the subsequent management and with ongoing continuity of care.    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 No   Have you ever had surgery on your heart or blood vessels, such as a stent placement, a coronary artery bypass, or surgery on an artery in your head, neck, heart, or legs? (!) no    Do you have chest pain with activity? No   Do you have a history of heart failure? No   Do you currently have a cold, bronchitis or symptoms of other infection? No   Do you have a cough, shortness of breath, or wheezing? No   Do you or anyone in your family have previous history of blood clots? (!) YES    Do you or does anyone in your family have a serious bleeding problem such as prolonged bleeding following surgeries or cuts? No   Have you ever had problems with anemia or been told to take iron pills? No   Have you had any abnormal blood loss such as black, tarry or bloody stools, or abnormal vaginal bleeding? (!) YES    Have you ever had a blood transfusion? (!) YES   Have you ever had a transfusion reaction? No   Are you willing to have a blood transfusion if it is medically needed before, during, or after your surgery? Yes   Have you or any of your relatives ever had problems with anesthesia? No   Do you have sleep apnea, excessive snoring or daytime drowsiness? No   Do you have any artifical heart valves or other implanted medical devices like a pacemaker, defibrillator, or continuous glucose monitor? No   Do you have artificial joints? (!) YES   Are you allergic to latex? (!) YES     Advance Care Planning    Discussed advance care planning with patient; however, patient declined at this time.    Preoperative Review of    reviewed -  no record of controlled substances prescribed.          Patient Active Problem List    Diagnosis Date Noted    Finger infection 06/16/2025     Priority: Medium    Vision loss of right eye 06/16/2025     Priority: Medium    Alcohol dependence in remission (H) 01/27/2025     Priority: Medium    Anaphylaxis, initial encounter 08/06/2024     Priority: Medium    B12 deficiency      Priority: Medium    Toxic effect of acetaminophen, accidental or unintentional, subsequent encounter 08/24/2023     Priority: Medium    LLQ abdominal pain 08/24/2023     Priority: Medium    Allergic reaction, subsequent encounter 08/24/2023     Priority: Medium    Severe depression (H) 07/07/2022     Priority: Medium    Arthritis of carpometacarpal (CMC) joint of right thumb 03/10/2021     Priority: Medium     Formatting of this note might be different from the original.  Added automatically from request for surgery [id number]        Prediabetes 03/03/2021     Priority: Medium    Lumbar back sprain, initial encounter 11/15/2019     Priority: Medium    Bilateral deafness 08/01/2019     Priority: Medium     Born deaf.        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     Priority: Medium     Overview:   US 3/16; needs PAP of both cervix        Insomnia 02/10/2016     Priority: Medium    Primary hypertension 02/08/2016     Priority: Medium    Status post total right knee replacement  01/18/2016     Priority: Medium    Migraine 04/24/2014     Priority: Medium     Overview:   Controlled with beta blockers        Asthma, persistent controlled 04/24/2014     Priority: Medium    Anxiety, generalized 02/24/2014     Priority: Medium    Personal history of PE (pulmonary embolism) 09/19/2012     Priority: Medium     Overview:   Postoperative after knee replacement surgery in 2012  (I am consulting with Dr Jd longo postop DVT prophylaxis   She is considering Xeralto 10 QD postop shorter and 20 QD for 3 months   after  . 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 07/30/2012     Priority: Medium     Overview:   MSSA 7/24/12 Nasal pre-op culture        Fatty liver 02/07/2012     Priority: Medium    Attention deficit hyperactivity disorder (ADHD) 02/09/2005     Priority: Medium      Past Medical History:   Diagnosis Date    Accidental overdose, initial encounter 07/07/2022    ADHD (attention deficit hyperactivity disorder)     Alcohol dependence (H)     Anxiety     Arthritis     Asthma     B12 deficiency     Bipolar depression (H)     Blood clotting disorder     Deafness     Depressive disorder 10 year    Drug abuse, nondependent (H) 10/29/2004    Overview:  polydrug abuse per psych notes ; Other, mixed, or unspecified nondependent drug abuse, unspecified    History of blood clots     History of transfusion     Hypertension     Kidney stones 2015    Major depressive disorder, recurrent episode, severe (H) 05/17/2016    Overview:  s/p suicide attempt Epic     Migraine     Obstructive sleep apnea 06/01/2016    Pt states she does not have ROBERT, does not use CPAP    Pulmonary emboli (H)     Status post total right knee replacement 01/18/2016     Past Surgical History:   Procedure Laterality Date    BACK SURGERY  soon    BIOPSY BREAST Right     2019... Also had fluid drained from left breast under surgery also in 2019    BREAST SURGERY      EYE  SURGERY  soon    retina    HEAD & NECK SURGERY  soon    MAMMOPLASTY REDUCTION Bilateral 01/01/2017    ORTHOPEDIC SURGERY  2012,2013,2014    TOTAL HIP ARTHROPLASTY Bilateral     TOTAL KNEE ARTHROPLASTY Right     TUBAL LIGATION      US BREAST CORE BIOPSY RIGHT Right 11/21/2019     Current Outpatient Medications   Medication Sig Dispense Refill    albuterol (PROAIR HFA/PROVENTIL HFA/VENTOLIN HFA) 108 (90 Base) MCG/ACT inhaler INHALE TWO PUFFS EVERY 4 HOURS AS NEEDED FOR WHEEZING OR SHORTNESS OF BREATH 6.7 g 1    albuterol (PROVENTIL) (2.5 MG/3ML) 0.083% neb solution USE 1 VIAL NEBULIZER every 4-6 hours as needed for wheeze or shortness of breath. 180 mL 1    budesonide-formoterol (SYMBICORT/BREYNA) 160-4.5 MCG/ACT inhaler Inhale 2 puffs into the lungs 2 times daily. 10.2 g 1    citalopram (CELEXA) 20 MG tablet Take 30 mg by mouth daily.      docusate sodium (COLACE) 100 MG capsule Take 1 capsule (100 mg) by mouth 2 times daily. 180 capsule 2    EPINEPHrine (ANY BX GENERIC EQUIV) 0.3 MG/0.3ML injection 2-pack Inject 0.3 mLs (0.3 mg) into the muscle as needed for anaphylaxis. 2 each 1    estradiol (CLIMARA) 0.05 MG/24HR weekly patch Place 1 patch over 168 hours onto the skin once a week. 12 patch 3    estradiol (CLIMARA) 0.05 MG/24HR weekly patch Place 1 patch over 168 hours onto the skin once a week. 12 patch 3    gabapentin (NEURONTIN) 100 MG capsule Take 2 capsules (200 mg) by mouth 3 times daily. 360 capsule 4    hydrOXYzine HCl (ATARAX) 25 MG tablet TAKE 3 TABLETS BY MOUTH THREE TIMES A DAY AS NEEDED FOR ANXIETY OR INSOMNIA . (Patient taking differently: Take 75 mg by mouth at bedtime as needed, may repeat once for anxiety (insomnia). TAKE 3 TABLETS BY MOUTH THREE TIMES A DAY AS NEEDED FOR ANXIETY OR INSOMNIA .) 180 tablet 1    levocetirizine (XYZAL) 5 MG tablet Take 1 tablet (5 mg) by mouth every evening. 90 tablet 1    losartan (COZAAR) 50 MG tablet Take 0.5 tablets (25 mg) by mouth daily. 50 tablet 1     methocarbamol (ROBAXIN) 500 MG tablet Take 1 tablet (500 mg) by mouth 3 times daily as needed for muscle spasms. 60 tablet 2    metoprolol succinate ER (TOPROL XL) 25 MG 24 hr tablet Take 1 tablet (25 mg) by mouth daily. 90 tablet 3    montelukast (SINGULAIR) 10 MG tablet Take 1 tablet (10 mg) by mouth at bedtime. 90 tablet 1    multivitamin, therapeutic (THERA-VIT) TABS tablet Take 1 tablet by mouth daily      NONFORMULARY Take 1 tablet by mouth daily Nerve vitamin      omeprazole (PRILOSEC) 20 MG DR capsule Take 1 capsule (20 mg) by mouth daily. Take once daily in the morning 30 minutes prior to food and drink. 90 capsule 2    prazosin (MINIPRESS) 1 MG capsule Take 1 capsule (1 mg) by mouth at bedtime. 90 capsule 3    predniSONE (DELTASONE) 20 MG tablet Take two tablets (= 40mg) each day for 4 (four) days 8 tablet 0    PROMETRIUM 200 MG capsule Take 1 capsule (200 mg) by mouth daily. 90 capsule 4    QUEtiapine (SEROQUEL) 100 MG tablet Take 1 tablet (100 mg) by mouth at bedtime.      rizatriptan (MAXALT) 10 MG tablet Take 1 tablet (10 mg) by mouth at onset of headache for migraine. 18 tablet 1    topiramate (TOPAMAX) 25 MG tablet Take 1 tablet (25 mg) by mouth 2 times daily. 180 tablet 1       Allergies   Allergen Reactions    Acetylcysteine Rash and Other (See Comments)    Amoxicillin Itching and Shortness Of Breath    Bee Pollen Anaphylaxis    Chocolate Anaphylaxis    Contrast [Iodixanol] Shortness Of Breath and Rash     Pt stated she reacted to the contrast given for her CT in past. She experienced shortness of breath, throat tightening, and rash on chest, and they gave her an injection to counter the symptoms.     Covid-19 (Mrna) Vaccine Shortness Of Breath     [facility] COVID-19 Vaccine    Hymenoptera Allergenic Extract [Wasp Venom Protein] Anaphylaxis    Iodine Hives and Unknown     Pt stated that she was injected with CT CONTRAST in the past and got hives, SOB, and nausea. Please pre-medicate patient in the  "future.     Meat Extract Anaphylaxis    Wasp Venom Protein Starter Kit [Wasp Venom Protein] Itching, Shortness Of Breath, Swelling and Difficulty breathing    Adhesive Tape Unknown    Aspirin     Bee Venom Unknown    Beef-Derived Drug Products     Geodon [Ziprasidone] Unknown    Latex Unknown     Added based on information entered during case entry, please review and add reactions, type, and severity as needed    Morphine Unknown    Pork Allergy     Prochlorperazine Other (See Comments)    Risperdal [Risperidone] Unknown    Risperidone Analogues [Risperidone] Other (See Comments)    Shellfish Allergy Unknown    Theobroma Oil [Theobroma Grandiflorum Seed Butter] Unknown    Trazodone Other (See Comments)     Elevated creatinine    Zoloft [Sertraline] Unknown    Hydrochlorothiazide Rash    Ibuprofen Difficulty breathing and Rash     Kidney function, Kidney function        Social History     Tobacco Use    Smoking status: Never     Passive exposure: Never    Smokeless tobacco: Never   Substance Use Topics    Alcohol use: Yes     Comment: Alcoholic Drinks/day: dependence       History   Drug Use Unknown             Objective    BP (!) 152/79   Pulse 69   Temp 98.2  F (36.8  C) (Oral)   Resp 20   Ht 1.651 m (5' 5\")   Wt 97.6 kg (215 lb 3.2 oz)   LMP  (LMP Unknown)   SpO2 98%   BMI 35.81 kg/m     Estimated body mass index is 35.81 kg/m  as calculated from the following:    Height as of this encounter: 1.651 m (5' 5\").    Weight as of this encounter: 97.6 kg (215 lb 3.2 oz).    Physical Exam  GENERAL: alert and no distress  NECK: no adenopathy, no asymmetry, masses, or scars  RESP: lungs clear to auscultation - no rales, rhonchi or wheezes  CV: regular rate and rhythm, normal S1 S2, no S3 or S4, no murmur, click or rub, no peripheral edema  ABDOMEN: soft, nontender, no hepatosplenomegaly, no masses and bowel sounds normal  MS: no gross musculoskeletal defects noted, no edema  SKIN: no suspicious lesions or " matthew    Recent Labs   Lab Test 07/14/25  1100 06/17/25  0602 06/16/25  1219 06/14/25  1828 04/12/25  2303 04/10/25  1408   HGB  --  12.2 13.1   < > 13.5  --    PLT  --  248 286   < > 268  --    INR  --   --  0.85  --  0.91  --    NA  --  140 140   < > 142  --    POTASSIUM  --  4.5 4.0   < > 3.5  --    CR  --  1.03* 1.20*   < > 0.90  --    A1C 6.0*  --   --   --   --  5.7*    < > = values in this interval not displayed.        Diagnostics  Recent Results (from the past 48 hours)   CBC with platelets    Collection Time: 07/16/25  3:28 PM   Result Value Ref Range    WBC Count 6.4 4.0 - 11.0 10e3/uL    RBC Count 4.62 3.80 - 5.20 10e6/uL    Hemoglobin 13.3 11.7 - 15.7 g/dL    Hematocrit 39.1 35.0 - 47.0 %    MCV 85 78 - 100 fL    MCH 28.8 26.5 - 33.0 pg    MCHC 34.0 31.5 - 36.5 g/dL    RDW 13.3 10.0 - 15.0 %    Platelet Count 323 150 - 450 10e3/uL   Basic metabolic panel    Collection Time: 07/16/25  3:28 PM   Result Value Ref Range    Sodium 142 135 - 145 mmol/L    Potassium 4.0 3.4 - 5.3 mmol/L    Chloride 109 (H) 98 - 107 mmol/L    Carbon Dioxide (CO2) 22 22 - 29 mmol/L    Anion Gap 11 7 - 15 mmol/L    Urea Nitrogen 16.9 6.0 - 20.0 mg/dL    Creatinine 0.92 0.51 - 0.95 mg/dL    GFR Estimate 75 >60 mL/min/1.73m2    Calcium 9.2 8.8 - 10.4 mg/dL    Glucose 111 (H) 70 - 99 mg/dL      ECG results from 06/14/25   ECG 12-Lead with MUSE - [facility]     Value    Systolic Blood Pressure     Diastolic Blood Pressure     Ventricular Rate 62    Atrial Rate 62    AL Interval 166    QRS Duration 74        QTc 416    P Axis 47    R AXIS 43    T Axis -11    Interpretation ECG      Sinus rhythm  Low voltage QRS  Nonspecific T wave abnormality  Abnormal ECG  When compared with ECG of 29-Apr-2025 15:26,  Premature atrial complexes are no longer Present  QT has shortened  Confirmed by SEE ED PROVIDER NOTE FOR, ECG INTERPRETATION (4000),  PARISH BATISTA ([id number]) on 6/15/2025 1:20:34 PM  Also confirmed by SEE ED PROVIDER NOTE  FOR, ECG INTERPRETATION ([id number]),  Jacinta Mata ([id number])  on 6/16/2025 5:13:57 AM          Revised Cardiac Risk Index (RCRI)  The patient has the following serious cardiovascular risks for perioperative complications:   - No serious cardiac risks = 0 points     RCRI Interpretation: 0 points: Class I (very low risk - 0.4% complication rate)         Signed Electronically by: Erika Calvert MD  A copy of this evaluation report is provided to the requesting physician.

## 2025-07-16 NOTE — PATIENT INSTRUCTIONS
How to Take Your Medication Before Surgery  Preoperative Medication Instructions   Antiplatelet or Anticoagulation Medication Instructions   - We reviewed the medication list and the patient is not on an antiplatelet or anticoagulation medications.    Additional Medication Instructions  Take all scheduled medications on the day of surgery       Patient Education   Preparing for Your Surgery  For Adults  Getting started  In most cases, a nurse will call to review your health history and instructions. They will give you an arrival time based on your scheduled surgery time. Please be ready to share:  Your doctor's clinic name and phone number  Your medical, surgical, and anesthesia history  A list of allergies and sensitivities  A list of medicines, including herbal treatments and over-the-counter drugs  Whether the patient has a legal guardian (ask how to send us the papers in advance)  Note: You may not receive a call if you were seen at our PAC (Preoperative Assessment Center).  Please tell us if you're pregnant--or if there's any chance you might be pregnant. Some surgeries may injure a fetus (unborn baby), so they require a pregnancy test. Surgeries that are safe for a fetus don't always need a test, and you can choose whether to have one.   Preparing for surgery  Within 10 to 30 days of surgery: Have a pre-op exam (sometimes called an H&P, or History and Physical). This can be done at a clinic or pre-operative center.  If you're having a , you may not need this exam. Talk to your care team.  At your pre-op exam, talk to your care team about all medicines you take. (This includes CBD oil and any drugs, such as THC, marijuana, and other forms of cannabis.) If you need to stop any medicine before surgery, ask when to start taking it again.  This is for your safety. Many medicines and drugs can make you bleed too much during surgery. Some change how well surgery (anesthesia) drugs work.  Call your insurance  company to let them know you're having surgery. (If you don't have insurance, call [phone].)  Call your clinic if there's any change in your health. This includes a scrape or scratch near the surgery site, or any signs of a cold (sore throat, runny nose, cough, rash, fever).  Eating and drinking guidelines  For your safety: Unless your surgeon tells you otherwise, follow the guidelines below.  Eat and drink as normal until 8 hours before you arrive for surgery. After that, no food or milk. You can spit out gum when you arrive.  Drink clear liquids until 2 hours before you arrive. These are liquids you can see through, like water, Gatorade, and Propel Water. They also include plain black coffee and tea (no cream or milk).  No alcohol for 24 hours before you arrive. The night before surgery, stop any drinks that contain THC.  If your care team tells you to take medicine on the morning of surgery, it's okay to take it with a sip of water. No other medicines or drugs are allowed (including CBD oil)--follow your care team's instructions.  If you have questions the day of surgery, call your hospital or surgery center.   Preventing infection  Shower or bathe the night before and the morning of surgery. Follow the instructions your clinic gave you. (If no instructions, use regular soap.)  Don't shave or clip hair near your surgery site. We'll remove the hair if needed.  Don't smoke or vape the morning of surgery. No chewing tobacco for 6 hours before you arrive. A nicotine patch is okay. You may spit out nicotine gum when you arrive.  For some surgeries, the surgeon will tell you to fully quit smoking and nicotine.  We will make every effort to keep you safe from infection. We will:  Clean our hands often with soap and water (or an alcohol-based hand rub).  Clean the skin at your surgery site with a special soap that kills germs.  Give you a special gown to keep you warm. (Cold raises the risk of infection.)  Wear hair  covers, masks, gowns, and gloves during surgery.  Give antibiotic medicine, if prescribed. Not all surgeries need this medicine.  What to bring on the day of surgery  Photo ID and insurance card  Copy of your health care directive, if you have one  Glasses and hearing aids (bring cases)  You can't wear contacts during surgery  Inhaler and eye drops, if you use them (tell us about these when you arrive)  CPAP machine or breathing device, if you use them  A few personal items, if spending the night  If you have . . .  A pacemaker, ICD (cardiac defibrillator), or other implant: Bring the ID card.  An implanted stimulator: Bring the remote control.  A legal guardian: Bring a copy of the certified (court-stamped) guardianship papers.  Please remove any jewelry, including body piercings. Leave jewelry and other valuables at home.  If you're going home the day of surgery  You must have a support person drive you home. They should stay with you overnight, and they may need to help with your self-care.  If you don't have a support person, please tells us as soon as possible. We can help.  After surgery  If it's hard to control your pain or you need more pain medicine, please call your surgeon's office.  Questions?   If you have any questions for your care team, list them here:   ____________________________________________________________________________________________________________________________________________________________________________________________________________________________________________________________  For informational purposes only. Not to replace the advice of your health care provider. Copyright   2003, 2019 [facility]. All rights reserved. Clinically reviewed by Von Graham MD. [employer] [id number] - REV 02/25.

## 2025-07-17 ENCOUNTER — RESULTS FOLLOW-UP (OUTPATIENT)
Dept: FAMILY MEDICINE | Facility: CLINIC | Age: 50
End: 2025-07-17
Payer: COMMERCIAL

## 2025-07-17 LAB
ANION GAP SERPL CALCULATED.3IONS-SCNC: 11 MMOL/L (ref 7–15)
BUN SERPL-MCNC: 16.9 MG/DL (ref 6–20)
CALCIUM SERPL-MCNC: 9.2 MG/DL (ref 8.8–10.4)
CHLORIDE SERPL-SCNC: 109 MMOL/L (ref 98–107)
CREAT SERPL-MCNC: 0.92 MG/DL (ref 0.51–0.95)
EGFRCR SERPLBLD CKD-EPI 2021: 75 ML/MIN/1.73M2
GLUCOSE SERPL-MCNC: 111 MG/DL (ref 70–99)
HCO3 SERPL-SCNC: 22 MMOL/L (ref 22–29)
POTASSIUM SERPL-SCNC: 4 MMOL/L (ref 3.4–5.3)
SODIUM SERPL-SCNC: 142 MMOL/L (ref 135–145)

## 2025-07-17 NOTE — TELEPHONE ENCOUNTER
"Patient returned call. She is very concerned about the potential of PE of DVT during up coming eye procedure.     She reports having a history of PE and is asking if she needs to be on anticoagulation therapy prior or post procedure. HX of PE is on DX list (post op knee replacement in 2012). She referred to \"a injection\" that she has used in the past. She is currently not on anticoagulation therapy per chart.     PCP is out of clinic. I will route to MD to preformed pre-op to ask if they are able to review if anticoagulation is appropriate.       "

## 2025-07-19 ENCOUNTER — HEALTH MAINTENANCE LETTER (OUTPATIENT)
Age: 50
End: 2025-07-19

## 2025-07-20 ENCOUNTER — ANESTHESIA EVENT (OUTPATIENT)
Dept: SURGERY | Facility: CLINIC | Age: 50
End: 2025-07-20
Payer: COMMERCIAL

## 2025-07-21 ENCOUNTER — ANESTHESIA (OUTPATIENT)
Dept: SURGERY | Facility: CLINIC | Age: 50
End: 2025-07-21
Payer: COMMERCIAL

## 2025-07-21 ENCOUNTER — HOSPITAL ENCOUNTER (OUTPATIENT)
Facility: CLINIC | Age: 50
Discharge: HOME | End: 2025-07-21
Attending: OPHTHALMOLOGY | Admitting: OPHTHALMOLOGY
Payer: COMMERCIAL

## 2025-07-21 VITALS
DIASTOLIC BLOOD PRESSURE: 80 MMHG | SYSTOLIC BLOOD PRESSURE: 133 MMHG | RESPIRATION RATE: 23 BRPM | OXYGEN SATURATION: 98 % | HEART RATE: 102 BPM | HEIGHT: 66 IN | TEMPERATURE: 98.2 F | BODY MASS INDEX: 34.47 KG/M2 | WEIGHT: 214.51 LBS

## 2025-07-21 DIAGNOSIS — H33.021 RETINAL DETACHMENT OF RIGHT EYE WITH MULTIPLE BREAKS: ICD-10-CM

## 2025-07-21 PROCEDURE — 250N000011 HC RX IP 250 OP 636: Performed by: STUDENT IN AN ORGANIZED HEALTH CARE EDUCATION/TRAINING PROGRAM

## 2025-07-21 PROCEDURE — 710N000011 HC RECOVERY PHASE 1, LEVEL 3, PER MIN: Performed by: OPHTHALMOLOGY

## 2025-07-21 PROCEDURE — 250N000011 HC RX IP 250 OP 636: Performed by: NURSE ANESTHETIST, CERTIFIED REGISTERED

## 2025-07-21 PROCEDURE — 272N000001 HC OR GENERAL SUPPLY STERILE: Performed by: OPHTHALMOLOGY

## 2025-07-21 PROCEDURE — 250N000013 HC RX MED GY IP 250 OP 250 PS 637: Performed by: STUDENT IN AN ORGANIZED HEALTH CARE EDUCATION/TRAINING PROGRAM

## 2025-07-21 PROCEDURE — 999N000141 HC STATISTIC PRE-PROCEDURE NURSING ASSESSMENT: Performed by: OPHTHALMOLOGY

## 2025-07-21 PROCEDURE — 999N000127 HC STATISTIC PERIPHERAL IV START W US GUIDANCE

## 2025-07-21 PROCEDURE — 250N000009 HC RX 250: Performed by: NURSE ANESTHETIST, CERTIFIED REGISTERED

## 2025-07-21 PROCEDURE — 67145 PROPH RTA DTCHMNT PC: CPT | Mod: XS | Performed by: OPHTHALMOLOGY

## 2025-07-21 PROCEDURE — 250N000009 HC RX 250: Performed by: OPHTHALMOLOGY

## 2025-07-21 PROCEDURE — 360N000077 HC SURGERY LEVEL 4, PER MIN: Performed by: OPHTHALMOLOGY

## 2025-07-21 PROCEDURE — C1784 OCULAR DEV, INTRAOP, DET RET: HCPCS | Performed by: OPHTHALMOLOGY

## 2025-07-21 PROCEDURE — 370N000017 HC ANESTHESIA TECHNICAL FEE, PER MIN: Performed by: OPHTHALMOLOGY

## 2025-07-21 PROCEDURE — C1814 RETINAL TAMP, SILICONE OIL: HCPCS | Performed by: OPHTHALMOLOGY

## 2025-07-21 PROCEDURE — 250N000025 HC SEVOFLURANE, PER MIN: Performed by: OPHTHALMOLOGY

## 2025-07-21 PROCEDURE — 710N000012 HC RECOVERY PHASE 2, PER MINUTE: Performed by: OPHTHALMOLOGY

## 2025-07-21 PROCEDURE — 258N000003 HC RX IP 258 OP 636: Performed by: NURSE ANESTHETIST, CERTIFIED REGISTERED

## 2025-07-21 PROCEDURE — 67113 REPAIR RETINAL DETACH CPLX: CPT | Mod: RT | Performed by: OPHTHALMOLOGY

## 2025-07-21 PROCEDURE — 250N000009 HC RX 250: Performed by: STUDENT IN AN ORGANIZED HEALTH CARE EDUCATION/TRAINING PROGRAM

## 2025-07-21 PROCEDURE — 999N000040 HC STATISTIC CONSULT NO CHARGE VASC ACCESS

## 2025-07-21 PROCEDURE — 250N000011 HC RX IP 250 OP 636: Performed by: OPHTHALMOLOGY

## 2025-07-21 DEVICE — EYE SILIKON OIL 1000 8.5ML 8065601187: Type: IMPLANTABLE DEVICE | Site: EYE | Status: FUNCTIONAL

## 2025-07-21 DEVICE — EYE IMP SLEEVE STYLE 70 S3018: Type: IMPLANTABLE DEVICE | Site: EYE | Status: FUNCTIONAL

## 2025-07-21 DEVICE — EYE IMP STRIP 4MM STYLE 41 S2970: Type: IMPLANTABLE DEVICE | Site: EYE | Status: FUNCTIONAL

## 2025-07-21 RX ORDER — SODIUM CHLORIDE, SODIUM LACTATE, POTASSIUM CHLORIDE, CALCIUM CHLORIDE 600; 310; 30; 20 MG/100ML; MG/100ML; MG/100ML; MG/100ML
INJECTION, SOLUTION INTRAVENOUS CONTINUOUS
Status: DISCONTINUED | OUTPATIENT
Start: 2025-07-21 | End: 2025-07-21 | Stop reason: HOSPADM

## 2025-07-21 RX ORDER — LIDOCAINE 40 MG/G
CREAM TOPICAL
Status: DISCONTINUED | OUTPATIENT
Start: 2025-07-21 | End: 2025-07-21 | Stop reason: HOSPADM

## 2025-07-21 RX ORDER — ONDANSETRON 2 MG/ML
INJECTION INTRAMUSCULAR; INTRAVENOUS PRN
Status: DISCONTINUED | OUTPATIENT
Start: 2025-07-21 | End: 2025-07-21

## 2025-07-21 RX ORDER — HYDROMORPHONE HYDROCHLORIDE 1 MG/ML
0.2 INJECTION, SOLUTION INTRAMUSCULAR; INTRAVENOUS; SUBCUTANEOUS EVERY 5 MIN PRN
Status: DISCONTINUED | OUTPATIENT
Start: 2025-07-21 | End: 2025-07-21 | Stop reason: HOSPADM

## 2025-07-21 RX ORDER — DEXAMETHASONE SODIUM PHOSPHATE 4 MG/ML
4 INJECTION, SOLUTION INTRA-ARTICULAR; INTRALESIONAL; INTRAMUSCULAR; INTRAVENOUS; SOFT TISSUE
Status: DISCONTINUED | OUTPATIENT
Start: 2025-07-21 | End: 2025-07-21 | Stop reason: HOSPADM

## 2025-07-21 RX ORDER — LIDOCAINE HYDROCHLORIDE 20 MG/ML
INJECTION, SOLUTION INFILTRATION; PERINEURAL PRN
Status: DISCONTINUED | OUTPATIENT
Start: 2025-07-21 | End: 2025-07-21

## 2025-07-21 RX ORDER — FENTANYL CITRATE 50 UG/ML
50 INJECTION, SOLUTION INTRAMUSCULAR; INTRAVENOUS EVERY 5 MIN PRN
Status: DISCONTINUED | OUTPATIENT
Start: 2025-07-21 | End: 2025-07-21 | Stop reason: HOSPADM

## 2025-07-21 RX ORDER — HYDROMORPHONE HYDROCHLORIDE 1 MG/ML
0.4 INJECTION, SOLUTION INTRAMUSCULAR; INTRAVENOUS; SUBCUTANEOUS EVERY 5 MIN PRN
Status: DISCONTINUED | OUTPATIENT
Start: 2025-07-21 | End: 2025-07-21 | Stop reason: HOSPADM

## 2025-07-21 RX ORDER — BALANCED SALT SOLUTION 6.4; .75; .48; .3; 3.9; 1.7 MG/ML; MG/ML; MG/ML; MG/ML; MG/ML; MG/ML
SOLUTION OPHTHALMIC PRN
Status: DISCONTINUED | OUTPATIENT
Start: 2025-07-21 | End: 2025-07-21 | Stop reason: HOSPADM

## 2025-07-21 RX ORDER — SODIUM CHLORIDE, SODIUM LACTATE, POTASSIUM CHLORIDE, CALCIUM CHLORIDE 600; 310; 30; 20 MG/100ML; MG/100ML; MG/100ML; MG/100ML
INJECTION, SOLUTION INTRAVENOUS CONTINUOUS PRN
Status: DISCONTINUED | OUTPATIENT
Start: 2025-07-21 | End: 2025-07-21

## 2025-07-21 RX ORDER — PROPOFOL 10 MG/ML
INJECTION, EMULSION INTRAVENOUS PRN
Status: DISCONTINUED | OUTPATIENT
Start: 2025-07-21 | End: 2025-07-21

## 2025-07-21 RX ORDER — LABETALOL HYDROCHLORIDE 5 MG/ML
10 INJECTION, SOLUTION INTRAVENOUS
Status: DISCONTINUED | OUTPATIENT
Start: 2025-07-21 | End: 2025-07-21 | Stop reason: HOSPADM

## 2025-07-21 RX ORDER — ONDANSETRON 4 MG/1
4 TABLET, ORALLY DISINTEGRATING ORAL EVERY 30 MIN PRN
Status: DISCONTINUED | OUTPATIENT
Start: 2025-07-21 | End: 2025-07-21 | Stop reason: HOSPADM

## 2025-07-21 RX ORDER — FENTANYL CITRATE 50 UG/ML
25 INJECTION, SOLUTION INTRAMUSCULAR; INTRAVENOUS EVERY 5 MIN PRN
Status: DISCONTINUED | OUTPATIENT
Start: 2025-07-21 | End: 2025-07-21 | Stop reason: HOSPADM

## 2025-07-21 RX ORDER — NEOMYCIN SULFATE, POLYMYXIN B SULFATE, AND DEXAMETHASONE 3.5; 10000; 1 MG/G; [USP'U]/G; MG/G
OINTMENT OPHTHALMIC PRN
Status: DISCONTINUED | OUTPATIENT
Start: 2025-07-21 | End: 2025-07-21 | Stop reason: HOSPADM

## 2025-07-21 RX ORDER — ATROPINE SULFATE 10 MG/ML
SOLUTION/ DROPS OPHTHALMIC PRN
Status: DISCONTINUED | OUTPATIENT
Start: 2025-07-21 | End: 2025-07-21 | Stop reason: HOSPADM

## 2025-07-21 RX ORDER — FENTANYL CITRATE 50 UG/ML
INJECTION, SOLUTION INTRAMUSCULAR; INTRAVENOUS PRN
Status: DISCONTINUED | OUTPATIENT
Start: 2025-07-21 | End: 2025-07-21

## 2025-07-21 RX ORDER — DEXAMETHASONE SODIUM PHOSPHATE 4 MG/ML
INJECTION, SOLUTION INTRA-ARTICULAR; INTRALESIONAL; INTRAMUSCULAR; INTRAVENOUS; SOFT TISSUE PRN
Status: DISCONTINUED | OUTPATIENT
Start: 2025-07-21 | End: 2025-07-21 | Stop reason: HOSPADM

## 2025-07-21 RX ORDER — HYDRALAZINE HYDROCHLORIDE 20 MG/ML
2.5-5 INJECTION INTRAMUSCULAR; INTRAVENOUS EVERY 10 MIN PRN
Status: DISCONTINUED | OUTPATIENT
Start: 2025-07-21 | End: 2025-07-21 | Stop reason: HOSPADM

## 2025-07-21 RX ORDER — NALOXONE HYDROCHLORIDE 0.4 MG/ML
0.1 INJECTION, SOLUTION INTRAMUSCULAR; INTRAVENOUS; SUBCUTANEOUS
Status: DISCONTINUED | OUTPATIENT
Start: 2025-07-21 | End: 2025-07-21 | Stop reason: HOSPADM

## 2025-07-21 RX ORDER — ACETAMINOPHEN 325 MG/1
650 TABLET ORAL ONCE
Status: COMPLETED | OUTPATIENT
Start: 2025-07-21 | End: 2025-07-21

## 2025-07-21 RX ORDER — DEXAMETHASONE SODIUM PHOSPHATE 4 MG/ML
INJECTION, SOLUTION INTRA-ARTICULAR; INTRALESIONAL; INTRAMUSCULAR; INTRAVENOUS; SOFT TISSUE PRN
Status: DISCONTINUED | OUTPATIENT
Start: 2025-07-21 | End: 2025-07-21

## 2025-07-21 RX ORDER — ONDANSETRON 2 MG/ML
4 INJECTION INTRAMUSCULAR; INTRAVENOUS EVERY 30 MIN PRN
Status: DISCONTINUED | OUTPATIENT
Start: 2025-07-21 | End: 2025-07-21 | Stop reason: HOSPADM

## 2025-07-21 RX ORDER — CYCLOPENTOLAT/TROPIC/PHENYLEPH 1%-1%-2.5%
1 DROPS (EA) OPHTHALMIC (EYE)
Status: COMPLETED | OUTPATIENT
Start: 2025-07-21 | End: 2025-07-21

## 2025-07-21 RX ORDER — PROPARACAINE HYDROCHLORIDE 5 MG/ML
1 SOLUTION/ DROPS OPHTHALMIC ONCE
Status: COMPLETED | OUTPATIENT
Start: 2025-07-21 | End: 2025-07-21

## 2025-07-21 RX ADMIN — FENTANYL CITRATE 50 MCG: 0.05 INJECTION, SOLUTION INTRAMUSCULAR; INTRAVENOUS at 15:26

## 2025-07-21 RX ADMIN — FENTANYL CITRATE 100 MCG: 50 INJECTION INTRAMUSCULAR; INTRAVENOUS at 11:31

## 2025-07-21 RX ADMIN — DEXAMETHASONE SODIUM PHOSPHATE 4 MG: 4 INJECTION, SOLUTION INTRAMUSCULAR; INTRAVENOUS at 11:33

## 2025-07-21 RX ADMIN — MIDAZOLAM 2 MG: 1 INJECTION INTRAMUSCULAR; INTRAVENOUS at 11:24

## 2025-07-21 RX ADMIN — ONDANSETRON 4 MG: 2 INJECTION INTRAMUSCULAR; INTRAVENOUS at 14:43

## 2025-07-21 RX ADMIN — Medication 1 DROP: at 10:53

## 2025-07-21 RX ADMIN — Medication 1 DROP: at 11:02

## 2025-07-21 RX ADMIN — PHENYLEPHRINE HYDROCHLORIDE 100 MCG: 10 INJECTION INTRAVENOUS at 11:47

## 2025-07-21 RX ADMIN — FENTANYL CITRATE 50 MCG: 0.05 INJECTION, SOLUTION INTRAMUSCULAR; INTRAVENOUS at 15:49

## 2025-07-21 RX ADMIN — Medication 120 MG: at 15:03

## 2025-07-21 RX ADMIN — PHENYLEPHRINE HYDROCHLORIDE 100 MCG: 10 INJECTION INTRAVENOUS at 12:30

## 2025-07-21 RX ADMIN — Medication 1 DROP: at 10:44

## 2025-07-21 RX ADMIN — PHENYLEPHRINE HYDROCHLORIDE 100 MCG: 10 INJECTION INTRAVENOUS at 12:19

## 2025-07-21 RX ADMIN — LIDOCAINE HYDROCHLORIDE 100 MG: 20 INJECTION, SOLUTION INFILTRATION; PERINEURAL at 11:31

## 2025-07-21 RX ADMIN — Medication 50 MG: at 11:33

## 2025-07-21 RX ADMIN — Medication 80 MG: at 15:08

## 2025-07-21 RX ADMIN — PROPARACAINE HYDROCHLORIDE 1 DROP: 5 SOLUTION/ DROPS OPHTHALMIC at 10:44

## 2025-07-21 RX ADMIN — DEXAMETHASONE SODIUM PHOSPHATE 4 MG: 4 INJECTION, SOLUTION INTRAMUSCULAR; INTRAVENOUS at 14:43

## 2025-07-21 RX ADMIN — ACETAMINOPHEN 650 MG: 325 TABLET ORAL at 17:03

## 2025-07-21 RX ADMIN — SODIUM CHLORIDE, SODIUM LACTATE, POTASSIUM CHLORIDE, AND CALCIUM CHLORIDE: .6; .31; .03; .02 INJECTION, SOLUTION INTRAVENOUS at 11:30

## 2025-07-21 RX ADMIN — PROPOFOL 150 MG: 10 INJECTION, EMULSION INTRAVENOUS at 11:33

## 2025-07-21 ASSESSMENT — ACTIVITIES OF DAILY LIVING (ADL)
ADLS_ACUITY_SCORE: 52
ADLS_ACUITY_SCORE: 48
ADLS_ACUITY_SCORE: 52
ADLS_ACUITY_SCORE: 52

## 2025-07-21 NOTE — ANESTHESIA PREPROCEDURE EVALUATION
Anesthesia Pre-Procedure Evaluation    Patient: Marleny Viera   MRN: [id number] : 1975          Procedure : Procedure(s):  Laser Indirect Ophthalmoscopy to LEFT EYE  RIght Eye: Vitrectomy, Scleral Buckle, Gas or Oil Bubble,  Possible Lensectomy         Past Medical History:   Diagnosis Date    Accidental overdose, initial encounter 2022    ADHD (attention deficit hyperactivity disorder)     Alcohol dependence (H)     Anxiety     Arthritis     Asthma     B12 deficiency     Bipolar depression (H)     Blood clotting disorder     Deafness     Depressive disorder 10 year    Drug abuse, nondependent (H) 10/29/2004    Overview:  polydrug abuse per psych notes ; Other, mixed, or unspecified nondependent drug abuse, unspecified    History of blood clots     History of transfusion     Hypertension     Kidney stones     Major depressive disorder, recurrent episode, severe (H) 2016    Overview:  s/p suicide attempt Epic     Migraine     Obstructive sleep apnea 2016    Pt states she does not have ROBERT, does not use CPAP    Pulmonary emboli (H)     Status post total right knee replacement 2016      Past Surgical History:   Procedure Laterality Date    BACK SURGERY  soon    BIOPSY BREAST Right     2019... Also had fluid drained from left breast under surgery also in 2019    BREAST SURGERY      EYE SURGERY  soon    retina    HEAD & NECK SURGERY  soon    MAMMOPLASTY REDUCTION Bilateral 2017    ORTHOPEDIC SURGERY  ,,2014    TOTAL HIP ARTHROPLASTY Bilateral     TOTAL KNEE ARTHROPLASTY Right     TUBAL LIGATION      US BREAST CORE BIOPSY RIGHT Right 2019      Allergies   Allergen Reactions    Acetylcysteine Rash and Other (See Comments)    Amoxicillin Itching and Shortness Of Breath    Bee Pollen Anaphylaxis    Chocolate Anaphylaxis    Contrast [Iodixanol] Shortness Of Breath and Rash     Pt stated she reacted to the contrast given for her CT in past. She experienced shortness of  breath, throat tightening, and rash on chest, and they gave her an injection to counter the symptoms.     Covid-19 (Mrna) Vaccine Shortness Of Breath     Pfizer COVID-19 Vaccine    Hymenoptera Allergenic Extract [Wasp Venom Protein] Anaphylaxis    Iodine Hives and Unknown     Pt stated that she was injected with CT CONTRAST in the past and got hives, SOB, and nausea. Please pre-medicate patient in the future.     Meat Extract Anaphylaxis    Wasp Venom Protein Starter Kit [Wasp Venom Protein] Itching, Shortness Of Breath, Swelling and Difficulty breathing    Adhesive Tape Unknown    Aspirin     Bee Venom Unknown    Beef-Derived Drug Products     Geodon [Ziprasidone] Unknown    Latex Unknown     Added based on information entered during case entry, please review and add reactions, type, and severity as needed    Morphine Unknown    Pork Allergy     Prochlorperazine Other (See Comments)    Risperdal [Risperidone] Unknown    Risperidone Analogues [Risperidone] Other (See Comments)    Shellfish Allergy Unknown    Theobroma Oil [Theobroma Grandiflorum Seed Butter] Unknown    Trazodone Other (See Comments)     Elevated creatinine    Zoloft [Sertraline] Unknown    Hydrochlorothiazide Rash    Ibuprofen Difficulty breathing and Rash     Kidney function, Kidney function      Social History     Tobacco Use    Smoking status: Never     Passive exposure: Never    Smokeless tobacco: Never   Substance Use Topics    Alcohol use: Yes     Comment: Alcoholic Drinks/day: dependence - pt denied      Wt Readings from Last 1 Encounters:   07/16/25 97.6 kg (215 lb 3.2 oz)        Anesthesia Evaluation   Pt has had prior anesthetic.         ROS/MED HX  ENT/Pulmonary: Comment: Hx of PE     (+)     ROBERT risk factors,  hypertension, obese,             Moderate Persistent, asthma  Treatment: Inhaler prn, Inhaled steroids and Inhaler daily,                 Neurologic: Comment: Left retinal detachment  Vision loss of right eye  Bilateral deafness,  american sign language     (+)      migraines,                          Cardiovascular:     (+)  hypertension- -   -  - -                                      METS/Exercise Tolerance:     Hematologic:     (+) History of blood clots,    pt is not anticoagulated,  history of blood transfusion,         Musculoskeletal: Comment: Low back pain    (+)  arthritis,             GI/Hepatic:     (+)             liver disease (fatty liver),       Renal/Genitourinary:       Endo:     (+)               Obesity,       Psychiatric/Substance Use: Comment: Borderline personality disorder  ADHD  Accidental acetaminophen overdose     (+) psychiatric history anxiety alcohol abuse (in remission)      Infectious Disease:       Malignancy:       Other:              Physical Exam  Airway  Mallampati: III  TM distance: >3 FB  Neck ROM: limited  Mouth opening: >= 4 cm    Cardiovascular - normal exam   Dental   (+) Multiple visibly decayed, broken teeth      Pulmonary - normal exam      Neurological - normal exam  She appears awake, alert and oriented x3.    Other Findings       OUTSIDE LABS:  CBC:   Lab Results   Component Value Date    WBC 6.4 07/16/2025    WBC 6.7 06/17/2025    HGB 13.3 07/16/2025    HGB 12.2 06/17/2025    HCT 39.1 07/16/2025    HCT 37.2 06/17/2025     07/16/2025     06/17/2025     BMP:   Lab Results   Component Value Date     07/16/2025     06/17/2025    POTASSIUM 4.0 07/16/2025    POTASSIUM 4.5 06/17/2025    CHLORIDE 109 (H) 07/16/2025    CHLORIDE 110 (H) 06/17/2025    CO2 22 07/16/2025    CO2 21 (L) 06/17/2025    BUN 16.9 07/16/2025    BUN 15.6 06/17/2025    CR 0.92 07/16/2025    CR 1.03 (H) 06/17/2025     (H) 07/16/2025     (H) 06/17/2025     COAGS:   Lab Results   Component Value Date    PTT 29 10/06/2022    INR 0.85 06/16/2025     POC:   Lab Results   Component Value Date    HCG Negative 07/03/2023    HCGS Negative 08/07/2024     HEPATIC:   Lab Results   Component Value Date     "ALBUMIN 4.1 04/12/2025    PROTTOTAL 7.1 04/12/2025    ALT 15 04/12/2025    AST 20 04/12/2025    ALKPHOS 102 04/12/2025    BILITOTAL 0.4 04/12/2025     OTHER:   Lab Results   Component Value Date    LACT 2.7 (H) 08/07/2024    A1C 6.0 (H) 07/14/2025    TOO 9.2 07/16/2025    MAG 2.2 01/23/2025    LIPASE 30 04/12/2025    TSH 3.50 07/19/2023    CRP 0.5 05/03/2023    SED 9 06/16/2025       Anesthesia Plan    ASA Status:  3      NPO Status: NPO Appropriate   Anesthesia Type: General.  Airway: oral.  Induction: intravenous.  Maintenance: Balanced.   Techniques and Equipment:       - Monitoring Plan: standard ASA monitoring, train of four monitoring     Consents    Anesthesia Plan(s) and associated risks, benefits, and realistic alternatives discussed. Questions answered and patient/representative(s) expressed understanding.     - Discussed: CRNA     - Discussed with:  Patient,           Blood Consent:      - Consented: consented to blood products     Postoperative Care    Pain management: multimodal analgesia.     Comments:                   Rg Guzmán MD    I have reviewed the pertinent notes and labs in the chart from the past 30 days and (re)examined the patient.  Any updates or changes from those notes are reflected in this note.    Clinically Significant Risk Factors Present on Admission                   # Hypertension: Noted on problem list           # Obesity: Estimated body mass index is 35.81 kg/m  as calculated from the following:    Height as of 7/16/25: 1.651 m (5' 5\").    Weight as of 7/16/25: 97.6 kg (215 lb 3.2 oz).       # Financial/Environmental Concerns:    # Asthma: noted on problem list              "

## 2025-07-21 NOTE — OP NOTE
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 Scleral buckle 41/70, RIGHT EYE   2. 25 gauge pars plana vitectomy, endolaser, PFO, membrane peel of PVR, Silicone Oil 1000cs, 270 retinotomy (sparing nasal quadrant), RIGHT EYE  3. Laser indirect ophthalmoscopy retinopexy, LEFT EYE  ANESTHESIA: General anesthesia/ Monitored Anesthesia Care and Retrobulbar block (RIGHT EYE)  COMPLICATIONS: none   FINDINGS: total RD w/ GRT 12:00-5:00 and 11:00 HST OD - PVR prevented flattening so retinectomy was performed from 11:00-8:00; PFO-SO exchange, 260 laser; SB; SN HST OS t/w pexy  INDICATIONS: Marleny Viera is 49 year old year old patient with diagnosis of total Retinal detachment with proliferative vitreoretinopathy RIGHT eye, the patient is here for Retinal detachment Repair in the right eye. She also has a retinal tear in the LEFT eye and is here to receive retinopexy for that eye.  DESCRIPTION OF THE PROCEDURE   The patient was brought into the OR where general anesthesia was administered.   First, the retinal tear in the LEFT eye was treated with indirect laser retinopexy with scleral depression.  Next, the RIGHT eye was prepped and draped in the usual fashion for ophthalmic surgery, including the installation of povidone iodine   A 360 degree conjunctival peritomy was created and the quadrants cleared with the rader scissors. The muscles were isolated and looped with 2-0 silk sutures. A #41 band previously soak in polymyxin solution was placed under each of the rectus muscles. Next 5-0 horizontal mattress merseline sutures were placed in each quadrants to secure the band. A #70 sleeve  was used to secure the band in the superonasal quadrant.   Attention was then turned to the vitrectomy. Marks were made on the sclera inferotemporally, superotemporally, and superonasally 3.5 mm posterior to the limbus. The 25g transscleral cannulas were inserted through the sclera using the trocars. The infusion cannula was connected inferotemporally and directly visualized to verify it was in the correct location. The vitrector handpiece and endoilluiminator were placed in the eye.  A Pars plana vitrectomy was performed and the vitreous was stained with kenalog. Core vitrectomy was performed followed by PFO placement centrally. Peripheral vitrectomy was assisted with scleral depression. Traction was removed. The pre-retinal PVR membranes were removed inferiorly with maxgrip forceps. An 270 degree inferior peripheral retinotomy was made due to PVR after demarcation with endodiathermy. PFO with injected with a dual bore cannula and the subretinal fluid  was drained.The retina flattened nicely. Laser was placed around all breaks, retinotomy and on the buckle 360 degrees. Silicone oil was placed through the infusion cannula while a backflush cannula was used to passively egress all PFO with attention to maintaining retinal perfusion at all times.   The band ends were trimmed. The buckle height was verified to be appropriate.  The cannulas were removed. The sclerotomies were sutured with 6-0 plain gut suture and no leakage was seen. The pressure was checked and verified to be appropriate. The buckle was rinsed with neosporine solution and the muscle silk sutures removed. The conjunctiva was closed with 6-0 plain suture. A subtenons block consistent of a 1:1 mixtures of 2% Lidocaine and 0.75 % of marcaine with epinephrine and wydase was administered. Subconjuctival injections of dexamethasone and ancef were administered. The lid speculum was removed. The eye was cleaned with wet and dry gauze. A drop of atropine and  maxitrol ointment was placed on the eye. An eye pad and shield were taped over the eye. Maxitrol ointment was placed in the left eye as well but it remained unpatched. The patient tolerated well the procedure and was discharge to the post-operative unit in stable conditions with no complications.  The surgery was assisted by Dr. Arabella Obando. Due to the delicate and complex nature of this surgery, an assistant was required and No qualified resident was available. He assisted with vitrectomy. I was present for the entire surgery.

## 2025-07-21 NOTE — PROGRESS NOTES
OPHTHALMOLOGY POSTOPERATIVE INPATIENT NOTE  07/21/25      S: Doing well postoperatively. Surgery uneventful.   O: Eye patched  AP:  # POD0  - 07/21/25 s/p PPV SB (41/70) EL PVR  Rx PFO SO 1000cs OD and retinopexy OS   - Patient has no care taker at home so requires inpatient observation until tomorrow - of note she is also deaf  - Positioning: FACE DOWN    - No drops needed - keep patch on - do not remove patch  - Send to clinic tomorrow morning for scheduled POD1 visit at [facility] - please help arrange transport to clinic  - Discharge patient tomorrow with prednisolone 4x/day and ofloxacin 4x/day  - Discharge instructions below - OK to discharge tomorrow after clinic visit    Arabella Obando MD  Vitreoretinal Surgery Fellow      POST-OPERATIVE INSTRUCTIONS FOLLOWING RETINA SURGERY    Department of Ophthalmology  [facility]  [phone]    ACTIVITY:  No heavy lifting for 2 weeks after surgery.  No swimming for 4 weeks after surgery.  It is OK for you to shower, to wash your face, and to wash your hair.  Allow the shower to hit the top of your head and wash down your face.  Do not hit your eye directly with the jet from the shower.  Keep your eye covered with the shield when you sleep for 1 week after surgery.  If your shield has a tab, it is designed to go over the bridge of your nose.  Place one piece of tape diagonally from the center of your forehead to the side of your cheek to secure the shield.   During the daytime, you can use either the shield or your regular eyeglasses or sunglasses to protect your eye.    EYE DROPS:  Use these drops after surgery.  When using more than one drop, separate them by 3 minutes between drops.  Common times to place drops are breakfast, lunch, dinner, and bedtime.  Do not stop your drops without discussing with our office.  If you run out before your appointment, call and we will send in a refill.  Prednisolone - 4 times per day (pink top)  Ofloxacin - 4 times per  day (tan top)    WHAT TO EXPECT:  It is common for the eye to to have a blood tinged discharge for a few days after surgery  It may feel irritated (as if something were in your eye), for there to be clear discharge (thicker in the mornings upon awakening), and for it to be bloodshot for 2-3 weeks following retina surgery  You might feel itchiness, please do not rub your eye, instead it is OK to use artificial tears to minimize symptoms  Your vision is will be decreased during this time due to the bubble    WHAT TO WATCH OUT FOR:  If you experience any of the following, you should call immediately:  Increasing pain  Increasing nausea or vomiting  Increasing redness  Worsening or darkening of the vision  New flashing lights or floaters    For any of the symptoms listed above, or for other concerns, call [phone] and ask to speak to the clinic nurse.  If you call after hours, follow to prompts to reach the doctor on call.    SILICONE OIL BUBBLE POSITIONING REQUIREMENTS  You have an oil bubble in your eye. You should maintain the position below for the first 5 days after surgery to help postoperative healing and promote a successful outcome to your retinal surgery:    __X___  Face down   _____  Upright   _____  Left side down   _____  Right side down  _____  No position required    When positioning, it is OK to take brief breaks to eat, stretch, clean up, etc.  Try to position for 90% of the time (5-10 minute break per hour on average).  Do not lay flat on your back for 3 days.    For any of the symptoms listed above, or for other concerns, call [phone] and ask to speak to the clinic nurse.  If you call after hours, follow to prompts to reach the doctor on call.

## 2025-07-21 NOTE — BRIEF OP NOTE
[facility]    Brief Operative Note    Pre-operative diagnosis: Retinal detachment of right eye with multiple breaks [H33.021]  Post-operative diagnosis Same as pre-operative diagnosis    Procedure: Laser Indirect Ophthalmoscopy to LEFT EYE, Left - Eye  Right Eye: Vitrectomy, Scleral Buckle, Silicone Oil Bubble, PFO, Endolaser, Membrane Peel, Retinal dettachment repair, Retinectomy, Right - Eye    Surgeon: Surgeons and Role:     * Ama Hazel MD - Primary     * Arabella Obando MD - Fellow - Assisting  Anesthesia: General   Estimated Blood Loss: Minimal    Drains: None  Specimens: * No specimens in log *  Findings:   As detailed in complete operative note.   Complications: None.  Implants:   Implant Name Type Inv. Item Serial No.  Lot No. LRB No. Used Action   EYE IMP STRIP 4MM STYLE 41  - [id number] Lens/Eye Implant EYE IMP STRIP 4MM STYLE 41   [employer] [id number] Right 1 Implanted   EYE IMP SLEEVE STYLE 70  - [id number] Lens/Eye Implant EYE IMP SLEEVE STYLE 70   [employer] [id number] Right 1 Implanted   EYE SILIKON OIL 1000 8.5ML [id number] - [id number] Lens/Eye Implant EYE SILIKON OIL 1000 8.5ML [id number]  [employer] [id number] Right 2 Implanted

## 2025-07-21 NOTE — ANESTHESIA CARE TRANSFER NOTE
Patient: Marleny Viera    Procedure: Procedure(s):  Laser Indirect Ophthalmoscopy to LEFT EYE  Right Eye: Vitrectomy, Scleral Buckle, Silicone Oil Bubble, PFO, Endolaser, Membrane Peel, Retinal dettachment repair, Retinectomy       Diagnosis: Retinal detachment of right eye with multiple breaks [H33.021]  Diagnosis Additional Information: No value filed.    Anesthesia Type:   General     Note:    Oropharynx: oropharynx clear of all foreign objects and spontaneously breathing  Level of Consciousness: drowsy  Oxygen Supplementation: face mask  Level of Supplemental Oxygen (L/min / FiO2): 8  Independent Airway: airway patency satisfactory and stable  Dentition: dentition unchanged  Vital Signs Stable: post-procedure vital signs reviewed and stable  Report to RN Given: handoff report given  Patient transferred to: PACU    Handoff Report: Identifed the Patient, Identified the Reponsible Provider, Reviewed the pertinent medical history, Discussed the surgical course, Reviewed Intra-OP anesthesia mangement and issues during anesthesia, Set expectations for post-procedure period and Allowed opportunity for questions and acknowledgement of understanding      Vitals:  Vitals Value Taken Time   /63 07/21/25 15:21   Temp 37.7    Pulse 104 07/21/25 15:22   Resp 15 07/21/25 15:22   SpO2 97 % 07/21/25 15:22   Vitals shown include unfiled device data.    Electronically Signed By: MARU Hammonds CRNA  July 21, 2025  3:23 PM

## 2025-07-21 NOTE — OR NURSING
Patient states she took all her meds today but cannot confirm what she took as someone else puts her pill packs together. Notified Dr. Guzmán.

## 2025-07-21 NOTE — PROGRESS NOTES
PACU to Inpatient Nursing Handoff    Patient Marleny Viera is a 49 year old female who speaks American Sign Language.   Procedure Procedure(s):  Laser Indirect Ophthalmoscopy to LEFT EYE  Right Eye: Vitrectomy, Scleral Buckle, Silicone Oil Bubble, PFO, Endolaser, Membrane Peel, Retinal dettachment repair, Retinectomy   Surgeon(s) Primary: Ama Hazel MD  Fellow - Assisting: Arabella Obando MD     Allergies   Allergen Reactions    Acetylcysteine Rash and Other (See Comments)    Amoxicillin Itching and Shortness Of Breath    Bee Pollen Anaphylaxis    Chocolate Anaphylaxis    Contrast [Iodixanol] Shortness Of Breath and Rash     Pt stated she reacted to the contrast given for her CT in past. She experienced shortness of breath, throat tightening, and rash on chest, and they gave her an injection to counter the symptoms.     Covid-19 (Mrna) Vaccine Shortness Of Breath     Pfizer COVID-19 Vaccine    Hymenoptera Allergenic Extract [Wasp Venom Protein] Anaphylaxis    Iodine Hives and Unknown     Pt stated that she was injected with CT CONTRAST in the past and got hives, SOB, and nausea. Please pre-medicate patient in the future.     Meat Extract Anaphylaxis    Wasp Venom Protein Starter Kit [Wasp Venom Protein] Itching, Shortness Of Breath, Swelling and Difficulty breathing    Adhesive Tape Unknown    Aspirin     Bee Venom Unknown    Beef-Derived Drug Products     Geodon [Ziprasidone] Unknown    Latex Unknown     Added based on information entered during case entry, please review and add reactions, type, and severity as needed    Morphine Unknown    Pork Allergy     Prochlorperazine Other (See Comments)    Risperdal [Risperidone] Unknown    Risperidone Analogues [Risperidone] Other (See Comments)    Shellfish Allergy Unknown    Theobroma Oil [Theobroma Grandiflorum Seed Butter] Unknown    Trazodone Other (See Comments)     Elevated creatinine    Zoloft [Sertraline] Unknown    Hydrochlorothiazide Rash     Ibuprofen Difficulty breathing and Rash     Kidney function, Kidney function       Isolation  No active isolations     Past Medical History   has a past medical history of Accidental overdose, initial encounter (07/07/2022), ADHD (attention deficit hyperactivity disorder), Alcohol dependence (H), Anxiety, Arthritis, Asthma, B12 deficiency, Bipolar depression (H), Blood clotting disorder, Deafness, Depressive disorder (10 year), Drug abuse, nondependent (H) (10/29/2004), History of blood clots, History of transfusion, Hypertension, Kidney stones (2015), Major depressive disorder, recurrent episode, severe (H) (05/17/2016), Migraine, Obstructive sleep apnea (06/01/2016), Pulmonary emboli (H), and Status post total right knee replacement (01/18/2016).    Anesthesia General   Dermatome Level     Preop Meds Not applicable   Nerve block Not applicable   Intraop Meds dexamethasone (Decadron)  fentanyl (Sublimaze): 100 mcg total  ondansetron (Zofran): last given at 1443   Local Meds No   Antibiotics vancomycin (Vancocin) - last given at 1501 (drops in eye)     Pain     PACU meds  Not applicable   PCA / epidural No   Capnography  36   Telemetry  NSR   Inpatient Telemetry Monitor Ordered? No        Labs Glucose Lab Results   Component Value Date     07/16/2025     01/23/2025     08/18/2023       Hgb Lab Results   Component Value Date    HGB 13.3 07/16/2025       INR Lab Results   Component Value Date    INR 0.85 06/16/2025      PACU Imaging Not applicable     Wound/Incision Incision/Surgical Site 07/21/25 Left Eye (Active)   Incision Assessment [facility] 07/21/25 1222   Dressing Intervention Open to air / No Dressing 07/21/25 1222   Number of days: 0       Incision/Surgical Site 07/21/25 Right Eye (Active)   Incision Assessment [facility] 07/21/25 1318   Closure Approximated 07/21/25 1318   Incision Care Medication applied - see MAR 07/21/25 1318   Dressing Intervention New dressing applied;Clean, dry, intact 07/21/25  1318   Number of days: 0      CMS        Equipment Not applicable   Other LDA       IV Access Peripheral IV 07/21/25 Anterior;Right Lower forearm (Active)   Site Assessment WDL 07/21/25 1517   Line Status Infusing 07/21/25 1517   Dressing Transparent 07/21/25 1517   Dressing Status clean;dry;intact 07/21/25 1517   Dressing Intervention New dressing  07/21/25 1037   Dressing Change Due 07/28/25 07/21/25 1037   Line Intervention Flushed 07/21/25 1037   Line Necessity Yes, meets criteria 07/21/25 1517   Phlebitis Scale 0-->no symptoms 07/21/25 1517   Infiltration? no 07/21/25 1517   Number of days: 0      Blood Products Not applicable EBL 1 mL   Intake/Output Date 07/21/25 0700 - 07/22/25 0659   Shift 7695-5265 6449-7417 5006-8255 24 Hour Total   INTAKE   I.V. 900   900   Shift Total(mL/kg) 900(9.25)   900(9.25)   OUTPUT   Blood 1   1   Shift Total(mL/kg) 1(0.01)   1(0.01)   Weight (kg) 97.3 97.3 97.3 97.3      Drains / Bruce     Time of void PreOp Time of Void Prior to Procedure: 1032 (07/21/25 1031)    PostOp  1600    Diapered? Yes brief with purwick; pt incontinent baseline; x1 wet brief at 1600   Bladder Scan     PO    popsicles     Vitals    B/P: (!) 153/76  T: 99.9  F (37.7  C)    Temp src: Axillary  P:  Pulse: 102 (07/21/25 1530)          R: 23  O2:  SpO2: 97 %    O2 Device: Nasal cannula (07/21/25 1549)              Family/support present Cousin- Dayan   Patient belongings  Walker, suitcase, 1 belongings bag    Patient transported on cart   DC meds/scripts (obs/outpt) Not applicable   Inpatient Pain Meds Released? No       Special needs/considerations DEAF; needs  and white board for when  leaves   Tasks needing completion Per Dr. Obando note:    - 07/21/25 s/p PPV SB (41/70) EL PVR  Rx PFO SO 1000cs OD and retinopexy OS   - Patient has no care taker at home so requires inpatient observation until tomorrow - of note she is also deaf  - Positioning: FACE DOWN    - No drops needed  - keep patch on - do not remove patch  - Send to clinic tomorrow morning for scheduled POD1 visit at [facility] - please help arrange transport to clinic  - Discharge patient tomorrow with prednisolone 4x/day and ofloxacin 4x/day  - Discharge instructions below - OK to discharge tomorrow after clinic visit       RENATA Harrison

## 2025-07-21 NOTE — ANESTHESIA POSTPROCEDURE EVALUATION
Patient: Marleny Viera    Procedure: Procedure(s):  Laser Indirect Ophthalmoscopy to LEFT EYE  Right Eye: Vitrectomy, Scleral Buckle, Silicone Oil Bubble, PFO, Endolaser, Membrane Peel, Retinal dettachment repair, Retinectomy       Anesthesia Type:  General    Note:  Disposition: Outpatient   Postop Pain Control: Uneventful            Sign Out: Well controlled pain   PONV: No   Neuro/Psych: Uneventful            Sign Out: Acceptable/Baseline neuro status   Airway/Respiratory: Uneventful            Sign Out: Acceptable/Baseline resp. status   CV/Hemodynamics: Uneventful            Sign Out: Acceptable CV status; No obvious hypovolemia; No obvious fluid overload   Other NRE: NONE   DID A NON-ROUTINE EVENT OCCUR?            Last vitals:  Vitals Value Taken Time   /80 07/21/25 16:00   Temp 37.7  C (99.9  F) 07/21/25 15:17   Pulse 100 07/21/25 15:39   Resp 20 07/21/25 15:39   SpO2 97 % 07/21/25 16:00   Vitals shown include unfiled device data.    Electronically Signed By: Bonnie Rodriguez MD  July 21, 2025  5:00 PM

## 2025-07-21 NOTE — ANESTHESIA PROCEDURE NOTES
Airway       Patient location during procedure: OR       Procedure Start/Stop Times: 7/21/2025 11:37 AM  Staff -        Anesthesiologist:  Rg Guzmán MD       CRNA: Eve Walton APRN CRNA       Performed By: CRNA  Consent for Airway        Urgency: elective  Indications and Patient Condition       Indications for airway management: heidi-procedural       Induction type:intravenous       Mask difficulty assessment: 2 - vent by mask + OA or adjuvant +/- NMBA    Final Airway Details       Final airway type: endotracheal airway       Successful airway: ETT - single and Oral  Endotracheal Airway Details        ETT size (mm): 7.5       Cuffed: yes       Inital cuff pressure (cm H2O): 15       Successful intubation technique: video laryngoscopy       VL Blade Size: MAC 3       Grade View of Cords: 2       Adjucts: stylet       Bite block used: None    Post intubation assessment        Placement verified by: capnometry, equal breath sounds and chest rise        Number of attempts at approach: 1       Number of other approaches attempted: 0       Secured with: tape       Ease of procedure: easy       Dentition: Intact and Unchanged    Medication(s) Administered   Medication Administration Time: 7/21/2025 11:37 AM

## 2025-07-21 NOTE — PROGRESS NOTES
Pt discharging home to self care with significant other; education taught; no prescriptions sent; pt will return to Eye clinic tomorrow for follow up appointment; Dr. Obando approved discharge.

## 2025-07-21 NOTE — CONSULTS
"Consult received for Vascular Access Team.  See LDA for details. For additional needs place \"Consult for Inpatient Vascular Access Care\"  [id number] order in EPIC.  " .

## 2025-07-21 NOTE — DISCHARGE INSTRUCTIONS
POST-OPERATIVE INSTRUCTIONS FOLLOWING RETINA SURGERY     Department of Ophthalmology  [facility]  [phone]     ACTIVITY:  No heavy lifting for 2 weeks after surgery.  No swimming for 4 weeks after surgery.  It is OK for you to shower, to wash your face, and to wash your hair.  Allow the shower to hit the top of your head and wash down your face.  Do not hit your eye directly with the jet from the shower.  Keep your eye covered with the shield when you sleep for 1 week after surgery.  If your shield has a tab, it is designed to go over the bridge of your nose.  Place one piece of tape diagonally from the center of your forehead to the side of your cheek to secure the shield.   During the daytime, you can use either the shield or your regular eyeglasses or sunglasses to protect your eye.     EYE DROPS:  Use these drops after surgery.  When using more than one drop, separate them by 3 minutes between drops.  Common times to place drops are breakfast, lunch, dinner, and bedtime.  Do not stop your drops without discussing with our office.  If you run out before your appointment, call and we will send in a refill.  Prednisolone - 4 times per day (pink top)  Ofloxacin - 4 times per day (tan top)     WHAT TO EXPECT:  It is common for the eye to to have a blood tinged discharge for a few days after surgery  It may feel irritated (as if something were in your eye), for there to be clear discharge (thicker in the mornings upon awakening), and for it to be bloodshot for 2-3 weeks following retina surgery  You might feel itchiness, please do not rub your eye, instead it is OK to use artificial tears to minimize symptoms  Your vision is will be decreased during this time due to the bubble     WHAT TO WATCH OUT FOR:  If you experience any of the following, you should call immediately:  Increasing pain  Increasing nausea or vomiting  Increasing redness  Worsening or darkening of the vision  New flashing lights or  floaters     For any of the symptoms listed above, or for other concerns, call [phone] and ask to speak to the clinic nurse.  If you call after hours, follow to prompts to reach the doctor on call.     SILICONE OIL BUBBLE POSITIONING REQUIREMENTS  You have an oil bubble in your eye. You should maintain the position below for the first 5 days after surgery to help postoperative healing and promote a successful outcome to your retinal surgery:     __X___  Face down   _____  Upright   _____  Left side down   _____  Right side down  _____  No position required     When positioning, it is OK to take brief breaks to eat, stretch, clean up, etc.  Try to position for 90% of the time (5-10 minute break per hour on average).  Do not lay flat on your back for 3 days.     For any of the symptoms listed above, or for other concerns, call [phone] and ask to speak to the clinic nurse.  If you call after hours, follow to prompts to reach the doctor on call.      To contact a doctor, call [facility] at [phone]  or:     [phone] and ask for the Resident On Call for:          Ophthalmology (answered 24 hours a day)   Emergency Departments:  [facility] [place] [facility]: [phone]

## 2025-07-22 ENCOUNTER — OFFICE VISIT (OUTPATIENT)
Dept: OPHTHALMOLOGY | Facility: CLINIC | Age: 50
End: 2025-07-22
Attending: OPHTHALMOLOGY
Payer: COMMERCIAL

## 2025-07-22 DIAGNOSIS — Z48.810 AFTERCARE FOLLOWING SURGERY OF A SENSE ORGAN: Primary | ICD-10-CM

## 2025-07-22 PROCEDURE — 99024 POSTOP FOLLOW-UP VISIT: CPT | Performed by: OPHTHALMOLOGY

## 2025-07-22 PROCEDURE — G0463 HOSPITAL OUTPT CLINIC VISIT: HCPCS | Performed by: OPHTHALMOLOGY

## 2025-07-22 RX ORDER — OFLOXACIN 3 MG/ML
1-2 SOLUTION/ DROPS OPHTHALMIC 4 TIMES DAILY
Qty: 10 ML | Refills: 0 | Status: SHIPPED | OUTPATIENT
Start: 2025-07-22

## 2025-07-22 RX ORDER — PREDNISOLONE ACETATE 10 MG/ML
1-2 SUSPENSION/ DROPS OPHTHALMIC 4 TIMES DAILY
Qty: 10 ML | Refills: 1 | Status: SHIPPED | OUTPATIENT
Start: 2025-07-22

## 2025-07-22 ASSESSMENT — CUP TO DISC RATIO
OS_RATIO: 0.4
OD_RATIO: 0.4

## 2025-07-22 ASSESSMENT — VISUAL ACUITY
OD_CC: HM
OS_PH_CC: 20/40
OS_CC: 20/100
METHOD: SNELLEN - LINEAR

## 2025-07-22 ASSESSMENT — SLIT LAMP EXAM - LIDS
COMMENTS: NORMAL
COMMENTS: NORMAL

## 2025-07-22 ASSESSMENT — TONOMETRY
OS_IOP_MMHG: 11
OD_IOP_MMHG: 24

## 2025-07-22 NOTE — PROGRESS NOTES
CC -  POD1  S/P right eye PPV/SB/SO and left eye laser retinopexy 7/21/25 (by Dr. Hazel)    INTERVAL HISTORY -  has had pain and headaches since after surgery    [facility] - Marleny Viera is a 49 year old patient with mac-off RRD OD diagnosed 6/16/25 seen by resident while patient admitted for infection on her finger (had been at OSH for finger infection failed outpatient oral ABx)    Patient deaf, uses ASL sign language  bipolar on seroquel    Seen at [facility] 5/28/25 [facility] OD 20/400 retina attached documented. No trauma. Notes from [facility] from 5/28/25 reviewed. Pt saw Dr titus and refer for surgery because of  pt prefers Female surgeon. Patient reports Decreased vision started June 11,2025 Floaters right eye , no flashes No eye pain. Previously scheduled surgery with dr. Titus but cancelled. she Told her work that surgery will be 7/21/25 and they accepted so now she is ready for surgery.    Past Medical History:   Diagnosis Date    Accidental overdose, initial encounter 07/07/2022    ADHD (attention deficit hyperactivity disorder)     Alcohol dependence (H)     Anxiety     Arthritis     Asthma     B12 deficiency     Bipolar depression (H)     Blood clotting disorder     Deafness     Depressive disorder 10 year    Drug abuse, nondependent (H) 10/29/2004    Overview:  polydrug abuse per psych notes ; Other, mixed, or unspecified nondependent drug abuse, unspecified    History of blood clots     History of transfusion     Hypertension     Kidney stones 2015    Major depressive disorder, recurrent episode, severe (H) 05/17/2016    Overview:  s/p suicide attempt Epic     Migraine     Obstructive sleep apnea 06/01/2016    Pt states she does not have ROBERT, does not use CPAP    Pulmonary emboli (H)     Status post total right knee replacement 01/18/2016        PAST OCULAR SURGERY: None  RETINAL IMAGING:  OCT 6/17/25  OD - detached retina  OS - normal foveal contour, PPA nasally near nerve, no fluid,  attached    ASSESSMENT & PLAN    # POD1  S/P right eye PPV/SB/SO and left eye laser retinopexy 7/21/25 (by Dr. Hazel)    Doing well on POD1  Attached retina with good SO Fill  IOP 24: observe    Start drops:  Prednisolone 4/day right eye   Ofloxacin 4/day right eye   Tropicamide/cyclopentolate 2/day right eye     Follow-up in 1 w with Dr. Hazel for DFE and OCT and optos OU      # Cataract OU  - Likely VS; discussed possible removal of lens OD during this or next surgery     #Myopic degeneration, left eye  - high myope (-16 right eye, -14 left eye)  - staphyloma  OU on OCT, mild pigmentary change to macula  - cobblestone degernation peripherally      #PVD OU      Complete documentation of historical and exam elements from today's encounter can be found in the full encounter summary report (not reduplicated in this progress note). I personally obtained the chief complaint(s) and history of present illness.  I confirmed and edited as necessary the review of systems, past medical/surgical history, family history, social history, and examination findings as documented by others; and I examined the patient myself. I personally reviewed the relevant tests, images, and reports as documented above. I formulated and edited as necessary the assessment and plan and discussed the findings and management plan with the patient and family.    Robert Mcfarland MD, PhD

## 2025-07-22 NOTE — NURSING NOTE
Chief Complaints and History of Present Illnesses   Patient presents with    Post Op (Ophthalmology) Right Eye     Right Eye: Vitrectomy, Scleral Buckle, Silicone Oil Bubble, PFO, Endolaser, Membrane Peel, Retinal dettachment repair, Retinectomy 7/21/25     Chief Complaint(s) and History of Present Illness(es)       Post Op (Ophthalmology) Right Eye              Comments: Right Eye: Vitrectomy, Scleral Buckle, Silicone Oil Bubble, PFO, Endolaser, Membrane Peel, Retinal dettachment repair, Retinectomy 7/21/25              Comments    Day 1 Po  Pt states she was vomiting and headache last night   Pt states she was unable to sleep on my stomach, so I had to sleep on my left side   States pain 9/10 on the pain scale     Pt states she got 2 drops in her bag   Proparacaine  And dilation drops   (ADVISED NOT TO USE, explained that those drops were used before her procedure     Needs medication clarification and RX for post op drops    Nelly Rdz COT 9:56 AM July 22, 2025

## 2025-08-05 ENCOUNTER — HOSPITAL ENCOUNTER (EMERGENCY)
Facility: CLINIC | Age: 50
Discharge: HOME | End: 2025-08-06
Attending: STUDENT IN AN ORGANIZED HEALTH CARE EDUCATION/TRAINING PROGRAM | Admitting: STUDENT IN AN ORGANIZED HEALTH CARE EDUCATION/TRAINING PROGRAM
Payer: COMMERCIAL

## 2025-08-05 DIAGNOSIS — H33.21 RIGHT RETINAL DETACHMENT: ICD-10-CM

## 2025-08-05 DIAGNOSIS — H53.9 VISION CHANGES: Primary | ICD-10-CM

## 2025-08-05 PROCEDURE — 99282 EMERGENCY DEPT VISIT SF MDM: CPT | Performed by: STUDENT IN AN ORGANIZED HEALTH CARE EDUCATION/TRAINING PROGRAM

## 2025-08-05 PROCEDURE — 99284 EMERGENCY DEPT VISIT MOD MDM: CPT | Mod: FS | Performed by: STUDENT IN AN ORGANIZED HEALTH CARE EDUCATION/TRAINING PROGRAM

## 2025-08-05 RX ORDER — PROPARACAINE HYDROCHLORIDE 5 MG/ML
1 SOLUTION/ DROPS OPHTHALMIC ONCE
Status: DISCONTINUED | OUTPATIENT
Start: 2025-08-05 | End: 2025-08-06 | Stop reason: HOSPADM

## 2025-08-05 ASSESSMENT — ACTIVITIES OF DAILY LIVING (ADL): ADLS_ACUITY_SCORE: 48

## 2025-08-06 ENCOUNTER — OFFICE VISIT (OUTPATIENT)
Dept: INTERPRETER SERVICES | Facility: CLINIC | Age: 50
End: 2025-08-06
Payer: COMMERCIAL

## 2025-08-06 ENCOUNTER — MYC MEDICAL ADVICE (OUTPATIENT)
Dept: OPHTHALMOLOGY | Facility: CLINIC | Age: 50
End: 2025-08-06

## 2025-08-06 ENCOUNTER — HOSPITAL ENCOUNTER (OUTPATIENT)
Facility: AMBULATORY SURGERY CENTER | Age: 50
End: 2025-08-06
Attending: OPHTHALMOLOGY
Payer: COMMERCIAL

## 2025-08-06 ENCOUNTER — OFFICE VISIT (OUTPATIENT)
Dept: OPHTHALMOLOGY | Facility: CLINIC | Age: 50
End: 2025-08-06
Attending: OPHTHALMOLOGY
Payer: COMMERCIAL

## 2025-08-06 ENCOUNTER — MYC MEDICAL ADVICE (OUTPATIENT)
Dept: OPHTHALMOLOGY | Facility: CLINIC | Age: 50
End: 2025-08-06
Payer: COMMERCIAL

## 2025-08-06 VITALS
DIASTOLIC BLOOD PRESSURE: 80 MMHG | TEMPERATURE: 98.5 F | OXYGEN SATURATION: 97 % | HEART RATE: 84 BPM | RESPIRATION RATE: 17 BRPM | SYSTOLIC BLOOD PRESSURE: 128 MMHG

## 2025-08-06 DIAGNOSIS — H26.9 CATARACT OF RIGHT EYE, UNSPECIFIED CATARACT TYPE: ICD-10-CM

## 2025-08-06 DIAGNOSIS — H33.21 RETINAL DETACHMENT, RIGHT: ICD-10-CM

## 2025-08-06 DIAGNOSIS — H33.312 HORSESHOE TEAR OF RETINA OF LEFT EYE WITHOUT DETACHMENT: Primary | ICD-10-CM

## 2025-08-06 DIAGNOSIS — H33.001 RETINAL DETACHMENT, RHEGMATOGENOUS, RIGHT EYE: Primary | ICD-10-CM

## 2025-08-06 DIAGNOSIS — H33.312 HORSESHOE TEAR OF RETINA OF LEFT EYE WITHOUT DETACHMENT: ICD-10-CM

## 2025-08-06 PROCEDURE — 92250 FUNDUS PHOTOGRAPHY W/I&R: CPT | Performed by: OPHTHALMOLOGY

## 2025-08-06 PROCEDURE — 92134 CPTRZ OPH DX IMG PST SGM RTA: CPT | Performed by: OPHTHALMOLOGY

## 2025-08-06 PROCEDURE — 76519 ECHO EXAM OF EYE: CPT | Performed by: OPHTHALMOLOGY

## 2025-08-06 PROCEDURE — G0463 HOSPITAL OUTPT CLINIC VISIT: HCPCS | Mod: 25 | Performed by: OPHTHALMOLOGY

## 2025-08-06 PROCEDURE — G0463 HOSPITAL OUTPT CLINIC VISIT: HCPCS | Performed by: OPHTHALMOLOGY

## 2025-08-06 PROCEDURE — T1013 SIGN LANG/ORAL INTERPRETER: HCPCS | Mod: U3

## 2025-08-06 ASSESSMENT — ACTIVITIES OF DAILY LIVING (ADL)
ADLS_ACUITY_SCORE: 48
ADLS_ACUITY_SCORE: 48

## 2025-08-06 ASSESSMENT — REFRACTION_WEARINGRX
OS_AXIS: 127
OD_SPHERE: -16.00
OD_CYLINDER: +2.00
OD_AXIS: 064
OS_SPHERE: -14.25
OS_CYLINDER: +0.75

## 2025-08-06 ASSESSMENT — TONOMETRY
OD_IOP_MMHG: 21
OS_IOP_MMHG: 15
OD_IOP_MMHG: 26
OS_IOP_MMHG: 17
IOP_METHOD: TONOPEN
IOP_HANDHELD: 1

## 2025-08-06 ASSESSMENT — CONF VISUAL FIELD
OD_SUPERIOR_TEMPORAL_RESTRICTION: 1
OD_INFERIOR_TEMPORAL_RESTRICTION: 3
OS_NORMAL: 1
OD_SUPERIOR_NASAL_RESTRICTION: 1
OS_INFERIOR_TEMPORAL_RESTRICTION: 0
OS_SUPERIOR_NASAL_RESTRICTION: 0
OD_INFERIOR_NASAL_RESTRICTION: 3
OS_INFERIOR_NASAL_RESTRICTION: 0
OS_SUPERIOR_TEMPORAL_RESTRICTION: 0

## 2025-08-06 ASSESSMENT — VISUAL ACUITY
METHOD: SNELLEN - LINEAR
OS_PH_CC: 20/40
OS_CC: 20/40
OD_CC: CF 4'
OS_CC+: -2
OS_PH_CC+: +1

## 2025-08-06 ASSESSMENT — CUP TO DISC RATIO
OD_RATIO: 0.4
OS_RATIO: 0.4

## 2025-08-06 ASSESSMENT — SLIT LAMP EXAM - LIDS
COMMENTS: NORMAL
COMMENTS: NORMAL

## 2025-08-07 ENCOUNTER — MYC MEDICAL ADVICE (OUTPATIENT)
Dept: OPHTHALMOLOGY | Facility: CLINIC | Age: 50
End: 2025-08-07
Payer: COMMERCIAL

## 2025-08-07 ENCOUNTER — TELEPHONE (OUTPATIENT)
Dept: OPHTHALMOLOGY | Facility: CLINIC | Age: 50
End: 2025-08-07
Payer: COMMERCIAL

## 2025-08-07 ENCOUNTER — PATIENT OUTREACH (OUTPATIENT)
Dept: CARE COORDINATION | Facility: CLINIC | Age: 50
End: 2025-08-07
Payer: COMMERCIAL

## 2025-08-08 ENCOUNTER — MYC MEDICAL ADVICE (OUTPATIENT)
Dept: OPHTHALMOLOGY | Facility: CLINIC | Age: 50
End: 2025-08-08
Payer: COMMERCIAL

## 2025-08-08 DIAGNOSIS — Z48.810 AFTERCARE FOLLOWING SURGERY OF A SENSE ORGAN: ICD-10-CM

## 2025-08-08 RX ORDER — OFLOXACIN 3 MG/ML
1-2 SOLUTION/ DROPS OPHTHALMIC 4 TIMES DAILY
Qty: 10 ML | Refills: 0 | Status: CANCELLED | OUTPATIENT
Start: 2025-08-08

## 2025-08-12 ENCOUNTER — OFFICE VISIT (OUTPATIENT)
Dept: FAMILY MEDICINE | Facility: CLINIC | Age: 50
End: 2025-08-12
Payer: COMMERCIAL

## 2025-08-12 ENCOUNTER — MYC MEDICAL ADVICE (OUTPATIENT)
Dept: OPHTHALMOLOGY | Facility: CLINIC | Age: 50
End: 2025-08-12

## 2025-08-12 VITALS
WEIGHT: 213 LBS | RESPIRATION RATE: 14 BRPM | TEMPERATURE: 97.9 F | BODY MASS INDEX: 34.64 KG/M2 | SYSTOLIC BLOOD PRESSURE: 166 MMHG | OXYGEN SATURATION: 99 % | HEART RATE: 83 BPM | DIASTOLIC BLOOD PRESSURE: 92 MMHG

## 2025-08-12 DIAGNOSIS — J01.10 ACUTE FRONTAL SINUSITIS, RECURRENCE NOT SPECIFIED: ICD-10-CM

## 2025-08-12 DIAGNOSIS — R10.9 FLANK PAIN: ICD-10-CM

## 2025-08-12 DIAGNOSIS — E66.811 CLASS 1 OBESITY DUE TO EXCESS CALORIES WITH SERIOUS COMORBIDITY AND BODY MASS INDEX (BMI) OF 34.0 TO 34.9 IN ADULT: ICD-10-CM

## 2025-08-12 DIAGNOSIS — H65.191 ACUTE MEE (MIDDLE EAR EFFUSION), RIGHT: ICD-10-CM

## 2025-08-12 DIAGNOSIS — E66.09 CLASS 1 OBESITY DUE TO EXCESS CALORIES WITH SERIOUS COMORBIDITY AND BODY MASS INDEX (BMI) OF 34.0 TO 34.9 IN ADULT: ICD-10-CM

## 2025-08-12 DIAGNOSIS — R73.03 PREDIABETES: Primary | ICD-10-CM

## 2025-08-12 LAB
ALBUMIN UR-MCNC: NEGATIVE MG/DL
APPEARANCE UR: CLEAR
BACTERIA #/AREA URNS HPF: ABNORMAL /HPF
BILIRUB UR QL STRIP: NEGATIVE
CAOX CRY #/AREA URNS HPF: ABNORMAL /HPF
COLOR UR AUTO: YELLOW
GLUCOSE UR STRIP-MCNC: NEGATIVE MG/DL
HGB UR QL STRIP: ABNORMAL
KETONES UR STRIP-MCNC: NEGATIVE MG/DL
LEUKOCYTE ESTERASE UR QL STRIP: NEGATIVE
NITRATE UR QL: NEGATIVE
PH UR STRIP: 6 [PH] (ref 5–8)
RBC #/AREA URNS AUTO: ABNORMAL /HPF
SP GR UR STRIP: 1.02 (ref 1–1.03)
SQUAMOUS #/AREA URNS AUTO: ABNORMAL /LPF
UROBILINOGEN UR STRIP-ACNC: 0.2 E.U./DL
WBC #/AREA URNS AUTO: ABNORMAL /HPF
WBC CLUMPS #/AREA URNS HPF: ABNORMAL /HPF

## 2025-08-12 PROCEDURE — 3080F DIAST BP >= 90 MM HG: CPT | Performed by: NURSE PRACTITIONER

## 2025-08-12 PROCEDURE — 81001 URINALYSIS AUTO W/SCOPE: CPT | Performed by: NURSE PRACTITIONER

## 2025-08-12 PROCEDURE — G2211 COMPLEX E/M VISIT ADD ON: HCPCS | Performed by: NURSE PRACTITIONER

## 2025-08-12 PROCEDURE — 99214 OFFICE O/P EST MOD 30 MIN: CPT | Performed by: NURSE PRACTITIONER

## 2025-08-12 PROCEDURE — 3077F SYST BP >= 140 MM HG: CPT | Performed by: NURSE PRACTITIONER

## 2025-08-12 RX ORDER — CETIRIZINE HYDROCHLORIDE 10 MG/1
10 TABLET ORAL DAILY
Qty: 90 TABLET | Refills: 0 | Status: SHIPPED | OUTPATIENT
Start: 2025-08-12

## 2025-08-12 RX ORDER — CEPHALEXIN 500 MG/1
500 CAPSULE ORAL 2 TIMES DAILY
Qty: 14 CAPSULE | Refills: 0 | Status: SHIPPED | OUTPATIENT
Start: 2025-08-12 | End: 2025-08-19

## 2025-08-12 RX ORDER — METFORMIN HYDROCHLORIDE 500 MG/1
TABLET, EXTENDED RELEASE ORAL
Qty: 194 TABLET | Refills: 0 | Status: SHIPPED | OUTPATIENT
Start: 2025-08-12 | End: 2025-11-24

## 2025-08-12 ASSESSMENT — ASTHMA QUESTIONNAIRES: ACT_TOTALSCORE: 17

## 2025-08-13 ENCOUNTER — MYC MEDICAL ADVICE (OUTPATIENT)
Dept: OPHTHALMOLOGY | Facility: CLINIC | Age: 50
End: 2025-08-13
Payer: COMMERCIAL

## 2025-08-14 ENCOUNTER — MYC MEDICAL ADVICE (OUTPATIENT)
Dept: OPHTHALMOLOGY | Facility: CLINIC | Age: 50
End: 2025-08-14
Payer: COMMERCIAL

## 2025-08-14 ENCOUNTER — TELEPHONE (OUTPATIENT)
Dept: OPHTHALMOLOGY | Facility: CLINIC | Age: 50
End: 2025-08-14
Payer: COMMERCIAL

## 2025-08-18 ENCOUNTER — E-VISIT (OUTPATIENT)
Dept: FAMILY MEDICINE | Facility: CLINIC | Age: 50
End: 2025-08-18
Payer: COMMERCIAL

## 2025-08-18 DIAGNOSIS — H33.21 RETINAL DETACHMENT, RIGHT: Primary | ICD-10-CM

## 2025-08-18 PROCEDURE — 99207 PR NON-BILLABLE SERV PER CHARTING: CPT | Performed by: NURSE PRACTITIONER

## 2025-08-19 ENCOUNTER — HOSPITAL ENCOUNTER (OUTPATIENT)
Dept: ULTRASOUND IMAGING | Facility: HOSPITAL | Age: 50
Discharge: HOME | End: 2025-08-19
Attending: NURSE PRACTITIONER
Payer: COMMERCIAL

## 2025-08-19 DIAGNOSIS — R10.9 FLANK PAIN: ICD-10-CM

## 2025-08-19 PROCEDURE — 76770 US EXAM ABDO BACK WALL COMP: CPT

## 2025-08-19 PROCEDURE — T1013 SIGN LANG/ORAL INTERPRETER: HCPCS | Mod: U3

## (undated) DEVICE — STRAP POSITIONING 60X31" BODY KNEE KBS 01

## (undated) DEVICE — SU MERSILENE 5-0 S-24DA 18" 1761G

## (undated) DEVICE — LINEN TOWEL PACK X5 5464

## (undated) DEVICE — EYE SOL BSS 500ML BAG 0065-1795-04

## (undated) DEVICE — EYE PROBE LASER 25GA FLEX RFID 8065751114

## (undated) DEVICE — EYE PREP BETADINE 5% SOLUTION 30ML 0065-0411-30

## (undated) DEVICE — EYE DRAPE MICROSCOPE UNIVERSAL (BIOM FULL) 08-MK55140

## (undated) DEVICE — ESU CORD BIPOLAR GREEN 10-4000

## (undated) DEVICE — POSITIONER ARMBOARD FOAM CONVOLUTE CP-501

## (undated) DEVICE — EYE CANN DUALBORE 25GA 3425

## (undated) DEVICE — NDL 18GA 1.5" 305196

## (undated) DEVICE — EYE PACK CONSTELLATION VFC 8065750957

## (undated) DEVICE — EYE PERFLUORON KIT 5ML 8065900163

## (undated) DEVICE — EYE FORCEP TIP MAX GRIP ADV DISP 25GA 725.13

## (undated) DEVICE — APPLICATORS COTTON TIP 6"X2 STERILE LF C15053-006

## (undated) DEVICE — APPLICATORS COTTON-TIPPED 3" PKG OF 2 C15050-003

## (undated) DEVICE — EYE SHIELD PLASTIC CLEAR UNIVERSAL K9-6050

## (undated) DEVICE — TAPE MICROPORE 1"X1.5YD 1530S-1

## (undated) DEVICE — EYE PACK 25GA CONSTELLATION 10,000 CPM PPK9380-04

## (undated) DEVICE — EYE NDL RETROBULBAR ATKINSON 25GA 1.5" 581637

## (undated) DEVICE — PACK VITRECTOMY/RET CUSTOM ASC PQ15VRU12

## (undated) DEVICE — PACK EYE PREP KIT CUSTOM 17B0292A

## (undated) DEVICE — EYE PROBE ESU DIATHERMY DSP 25GA 339.21

## (undated) DEVICE — EYE CANN SOFT TIP 25GA FOR VALVED SET 8065149530

## (undated) DEVICE — EYE CANN IRR 27GA ANTERIOR CHAMBER 581280

## (undated) DEVICE — SU SILK 2-0 TIE 12X30" A305H

## (undated) DEVICE — EYE BACK FLUSH SOFT TIP 25GA VITREORETINAL 337.84

## (undated) DEVICE — SYR 01ML LL W/O NDL LATEX FREE 309628

## (undated) DEVICE — EYE CANN IRR 30GA  ANTERIOR CHAMBER 581273

## (undated) DEVICE — SU PLAIN 6-0 TG140-8 18" 1735G

## (undated) DEVICE — TAPE DURAPORE 2"X1.5YD SILK 1538S-2

## (undated) RX ORDER — FENTANYL CITRATE 50 UG/ML
INJECTION, SOLUTION INTRAMUSCULAR; INTRAVENOUS
Status: DISPENSED
Start: 2025-07-21

## (undated) RX ORDER — PROPARACAINE HYDROCHLORIDE 5 MG/ML
SOLUTION/ DROPS OPHTHALMIC
Status: DISPENSED
Start: 2025-07-21

## (undated) RX ORDER — CYCLOPENTOLAT/TROPIC/PHENYLEPH 1%-1%-2.5%
DROPS (EA) OPHTHALMIC (EYE)
Status: DISPENSED
Start: 2025-07-21

## (undated) RX ORDER — FENTANYL CITRATE-0.9 % NACL/PF 10 MCG/ML
PLASTIC BAG, INJECTION (ML) INTRAVENOUS
Status: DISPENSED
Start: 2025-07-21

## (undated) RX ORDER — DEXAMETHASONE SODIUM PHOSPHATE 4 MG/ML
INJECTION, SOLUTION INTRA-ARTICULAR; INTRALESIONAL; INTRAMUSCULAR; INTRAVENOUS; SOFT TISSUE
Status: DISPENSED
Start: 2025-07-21

## (undated) RX ORDER — ACETAMINOPHEN 325 MG/1
TABLET ORAL
Status: DISPENSED
Start: 2025-07-21

## (undated) RX ORDER — ONDANSETRON 2 MG/ML
INJECTION INTRAMUSCULAR; INTRAVENOUS
Status: DISPENSED
Start: 2025-07-21